# Patient Record
Sex: MALE | Race: WHITE | Employment: OTHER | URBAN - METROPOLITAN AREA
[De-identification: names, ages, dates, MRNs, and addresses within clinical notes are randomized per-mention and may not be internally consistent; named-entity substitution may affect disease eponyms.]

---

## 2017-01-04 RX ORDER — OXYCODONE HYDROCHLORIDE 5 MG/1
TABLET ORAL
Qty: 180 TABLET | Refills: 0 | Status: SHIPPED | OUTPATIENT
Start: 2017-01-04 | End: 2017-02-08

## 2017-01-04 RX ORDER — MORPHINE SULFATE 100 MG/1
100 TABLET ORAL EVERY 8 HOURS
Qty: 90 TABLET | Refills: 0 | Status: SHIPPED | OUTPATIENT
Start: 2017-01-04 | End: 2017-02-03

## 2017-01-04 RX ORDER — MORPHINE SULFATE 60 MG/1
60 TABLET, FILM COATED, EXTENDED RELEASE ORAL EVERY 8 HOURS
Qty: 90 TABLET | Refills: 0 | Status: SHIPPED | OUTPATIENT
Start: 2017-01-04 | End: 2017-02-03

## 2017-01-09 ENCOUNTER — TELEPHONE (OUTPATIENT)
Dept: PAIN CLINIC | Facility: HOSPITAL | Age: 57
End: 2017-01-09

## 2017-01-09 ENCOUNTER — TELEPHONE (OUTPATIENT)
Dept: PULMONOLOGY | Facility: CLINIC | Age: 57
End: 2017-01-09

## 2017-01-23 ENCOUNTER — PRIOR ORIGINAL RECORDS (OUTPATIENT)
Dept: OTHER | Age: 57
End: 2017-01-23

## 2017-02-06 NOTE — TELEPHONE ENCOUNTER
Patient sent blood sugars for review. On 1/23 patient's Luis Enrique Pong was increased to 10 mg daily. Patient refusing insulin.  Note from Dr. Raúl Boone from [facility] 12/6/16: \"Reviewed records which demonstrate significant transaminitis but no evidence of cirrhosis.  Would

## 2017-02-07 NOTE — TELEPHONE ENCOUNTER
Confirmed allergy to Januvia with patient. He reports having stomach issues and abdominal pain with Januvia.

## 2017-02-07 NOTE — TELEPHONE ENCOUNTER
Let's start januvia 100 mg daily. Can prescribe after discussing with him. I need to see him in [address] or April 2017.   thx

## 2017-02-07 NOTE — TELEPHONE ENCOUNTER
Called Brandon and let him know per AM based on blood sugars she would like to add Januvia 100mg PO daily to medications. He is agreeable to plan.      Dr. Evelyne Bustillos I noticed when sending medication patient has Januvia in his allergy list. Unsure what his michelle

## 2017-02-09 RX ORDER — MORPHINE SULFATE 60 MG/1
60 TABLET, FILM COATED, EXTENDED RELEASE ORAL EVERY 8 HOURS
Qty: 90 TABLET | Refills: 0 | Status: SHIPPED | OUTPATIENT
Start: 2017-02-09 | End: 2017-03-07

## 2017-02-09 RX ORDER — ZOLPIDEM TARTRATE 10 MG/1
10 TABLET ORAL NIGHTLY
Qty: 30 TABLET | Refills: 5 | Status: SHIPPED | OUTPATIENT
Start: 2017-02-09 | End: 2017-08-02

## 2017-02-09 RX ORDER — OXYCODONE HYDROCHLORIDE 5 MG/1
TABLET ORAL
Qty: 180 TABLET | Refills: 0 | Status: SHIPPED | OUTPATIENT
Start: 2017-02-09 | End: 2017-03-07

## 2017-02-09 RX ORDER — PREGABALIN 75 MG/1
75 CAPSULE ORAL 3 TIMES DAILY
Qty: 90 CAPSULE | Refills: 5 | Status: SHIPPED | OUTPATIENT
Start: 2017-02-09 | End: 2017-07-18

## 2017-02-09 RX ORDER — MORPHINE SULFATE 100 MG/1
100 TABLET ORAL EVERY 8 HOURS
Qty: 90 TABLET | Refills: 0 | Status: SHIPPED | OUTPATIENT
Start: 2017-02-09 | End: 2017-03-07

## 2017-02-10 NOTE — TELEPHONE ENCOUNTER
Dr. Lela Child sent the following routing message: \" Could we do tradjenta 5 mg daily? It is the same class of medication,  So he could have similar allergy with this too.  If patient is okay to try (after understanding that he may develop similar reaction)

## 2017-02-11 ENCOUNTER — PRIOR ORIGINAL RECORDS (OUTPATIENT)
Dept: OTHER | Age: 57
End: 2017-02-11

## 2017-02-11 ENCOUNTER — LAB ENCOUNTER (OUTPATIENT)
Dept: LAB | Facility: HOSPITAL | Age: 57
End: 2017-02-11
Attending: INTERNAL MEDICINE
Payer: MEDICARE

## 2017-02-11 DIAGNOSIS — I50.20 SYSTOLIC HEART FAILURE (HCC): Primary | ICD-10-CM

## 2017-02-11 LAB
ANION GAP SERPL CALC-SCNC: 10 MMOL/L (ref 0–18)
BUN SERPL-MCNC: 21 MG/DL (ref 8–20)
BUN/CREAT SERPL: 19.4 (ref 10–20)
CALCIUM SERPL-MCNC: 8.8 MG/DL (ref 8.5–10.5)
CHLORIDE SERPL-SCNC: 106 MMOL/L (ref 95–110)
CO2 SERPL-SCNC: 22 MMOL/L (ref 22–32)
CREAT SERPL-MCNC: 1.08 MG/DL (ref 0.5–1.5)
GLUCOSE SERPL-MCNC: 274 MG/DL (ref 70–99)
OSMOLALITY UR CALC.SUM OF ELEC: 299 MOSM/KG (ref 275–295)
POTASSIUM SERPL-SCNC: 5 MMOL/L (ref 3.3–5.1)
SODIUM SERPL-SCNC: 138 MMOL/L (ref 136–144)

## 2017-02-11 PROCEDURE — 80048 BASIC METABOLIC PNL TOTAL CA: CPT

## 2017-02-11 PROCEDURE — 36415 COLL VENOUS BLD VENIPUNCTURE: CPT

## 2017-02-18 ENCOUNTER — PRIOR ORIGINAL RECORDS (OUTPATIENT)
Dept: OTHER | Age: 57
End: 2017-02-18

## 2017-02-18 ENCOUNTER — LAB ENCOUNTER (OUTPATIENT)
Dept: LAB | Facility: HOSPITAL | Age: 57
End: 2017-02-18
Attending: INTERNAL MEDICINE
Payer: MEDICARE

## 2017-02-18 DIAGNOSIS — I50.20 SYSTOLIC HEART FAILURE (HCC): Primary | ICD-10-CM

## 2017-02-18 LAB
ALT SERPL-CCNC: 97 U/L (ref 17–63)
AST SERPL-CCNC: 68 U/L (ref 15–41)

## 2017-02-18 PROCEDURE — 87522 HEPATITIS C REVRS TRNSCRPJ: CPT

## 2017-02-18 PROCEDURE — 84460 ALANINE AMINO (ALT) (SGPT): CPT

## 2017-02-18 PROCEDURE — 84450 TRANSFERASE (AST) (SGOT): CPT

## 2017-02-18 PROCEDURE — 36415 COLL VENOUS BLD VENIPUNCTURE: CPT

## 2017-02-20 ENCOUNTER — TELEPHONE (OUTPATIENT)
Dept: PAIN CLINIC | Facility: HOSPITAL | Age: 57
End: 2017-02-20

## 2017-02-21 LAB
ALT (SGPT): 97 U/L
ALT (SGPT): 97 U/L
AST (SGOT): 68 U/L
BUN: 21 MG/DL
CREATININE, SERUM: 1.08 MG/DL
GLUCOSE: 274 MG/DL
HCV RNA QNT PCR INTERPRETATION: DETECTED
HCV RNA QNT REAL-TIME PCR LOG IU/ML: 6.1 LOG IU
POTASSIUM, SERUM: 5 MEQ/L
SODIUM: 138 MEQ/L

## 2017-03-06 ENCOUNTER — PRIOR ORIGINAL RECORDS (OUTPATIENT)
Dept: OTHER | Age: 57
End: 2017-03-06

## 2017-03-07 NOTE — TELEPHONE ENCOUNTER
Pt was informed this will be his last refill without and appt. Pt last seen in Aug 2016.   Pt states understanding

## 2017-03-09 ENCOUNTER — PRIOR ORIGINAL RECORDS (OUTPATIENT)
Dept: OTHER | Age: 57
End: 2017-03-09

## 2017-03-10 RX ORDER — MORPHINE SULFATE 60 MG/1
60 TABLET, FILM COATED, EXTENDED RELEASE ORAL EVERY 8 HOURS
Qty: 90 TABLET | Refills: 0 | Status: SHIPPED | OUTPATIENT
Start: 2017-03-10 | End: 2017-04-04

## 2017-03-10 RX ORDER — MORPHINE SULFATE 100 MG/1
100 TABLET ORAL EVERY 8 HOURS
Qty: 90 TABLET | Refills: 0 | Status: SHIPPED | OUTPATIENT
Start: 2017-03-10 | End: 2017-04-04

## 2017-03-10 RX ORDER — OXYCODONE HYDROCHLORIDE 5 MG/1
TABLET ORAL
Qty: 180 TABLET | Refills: 0 | Status: SHIPPED | OUTPATIENT
Start: 2017-03-10 | End: 2017-04-04

## 2017-03-14 ENCOUNTER — OFFICE VISIT (OUTPATIENT)
Dept: ENDOCRINOLOGY CLINIC | Facility: CLINIC | Age: 57
End: 2017-03-14

## 2017-03-14 VITALS
BODY MASS INDEX: 47.48 KG/M2 | SYSTOLIC BLOOD PRESSURE: 126 MMHG | WEIGHT: 285 LBS | DIASTOLIC BLOOD PRESSURE: 82 MMHG | HEIGHT: 65 IN | HEART RATE: 85 BPM

## 2017-03-14 DIAGNOSIS — E11.65 UNCONTROLLED TYPE 2 DIABETES MELLITUS WITH HYPERGLYCEMIA, WITHOUT LONG-TERM CURRENT USE OF INSULIN (HCC): Primary | ICD-10-CM

## 2017-03-14 LAB
CARTRIDGE LOT#: ABNORMAL NUMERIC
GLUCOSE BLOOD: 132
HEMOGLOBIN A1C: 8.5 % (ref 4.3–5.6)
TEST STRIP LOT #: NORMAL NUMERIC

## 2017-03-14 PROCEDURE — 36416 COLLJ CAPILLARY BLOOD SPEC: CPT | Performed by: INTERNAL MEDICINE

## 2017-03-14 PROCEDURE — 82962 GLUCOSE BLOOD TEST: CPT | Performed by: INTERNAL MEDICINE

## 2017-03-14 PROCEDURE — 99214 OFFICE O/P EST MOD 30 MIN: CPT | Performed by: INTERNAL MEDICINE

## 2017-03-14 PROCEDURE — 83036 HEMOGLOBIN GLYCOSYLATED A1C: CPT | Performed by: INTERNAL MEDICINE

## 2017-03-14 RX ORDER — DAPAGLIFLOZIN 10 MG/1
10 TABLET, FILM COATED ORAL DAILY
Refills: 2 | COMMUNITY
Start: 2017-02-23 | End: 2017-04-22

## 2017-03-14 NOTE — PROGRESS NOTES
Return Office Visit - Diabetes    CHIEF COMPLAINT:    Diabetes     HISTORY OF PRESENT ILLNESS:   Rosita Hillman is a 61year old male PMH as below including HTN, chronic CHF, chronic hepatitis C , diverticulitis who presents for a FU visit for DM ( discovere DAILY Disp: 90 tablet Rfl: 0   MICROLET LANCETS Does not apply Misc TEST TWICE DAILY Disp: 100 each Rfl: 0   Promethazine HCl 12.5 MG Oral Tab TK 1 T PO TID PRN N Disp:  Rfl: 0   Glucose Blood (TALIA CONTOUR NEXT TEST) In Vitro Strip Test 2-3 times a day. proptosis, blurring  ENT: Negative for:  dysphagia, neck swelling, dysphonia  Respiratory: Negative for:  dyspnea, cough  Cardiovascular: Negative for:  chest pain, palpitations, orthopnea  GI: Negative for:  abdominal pain, nausea, vomiting, diarrhea, con still high. Discussed insulin would be the best option. However, patient is resistant to insulin  Discussed that we could do the pills along with one shot of levemir. After a long discussion, patient agreed.    .     Goal a1c: < 7 %  Goal Fasting, Goal pre

## 2017-03-15 ENCOUNTER — TELEPHONE (OUTPATIENT)
Dept: ENDOCRINOLOGY CLINIC | Facility: CLINIC | Age: 57
End: 2017-03-15

## 2017-03-15 NOTE — TELEPHONE ENCOUNTER
Called pharmacy. Patient is in a coverage gap for Medicare. Unfortunately co-pay is $160 for 1 box of levemir. Bailee Benavidez is not on formulary. However, due to patient's low dose, one box of levemir should last him 6 months on his current dose.  Called patient

## 2017-03-21 ENCOUNTER — TELEPHONE (OUTPATIENT)
Dept: ENDOCRINOLOGY CLINIC | Facility: CLINIC | Age: 57
End: 2017-03-21

## 2017-03-21 NOTE — TELEPHONE ENCOUNTER
Spouse calling to report Blood sugar readings:      3/11  226  3/12  200  3/13  198  3/14  168  3/15  162  3/17   266  3/20   219   3/21 192 /310      No further information given.

## 2017-03-22 NOTE — TELEPHONE ENCOUNTER
farxiga 10 mg and Tradjenta 5 mg  Start levemir 8 units daily. Please confirm above mentioned regimen.   If correct, please increase levemir to 12

## 2017-03-22 NOTE — TELEPHONE ENCOUNTER
Spoke with Amaya Blood. Confirmed he is taking Levemir 8 units nightly, tradjenta 5 mg daily, and farxiga 10 mg daily. He will increase dose to 12 units and will fax sugars in 7-10 days.

## 2017-03-29 ENCOUNTER — TELEPHONE (OUTPATIENT)
Dept: ENDOCRINOLOGY CLINIC | Facility: CLINIC | Age: 57
End: 2017-03-29

## 2017-03-29 NOTE — TELEPHONE ENCOUNTER
Patient was last seen in internal medicine by Dr. Ronny dHz. Per request from Dr. Evelyne Bustillos routed to  and Dr. Ronny Hdz for review. Patient having abdominal pain, h/o hepatitis C. Refusing to go to ER.

## 2017-03-29 NOTE — TELEPHONE ENCOUNTER
He is regular pt of Dr Angel Houston.  i agree with sending him to ER then ffup with Dr Angel Houston tomorrow

## 2017-03-29 NOTE — TELEPHONE ENCOUNTER
Called patient. Informed him that, especially given his liver condition, she recommends that he goes to the ER for evaluation. Patient refuses to go.  Informed him that given his worsening liver condition, and that this pain is new that started this week, D

## 2017-03-29 NOTE — TELEPHONE ENCOUNTER
Called patient. He is currently taking Levemir 12 units nightly, Farxiga 10 mg daily, and tradjenta 5 mg daily. His Levemir dose was increased to 12 units 1 week ago, at which time he started having symptoms.  He takes the levemir shot at 8pm every night, a

## 2017-03-29 NOTE — TELEPHONE ENCOUNTER
Pt notified of EL message below. States he is still not willing to go to ER because his labs are not in from [place]. Stressed to pt that all providers want him to go to ER due to his history and new symptoms but pt adamantly refuses.   He states he will f

## 2017-03-29 NOTE — PROGRESS NOTES
Patient's Personal History/Story    PT OF [facility] SINCE 2000 FOR FIBROMYALGIA, ARTHRITIS, LT KNEE REPLACEMENTS     What Should We Know About You or Your Family to Care for You    ON DISABILITY   , NO CHILDREN  Patient Progress/Clinical Milestones

## 2017-03-30 NOTE — TELEPHONE ENCOUNTER
Followed up with patient. Reports feeling better today. No nausea last night or today and stomach pain has improved. Rated as 5/10 yesterday and 3/10 today. Feels better overall.  Routed to AM

## 2017-04-04 ENCOUNTER — OFFICE VISIT (OUTPATIENT)
Dept: PAIN CLINIC | Facility: HOSPITAL | Age: 57
End: 2017-04-04
Attending: NURSE PRACTITIONER
Payer: MEDICARE

## 2017-04-04 VITALS — HEIGHT: 65 IN | WEIGHT: 280 LBS | BODY MASS INDEX: 46.65 KG/M2

## 2017-04-04 DIAGNOSIS — M79.7 FIBROMYALGIA: ICD-10-CM

## 2017-04-04 DIAGNOSIS — Z79.891 ENCOUNTER FOR MONITORING OPIOID MAINTENANCE THERAPY: Primary | ICD-10-CM

## 2017-04-04 DIAGNOSIS — Z51.81 ENCOUNTER FOR MONITORING OPIOID MAINTENANCE THERAPY: Primary | ICD-10-CM

## 2017-04-04 PROCEDURE — 99211 OFF/OP EST MAY X REQ PHY/QHP: CPT

## 2017-04-04 PROCEDURE — 80307 DRUG TEST PRSMV CHEM ANLYZR: CPT | Performed by: NURSE PRACTITIONER

## 2017-04-04 PROCEDURE — 80356 HEROIN METABOLITE: CPT | Performed by: NURSE PRACTITIONER

## 2017-04-04 PROCEDURE — 80361 OPIATES 1 OR MORE: CPT | Performed by: NURSE PRACTITIONER

## 2017-04-04 PROCEDURE — 80365 DRUG SCREENING OXYCODONE: CPT | Performed by: NURSE PRACTITIONER

## 2017-04-04 RX ORDER — MORPHINE SULFATE 100 MG/1
100 TABLET ORAL EVERY 8 HOURS
Qty: 90 TABLET | Refills: 0 | Status: SHIPPED | OUTPATIENT
Start: 2017-04-09 | End: 2017-05-03

## 2017-04-04 RX ORDER — MORPHINE SULFATE 60 MG/1
60 TABLET, FILM COATED, EXTENDED RELEASE ORAL EVERY 8 HOURS
Qty: 90 TABLET | Refills: 0 | Status: SHIPPED | OUTPATIENT
Start: 2017-04-09 | End: 2017-05-03

## 2017-04-04 RX ORDER — OXYCODONE HYDROCHLORIDE 5 MG/1
TABLET ORAL
Qty: 180 TABLET | Refills: 0 | Status: SHIPPED | OUTPATIENT
Start: 2017-04-04 | End: 2017-05-03

## 2017-04-04 NOTE — PROGRESS NOTES
Patient presents to [facility] ambulatory with wife. He is here for a med eval.  We have been seeing him for chronic fibromyalgia in which he has pain in his abdomen, his lower back and his left leg.   He has numbness and tingling in his left hand, his left leg a

## 2017-04-04 NOTE — CHRONIC PAIN
MEDICATION EVALUATION    HISTORY OF PRESENT ILLNESS:  Jefferson Jeffers is a 61year old old male with history of chronic pain who presents for medication evaluation. He continues to report chronic joint pain. He denies new or worsening pain.  Pt reports that the tablet Rfl: 0   MICROLET LANCETS Does not apply Misc TEST TWICE DAILY Disp: 100 each Rfl: 0   Promethazine HCl 12.5 MG Oral Tab TK 1 T PO TID PRN N Disp:  Rfl: 0   Glucose Blood (TALIA CONTOUR NEXT TEST) In Vitro Strip Test 2-3 times a day.  Disp: 100 strip Epilepsy ([facility]     Pain and swelling of left lower leg     Fibromyalgia    Past Medical History   Diagnosis Date   • Seizure disorder ([facility]    • Lipid screening 1/17/2011     per NG   • Fibromyalgia    • Cardiomyopathy [facility])    • Rectal fissure      color

## 2017-04-11 RX ORDER — PEN NEEDLE, DIABETIC 32GX 5/32"
NEEDLE, DISPOSABLE MISCELLANEOUS
Qty: 100 EACH | Refills: 2 | Status: SHIPPED | OUTPATIENT
Start: 2017-04-11 | End: 2017-07-31

## 2017-04-11 RX ORDER — LINAGLIPTIN 5 MG/1
TABLET, FILM COATED ORAL
Qty: 30 TABLET | Refills: 2 | Status: SHIPPED | OUTPATIENT
Start: 2017-04-11 | End: 2017-06-27

## 2017-04-12 ENCOUNTER — TELEPHONE (OUTPATIENT)
Dept: ENDOCRINOLOGY CLINIC | Facility: CLINIC | Age: 57
End: 2017-04-12

## 2017-04-12 NOTE — TELEPHONE ENCOUNTER
Pt calling to report his lowest sugar reading was 163- now is back up to 207 210. Requesting to speak with RN.

## 2017-04-12 NOTE — TELEPHONE ENCOUNTER
Spoke with Juan Ramon Harrington. He is only checking fasting sugars. He is currently taking Levemir 12u nightly, Farxiga 10mg daily, and tradjenta 5 mg daily.                B  4/12: 220 (after breakfast)  4/11: 210  4/10: 207  4/9:  4/8:    163

## 2017-04-12 NOTE — TELEPHONE ENCOUNTER
Spoke with Lizeth Mayes and informed him of Dr. Teresa Brambila message below. He verbalized his understanding. He had no further questions at this time.

## 2017-04-24 RX ORDER — DAPAGLIFLOZIN 10 MG/1
TABLET, FILM COATED ORAL
Qty: 30 TABLET | Refills: 3 | Status: SHIPPED | OUTPATIENT
Start: 2017-04-24 | End: 2017-06-27

## 2017-04-26 ENCOUNTER — PRIOR ORIGINAL RECORDS (OUTPATIENT)
Dept: OTHER | Age: 57
End: 2017-04-26

## 2017-05-04 RX ORDER — OXYCODONE HYDROCHLORIDE 5 MG/1
TABLET ORAL
Qty: 180 TABLET | Refills: 0 | Status: SHIPPED | OUTPATIENT
Start: 2017-05-08 | End: 2017-06-06

## 2017-05-04 RX ORDER — MORPHINE SULFATE 100 MG/1
100 TABLET ORAL EVERY 8 HOURS
Qty: 90 TABLET | Refills: 0 | Status: SHIPPED | OUTPATIENT
Start: 2017-05-08 | End: 2017-06-06

## 2017-05-04 RX ORDER — MORPHINE SULFATE 60 MG/1
60 TABLET, FILM COATED, EXTENDED RELEASE ORAL EVERY 8 HOURS
Qty: 90 TABLET | Refills: 0 | Status: SHIPPED | OUTPATIENT
Start: 2017-05-08 | End: 2017-06-06

## 2017-05-11 ENCOUNTER — TELEPHONE (OUTPATIENT)
Dept: ENDOCRINOLOGY CLINIC | Facility: CLINIC | Age: 57
End: 2017-05-11

## 2017-05-15 NOTE — TELEPHONE ENCOUNTER
Reviewed BG. Morning BG on the higher side. Please confirm regimen. I think he is on 15 levemir. If that is the case, should increase to 18. Call in a week with Bg. Checks some more Bg during the day.      Thx.

## 2017-05-15 NOTE — TELEPHONE ENCOUNTER
Called Brandon. He is currently taking levemir 14 units. Increase to 17? Also patient will need a refill of levemir with new dosing instructions.

## 2017-05-15 NOTE — TELEPHONE ENCOUNTER
Called Brandon. Informed him to increase levemir to 17 units daily and call in 1 week with sugars. He verbalized his understanding and had no further questions at this time.

## 2017-06-06 RX ORDER — MORPHINE SULFATE 100 MG/1
100 TABLET ORAL EVERY 8 HOURS
Qty: 90 TABLET | Refills: 0 | Status: SHIPPED | OUTPATIENT
Start: 2017-06-07 | End: 2017-06-30

## 2017-06-06 RX ORDER — MORPHINE SULFATE 60 MG/1
60 TABLET, FILM COATED, EXTENDED RELEASE ORAL EVERY 8 HOURS
Qty: 90 TABLET | Refills: 0 | Status: SHIPPED | OUTPATIENT
Start: 2017-06-07 | End: 2017-06-30

## 2017-06-06 RX ORDER — OXYCODONE HYDROCHLORIDE 5 MG/1
TABLET ORAL
Qty: 180 TABLET | Refills: 0 | Status: SHIPPED | OUTPATIENT
Start: 2017-06-07 | End: 2017-06-30

## 2017-06-27 ENCOUNTER — OFFICE VISIT (OUTPATIENT)
Dept: ENDOCRINOLOGY CLINIC | Facility: CLINIC | Age: 57
End: 2017-06-27

## 2017-06-27 VITALS
HEIGHT: 65.5 IN | BODY MASS INDEX: 46.65 KG/M2 | HEART RATE: 62 BPM | WEIGHT: 283.38 LBS | DIASTOLIC BLOOD PRESSURE: 85 MMHG | SYSTOLIC BLOOD PRESSURE: 121 MMHG

## 2017-06-27 DIAGNOSIS — E11.65 UNCONTROLLED TYPE 2 DIABETES MELLITUS WITH HYPERGLYCEMIA, WITH LONG-TERM CURRENT USE OF INSULIN (HCC): Primary | ICD-10-CM

## 2017-06-27 DIAGNOSIS — Z79.4 UNCONTROLLED TYPE 2 DIABETES MELLITUS WITH HYPERGLYCEMIA, WITH LONG-TERM CURRENT USE OF INSULIN (HCC): Primary | ICD-10-CM

## 2017-06-27 PROCEDURE — 83036 HEMOGLOBIN GLYCOSYLATED A1C: CPT | Performed by: INTERNAL MEDICINE

## 2017-06-27 PROCEDURE — 99214 OFFICE O/P EST MOD 30 MIN: CPT | Performed by: INTERNAL MEDICINE

## 2017-06-27 PROCEDURE — 36416 COLLJ CAPILLARY BLOOD SPEC: CPT | Performed by: INTERNAL MEDICINE

## 2017-06-27 PROCEDURE — 82962 GLUCOSE BLOOD TEST: CPT | Performed by: INTERNAL MEDICINE

## 2017-06-27 RX ORDER — INSULIN LISPRO 100 [IU]/ML
18 INJECTION, SUSPENSION SUBCUTANEOUS 2 TIMES DAILY
Qty: 15 ML | Refills: 0 | Status: SHIPPED | OUTPATIENT
Start: 2017-06-27 | End: 2017-07-21

## 2017-06-27 NOTE — PROGRESS NOTES
Return Office Visit - Diabetes    CHIEF COMPLAINT:    Diabetes     HISTORY OF PRESENT ILLNESS:   Orquidea Pascal is a 61year old male PMH as below including HTN, chronic CHF, chronic hepatitis C , diverticulitis who presents for a FU visit for DM ( discovere Promethazine HCl 12.5 MG Oral Tab TK 1 T PO TID PRN N Disp:  Rfl: 0   Glucose Blood (TALIA CONTOUR NEXT TEST) In Vitro Strip Test 2-3 times a day.  Disp: 100 strip Rfl: 3   Elastic Bandages & Supports (MEDICAL COMPRESSION SOCKS) Does not apply Misc 1 each diarrhea, constipation, bleeding  Neurology: Negative for: headache, numbness, weakness  Genito-Urinary: Negative for: dysuria, frequency  Psychiatric: Negative for:  depression, anxiety  Hematology/Lymphatics: Negative for: bruising, lower extremity edema call me with BG in 2-3 days. b). Urine microalbumin /creatinine normal.  C). He is up to date with eye exam.   d). Foot exam: Daily feet exam explained. e). BG log maintainence explained in great detail, to get log and glucometer on next visit.   f)

## 2017-06-30 ENCOUNTER — TELEPHONE (OUTPATIENT)
Dept: ENDOCRINOLOGY CLINIC | Facility: CLINIC | Age: 57
End: 2017-06-30

## 2017-06-30 RX ORDER — MORPHINE SULFATE 60 MG/1
60 TABLET, FILM COATED, EXTENDED RELEASE ORAL EVERY 8 HOURS
Qty: 90 TABLET | Refills: 0 | Status: SHIPPED | OUTPATIENT
Start: 2017-07-06 | End: 2017-06-30

## 2017-06-30 RX ORDER — MORPHINE SULFATE 100 MG/1
100 TABLET ORAL EVERY 8 HOURS
Qty: 90 TABLET | Refills: 0 | Status: SHIPPED | OUTPATIENT
Start: 2017-07-06 | End: 2017-07-31

## 2017-06-30 RX ORDER — OXYCODONE HYDROCHLORIDE 5 MG/1
TABLET ORAL
Qty: 180 TABLET | Refills: 0 | Status: SHIPPED | OUTPATIENT
Start: 2017-07-06 | End: 2017-07-31

## 2017-06-30 NOTE — TELEPHONE ENCOUNTER
Per LOV note on 6/27/17 patient was given the following instructions: Will insulin 70/30 18 units BID. Stop levemir, stop farxiga and stop jardiance  He will call me with BG in 2-3 days.      Also called patient to see if he has been checking blood suga

## 2017-06-30 NOTE — TELEPHONE ENCOUNTER
Called Brandon. Informed him of Dr. Alyx Wills message below. He will increase evening dose to 22 units. He will call with blood sugars in 1 week or sooner if he has any blood sugars <70 or persistently >300.

## 2017-07-03 RX ORDER — MORPHINE SULFATE 60 MG/1
60 TABLET, FILM COATED, EXTENDED RELEASE ORAL EVERY 8 HOURS
Qty: 90 TABLET | Refills: 0 | Status: SHIPPED | OUTPATIENT
Start: 2017-07-06 | End: 2017-07-31

## 2017-07-07 ENCOUNTER — TELEPHONE (OUTPATIENT)
Dept: ENDOCRINOLOGY CLINIC | Facility: CLINIC | Age: 57
End: 2017-07-07

## 2017-07-07 NOTE — TELEPHONE ENCOUNTER
Pt is calling in with his sugar readings.  Before breakfast and before dinner    7/1 - 186 - 270  7/2 - 187 - no reading before dinner  7/3 - 292 - 275  7/4 - 202 - 371  7/5 - 174 - no reading before dinner  7/6 - 320 - 298   7/7 - 214

## 2017-07-07 NOTE — TELEPHONE ENCOUNTER
Let's increase to 24 twice a day. Call in one week with before Bf abd before dinner. Call before if Bg is < 70.   Thx.

## 2017-07-07 NOTE — TELEPHONE ENCOUNTER
Called Brandon. Informed him of Dr. Trenton Schilder instructions below. He verbalized his understanding and repeated instructions back correctly to the RN. He had no further questions at this time.

## 2017-07-14 ENCOUNTER — TELEPHONE (OUTPATIENT)
Dept: ENDOCRINOLOGY CLINIC | Facility: CLINIC | Age: 57
End: 2017-07-14

## 2017-07-14 NOTE — TELEPHONE ENCOUNTER
Called patient. Informed him of Dr. Lesly Connelly instructions below. He verbalized his understanding and had no further questions at this time.

## 2017-07-14 NOTE — TELEPHONE ENCOUNTER
Pt called with sugar readings:       Before Breakfast Before Dinner  7/9/17  277   177  7/10/17  223   209  7/11/17  228   223  7/12/17  159     7/13/17  231   293  7/14/17  239    Please call.

## 2017-07-18 ENCOUNTER — TELEPHONE (OUTPATIENT)
Dept: PAIN CLINIC | Facility: HOSPITAL | Age: 57
End: 2017-07-18

## 2017-07-21 ENCOUNTER — TELEPHONE (OUTPATIENT)
Dept: ENDOCRINOLOGY CLINIC | Facility: CLINIC | Age: 57
End: 2017-07-21

## 2017-07-21 RX ORDER — INSULIN LISPRO 100 [IU]/ML
INJECTION, SUSPENSION SUBCUTANEOUS
Qty: 15 ML | Refills: 0 | COMMUNITY
Start: 2017-07-21 | End: 2017-09-01

## 2017-07-21 RX ORDER — INSULIN LISPRO 100 [IU]/ML
INJECTION, SUSPENSION SUBCUTANEOUS
Qty: 30 ML | Refills: 0 | Status: SHIPPED | OUTPATIENT
Start: 2017-07-21 | End: 2017-08-23

## 2017-07-21 NOTE — TELEPHONE ENCOUNTER
Pt called with readings:       Morning  Afternoon  7/15/17  281  393  7/16/17  246  376  7/17/17  199  278  7/18/17  235  7/19/17  188  306  7/20/17  247  364  7/21/17  273    Please call.

## 2017-07-21 NOTE — TELEPHONE ENCOUNTER
Contacted pt and notified him AM reviewed his blood sugars. Blood sugars continue to be high so advised him to increase insulin to 36 units in the morning and 32 units in the evening. Pt gave verbal readback of new insulin instructions.  He will call back i

## 2017-07-24 RX ORDER — PREGABALIN 75 MG/1
75 CAPSULE ORAL 3 TIMES DAILY
Qty: 90 CAPSULE | Refills: 5 | Status: SHIPPED | OUTPATIENT
Start: 2017-07-24 | End: 2017-08-14

## 2017-07-28 ENCOUNTER — TELEPHONE (OUTPATIENT)
Dept: ENDOCRINOLOGY CLINIC | Facility: CLINIC | Age: 57
End: 2017-07-28

## 2017-07-28 NOTE — TELEPHONE ENCOUNTER
Pt called with sugar readings:      Before Breakfast  Before Dinner   7/23/17  294   154  7/24/17  192   244  7/25/17  257   194  7/26/17  227   212  7/27/17  211   350 (after lunch)  7/28/17  268    Please call.

## 2017-07-28 NOTE — TELEPHONE ENCOUNTER
Patient sent sugars for review.  He is currently taking insulin 70/30: 36 units in the morning and 32 units in the evening

## 2017-07-31 NOTE — TELEPHONE ENCOUNTER
Called Brandon. Informed him of Dr. Moore Se instructions below. He needs a refill on his insulin pen needles. Order pending. He will call with sugars in 1 week.

## 2017-08-01 RX ORDER — MORPHINE SULFATE 60 MG/1
60 TABLET, FILM COATED, EXTENDED RELEASE ORAL EVERY 8 HOURS
Qty: 90 TABLET | Refills: 0 | Status: SHIPPED | OUTPATIENT
Start: 2017-08-05 | End: 2017-09-05

## 2017-08-01 RX ORDER — OXYCODONE HYDROCHLORIDE 5 MG/1
TABLET ORAL
Qty: 180 TABLET | Refills: 0 | Status: SHIPPED | OUTPATIENT
Start: 2017-08-05 | End: 2017-08-14

## 2017-08-01 RX ORDER — MORPHINE SULFATE 100 MG/1
100 TABLET ORAL EVERY 8 HOURS
Qty: 90 TABLET | Refills: 0 | Status: SHIPPED | OUTPATIENT
Start: 2017-08-05 | End: 2017-09-04

## 2017-08-02 RX ORDER — ZOLPIDEM TARTRATE 10 MG/1
10 TABLET ORAL NIGHTLY
Qty: 30 TABLET | Refills: 2 | Status: SHIPPED | OUTPATIENT
Start: 2017-08-02 | End: 2017-09-05

## 2017-08-04 ENCOUNTER — TELEPHONE (OUTPATIENT)
Dept: ENDOCRINOLOGY CLINIC | Facility: CLINIC | Age: 57
End: 2017-08-04

## 2017-08-04 NOTE — TELEPHONE ENCOUNTER
Lissette Taylor. Informed him of Dr. Covington Spearing message below. He verbalized his undrestanding.

## 2017-08-04 NOTE — TELEPHONE ENCOUNTER
Pt called to give AM Sugar Readings      Date: Mornings Before Dinner  7/28 268  176  7/29 183  218  7/30 259  208  7/31 156  200  8/1 371  295  8/2 247  291  8/3 224  240  8/4 157      Thank You

## 2017-08-07 ENCOUNTER — TELEPHONE (OUTPATIENT)
Dept: ENDOCRINOLOGY CLINIC | Facility: CLINIC | Age: 57
End: 2017-08-07

## 2017-08-07 NOTE — TELEPHONE ENCOUNTER
Refer to previous TE Pt spoke to RN earlier but lost information on increased amount of insulin. Please Call back to clarify.

## 2017-08-07 NOTE — TELEPHONE ENCOUNTER
Called Brandon and let him know per AM to increase insulin to 50 units SQ in the morning and 44 in the evening. Understands to call back in 1 week with BG readings.

## 2017-08-07 NOTE — TELEPHONE ENCOUNTER
Pt called with sugar readings:        Before breakfast  After Lunch   8/5/17   179   319  8/6/17   267   200  8/7/17   254    Please call.

## 2017-08-11 ENCOUNTER — TELEPHONE (OUTPATIENT)
Dept: ENDOCRINOLOGY CLINIC | Facility: CLINIC | Age: 57
End: 2017-08-11

## 2017-08-11 NOTE — TELEPHONE ENCOUNTER
Spoke with Emmie Runner. Informed him of changes below. Understands to increase to 56 units in the morning and 50 units in the evening.      FYI he is checking fasting before breakfast and about 2 hours after last meal (which is eaten around 3-4 pm, doesn't eat aft

## 2017-08-11 NOTE — TELEPHONE ENCOUNTER
56 am and 50 pm   Is he checking before Bf and before dinner  Call in one week with BG.      Veronicax.

## 2017-08-11 NOTE — TELEPHONE ENCOUNTER
Pt called to give AM Sugar readings:    Date: Mornings  Evenings  8/6 267  200  8/7 254  247  8/8 265  419  8/9 220  130  8/10 222  433  8/11 294       Thank You

## 2017-08-11 NOTE — TELEPHONE ENCOUNTER
Last adjustment made last week. Patient currently taking 70/30 insulin 50 units in the morning and 44 units at night.

## 2017-08-14 ENCOUNTER — OFFICE VISIT (OUTPATIENT)
Dept: PAIN CLINIC | Facility: HOSPITAL | Age: 57
End: 2017-08-14
Attending: ANESTHESIOLOGY
Payer: MEDICARE

## 2017-08-14 VITALS
HEIGHT: 65 IN | HEART RATE: 78 BPM | BODY MASS INDEX: 47.48 KG/M2 | DIASTOLIC BLOOD PRESSURE: 95 MMHG | SYSTOLIC BLOOD PRESSURE: 128 MMHG | WEIGHT: 285 LBS

## 2017-08-14 DIAGNOSIS — M79.7 FIBROMYALGIA: Primary | ICD-10-CM

## 2017-08-14 DIAGNOSIS — M79.89 PAIN AND SWELLING OF LEFT LOWER LEG: ICD-10-CM

## 2017-08-14 DIAGNOSIS — M79.662 PAIN AND SWELLING OF LEFT LOWER LEG: ICD-10-CM

## 2017-08-14 DIAGNOSIS — F11.90 CHRONIC, CONTINUOUS USE OF OPIOIDS: ICD-10-CM

## 2017-08-14 PROCEDURE — 99211 OFF/OP EST MAY X REQ PHY/QHP: CPT

## 2017-08-14 RX ORDER — NORTRIPTYLINE HYDROCHLORIDE 25 MG/1
25 CAPSULE ORAL NIGHTLY
Qty: 30 CAPSULE | Refills: 2 | Status: SHIPPED | OUTPATIENT
Start: 2017-08-14 | End: 2017-11-01

## 2017-08-14 RX ORDER — PREGABALIN 100 MG/1
100 CAPSULE ORAL 3 TIMES DAILY
Qty: 90 CAPSULE | Refills: 2 | Status: SHIPPED | OUTPATIENT
Start: 2017-08-14 | End: 2017-11-06

## 2017-08-14 RX ORDER — PREGABALIN 75 MG/1
75 CAPSULE ORAL 3 TIMES DAILY
Qty: 90 CAPSULE | Refills: 5 | OUTPATIENT
Start: 2017-08-14 | End: 2018-02-10

## 2017-08-14 NOTE — CHRONIC PAIN
MEDICATION EVALUATION    HISTORY OF PRESENT ILLNESS:  Carmen Castellanos is a 61year old old male with history of chronic pain 2/2 fibromyalgia, joint pain who presents for medication evaluation. He continues to report chronic joint pain, right breast pain.  He d Insulin Lispro Prot & Lispro (HUMALOG MIX 75/25 KWIKPEN) (75-25) 100 UNIT/ML SC SUPN Inject 36 units SC every morning and 32 units SC every night Disp: 15 mL Rfl: 0   Insulin Lispro Prot & Lispro (HUMALOG MIX 75/25 KWIKPEN) (75-25) 100 UNIT/ML SC SUPN In Rfl: 0        REVIEW OF SYSTEMS:   Bowel/Bladder Incontinence: as above  Coughing/sneezing/straining does not exacerbate the pain.   Numbness/tingling: as above  Weakness: as above  Weight Loss: Negative   Fever: Negative   Cardiovascular:  No current chest Ayaz Henning is a 61year old  male, with history of chronic pain secondary to fibromyalgia, history of knee replacement status post foot drop in 2010 with improvement in overall functionality. Patient is now ambulating without the presence of a cane or walker.

## 2017-08-14 NOTE — PROGRESS NOTES
8/14/17-Presents ambulatory to [facility] for med eval for treatment of fibromyalgia. Presents with wife, reports more pain today than usual and feels this is due to humid weather and not sleeping well last night, otherwise no new concerns.  Seen by Miladis Wolf

## 2017-08-18 ENCOUNTER — TELEPHONE (OUTPATIENT)
Dept: ENDOCRINOLOGY CLINIC | Facility: CLINIC | Age: 57
End: 2017-08-18

## 2017-08-18 NOTE — TELEPHONE ENCOUNTER
Patient sent sugars for review. Per chart he is currently taking insulin 70/30 56 units in the morning and 50 units in the evening. Did inform patient last time to check before meals.

## 2017-08-18 NOTE — TELEPHONE ENCOUNTER
Called Brandon. Informed him of Dr. Cora Mulligan message below. He verbalized his understanding and had no further questions at this time.

## 2017-08-18 NOTE — TELEPHONE ENCOUNTER
Pt called with sugar readings:       Before Breakfast After Lunch  8/13/17  300   218  8/14/17  251   300  8/15/17  328   308  8/16/17  367 206 (before Lunch)  300 (after lunch)   8/17/17  282 300 \"      285        \"  8/18/18  231    Please call.

## 2017-08-18 NOTE — TELEPHONE ENCOUNTER
Let's increase to 64 am and 58 with dinner. Call in one week. If remain elevated, I would like to see him to discuss further options.      Thx.

## 2017-08-21 RX ORDER — LANCETS
EACH MISCELLANEOUS
Qty: 200 EACH | Refills: 0 | Status: SHIPPED | OUTPATIENT
Start: 2017-08-21 | End: 2017-09-15

## 2017-08-21 RX ORDER — LEVETIRACETAM 1000 MG/1
TABLET ORAL
Qty: 90 TABLET | Refills: 0 | OUTPATIENT
Start: 2017-08-21

## 2017-08-23 RX ORDER — INSULIN LISPRO 100 [IU]/ML
INJECTION, SUSPENSION SUBCUTANEOUS
Qty: 30 ML | Refills: 0 | Status: SHIPPED | OUTPATIENT
Start: 2017-08-23 | End: 2017-09-01

## 2017-08-23 RX ORDER — LEVETIRACETAM 1000 MG/1
1000 TABLET ORAL 2 TIMES DAILY
Qty: 60 TABLET | Refills: 0 | Status: SHIPPED | OUTPATIENT
Start: 2017-08-23 | End: 2017-09-21

## 2017-08-23 NOTE — TELEPHONE ENCOUNTER
Medication request: Levetiracetam 100 mg take 1 tab BID, qt:60 0 refills  LOV: 9/15/15  NOV: 9/21/17    Last refill: 10/3/16 qt:90

## 2017-08-25 ENCOUNTER — TELEPHONE (OUTPATIENT)
Dept: ENDOCRINOLOGY CLINIC | Facility: CLINIC | Age: 57
End: 2017-08-25

## 2017-08-25 NOTE — TELEPHONE ENCOUNTER
Blood Sugar Readings - before meals    DATE  AM  KWAKU    8/18/2017 231  238  8/19/2017 284  235  8/20/2017 242  190  8/21/2017 210  240  8/22/2017 218  225  8/23/2017 171  244  8/24/2017 280  200  8/25/2017 398  -    Please call. Thank you.

## 2017-08-25 NOTE — TELEPHONE ENCOUNTER
Instructions from last adjustment:    Let's increase to 64 am and 58 with dinner.      Call in one week. If remain elevated, I would like to see him to discuss further options.

## 2017-09-01 ENCOUNTER — TELEPHONE (OUTPATIENT)
Dept: ENDOCRINOLOGY CLINIC | Facility: CLINIC | Age: 57
End: 2017-09-01

## 2017-09-01 RX ORDER — INSULIN LISPRO 100 [IU]/ML
INJECTION, SUSPENSION SUBCUTANEOUS
Qty: 30 ML | Refills: 0 | COMMUNITY
Start: 2017-09-01 | End: 2017-09-12

## 2017-09-01 NOTE — TELEPHONE ENCOUNTER
Pt called w/readings:        Before Brkfst  Before Dinner   8/25/17   398   284  8/26/17   257   220  8/27/17   247   168  8/28/17   166   291  8/29/17   279   250  8/30/17   201   328  8/31/17   266   250  9/1/17   288    Please call.

## 2017-09-01 NOTE — TELEPHONE ENCOUNTER
Is he injecting it okay? No leaks? His sugars do not seem to go down even after increasing insulin doses. Increase by 6 units on both doses and I will see him on 9/12.

## 2017-09-01 NOTE — TELEPHONE ENCOUNTER
Pt returned call. Read AM's message below to patient. He states he used to have leaking and used to develop a bump after injecting insulin when he first started but now that he knows to inject more slowly, he doesn't experience this anymore.  He will increa

## 2017-09-01 NOTE — TELEPHONE ENCOUNTER
Per notes from last encounter. Dr. Kristen Wagner asked to see patient in clinic given persistent high blood sugars despite insulin adjustment. He was booked for 8/29 but rescheduled to 9/12.  Current doses of 70/30 are 64 units in the morning and 58 with dinner

## 2017-09-06 RX ORDER — MORPHINE SULFATE 60 MG/1
60 TABLET, FILM COATED, EXTENDED RELEASE ORAL EVERY 8 HOURS
Qty: 90 TABLET | Refills: 0 | Status: SHIPPED | OUTPATIENT
Start: 2017-10-05 | End: 2017-10-31

## 2017-09-06 RX ORDER — MORPHINE SULFATE 100 MG/1
100 TABLET ORAL EVERY 8 HOURS
Qty: 90 TABLET | Refills: 0 | Status: SHIPPED | OUTPATIENT
Start: 2017-09-06 | End: 2017-10-06

## 2017-09-06 RX ORDER — MORPHINE SULFATE 60 MG/1
60 TABLET, FILM COATED, EXTENDED RELEASE ORAL EVERY 8 HOURS
Qty: 90 TABLET | Refills: 0 | Status: SHIPPED | OUTPATIENT
Start: 2017-09-06 | End: 2018-01-25

## 2017-09-06 RX ORDER — ZOLPIDEM TARTRATE 10 MG/1
10 TABLET ORAL NIGHTLY
Qty: 30 TABLET | Refills: 2 | Status: SHIPPED | OUTPATIENT
Start: 2017-09-06 | End: 2018-02-01

## 2017-09-08 ENCOUNTER — TELEPHONE (OUTPATIENT)
Dept: ENDOCRINOLOGY CLINIC | Facility: CLINIC | Age: 57
End: 2017-09-08

## 2017-09-08 NOTE — TELEPHONE ENCOUNTER
Pt calling with BG readings     9/1- 288 in am , 241 in pm     9/2- 271 in am , 328 in pm    9/3- 190 in am , 211 in pm     9/4- 183 in am , 201 in pm    9/5- 229 in am , 289 in pm     9/6- 181 in am , 159 in pm     9/7- 294 in am , 154 in pm     9/8- 300

## 2017-09-08 NOTE — TELEPHONE ENCOUNTER
Spoke to Reese ventura and let him know per AM he should increase insulin to 74 units in the morning and 70 at night.  He has follow up scheduled 9/12

## 2017-09-08 NOTE — TELEPHONE ENCOUNTER
Per chart patient is currently takin units in the morning & 64 units with dinner of insulin 70/30. He has a follow up appt scheduled 17.

## 2017-09-12 ENCOUNTER — OFFICE VISIT (OUTPATIENT)
Dept: ENDOCRINOLOGY CLINIC | Facility: CLINIC | Age: 57
End: 2017-09-12

## 2017-09-12 ENCOUNTER — TELEPHONE (OUTPATIENT)
Dept: ENDOCRINOLOGY CLINIC | Facility: CLINIC | Age: 57
End: 2017-09-12

## 2017-09-12 VITALS
HEIGHT: 65.5 IN | WEIGHT: 297.19 LBS | SYSTOLIC BLOOD PRESSURE: 130 MMHG | DIASTOLIC BLOOD PRESSURE: 82 MMHG | HEART RATE: 64 BPM | BODY MASS INDEX: 48.92 KG/M2

## 2017-09-12 DIAGNOSIS — E11.65 UNCONTROLLED TYPE 2 DIABETES MELLITUS WITH HYPERGLYCEMIA, WITHOUT LONG-TERM CURRENT USE OF INSULIN (HCC): Primary | ICD-10-CM

## 2017-09-12 LAB
CARTRIDGE LOT#: ABNORMAL NUMERIC
GLUCOSE BLOOD: 105
HEMOGLOBIN A1C: 9.6 % (ref 4.3–5.6)
TEST STRIP LOT #: NORMAL NUMERIC

## 2017-09-12 PROCEDURE — 36416 COLLJ CAPILLARY BLOOD SPEC: CPT | Performed by: INTERNAL MEDICINE

## 2017-09-12 PROCEDURE — 82962 GLUCOSE BLOOD TEST: CPT | Performed by: INTERNAL MEDICINE

## 2017-09-12 PROCEDURE — 99214 OFFICE O/P EST MOD 30 MIN: CPT | Performed by: INTERNAL MEDICINE

## 2017-09-12 PROCEDURE — 83036 HEMOGLOBIN GLYCOSYLATED A1C: CPT | Performed by: INTERNAL MEDICINE

## 2017-09-12 NOTE — PROGRESS NOTES
Return Office Visit - Diabetes    CHIEF COMPLAINT:    Diabetes     HISTORY OF PRESENT ILLNESS:   Luly Deluca is a 61year old male PMH as below including HTN, chronic CHF, chronic hepatitis C , diverticulitis who presents for a FU visit for DM ( discovere HCl 25 MG Oral Cap Take 1 capsule (25 mg total) by mouth nightly.  Disp: 30 capsule Rfl: 2   Insulin Pen Needle (BD PEN NEEDLE ROMMEL U/F) 32G X 4 MM Does not apply Misc USE TO INJECT insulin two times per day Disp: 100 each Rfl: 2   Promethazine HCl 12.5 MG blurring  ENT: Negative for:  dysphagia, neck swelling, dysphonia  Respiratory: Negative for:  dyspnea, cough  Cardiovascular: Negative for:  chest pain, palpitations, orthopnea  GI: Negative for:  abdominal pain, nausea, vomiting, diarrhea, constipation, snack  Discussed risk of hypoglycemia. Discussed that he should call with BG as instructed ( check before meals)      b). Urine microalbumin /creatinine normal.  C). He is up to date with eye exam.   d). Foot exam: Daily feet exam explained. e).  BG log

## 2017-09-13 ENCOUNTER — TELEPHONE (OUTPATIENT)
Dept: ENDOCRINOLOGY CLINIC | Facility: CLINIC | Age: 57
End: 2017-09-13

## 2017-09-13 NOTE — TELEPHONE ENCOUNTER
Sent Rx as written in LOV note. Bola Wandy states that the pharmacy has to order his Humulin R U-500 insulin and it will not be in until Thursday. He will continue his 70/30 insulin until he gets it. He states to call with any instructions.

## 2017-09-15 ENCOUNTER — TELEPHONE (OUTPATIENT)
Dept: ENDOCRINOLOGY CLINIC | Facility: CLINIC | Age: 57
End: 2017-09-15

## 2017-09-15 RX ORDER — LANCETS
EACH MISCELLANEOUS
Qty: 200 EACH | Refills: 0 | Status: SHIPPED | OUTPATIENT
Start: 2017-09-15 | End: 2017-10-23

## 2017-09-15 NOTE — TELEPHONE ENCOUNTER
Called Lizeth Mayes and discussed message below. He understands to call Monday with BG readings and tonight will check BG over night and will call if <70 or will call if BG readings persistently >350. He is requesting refill for lancets for glucometer.  Sent per AM

## 2017-09-15 NOTE — TELEPHONE ENCOUNTER
Noted. Call on Monday with sugars. Just for tonight, check a sugar in the middel of the night. Call if under 70. Other wise call on Monday with BG. Call before if BG in > 350.

## 2017-09-18 ENCOUNTER — TELEPHONE (OUTPATIENT)
Dept: ENDOCRINOLOGY CLINIC | Facility: CLINIC | Age: 57
End: 2017-09-18

## 2017-09-18 NOTE — TELEPHONE ENCOUNTER
Called Brandon and let him know per AM he should increase doses to 55 units with breakfast, 65 units with lunch and 25 units with his evening snack. He will contact our office on Wednesday with blood sugars.

## 2017-09-18 NOTE — TELEPHONE ENCOUNTER
U 500 50 BF / 60 2nd meal /20 snack\"---> 55 BF/ 65 second meal/25 snack    Call on Wednesday with sugars  Thanks.

## 2017-09-18 NOTE — TELEPHONE ENCOUNTER
Pt called to give AM sugar readings:  BB-Before Breakfast  BL-Before Lunch  BD-Before Dinner        Date: BB BL BD  9/15 203 274 260  9/16 297 149 244  9/17 256 269 222  9/18 186        Thank You

## 2017-09-20 ENCOUNTER — TELEPHONE (OUTPATIENT)
Dept: ENDOCRINOLOGY CLINIC | Facility: CLINIC | Age: 57
End: 2017-09-20

## 2017-09-20 NOTE — TELEPHONE ENCOUNTER
Returned call to [email or web address]. Discussed below changes. Current doses now 55-70-25 and he understands to contact our office on Monday again with readings.

## 2017-09-20 NOTE — TELEPHONE ENCOUNTER
Pt called to give AM sugar readings:    BB-Before Breakfast  BL-Before Lunch  BD-Before Dinner  BB-Before Bed    Date:  BB BL BD BB  9/18 186 161 194  9/19 131 313 191 174  9/20 266    Thank You

## 2017-09-21 ENCOUNTER — OFFICE VISIT (OUTPATIENT)
Dept: NEUROLOGY | Facility: CLINIC | Age: 57
End: 2017-09-21

## 2017-09-21 VITALS
HEART RATE: 80 BPM | RESPIRATION RATE: 18 BRPM | SYSTOLIC BLOOD PRESSURE: 130 MMHG | DIASTOLIC BLOOD PRESSURE: 70 MMHG | HEIGHT: 65.5 IN | WEIGHT: 297 LBS | BODY MASS INDEX: 48.89 KG/M2

## 2017-09-21 DIAGNOSIS — G40.909 NONINTRACTABLE EPILEPSY WITHOUT STATUS EPILEPTICUS, UNSPECIFIED EPILEPSY TYPE (HCC): Primary | ICD-10-CM

## 2017-09-21 PROCEDURE — 99214 OFFICE O/P EST MOD 30 MIN: CPT | Performed by: OTHER

## 2017-09-21 RX ORDER — LEVETIRACETAM 1000 MG/1
1000 TABLET ORAL 2 TIMES DAILY
Qty: 180 TABLET | Refills: 3 | Status: SHIPPED | OUTPATIENT
Start: 2017-09-21 | End: 2018-09-26

## 2017-09-21 NOTE — PROGRESS NOTES
He has had no seizures for over 10 years. He is working on controlling his diabetes. Bilateral slight carpal tunnel symptoms. No weakness. Wears splints. Denies headache neck pain. Left foot drop has resolved. Denies lumbar spine pain.   No visual sy

## 2017-09-25 ENCOUNTER — TELEPHONE (OUTPATIENT)
Dept: ENDOCRINOLOGY CLINIC | Facility: CLINIC | Age: 57
End: 2017-09-25

## 2017-09-25 NOTE — TELEPHONE ENCOUNTER
Called Brandon. Informed him of Dr. Whitfield Conception instructions below. He verbalized his understanding and had no further questions at this time. He will call in one week with april.  Updated medication list.

## 2017-09-25 NOTE — TELEPHONE ENCOUNTER
Pt calling with blood sugar meals before meals,thanks.     Date Morning  Lunch  Dinner  9/18 186  161  194  9/19 131  313  191  9/20 266  259  231  9/21 128  230  176  9/22 197  230  303  9/23 128  111  150  9/24 103  225  171  9/25 123

## 2017-10-02 ENCOUNTER — TELEPHONE (OUTPATIENT)
Dept: ENDOCRINOLOGY CLINIC | Facility: CLINIC | Age: 57
End: 2017-10-02

## 2017-10-02 NOTE — TELEPHONE ENCOUNTER
Pt calling with blood sugar meals before meals.     Date Breakfast Lunch  Dinner    9/24 103  225  172  9/25 123  179  186  9/26 175  244  144  9/27 126  250  283  9/28 139  248  250  9/29 191  289  140  9/30 151  159  284  10/1 164  136  110  10/2 171  -

## 2017-10-03 NOTE — TELEPHONE ENCOUNTER
Called Brandon. Informed him of Dr. Daniella Gifford message below. He verbalized his understanding and had no further questions at this time.

## 2017-10-18 ENCOUNTER — TELEPHONE (OUTPATIENT)
Dept: ENDOCRINOLOGY CLINIC | Facility: CLINIC | Age: 57
End: 2017-10-18

## 2017-10-18 NOTE — TELEPHONE ENCOUNTER
Liana Rodriguez were high all day yesterday, any reason? Did he eat out  Eat different? Recommend: U500 60 --> 65-70--> 75-25  Call on Monday with BG. Before if sugars are in the 300s again  Thanks.

## 2017-10-18 NOTE — TELEPHONE ENCOUNTER
Called Brandon. Informed him of Dr. Dudley Alexander insulin instructions below. He will call as instructed. He says that he accidentally drank regular soda yesterday thinking it was diet.

## 2017-10-18 NOTE — TELEPHONE ENCOUNTER
Pt called to give AM Sugar Readings:      Date:  BB BL BD  10/1 164 136 110  10/2 171 201 98  10/3 111 186 174  10/4 132 158 123  10/5 115 160 221  10/6 149 264 225  10/7 173 206 124  10/8 119 221 118  10/9 118 289 215  10/10 120 280 130  10/11 143 161 190

## 2017-10-19 ENCOUNTER — TELEPHONE (OUTPATIENT)
Dept: ENDOCRINOLOGY CLINIC | Facility: CLINIC | Age: 57
End: 2017-10-19

## 2017-10-19 NOTE — TELEPHONE ENCOUNTER
Pt states that his sugar reading was 95 this afternoon and he has headache and he is dizzy. Please call.

## 2017-10-19 NOTE — TELEPHONE ENCOUNTER
AM - WALDO    Spoke with Brandon- BG 95 c/o HA and dizzy. Pt reports BGs have been running 120s. Nurse discussed that BG 95 is good and in target range. Pt reports he typically runs higher and U-500 dose was increased yesterday due to higher BGs.  Pt understands

## 2017-10-20 NOTE — TELEPHONE ENCOUNTER
Spoke with Katarzyna Mackey. He reports \"I'm feeling feeling better\" fasting BG this am 213.  Pt will call Monday with sugars

## 2017-10-23 ENCOUNTER — TELEPHONE (OUTPATIENT)
Dept: ENDOCRINOLOGY CLINIC | Facility: CLINIC | Age: 57
End: 2017-10-23

## 2017-10-23 RX ORDER — LANCETS
EACH MISCELLANEOUS
Qty: 300 EACH | Refills: 0 | Status: SHIPPED | OUTPATIENT
Start: 2017-10-23 | End: 2018-01-28

## 2017-10-23 NOTE — TELEPHONE ENCOUNTER
Blood Sugar Readings - Before Meals    Date   AM  PM  Kimberly    10/19/2017 170  95  195  10/20/2017 213  205  146  10/21/2017 141  191  144  10/22/2017 191  270  95 -- states pt did not do the last injection as he feel well - dizziness and blurry vision  10/2

## 2017-10-23 NOTE — TELEPHONE ENCOUNTER
These symptoms are most likely related to improvement in blood sugars. I would C[facility]. Will not increase dose further till he feels better. However, he should take his insulin with all meals. Call on Friday with Bg and give us an update on how he feels.

## 2017-10-23 NOTE — TELEPHONE ENCOUNTER
Patient sent BG for review. Please see comment regarding dizziness and blurry vision. Per chart he is currently taking U500 65-75-25.

## 2017-10-23 NOTE — TELEPHONE ENCOUNTER
Called Brandon. Informed him of Dr. Ira Alanis message below. He verbalized his understanding and had no further questions at this time.

## 2017-10-27 ENCOUNTER — TELEPHONE (OUTPATIENT)
Dept: ENDOCRINOLOGY CLINIC | Facility: CLINIC | Age: 57
End: 2017-10-27

## 2017-10-27 NOTE — TELEPHONE ENCOUNTER
pt is calling in with sugar readings.      10/17 - 214, 361, 408  10/18 - 175, 436, 666  10/19 - 170, 95, 195  10/20 - 213, 205, 146  10/21 - 141, 191, 144  10/22 - 191, 270, 95  10/23 -  225, 176, 240  10/24 - 110, 211, 200  10/25 - 151, 272, 186  10/26 -

## 2017-10-27 NOTE — TELEPHONE ENCOUNTER
Called patient and informed him to increase breakfast dose of insulin to 70 units. Will keep other doses the same. Booked for follow up on 11/28 since patient needed later appt time.

## 2017-10-31 RX ORDER — MORPHINE SULFATE 60 MG/1
60 TABLET, FILM COATED, EXTENDED RELEASE ORAL EVERY 8 HOURS
Qty: 90 TABLET | Refills: 0 | Status: SHIPPED | OUTPATIENT
Start: 2017-11-03 | End: 2017-11-15

## 2017-11-02 RX ORDER — NORTRIPTYLINE HYDROCHLORIDE 25 MG/1
25 CAPSULE ORAL NIGHTLY
Qty: 30 CAPSULE | Refills: 2 | Status: SHIPPED | OUTPATIENT
Start: 2017-11-02 | End: 2017-11-03

## 2017-11-03 RX ORDER — NORTRIPTYLINE HYDROCHLORIDE 25 MG/1
25 CAPSULE ORAL NIGHTLY
Qty: 30 CAPSULE | Refills: 2 | Status: SHIPPED | OUTPATIENT
Start: 2017-11-03 | End: 2018-07-31

## 2017-11-06 RX ORDER — PREGABALIN 100 MG/1
100 CAPSULE ORAL 3 TIMES DAILY
Qty: 90 CAPSULE | Refills: 2 | Status: SHIPPED | OUTPATIENT
Start: 2017-11-06 | End: 2018-02-02

## 2017-11-15 ENCOUNTER — OFFICE VISIT (OUTPATIENT)
Dept: PAIN CLINIC | Facility: HOSPITAL | Age: 57
End: 2017-11-15
Attending: ANESTHESIOLOGY
Payer: MEDICARE

## 2017-11-15 VITALS — DIASTOLIC BLOOD PRESSURE: 80 MMHG | HEART RATE: 75 BPM | SYSTOLIC BLOOD PRESSURE: 112 MMHG

## 2017-11-15 DIAGNOSIS — M79.7 FIBROMYALGIA: Primary | ICD-10-CM

## 2017-11-15 PROCEDURE — 99211 OFF/OP EST MAY X REQ PHY/QHP: CPT

## 2017-11-15 RX ORDER — NALOXONE HYDROCHLORIDE 4 MG/.1ML
4 SPRAY, METERED NASAL AS NEEDED
Qty: 1 EACH | Refills: 0 | Status: SHIPPED | OUTPATIENT
Start: 2017-11-15 | End: 2019-11-10

## 2017-11-15 RX ORDER — MORPHINE SULFATE 60 MG/1
60 TABLET, FILM COATED, EXTENDED RELEASE ORAL EVERY 8 HOURS
Qty: 90 TABLET | Refills: 0 | Status: SHIPPED | OUTPATIENT
Start: 2017-12-03 | End: 2017-11-15

## 2017-11-15 RX ORDER — MORPHINE SULFATE 60 MG/1
60 TABLET, FILM COATED, EXTENDED RELEASE ORAL EVERY 8 HOURS
Qty: 90 TABLET | Refills: 0 | Status: SHIPPED | OUTPATIENT
Start: 2018-01-02 | End: 2018-01-25

## 2017-11-15 NOTE — PROGRESS NOTES
Patient presents to [facility] ambulatory for med eval.  He has generalized body ache due to fibromyalgia. He takes morphine ER for pain which helps. He has generalized numbness and tingling as well.   He was juist started on lyrica and elavil at last visit which

## 2017-11-15 NOTE — CHRONIC PAIN
MEDICATION EVALUATION    HISTORY OF PRESENT ILLNESS:  Carmen Castellanos is a 61year old old male with history of chronic pain 2/2 fibromyalgia, joint pain who presents for medication evaluation. He continues to report chronic joint pain, right breast pain.  He d day. E11.65 with insulin use. Disp: 300 strip Rfl: 0   MICROLET LANCETS Does not apply Misc Use lancets to check blood sugar 3 times daily. E11.65 with insulin use.  Disp: 300 each Rfl: 0   levetiracetam 1000 MG Oral Tab Take 1 tablet (1,000 mg total) by mo meals. Disp:  Rfl:    Polyethylene Glycol 3350 (MIRALAX OR) Take by mouth daily. Disp:  Rfl:    furosemide (LASIX) 40 MG Oral Tab 20 mg daily. Disp:  Rfl: 6   spironolactone (ALDACTONE) 25 MG Oral Tab 25 mg daily.  Disp:  Rfl: 10   captopril (CAPOTEN) 1 History Narrative    The patient does not use an assistive device. .      The patient does live in a home with stairs.      PHYSICAL EXAMINATION:   11/15/17  1340   BP: 112/80   Pulse: 75      General: Alert and oriented x3  Affect:  NAD  Gait: Antalgic;  ca

## 2017-11-28 ENCOUNTER — OFFICE VISIT (OUTPATIENT)
Dept: ENDOCRINOLOGY CLINIC | Facility: CLINIC | Age: 57
End: 2017-11-28

## 2017-11-28 VITALS
HEART RATE: 78 BPM | WEIGHT: 298 LBS | DIASTOLIC BLOOD PRESSURE: 80 MMHG | HEIGHT: 65.5 IN | SYSTOLIC BLOOD PRESSURE: 150 MMHG | BODY MASS INDEX: 49.05 KG/M2

## 2017-11-28 DIAGNOSIS — Z79.4 UNCONTROLLED TYPE 2 DIABETES MELLITUS WITH HYPERGLYCEMIA, WITH LONG-TERM CURRENT USE OF INSULIN (HCC): Primary | ICD-10-CM

## 2017-11-28 DIAGNOSIS — E11.65 UNCONTROLLED TYPE 2 DIABETES MELLITUS WITH HYPERGLYCEMIA, WITH LONG-TERM CURRENT USE OF INSULIN (HCC): Primary | ICD-10-CM

## 2017-11-28 PROCEDURE — 83036 HEMOGLOBIN GLYCOSYLATED A1C: CPT | Performed by: INTERNAL MEDICINE

## 2017-11-28 PROCEDURE — 36416 COLLJ CAPILLARY BLOOD SPEC: CPT | Performed by: INTERNAL MEDICINE

## 2017-11-28 PROCEDURE — 82962 GLUCOSE BLOOD TEST: CPT | Performed by: INTERNAL MEDICINE

## 2017-11-28 PROCEDURE — 99214 OFFICE O/P EST MOD 30 MIN: CPT | Performed by: INTERNAL MEDICINE

## 2017-11-29 NOTE — PROGRESS NOTES
Return Office Visit - Diabetes    CHIEF COMPLAINT:    Diabetes     HISTORY OF PRESENT ILLNESS:   Orquidea Pascal is a 61year old male PMH as below including HTN, chronic CHF, chronic hepatitis C , diverticulitis who presents for a FU visit for DM ( discovere mouth 2 (two) times daily. Disp: 180 tablet Rfl: 3   Insulin Regular Human, Conc, (HUMULIN R U-500 KWIKPEN) 500 UNIT/ML Subcutaneous Solution Pen-injector Inject 20-60 Units into the skin 3 (three) times daily.  50 units with breakfast, 60 units with lunch, (CAPOTEN) 12.5 MG Oral Tab TAKE 1 TABLET BY MOUTH TWICE DAILY Disp: 180 tablet Rfl: 0       PAST MEDICAL, SOCIAL AND FAMILY HISTORY:  See past medical history marked as reviewed. See past surgical history marked as reviewed.   See past family history marke 7.8 % ( 11/2017)    ASSESSMENT AND PLAN:    1. Type 2 DM:    Plan:  Discussed the pathogenesis, natural course of diabetes.  Patient understands the importance of glycemic control and the implications of uncontrolled diabetes including Diabetic ketoacidosis

## 2017-11-30 ENCOUNTER — TELEPHONE (OUTPATIENT)
Dept: ENDOCRINOLOGY CLINIC | Facility: CLINIC | Age: 57
End: 2017-11-30

## 2017-11-30 NOTE — TELEPHONE ENCOUNTER
Spoke with Amaya Jaffe. Discussed note below from physician. Patient read back instructions correctly and verbalized understanding.

## 2017-11-30 NOTE — TELEPHONE ENCOUNTER
Routed to doctor on call - please advise    Patient c/o dizzy and nauseated from lower BG's due to increased U-500 insulin dose on 11/28/17 w/ AM. This AM BG reading was 118.     Patient requesting to increase lunch dose, but leave morning dose at 70 units

## 2017-11-30 NOTE — TELEPHONE ENCOUNTER
Ok to decrease morning U500 dose back to 70 units and try to increase dose next Monday. Please continue with higher dose at lunchtime. Thanks.

## 2017-12-05 ENCOUNTER — TELEPHONE (OUTPATIENT)
Dept: ENDOCRINOLOGY CLINIC | Facility: CLINIC | Age: 57
End: 2017-12-05

## 2017-12-05 NOTE — TELEPHONE ENCOUNTER
FYI - Pt called to let Dr. Griffin Begin know that the medication change is working and his sugar readings have all been lower. No specific readings given. No need to call back unless questions.

## 2017-12-18 RX ORDER — INSULIN HUMAN 500 [IU]/ML
INJECTION, SOLUTION SUBCUTANEOUS
Qty: 12 ML | Refills: 0 | Status: SHIPPED | OUTPATIENT
Start: 2017-12-18 | End: 2018-01-19

## 2017-12-20 ENCOUNTER — TELEPHONE (OUTPATIENT)
Dept: ENDOCRINOLOGY CLINIC | Facility: CLINIC | Age: 57
End: 2017-12-20

## 2017-12-20 NOTE — TELEPHONE ENCOUNTER
Jack Shelby, informed him of AM's instructions below. He verbalized his understanding.  Updated med list.

## 2017-12-20 NOTE — TELEPHONE ENCOUNTER
Pt called w/ readings:     Before   Breakfast  Dinner  Bedtime     11/29/17 169  239  165  11/30/17 118  98  219   12/1/17  169  183  155  12/2/17  121  228  219  12/3/17  215  207    12/4/17  102  156  180  12/5/17  124  146  157  12/6/17  161  225  166

## 2018-01-12 ENCOUNTER — TELEPHONE (OUTPATIENT)
Dept: ENDOCRINOLOGY CLINIC | Facility: CLINIC | Age: 58
End: 2018-01-12

## 2018-01-12 NOTE — TELEPHONE ENCOUNTER
Pt called w/ sugar readings:    Before  Breakfast 4pm  Bedtime   12/23/17 151  251  172  12/24/17 238  239  341  12/25/17 221  207  261  12/26/17 212  147  224  12/27/17 145  148  109  12/28/17 152  160  111  12/29/17 167  197  123  12/30/17 127  131  106

## 2018-01-19 ENCOUNTER — TELEPHONE (OUTPATIENT)
Dept: ENDOCRINOLOGY CLINIC | Facility: CLINIC | Age: 58
End: 2018-01-19

## 2018-01-19 RX ORDER — INSULIN HUMAN 500 [IU]/ML
INJECTION, SOLUTION SUBCUTANEOUS
Qty: 12 ML | Refills: 0 | Status: SHIPPED | OUTPATIENT
Start: 2018-01-19 | End: 2018-02-18

## 2018-01-19 RX ORDER — NORTRIPTYLINE HYDROCHLORIDE 25 MG/1
CAPSULE ORAL
Qty: 90 CAPSULE | Refills: 0 | Status: SHIPPED | OUTPATIENT
Start: 2018-01-19 | End: 2018-03-27

## 2018-01-22 ENCOUNTER — TELEPHONE (OUTPATIENT)
Dept: ENDOCRINOLOGY CLINIC | Facility: CLINIC | Age: 58
End: 2018-01-22

## 2018-01-25 ENCOUNTER — OFFICE VISIT (OUTPATIENT)
Dept: PAIN CLINIC | Facility: HOSPITAL | Age: 58
End: 2018-01-25
Attending: ANESTHESIOLOGY
Payer: MEDICARE

## 2018-01-25 VITALS — HEART RATE: 78 BPM | DIASTOLIC BLOOD PRESSURE: 82 MMHG | SYSTOLIC BLOOD PRESSURE: 134 MMHG

## 2018-01-25 DIAGNOSIS — M79.7 FIBROMYALGIA: Primary | ICD-10-CM

## 2018-01-25 PROCEDURE — 99211 OFF/OP EST MAY X REQ PHY/QHP: CPT

## 2018-01-25 RX ORDER — MORPHINE SULFATE 60 MG/1
60 TABLET, FILM COATED, EXTENDED RELEASE ORAL EVERY 8 HOURS
Qty: 90 TABLET | Refills: 0 | Status: SHIPPED | OUTPATIENT
Start: 2018-01-31 | End: 2018-03-02

## 2018-01-25 RX ORDER — MORPHINE SULFATE 100 MG/1
100 TABLET ORAL EVERY 8 HOURS
Qty: 90 TABLET | Refills: 0 | Status: SHIPPED | OUTPATIENT
Start: 2018-03-01 | End: 2018-03-29

## 2018-01-25 RX ORDER — MORPHINE SULFATE 60 MG/1
60 TABLET, FILM COATED, EXTENDED RELEASE ORAL EVERY 8 HOURS
Qty: 90 TABLET | Refills: 0 | Status: SHIPPED | OUTPATIENT
Start: 2018-03-01 | End: 2018-03-29

## 2018-01-25 RX ORDER — MORPHINE SULFATE 100 MG/1
100 TABLET ORAL EVERY 8 HOURS
Qty: 90 TABLET | Refills: 0 | Status: SHIPPED | OUTPATIENT
Start: 2018-01-31 | End: 2018-03-02

## 2018-01-25 NOTE — PROGRESS NOTES
Patient presents to [facility] ambulatory for med eval.  He has generalized joint pain especially in his hands. He takes morphine for pain which helps. He has weaned off oxy. His lyrica was increased at last visit which helped. DEBORAH Norwood in to see patient.  Se

## 2018-01-25 NOTE — CHRONIC PAIN
MEDICATION EVALUATION    HISTORY OF PRESENT ILLNESS:  Roel Alexandra is a 61year old old male with history of chronic pain 2/2 fibromyalgia, joint pain who presents for medication evaluation. He continues to report chronic joint pain.  He reports worsening ha Vitro Strip USE TEST STRIPS TO CHECK BLOOD SUGAR THREE TIMES DAILY Disp: 300 strip Rfl: 0   HUMULIN R U-500 KWIKPEN 500 UNIT/ML Subcutaneous Solution Pen-injector INJECT 20-60 UNITS INTO THE SKIN 3 TIMES DAILY.  50 UNITS WITH BREAKFAST, 60 UNITS WITH LUNCH, Vitro Solution  Disp:  Rfl: 0   Blood Glucose Monitoring Suppl (TALIA CONTOUR NEXT EZ) W/DEVICE Does not apply Kit  Disp:  Rfl: 0   Pantoprazole Sodium (PROTONIX) 40 MG Oral Tab EC  Disp:  Rfl: 0   carvedilol (COREG) 12.5 MG Oral Tab Take 12.5 mg by mouth N/A     Occupational History  None on file     Social History Main Topics   Smoking status: Never Smoker    Smokeless tobacco: Never Used    Alcohol use No    Drug use: No    Sexual activity: Not on file     Other Topics Concern    Caffeine Concern No    E

## 2018-01-28 ENCOUNTER — TELEPHONE (OUTPATIENT)
Dept: ENDOCRINOLOGY CLINIC | Facility: CLINIC | Age: 58
End: 2018-01-28

## 2018-01-29 RX ORDER — LANCETS
EACH MISCELLANEOUS
Qty: 300 EACH | Refills: 0 | Status: SHIPPED | OUTPATIENT
Start: 2018-01-29 | End: 2018-07-12

## 2018-01-29 NOTE — TELEPHONE ENCOUNTER
Spoke to pt. Relayed Dr. Rothman Her message as shown below. Pt offered appt and scheduled for 2/6/18 at 1830. Pt verbalized understanding with agreement to appt scheduled. No further action required at this time.

## 2018-02-02 RX ORDER — ZOLPIDEM TARTRATE 10 MG/1
10 TABLET ORAL NIGHTLY
Qty: 30 TABLET | Refills: 2 | Status: SHIPPED | OUTPATIENT
Start: 2018-02-02 | End: 2018-04-23

## 2018-02-02 RX ORDER — LANCETS
EACH MISCELLANEOUS
Qty: 400 EACH | Refills: 0 | Status: SHIPPED | OUTPATIENT
Start: 2018-02-02 | End: 2018-03-27

## 2018-02-02 RX ORDER — PREGABALIN 100 MG/1
100 CAPSULE ORAL 3 TIMES DAILY
Qty: 90 CAPSULE | Refills: 2 | OUTPATIENT
Start: 2018-02-02 | End: 2018-04-26

## 2018-02-12 ENCOUNTER — TELEPHONE (OUTPATIENT)
Dept: ENDOCRINOLOGY CLINIC | Facility: CLINIC | Age: 58
End: 2018-02-12

## 2018-02-12 NOTE — TELEPHONE ENCOUNTER
Spoke with Kellie Govea. He is requesting late evening Tuesday appt. Not any available until 3/27. He states he would like to wait until then. Cannot come earlier in the day. Discussed BG levels and he states they are good at home running 120-130's.

## 2018-02-12 NOTE — TELEPHONE ENCOUNTER
Pt states he has missed a couple appts due to the flu and deaths in the family and states he is still not feeling well and would like to know if there are any cancellations / openings this week.  Please call thank you

## 2018-02-19 RX ORDER — INSULIN HUMAN 500 [IU]/ML
INJECTION, SOLUTION SUBCUTANEOUS
Qty: 12 ML | Refills: 0 | Status: SHIPPED | OUTPATIENT
Start: 2018-02-19 | End: 2018-03-22

## 2018-03-08 NOTE — TELEPHONE ENCOUNTER
03/08/2018  Vaughn Retana is a 52 y.o., male.    Anesthesia Evaluation         Review of Systems  Hematology/Oncology:        Hematology Comments: Hx of DVT   Cardiovascular:   Hypertension CHF (Chronic diastolic )   Renal/:   Chronic Renal Disease, ESRD, Dialysis    Hepatic/GI:   Esophagitis   Neurological:   CVA Hemiparesis affecting left side as late effect of stroke  Closed head injury   Endocrine:   Diabetes, type 2        Physical Exam  General:  Well nourished    Airway/Jaw/Neck:  Airway Findings: Mouth Opening: Normal Tongue: Normal  General Airway Assessment: Adult  Mallampati: I  TM Distance: Normal, at least 6 cm  Jaw/Neck Findings:  Neck ROM: Normal ROM      Dental:  Dental Findings: In tact        Mental Status:  Mental Status Findings:  Cooperative, Alert and Oriented         Anesthesia Plan  Type of Anesthesia, risks & benefits discussed:  Anesthesia Type:  general  Patient's Preference:   Intra-op Monitoring Plan: standard ASA monitors  Intra-op Monitoring Plan Comments:   Post Op Pain Control Plan:   Post Op Pain Control Plan Comments:   Induction:   IV  Beta Blocker:  Patient is on a Beta-Blocker and has received one dose within the past 24 hours (No further documentation required).       Informed Consent: Patient understands risks and agrees with Anesthesia plan.  Questions answered. Anesthesia consent signed with patient.  ASA Score: 3     Day of Surgery Review of History & Physical:    H&P update referred to the provider.         Ready For Surgery From Anesthesia Perspective.        BG continue to be high. Let's do 36 am and 32 pm  Call in one week.    Thx.

## 2018-03-22 RX ORDER — INSULIN HUMAN 500 [IU]/ML
INJECTION, SOLUTION SUBCUTANEOUS
Qty: 24 ML | Refills: 0 | Status: SHIPPED | OUTPATIENT
Start: 2018-03-22 | End: 2018-04-27

## 2018-03-27 ENCOUNTER — OFFICE VISIT (OUTPATIENT)
Dept: ENDOCRINOLOGY CLINIC | Facility: CLINIC | Age: 58
End: 2018-03-27

## 2018-03-27 VITALS
HEART RATE: 66 BPM | BODY MASS INDEX: 50.27 KG/M2 | WEIGHT: 305.38 LBS | HEIGHT: 65.5 IN | DIASTOLIC BLOOD PRESSURE: 74 MMHG | SYSTOLIC BLOOD PRESSURE: 111 MMHG

## 2018-03-27 DIAGNOSIS — E11.65 UNCONTROLLED TYPE 2 DIABETES MELLITUS WITH HYPERGLYCEMIA, WITH LONG-TERM CURRENT USE OF INSULIN (HCC): Primary | ICD-10-CM

## 2018-03-27 DIAGNOSIS — Z79.4 UNCONTROLLED TYPE 2 DIABETES MELLITUS WITH HYPERGLYCEMIA, WITH LONG-TERM CURRENT USE OF INSULIN (HCC): Primary | ICD-10-CM

## 2018-03-27 DIAGNOSIS — E78.5 DYSLIPIDEMIA: ICD-10-CM

## 2018-03-27 LAB
CARTRIDGE LOT#: ABNORMAL NUMERIC
GLUCOSE BLOOD: 114
HEMOGLOBIN A1C: 7.4 % (ref 4.3–5.6)
TEST STRIP LOT #: NORMAL NUMERIC

## 2018-03-27 PROCEDURE — 36416 COLLJ CAPILLARY BLOOD SPEC: CPT | Performed by: INTERNAL MEDICINE

## 2018-03-27 PROCEDURE — 99214 OFFICE O/P EST MOD 30 MIN: CPT | Performed by: INTERNAL MEDICINE

## 2018-03-27 PROCEDURE — 82962 GLUCOSE BLOOD TEST: CPT | Performed by: INTERNAL MEDICINE

## 2018-03-27 PROCEDURE — 83036 HEMOGLOBIN GLYCOSYLATED A1C: CPT | Performed by: INTERNAL MEDICINE

## 2018-03-27 NOTE — PROGRESS NOTES
Return Office Visit - Diabetes    CHIEF COMPLAINT:    Diabetes     HISTORY OF PRESENT ILLNESS:   Jefferson Jeffers is a 61year old male PMH as below including HTN, chronic CHF, chronic hepatitis C , diverticulitis who presents for a FU visit for DM ( discovere (eight) hours.  Disp: 90 tablet Rfl: 0   TALIA CONTOUR NEXT TEST In Vitro Strip USE TEST STRIPS TO CHECK BLOOD SUGAR THREE TIMES DAILY Disp: 300 strip Rfl: 0   TALIA CONTOUR NEXT TEST In Vitro Strip USE TEST STRIPS TO CHECK BLOOD SUGAR THREE TIMES DAILY Dis MEDICAL, SOCIAL AND FAMILY HISTORY:  See past medical history marked as reviewed. See past surgical history marked as reviewed. See past family history marked as reviewed. See past social history marked as reviewed.     ASSESSMENTS:     REVIEW OF SYSTEMS pathogenesis, natural course of diabetes. Patient understands the importance of glycemic control and the implications of uncontrolled diabetes including Diabetic ketoacidosis and various micro vascular and macrovascular complications.     .     Goal a1c: <

## 2018-03-29 ENCOUNTER — OFFICE VISIT (OUTPATIENT)
Dept: PAIN CLINIC | Facility: HOSPITAL | Age: 58
End: 2018-03-29
Attending: ANESTHESIOLOGY
Payer: MEDICARE

## 2018-03-29 VITALS — HEIGHT: 65 IN | WEIGHT: 305 LBS | BODY MASS INDEX: 50.82 KG/M2

## 2018-03-29 DIAGNOSIS — M79.7 FIBROMYALGIA: Primary | ICD-10-CM

## 2018-03-29 PROCEDURE — 99211 OFF/OP EST MAY X REQ PHY/QHP: CPT

## 2018-03-29 RX ORDER — MORPHINE SULFATE 60 MG/1
60 TABLET, FILM COATED, EXTENDED RELEASE ORAL EVERY 8 HOURS
Qty: 90 TABLET | Refills: 0 | Status: SHIPPED | OUTPATIENT
Start: 2018-04-28 | End: 2018-05-22

## 2018-03-29 RX ORDER — MORPHINE SULFATE 60 MG/1
60 TABLET, FILM COATED, EXTENDED RELEASE ORAL EVERY 8 HOURS
Qty: 90 TABLET | Refills: 0 | Status: SHIPPED | OUTPATIENT
Start: 2018-03-30 | End: 2018-03-29

## 2018-03-29 RX ORDER — MORPHINE SULFATE 100 MG/1
100 TABLET ORAL EVERY 8 HOURS
Qty: 90 TABLET | Refills: 0 | Status: SHIPPED | OUTPATIENT
Start: 2018-04-28 | End: 2018-05-22

## 2018-03-29 RX ORDER — MORPHINE SULFATE 100 MG/1
100 TABLET ORAL EVERY 8 HOURS
Qty: 90 TABLET | Refills: 0 | Status: SHIPPED | OUTPATIENT
Start: 2018-03-30 | End: 2018-03-29

## 2018-03-29 NOTE — PROGRESS NOTES
Patient's Personal History/Story    PT OF [facility] SINCE 2000 FOR FIBROMYALGIA, ARTHRITIS, LT KNEE REPLACEMENTS     What Should We Know About You or Your Family to Care for You    ON DISABILITY   , NO CHILDREN  Patient Progress/Clinical Milestones Presents with wife, reports more pain today than usual and feels this is due to humid weather and not sleeping well last night, otherwise no new concerns. Seen by , script refilled for Lyrica, dose increased from 75mg to 100mg tid.   4/4/17-  Patient

## 2018-03-29 NOTE — CHRONIC PAIN
MEDICATION EVALUATION  HISTORY OF PRESENT ILLNESS:  Serenity Velasquez is a 61year old old male with history of chronic pain 2/2 fibromyalgia, joint pain who presents for medication evaluation. He continues to report chronic joint pain. He deneis worsening pain. 1 each Rfl: 0   Nortriptyline HCl 25 MG Oral Cap Take 1 capsule (25 mg total) by mouth nightly. Disp: 30 capsule Rfl: 2   levetiracetam 1000 MG Oral Tab Take 1 tablet (1,000 mg total) by mouth 2 (two) times daily.  Disp: 180 tablet Rfl: 3   Promethazine HCl OF SYSTEMS:   Bowel/Bladder Incontinence: as above  Coughing/sneezing/straining does not exacerbate the pain.   Numbness/tingling: as above  Weakness: as above  Weight Loss: Negative   Fever: Negative   Cardiovascular:  No current chest pain or palpitations [place] PHYSICIAN MONITORING PROGRAM REVIEWED  Yes      ASSESSMENT AND PLAN:    Guido Cui is a 61year old  male, with history of chronic pain secondary to fibromyalgia, history of knee replacement status post foot drop in 2010 with improvement in overall

## 2018-04-04 RX ORDER — NORTRIPTYLINE HYDROCHLORIDE 25 MG/1
CAPSULE ORAL
Qty: 90 CAPSULE | Refills: 0 | Status: SHIPPED | OUTPATIENT
Start: 2018-04-04 | End: 2018-06-04

## 2018-04-23 RX ORDER — ZOLPIDEM TARTRATE 10 MG/1
10 TABLET ORAL NIGHTLY
Qty: 30 TABLET | Refills: 2 | OUTPATIENT
Start: 2018-04-23 | End: 2018-07-31

## 2018-04-27 RX ORDER — PREGABALIN 100 MG/1
100 CAPSULE ORAL 3 TIMES DAILY
Qty: 90 CAPSULE | Refills: 2 | OUTPATIENT
Start: 2018-04-27 | End: 2018-07-19

## 2018-04-27 RX ORDER — INSULIN HUMAN 500 [IU]/ML
INJECTION, SOLUTION SUBCUTANEOUS
Qty: 36 ML | Refills: 0 | Status: SHIPPED | OUTPATIENT
Start: 2018-04-27 | End: 2018-07-12

## 2018-04-27 NOTE — TELEPHONE ENCOUNTER
Pt requesting refill for HUMULIN R U-500 KWIKPEN          Current Outpatient Prescriptions:     •  HUMULIN R U-500 KWIKPEN 500 UNIT/ML Subcutaneous Solution Pen-injector, INJECT 20-60 UNITS INTO THE SKIN 3 TIMES DAILY.  50 UNITS WITH BREAKFAST, 60 UNITS WIT

## 2018-04-27 NOTE — TELEPHONE ENCOUNTER
LOV 3/27/18. No F/U scheduled.  Per LOV:    - Insulin U 500 85--> 80/ 80 --> 85/ 30 with 1st/2nd/3rd meal

## 2018-05-09 NOTE — TELEPHONE ENCOUNTER
LOV 11/28/2017 No need for additional DVT ppx - C/w patient specific Xarelto regimen No need for additional DVT ppx - C/w patient specific Xarelto regimen No need for additional DVT ppx - C/w patient specific Xarelto regimen

## 2018-05-22 ENCOUNTER — OFFICE VISIT (OUTPATIENT)
Dept: PAIN CLINIC | Facility: HOSPITAL | Age: 58
End: 2018-05-22
Attending: NURSE PRACTITIONER
Payer: MEDICARE

## 2018-05-22 VITALS — SYSTOLIC BLOOD PRESSURE: 132 MMHG | HEART RATE: 84 BPM | DIASTOLIC BLOOD PRESSURE: 68 MMHG

## 2018-05-22 DIAGNOSIS — M79.7 FIBROMYALGIA: Primary | ICD-10-CM

## 2018-05-22 PROCEDURE — 99211 OFF/OP EST MAY X REQ PHY/QHP: CPT

## 2018-05-22 RX ORDER — MORPHINE SULFATE 100 MG/1
100 TABLET ORAL EVERY 8 HOURS
Qty: 90 TABLET | Refills: 0 | Status: SHIPPED | OUTPATIENT
Start: 2018-06-25 | End: 2018-07-20

## 2018-05-22 RX ORDER — MORPHINE SULFATE 100 MG/1
100 TABLET ORAL EVERY 8 HOURS
Qty: 90 TABLET | Refills: 0 | Status: SHIPPED | OUTPATIENT
Start: 2018-05-26 | End: 2018-06-25

## 2018-05-22 RX ORDER — MORPHINE SULFATE 60 MG/1
60 TABLET, FILM COATED, EXTENDED RELEASE ORAL EVERY 8 HOURS
Qty: 90 TABLET | Refills: 0 | Status: SHIPPED | OUTPATIENT
Start: 2018-06-25 | End: 2018-07-20

## 2018-05-22 RX ORDER — MORPHINE SULFATE 60 MG/1
60 TABLET, FILM COATED, EXTENDED RELEASE ORAL EVERY 8 HOURS
Qty: 90 TABLET | Refills: 0 | Status: SHIPPED | OUTPATIENT
Start: 2018-05-26 | End: 2018-06-25

## 2018-05-22 NOTE — PROGRESS NOTES
Patient presents to [facility] ambulatory for med eval.  He has chronic widespread pain due to fibromyalgia along with neck and low back pain that radiates to his left leg. He takes mser 160mg q 8 which is helping. DEBORAH Norwood in to see patient.  See provider note

## 2018-05-22 NOTE — CHRONIC PAIN
MEDICATION EVALUATION  HISTORY OF PRESENT ILLNESS:  Luly Deluca is a 61year old old male with history of chronic pain 2/2 fibromyalgia, joint pain who presents for medication evaluation. He continues to report chronic joint pain.  He reports some worsening STRIPS TO CHECK BLOOD SUGAR THREE TIMES DAILY Disp: 300 strip Rfl: 0   TALIA CONTOUR NEXT TEST In Vitro Strip USE TEST STRIPS TO CHECK BLOOD SUGAR THREE TIMES DAILY Disp: 300 strip Rfl: 0   Zolpidem Tartrate 10 MG Oral Tab Take 1 tablet (10 mg total) by mo with meals. Disp:  Rfl:    Polyethylene Glycol 3350 (MIRALAX OR) Take by mouth daily. Disp:  Rfl:    furosemide (LASIX) 40 MG Oral Tab 20 mg daily. Disp:  Rfl: 6   spironolactone (ALDACTONE) 25 MG Oral Tab 25 mg daily.  Disp:  Rfl: 10   captopril (CAPOT History Narrative    The patient does not use an assistive device. .      The patient does live in a home with stairs.      PHYSICAL EXAMINATION:   05/22/18  1144   BP: 132/68   Pulse: 84      General: Alert and oriented x3  Affect:  NAD  Gait: Antalgic;  ca

## 2018-06-04 RX ORDER — NORTRIPTYLINE HYDROCHLORIDE 25 MG/1
CAPSULE ORAL
Qty: 90 CAPSULE | Refills: 0 | Status: SHIPPED | OUTPATIENT
Start: 2018-06-04 | End: 2018-08-15

## 2018-07-12 ENCOUNTER — TELEPHONE (OUTPATIENT)
Dept: ENDOCRINOLOGY CLINIC | Facility: CLINIC | Age: 58
End: 2018-07-12

## 2018-07-12 RX ORDER — LANCETS
EACH MISCELLANEOUS
Qty: 300 EACH | Refills: 0 | Status: SHIPPED | OUTPATIENT
Start: 2018-07-12

## 2018-07-12 RX ORDER — INSULIN HUMAN 500 [IU]/ML
INJECTION, SOLUTION SUBCUTANEOUS
Qty: 36 ML | Refills: 0 | Status: SHIPPED | OUTPATIENT
Start: 2018-07-12 | End: 2018-07-20

## 2018-07-12 RX ORDER — BLOOD SUGAR DIAGNOSTIC
STRIP MISCELLANEOUS
Qty: 300 EACH | Refills: 0 | Status: ON HOLD | OUTPATIENT
Start: 2018-07-12 | End: 2019-07-01

## 2018-07-12 NOTE — TELEPHONE ENCOUNTER
Faxed request received from [facility] for progress notes, a1c, and testing logs if available. Sent.

## 2018-07-12 NOTE — TELEPHONE ENCOUNTER
Pt requsting refill for rx: Lancets; rx: Pens Needles, and rx:Talib Contour Next Strips, pls call at:[phone],thanks.   *Pt confirmed default [facility]    Current Outpatient Prescriptions:   •  TALIB CONTOUR NEXT TEST In [facility], U

## 2018-07-20 ENCOUNTER — OFFICE VISIT (OUTPATIENT)
Dept: PAIN CLINIC | Facility: HOSPITAL | Age: 58
End: 2018-07-20
Attending: ANESTHESIOLOGY
Payer: MEDICARE

## 2018-07-20 VITALS — HEART RATE: 80 BPM | SYSTOLIC BLOOD PRESSURE: 134 MMHG | DIASTOLIC BLOOD PRESSURE: 73 MMHG

## 2018-07-20 DIAGNOSIS — M79.7 FIBROMYALGIA: Primary | ICD-10-CM

## 2018-07-20 PROCEDURE — 99211 OFF/OP EST MAY X REQ PHY/QHP: CPT

## 2018-07-20 RX ORDER — MORPHINE SULFATE 60 MG/1
60 TABLET, FILM COATED, EXTENDED RELEASE ORAL EVERY 8 HOURS
Qty: 90 TABLET | Refills: 0 | Status: SHIPPED | OUTPATIENT
Start: 2018-08-22 | End: 2018-09-17

## 2018-07-20 RX ORDER — MORPHINE SULFATE 60 MG/1
60 TABLET, FILM COATED, EXTENDED RELEASE ORAL EVERY 8 HOURS
Qty: 90 TABLET | Refills: 0 | Status: SHIPPED | OUTPATIENT
Start: 2018-07-24 | End: 2018-08-23

## 2018-07-20 RX ORDER — INSULIN HUMAN 500 [IU]/ML
INJECTION, SOLUTION SUBCUTANEOUS
Qty: 36 ML | Refills: 0 | Status: SHIPPED | OUTPATIENT
Start: 2018-07-20 | End: 2018-10-16

## 2018-07-20 RX ORDER — MORPHINE SULFATE 100 MG/1
100 TABLET ORAL EVERY 8 HOURS
Qty: 90 TABLET | Refills: 0 | Status: SHIPPED | OUTPATIENT
Start: 2018-07-24 | End: 2018-08-23

## 2018-07-20 RX ORDER — PREGABALIN 100 MG/1
100 CAPSULE ORAL 3 TIMES DAILY
Qty: 90 CAPSULE | Refills: 2 | Status: SHIPPED | OUTPATIENT
Start: 2018-07-20 | End: 2019-02-13

## 2018-07-20 RX ORDER — MORPHINE SULFATE 100 MG/1
100 TABLET ORAL EVERY 8 HOURS
Qty: 90 TABLET | Refills: 0 | Status: SHIPPED | OUTPATIENT
Start: 2018-08-22 | End: 2018-09-17

## 2018-07-20 RX ORDER — PREGABALIN 100 MG/1
100 CAPSULE ORAL 3 TIMES DAILY
Qty: 90 CAPSULE | Refills: 2 | OUTPATIENT
Start: 2018-07-20 | End: 2018-07-20

## 2018-07-20 NOTE — TELEPHONE ENCOUNTER
Pt called for refill:     Current Outpatient Prescriptions:   •  HUMULIN R U-500 KWIKPEN 500 UNIT/ML Subcutaneous Solution Pen-injector, INJECT 80 UNITS WITH BREAKFAST, 85 UNITS WITH LUNCH AND 30 UNITS WITH DINNER, Disp: 36 mL, Rfl: 0

## 2018-07-20 NOTE — PROGRESS NOTES
Patient presents to [facility] ambulatory for med eval.  He has generalized pain joint, hands, back, neck, legs with constant numbness and tingling. He takes MSER 160mg q 8 and it helps him function.  He states the tingling is getting worse and is requesting alyssa

## 2018-07-20 NOTE — CHRONIC PAIN
MEDICATION EVALUATION  HISTORY OF PRESENT ILLNESS:  Natalia Barajas is a 61year old old male with history of chronic pain 2/2 fibromyalgia, joint pain who presents for medication evaluation. He continues to report chronic joint pain.  He reports some worsening Needle (BD PEN NEEDLE ROMMEL U/F) 32G X 4 MM Does not apply Misc USE TO INJECT insulin 3 times per day Disp: 300 each Rfl: 0   MICROLET LANCETS Does not apply Misc USE LANCET TO CHECK BLOOD SUGAR THREE TIMES DAILY Disp: 300 each Rfl: 0   CONTOUR NEXT TEST In Pantoprazole Sodium (PROTONIX) 40 MG Oral Tab EC  Disp:  Rfl: 0   carvedilol (COREG) 12.5 MG Oral Tab Take 12.5 mg by mouth 2 (two) times daily with meals. Disp:  Rfl:    Polyethylene Glycol 3350 (MIRALAX OR) Take by mouth daily.    Disp:  Rfl:    furosem Never Used    Alcohol use No    Drug use: No    Sexual activity: Not on file     Other Topics Concern    Caffeine Concern No    Exercise No     Social History Narrative    The patient does not use an assistive device. .      The patient does live in a home

## 2018-07-31 ENCOUNTER — OFFICE VISIT (OUTPATIENT)
Dept: ENDOCRINOLOGY CLINIC | Facility: CLINIC | Age: 58
End: 2018-07-31
Payer: MEDICARE

## 2018-07-31 VITALS
HEIGHT: 65.5 IN | BODY MASS INDEX: 51.03 KG/M2 | WEIGHT: 310 LBS | HEART RATE: 97 BPM | SYSTOLIC BLOOD PRESSURE: 126 MMHG | DIASTOLIC BLOOD PRESSURE: 96 MMHG

## 2018-07-31 DIAGNOSIS — E11.65 UNCONTROLLED TYPE 2 DIABETES MELLITUS WITH COMPLICATION, UNSPECIFIED WHETHER LONG TERM INSULIN USE: Primary | ICD-10-CM

## 2018-07-31 DIAGNOSIS — E11.8 UNCONTROLLED TYPE 2 DIABETES MELLITUS WITH COMPLICATION, UNSPECIFIED WHETHER LONG TERM INSULIN USE: Primary | ICD-10-CM

## 2018-07-31 PROBLEM — E11.9 DIABETES (HCC): Status: ACTIVE | Noted: 2018-07-31

## 2018-07-31 LAB
CARTRIDGE LOT#: ABNORMAL NUMERIC
GLUCOSE BLOOD: 190
HEMOGLOBIN A1C: 8.2 % (ref 4.3–5.6)
TEST STRIP LOT #: NORMAL NUMERIC

## 2018-07-31 PROCEDURE — 36416 COLLJ CAPILLARY BLOOD SPEC: CPT | Performed by: INTERNAL MEDICINE

## 2018-07-31 PROCEDURE — 99214 OFFICE O/P EST MOD 30 MIN: CPT | Performed by: INTERNAL MEDICINE

## 2018-07-31 PROCEDURE — 83036 HEMOGLOBIN GLYCOSYLATED A1C: CPT | Performed by: INTERNAL MEDICINE

## 2018-07-31 PROCEDURE — 82962 GLUCOSE BLOOD TEST: CPT | Performed by: INTERNAL MEDICINE

## 2018-07-31 RX ORDER — ZOLPIDEM TARTRATE 10 MG/1
10 TABLET ORAL NIGHTLY
Qty: 30 TABLET | Refills: 2 | OUTPATIENT
Start: 2018-07-31 | End: 2018-10-16

## 2018-07-31 NOTE — PROGRESS NOTES
Return Office Visit - Diabetes    CHIEF COMPLAINT:    Diabetes     HISTORY OF PRESENT ILLNESS:   Corrine Romero is a 61year old male PMH as below including HTN, chronic CHF, chronic hepatitis C , diverticulitis who presents for a FU visit for DM ( discovere Oral Cap Take 1 capsule (100 mg total) by mouth 3 (three) times daily.  Disp: 90 capsule Rfl: 2   Insulin Pen Needle (BD PEN NEEDLE ROMMEL U/F) 32G X 4 MM Does not apply Misc USE TO INJECT insulin 3 times per day Disp: 300 each Rfl: 0   MICROLET LANCETS Does furosemide (LASIX) 40 MG Oral Tab 20 mg daily. Disp:  Rfl: 6   spironolactone (ALDACTONE) 25 MG Oral Tab 25 mg daily.  Disp:  Rfl: 10   captopril (CAPOTEN) 12.5 MG Oral Tab TAKE 1 TABLET BY MOUTH TWICE DAILY Disp: 180 tablet Rfl: 0       PAST MEDICAL, S lesions  DIABETIC FOOT EXAM: normal monofilament sensation, + fungal infection of the left toes      DATA:     HbA1c is 5.9 %  (7/2016) --> 9.4 % ( 12/2016) --> 8.5 % ( 3/2017) -->  8.3 % ( 6/2017) --> 9.6 % ( 9/2017) --> 7.8 % ( 11/2017) --> 7.3 % ( 3/201

## 2018-08-01 ENCOUNTER — TELEPHONE (OUTPATIENT)
Dept: ENDOCRINOLOGY CLINIC | Facility: CLINIC | Age: 58
End: 2018-08-01

## 2018-08-15 ENCOUNTER — TELEPHONE (OUTPATIENT)
Dept: PAIN CLINIC | Facility: HOSPITAL | Age: 58
End: 2018-08-15

## 2018-08-15 RX ORDER — NORTRIPTYLINE HYDROCHLORIDE 25 MG/1
50 CAPSULE ORAL NIGHTLY
Qty: 60 CAPSULE | Refills: 2 | Status: SHIPPED | OUTPATIENT
Start: 2018-08-15 | End: 2018-11-10

## 2018-09-17 RX ORDER — MORPHINE SULFATE 100 MG/1
100 TABLET ORAL EVERY 8 HOURS PRN
COMMUNITY
End: 2018-09-17

## 2018-09-17 RX ORDER — MORPHINE SULFATE 60 MG/1
60 TABLET, FILM COATED, EXTENDED RELEASE ORAL EVERY 8 HOURS SCHEDULED
COMMUNITY
End: 2018-09-17

## 2018-09-21 ENCOUNTER — TELEPHONE (OUTPATIENT)
Dept: ENDOCRINOLOGY CLINIC | Facility: CLINIC | Age: 58
End: 2018-09-21

## 2018-09-21 ENCOUNTER — TELEPHONE (OUTPATIENT)
Dept: PAIN CLINIC | Facility: HOSPITAL | Age: 58
End: 2018-09-21

## 2018-09-21 ENCOUNTER — OFFICE VISIT (OUTPATIENT)
Dept: PAIN CLINIC | Facility: HOSPITAL | Age: 58
End: 2018-09-21
Attending: ANESTHESIOLOGY
Payer: MEDICARE

## 2018-09-21 VITALS
OXYGEN SATURATION: 93 % | DIASTOLIC BLOOD PRESSURE: 90 MMHG | RESPIRATION RATE: 20 BRPM | SYSTOLIC BLOOD PRESSURE: 145 MMHG | HEART RATE: 100 BPM

## 2018-09-21 DIAGNOSIS — Z51.81 ENCOUNTER FOR MONITORING OPIOID MAINTENANCE THERAPY: ICD-10-CM

## 2018-09-21 DIAGNOSIS — F11.90 CHRONIC, CONTINUOUS USE OF OPIOIDS: ICD-10-CM

## 2018-09-21 DIAGNOSIS — M79.89 PAIN AND SWELLING OF LEFT LOWER LEG: Primary | ICD-10-CM

## 2018-09-21 DIAGNOSIS — M79.7 FIBROMYALGIA: ICD-10-CM

## 2018-09-21 DIAGNOSIS — M79.662 PAIN AND SWELLING OF LEFT LOWER LEG: Primary | ICD-10-CM

## 2018-09-21 DIAGNOSIS — Z79.891 ENCOUNTER FOR MONITORING OPIOID MAINTENANCE THERAPY: ICD-10-CM

## 2018-09-21 PROCEDURE — 99211 OFF/OP EST MAY X REQ PHY/QHP: CPT

## 2018-09-21 RX ORDER — MORPHINE SULFATE 60 MG/1
60 TABLET, FILM COATED, EXTENDED RELEASE ORAL EVERY 8 HOURS
Qty: 90 TABLET | Refills: 0 | Status: SHIPPED | OUTPATIENT
Start: 2018-10-20 | End: 2018-11-14

## 2018-09-21 RX ORDER — MORPHINE SULFATE 60 MG/1
60 TABLET, FILM COATED, EXTENDED RELEASE ORAL EVERY 8 HOURS SCHEDULED
Qty: 90 TABLET | Refills: 0 | Status: SHIPPED | OUTPATIENT
Start: 2018-09-21 | End: 2018-10-21

## 2018-09-21 RX ORDER — MORPHINE SULFATE 100 MG/1
100 TABLET ORAL EVERY 8 HOURS
Qty: 90 TABLET | Refills: 0 | Status: SHIPPED | OUTPATIENT
Start: 2018-10-20 | End: 2018-11-14

## 2018-09-21 RX ORDER — MORPHINE SULFATE 100 MG/1
100 TABLET ORAL EVERY 8 HOURS PRN
Qty: 90 TABLET | Refills: 0 | Status: SHIPPED | OUTPATIENT
Start: 2018-09-21 | End: 2018-10-21

## 2018-09-21 NOTE — TELEPHONE ENCOUNTER
No answer/no voicemail. Patient needs to call [facility] for his Ray sensor refill.  [phone] ext [phone]

## 2018-09-21 NOTE — CHRONIC PAIN
MEDICATION EVALUATION  HISTORY OF PRESENT ILLNESS:  Aime Thomas is a 61year old old male with history of chronic pain 2/2 fibromyalgia, joint pain foot drops/p left total knee replacement  who presents for medication evaluation.  He continues to report chr Pen-injector INJECT 80 UNITS WITH BREAKFAST, 85 UNITS WITH LUNCH AND 30 UNITS WITH DINNER Disp: 36 mL Rfl: 0   Insulin Pen Needle (BD PEN NEEDLE ROMMEL U/F) 32G X 4 MM Does not apply Misc USE TO INJECT insulin 3 times per day Disp: 300 each Rfl: 0   MICROLET Solution  Disp:  Rfl: 0   Blood Glucose Monitoring Suppl (TALIA CONTOUR NEXT EZ) W/DEVICE Does not apply Kit  Disp:  Rfl: 0   Pantoprazole Sodium (PROTONIX) 40 MG Oral Tab EC  Disp:  Rfl: 0   carvedilol (COREG) 12.5 MG Oral Tab Take 12.5 mg by mouth 2 (two Occupational History  None on file     Social History Main Topics   Smoking status: Never Smoker    Smokeless tobacco: Never Used    Alcohol use No    Drug use: No    Sexual activity: Not on file     Other Topics Concern    Caffeine Concern No    Exerc

## 2018-09-24 NOTE — TELEPHONE ENCOUNTER
Contacted [facility]. Placed reorder request on behalf of patient. Contacted patient and let him know sensors should ship out in 3-4 days. Provided number for [facility] and instructed him how to place next reorder for supplies.

## 2018-09-26 RX ORDER — LEVETIRACETAM 1000 MG/1
TABLET ORAL
Qty: 180 TABLET | Refills: 0 | Status: ON HOLD | OUTPATIENT
Start: 2018-09-26 | End: 2019-07-01

## 2018-09-26 NOTE — TELEPHONE ENCOUNTER
Medication request: levetiracetam 1000 MG Oral Tab, #180, 0 refills  Take 1 tablet (1000 mg total) by mouth 2 (two) times daily.      [place]: 9/21/17    LOV: 9/21/17  NOV: None    Medication refill pended - routed to Dr. Iwona Duenas (Dr. Olegario Andrade is

## 2018-10-16 RX ORDER — INSULIN HUMAN 500 [IU]/ML
INJECTION, SOLUTION SUBCUTANEOUS
Qty: 36 ML | Refills: 0 | Status: SHIPPED | OUTPATIENT
Start: 2018-10-16 | End: 2019-01-09

## 2018-10-16 RX ORDER — PEN NEEDLE, DIABETIC 32GX 5/32"
NEEDLE, DISPOSABLE MISCELLANEOUS
Qty: 300 EACH | Refills: 0 | Status: SHIPPED | OUTPATIENT
Start: 2018-10-16

## 2018-10-19 RX ORDER — ZOLPIDEM TARTRATE 10 MG/1
10 TABLET ORAL NIGHTLY
Qty: 30 TABLET | Refills: 2 | OUTPATIENT
Start: 2018-10-28 | End: 2018-10-22

## 2018-10-22 ENCOUNTER — TELEPHONE (OUTPATIENT)
Dept: PAIN CLINIC | Facility: HOSPITAL | Age: 58
End: 2018-10-22

## 2018-10-22 RX ORDER — ZOLPIDEM TARTRATE 10 MG/1
10 TABLET ORAL NIGHTLY
Qty: 30 TABLET | Refills: 2 | OUTPATIENT
Start: 2018-10-28 | End: 2019-01-16

## 2018-11-12 RX ORDER — NORTRIPTYLINE HYDROCHLORIDE 25 MG/1
CAPSULE ORAL
Qty: 60 CAPSULE | Refills: 0 | Status: SHIPPED | OUTPATIENT
Start: 2018-11-12 | End: 2018-12-08

## 2018-11-12 RX ORDER — NORTRIPTYLINE HYDROCHLORIDE 25 MG/1
CAPSULE ORAL
Qty: 90 CAPSULE | Refills: 0 | Status: ON HOLD | OUTPATIENT
Start: 2018-11-12 | End: 2019-07-01

## 2018-11-14 NOTE — PROGRESS NOTES
Patient presents to [facility] ambulatory with cane for med eval.  He has chronic joint pain fibromyalgia shoulder bhack and bilateral knee pain./  He takes morphine er 160mg tid which helps him tolerate activity. The weather has been making his pain worse.   COCON

## 2018-11-14 NOTE — CHRONIC PAIN
MEDICATION EVALUATION  HISTORY OF PRESENT ILLNESS:  Saida Wen is a 61year old old male with history of chronic pain 2/2 fibromyalgia, joint pain who presents for medication evaluation. He continues to report chronic joint pain.  He denies new or worsenin MM Does not apply Misc USE TO INJECT INSULIN THREE TIMES DAILY.  Disp: 300 each Rfl: 0   LEVETIRACETAM 1000 MG Oral Tab TAKE 1 TABLET(1000 MG) BY MOUTH TWICE DAILY Disp: 180 tablet Rfl: 0   morphINE Sulfate  MG Oral Tab CR Take 1 tablet (100 mg total) Rfl:    furosemide (LASIX) 40 MG Oral Tab 20 mg daily. Disp:  Rfl: 6   spironolactone (ALDACTONE) 25 MG Oral Tab 25 mg daily.  Disp:  Rfl: 10   captopril (CAPOTEN) 12.5 MG Oral Tab TAKE 1 TABLET BY MOUTH TWICE DAILY Disp: 180 tablet Rfl: 0        REVIEW O non-medical: Not on file    Occupational History      Not on file    Tobacco Use      Smoking status: Never Smoker      Smokeless tobacco: Never Used    Substance and Sexual Activity      Alcohol use: No      Drug use: No      Sexual activity: Not on file

## 2018-12-10 RX ORDER — NORTRIPTYLINE HYDROCHLORIDE 25 MG/1
CAPSULE ORAL
Qty: 60 CAPSULE | Refills: 0 | Status: SHIPPED | OUTPATIENT
Start: 2018-12-10 | End: 2019-01-07

## 2019-01-01 ENCOUNTER — EXTERNAL RECORD (OUTPATIENT)
Dept: HEALTH INFORMATION MANAGEMENT | Facility: OTHER | Age: 59
End: 2019-01-01

## 2019-01-07 RX ORDER — NORTRIPTYLINE HYDROCHLORIDE 25 MG/1
CAPSULE ORAL
Qty: 60 CAPSULE | Refills: 2 | Status: SHIPPED | OUTPATIENT
Start: 2019-01-07 | End: 2019-02-01

## 2019-01-09 ENCOUNTER — PRIOR ORIGINAL RECORDS (OUTPATIENT)
Dept: OTHER | Age: 59
End: 2019-01-09

## 2019-01-09 ENCOUNTER — MYAURORA ACCOUNT LINK (OUTPATIENT)
Dept: OTHER | Age: 59
End: 2019-01-09

## 2019-01-09 RX ORDER — INSULIN HUMAN 500 [IU]/ML
INJECTION, SOLUTION SUBCUTANEOUS
Qty: 12 ML | Refills: 0 | Status: ON HOLD | OUTPATIENT
Start: 2019-01-09 | End: 2019-07-01

## 2019-01-09 NOTE — TELEPHONE ENCOUNTER
Pt is requesting a refill on the following medication and requesting to speak Mariza Broussard please call thank you       Current Outpatient Medications:     •  HUMULIN R U-500 KWIKPEN 500 UNIT/ML Subcutaneous Solution Pen-injector, INJECT 80 UNITS WITH BREAKFAST,

## 2019-01-16 NOTE — PROGRESS NOTES
Patient presents to [facility] ambulatory for med eval.  He has chronic neck and back pain with radiculopathy. He has n/t all over too. He takes mser 100 and mser 60 q 8. APN Green in to see patient. See provider notes.

## 2019-01-22 ENCOUNTER — OFFICE VISIT (OUTPATIENT)
Dept: ENDOCRINOLOGY CLINIC | Facility: CLINIC | Age: 59
End: 2019-01-22
Payer: MEDICARE

## 2019-01-22 VITALS
BODY MASS INDEX: 54 KG/M2 | SYSTOLIC BLOOD PRESSURE: 111 MMHG | DIASTOLIC BLOOD PRESSURE: 67 MMHG | WEIGHT: 315 LBS | HEART RATE: 55 BPM

## 2019-01-22 DIAGNOSIS — E11.65 TYPE 2 DIABETES MELLITUS WITH HYPERGLYCEMIA, WITH LONG-TERM CURRENT USE OF INSULIN (HCC): Primary | ICD-10-CM

## 2019-01-22 DIAGNOSIS — Z79.4 TYPE 2 DIABETES MELLITUS WITH HYPERGLYCEMIA, WITH LONG-TERM CURRENT USE OF INSULIN (HCC): Primary | ICD-10-CM

## 2019-01-22 LAB
CARTRIDGE LOT#: ABNORMAL NUMERIC
GLUCOSE BLOOD: 158
HEMOGLOBIN A1C: 7.6 % (ref 4.3–5.6)
TEST STRIP LOT #: NORMAL NUMERIC

## 2019-01-22 PROCEDURE — 83036 HEMOGLOBIN GLYCOSYLATED A1C: CPT | Performed by: INTERNAL MEDICINE

## 2019-01-22 PROCEDURE — 36416 COLLJ CAPILLARY BLOOD SPEC: CPT | Performed by: INTERNAL MEDICINE

## 2019-01-22 PROCEDURE — 99214 OFFICE O/P EST MOD 30 MIN: CPT | Performed by: INTERNAL MEDICINE

## 2019-01-22 PROCEDURE — G0463 HOSPITAL OUTPT CLINIC VISIT: HCPCS | Performed by: INTERNAL MEDICINE

## 2019-01-22 PROCEDURE — 82962 GLUCOSE BLOOD TEST: CPT | Performed by: INTERNAL MEDICINE

## 2019-01-23 NOTE — PROGRESS NOTES
Return Office Visit - Diabetes    CHIEF COMPLAINT:    Diabetes     HISTORY OF PRESENT ILLNESS:   Julian Shepherd is a 60year old male PMH as below including HTN, chronic CHF, chronic hepatitis C , diverticulitis who presents for a FU visit for DM.     DM HIST not apply Misc USE TO INJECT insulin 3 times per day Disp: 300 each Rfl: 0   TRUEPLUS PEN NEEDLES 32G X 4 MM Does not apply Misc USE TO INJECT INSULIN THREE TIMES DAILY Disp: 300 each Rfl: 0   Insulin Pen Needle (BD PEN NEEDLE ROMMEL U/F) 32G X 4 MM Does not daily. Disp:  Rfl: 10   captopril (CAPOTEN) 12.5 MG Oral Tab TAKE 1 TABLET BY MOUTH TWICE DAILY Disp: 180 tablet Rfl: 0   morphINE Sulfate ER 60 MG Oral Tab CR Take 1 tablet (60 mg total) by mouth every 8 (eight) hours.  Disp: 90 tablet Rfl: 0   [START ON 2 atraumatic  NECK:  Supple, symmetrical, trachea midline, no adenopathy, thyroid symmetric, not enlarged and no tenderness  LUNGS: clear to auscultation bilaterally, no crackles or wheezing  CARDIOVASCULAR:  regular rate and rhythm, normal S1 and S2  ABDOME Microalb/Creat Ratio, Random Urine

## 2019-02-01 RX ORDER — NORTRIPTYLINE HYDROCHLORIDE 25 MG/1
CAPSULE ORAL
Qty: 180 CAPSULE | Refills: 0 | Status: SHIPPED | OUTPATIENT
Start: 2019-02-01 | End: 2019-04-30

## 2019-02-06 ENCOUNTER — TELEPHONE (OUTPATIENT)
Dept: ENDOCRINOLOGY CLINIC | Facility: CLINIC | Age: 59
End: 2019-02-06

## 2019-02-06 NOTE — TELEPHONE ENCOUNTER
Pt calling with BG reading    before breakfast , before dinner, bedtime    1/22 - 109, 221, 94    1/23 - 83 , 235 , 332    1/24 - 137, 203, 128    1/25 - 129, 326, 225    1/26 - 142, 182, 81    1/27 -- 129, 95, 60    1/28 - 83, 213, 79    1/29 - 90, 143, 2

## 2019-02-08 NOTE — TELEPHONE ENCOUNTER
Most sugars are okay, but there are random high and low BG. Please stress on consistent and regular eating.   Call if Bg are under 70/ persistently over 200  Otherwise I will see him in three months  Thanks

## 2019-02-08 NOTE — TELEPHONE ENCOUNTER
Spoke with Reese ventura. He verbalizes understanding of message below from provider. RN offered to schedule follow up appointment but he declines and states he will call back to schedule at a later time.

## 2019-02-14 RX ORDER — PREGABALIN 100 MG/1
100 CAPSULE ORAL 3 TIMES DAILY
Qty: 90 CAPSULE | Refills: 2 | Status: ON HOLD | OUTPATIENT
Start: 2019-02-14 | End: 2019-07-01

## 2019-02-28 VITALS
WEIGHT: 315 LBS | DIASTOLIC BLOOD PRESSURE: 80 MMHG | BODY MASS INDEX: 52.48 KG/M2 | RESPIRATION RATE: 18 BRPM | HEIGHT: 65 IN | HEART RATE: 61 BPM | SYSTOLIC BLOOD PRESSURE: 120 MMHG

## 2019-02-28 NOTE — PROGRESS NOTES
Patient presents to [facility] ambulatory for med eval.  He has chronic back pain and generalized joint pain. He takes mser 160 q 8. He is doing generally well. DEBORAH Norwood in to see patient. See provider notes.

## 2019-02-28 NOTE — CHRONIC PAIN
MEDICATION EVALUATION  HISTORY OF PRESENT ILLNESS:  Rosita Hillman is a 60year old old male with history of chronic pain 2/2 fibromyalgia, joint pain who presents for medication evaluation. He continues to report chronic joint pain.  He denies new or worsenin HCL 25 MG Oral Cap TAKE 2 CAPSULES(50MG TOTAL) BY MOUTH NIGHTLY Disp: 180 capsule Rfl: 0   Zolpidem Tartrate 10 MG Oral Tab Take 1 tablet (10 mg total) by mouth nightly.  Disp: 30 tablet Rfl: 2   HUMULIN R U-500 KWIKPEN 500 UNIT/ML Subcutaneous Solution Pen Rfl: 0   Blood Glucose Calibration (TALIA CONTOUR NEXT CONTROL) NORMAL In Vitro Solution  Disp:  Rfl: 0   Blood Glucose Monitoring Suppl (TALIA CONTOUR NEXT EZ) W/DEVICE Does not apply Kit  Disp:  Rfl: 0   Pantoprazole Sodium (PROTONIX) 40 MG Oral Tab EC HISTORY:  Social History    Socioeconomic History      Marital status:       Spouse name: Not on file      Number of children: Not on file      Years of education: Not on file      Highest education level: Not on file    Occupational History      No EXAMINATION:  There were no vitals filed for this visit.    General: Alert and oriented x3  Affect:  NAD  Gait: Antalgic;  cane user - No  Spine: Normal  Joint Exam: Crepitus  IMAGING:  No new relevant studies   [place] PHYSICIAN MONITORING PROGRAM REVIEWED  Yes

## 2019-03-01 VITALS
HEART RATE: 67 BPM | SYSTOLIC BLOOD PRESSURE: 122 MMHG | RESPIRATION RATE: 18 BRPM | HEIGHT: 65 IN | DIASTOLIC BLOOD PRESSURE: 86 MMHG | BODY MASS INDEX: 47.15 KG/M2 | WEIGHT: 283 LBS

## 2019-04-04 ENCOUNTER — TELEPHONE (OUTPATIENT)
Dept: ENDOCRINOLOGY CLINIC | Facility: CLINIC | Age: 59
End: 2019-04-04

## 2019-04-09 RX ORDER — ZOLPIDEM TARTRATE 10 MG/1
10 TABLET ORAL NIGHTLY
Qty: 30 TABLET | Refills: 2 | Status: ON HOLD | OUTPATIENT
Start: 2019-04-10 | End: 2019-07-01

## 2019-04-17 RX ORDER — CAPTOPRIL 12.5 MG/1
TABLET ORAL
Qty: 180 TABLET | Refills: 0 | Status: SHIPPED | OUTPATIENT
Start: 2019-04-17 | End: 2020-04-10 | Stop reason: SDUPTHER

## 2019-04-17 RX ORDER — CAPTOPRIL 12.5 MG/1
12.5 TABLET ORAL 2 TIMES DAILY
COMMUNITY
Start: 2018-09-28 | End: 2020-04-10 | Stop reason: SDUPTHER

## 2019-04-30 RX ORDER — NORTRIPTYLINE HYDROCHLORIDE 25 MG/1
CAPSULE ORAL
Qty: 180 CAPSULE | Refills: 2 | Status: ON HOLD | OUTPATIENT
Start: 2019-04-30 | End: 2019-07-01

## 2019-05-02 ENCOUNTER — HOSPITAL ENCOUNTER (INPATIENT)
Facility: HOSPITAL | Age: 59
LOS: 60 days | Discharge: NURSING, REHAB OR LONG-TERM CARE | DRG: 003 | End: 2019-07-01
Attending: EMERGENCY MEDICINE | Admitting: HOSPITALIST
Payer: MEDICARE

## 2019-05-02 ENCOUNTER — APPOINTMENT (OUTPATIENT)
Dept: GENERAL RADIOLOGY | Facility: HOSPITAL | Age: 59
DRG: 003 | End: 2019-05-02
Attending: EMERGENCY MEDICINE
Payer: MEDICARE

## 2019-05-02 DIAGNOSIS — K92.2 ACUTE GI BLEEDING: Primary | ICD-10-CM

## 2019-05-02 DIAGNOSIS — I48.91 ATRIAL FIBRILLATION WITH RAPID VENTRICULAR RESPONSE (HCC): ICD-10-CM

## 2019-05-02 DIAGNOSIS — A41.9 SEPSIS DUE TO UNDETERMINED ORGANISM (HCC): ICD-10-CM

## 2019-05-02 LAB
ADENOVIRUS PCR:: NEGATIVE
ALBUMIN SERPL-MCNC: 3.2 G/DL (ref 3.4–5)
ALP LIVER SERPL-CCNC: 80 U/L (ref 45–117)
ALT SERPL-CCNC: 61 U/L (ref 16–61)
AMMONIA PLAS-MCNC: 18 UMOL/L (ref 11–32)
ANION GAP SERPL CALC-SCNC: 5 MMOL/L (ref 0–18)
AST SERPL-CCNC: 63 U/L (ref 15–37)
B PERT DNA SPEC QL NAA+PROBE: NEGATIVE
BACTERIA UR QL AUTO: NEGATIVE /HPF
BASE EXCESS BLD CALC-SCNC: 2 MMOL/L (ref ?–2)
BASOPHILS # BLD AUTO: 0.03 X10(3) UL (ref 0–0.2)
BASOPHILS NFR BLD AUTO: 0.2 %
BILIRUB DIRECT SERPL-MCNC: 0.2 MG/DL (ref 0–0.2)
BILIRUB SERPL-MCNC: 0.6 MG/DL (ref 0.1–2)
BILIRUB UR QL: NEGATIVE
BUN BLD-MCNC: 24 MG/DL (ref 7–18)
BUN/CREAT SERPL: 16.3 (ref 10–20)
C PNEUM DNA SPEC QL NAA+PROBE: NEGATIVE
CALCIUM BLD-MCNC: 9.1 MG/DL (ref 8.5–10.1)
CHLORIDE SERPL-SCNC: 107 MMOL/L (ref 98–112)
CLARITY UR: CLEAR
CO2 SERPL-SCNC: 31 MMOL/L (ref 21–32)
COLOR UR: YELLOW
CORONAVIRUS 229E PCR:: NEGATIVE
CORONAVIRUS HKU1 PCR:: NEGATIVE
CORONAVIRUS NL63 PCR:: NEGATIVE
CORONAVIRUS OC43 PCR:: NEGATIVE
CREAT BLD-MCNC: 1.47 MG/DL (ref 0.7–1.3)
DEPRECATED RDW RBC AUTO: 44.6 FL (ref 35.1–46.3)
EOSINOPHIL # BLD AUTO: 0 X10(3) UL (ref 0–0.7)
EOSINOPHIL NFR BLD AUTO: 0 %
ERYTHROCYTE [DISTWIDTH] IN BLOOD BY AUTOMATED COUNT: 13.1 % (ref 11–15)
EST. AVERAGE GLUCOSE BLD GHB EST-MCNC: 189 MG/DL (ref 68–126)
FLUAV RNA SPEC QL NAA+PROBE: NEGATIVE
FLUBV RNA SPEC QL NAA+PROBE: NEGATIVE
GLUCOSE BLD-MCNC: 280 MG/DL (ref 70–99)
GLUCOSE BLDC GLUCOMTR-MCNC: 250 MG/DL (ref 70–99)
GLUCOSE BLDC GLUCOMTR-MCNC: 300 MG/DL (ref 70–99)
GLUCOSE UR-MCNC: 50 MG/DL
HBA1C MFR BLD HPLC: 8.2 % (ref ?–5.7)
HCO3 BLDA-SCNC: 26.3 MEQ/L (ref 21–27)
HCT VFR BLD AUTO: 50.9 % (ref 39–53)
HGB BLD-MCNC: 16.7 G/DL (ref 13–17.5)
IMM GRANULOCYTES # BLD AUTO: 0.14 X10(3) UL (ref 0–1)
IMM GRANULOCYTES NFR BLD: 0.7 %
INR BLD: 1.35 (ref 0.9–1.2)
LACTATE SERPL-SCNC: 2.1 MMOL/L (ref 0.4–2)
LACTATE SERPL-SCNC: 2.1 MMOL/L (ref 0.4–2)
LACTATE SERPL-SCNC: 2.2 MMOL/L (ref 0.4–2)
LACTATE SERPL-SCNC: 2.4 MMOL/L (ref 0.4–2)
LACTATE SERPL-SCNC: 3.2 MMOL/L (ref 0.4–2)
LEUKOCYTE ESTERASE UR QL STRIP.AUTO: NEGATIVE
LYMPHOCYTES # BLD AUTO: 2.44 X10(3) UL (ref 1–4)
LYMPHOCYTES NFR BLD AUTO: 12.7 %
M PROTEIN MFR SERPL ELPH: 8.4 G/DL (ref 6.4–8.2)
MCH RBC QN AUTO: 30.4 PG (ref 26–34)
MCHC RBC AUTO-ENTMCNC: 32.8 G/DL (ref 31–37)
MCV RBC AUTO: 92.7 FL (ref 80–100)
METAPNEUMOVIRUS PCR:: NEGATIVE
MODIFIED ALLEN TEST: POSITIVE
MONOCYTES # BLD AUTO: 1.4 X10(3) UL (ref 0.1–1)
MONOCYTES NFR BLD AUTO: 7.3 %
MRSA DNA SPEC QL NAA+PROBE: NEGATIVE
MYCOPLASMA PNEUMONIA PCR:: NEGATIVE
NEUTROPHILS # BLD AUTO: 15.19 X10 (3) UL (ref 1.5–7.7)
NEUTROPHILS # BLD AUTO: 15.19 X10(3) UL (ref 1.5–7.7)
NEUTROPHILS NFR BLD AUTO: 79.1 %
NITRITE UR QL STRIP.AUTO: NEGATIVE
O2 CT BLD-SCNC: 20.9 VOL% (ref 15–23)
OSMOLALITY SERPL CALC.SUM OF ELEC: 310 MOSM/KG (ref 275–295)
PARAINFLUENZA 1 PCR:: NEGATIVE
PARAINFLUENZA 2 PCR:: NEGATIVE
PARAINFLUENZA 3 PCR:: POSITIVE
PARAINFLUENZA 4 PCR:: NEGATIVE
PCO2 BLDA: 36 MM HG (ref 35–45)
PH BLDA: 7.46 [PH] (ref 7.35–7.45)
PH UR: 6 [PH] (ref 5–8)
PLATELET # BLD AUTO: 190 10(3)UL (ref 150–450)
PO2 BLDA: 69 MM HG (ref 80–100)
POTASSIUM SERPL-SCNC: 4.3 MMOL/L (ref 3.5–5.1)
PROT UR-MCNC: 100 MG/DL
PROTHROMBIN TIME: 16.6 SECONDS (ref 11.8–14.5)
PUNCTURE CHARGE: YES
RBC # BLD AUTO: 5.49 X10(6)UL (ref 4.3–5.7)
RBC #/AREA URNS AUTO: 2 /HPF
RHINOVIRUS/ENTERO PCR:: NEGATIVE
RSV RNA SPEC QL NAA+PROBE: NEGATIVE
SAO2 % BLDA: 96.7 % (ref 94–100)
SODIUM SERPL-SCNC: 143 MMOL/L (ref 136–145)
SP GR UR STRIP: 1.02 (ref 1–1.03)
UROBILINOGEN UR STRIP-ACNC: <2
VIT C UR-MCNC: NEGATIVE MG/DL
WBC # BLD AUTO: 19.2 X10(3) UL (ref 4–11)
WBC #/AREA URNS AUTO: 2 /HPF

## 2019-05-02 PROCEDURE — 71045 X-RAY EXAM CHEST 1 VIEW: CPT | Performed by: EMERGENCY MEDICINE

## 2019-05-02 PROCEDURE — 99222 1ST HOSP IP/OBS MODERATE 55: CPT | Performed by: INTERNAL MEDICINE

## 2019-05-02 PROCEDURE — 99223 1ST HOSP IP/OBS HIGH 75: CPT | Performed by: INTERNAL MEDICINE

## 2019-05-02 PROCEDURE — 99223 1ST HOSP IP/OBS HIGH 75: CPT | Performed by: HOSPITALIST

## 2019-05-02 RX ORDER — DEXTROSE MONOHYDRATE 25 G/50ML
50 INJECTION, SOLUTION INTRAVENOUS AS NEEDED
Status: DISCONTINUED | OUTPATIENT
Start: 2019-05-02 | End: 2019-05-16

## 2019-05-02 RX ORDER — ONDANSETRON 2 MG/ML
4 INJECTION INTRAMUSCULAR; INTRAVENOUS EVERY 6 HOURS PRN
Status: DISCONTINUED | OUTPATIENT
Start: 2019-05-02 | End: 2019-05-11

## 2019-05-02 RX ORDER — MORPHINE SULFATE 30 MG/1
60 TABLET, FILM COATED, EXTENDED RELEASE ORAL EVERY 8 HOURS
Status: DISCONTINUED | OUTPATIENT
Start: 2019-05-02 | End: 2019-05-03

## 2019-05-02 RX ORDER — SODIUM CHLORIDE 0.9 % (FLUSH) 0.9 %
3 SYRINGE (ML) INJECTION AS NEEDED
Status: DISCONTINUED | OUTPATIENT
Start: 2019-05-02 | End: 2019-05-28

## 2019-05-02 RX ORDER — PREGABALIN 50 MG/1
100 CAPSULE ORAL 3 TIMES DAILY
Status: DISCONTINUED | OUTPATIENT
Start: 2019-05-02 | End: 2019-05-13

## 2019-05-02 RX ORDER — SPIRONOLACTONE 25 MG/1
25 TABLET ORAL DAILY
Status: DISCONTINUED | OUTPATIENT
Start: 2019-05-03 | End: 2019-05-03

## 2019-05-02 RX ORDER — PROMETHAZINE HYDROCHLORIDE 25 MG/1
12.5 TABLET ORAL EVERY 4 HOURS PRN
Status: DISCONTINUED | OUTPATIENT
Start: 2019-05-02 | End: 2019-07-01

## 2019-05-02 RX ORDER — MORPHINE SULFATE 100 MG/1
100 TABLET ORAL EVERY 8 HOURS
Status: DISCONTINUED | OUTPATIENT
Start: 2019-05-02 | End: 2019-05-03

## 2019-05-02 RX ORDER — PANTOPRAZOLE SODIUM 40 MG/1
40 TABLET, DELAYED RELEASE ORAL
Status: DISCONTINUED | OUTPATIENT
Start: 2019-05-03 | End: 2019-05-03

## 2019-05-02 RX ORDER — CAPTOPRIL 12.5 MG/1
12.5 TABLET ORAL 2 TIMES DAILY
Status: DISCONTINUED | OUTPATIENT
Start: 2019-05-02 | End: 2019-05-03

## 2019-05-02 RX ORDER — ZOLPIDEM TARTRATE 5 MG/1
10 TABLET ORAL NIGHTLY
Status: DISCONTINUED | OUTPATIENT
Start: 2019-05-02 | End: 2019-05-03

## 2019-05-02 RX ORDER — SODIUM CHLORIDE 9 MG/ML
INJECTION, SOLUTION INTRAVENOUS CONTINUOUS
Status: DISCONTINUED | OUTPATIENT
Start: 2019-05-02 | End: 2019-05-04

## 2019-05-02 RX ORDER — ACETAMINOPHEN 500 MG
1000 TABLET ORAL ONCE
Status: COMPLETED | OUTPATIENT
Start: 2019-05-02 | End: 2019-05-02

## 2019-05-02 RX ORDER — CARVEDILOL 12.5 MG/1
12.5 TABLET ORAL 2 TIMES DAILY WITH MEALS
Status: DISCONTINUED | OUTPATIENT
Start: 2019-05-02 | End: 2019-05-03

## 2019-05-02 RX ORDER — LEVETIRACETAM 500 MG/1
1000 TABLET ORAL 2 TIMES DAILY
Status: DISCONTINUED | OUTPATIENT
Start: 2019-05-02 | End: 2019-05-11

## 2019-05-02 RX ORDER — POLYETHYLENE GLYCOL 3350 17 G/17G
17 POWDER, FOR SOLUTION ORAL DAILY
Status: DISCONTINUED | OUTPATIENT
Start: 2019-05-03 | End: 2019-05-19

## 2019-05-02 RX ORDER — HEPARIN SODIUM 5000 [USP'U]/ML
5000 INJECTION, SOLUTION INTRAVENOUS; SUBCUTANEOUS EVERY 12 HOURS SCHEDULED
Status: DISCONTINUED | OUTPATIENT
Start: 2019-05-02 | End: 2019-05-03

## 2019-05-02 RX ORDER — NORTRIPTYLINE HYDROCHLORIDE 50 MG/1
50 CAPSULE ORAL NIGHTLY
Status: DISCONTINUED | OUTPATIENT
Start: 2019-05-02 | End: 2019-07-01

## 2019-05-02 RX ORDER — ACETAMINOPHEN 325 MG/1
650 TABLET ORAL EVERY 6 HOURS PRN
Status: DISCONTINUED | OUTPATIENT
Start: 2019-05-02 | End: 2019-05-03

## 2019-05-02 RX ORDER — SODIUM CHLORIDE 9 MG/ML
INJECTION, SOLUTION INTRAVENOUS ONCE
Status: COMPLETED | OUTPATIENT
Start: 2019-05-02 | End: 2019-05-02

## 2019-05-02 NOTE — ED NOTES
Sepsis note: pt arrived febrile and confused , hx of CHF , sepsis orders changed in order to account for heart function per Dr. Marshal Garcia and Dr. Dirk Haywood    Total volume received  500ml, infusing 500cc now, still needs last Liter to equal; 2000ml total, it is on

## 2019-05-02 NOTE — ED INITIAL ASSESSMENT (HPI)
Fever and cough x 1 week.  Patient confused per medics    Hypoxic 89% on room air, diaphoretic and AOX2 upon arrival.   Patient is a poor historian

## 2019-05-02 NOTE — ED NOTES
Respiratory at bedside for ABG, unsuccessful, Dr. Burns Never notified, have respiratory try in about 1 hr , respiratory will come back at 2pm to try for ABG

## 2019-05-02 NOTE — PROGRESS NOTES
[facility] Note: Antimicrobial Weight Dose Adjustment for: piperacillin/tazobactam (Laura Daunt)    Rosita Hillman is a 60year old male who has been prescribed piperacillin/tazobactam (ZOSYN) 3.375 gm every 8 hours.   CrCl is estimated creatinine clearance is 48

## 2019-05-02 NOTE — ED NOTES
Per Dr. Erinn Rivera order change sepsis fluid to total of 2000ml due to his hx of heart failure, Dr. Demertis Manjarrez notified

## 2019-05-02 NOTE — ED NOTES
Report to [facility], pt need repeat lactic for 3hr now, will draw and bring up to 239

## 2019-05-02 NOTE — ED NOTES
60year old male arrived via EMS for confusion and fever, per EMS able to follow command, unable to tell name/birthday, warm to touch pt diaphoretic and incontinent on arrival, temp 103 upon arrival heart rate 150, PIV 20 gauge in left AC from EMS, family

## 2019-05-02 NOTE — HISTORICAL OFFICE NOTE
Clif Plan  : 1960  ACCOUNT: [id number]  [phone]  PCP: Dr. Júnior Pool DATE: 2019  DICTATED BY: [Dr. Esa Rodriguez    CHIEF COMPLAINT: [Followup of Atrial fibrillation, Followup of Cardiomyopathy, dilated and Followup of Dyspne NEURO/PSYCH: alert and oriented to time, place and person and normal affect. CV: PALP: PMI not displaced, no lifts and thrills or rub. AUSC: regular rhythm, normal S1, S2 without S3; no pathologic murmurs.  CAROTIDS: carotid pulses normal. ABD AORTA: ab

## 2019-05-02 NOTE — CONSULTS
Pulmonary/Critical Care Consultation Note    HPI:   Fidelina Baires is a 60year old male with Patient presents with:  Fever (infectious)  Altered Mental Status (neurologic)    LATIA Garcia MD    Pt is a 61 yo with h/o CHF, morbid obesity, DM, chronic pain Rfl: 0   pregabalin 100 MG Oral Cap Take 1 capsule (100 mg total) by mouth 3 (three) times daily.  Disp: 90 capsule Rfl: 2   HUMULIN R U-500 KWIKPEN 500 UNIT/ML Subcutaneous Solution Pen-injector INJECT 80 UNITS WITH BREAKFAST, 85 UNITS WITH LUNCH AND 30 UN In Vitro Solution  Disp:  Rfl: 0   Blood Glucose Monitoring Suppl (TALIA CONTOUR NEXT EZ) W/DEVICE Does not apply Kit  Disp:  Rfl: 0   Pantoprazole Sodium (PROTONIX) 40 MG Oral Tab EC  Disp:  Rfl: 0   carvedilol (COREG) 12.5 MG Oral Tab Take 12.5 mg by caleb (39.8 °C) (Oral)   Resp 13   Wt (!) 310 lb (140.6 kg)   SpO2 95%   BMI 50.80 kg/m²     Intake/Output Summary (Last 24 hours) at 5/2/2019 1357  Last data filed at 5/2/2019 1223  Gross per 24 hour   Intake 2600 ml   Output —   Net 2600 ml     NAD but somnole

## 2019-05-02 NOTE — CONSULTS
[facility]    Report of Consultation    Tinsley Yumiko Patient Status:  Inpatient    1960 MRN [id number]   Location [facility] 2W/SW Attending Cecile Morgan MD   Hosp Day # 0 PCP LATIA Maldonado MD     Date of Admission: Continuous  •  Heparin Sodium (Porcine) 5000 UNIT/ML injection 5,000 Units, 5,000 Units, Subcutaneous, 2 times per day  •  acetaminophen (TYLENOL) tab 650 mg, 650 mg, Oral, Q6H PRN  •  ondansetron HCl (ZOFRAN) injection 4 mg, 4 mg, Intravenous, Q6H PRN  • (140.6 kg), SpO2 93 %. General: Alert, orientated x3. Cooperative. No apparent distress. HEENT: Exam is unremarkable. Lungs: Clear bilaterally. Cardiac: Regular rate and rhythm. Abdomen: Bowel sounds present, normoactive. Nontender.   Extremities

## 2019-05-02 NOTE — H&P
[facility]    PATIENT'S NAME: Abdi Sheets   ATTENDING PHYSICIAN: Kayli Kelly MD   PATIENT ACCOUNT#:   [id number]    LOCATION:  [place] [address]  MEDICAL RECORD #:   [id number]       YOB: 1960  ADMISSION DATE:       05/02/2019 Most recent ultrasound showed no evidence of cirrhosis. PAST SURGICAL HISTORY:  Left total knee arthroplasty.     MEDICATIONS:  Please see medication reconciliation list.  The patient noted to be on large doses of opiates but the last time he took his m is not able to follow full instructions for neurological exam.    ASSESSMENT:    1. Acute encephalopathy; rule out hypercapnia versus hepatic encephalopathy considering his history of hepatitis C and chronic liver disease.   2.   History of cardiomyopathy

## 2019-05-02 NOTE — CONSULTS
[facility]    Cardiology Consultation  [facility]    Rm Calle Patient Status:  Inpatient    1960 MRN [id number]   Location [facility] 2W/SW Attending Baron Collins MD   Hosp Day # 0 PCP LATIA Bruno obesity ([address]    • Obesity    • Rectal fissure     colorectal fistula - surgery   • Seizure disorder [facility])        Past Surgical History  Past Surgical History:   Procedure Laterality Date   • COLONOSCOPY     • KNEE REPLACEMENT SURGERY Left 2010       Famil packet 17 g 17 g Oral Daily   pregabalin (LYRICA) cap 100 mg 100 mg Oral TID   Promethazine HCl (PHENERGAN) tab 12.5 mg 12.5 mg Oral Q4H PRN   [START ON 5/3/2019] spironolactone (ALDACTONE) tab 25 mg 25 mg Oral Daily   Zolpidem Tartrate (AMBIEN) tab 10 mg DAILY   Naloxone HCl 4 MG/0.1ML Nasal Liquid 4 mg by Nasal route as needed. Promethazine HCl 12.5 MG Oral Tab TK 1 T PO TID PRN N   Elastic Bandages & Supports (MEDICAL COMPRESSION SOCKS) Does not apply Misc 1 each by Does not apply route continuous.  Suzie Sahni piperacillin-tazobactam  4.5 g Intravenous Q8H   • captopril  12.5 mg Oral BID   • carvedilol  12.5 mg Oral BID with meals   • levetiracetam  1,000 mg Oral BID   • morphINE Sulfate ER  100 mg Oral Q8H   • morphINE Sulfate ER  60 mg Oral Q8H   • Nortriptyli Tortuous aorta. 3. Demineralization. 4. Scoliosis. 5. Osteoarthritis.)    Dictated by (CST): Sayra Santamaria MD on 5/02/2019 at 11:03     Approved by (CST):  Sayra Santamaria MD on 5/02/2019 at 11:07              Impression:     Sepsis due to undetermine

## 2019-05-02 NOTE — ED PROVIDER NOTES
Patient Seen in: [facility] AND [facility] Emergency Department    History   Patient presents with:  Fever (infectious)  Altered Mental Status (neurologic)    Stated Complaint: fever    HPI    Patient presents with limited history from home.   According to parame DINNER  Patient taking differently: INJECT 80 UNITS WITH BREAKFAST, 95 UNITS WITH LUNCH AND 35 UNITS WITH DINNER    NORTRIPTYLINE HCL 25 MG Oral Cap,  TAKE 1 CAPSULE(25 MG) BY MOUTH EVERY NIGHT   TRUEPLUS PEN NEEDLES 32G X 4 MM Does not apply Misc,  USE TO Problem Relation Age of Onset   • Stroke Father 43        CVA   • Cancer Maternal Grandmother 79       Social History    Tobacco Use      Smoking status: Never Smoker      Smokeless tobacco: Never Used    Alcohol use: No    Drug use: No      Review of Sy here..    Patient presents with fever of 103.7 increased respiratory rate increased pulse. According to paramedics he was 91% when they picked him up he was put on 2 L.   We will do chest x-ray blood cultures lactic acid blood tests and urinalysis and IV f following components:    Lactic Acid 3.2 (*)     All other components within normal limits   URINALYSIS WITH CULTURE REFLEX - Abnormal; Notable for the following components:    Protein Urine 100  (*)     Glucose Urine 50  (*)     Ketones Urine Trace (*) specified.     Medications Prescribed:  Current Discharge Medication List        Present on Admission  Date Reviewed: 2/28/2019          ICD-10-CM Noted POA    Sepsis due to undetermined organism ([facility] [address] A41.9 5/2/2019 Unknown

## 2019-05-03 ENCOUNTER — APPOINTMENT (OUTPATIENT)
Dept: CV DIAGNOSTICS | Facility: HOSPITAL | Age: 59
DRG: 003 | End: 2019-05-03
Attending: HOSPITALIST
Payer: MEDICARE

## 2019-05-03 ENCOUNTER — APPOINTMENT (OUTPATIENT)
Dept: CT IMAGING | Facility: HOSPITAL | Age: 59
DRG: 003 | End: 2019-05-03
Attending: INTERNAL MEDICINE
Payer: MEDICARE

## 2019-05-03 ENCOUNTER — ANESTHESIA (OUTPATIENT)
Dept: MEDSURG UNIT | Facility: HOSPITAL | Age: 59
DRG: 003 | End: 2019-05-03
Payer: MEDICARE

## 2019-05-03 ENCOUNTER — ANESTHESIA EVENT (OUTPATIENT)
Dept: MEDSURG UNIT | Facility: HOSPITAL | Age: 59
DRG: 003 | End: 2019-05-03
Payer: MEDICARE

## 2019-05-03 ENCOUNTER — APPOINTMENT (OUTPATIENT)
Dept: GENERAL RADIOLOGY | Facility: HOSPITAL | Age: 59
DRG: 003 | End: 2019-05-03
Attending: INTERNAL MEDICINE
Payer: MEDICARE

## 2019-05-03 ENCOUNTER — APPOINTMENT (OUTPATIENT)
Dept: CT IMAGING | Facility: HOSPITAL | Age: 59
DRG: 003 | End: 2019-05-03
Attending: HOSPITALIST
Payer: MEDICARE

## 2019-05-03 ENCOUNTER — APPOINTMENT (OUTPATIENT)
Dept: PICC SERVICES | Facility: HOSPITAL | Age: 59
DRG: 003 | End: 2019-05-03
Attending: INTERNAL MEDICINE
Payer: MEDICARE

## 2019-05-03 LAB
ALBUMIN SERPL-MCNC: 2.9 G/DL (ref 3.4–5)
ALP LIVER SERPL-CCNC: 73 U/L (ref 45–117)
ALT SERPL-CCNC: 69 U/L (ref 16–61)
ANION GAP SERPL CALC-SCNC: 7 MMOL/L (ref 0–18)
ANION GAP SERPL CALC-SCNC: 8 MMOL/L (ref 0–18)
ANTIBODY SCREEN: POSITIVE
APTT PPP: 26.9 SECONDS (ref 23.2–35.3)
AST SERPL-CCNC: 79 U/L (ref 15–37)
BASE EXCESS BLD CALC-SCNC: 4.1 MMOL/L (ref ?–2)
BASOPHILS # BLD AUTO: 0.03 X10(3) UL (ref 0–0.2)
BASOPHILS # BLD AUTO: 0.07 X10(3) UL (ref 0–0.2)
BASOPHILS NFR BLD AUTO: 0.1 %
BASOPHILS NFR BLD AUTO: 0.3 %
BILIRUB DIRECT SERPL-MCNC: 0.3 MG/DL (ref 0–0.2)
BILIRUB SERPL-MCNC: 1 MG/DL (ref 0.1–2)
BUN BLD-MCNC: 27 MG/DL (ref 7–18)
BUN BLD-MCNC: 30 MG/DL (ref 7–18)
BUN/CREAT SERPL: 17.9 (ref 10–20)
BUN/CREAT SERPL: 22.6 (ref 10–20)
C ANTIGEN: POSITIVE
CALCIUM BLD-MCNC: 7.8 MG/DL (ref 8.5–10.1)
CALCIUM BLD-MCNC: 9 MG/DL (ref 8.5–10.1)
CHLORIDE SERPL-SCNC: 108 MMOL/L (ref 98–112)
CHLORIDE SERPL-SCNC: 114 MMOL/L (ref 98–112)
CO2 SERPL-SCNC: 28 MMOL/L (ref 21–32)
CO2 SERPL-SCNC: 29 MMOL/L (ref 21–32)
CREAT BLD-MCNC: 1.33 MG/DL (ref 0.7–1.3)
CREAT BLD-MCNC: 1.51 MG/DL (ref 0.7–1.3)
DEPRECATED RDW RBC AUTO: 45.4 FL (ref 35.1–46.3)
DEPRECATED RDW RBC AUTO: 47.4 FL (ref 35.1–46.3)
DIRECT COOMBS POLY: NEGATIVE
E ANTIGEN: NEGATIVE
EOSINOPHIL # BLD AUTO: 0 X10(3) UL (ref 0–0.7)
EOSINOPHIL # BLD AUTO: 0 X10(3) UL (ref 0–0.7)
EOSINOPHIL NFR BLD AUTO: 0 %
EOSINOPHIL NFR BLD AUTO: 0 %
ERYTHROCYTE [DISTWIDTH] IN BLOOD BY AUTOMATED COUNT: 13.5 % (ref 11–15)
ERYTHROCYTE [DISTWIDTH] IN BLOOD BY AUTOMATED COUNT: 13.8 % (ref 11–15)
GLUCOSE BLD-MCNC: 277 MG/DL (ref 70–99)
GLUCOSE BLD-MCNC: 306 MG/DL (ref 70–99)
GLUCOSE BLDC GLUCOMTR-MCNC: 196 MG/DL (ref 70–99)
GLUCOSE BLDC GLUCOMTR-MCNC: 265 MG/DL (ref 70–99)
GLUCOSE BLDC GLUCOMTR-MCNC: 292 MG/DL (ref 70–99)
GLUCOSE BLDC GLUCOMTR-MCNC: 306 MG/DL (ref 70–99)
HAV AB SER QL IA: NONREACTIVE
HAV IGM SER QL: 2.4 MG/DL (ref 1.6–2.6)
HBV CORE AB SERPL QL IA: REACTIVE
HBV SURFACE AB SER QL: NONREACTIVE
HBV SURFACE AB SERPL IA-ACNC: 4.09 MIU/ML
HBV SURFACE AG SERPL QL IA: NONREACTIVE
HCO3 BLDA-SCNC: 28 MEQ/L (ref 21–27)
HCT VFR BLD AUTO: 43.7 % (ref 39–53)
HCT VFR BLD AUTO: 53.2 % (ref 39–53)
HGB BLD-MCNC: 14.1 G/DL (ref 13–17.5)
HGB BLD-MCNC: 17.6 G/DL (ref 13–17.5)
IMM GRANULOCYTES # BLD AUTO: 0.08 X10(3) UL (ref 0–1)
IMM GRANULOCYTES # BLD AUTO: 0.18 X10(3) UL (ref 0–1)
IMM GRANULOCYTES NFR BLD: 0.4 %
IMM GRANULOCYTES NFR BLD: 0.7 %
INR BLD: 1.47 (ref 0.9–1.2)
K ANTIGEN: NEGATIVE
LACTATE SERPL-SCNC: 1.8 MMOL/L (ref 0.4–2)
LACTATE SERPL-SCNC: 2.3 MMOL/L (ref 0.4–2)
LACTATE SERPL-SCNC: 3.6 MMOL/L (ref 0.4–2)
LACTATE SERPL-SCNC: 4.6 MMOL/L (ref 0.4–2)
LIPASE SERPL-CCNC: 537 U/L (ref 73–393)
LITTLE C ANTIGEN: NEGATIVE
LITTLE E ANTIGEN: POSITIVE
LYMPHOCYTES # BLD AUTO: 2.26 X10(3) UL (ref 1–4)
LYMPHOCYTES # BLD AUTO: 3.77 X10(3) UL (ref 1–4)
LYMPHOCYTES NFR BLD AUTO: 18.8 %
LYMPHOCYTES NFR BLD AUTO: 9.3 %
M PROTEIN MFR SERPL ELPH: 7.9 G/DL (ref 6.4–8.2)
MCH RBC QN AUTO: 30.3 PG (ref 26–34)
MCH RBC QN AUTO: 30.5 PG (ref 26–34)
MCHC RBC AUTO-ENTMCNC: 32.3 G/DL (ref 31–37)
MCHC RBC AUTO-ENTMCNC: 33.1 G/DL (ref 31–37)
MCV RBC AUTO: 92.2 FL (ref 80–100)
MCV RBC AUTO: 94 FL (ref 80–100)
MODIFIED ALLEN TEST: POSITIVE
MONOCYTES # BLD AUTO: 1.39 X10(3) UL (ref 0.1–1)
MONOCYTES # BLD AUTO: 1.58 X10(3) UL (ref 0.1–1)
MONOCYTES NFR BLD AUTO: 5.7 %
MONOCYTES NFR BLD AUTO: 7.9 %
NEUTROPHILS # BLD AUTO: 14.56 X10 (3) UL (ref 1.5–7.7)
NEUTROPHILS # BLD AUTO: 14.56 X10(3) UL (ref 1.5–7.7)
NEUTROPHILS # BLD AUTO: 20.35 X10 (3) UL (ref 1.5–7.7)
NEUTROPHILS # BLD AUTO: 20.35 X10(3) UL (ref 1.5–7.7)
NEUTROPHILS NFR BLD AUTO: 72.8 %
NEUTROPHILS NFR BLD AUTO: 84 %
O2 CT BLD-SCNC: 21.9 VOL% (ref 15–23)
OSMOLALITY SERPL CALC.SUM OF ELEC: 315 MOSM/KG (ref 275–295)
OSMOLALITY SERPL CALC.SUM OF ELEC: 326 MOSM/KG (ref 275–295)
OXYGEN LITERS/MINUTE: 4 L/MIN
PCO2 BLDA: 37 MM HG (ref 35–45)
PH BLDA: 7.48 [PH] (ref 7.35–7.45)
PLATELET # BLD AUTO: 187 10(3)UL (ref 150–450)
PLATELET # BLD AUTO: 252 10(3)UL (ref 150–450)
PO2 BLDA: 76 MM HG (ref 80–100)
POTASSIUM SERPL-SCNC: 3.5 MMOL/L (ref 3.5–5.1)
POTASSIUM SERPL-SCNC: 4.2 MMOL/L (ref 3.5–5.1)
PROCALCITONIN SERPL-MCNC: 15.4 NG/ML
PROTHROMBIN TIME: 17.8 SECONDS (ref 11.8–14.5)
PUNCTURE CHARGE: YES
RBC # BLD AUTO: 4.65 X10(6)UL (ref 4.3–5.7)
RBC # BLD AUTO: 5.77 X10(6)UL (ref 4.3–5.7)
RH BLOOD TYPE: POSITIVE
SAO2 % BLDA: 97.3 % (ref 94–100)
SODIUM SERPL-SCNC: 144 MMOL/L (ref 136–145)
SODIUM SERPL-SCNC: 150 MMOL/L (ref 136–145)
TREATCELL: 10
TRIGL SERPL-MCNC: 106 MG/DL (ref 30–149)
WBC # BLD AUTO: 20 X10(3) UL (ref 4–11)
WBC # BLD AUTO: 24.3 X10(3) UL (ref 4–11)

## 2019-05-03 PROCEDURE — 93306 TTE W/DOPPLER COMPLETE: CPT | Performed by: HOSPITALIST

## 2019-05-03 PROCEDURE — 99291 CRITICAL CARE FIRST HOUR: CPT | Performed by: INTERNAL MEDICINE

## 2019-05-03 PROCEDURE — 0BH17EZ INSERTION OF ENDOTRACHEAL AIRWAY INTO TRACHEA, VIA NATURAL OR ARTIFICIAL OPENING: ICD-10-PCS | Performed by: INTERNAL MEDICINE

## 2019-05-03 PROCEDURE — 5A1955Z RESPIRATORY VENTILATION, GREATER THAN 96 CONSECUTIVE HOURS: ICD-10-PCS | Performed by: INTERNAL MEDICINE

## 2019-05-03 PROCEDURE — 71045 X-RAY EXAM CHEST 1 VIEW: CPT | Performed by: INTERNAL MEDICINE

## 2019-05-03 PROCEDURE — 99232 SBSQ HOSP IP/OBS MODERATE 35: CPT | Performed by: INTERNAL MEDICINE

## 2019-05-03 PROCEDURE — 74150 CT ABDOMEN W/O CONTRAST: CPT | Performed by: HOSPITALIST

## 2019-05-03 PROCEDURE — 02HV33Z INSERTION OF INFUSION DEVICE INTO SUPERIOR VENA CAVA, PERCUTANEOUS APPROACH: ICD-10-PCS | Performed by: INTERNAL MEDICINE

## 2019-05-03 PROCEDURE — 99222 1ST HOSP IP/OBS MODERATE 55: CPT | Performed by: INTERNAL MEDICINE

## 2019-05-03 PROCEDURE — 70450 CT HEAD/BRAIN W/O DYE: CPT | Performed by: INTERNAL MEDICINE

## 2019-05-03 RX ORDER — HYDRALAZINE HYDROCHLORIDE 20 MG/ML
5 INJECTION INTRAMUSCULAR; INTRAVENOUS EVERY 4 HOURS PRN
Status: DISCONTINUED | OUTPATIENT
Start: 2019-05-03 | End: 2019-05-05

## 2019-05-03 RX ORDER — MORPHINE SULFATE 4 MG/ML
4 INJECTION, SOLUTION INTRAMUSCULAR; INTRAVENOUS ONCE
Status: DISCONTINUED | OUTPATIENT
Start: 2019-05-03 | End: 2019-05-12 | Stop reason: ALTCHOICE

## 2019-05-03 RX ORDER — LIDOCAINE HYDROCHLORIDE 10 MG/ML
0.5 INJECTION, SOLUTION INFILTRATION; PERINEURAL ONCE AS NEEDED
Status: ACTIVE | OUTPATIENT
Start: 2019-05-03 | End: 2019-05-03

## 2019-05-03 RX ORDER — MORPHINE SULFATE IN 0.9 % NACL 1 MG/ML
0.5 PLASTIC BAG, INJECTION (ML) INTRAVENOUS CONTINUOUS
Status: DISCONTINUED | OUTPATIENT
Start: 2019-05-03 | End: 2019-05-19

## 2019-05-03 RX ORDER — CAPTOPRIL 25 MG/1
25 TABLET ORAL 2 TIMES DAILY
Status: DISCONTINUED | OUTPATIENT
Start: 2019-05-03 | End: 2019-05-03

## 2019-05-03 RX ORDER — CHLORHEXIDINE GLUCONATE 0.12 MG/ML
15 RINSE ORAL
Status: DISCONTINUED | OUTPATIENT
Start: 2019-05-03 | End: 2019-05-09

## 2019-05-03 RX ORDER — METOPROLOL TARTRATE 5 MG/5ML
5 INJECTION INTRAVENOUS ONCE
Status: COMPLETED | OUTPATIENT
Start: 2019-05-03 | End: 2019-05-03

## 2019-05-03 RX ORDER — METOPROLOL TARTRATE 5 MG/5ML
5 INJECTION INTRAVENOUS EVERY 6 HOURS
Status: DISCONTINUED | OUTPATIENT
Start: 2019-05-03 | End: 2019-05-04

## 2019-05-03 RX ORDER — ACETAMINOPHEN 500 MG
1000 TABLET ORAL EVERY 6 HOURS PRN
Status: DISCONTINUED | OUTPATIENT
Start: 2019-05-03 | End: 2019-07-01

## 2019-05-03 RX ORDER — ACETAMINOPHEN 650 MG/1
650 SUPPOSITORY RECTAL EVERY 6 HOURS PRN
Status: DISCONTINUED | OUTPATIENT
Start: 2019-05-03 | End: 2019-05-09

## 2019-05-03 RX ORDER — ACETAMINOPHEN 325 MG/1
650 TABLET ORAL EVERY 6 HOURS PRN
Status: DISCONTINUED | OUTPATIENT
Start: 2019-05-03 | End: 2019-05-09

## 2019-05-03 RX ORDER — METOPROLOL TARTRATE 5 MG/5ML
INJECTION INTRAVENOUS
Status: COMPLETED
Start: 2019-05-03 | End: 2019-05-03

## 2019-05-03 RX ORDER — ENALAPRILAT 2.5 MG/2ML
1.25 INJECTION INTRAVENOUS EVERY 6 HOURS
Status: DISCONTINUED | OUTPATIENT
Start: 2019-05-03 | End: 2019-05-03

## 2019-05-03 RX ORDER — POTASSIUM CHLORIDE 29.8 MG/ML
40 INJECTION INTRAVENOUS ONCE
Status: COMPLETED | OUTPATIENT
Start: 2019-05-03 | End: 2019-05-04

## 2019-05-03 RX ORDER — SODIUM CHLORIDE 0.9 % (FLUSH) 0.9 %
10 SYRINGE (ML) INJECTION AS NEEDED
Status: DISCONTINUED | OUTPATIENT
Start: 2019-05-03 | End: 2019-07-01

## 2019-05-03 RX ORDER — DILTIAZEM HYDROCHLORIDE 5 MG/ML
INJECTION INTRAVENOUS
Status: COMPLETED
Start: 2019-05-03 | End: 2019-05-03

## 2019-05-03 RX ORDER — ETOMIDATE 2 MG/ML
INJECTION INTRAVENOUS
Status: DISCONTINUED
Start: 2019-05-03 | End: 2019-05-03 | Stop reason: WASHOUT

## 2019-05-03 RX ORDER — CARVEDILOL 12.5 MG/1
12.5 TABLET ORAL 2 TIMES DAILY WITH MEALS
Status: DISCONTINUED | OUTPATIENT
Start: 2019-05-03 | End: 2019-05-03

## 2019-05-03 RX ORDER — ETOMIDATE 2 MG/ML
INJECTION INTRAVENOUS AS NEEDED
Status: DISCONTINUED | OUTPATIENT
Start: 2019-05-03 | End: 2019-05-03

## 2019-05-03 RX ORDER — SODIUM CHLORIDE 9 MG/ML
INJECTION, SOLUTION INTRAVENOUS CONTINUOUS
Status: DISCONTINUED | OUTPATIENT
Start: 2019-05-03 | End: 2019-05-04

## 2019-05-03 RX ORDER — CARVEDILOL 6.25 MG/1
6.25 TABLET ORAL 2 TIMES DAILY WITH MEALS
Status: DISCONTINUED | OUTPATIENT
Start: 2019-05-03 | End: 2019-05-03

## 2019-05-03 RX ORDER — ACETAMINOPHEN 160 MG/5ML
650 SOLUTION ORAL EVERY 6 HOURS PRN
Status: DISCONTINUED | OUTPATIENT
Start: 2019-05-03 | End: 2019-05-09

## 2019-05-03 RX ORDER — ETOMIDATE 2 MG/ML
INJECTION INTRAVENOUS
Status: DISPENSED
Start: 2019-05-03 | End: 2019-05-03

## 2019-05-03 RX ADMIN — ETOMIDATE 20 MG: 2 INJECTION INTRAVENOUS at 10:25:00

## 2019-05-03 NOTE — PROGRESS NOTES
[facility]    Progress Note    Dia Parisi Patient Status:  Inpatient    1960 MRN [id number]   Location [facility] 2W/SW Attending Hetal Villanueva MD   Hosp Day # 1 PCP LATIA Haider MD     Subjective:  Feels better  Ea Diabetic diet    We will follow BG and adjust insulin doses as needed.       Gilda Kaye MD

## 2019-05-03 NOTE — PROGRESS NOTES
[facility] Dosing Service    Initial Pharmacokinetic Consult for Vancomycin Dosing     Magda Ko is a 60year old male who is being treated for fever of unknown origin.   Pharmacy has been asked to dose Vancomycin by Dr. Rosa Campos    He is allergic to d

## 2019-05-03 NOTE — PROGRESS NOTES
This writer tried to call the wife for consent for a PICC line at [phone]; called home and mobile number. Could not get a hold of her.

## 2019-05-03 NOTE — PROGRESS NOTES
[facility]    Cardiology Progress Note  [facility]    Mary Alice Jha Patient Status:  Inpatient    1960 MRN [id number]   Location [facility] 2W/SW Attending Juancarlos Peguero MD   Hosp Day # 1 PCP LATIA Odom Abdomen protuberant hard and tender. Assessment and Plan:     Sepsis due to undetermined organism (HCC)  Lactic acid elevated. Fortunately hypotensive is resolved but he appears to be more volume overloaded.       Atrial fibrillation with rapid ve (CST): Kelly Lynch MD on 5/03/2019 at 7:03          Xr Chest Ap Portable  (cpt=71045)    Result Date: 5/2/2019  CONCLUSION:  1. Mild cardiomegaly, heart appears significantly smaller than prior study of May 8, 2012. (pericardial effusion 2.  Tortuous

## 2019-05-03 NOTE — PROGRESS NOTES
[facility]    Progress Note    Dayanara Jordan Patient Status:  Inpatient    1960 MRN [id number]   Location [facility] 2W/SW Attending Marge Melendez MD   Hosp Day # 1 PCP LATIA Guan MD     Subjective:  Alert but not or Hypoglycemia protocol      We will follow BG and adjust insulin doses as needed. Barbara Zavala MD    ADDENDUM  Patient was intubated.    Novolin R q 6 hrs  We will follow

## 2019-05-03 NOTE — PLAN OF CARE
Patient was handed-off at 0700 shift change with increased wet breathing; increased (but not sustained) heart rate and blood pressure; increasing restlessness. Patient handed off to RADAH Olsen at [phone].

## 2019-05-03 NOTE — PROGRESS NOTES
Notified Dr. Emma Jaramillo that lab called with hepatitis B core antibody that was reactive. MD aware, no concerns at this time.

## 2019-05-03 NOTE — SEPSIS REASSESSMENT
[facility]    Sepsis Reassessment Note    BP (!) 161/94 (BP Location: Right arm)   Pulse 97   Temp 98.2 °F (36.8 °C) (Rectal)   Resp 22   Wt (!) 316 lb 9.6 oz (143.6 kg)   SpO2 96%   BMI 51.88 kg/m²      6:59 AM    Cardiac:  Regularity: I

## 2019-05-03 NOTE — PLAN OF CARE
Problem: Patient Centered Care  Goal: Patient preferences are identified and integrated in the patient's plan of care  Description  Interventions:  - What would you like us to know as we care for you?   - Provide timely, complete, and accurate informatio routines  Outcome: Not Progressing     Problem: Safety Risk - Non-Violent Restraints  Goal: Patient will remain free from self-harm  Description  INTERVENTIONS:  - Apply the least restrictive restraint to prevent harm  - Notify patient and family of reason

## 2019-05-03 NOTE — CONSULTS
[facility]  Report of GI Consultation    Jefferson Jeffers Patient Status:  Inpatient    1960 MRN [id number]   Location [facility] 2W/SW Attending Lainey Hope, [facility] Day # 1 PCP LATIA Joel MD     Date of Admission:   coffee-ground to clear green bilious. On assessment this afternoon, Mr. Keren Carrillo is sedated, tolerating mechanical ventilation well.     Wt Readings from Last 20 Encounters:  05/03/19 : (!) 316 lb 9.6 oz (143.6 kg)  01/22/19 : (!) 326 lb 12.8 oz (148.2 kg)  07 acute blood loss anemia  · Coffee-ground, bloody output from orogastric tube has cleared. · Unclear cause for upper GI bleed at this point. Possibilities include ulcer disease, Rose-Johnson tear, less likely gut ischemia.   · Not currently a candidate fo History   Problem Relation Age of Onset   • Stroke Father 43        CVA   • Cancer Maternal Grandmother 79       Social History  Patient Guardian Status:  Not on file    Other Topics            Concern  Caffeine Concern        No  Exercise                N injection 3 mL 3 mL Intravenous PRN   0.9%  NaCl infusion  Intravenous Continuous   Heparin Sodium (Porcine) 5000 UNIT/ML injection 5,000 Units 5,000 Units Subcutaneous 2 times per day   ondansetron HCl (ZOFRAN) injection 4 mg 4 mg Intravenous Q6H PRN   de EVERY NIGHT   TRUEPLUS PEN NEEDLES 32G X 4 MM Does not apply Misc USE TO INJECT INSULIN THREE TIMES DAILY   Insulin Pen Needle (BD PEN NEEDLE ROMMEL U/F) 32G X 4 MM Does not apply Misc USE TO INJECT INSULIN THREE TIMES DAILY.    LEVETIRACETAM 1000 MG Oral Tab at home. Some abdominal pain past few days. No vomiting prior to admission. 12 point review of systems is as above, otherwise negative.     Physical Exam:   Blood pressure 138/71, pulse 77, temperature 97.2 °F (36.2 °C), temperature source Temporal, resp extraluminal gas evident at this time, although this does not rule out an associated ischemic process. Bibasilar atelectasis.   Dictated by (CST): Kiah Wilhelm MD on 5/03/2019 at 6:56     Approved by (CST): Kiah Wilhelm MD on 5/03/2019 at 7:03

## 2019-05-03 NOTE — PLAN OF CARE
Problem: Safety Risk - Non-Violent Restraints  Goal: Patient will remain free from self-harm  Description  INTERVENTIONS:  - Apply the least restrictive restraint to prevent harm  - Notify patient and family of reasons restraints applied  - Assess for an

## 2019-05-03 NOTE — PROGRESS NOTES
Pulmonary/Critical Care Follow Up Note    HPI:   Luly Deluca is a 60year old male with Patient presents with:  Fever (infectious)  Altered Mental Status (neurologic)      PCP LATIA Damon MD  Admission Attending No admitting provider for patient encoun 5,000 Units, 5,000 Units, Subcutaneous, 2 times per day  •  ondansetron HCl (ZOFRAN) injection 4 mg, 4 mg, Intravenous, Q6H PRN  •  dextrose 50 % injection 50 mL, 50 mL, Intravenous, PRN  •  Glucose-Vitamin C (DEX-4) 4-6 GM-MG chewable tab 4 tablet, 4 tabl 05/03/2019    ALB 2.9 05/03/2019    ALKPHO 73 05/03/2019    BILT 1.0 05/03/2019    TP 7.9 05/03/2019    AST 79 05/03/2019    ALT 69 05/03/2019    PTT 26.9 05/03/2019    INR 1.47 05/03/2019           ASSESSMENT/PLAN:     Severe sepsis  Worsening MOSF but no

## 2019-05-03 NOTE — PROGRESS NOTES
Vascular Access Note  Inserted by Vance Soulier RN  Vascular Access Screening:   Allergies to Lidocaine: no  Allergies to Latex: no  Presence of Pacemaker/Defibrillator: No  Mastectomy with Lymph Node Dissection: No  AV Fistula / AV Graft: No  Dialysis Cath

## 2019-05-03 NOTE — ANESTHESIA PROCEDURE NOTES
Airway  Date/Time: 5/3/2019 10:30 AM  Urgency: emergent    Airway not difficult    General Information and Staff    Anesthesiologist: Laurita Fernando MD  Performed: anesthesiologist     Consent for Airway (if performed for an anesthetic, see related do

## 2019-05-03 NOTE — PROGRESS NOTES
Assumed care of patient at 1100. Patient intubated at approximately 1030. Large amount of coffee ground emesis present at time of intubation. OG tube place. 1750 dark/coffee ground output noted. Dr. Won Santamaria at bedside and aware. GI consulted.  VSS at this stefani

## 2019-05-04 ENCOUNTER — APPOINTMENT (OUTPATIENT)
Dept: GENERAL RADIOLOGY | Facility: HOSPITAL | Age: 59
DRG: 003 | End: 2019-05-04
Attending: INTERNAL MEDICINE
Payer: MEDICARE

## 2019-05-04 LAB
ALBUMIN SERPL-MCNC: 2.4 G/DL (ref 3.4–5)
ALBUMIN/GLOB SERPL: 0.5 {RATIO} (ref 1–2)
ALP LIVER SERPL-CCNC: 55 U/L (ref 45–117)
ALT SERPL-CCNC: 95 U/L (ref 16–61)
ANION GAP SERPL CALC-SCNC: 6 MMOL/L (ref 0–18)
AST SERPL-CCNC: 97 U/L (ref 15–37)
BASOPHILS # BLD AUTO: 0.03 X10(3) UL (ref 0–0.2)
BASOPHILS NFR BLD AUTO: 0.1 %
BILIRUB SERPL-MCNC: 0.8 MG/DL (ref 0.1–2)
BUN BLD-MCNC: 22 MG/DL (ref 7–18)
BUN/CREAT SERPL: 19.3 (ref 10–20)
CALCIUM BLD-MCNC: 7.6 MG/DL (ref 8.5–10.1)
CHLORIDE SERPL-SCNC: 119 MMOL/L (ref 98–112)
CO2 SERPL-SCNC: 28 MMOL/L (ref 21–32)
CREAT BLD-MCNC: 1.14 MG/DL (ref 0.7–1.3)
DEPRECATED RDW RBC AUTO: 47.1 FL (ref 35.1–46.3)
EOSINOPHIL # BLD AUTO: 0 X10(3) UL (ref 0–0.7)
EOSINOPHIL NFR BLD AUTO: 0 %
ERYTHROCYTE [DISTWIDTH] IN BLOOD BY AUTOMATED COUNT: 13.7 % (ref 11–15)
GLOBULIN PLAS-MCNC: 4.4 G/DL (ref 2.8–4.4)
GLUCOSE BLD-MCNC: 158 MG/DL (ref 70–99)
GLUCOSE BLDC GLUCOMTR-MCNC: 129 MG/DL (ref 70–99)
GLUCOSE BLDC GLUCOMTR-MCNC: 137 MG/DL (ref 70–99)
GLUCOSE BLDC GLUCOMTR-MCNC: 166 MG/DL (ref 70–99)
GLUCOSE BLDC GLUCOMTR-MCNC: 217 MG/DL (ref 70–99)
GLUCOSE BLDC GLUCOMTR-MCNC: 243 MG/DL (ref 70–99)
HCT VFR BLD AUTO: 44 % (ref 39–53)
HGB BLD-MCNC: 14.1 G/DL (ref 13–17.5)
IMM GRANULOCYTES # BLD AUTO: 0.1 X10(3) UL (ref 0–1)
IMM GRANULOCYTES NFR BLD: 0.5 %
LYMPHOCYTES # BLD AUTO: 4.63 X10(3) UL (ref 1–4)
LYMPHOCYTES NFR BLD AUTO: 22.9 %
M PROTEIN MFR SERPL ELPH: 6.8 G/DL (ref 6.4–8.2)
MCH RBC QN AUTO: 30.2 PG (ref 26–34)
MCHC RBC AUTO-ENTMCNC: 32 G/DL (ref 31–37)
MCV RBC AUTO: 94.2 FL (ref 80–100)
MONOCYTES # BLD AUTO: 1.53 X10(3) UL (ref 0.1–1)
MONOCYTES NFR BLD AUTO: 7.6 %
NEUTROPHILS # BLD AUTO: 13.97 X10 (3) UL (ref 1.5–7.7)
NEUTROPHILS # BLD AUTO: 13.97 X10(3) UL (ref 1.5–7.7)
NEUTROPHILS NFR BLD AUTO: 68.9 %
OSMOLALITY SERPL CALC.SUM OF ELEC: 323 MOSM/KG (ref 275–295)
PLATELET # BLD AUTO: 181 10(3)UL (ref 150–450)
POTASSIUM SERPL-SCNC: 3.5 MMOL/L (ref 3.5–5.1)
RBC # BLD AUTO: 4.67 X10(6)UL (ref 4.3–5.7)
SODIUM SERPL-SCNC: 153 MMOL/L (ref 136–145)
STAPHYLOCOCCUS NOT AUREUS BY PCR: DETECTED
WBC # BLD AUTO: 20.3 X10(3) UL (ref 4–11)

## 2019-05-04 PROCEDURE — 99232 SBSQ HOSP IP/OBS MODERATE 35: CPT | Performed by: INTERNAL MEDICINE

## 2019-05-04 PROCEDURE — 99291 CRITICAL CARE FIRST HOUR: CPT | Performed by: INTERNAL MEDICINE

## 2019-05-04 PROCEDURE — 71045 X-RAY EXAM CHEST 1 VIEW: CPT | Performed by: INTERNAL MEDICINE

## 2019-05-04 RX ORDER — DIGOXIN 0.25 MG/ML
500 INJECTION INTRAMUSCULAR; INTRAVENOUS ONCE
Status: COMPLETED | OUTPATIENT
Start: 2019-05-04 | End: 2019-05-04

## 2019-05-04 RX ORDER — DEXTROSE AND SODIUM CHLORIDE 5; .45 G/100ML; G/100ML
INJECTION, SOLUTION INTRAVENOUS CONTINUOUS
Status: DISCONTINUED | OUTPATIENT
Start: 2019-05-04 | End: 2019-05-05

## 2019-05-04 RX ORDER — POTASSIUM CHLORIDE 29.8 MG/ML
40 INJECTION INTRAVENOUS ONCE
Status: COMPLETED | OUTPATIENT
Start: 2019-05-04 | End: 2019-05-04

## 2019-05-04 RX ORDER — IPRATROPIUM BROMIDE AND ALBUTEROL SULFATE 2.5; .5 MG/3ML; MG/3ML
3 SOLUTION RESPIRATORY (INHALATION) EVERY 6 HOURS PRN
Status: DISCONTINUED | OUTPATIENT
Start: 2019-05-04 | End: 2019-07-01

## 2019-05-04 RX ORDER — DIGOXIN 0.25 MG/ML
250 INJECTION INTRAMUSCULAR; INTRAVENOUS EVERY 6 HOURS
Status: COMPLETED | OUTPATIENT
Start: 2019-05-04 | End: 2019-05-04

## 2019-05-04 RX ORDER — HYDRALAZINE HYDROCHLORIDE 20 MG/ML
10 INJECTION INTRAMUSCULAR; INTRAVENOUS ONCE
Status: COMPLETED | OUTPATIENT
Start: 2019-05-04 | End: 2019-05-04

## 2019-05-04 RX ORDER — METOPROLOL TARTRATE 5 MG/5ML
5 INJECTION INTRAVENOUS EVERY 4 HOURS
Status: DISCONTINUED | OUTPATIENT
Start: 2019-05-04 | End: 2019-05-13

## 2019-05-04 NOTE — H&P (VIEW-ONLY)
[facility]     Gastroenterology Progress Note    Rm Calle Patient Status:  Inpatient    1960 MRN [id number]   Location [facility] 2W/SW Attending Leo Simms, [address] [facility] Day # 2 PCP MD Guicho Peterson [facility] monitor, possibly from sepsis. Of note ammonia is not elevated. #Hep C hx: Per report--> has a history of hepatitis C, failed Harvoni treatment secondary to drug resistance. He follows up at [facility].        Darrin Rivera, [address] [place] [address] Inocente Brooks MD on 5/03/2019 at 7:03          Xr Chest Ap Portable  (cpt=71045)    Result Date: 5/4/2019  CONCLUSION:  1. Stable cardiomegaly. 2. Bibasilar lung consolidation/atelectasis left much greater than right with left effusion.   Similar to May 3, 2019

## 2019-05-04 NOTE — PLAN OF CARE
Problem: Diabetes/Glucose Control  Goal: Glucose maintained within prescribed range  Description  INTERVENTIONS:  - Monitor Blood Glucose as ordered  - Assess for signs and symptoms of hyperglycemia and hypoglycemia  - Administer ordered medications to m breathe, Incentive Spirometry  - Assess the need for suctioning and perform as needed  - Assess and instruct to report SOB or any respiratory difficulty  - Respiratory Therapy support as indicated  - Manage/alleviate anxiety  - Monitor for signs/symptoms o Sedation vacation performed this morning. Patient opening eyes/moving all extremities however not following commands. Dr. Faustin Ran aware. Restraints in place for safety. Pt tolerating vent settings, Fio2 weaned to 40%. Suctioning PRN. Oral care q4hr.  NO SBT

## 2019-05-04 NOTE — PROGRESS NOTES
[facility]    Cardiology Progress Note    Natalia Barajas Patient Status:  Inpatient    1960 MRN [id number]   Location [facility] 2W/SW Attending Chu Vides, [address] [facility] Day # 2 PCP LATIA Abernathy MD     2019  Subjective: MG  --   --   --   --  2.4  --    * 306*  --   --  277* 158*       Recent Labs   Lab 05/02/19  1026 05/03/19  0545 05/04/19  0445   ALT 61 69* 95*   AST 63* 79* 97*   ALB 3.2* 2.9* 2.4*       No results for input(s): TROP in the last 168 hours. injection 4 mg 4 mg Intravenous Q6H PRN   dextrose 50 % injection 50 mL 50 mL Intravenous PRN   Glucose-Vitamin C (DEX-4) 4-6 GM-MG chewable tab 4 tablet 4 tablet Oral Q15 Min PRN   glucose (DEX4) oral liquid 15 g 15 g Oral Q15 Min PRN   Piperacillin Sod-T

## 2019-05-04 NOTE — PROGRESS NOTES
[facility]    Progress Note    Roberto Sickle Patient Status:  Inpatient    1960 MRN [id number]   Location [facility] 2W/SW Attending Rhiannon Bone, [address] [facility] Day # 2 PCP LATIA Galeas MD        Subjective:     Unable to p suppose to use bipap 12/8      9-Sz  Plan Keppra     10- FM/chronic pain  On chronic narcotics  Plan     Will use narcotic pain meds for comfort while intubated     11-HL  Plan statin     12-DM  Endo following  Plan insulin  Endo following      13-DVT px this time, although this does not rule out an associated ischemic process. Bibasilar atelectasis.   Dictated by (CST): Fredi Walsh MD on 5/03/2019 at 6:56     Approved by (CST): Fredi Walsh MD on 5/03/2019 at 7:03          Xr Chest Ap Portable documentation in H+P. Based on patients current state of illness, I anticipate that, after discharge, patient will require TBD. Kevin Rendon.  Isac Ware MD  5/4/2019

## 2019-05-04 NOTE — CONSULTS
[facility]     Gastroenterology Progress Note    Donelda Dakins Patient Status:  Inpatient    1960 MRN [id number]   Location [facility] 2W/SW Attending Shadi Todd, [address] [facility] Day # 2 PCP MD Candi Meek monitor, possibly from sepsis. Of note ammonia is not elevated. #Hep C hx: Per report--> has a history of hepatitis C, failed Harvoni treatment secondary to drug resistance. He follows up at [facility].        Broderick Bahena, [address] [place] [address] Kayla Chen MD on 5/03/2019 at 7:03          Xr Chest Ap Portable  (cpt=71045)    Result Date: 5/4/2019  CONCLUSION:  1. Stable cardiomegaly. 2. Bibasilar lung consolidation/atelectasis left much greater than right with left effusion.   Similar to May 3, 2019

## 2019-05-04 NOTE — PLAN OF CARE
Problem: Patient Centered Care  Goal: Patient preferences are identified and integrated in the patient's plan of care  Description  Interventions:  - What would you like us to know as we care for you?   - Provide timely, complete, and accurate informatio Restraints  Goal: Patient will remain free from self-harm  Description  INTERVENTIONS:  - Apply the least restrictive restraint to prevent harm  - Notify patient and family of reasons restraints applied  - Assess for any contributing factors to confusion (

## 2019-05-04 NOTE — PROGRESS NOTES
[place] [facility]    Progress Note    Esther Toro Patient Status:  Inpatient    1960 MRN [id number]   Location [facility] 2W/SW Attending Lennox Muñoz MD   [facility] Day # 2 PCP LATIA Rocha MD     Subjective:   Intubated and se

## 2019-05-05 ENCOUNTER — APPOINTMENT (OUTPATIENT)
Dept: GENERAL RADIOLOGY | Facility: HOSPITAL | Age: 59
DRG: 003 | End: 2019-05-05
Attending: INTERNAL MEDICINE
Payer: MEDICARE

## 2019-05-05 LAB
ALBUMIN SERPL-MCNC: 2.1 G/DL (ref 3.4–5)
ALBUMIN/GLOB SERPL: 0.5 {RATIO} (ref 1–2)
ALP LIVER SERPL-CCNC: 43 U/L (ref 45–117)
ALT SERPL-CCNC: 73 U/L (ref 16–61)
ANION GAP SERPL CALC-SCNC: 5 MMOL/L (ref 0–18)
APTT PPP: 25.7 SECONDS (ref 23.2–35.3)
APTT PPP: 31.3 SECONDS (ref 23.2–35.3)
AST SERPL-CCNC: 57 U/L (ref 15–37)
BASOPHILS # BLD AUTO: 0.02 X10(3) UL (ref 0–0.2)
BASOPHILS NFR BLD AUTO: 0.2 %
BILIRUB SERPL-MCNC: 0.8 MG/DL (ref 0.1–2)
BUN BLD-MCNC: 19 MG/DL (ref 7–18)
BUN/CREAT SERPL: 18.1 (ref 10–20)
CALCIUM BLD-MCNC: 7.6 MG/DL (ref 8.5–10.1)
CHLORIDE SERPL-SCNC: 118 MMOL/L (ref 98–112)
CO2 SERPL-SCNC: 26 MMOL/L (ref 21–32)
CREAT BLD-MCNC: 1.05 MG/DL (ref 0.7–1.3)
DEPRECATED RDW RBC AUTO: 47.5 FL (ref 35.1–46.3)
EOSINOPHIL # BLD AUTO: 0.09 X10(3) UL (ref 0–0.7)
EOSINOPHIL NFR BLD AUTO: 0.7 %
ERYTHROCYTE [DISTWIDTH] IN BLOOD BY AUTOMATED COUNT: 13.4 % (ref 11–15)
GLOBULIN PLAS-MCNC: 3.9 G/DL (ref 2.8–4.4)
GLUCOSE BLD-MCNC: 220 MG/DL (ref 70–99)
GLUCOSE BLDC GLUCOMTR-MCNC: 140 MG/DL (ref 70–99)
GLUCOSE BLDC GLUCOMTR-MCNC: 149 MG/DL (ref 70–99)
GLUCOSE BLDC GLUCOMTR-MCNC: 190 MG/DL (ref 70–99)
GLUCOSE BLDC GLUCOMTR-MCNC: 197 MG/DL (ref 70–99)
GLUCOSE BLDC GLUCOMTR-MCNC: 220 MG/DL (ref 70–99)
HCT VFR BLD AUTO: 38.6 % (ref 39–53)
HGB BLD-MCNC: 12.2 G/DL (ref 13–17.5)
IMM GRANULOCYTES # BLD AUTO: 0.13 X10(3) UL (ref 0–1)
IMM GRANULOCYTES NFR BLD: 1 %
LYMPHOCYTES # BLD AUTO: 3 X10(3) UL (ref 1–4)
LYMPHOCYTES NFR BLD AUTO: 22.6 %
M PROTEIN MFR SERPL ELPH: 6 G/DL (ref 6.4–8.2)
MCH RBC QN AUTO: 30.2 PG (ref 26–34)
MCHC RBC AUTO-ENTMCNC: 31.6 G/DL (ref 31–37)
MCV RBC AUTO: 95.5 FL (ref 80–100)
MONOCYTES # BLD AUTO: 0.89 X10(3) UL (ref 0.1–1)
MONOCYTES NFR BLD AUTO: 6.7 %
NEUTROPHILS # BLD AUTO: 9.17 X10 (3) UL (ref 1.5–7.7)
NEUTROPHILS # BLD AUTO: 9.17 X10(3) UL (ref 1.5–7.7)
NEUTROPHILS NFR BLD AUTO: 68.8 %
OSMOLALITY SERPL CALC.SUM OF ELEC: 317 MOSM/KG (ref 275–295)
PLATELET # BLD AUTO: 153 10(3)UL (ref 150–450)
POTASSIUM SERPL-SCNC: 3.4 MMOL/L (ref 3.5–5.1)
RBC # BLD AUTO: 4.04 X10(6)UL (ref 4.3–5.7)
SODIUM SERPL-SCNC: 149 MMOL/L (ref 136–145)
WBC # BLD AUTO: 13.3 X10(3) UL (ref 4–11)

## 2019-05-05 PROCEDURE — 99232 SBSQ HOSP IP/OBS MODERATE 35: CPT | Performed by: INTERNAL MEDICINE

## 2019-05-05 PROCEDURE — 99291 CRITICAL CARE FIRST HOUR: CPT | Performed by: INTERNAL MEDICINE

## 2019-05-05 PROCEDURE — 71045 X-RAY EXAM CHEST 1 VIEW: CPT | Performed by: INTERNAL MEDICINE

## 2019-05-05 RX ORDER — FUROSEMIDE 10 MG/ML
40 INJECTION INTRAMUSCULAR; INTRAVENOUS EVERY 12 HOURS
Status: COMPLETED | OUTPATIENT
Start: 2019-05-05 | End: 2019-05-06

## 2019-05-05 RX ORDER — POTASSIUM CHLORIDE 29.8 MG/ML
40 INJECTION INTRAVENOUS ONCE
Status: COMPLETED | OUTPATIENT
Start: 2019-05-05 | End: 2019-05-05

## 2019-05-05 RX ORDER — HEPARIN SODIUM AND DEXTROSE 10000; 5 [USP'U]/100ML; G/100ML
1000 INJECTION INTRAVENOUS ONCE
Status: COMPLETED | OUTPATIENT
Start: 2019-05-05 | End: 2019-05-05

## 2019-05-05 RX ORDER — DIGOXIN 0.25 MG/ML
250 INJECTION INTRAMUSCULAR; INTRAVENOUS DAILY
Status: DISCONTINUED | OUTPATIENT
Start: 2019-05-06 | End: 2019-05-12

## 2019-05-05 RX ORDER — HEPARIN SODIUM AND DEXTROSE 10000; 5 [USP'U]/100ML; G/100ML
INJECTION INTRAVENOUS CONTINUOUS
Status: DISCONTINUED | OUTPATIENT
Start: 2019-05-05 | End: 2019-05-05

## 2019-05-05 RX ORDER — HEPARIN SODIUM AND DEXTROSE 10000; 5 [USP'U]/100ML; G/100ML
1000 INJECTION INTRAVENOUS ONCE
Status: DISCONTINUED | OUTPATIENT
Start: 2019-05-05 | End: 2019-05-05

## 2019-05-05 RX ORDER — DEXTROSE AND SODIUM CHLORIDE 5; .45 G/100ML; G/100ML
INJECTION, SOLUTION INTRAVENOUS CONTINUOUS
Status: DISCONTINUED | OUTPATIENT
Start: 2019-05-05 | End: 2019-05-12

## 2019-05-05 RX ORDER — HEPARIN SODIUM AND DEXTROSE 10000; 5 [USP'U]/100ML; G/100ML
INJECTION INTRAVENOUS CONTINUOUS
Status: DISCONTINUED | OUTPATIENT
Start: 2019-05-05 | End: 2019-05-14

## 2019-05-05 RX ORDER — HYDRALAZINE HYDROCHLORIDE 20 MG/ML
10 INJECTION INTRAMUSCULAR; INTRAVENOUS EVERY 4 HOURS PRN
Status: DISCONTINUED | OUTPATIENT
Start: 2019-05-05 | End: 2019-05-07

## 2019-05-05 RX ORDER — HEPARIN SODIUM 1000 [USP'U]/ML
3000 INJECTION, SOLUTION INTRAVENOUS; SUBCUTANEOUS ONCE
Status: DISCONTINUED | OUTPATIENT
Start: 2019-05-05 | End: 2019-05-05

## 2019-05-05 RX ORDER — HEPARIN SODIUM 1000 [USP'U]/ML
3000 INJECTION, SOLUTION INTRAVENOUS; SUBCUTANEOUS ONCE
Status: COMPLETED | OUTPATIENT
Start: 2019-05-05 | End: 2019-05-05

## 2019-05-05 NOTE — CONSULTS
[facility] HOSP - [facility]    Report of Consultation    Aime Thomas Patient Status:  Inpatient    1960 MRN [id number]   Location [facility] 2W/SW Attending Azucena Vasques MD   Hosp Day # 2 PCP LATIA Gonzales MD     Date of Admission:  5 at that time requiring LifeVest.  Repeat echocardiogram showed improvement in his ejection fraction, according to the wife. He follows up with Dr. Lester Salcedo. He has no history of coronary artery disease.   History of diabetes mellitus type 2, musculoskeletal d clinic for chronic pain due to fibromyalgia (on narcotics), who presented with worsening cough, fatigue, weakness. Febrile on admission. Found to have sepsis, respiratory failure and a SBO. GI consulted for coffee ground emesis.      History:    Past Medica (APRESOLINE) injection 5 mg, 5 mg, Intravenous, Q4H PRN  •  morphINE sulfate (PF) 4 MG/ML injection 4 mg, 4 mg, Intravenous, Once  •  acetaminophen (TYLENOL) tab 650 mg, 650 mg, Oral, Q6H PRN **OR** acetaminophen (TYLENOL) 160 MG/5ML oral liquid 650 mg, 65 upper or lower extremities  NEURO: no sensory or motor complaint  ENDOCRINE: denies excessive thirst or urination; denies unexpected wt gain or wt loss      Physical Exam:  Blood pressure (!) 161/90, pulse 98, temperature 99.8 °F (37.7 °C), temperature tejinder findings as above. Dictated by (CST): Clay Diaz MD on 5/03/2019 at 16:13     Approved by (CST): Clay Diaz MD on 5/03/2019 at 16:18          Ct Abdomen (cpt=74150)    Result Date: 5/3/2019  CONCLUSION:   High grade small-bowel obstruction.   Ca Portable  (cpt=71045)    Result Date: 5/3/2019  CONCLUSION:   Endotracheal tube above the christian. Nasogastric tube within the stomach and below the diaphragm.    Mild interstitial edema, unchanged  Dictated by (CST): Dayne Aschoff, MD on 5/03/2019 at 11:19

## 2019-05-05 NOTE — PROGRESS NOTES
[facility]    Progress Note    Fidelina Baires Patient Status:  Inpatient    1960 MRN [id number]   Location [facility] 2W/SW Attending Philomena Duff MD   Hosp Day # 4 PCP LATIA Garcia MD     Subjective:   Intubated    Majo

## 2019-05-05 NOTE — PROGRESS NOTES
[facility]    Progress Note    [facility] Patient Status:  Inpatient    1960 MRN [id number]   Location [facility] 2W/SW Attending Kenny Valdez MD   Hosp Day # 3 PCP LATIA Velazquez MD     Subjective:   Intubated    Majo

## 2019-05-05 NOTE — PLAN OF CARE
Problem: Diabetes/Glucose Control  Goal: Glucose maintained within prescribed range  Description  INTERVENTIONS:  - Monitor Blood Glucose as ordered  - Assess for signs and symptoms of hyperglycemia and hypoglycemia  - Administer ordered medications to m mobilization and activity  - Obtain nutritional consult as needed  - Establish a toileting routine/schedule  - Consider collaborating with pharmacy to review patient's medication profile  Outcome: Progressing  NG to LIS.  No surgical or gastroenterology int and behaviors that affect risk of falls.   - [facility] fall precautions as indicated by assessment.  - Educate pt/family on patient safety including physical limitations  - Instruct pt to call for assistance with activity based on assessment  - Modify envir Oxygen supplementation based on oxygen saturation or ABGs  - Provide Smoking Cessation handout, if applicable  - Encourage broncho-pulmonary hygiene including cough, deep breathe, Incentive Spirometry  - Assess the need for suctioning and perform as needed

## 2019-05-05 NOTE — PROGRESS NOTES
05/05/19 0715   Vent Information   Interface Invasive   Vent Type    Vent ID [id number]   Vent Mode VC/AC   Settings   FiO2 (%) 40 %   Resp Rate (Set) 16   Vt (Set, mL) 450 mL   Waveform Decelerating ramp   PEEP/CPAP (cm H2O) 5 cm H20   Peak Flow LPM

## 2019-05-05 NOTE — PROGRESS NOTES
[facility] [address]     Gastroenterology Progress Note    Esther Toro Patient Status:  Inpatient    1960 MRN [id number]   Location [facility] 2W/SW Attending Laurance Goodpasture, [address] [facility]  Day # 3 PCP MD Tyrone Lainez okay for anticoagulation if needed, please also clear with surgery. Continue IV PPI.      #Coffee-ground emesis- likely 2/2 MWT or gastritis in the setting of SBO, treat with IV protonix.  NO plans for endoscopy given no signs of active bleeding.      #LFT compressive atelectasis and/or superimposed pneumonia. The lungs are hypoinflated. 3. Additional support tubes and lines as above.      Dictated by (CST): Guido Lee MD on 5/05/2019 at 7:47     Approved by (CST): Guido Lee MD on 5/05/2019 at 7:5

## 2019-05-05 NOTE — PROGRESS NOTES
[facility] - [facility]    Progress Note    Aime Thomas Patient Status:  Inpatient    1960 MRN [id number]   Location [facility] 2W/SW Attending Azucena Vasques, [address] [facility] Se Day # 3 PCP LATIA Gonzales MD        Subjective:     Unable to p Cardiology following /and recommended start IV heparin  Per GI note cleared to start IV heparin  We will start IV heparin for A. fib    5-toxic and metabolic Encepahopathy  Moving all extremities  Head ct negative   Still lethargic   Supportive care     incidental findings as above. Dictated by (CST): Alice Gaytan MD on 5/03/2019 at 16:13     Approved by (CST): Alice Gaytan MD on 5/03/2019 at 16:18          Xr Chest Ap Portable  (cpt=71045)    Result Date: 5/5/2019  CONCLUSION:  1.  Cardiomegaly wi

## 2019-05-05 NOTE — PROGRESS NOTES
[facility]    Cardiology Progress Note  [facility]    Dayanara Jordan Patient Status:  Inpatient    1960 MRN [id number]   Location [facility] 2W/SW Attending Betty Drake, [address] [facility]  Day # 3 PCP LATIA Rutherford anteriorly on ventilator   CV: Heart with atrial fibrillation rates in the 80s now. Ext: No edema  Abd: Abdomen obese, nontender today.   Was tender to my initial exam.        Assessment and Plan:     Sepsis due to undetermined organism ([address]  No longer acute sinusitis or alternatively be related to prolonged intubation. 4. Lesser incidental findings as above.    Dictated by (CST): Venkat Fatima MD on 5/03/2019 at 16:13     Approved by (CST): Venkat Fatima MD on 5/03/2019 at 16:18          Xr Chest Ap (cpt=71045)    Result Date: 5/3/2019  CONCLUSION:   Endotracheal tube above the christian. Nasogastric tube within the stomach and below the diaphragm.    Mild interstitial edema, unchanged  Dictated by (CST): Birgit Downs MD on 5/03/2019 at 11:19     Approve

## 2019-05-06 ENCOUNTER — APPOINTMENT (OUTPATIENT)
Dept: GENERAL RADIOLOGY | Facility: HOSPITAL | Age: 59
DRG: 003 | End: 2019-05-06
Attending: SURGERY
Payer: MEDICARE

## 2019-05-06 ENCOUNTER — APPOINTMENT (OUTPATIENT)
Dept: GENERAL RADIOLOGY | Facility: HOSPITAL | Age: 59
DRG: 003 | End: 2019-05-06
Attending: INTERNAL MEDICINE
Payer: MEDICARE

## 2019-05-06 LAB
ANION GAP SERPL CALC-SCNC: 7 MMOL/L (ref 0–18)
APTT PPP: 36.2 SECONDS (ref 23.2–35.3)
APTT PPP: 38 SECONDS (ref 23.2–35.3)
APTT PPP: >240 SECONDS (ref 23.2–35.3)
BASOPHILS # BLD AUTO: 0.03 X10(3) UL (ref 0–0.2)
BASOPHILS NFR BLD AUTO: 0.2 %
BUN BLD-MCNC: 19 MG/DL (ref 7–18)
BUN/CREAT SERPL: 18.6 (ref 10–20)
CALCIUM BLD-MCNC: 7.8 MG/DL (ref 8.5–10.1)
CHLORIDE SERPL-SCNC: 110 MMOL/L (ref 98–112)
CO2 SERPL-SCNC: 28 MMOL/L (ref 21–32)
CREAT BLD-MCNC: 1.02 MG/DL (ref 0.7–1.3)
DEPRECATED RDW RBC AUTO: 44 FL (ref 35.1–46.3)
EOSINOPHIL # BLD AUTO: 0.29 X10(3) UL (ref 0–0.7)
EOSINOPHIL NFR BLD AUTO: 1.8 %
ERYTHROCYTE [DISTWIDTH] IN BLOOD BY AUTOMATED COUNT: 13.1 % (ref 11–15)
GLUCOSE BLD-MCNC: 185 MG/DL (ref 70–99)
GLUCOSE BLDC GLUCOMTR-MCNC: 152 MG/DL (ref 70–99)
GLUCOSE BLDC GLUCOMTR-MCNC: 163 MG/DL (ref 70–99)
GLUCOSE BLDC GLUCOMTR-MCNC: 189 MG/DL (ref 70–99)
GLUCOSE BLDC GLUCOMTR-MCNC: 194 MG/DL (ref 70–99)
GLUCOSE BLDC GLUCOMTR-MCNC: 201 MG/DL (ref 70–99)
GLUCOSE BLDC GLUCOMTR-MCNC: 208 MG/DL (ref 70–99)
HAV IGM SER QL: 2.5 MG/DL (ref 1.6–2.6)
HCT VFR BLD AUTO: 42.7 % (ref 39–53)
HGB BLD-MCNC: 14.1 G/DL (ref 13–17.5)
IMM GRANULOCYTES # BLD AUTO: 0.12 X10(3) UL (ref 0–1)
IMM GRANULOCYTES NFR BLD: 0.8 %
LYMPHOCYTES # BLD AUTO: 3.52 X10(3) UL (ref 1–4)
LYMPHOCYTES NFR BLD AUTO: 22.2 %
MCH RBC QN AUTO: 30.6 PG (ref 26–34)
MCHC RBC AUTO-ENTMCNC: 33 G/DL (ref 31–37)
MCV RBC AUTO: 92.6 FL (ref 80–100)
MONOCYTES # BLD AUTO: 1.04 X10(3) UL (ref 0.1–1)
MONOCYTES NFR BLD AUTO: 6.5 %
NEUTROPHILS # BLD AUTO: 10.88 X10 (3) UL (ref 1.5–7.7)
NEUTROPHILS # BLD AUTO: 10.88 X10(3) UL (ref 1.5–7.7)
NEUTROPHILS NFR BLD AUTO: 68.5 %
OSMOLALITY SERPL CALC.SUM OF ELEC: 307 MOSM/KG (ref 275–295)
PLATELET # BLD AUTO: 188 10(3)UL (ref 150–450)
POTASSIUM SERPL-SCNC: 3.2 MMOL/L (ref 3.5–5.1)
POTASSIUM SERPL-SCNC: 4.1 MMOL/L (ref 3.5–5.1)
RBC # BLD AUTO: 4.61 X10(6)UL (ref 4.3–5.7)
SODIUM SERPL-SCNC: 145 MMOL/L (ref 136–145)
VANCOMYCIN TROUGH SERPL-MCNC: 5.6 UG/ML (ref 10–20)
WBC # BLD AUTO: 15.9 X10(3) UL (ref 4–11)

## 2019-05-06 PROCEDURE — 74018 RADEX ABDOMEN 1 VIEW: CPT | Performed by: SURGERY

## 2019-05-06 PROCEDURE — 71045 X-RAY EXAM CHEST 1 VIEW: CPT | Performed by: INTERNAL MEDICINE

## 2019-05-06 PROCEDURE — 99232 SBSQ HOSP IP/OBS MODERATE 35: CPT | Performed by: INTERNAL MEDICINE

## 2019-05-06 PROCEDURE — 99233 SBSQ HOSP IP/OBS HIGH 50: CPT | Performed by: INTERNAL MEDICINE

## 2019-05-06 PROCEDURE — 99291 CRITICAL CARE FIRST HOUR: CPT | Performed by: INTERNAL MEDICINE

## 2019-05-06 RX ORDER — FUROSEMIDE 10 MG/ML
40 INJECTION INTRAMUSCULAR; INTRAVENOUS ONCE
Status: COMPLETED | OUTPATIENT
Start: 2019-05-06 | End: 2019-05-06

## 2019-05-06 RX ORDER — HEPARIN SODIUM 1000 [USP'U]/ML
3000 INJECTION, SOLUTION INTRAVENOUS; SUBCUTANEOUS ONCE
Status: COMPLETED | OUTPATIENT
Start: 2019-05-06 | End: 2019-05-06

## 2019-05-06 RX ORDER — POTASSIUM CHLORIDE 1.5 G/1.77G
40 POWDER, FOR SOLUTION ORAL EVERY 4 HOURS
Status: COMPLETED | OUTPATIENT
Start: 2019-05-06 | End: 2019-05-06

## 2019-05-06 RX ORDER — SPIRONOLACTONE 25 MG/1
25 TABLET ORAL DAILY
Status: DISCONTINUED | OUTPATIENT
Start: 2019-05-06 | End: 2019-05-13 | Stop reason: SINTOL

## 2019-05-06 NOTE — PROGRESS NOTES
Pulmonary/Critical Care Follow Up Note    HPI:   Dia Parisi is a 60year old male with Patient presents with:  Fever (infectious)  Altered Mental Status (neurologic)      PCP LATIA Haider MD  Admission Attending No admitting provider for patient encoun detemir (LEVEMIR) 100 UNIT/ML flextouch 68 Units, 68 Units, Subcutaneous, Nightly  •  morphINE sulfate (PF) 4 MG/ML injection 4 mg, 4 mg, Intravenous, Once  •  acetaminophen (TYLENOL) tab 650 mg, 650 mg, Oral, Q6H PRN **OR** acetaminophen (TYLENOL) 160 MG/ %.    Intake/Output Summary (Last 24 hours) at 5/6/2019 1241  Last data filed at 5/6/2019 1100  Gross per 24 hour   Intake 3791.35 ml   Output 5780 ml   Net -1988.65 ml     No resp distress  Follows commands off sedation  Lung course bs  cv reg   Abd soft insulin     DVT px  SCDs  SQ hep    35 min CCT       Sondra Morin MD

## 2019-05-06 NOTE — PROGRESS NOTES
[facility]    Progress Note    Gustavo Dixon Patient Status:  Inpatient    1960 MRN [id number]   Location [facility] 2W/SW Attending Bonny Murillo, [address] [facility]  Day # 4 PCP LATIA Gonzalez MD         Assessment and Plan:     Sep arousable, exam limited  Skin: no rashes or lesions    Scheduled Meds:    • vancomycin  1,750 mg Intravenous Q12H   • potassium chloride  40 mEq Per NG Tube Q4H   • digoxin  250 mcg Intravenous Daily   • Insulin Regular Human  4 Units Subcutaneous Q6H   •

## 2019-05-06 NOTE — PLAN OF CARE
Problem: Diabetes/Glucose Control  Goal: Glucose maintained within prescribed range  Description  INTERVENTIONS:  - Monitor Blood Glucose as ordered  - Assess for signs and symptoms of hyperglycemia and hypoglycemia  - Administer ordered medications to m vasoactive medications to optimize hemodynamic stability  - Monitor arterial and/or venous puncture sites for bleeding and/or hematoma  - Assess quality of pulses, skin color and temperature  - Assess for signs of decreased coronary artery perfusion - ex. NEUROLOGICAL - ADULT  Goal: Achieves stable or improved neurological status  Description  INTERVENTIONS  - Assess for and report changes in neurological status  - Initiate measures to prevent increased intracranial pressure  - Maintain blood pressure and f

## 2019-05-06 NOTE — PROGRESS NOTES
[facility]     Gastroenterology Progress Note    Magda Ko Patient Status:  Inpatient    1960 MRN [id number]   Location [facility] 2W/SW Attending Gustavo Bee, [address] [facility] Se Day # 4 PCP MD Star Funes    #Coffee-ground emesis- likely 2/2 MWT or gastritis in the setting of SBO, treat with IV protonix.  NO plans for endoscopy given no signs of active bleeding.      #LFT elevation -- mildly elevated, will continue to monitor, possibly from sepsis, SHANKS/HC Angie Tomlin MD on 5/05/2019 at 7:51

## 2019-05-06 NOTE — PROGRESS NOTES
[address] [facility] dosing service    Follow-up Pharmacokinetic Consult for Vancomycin Dosing     Denise Villanueva is a 60year old male who is being treated for Sepsis/Pneumonia. Patient is on day 4 of Vancomycin and add is currently receiving 2 gm IV Q 24 hours.

## 2019-05-07 ENCOUNTER — APPOINTMENT (OUTPATIENT)
Dept: CT IMAGING | Facility: HOSPITAL | Age: 59
DRG: 003 | End: 2019-05-07
Attending: INTERNAL MEDICINE
Payer: MEDICARE

## 2019-05-07 LAB
ANION GAP SERPL CALC-SCNC: 6 MMOL/L (ref 0–18)
APTT PPP: 41.1 SECONDS (ref 23.2–35.3)
APTT PPP: 42.8 SECONDS (ref 23.2–35.3)
APTT PPP: 60.1 SECONDS (ref 23.2–35.3)
BUN BLD-MCNC: 22 MG/DL (ref 7–18)
BUN/CREAT SERPL: 21.6 (ref 10–20)
CALCIUM BLD-MCNC: 8.1 MG/DL (ref 8.5–10.1)
CHLORIDE SERPL-SCNC: 116 MMOL/L (ref 98–112)
CO2 SERPL-SCNC: 24 MMOL/L (ref 21–32)
CREAT BLD-MCNC: 1.02 MG/DL (ref 0.7–1.3)
DIGOXIN SERPL-MCNC: 0.92 NG/ML (ref 0.8–2)
GLUCOSE BLD-MCNC: 150 MG/DL (ref 70–99)
GLUCOSE BLDC GLUCOMTR-MCNC: 133 MG/DL (ref 70–99)
GLUCOSE BLDC GLUCOMTR-MCNC: 150 MG/DL (ref 70–99)
GLUCOSE BLDC GLUCOMTR-MCNC: 168 MG/DL (ref 70–99)
GLUCOSE BLDC GLUCOMTR-MCNC: 184 MG/DL (ref 70–99)
OSMOLALITY SERPL CALC.SUM OF ELEC: 308 MOSM/KG (ref 275–295)
POTASSIUM SERPL-SCNC: 3.6 MMOL/L (ref 3.5–5.1)
POTASSIUM SERPL-SCNC: 4.8 MMOL/L (ref 3.5–5.1)
SODIUM SERPL-SCNC: 146 MMOL/L (ref 136–145)
VANCOMYCIN TROUGH SERPL-MCNC: 11.3 UG/ML (ref 10–20)

## 2019-05-07 PROCEDURE — 99233 SBSQ HOSP IP/OBS HIGH 50: CPT | Performed by: INTERNAL MEDICINE

## 2019-05-07 PROCEDURE — 74177 CT ABD & PELVIS W/CONTRAST: CPT | Performed by: INTERNAL MEDICINE

## 2019-05-07 PROCEDURE — 99291 CRITICAL CARE FIRST HOUR: CPT | Performed by: INTERNAL MEDICINE

## 2019-05-07 PROCEDURE — 99232 SBSQ HOSP IP/OBS MODERATE 35: CPT | Performed by: INTERNAL MEDICINE

## 2019-05-07 RX ORDER — POTASSIUM CHLORIDE 1.5 G/1.77G
40 POWDER, FOR SOLUTION ORAL EVERY 4 HOURS
Status: COMPLETED | OUTPATIENT
Start: 2019-05-07 | End: 2019-05-07

## 2019-05-07 RX ORDER — HYDRALAZINE HYDROCHLORIDE 20 MG/ML
10 INJECTION INTRAMUSCULAR; INTRAVENOUS EVERY 4 HOURS
Status: DISCONTINUED | OUTPATIENT
Start: 2019-05-07 | End: 2019-05-12

## 2019-05-07 NOTE — PROGRESS NOTES
[facility] - [facility]    Progress Note    Malinda Flores Patient Status:  Inpatient    1960 MRN [id number]   Location [facility] 2W/SW Attending Niko Ojeda,  [facility] Se Day # 5 PCP LATIA Bess MD            Subjective:     Pt comfor --  93   GFRNAA  --    < > 70 77 80  --  80   CA  --    < > 7.6* 7.6* 7.8*  --  8.1*   ALB 2.9*  --  2.4* 2.1*  --   --   --    NA  --    < > 153* 149* 145  --  146*   K 4.2   < > 3.5 3.4* 3.2* 4.1 3.6   CL  --    < > 119* 118* 110  --  116*   CO2  --    <

## 2019-05-07 NOTE — PROGRESS NOTES
[facility]    Progress Note    Roberto Sickle Patient Status:  Inpatient    1960 MRN [id number]   Location [facility] 2W/SW Attending Stu Umaña MD   Hosp Day # 5 PCP LATIA Galeas MD     Subjective:   Intubated    Majo

## 2019-05-07 NOTE — CONSULTS
[facility]    PATIENT'S NAME: Tyler Lyman   ATTENDING PHYSICIAN: Kat Reagan MD   CONSULTING PHYSICIAN: Dave Elizondo.  Rafael Medina MD   PATIENT ACCOUNT#:   [id number]    LOCATION:  [facility] [address] RECORD #:   [id number]       DATE OF BIRTH:  [birth year]

## 2019-05-07 NOTE — PROGRESS NOTES
[facility]     Gastroenterology Progress Note    Denisedev Villanueva Patient Status:  Inpatient    1960 MRN [id number]   Location [facility] 2W/SW Attending Kelle Kennedy, [address] [facility]  Day # 5 PCP MD Fredy Lea a/p today for better assessment of dilation. Hold off on tube-feeds for now.      #CHF/Afib - per cardiology, okay for anticoagulation if needed.  Continue IV PPI.      #Coffee-ground emesis- likely 2/2 MWT or gastritis in the setting of SBO, treat with IV Moderate hiatal hernia. 2. Hyperinflation. Bibasilar atelectatic and/or parenchymal opacification.  3. Support tubes and catheters are satisfactory    Dictated by (CST): Chema Richardson MD on 5/06/2019 at 9:54     Approved by (CST): Chema Richardson,

## 2019-05-07 NOTE — PLAN OF CARE
Problem: Diabetes/Glucose Control  Goal: Glucose maintained within prescribed range  Description  INTERVENTIONS:  - Monitor Blood Glucose as ordered  - Assess for signs and symptoms of hyperglycemia and hypoglycemia  - Administer ordered medications to m redirection as needed  - Assess for the need to continue restraints  Outcome: Progressing  Note:   See Epic Flowsheet     Problem: CARDIOVASCULAR - ADULT  Goal: Maintains optimal cardiac output and hemodynamic stability  Description  INTERVENTIONS:  - Stephy Progressing  Note:   40%fio2 via vent with o2 sats >90     Problem: GASTROINTESTINAL - ADULT  Goal: Maintains or returns to baseline bowel function  Description  INTERVENTIONS:  - Assess bowel function  - Maintain adequate hydration with IV or PO as ordere Progressing     Problem: NEUROLOGICAL - ADULT  Goal: Achieves stable or improved neurological status  Description  INTERVENTIONS  - Assess for and report changes in neurological status  - Initiate measures to prevent increased intracranial pressure  - Main order or complex needs related to functional status, cognitive ability or social support system  Outcome: Progressing

## 2019-05-07 NOTE — RESPIRATORY THERAPY NOTE
1615: Patient placed on CPAP/PSV of 5/5. Seems to be tolerating, but patient is on phone with wife with RN holding phone, and helping communicate. Unable to get accurate RR. HR is 87.     Will continue to monitor pt.    1805: patient placed back on full sup

## 2019-05-07 NOTE — PROGRESS NOTES
[facility]    Cardiology Progress Note  [facility]    Magda Ko Patient Status:  Inpatient    1960 MRN [id number]   Location [facility] 2W/SW Attending Gustavo Bee, [address] [facility] Se Day # 5 PCP LATIA Harrell morphINE sulfate 0.5 mg/hr (05/06/19 2015)       Exam    Pulm: Lungs clear, anteriorly on ventilator  CV: Heart with dual fibrillation controlled rate  Ext: Legs large   abd: Abdomen soft, nontender, nondistended, no organomegaly, bowel sounds present atelectatic and/or parenchymal opacification.  3. Support tubes and catheters are satisfactory    Dictated by (CST): Ta Conway MD on 5/06/2019 at 9:54     Approved by (CST): Ta Conway MD on 5/06/2019 at 9:57          Xr Chest Ap Portable

## 2019-05-07 NOTE — PROGRESS NOTES
Pulmonary/Critical Care Follow Up Note    HPI:   Rm Calle is a 60year old male with Patient presents with:  Fever (infectious)  Altered Mental Status (neurologic)      PCP LATIA Mcmahon MD  Admission Attending No admitting provider for patient encoun Nightly  •  morphINE sulfate (PF) 4 MG/ML injection 4 mg, 4 mg, Intravenous, Once  •  acetaminophen (TYLENOL) tab 650 mg, 650 mg, Oral, Q6H PRN **OR** acetaminophen (TYLENOL) 160 MG/5ML oral liquid 650 mg, 650 mg, Oral, Q6H PRN **OR** acetaminophen (TYLENO [address] [facility] filed at 5/7/2019 0617  Gross per 24 hour   Intake 3376.85 ml   Output 4700 ml   Net -1323.15 ml     No resp distress  Follows commands off sedation  Lung course BS  CV  reg   Abd soft   Ext warm, + edema    LABS:    Lab Results   Component insulin     DVT px  SCDs  SQ hep    31 min CCT    D/w Dr Holly Goodman MD

## 2019-05-07 NOTE — PROGRESS NOTES
RESPIRATORY THERAPY MECHANICAL VENTILATION PROGRESS NOTE    Ventilator Weaning:  Patient meets criteria for weaning? no Weaning was attempted no.  Plan to do weaning trial after CT.         05/07/19 1105   Readings   Total RR 31   Minute Ventilation (L/min)

## 2019-05-07 NOTE — RESPIRATORY THERAPY NOTE
RESPIRATORY THERAPY PATIENT TRANSPORT NOTE    Transported from: 65  Transported to: CT      Patient is intubated?:yes  Transport ventilator used? :yes  Resuscitation bag used during transport?:no   ETT placement and ventilator function were verified post-t

## 2019-05-07 NOTE — RESPIRATORY THERAPY NOTE
RESPIRATORY THERAPY MECHANICAL VENTILATION PROGRESS NOTE    Ventilator Weaning:  Patient meets criteria for weaning? yes Weaning was attempted no   Weaning not done at this time on this shift.

## 2019-05-08 ENCOUNTER — APPOINTMENT (OUTPATIENT)
Dept: GENERAL RADIOLOGY | Facility: HOSPITAL | Age: 59
DRG: 003 | End: 2019-05-08
Attending: INTERNAL MEDICINE
Payer: MEDICARE

## 2019-05-08 LAB
ANION GAP SERPL CALC-SCNC: 6 MMOL/L (ref 0–18)
APTT PPP: 65 SECONDS (ref 23.2–35.3)
BASOPHILS # BLD AUTO: 0.02 X10(3) UL (ref 0–0.2)
BASOPHILS NFR BLD AUTO: 0.2 %
BUN BLD-MCNC: 21 MG/DL (ref 7–18)
BUN/CREAT SERPL: 21.6 (ref 10–20)
CALCIUM BLD-MCNC: 8.3 MG/DL (ref 8.5–10.1)
CHLORIDE SERPL-SCNC: 113 MMOL/L (ref 98–112)
CO2 SERPL-SCNC: 25 MMOL/L (ref 21–32)
CREAT BLD-MCNC: 0.97 MG/DL (ref 0.7–1.3)
DEPRECATED RDW RBC AUTO: 46.5 FL (ref 35.1–46.3)
EOSINOPHIL # BLD AUTO: 0.67 X10(3) UL (ref 0–0.7)
EOSINOPHIL NFR BLD AUTO: 5.2 %
ERYTHROCYTE [DISTWIDTH] IN BLOOD BY AUTOMATED COUNT: 13.8 % (ref 11–15)
GLUCOSE BLD-MCNC: 131 MG/DL (ref 70–99)
GLUCOSE BLDC GLUCOMTR-MCNC: 139 MG/DL (ref 70–99)
GLUCOSE BLDC GLUCOMTR-MCNC: 147 MG/DL (ref 70–99)
GLUCOSE BLDC GLUCOMTR-MCNC: 155 MG/DL (ref 70–99)
GLUCOSE BLDC GLUCOMTR-MCNC: 159 MG/DL (ref 70–99)
GLUCOSE BLDC GLUCOMTR-MCNC: 172 MG/DL (ref 70–99)
GLUCOSE BLDC GLUCOMTR-MCNC: 175 MG/DL (ref 70–99)
GLUCOSE BLDC GLUCOMTR-MCNC: 189 MG/DL (ref 70–99)
HCT VFR BLD AUTO: 39.1 % (ref 39–53)
HCV RNA SERPL NAA+PROBE-ACNC: ABNORMAL IU/ML
HCV RNA SERPL NAA+PROBE-LOG IU: 6.59 LOG (IU/ML)
HCV RNA SERPL QL NAA+PROBE: DETECTED
HGB BLD-MCNC: 12.9 G/DL (ref 13–17.5)
IMM GRANULOCYTES # BLD AUTO: 0.12 X10(3) UL (ref 0–1)
IMM GRANULOCYTES NFR BLD: 0.9 %
LYMPHOCYTES # BLD AUTO: 2.89 X10(3) UL (ref 1–4)
LYMPHOCYTES NFR BLD AUTO: 22.4 %
MCH RBC QN AUTO: 31 PG (ref 26–34)
MCHC RBC AUTO-ENTMCNC: 33 G/DL (ref 31–37)
MCV RBC AUTO: 94 FL (ref 80–100)
MONOCYTES # BLD AUTO: 1.08 X10(3) UL (ref 0.1–1)
MONOCYTES NFR BLD AUTO: 8.4 %
NEUTROPHILS # BLD AUTO: 8.1 X10 (3) UL (ref 1.5–7.7)
NEUTROPHILS # BLD AUTO: 8.1 X10(3) UL (ref 1.5–7.7)
NEUTROPHILS NFR BLD AUTO: 62.9 %
OSMOLALITY SERPL CALC.SUM OF ELEC: 303 MOSM/KG (ref 275–295)
PLATELET # BLD AUTO: 208 10(3)UL (ref 150–450)
POTASSIUM SERPL-SCNC: 3.5 MMOL/L (ref 3.5–5.1)
POTASSIUM SERPL-SCNC: 4.2 MMOL/L (ref 3.5–5.1)
RBC # BLD AUTO: 4.16 X10(6)UL (ref 4.3–5.7)
SODIUM SERPL-SCNC: 144 MMOL/L (ref 136–145)
TRIGL SERPL-MCNC: 144 MG/DL (ref 30–149)
WBC # BLD AUTO: 12.9 X10(3) UL (ref 4–11)

## 2019-05-08 PROCEDURE — 99232 SBSQ HOSP IP/OBS MODERATE 35: CPT | Performed by: INTERNAL MEDICINE

## 2019-05-08 PROCEDURE — 99291 CRITICAL CARE FIRST HOUR: CPT | Performed by: INTERNAL MEDICINE

## 2019-05-08 PROCEDURE — 99233 SBSQ HOSP IP/OBS HIGH 50: CPT | Performed by: INTERNAL MEDICINE

## 2019-05-08 PROCEDURE — 71045 X-RAY EXAM CHEST 1 VIEW: CPT | Performed by: INTERNAL MEDICINE

## 2019-05-08 RX ORDER — MORPHINE SULFATE 4 MG/ML
4 INJECTION, SOLUTION INTRAMUSCULAR; INTRAVENOUS EVERY 2 HOUR PRN
Status: DISCONTINUED | OUTPATIENT
Start: 2019-05-08 | End: 2019-07-01

## 2019-05-08 RX ORDER — FUROSEMIDE 10 MG/ML
40 INJECTION INTRAMUSCULAR; INTRAVENOUS ONCE
Status: COMPLETED | OUTPATIENT
Start: 2019-05-08 | End: 2019-05-08

## 2019-05-08 RX ORDER — POTASSIUM CHLORIDE 1.5 G/1.77G
40 POWDER, FOR SOLUTION ORAL EVERY 4 HOURS
Status: COMPLETED | OUTPATIENT
Start: 2019-05-08 | End: 2019-05-08

## 2019-05-08 RX ORDER — METOCLOPRAMIDE HYDROCHLORIDE 5 MG/ML
10 INJECTION INTRAMUSCULAR; INTRAVENOUS EVERY 8 HOURS PRN
Status: DISCONTINUED | OUTPATIENT
Start: 2019-05-08 | End: 2019-05-11

## 2019-05-08 NOTE — PLAN OF CARE
Problem: Patient Centered Care  Goal: Patient preferences are identified and integrated in the patient's plan of care  Description  Interventions:  - What would you like us to know as we care for you?   - Provide timely, complete, and accurate informatio routines  Outcome: Progressing     Problem: Safety Risk - Non-Violent Restraints  Goal: Patient will remain free from self-harm  Description  INTERVENTIONS:  - Apply the least restrictive restraint to prevent harm  - Notify patient and family of reasons re respiratory status  - Assess for changes in mentation and behavior  - Position to facilitate oxygenation and minimize respiratory effort  - Oxygen supplementation based on oxygen saturation or ABGs  - Provide Smoking Cessation handout, if applicable  - Enc fluid and nutrition restrictions as appropriate  Outcome: Progressing     Problem: SKIN/TISSUE INTEGRITY - ADULT  Goal: Skin integrity remains intact  Description  INTERVENTIONS  - Assess and document risk factors for pressure ulcer development  - Assess a interpreters to assist at discharge as needed  - Consider post-discharge preferences of patient/family/discharge partner  - Complete POLST form as appropriate  - Assess patient's ability to be responsible for managing their own health  - Refer to [facility]

## 2019-05-08 NOTE — PROGRESS NOTES
[address] [facility] dosing service    Follow-up Pharmacokinetic Consult for Vancomycin Dosing     Rosita Hillman is a 60year old male who is being treated for sepsis. Patient is on day 4 of Vancomycin and add is currently receiving 1.75 gm IV Q 12 hours.   Goal tr [phone]

## 2019-05-08 NOTE — PROGRESS NOTES
Pulmonary/Critical Care Follow Up Note    HPI:   Jesus Villa is a 60year old male with Patient presents with:  Fever (infectious)  Altered Mental Status (neurologic)      PCP LATIA Ceron MD  Admission Attending No admitting provider for patient encoun Subcutaneous, Nightly  •  morphINE sulfate (PF) 4 MG/ML injection 4 mg, 4 mg, Intravenous, Once  •  acetaminophen (TYLENOL) tab 650 mg, 650 mg, Oral, Q6H PRN **OR** acetaminophen (TYLENOL) 160 MG/5ML oral liquid 650 mg, 650 mg, Oral, Q6H PRN **OR** acetami 7280  Last data filed at 5/8/2019 0900  Gross per 24 hour   Intake 4078.03 ml   Output 2801 ml   Net 1277.03 ml     No resp distress  Follows commands off sedation  Lung course BS  CV  reg   Abd soft + BS but quiet and slighlty high pitched  Ext warm, + ed snoring  Suspect IRMA  ABG w/o hypercapnia now  Plan        PSG at outpt if pt allows                Empiric inpt BiPAP at night     Sz  Plan Keppra     FM/chronic pain  On chronic narcotics  Off other meds as NPO  Plan     Low dose morphine gtt    Prn for

## 2019-05-08 NOTE — PROGRESS NOTES
[facility]    Progress Note    Migdaliakeith Delarosaing Patient Status:  Inpatient    1960 MRN [id number]   Location [facility] 2W/SW Attending Chioma Marks,  [facility] Se Day # 6 PCP LATIA Felipe MD            Subjective:     Pt comfor 0. 97   GFRAA  --    < > 81 89 93  --  93  --  98   GFRNAA  --    < > 70 77 80  --  80  --  85   CA  --    < > 7.6* 7.6* 7.8*  --  8.1*  --  8.3*   ALB 2.9*  --  2.4* 2.1*  --   --   --   --   --    NA  --    < > 153* 149* 145  --  146*  --  144   K 4.2   < Right renal cyst.  Dictated by (CST): Cyndy Mixon MD on 5/07/2019 at 13:29     Approved by (CST): Cyndy Mixon MD on 5/07/2019 at 13:42          Xr Chest Ap Portable  (cpt=71045)    Result Date: 5/8/2019  CONCLUSION:  1.  Suboptimal inspiration with elevat

## 2019-05-08 NOTE — PROGRESS NOTES
[place] [facility] - [facility]    Progress Note    Jai Carbajal Patient Status:  Inpatient    1960 MRN [id number]   Location [facility] 2W/SW Attending Gideon Lazo MD   Hosp Day # 6 PCP C. Stephani Boas, MD     Subjective:   Intubated    Majo

## 2019-05-08 NOTE — PLAN OF CARE
Problem: Diabetes/Glucose Control  Goal: Glucose maintained within prescribed range  Description  INTERVENTIONS:  - Monitor Blood Glucose as ordered  - Assess for signs and symptoms of hyperglycemia and hypoglycemia  - Administer ordered medications to m See epic flowsheet for assessments     Problem: CARDIOVASCULAR - ADULT  Goal: Maintains optimal cardiac output and hemodynamic stability  Description  INTERVENTIONS:  - Monitor vital signs, rhythm, and trends  - Monitor for bleeding, hypotension and sign Problem: METABOLIC/FLUID AND ELECTROLYTES - ADULT  Goal: Glucose maintained within prescribed range  Description  INTERVENTIONS:  - Monitor Blood Glucose as ordered  - Assess for signs and symptoms of hyperglycemia and hypoglycemia  - Administer ordered fall injury  Description  INTERVENTIONS:  - Assess pt frequently for physical needs  - Identify cognitive and physical deficits and behaviors that affect risk of falls.   - [place] fall precautions as indicated by assessment.  - Educate pt/family on patie

## 2019-05-08 NOTE — PLAN OF CARE
Problem: Patient Centered Care  Goal: Patient preferences are identified and integrated in the patient's plan of care  Description  Interventions:  - What would you like us to know as we care for you?   - Provide timely, complete, and accurate informatio routines  Outcome: Progressing     Problem: Safety Risk - Non-Violent Restraints  Goal: Patient will remain free from self-harm  Description  INTERVENTIONS:  - Apply the least restrictive restraint to prevent harm  - Notify patient and family of reasons re minimize respiratory effort  - Oxygen supplementation based on oxygen saturation or ABGs  - Provide Smoking Cessation handout, if applicable  - Encourage broncho-pulmonary hygiene including cough, deep breathe, Incentive Spirometry  - Assess the need for s ADULT  Goal: Skin integrity remains intact  Description  INTERVENTIONS  - Assess and document risk factors for pressure ulcer development  - Assess and document skin integrity  - Monitor for areas of redness and/or skin breakdown  - Initiate interventions, patient/family/discharge partner  - Complete POLST form as appropriate  - Assess patient's ability to be responsible for managing their own health  - Refer to Case Management Department for coordinating discharge planning if the patient needs post-hospital

## 2019-05-08 NOTE — PROGRESS NOTES
[facility]     Gastroenterology Progress Note    Brayden Garcia Patient Status:  Inpatient    1960 MRN [id number]   Location [facility] 2W/SW Attending Yang Zimmerman, [address] [facility] Se Day # 6 PCP MD Catherine Alcocer [facility] see if this helps his ileus. No obstruction is seen. #Nutrition -- plan to start low rate of tube-feeds to see if he can tolerate.      #CHF/Afib - per cardiology, okay for anticoagulation if needed.  Continue IV PPI.      #Coffee-ground emesis- likely atherosclerosis. 3.  Wedge-shaped pulmonary opacities the lung bases, which could represent atelectasis or pneumonia. The bibasilar distribution also raises the possibility of aspiration.  4.  Feeding tube is present with tip in the 2nd portion of the duod

## 2019-05-08 NOTE — PROGRESS NOTES
[place] [facility] - [facility]    Progress Note    Donelda Dakins Patient Status:  Inpatient    1960 MRN [id number]   Location [facility] 2W/SW Attending Shadi Todd, [facility] Se Day # 6 PCP LATIA Ness MD         Assessment and Plan:     Sep Intravenous Q12H   • spironolactone  25 mg Oral Daily   • digoxin  250 mcg Intravenous Daily   • Insulin Regular Human  4 Units Subcutaneous Q6H   • Insulin Regular Human  1-5 Units Subcutaneous 4 times per day   • metoprolol Tartrate  5 mg Intravenous Q4H obvious bowel caliber transition site, and no evidence of bowel obstruction. The appearance suggests ileus.   There is suggestion of prior surgery in the region of the mid-distal sigmoid colon, with clips and sutures in this region, without evidence of bow

## 2019-05-09 ENCOUNTER — APPOINTMENT (OUTPATIENT)
Dept: GENERAL RADIOLOGY | Facility: HOSPITAL | Age: 59
DRG: 003 | End: 2019-05-09
Attending: INTERNAL MEDICINE
Payer: MEDICARE

## 2019-05-09 ENCOUNTER — APPOINTMENT (OUTPATIENT)
Dept: CT IMAGING | Facility: HOSPITAL | Age: 59
DRG: 003 | End: 2019-05-09
Attending: INTERNAL MEDICINE
Payer: MEDICARE

## 2019-05-09 LAB
ANION GAP SERPL CALC-SCNC: 8 MMOL/L (ref 0–18)
APTT PPP: 63.4 SECONDS (ref 23.2–35.3)
BASOPHILS # BLD AUTO: 0.02 X10(3) UL (ref 0–0.2)
BASOPHILS NFR BLD AUTO: 0.2 %
BUN BLD-MCNC: 21 MG/DL (ref 7–18)
BUN/CREAT SERPL: 19.6 (ref 10–20)
CALCIUM BLD-MCNC: 8.4 MG/DL (ref 8.5–10.1)
CHLORIDE SERPL-SCNC: 111 MMOL/L (ref 98–112)
CO2 SERPL-SCNC: 24 MMOL/L (ref 21–32)
CREAT BLD-MCNC: 1.07 MG/DL (ref 0.7–1.3)
DEPRECATED RDW RBC AUTO: 47.8 FL (ref 35.1–46.3)
EOSINOPHIL # BLD AUTO: 0.31 X10(3) UL (ref 0–0.7)
EOSINOPHIL NFR BLD AUTO: 2.6 %
ERYTHROCYTE [DISTWIDTH] IN BLOOD BY AUTOMATED COUNT: 14.4 % (ref 11–15)
GLUCOSE BLD-MCNC: 190 MG/DL (ref 70–99)
GLUCOSE BLDC GLUCOMTR-MCNC: 184 MG/DL (ref 70–99)
GLUCOSE BLDC GLUCOMTR-MCNC: 214 MG/DL (ref 70–99)
GLUCOSE BLDC GLUCOMTR-MCNC: 220 MG/DL (ref 70–99)
GLUCOSE BLDC GLUCOMTR-MCNC: 282 MG/DL (ref 70–99)
HAV IGM SER QL: 2.5 MG/DL (ref 1.6–2.6)
HCT VFR BLD AUTO: 39.5 % (ref 39–53)
HCV GENTYP SERPL NAA+PROBE: 1
HGB BLD-MCNC: 12.9 G/DL (ref 13–17.5)
IMM GRANULOCYTES # BLD AUTO: 0.11 X10(3) UL (ref 0–1)
IMM GRANULOCYTES NFR BLD: 0.9 %
LYMPHOCYTES # BLD AUTO: 2.58 X10(3) UL (ref 1–4)
LYMPHOCYTES NFR BLD AUTO: 21.4 %
MCH RBC QN AUTO: 31 PG (ref 26–34)
MCHC RBC AUTO-ENTMCNC: 32.7 G/DL (ref 31–37)
MCV RBC AUTO: 95 FL (ref 80–100)
MONOCYTES # BLD AUTO: 1.33 X10(3) UL (ref 0.1–1)
MONOCYTES NFR BLD AUTO: 11 %
NEUTROPHILS # BLD AUTO: 7.69 X10 (3) UL (ref 1.5–7.7)
NEUTROPHILS # BLD AUTO: 7.69 X10(3) UL (ref 1.5–7.7)
NEUTROPHILS NFR BLD AUTO: 63.9 %
OSMOLALITY SERPL CALC.SUM OF ELEC: 304 MOSM/KG (ref 275–295)
PHOSPHATE SERPL-MCNC: 3 MG/DL (ref 2.5–4.9)
PLATELET # BLD AUTO: 301 10(3)UL (ref 150–450)
POTASSIUM SERPL-SCNC: 3.7 MMOL/L (ref 3.5–5.1)
RBC # BLD AUTO: 4.16 X10(6)UL (ref 4.3–5.7)
SODIUM SERPL-SCNC: 143 MMOL/L (ref 136–145)
WBC # BLD AUTO: 12 X10(3) UL (ref 4–11)

## 2019-05-09 PROCEDURE — 99232 SBSQ HOSP IP/OBS MODERATE 35: CPT | Performed by: PHYSICIAN ASSISTANT

## 2019-05-09 PROCEDURE — 71045 X-RAY EXAM CHEST 1 VIEW: CPT | Performed by: INTERNAL MEDICINE

## 2019-05-09 PROCEDURE — 70496 CT ANGIOGRAPHY HEAD: CPT | Performed by: INTERNAL MEDICINE

## 2019-05-09 PROCEDURE — 99233 SBSQ HOSP IP/OBS HIGH 50: CPT | Performed by: INTERNAL MEDICINE

## 2019-05-09 PROCEDURE — 99232 SBSQ HOSP IP/OBS MODERATE 35: CPT | Performed by: INTERNAL MEDICINE

## 2019-05-09 RX ORDER — POTASSIUM CHLORIDE 1.5 G/1.77G
40 POWDER, FOR SOLUTION ORAL ONCE
Status: COMPLETED | OUTPATIENT
Start: 2019-05-09 | End: 2019-05-09

## 2019-05-09 RX ORDER — FUROSEMIDE 10 MG/ML
40 INJECTION INTRAMUSCULAR; INTRAVENOUS ONCE
Status: COMPLETED | OUTPATIENT
Start: 2019-05-09 | End: 2019-05-09

## 2019-05-09 NOTE — PROGRESS NOTES
[place] [facility] - [facility]    Progress Note    Magda Ko Patient Status:  Inpatient    1960 MRN [id number]   Location [facility] [place] 2W/SW Attending Gustavo Bee, [address] [facility] Se Day # 7 PCP LATIA Jesus MD            Subjective:     Pt comfor --  0.97  --  1.07   GFRAA  --    < > 81 89   < > 93  --  98  --  87   GFRNAA  --    < > 70 77   < > 80  --  85  --  76   CA  --    < > 7.6* 7.6*   < > 8.1*  --  8.3*  --  8.4*   ALB 2.9*  --  2.4* 2.1*  --   --   --   --   --   --    NA  --    < > 153* 1 CT scan 5/7 with majority of the large bowel with gas and fluid  NG tube has been removed. No nausea vomiting. Patient is extubated  We'll start liquids slowly today.         Shayla Wynne MD [facility]  General Surgery  [facility]  5/9/2019  4:31 PM

## 2019-05-09 NOTE — PROGRESS NOTES
[place] [facility] - [facility]    Progress Note    Saida Wen Patient Status:  Inpatient    1960 MRN [id number]   Location [facility] 2W/SW Attending Kenisha Chávez, [address] [facility] Se Day # 7 PCP LATIA Long MD         Assessment and Plan:     Sep Human  4 Units Subcutaneous Q6H   • Insulin Regular Human  1-5 Units Subcutaneous 4 times per day   • metoprolol Tartrate  5 mg Intravenous Q4H   • insulin detemir  68 Units Subcutaneous Nightly   • morphINE sulfate  4 mg Intravenous Once   • Chlorhexidine duodenum. 5.  Right renal cyst.  Dictated by (CST): Vandana Melo MD on 5/07/2019 at 13:29     Approved by (CST): Vandana Melo MD on 5/07/2019 at 13:42          Xr Chest Ap Portable  (cpt=71045)    Result Date: 5/9/2019  CONCLUSION:  1.   No significant hawley

## 2019-05-09 NOTE — PLAN OF CARE
Problem: ALTERED NUTRIENT INTAKE - ADULT  Goal: Nutrient intake appropriate for improving, restoring or maintaining nutritional needs  Description  INTERVENTIONS:  - Assess nutritional status and recommend course of action  - Monitor oral intake when res

## 2019-05-09 NOTE — PLAN OF CARE
Patient remains on the ventilator at [facility] 62 40%. Patient alert to himself and able to follow commands. Patient has NG-tube to [facility]. Patient still NPO for abnormal CT ABD. Patient had a smear/small bowel movement this morning.  Bowel sounds are hypoactive and a management of diabetes  Outcome: Progressing     Problem: Patient/Family Goals  Goal: Patient/Family Long Term Goal  Description  Patient's Long Term Goal: Extubation    Interventions:  - Weaning trials  - See additional Care Plan goals for specific interv arrhythmias  - Monitor electrolytes and administer replacement therapy as ordered  Outcome: Progressing     Problem: RESPIRATORY - ADULT  Goal: Achieves optimal ventilation and oxygenation  Description  INTERVENTIONS:  - Assess for changes in respiratory s development  - Assess and document skin integrity  - Monitor for areas of redness and/or skin breakdown  - Initiate interventions, skin care algorithm/standards of care as needed  Outcome: Progressing     Problem: NEUROLOGICAL - ADULT  Goal: Achieves stabl health  - Refer to Case Management Department for coordinating discharge planning if the patient needs post-hospital services based on physician/LIP order or complex needs related to functional status, cognitive ability or social support system  Outcome: P

## 2019-05-09 NOTE — PLAN OF CARE
Problem: Patient Centered Care  Goal: Patient preferences are identified and integrated in the patient's plan of care  Description  Interventions:  - What would you like us to know as we care for you?   - Provide timely, complete, and accurate informatio routines  Outcome: Progressing     Problem: CARDIOVASCULAR - ADULT  Goal: Maintains optimal cardiac output and hemodynamic stability  Description  INTERVENTIONS:  - Monitor vital signs, rhythm, and trends  - Monitor for bleeding, hypotension and signs of d function  - Maintain adequate hydration with IV or PO as ordered and tolerated  - Evaluate effectiveness of GI medications  - Encourage mobilization and activity  - Obtain nutritional consult as needed  - Establish a toileting routine/schedule  - Consider optimize cerebral perfusion and minimize risk of hemorrhage  - Monitor temperature, glucose, and sodium.  Initiate appropriate interventions as ordered  Outcome: Progressing     Problem: SAFETY ADULT - FALL  Goal: Free from fall injury  Description  INTERVE information to patient/family  - Incorporate patient and family knowledge, values, beliefs, and cultural backgrounds into the planning and delivery of care  - Encourage patient/family to participate in care and decision-making at the level they choose  - H of decreased cardiac output  - Evaluate effectiveness of vasoactive medications to optimize hemodynamic stability  - Monitor arterial and/or venous puncture sites for bleeding and/or hematoma  - Assess quality of pulses, skin color and temperature  - Asses Consider collaborating with pharmacy to review patient's medication profile  Outcome: Progressing     Problem: METABOLIC/FLUID AND ELECTROLYTES - ADULT  Goal: Glucose maintained within prescribed range  Description  INTERVENTIONS:  - Monitor Blood Glucose injury  Description  INTERVENTIONS:  - Assess pt frequently for physical needs  - Identify cognitive and physical deficits and behaviors that affect risk of falls.   - [place] fall precautions as indicated by assessment.  - Educate pt/family on patient sa

## 2019-05-09 NOTE — SLP NOTE
ADULT SWALLOWING EVALUATION    ASSESSMENT    ASSESSMENT/OVERALL IMPRESSION:  Pt extubated today at approximately 1200. Pt with dysphonic vocal output during any and all audible phonation. Pt with notable decrease in vocal intensity.  Audible phonation appea thick    Compensatory Strategies Recommended: No straws; Slow rate; Alternate consistencies;Small bites and sips;Multiple swallows  Aspiration Precautions: Upright position; Slow rate;Small bites and sips; No straw  Medication Administration Recommendations: C Quality: (dysphonic)  Respiratory Status: Nasal cannula  Consistencies Trialed: Thin liquids; Nectar thick liquids;Puree; Soft solid  Method of Presentation: Staff/Clinician assistance;Spoon;Cup;Single sips  Patient Positioning: Upright    Oral Phase of Swal CCC-SLP  Speech-Language Pathologist  [address] [place] [address]    If you have any questions, please contact Néstor Christianson

## 2019-05-09 NOTE — DIETARY NOTE
ADULT NUTRITION INITIAL ASSESSMENT    Pt is at high nutrition risk. Pt does not meet malnutrition criteria. RECOMMENDATIONS TO MD:  See Nutrition Intervention.  When able to advance tube feeds recommended rx Promote goal rate 80 ml/hr + ProSource BI nutrition care to new setting or provider: monitor plans        PERTINENT PAST MEDICAL HISTORY:   Past Medical History:   Diagnosis Date   • Cardiomyopathy [facility])    • Chronic pain     chronic narcotics   • [facility]    • Diabetes ([facility] HISTORY:  Appetite: PTA good. Intake: 0%    Intake Meeting Needs: TF meeting needs when to goal rate.  See above-trickle feeds start-low nutrition intake to start     Food Allergies: No  Cultural/Ethnic/Jewish Preferences: Not Obtained     MEDICATIONS: Supplements: nPO  ESTIMATED NUTRITION NEEDS:  Calories: 2324 calories/day ([place] state using est dry wt 136 kg or 17 calories per kg Estimated dry wt of 136 kg0  Protein: 127  grams protein/day (2 grams protein per kg Ideal body wt (IBW))  Fluid needs: ~200

## 2019-05-09 NOTE — PROGRESS NOTES
[place] [facility] - [facility]     Progress Note        Natalia Barajas Patient Status:  Inpatient    1960 MRN [id number]   Location [facility] 2W/SW Attending Chu Vides,  [facility] Se Day # 7 PCP LATIA Abernathy MD       Subjective:   Patient see acetaminophen (TYLENOL EXTRA STRENGTH) tab 1,000 mg 1,000 mg Oral Q6H PRN   insulin detemir (LEVEMIR) 100 UNIT/ML flextouch 68 Units 68 Units Subcutaneous Nightly   morphINE sulfate (PF) 4 MG/ML injection 4 mg 4 mg Intravenous Once   acetaminophen (TYLEN abdomen firm, slightly distended  Extremities: no clubbing, cyanosis, + bilateral lower extremity  Neurologic: Moves extremities  Skin: warm, dry      Results:     Lab Results   Component Value Date    WBC 12.0 05/09/2019    HGB 12.9 05/09/2019    HCT 39. 5 (cpt=71045)    Result Date: 5/8/2019  CONCLUSION:  1. Suboptimal inspiration with elevation/eventration left hemidiaphragm. Moderate left base consolidation/atelectasis perhaps slightly worse. Minimal right lung base atelectasis.   Follow-up studies are a

## 2019-05-09 NOTE — PROGRESS NOTES
[place] [facility] - [facility]    Progress Note    Tinsley Begin Patient Status:  Inpatient    1960 MRN [id number]   Location [facility] 2W/SW Attending Cecile Mogran MD   Hosp Day # 7 PCP LATIA Maldonado MD     Subjective:   Intubated    Majo

## 2019-05-09 NOTE — PROGRESS NOTES
[facility]     Gastroenterology Progress Note    Curt Buckleyalyse Patient Status:  Inpatient    1960 MRN [id number]   Location [facility] 2W/SW Attending Donald Hurd, [address] [facility] Se Day # 7 PCP LATIA Hayes MD       [facility] initiation of Relistor - CPM. No obstruction seen.     #Nutrition: patient was extubated today with plans for swallow evaluation and diet per surgery. Tube feedings were not initiated.     #CHF/Afib: per cardiology, OK for anticoagulation if needed.  Contin Dictated by (CST): Dominga Edouard MD on 5/09/2019 at 6:54     Approved by (CST): Dominga Edouard MD on 5/09/2019 at 6:57          Xr Chest Ap Portable  (cpt=71045)    Result Date: 5/8/2019  CONCLUSION:  1.  Suboptimal inspiration with elevation/eventration left

## 2019-05-09 NOTE — PROGRESS NOTES
Notified Dr. Britt Marti that patient is having difficulty \"finding word\"/expressive aphagia. No concerns for a head CT at this time. Will monitor progress.

## 2019-05-09 NOTE — RESPIRATORY THERAPY NOTE
RESPIRATORY THERAPY MECHANICAL VENTILATION PROGRESS NOTE    Ventilator Weaning:  Patient meets criteria for weaning? yes Weaning was attempted yes using pressure support 5 cmH2O + PEEP 5 cmH2O. The patient tolerated well for 45 minutes. extubated to [facility]

## 2019-05-10 ENCOUNTER — APPOINTMENT (OUTPATIENT)
Dept: GENERAL RADIOLOGY | Facility: HOSPITAL | Age: 59
DRG: 003 | End: 2019-05-10
Attending: SURGERY
Payer: MEDICARE

## 2019-05-10 ENCOUNTER — APPOINTMENT (OUTPATIENT)
Dept: GENERAL RADIOLOGY | Facility: HOSPITAL | Age: 59
DRG: 003 | End: 2019-05-10
Attending: INTERNAL MEDICINE
Payer: MEDICARE

## 2019-05-10 LAB
ANION GAP SERPL CALC-SCNC: 9 MMOL/L (ref 0–18)
APTT PPP: 64.2 SECONDS (ref 23.2–35.3)
BASE EXCESS BLD CALC-SCNC: 0.5 MMOL/L (ref ?–2)
BASOPHILS # BLD AUTO: 0.02 X10(3) UL (ref 0–0.2)
BASOPHILS NFR BLD AUTO: 0.2 %
BLOOD GAS EPAP: 5 CM H2O
BLOOD GAS IPAP: 12 CM H2O
BUN BLD-MCNC: 26 MG/DL (ref 7–18)
BUN/CREAT SERPL: 20 (ref 10–20)
CA-I BLD-SCNC: 1.17 MMOL/L (ref 0.95–1.32)
CALCIUM BLD-MCNC: 8.4 MG/DL (ref 8.5–10.1)
CHLORIDE SERPL-SCNC: 108 MMOL/L (ref 98–112)
CO2 SERPL-SCNC: 23 MMOL/L (ref 21–32)
COHGB MFR BLD: 2.1 % (ref 0–1.5)
CREAT BLD-MCNC: 1.3 MG/DL (ref 0.7–1.3)
DEPRECATED RDW RBC AUTO: 48.2 FL (ref 35.1–46.3)
EOSINOPHIL # BLD AUTO: 0 X10(3) UL (ref 0–0.7)
EOSINOPHIL NFR BLD AUTO: 0 %
ERYTHROCYTE [DISTWIDTH] IN BLOOD BY AUTOMATED COUNT: 14.6 % (ref 11–15)
GLUCOSE BLD-MCNC: 288 MG/DL (ref 70–99)
GLUCOSE BLDC GLUCOMTR-MCNC: 127 MG/DL (ref 70–99)
GLUCOSE BLDC GLUCOMTR-MCNC: 236 MG/DL (ref 70–99)
GLUCOSE BLDC GLUCOMTR-MCNC: 283 MG/DL (ref 70–99)
GLUCOSE BLDC GLUCOMTR-MCNC: 285 MG/DL (ref 70–99)
GLUCOSE BLDC GLUCOMTR-MCNC: 323 MG/DL (ref 70–99)
HCO3 BLDA-SCNC: 25.2 MEQ/L (ref 21–27)
HCT VFR BLD AUTO: 40.3 % (ref 39–53)
HGB BLD-MCNC: 13.4 G/DL (ref 13–17.5)
HGB BLD-MCNC: 14.4 G/DL (ref 13.5–17.5)
IMM GRANULOCYTES # BLD AUTO: 0.12 X10(3) UL (ref 0–1)
IMM GRANULOCYTES NFR BLD: 1 %
LACTIC ACID (BLOOD GAS): 2.1 MMOL/L (ref 0.5–2.2)
LYMPHOCYTES # BLD AUTO: 1.71 X10(3) UL (ref 1–4)
LYMPHOCYTES NFR BLD AUTO: 13.6 %
MCH RBC QN AUTO: 31.5 PG (ref 26–34)
MCHC RBC AUTO-ENTMCNC: 33.3 G/DL (ref 31–37)
MCV RBC AUTO: 94.8 FL (ref 80–100)
METHGB MFR BLD: 1.4 % SAT (ref 0.4–1.5)
MODIFIED ALLEN TEST: POSITIVE
MONOCYTES # BLD AUTO: 1.37 X10(3) UL (ref 0.1–1)
MONOCYTES NFR BLD AUTO: 10.9 %
NEUTROPHILS # BLD AUTO: 9.31 X10 (3) UL (ref 1.5–7.7)
NEUTROPHILS # BLD AUTO: 9.31 X10(3) UL (ref 1.5–7.7)
NEUTROPHILS NFR BLD AUTO: 74.3 %
O2 CT BLD-SCNC: 18.9 VOL% (ref 15–23)
O2/TOTAL GAS SETTING VFR VENT: 50 %
OSMOLALITY SERPL CALC.SUM OF ELEC: 305 MOSM/KG (ref 275–295)
PCO2 BLDA: 27 MM HG (ref 35–45)
PH BLDA: 7.52 [PH] (ref 7.35–7.45)
PLATELET # BLD AUTO: 354 10(3)UL (ref 150–450)
PO2 BLDA: 69 MM HG (ref 80–100)
POTASSIUM (BLOOD GAS): 3.6 MMOL/L (ref 3.3–5.1)
POTASSIUM SERPL-SCNC: 4 MMOL/L (ref 3.5–5.1)
PUNCTURE CHARGE: YES
RBC # BLD AUTO: 4.25 X10(6)UL (ref 4.3–5.7)
SAO2 % BLDA: 96.7 % (ref 94–100)
SODIUM (BLOOD GAS): 140 MMOL/L (ref 135–145)
SODIUM SERPL-SCNC: 140 MMOL/L (ref 136–145)
WBC # BLD AUTO: 12.5 X10(3) UL (ref 4–11)

## 2019-05-10 PROCEDURE — 99233 SBSQ HOSP IP/OBS HIGH 50: CPT | Performed by: INTERNAL MEDICINE

## 2019-05-10 PROCEDURE — 74018 RADEX ABDOMEN 1 VIEW: CPT | Performed by: SURGERY

## 2019-05-10 PROCEDURE — 99223 1ST HOSP IP/OBS HIGH 75: CPT | Performed by: OTHER

## 2019-05-10 PROCEDURE — 99232 SBSQ HOSP IP/OBS MODERATE 35: CPT | Performed by: INTERNAL MEDICINE

## 2019-05-10 PROCEDURE — 71045 X-RAY EXAM CHEST 1 VIEW: CPT | Performed by: INTERNAL MEDICINE

## 2019-05-10 RX ORDER — FUROSEMIDE 10 MG/ML
20 INJECTION INTRAMUSCULAR; INTRAVENOUS ONCE
Status: COMPLETED | OUTPATIENT
Start: 2019-05-10 | End: 2019-05-10

## 2019-05-10 NOTE — CM/SW NOTE
Care Coordination Note    Progression of Care: Hospital Day # 8  Saw pt at bedside to assess his progress and discharge planning needs. Pt resting in bed, awake, alert but slow to respond, nods occasionally, on bipap currently. Pt was extubated yesterday.

## 2019-05-10 NOTE — PROGRESS NOTES
[facility]     Gastroenterology Progress Note    Denise Villanueva Patient Status:  Inpatient    1960 MRN [id number]   Location [facility] 2W/SW Attending Kelle Kennedy, [address] [facility] Se Day # 8 PCP MD Fredy Lea in the setting of SBO, treat with IV protonix. NO plans for endoscopy given no signs of active bleeding. Hgb remains stable.      #LFT elevation -- mildly elevated, will continue to monitor, possibly from sepsis, SHANKS/HCV. Of note ammonia is not elevated. Sola Anderson MD on 5/10/2019 at 15:18          Xr Chest Ap Portable  (cpt=71045)    Result Date: 5/10/2019  CONCLUSION:  1. When compared to May 9, 2019 the endotracheal tube and nasogastric tube have been removed.  2. Bibasilar lung consolidation/atelectasis p 05/09/2019 at 17:31 by Alfredito Perez DO

## 2019-05-10 NOTE — PLAN OF CARE
Patient alert and oriented to person and place. Patient has expressive aphasia s/p extubation. CT head negative for stroke. Patient on high flow NC during the day, tolerated Bipap last night. Patient still on morphine drip for chronic pain.  Patient on hepa extubation    Interventions:   - ABX as ordered  -weaning trials when appropriate  - See additional Care Plan goals for specific interventions  Outcome: Progressing     Problem: Delirium  Goal: Minimize duration of delirium  Description  Interventions:  - effort  - Oxygen supplementation based on oxygen saturation or ABGs  - Provide Smoking Cessation handout, if applicable  - Encourage broncho-pulmonary hygiene including cough, deep breathe, Incentive Spirometry  - Assess the need for suctioning and perform integrity remains intact  Description  INTERVENTIONS  - Assess and document risk factors for pressure ulcer development  - Assess and document skin integrity  - Monitor for areas of redness and/or skin breakdown  - Initiate interventions, skin care algorit Complete POLST form as appropriate  - Assess patient's ability to be responsible for managing their own health  - Refer to Case Management Department for coordinating discharge planning if the patient needs post-hospital services based on physician/LIP ord

## 2019-05-10 NOTE — PROGRESS NOTES
[place] [facility] - [facility]    Progress Note    Magda Ko Patient Status:  Inpatient    1960 MRN [id number]   Location [facility] 2W/SW Attending Gustavo Bee, [address] [facility] Se Day # 8 PCP LATIA Jesus MD            Subjective:     Pt comfor GFRAA 81 89   < > 98  --  87 69   GFRNAA 70 77   < > 85  --  76 60   CA 7.6* 7.6*   < > 8.3*  --  8.4* 8.4*   ALB 2.4* 2.1*  --   --   --   --   --    * 149*   < > 144  --  143 140   K 3.5 3.4*   < > 3.5 4.2 3.7 4.0   * 118*   < > 113*  --  1 above.   Dictated by (CST): Mohinder Magdaleno MD on 5/09/2019 at 18:30     Approved by (CST): Mohinder Magdaleno MD on 5/09/2019 at 18:36          Ekg 12-lead    Result Date: 5/9/2019  ECG Report  Interpretation  -------------------------- Undetermined Rhythm -

## 2019-05-10 NOTE — PROGRESS NOTES
[place] [facility] - [facility]     Progress Note        Corrine Romero Patient Status:  Inpatient    1960 MRN [id number]   Location [facility] 2W/SW Attending Robi Reynolds, [address] [facility] Se Day # 8 PCP LATIA Heart MD       Subjective:   Patient see Q6H PRN   insulin detemir (LEVEMIR) 100 UNIT/ML flextouch 68 Units 68 Units Subcutaneous Nightly   morphINE sulfate (PF) 4 MG/ML injection 4 mg 4 mg Intravenous Once   Pantoprazole Sodium (PROTONIX) 40 mg in Sodium Chloride 0.9 % 10 mL IV push 40 mg [place] (cpt=71045)    Result Date: 5/10/2019  CONCLUSION:  1. When compared to May 9, 2019 the endotracheal tube and nasogastric tube have been removed. 2. Bibasilar lung consolidation/atelectasis perhaps slightly worse. 3. Increasing bowel distension.     Dictate shock  2. Acute respiratory failure  3. Hypertensive urgency  4. Encephalopathy, improved  5. Systolic CHF  6. Atrial fibrillation  7. Ileus  8. Coffee-ground emesis, resolved  9. Morbid obesity  10. Seizure disorder  11. Chronic pain  12.   Diabe

## 2019-05-10 NOTE — PROGRESS NOTES
[place] [facility] - [facility]    Progress Note    Isaiah Sesay Patient Status:  Inpatient    1960 MRN [id number]   Location [facility] 2W/SW Attending [email or web address], [address] [facility] Se Day # 8 PCP LATIA Chand MD         Assessment and Plan:   Russ Keita Insulin Aspart Pen  4 Units Subcutaneous TID CC   • hydrALAzine HCl  10 mg Intravenous Q4H   • spironolactone  25 mg Oral Daily   • digoxin  250 mcg Intravenous Daily   • metoprolol Tartrate  5 mg Intravenous Q4H   • insulin detemir  68 Units Subcutaneous (CST): Earle Carolina MD on 5/09/2019 at 6:54     Approved by (CST): Earle Carolina MD on 5/09/2019 at 6:57          Cta Brain (cpt=70496)    Result Date: 5/9/2019  CONCLUSION:  1. CT angiography of the head demonstrates no evidence of intracranial flow limiti

## 2019-05-10 NOTE — OCCUPATIONAL THERAPY NOTE
OCCUPATIONAL THERAPY EVALUATION - INPATIENT     Room Number: 151/585-Z  Evaluation Date: 5/10/2019  Type of Evaluation: Initial       Physician Order: IP Consult to Occupational Therapy  Reason for Therapy: ADL/IADL Dysfunction and Discharge Planning    OC following, therapist utilized OH lift to transition patient from bed to chair- once patient positioned in chair, he was unable to follow commands for repositioning and utilizing UE for groom/self feed; patient is able to initiate movement in his BUE; Cyndi Zuniga 1/17/2011    per NG   • Morbid obesity ([facility]    • Obesity    • Rectal fissure     colorectal fistula - surgery   • Seizure disorder [facility])        Past Surgical History  Past Surgical History:   Procedure Laterality Date   • COLONOSCOPY     • KNEE REPLACEMEN ‘6-Clicks’ Inpatient Daily Activity Short Form  How much help from another person does the patient currently need…  -   Putting on and taking off regular lower body clothing?: Total  -   Bathing (including washing, rinsing, drying)?: A Lot  -   Toileting,

## 2019-05-10 NOTE — CONSULTS
Neurology Inpatient Consult Note    Rosita Hillman : 1960   Referring Physician: Dr. Fay Frederick  HPI:     Rosita Hillman is a 60year old male who is being seen in neurologic evaluation. Pt being seen in eval for speech change. Notes reviewed. °F (36.6 °C) (Temporal)   Resp (!) 43   Ht 65.65\"   Wt (!) 309 lb 1.6 oz   SpO2 96%   BMI 50.43 kg/m²    General: no apparent distress   Lungs: on supplemental O2  Neuro:  Mental Status: alert, difficult to assess speech as severely hypophonic bu mouthing day, minimize interruptions at night, re-orienting cues (e.g. clocks, calendars), familiar staff when possible    - will continue to follow clinically; if symptoms worsen / fail to improve, will order MRI brain      Neurology service will continue to follo

## 2019-05-10 NOTE — PHYSICAL THERAPY NOTE
PHYSICAL THERAPY EVALUATION - INPATIENT     Room Number: 224/224-A  Evaluation Date: 5/10/2019  Type of Evaluation: Initial   Physician Order: PT Eval and Treat    Presenting Problem: sesis due to undetermined organism  Reason for Therapy: Mobility Dysfun may benefit from [facility] but is slowly improving mental status and will hopefully be able to progress and go to [facility] but will continue to monitor. Patient will benefit from continued IP PT services to address these deficits in preparation for discharge. home with his supportive wife. He was able to manage mobility at home with sc or without AD. SUBJECTIVE  Patient mouths words or shakes head yes/no    PHYSICAL THERAPY EXAMINATION     OBJECTIVE  Precautions: Bed/chair alarm; Other (Comment)(limited verba lying on back to sitting on the side of the bed?: A Lot   How much help from another person does the patient currently need. ..   -   Moving to and from a bed to a chair (including a wheelchair)?: Total   -   Need to walk in hospital room?: Total   -   Clim

## 2019-05-11 ENCOUNTER — APPOINTMENT (OUTPATIENT)
Dept: GENERAL RADIOLOGY | Facility: HOSPITAL | Age: 59
DRG: 003 | End: 2019-05-11
Attending: SURGERY
Payer: MEDICARE

## 2019-05-11 ENCOUNTER — APPOINTMENT (OUTPATIENT)
Dept: GENERAL RADIOLOGY | Facility: HOSPITAL | Age: 59
DRG: 003 | End: 2019-05-11
Attending: EMERGENCY MEDICINE
Payer: MEDICARE

## 2019-05-11 LAB
AMMONIA PLAS-MCNC: 54 UMOL/L (ref 11–32)
ANION GAP SERPL CALC-SCNC: 13 MMOL/L (ref 0–18)
BACTERIA UR QL AUTO: NEGATIVE /HPF
BASE EXCESS BLD CALC-SCNC: -6.1 MMOL/L (ref ?–2)
BASOPHILS # BLD AUTO: 0.04 X10(3) UL (ref 0–0.2)
BASOPHILS NFR BLD AUTO: 0.2 %
BILIRUB UR QL: NEGATIVE
BUN BLD-MCNC: 50 MG/DL (ref 7–18)
BUN/CREAT SERPL: 15.6 (ref 10–20)
CA-I BLD-SCNC: 1.11 MMOL/L (ref 0.95–1.32)
CALCIUM BLD-MCNC: 7.3 MG/DL (ref 8.5–10.1)
CHLORIDE SERPL-SCNC: 105 MMOL/L (ref 98–112)
CO2 SERPL-SCNC: 20 MMOL/L (ref 21–32)
COHGB MFR BLD: 1.9 % (ref 0–1.5)
COLOR UR: YELLOW
CREAT BLD-MCNC: 3.2 MG/DL (ref 0.7–1.3)
DEPRECATED RDW RBC AUTO: 50.7 FL (ref 35.1–46.3)
EOSINOPHIL # BLD AUTO: 0.01 X10(3) UL (ref 0–0.7)
EOSINOPHIL NFR BLD AUTO: 0.1 %
ERYTHROCYTE [DISTWIDTH] IN BLOOD BY AUTOMATED COUNT: 15.2 % (ref 11–15)
GLUCOSE BLD-MCNC: 342 MG/DL (ref 70–99)
GLUCOSE BLDC GLUCOMTR-MCNC: 146 MG/DL (ref 70–99)
GLUCOSE BLDC GLUCOMTR-MCNC: 190 MG/DL (ref 70–99)
GLUCOSE BLDC GLUCOMTR-MCNC: 222 MG/DL (ref 70–99)
GLUCOSE BLDC GLUCOMTR-MCNC: 232 MG/DL (ref 70–99)
GLUCOSE BLDC GLUCOMTR-MCNC: 236 MG/DL (ref 70–99)
GLUCOSE BLDC GLUCOMTR-MCNC: 272 MG/DL (ref 70–99)
GLUCOSE BLDC GLUCOMTR-MCNC: 284 MG/DL (ref 70–99)
GLUCOSE BLDC GLUCOMTR-MCNC: 318 MG/DL (ref 70–99)
GLUCOSE BLDC GLUCOMTR-MCNC: 326 MG/DL (ref 70–99)
GLUCOSE BLDC GLUCOMTR-MCNC: 339 MG/DL (ref 70–99)
GLUCOSE BLDC GLUCOMTR-MCNC: 396 MG/DL (ref 70–99)
GLUCOSE UR-MCNC: 50 MG/DL
HCO3 BLDA-SCNC: 20 MEQ/L (ref 21–27)
HCT VFR BLD AUTO: 40.3 % (ref 39–53)
HGB BLD-MCNC: 12.2 G/DL (ref 13–17.5)
HGB BLD-MCNC: 13.3 G/DL (ref 13–17.5)
HGB BLD-MCNC: 14.4 G/DL (ref 13.5–17.5)
HGB UR QL STRIP.AUTO: NEGATIVE
IMM GRANULOCYTES # BLD AUTO: 0.28 X10(3) UL (ref 0–1)
IMM GRANULOCYTES NFR BLD: 1.5 %
KETONES UR-MCNC: NEGATIVE MG/DL
LACTIC ACID (BLOOD GAS): 2.1 MMOL/L (ref 0.5–2.2)
LEUKOCYTE ESTERASE UR QL STRIP.AUTO: NEGATIVE
LYMPHOCYTES # BLD AUTO: 2.31 X10(3) UL (ref 1–4)
LYMPHOCYTES NFR BLD AUTO: 12 %
MCH RBC QN AUTO: 31.7 PG (ref 26–34)
MCHC RBC AUTO-ENTMCNC: 33 G/DL (ref 31–37)
MCV RBC AUTO: 96.2 FL (ref 80–100)
METHGB MFR BLD: 1.2 % SAT (ref 0.4–1.5)
MODIFIED ALLEN TEST: POSITIVE
MONOCYTES # BLD AUTO: 1.83 X10(3) UL (ref 0.1–1)
MONOCYTES NFR BLD AUTO: 9.5 %
NEUTROPHILS # BLD AUTO: 14.8 X10 (3) UL (ref 1.5–7.7)
NEUTROPHILS # BLD AUTO: 14.8 X10(3) UL (ref 1.5–7.7)
NEUTROPHILS NFR BLD AUTO: 76.7 %
NITRITE UR QL STRIP.AUTO: NEGATIVE
O2 CT BLD-SCNC: 18.6 VOL% (ref 15–23)
O2/TOTAL GAS SETTING VFR VENT: 100 %
OSMOLALITY SERPL CALC.SUM OF ELEC: 313 MOSM/KG (ref 275–295)
PCO2 BLDA: 30 MM HG (ref 35–45)
PEEP SETTING VENT: 5 CM H2O
PH BLDA: 7.38 [PH] (ref 7.35–7.45)
PH UR: 5 [PH] (ref 5–8)
PLATELET # BLD AUTO: 413 10(3)UL (ref 150–450)
PO2 BLDA: 66 MM HG (ref 80–100)
POTASSIUM (BLOOD GAS): 3.9 MMOL/L (ref 3.3–5.1)
POTASSIUM SERPL-SCNC: 4.5 MMOL/L (ref 3.5–5.1)
PROT UR-MCNC: 30 MG/DL
PUNCTURE CHARGE: YES
RBC # BLD AUTO: 4.19 X10(6)UL (ref 4.3–5.7)
RBC #/AREA URNS AUTO: 1 /HPF
RESP RATE: 16 BPM
SAO2 % BLDA: 94.8 % (ref 94–100)
SODIUM (BLOOD GAS): 136 MMOL/L (ref 135–145)
SODIUM SERPL-SCNC: 138 MMOL/L (ref 136–145)
SP GR UR STRIP: 1.03 (ref 1–1.03)
SPECIMEN VOL 24H UR: 450 ML
UROBILINOGEN UR STRIP-ACNC: <2
VIT B12 SERPL-MCNC: >2000 PG/ML (ref 193–986)
VIT C UR-MCNC: NEGATIVE MG/DL
WBC # BLD AUTO: 19.3 X10(3) UL (ref 4–11)
WBC #/AREA URNS AUTO: 2 /HPF

## 2019-05-11 PROCEDURE — 99233 SBSQ HOSP IP/OBS HIGH 50: CPT | Performed by: OTHER

## 2019-05-11 PROCEDURE — 74018 RADEX ABDOMEN 1 VIEW: CPT | Performed by: SURGERY

## 2019-05-11 PROCEDURE — 99291 CRITICAL CARE FIRST HOUR: CPT | Performed by: INTERNAL MEDICINE

## 2019-05-11 PROCEDURE — 5A1955Z RESPIRATORY VENTILATION, GREATER THAN 96 CONSECUTIVE HOURS: ICD-10-PCS | Performed by: EMERGENCY MEDICINE

## 2019-05-11 PROCEDURE — 0BH17EZ INSERTION OF ENDOTRACHEAL AIRWAY INTO TRACHEA, VIA NATURAL OR ARTIFICIAL OPENING: ICD-10-PCS | Performed by: EMERGENCY MEDICINE

## 2019-05-11 PROCEDURE — 71045 X-RAY EXAM CHEST 1 VIEW: CPT | Performed by: EMERGENCY MEDICINE

## 2019-05-11 RX ORDER — ETOMIDATE 2 MG/ML
20 INJECTION INTRAVENOUS ONCE
Status: COMPLETED | OUTPATIENT
Start: 2019-05-11 | End: 2019-05-11

## 2019-05-11 RX ORDER — BISACODYL 10 MG
10 SUPPOSITORY, RECTAL RECTAL
Status: DISCONTINUED | OUTPATIENT
Start: 2019-05-11 | End: 2019-07-01

## 2019-05-11 RX ORDER — ETOMIDATE 2 MG/ML
INJECTION INTRAVENOUS
Status: COMPLETED
Start: 2019-05-11 | End: 2019-05-11

## 2019-05-11 RX ORDER — METOCLOPRAMIDE HYDROCHLORIDE 5 MG/ML
5 INJECTION INTRAMUSCULAR; INTRAVENOUS EVERY 8 HOURS PRN
Status: DISCONTINUED | OUTPATIENT
Start: 2019-05-11 | End: 2019-05-11

## 2019-05-11 RX ORDER — METOCLOPRAMIDE HYDROCHLORIDE 5 MG/ML
5 INJECTION INTRAMUSCULAR; INTRAVENOUS EVERY 8 HOURS
Status: DISCONTINUED | OUTPATIENT
Start: 2019-05-11 | End: 2019-06-15

## 2019-05-11 NOTE — PROGRESS NOTES
[place] [facility] - [facility]    Progress Note    Isaiah Sesay Patient Status:  Inpatient    1960 MRN [id number]   Location [facility] 2W/SW Attending [employer], [address] [facility]  Day # 9 PCP LATIA Chand MD         Assessment and Plan:   1) Se pantoprazole (PROTONIX) IV push  40 mg Intravenous Q12H   • piperacillin-tazobactam  4.5 g Intravenous Q8H   • Nortriptyline HCl  50 mg Oral Nightly   • PEG 3350  17 g Oral Daily   • pregabalin  100 mg Oral TID       Results:     Lab Results   Component Va

## 2019-05-11 NOTE — SLP NOTE
Attempted to see patient to monitor tolerance of PO diet and for speech and language evaluation, however, patient was orally intubated this morning. Will check status tomorrow.   Emilia Mayorga MA/[facility]-SLP  Speech Language Pathologist  Darlyn Atkinson

## 2019-05-11 NOTE — PROGRESS NOTES
[place] [facility] - [facility]    Progress Note    Vanessa Tavarez Patient Status:  Inpatient    1960 MRN [id number]   Location [facility] 2W/SW Attending Sunny Ibrahim, [address] [facility] Se Day # 9 PCP LATIA Campbell MD            Subjective:     Pt was re BILT 0.8  --   --   --   --    TP 6.0*  --   --   --   --     < > = values in this interval not displayed.              Xr Abdomen (1 View) (cpt=74018)    Result Date: 5/10/2019  CONCLUSION:   Marked gaseous distention of multiple large and small bowel lo 5/10/2019 at 8:45          Cta Brain (cpt=70496)    Result Date: 5/9/2019  CONCLUSION:  1. CT angiography of the head demonstrates no evidence of intracranial flow limiting stenosis or aneurysm.  If symptoms or suspicion for acute ischemia persist, consider

## 2019-05-11 NOTE — PLAN OF CARE
Pt placed on bilateral soft wrist restraints to protect integrity of lines and airway. Bed is low/locked with siderails upx3. Frequent rounding and close monitoring in progress.        Problem: SAFETY ADULT - FALL  Goal: Free from fall injury  Description

## 2019-05-11 NOTE — PROGRESS NOTES
Called for dec bp, patient on heparin drip and bipap, tachypneic which is not new per patient. Patient is awake has expressive aphasia which is not new. The tachypnea is not new per primary nurse, coarse bs bilateral, hr in 90's.    Abd is distended which

## 2019-05-11 NOTE — PROGRESS NOTES
[facility] Note:  Renal Dose Adjustment for Metoclopramide (REGLAN)    Mary Alice Jha has been prescribed Metoclopramide (REGLAN) 10 mg every 8 hours as needed for N/V. Estimated Creatinine Clearance: 22.1 mL/min (A) (based on SCr of 3.2 mg/dL (H)).     H

## 2019-05-11 NOTE — PROGRESS NOTES
[facility]     Progress Note        Kelsi Quiles Patient Status:  Inpatient    1960 MRN [id number]   Location [facility] 2W/SW Attending Ever Girard, [address] [facility] Se Day # 9 PCP LATIA Mcclure MD       Subjective:   Patient see Intravenous Daily   dextrose 5 %-0.45 % NaCl infusion  Intravenous Continuous   heparin (PORCINE) drip 34090vbwgt/250mL infusion CONTINUOUS 200-3,000 Units/hr Intravenous Continuous   metoprolol Tartrate (LOPRESSOR) 5 MG/5ML injection 5 mg 5 mg Intravenous lower extremity  Neurologic: Moves extremities  Skin: warm, dry      Results:     Lab Results   Component Value Date    WBC 19.3 05/11/2019    HGB 13.3 05/11/2019    HCT 40.3 05/11/2019    .0 05/11/2019    CREATSERUM 3.20 05/11/2019    BUN 50 05/11/ compared to May 9, 2019 the endotracheal tube and nasogastric tube have been removed. 2. Bibasilar lung consolidation/atelectasis perhaps slightly worse. 3. Increasing bowel distension. Dictated by (CST):  Babs Milian MD on 5/10/2019 at 8:41     Ap heparin.  -Patient with underlying chronic pain syndrome. On chronic narcotics at home. Low-dose morphine GTT at this time. -NG tube to low intermittent suction  -Insulin for underlying diabetes management. Further recommendations per endocrine.   -If

## 2019-05-12 ENCOUNTER — APPOINTMENT (OUTPATIENT)
Dept: GENERAL RADIOLOGY | Facility: HOSPITAL | Age: 59
DRG: 003 | End: 2019-05-12
Attending: INTERNAL MEDICINE
Payer: MEDICARE

## 2019-05-12 ENCOUNTER — APPOINTMENT (OUTPATIENT)
Dept: CT IMAGING | Facility: HOSPITAL | Age: 59
DRG: 003 | End: 2019-05-12
Attending: INTERNAL MEDICINE
Payer: MEDICARE

## 2019-05-12 LAB
ALBUMIN SERPL-MCNC: 2 G/DL (ref 3.4–5)
ALBUMIN/GLOB SERPL: 0.5 {RATIO} (ref 1–2)
ALP LIVER SERPL-CCNC: 43 U/L (ref 45–117)
ALT SERPL-CCNC: 1110 U/L (ref 16–61)
ANION GAP SERPL CALC-SCNC: 14 MMOL/L (ref 0–18)
ANTIBODY SCREEN: POSITIVE
APTT PPP: 185.6 SECONDS (ref 23.2–35.3)
AST SERPL-CCNC: 1869 U/L (ref 15–37)
BASE EXCESS BLD CALC-SCNC: -11.5 MMOL/L (ref ?–2)
BASOPHILS # BLD AUTO: 0.05 X10(3) UL (ref 0–0.2)
BASOPHILS NFR BLD AUTO: 0.2 %
BILIRUB SERPL-MCNC: 1.2 MG/DL (ref 0.1–2)
BUN BLD-MCNC: 74 MG/DL (ref 7–18)
BUN/CREAT SERPL: 11.7 (ref 10–20)
C DIFF TOX B STL QL: NEGATIVE
CA-I BLD-SCNC: 0.91 MMOL/L (ref 0.95–1.32)
CALCIUM BLD-MCNC: 6.7 MG/DL (ref 8.5–10.1)
CHLORIDE SERPL-SCNC: 104 MMOL/L (ref 98–112)
CO2 SERPL-SCNC: 19 MMOL/L (ref 21–32)
COHGB MFR BLD: 2 % (ref 0–1.5)
CREAT BLD-MCNC: 6.32 MG/DL (ref 0.7–1.3)
DEPRECATED RDW RBC AUTO: 45.9 FL (ref 35.1–46.3)
DIRECT COOMBS POLY: NEGATIVE
EOSINOPHIL # BLD AUTO: 0 X10(3) UL (ref 0–0.7)
EOSINOPHIL NFR BLD AUTO: 0 %
ERYTHROCYTE [DISTWIDTH] IN BLOOD BY AUTOMATED COUNT: 13.9 % (ref 11–15)
GLOBULIN PLAS-MCNC: 4.2 G/DL (ref 2.8–4.4)
GLUCOSE BLD-MCNC: 133 MG/DL (ref 70–99)
GLUCOSE BLDC GLUCOMTR-MCNC: 108 MG/DL (ref 70–99)
GLUCOSE BLDC GLUCOMTR-MCNC: 117 MG/DL (ref 70–99)
GLUCOSE BLDC GLUCOMTR-MCNC: 120 MG/DL (ref 70–99)
GLUCOSE BLDC GLUCOMTR-MCNC: 124 MG/DL (ref 70–99)
GLUCOSE BLDC GLUCOMTR-MCNC: 124 MG/DL (ref 70–99)
GLUCOSE BLDC GLUCOMTR-MCNC: 126 MG/DL (ref 70–99)
GLUCOSE BLDC GLUCOMTR-MCNC: 127 MG/DL (ref 70–99)
GLUCOSE BLDC GLUCOMTR-MCNC: 128 MG/DL (ref 70–99)
GLUCOSE BLDC GLUCOMTR-MCNC: 131 MG/DL (ref 70–99)
GLUCOSE BLDC GLUCOMTR-MCNC: 133 MG/DL (ref 70–99)
GLUCOSE BLDC GLUCOMTR-MCNC: 135 MG/DL (ref 70–99)
GLUCOSE BLDC GLUCOMTR-MCNC: 139 MG/DL (ref 70–99)
GLUCOSE BLDC GLUCOMTR-MCNC: 144 MG/DL (ref 70–99)
GLUCOSE BLDC GLUCOMTR-MCNC: 155 MG/DL (ref 70–99)
GLUCOSE BLDC GLUCOMTR-MCNC: 155 MG/DL (ref 70–99)
GLUCOSE BLDC GLUCOMTR-MCNC: 161 MG/DL (ref 70–99)
GLUCOSE BLDC GLUCOMTR-MCNC: 162 MG/DL (ref 70–99)
GLUCOSE BLDC GLUCOMTR-MCNC: 163 MG/DL (ref 70–99)
GLUCOSE BLDC GLUCOMTR-MCNC: 164 MG/DL (ref 70–99)
HAV IGM SER QL: 2.3 MG/DL (ref 1.6–2.6)
HCO3 BLDA-SCNC: 15.9 MEQ/L (ref 21–27)
HCT VFR BLD AUTO: 38.3 % (ref 39–53)
HGB BLD-MCNC: 12.9 G/DL (ref 13–17.5)
HGB BLD-MCNC: 13.6 G/DL (ref 13.5–17.5)
IMM GRANULOCYTES # BLD AUTO: 0.4 X10(3) UL (ref 0–1)
IMM GRANULOCYTES NFR BLD: 1.3 %
INR BLD: 2.22 (ref 0.9–1.2)
LACTIC ACID (BLOOD GAS): 1.7 MMOL/L (ref 0.5–2.2)
LDH SERPL L TO P-CCNC: 1803 U/L (ref 87–241)
LYMPHOCYTES # BLD AUTO: 3.5 X10(3) UL (ref 1–4)
LYMPHOCYTES NFR BLD AUTO: 10.9 %
M PROTEIN MFR SERPL ELPH: 6.2 G/DL (ref 6.4–8.2)
MCH RBC QN AUTO: 31.4 PG (ref 26–34)
MCHC RBC AUTO-ENTMCNC: 33.7 G/DL (ref 31–37)
MCV RBC AUTO: 93.2 FL (ref 80–100)
METHGB MFR BLD: 1.4 % SAT (ref 0.4–1.5)
MODIFIED ALLEN TEST: POSITIVE
MONOCYTES # BLD AUTO: 1.21 X10(3) UL (ref 0.1–1)
MONOCYTES NFR BLD AUTO: 3.8 %
NEUTROPHILS # BLD AUTO: 26.84 X10 (3) UL (ref 1.5–7.7)
NEUTROPHILS # BLD AUTO: 26.84 X10(3) UL (ref 1.5–7.7)
NEUTROPHILS NFR BLD AUTO: 83.8 %
O2 CT BLD-SCNC: 18 VOL% (ref 15–23)
O2/TOTAL GAS SETTING VFR VENT: 100 %
OSMOLALITY SERPL CALC.SUM OF ELEC: 308 MOSM/KG (ref 275–295)
PCO2 BLDA: 32 MM HG (ref 35–45)
PEEP SETTING VENT: 7 CM H2O
PH BLDA: 7.26 [PH] (ref 7.35–7.45)
PHOSPHATE SERPL-MCNC: 8.7 MG/DL (ref 2.5–4.9)
PLATELET # BLD AUTO: 299 10(3)UL (ref 150–450)
PO2 BLDA: 75 MM HG (ref 80–100)
POTASSIUM (BLOOD GAS): 4.6 MMOL/L (ref 3.3–5.1)
POTASSIUM SERPL-SCNC: 4.8 MMOL/L (ref 3.5–5.1)
PROTHROMBIN TIME: 24.9 SECONDS (ref 11.8–14.5)
PUNCTURE CHARGE: YES
RBC # BLD AUTO: 4.11 X10(6)UL (ref 4.3–5.7)
RESP RATE: 16 BPM
RH BLOOD TYPE: POSITIVE
SAO2 % BLDA: 97.3 % (ref 94–100)
SODIUM (BLOOD GAS): 130 MMOL/L (ref 135–145)
SODIUM SERPL-SCNC: 137 MMOL/L (ref 136–145)
SPECIMEN VOL 24H UR: 450 ML
TREATCELL: 3
WBC # BLD AUTO: 32 X10(3) UL (ref 4–11)

## 2019-05-12 PROCEDURE — 74176 CT ABD & PELVIS W/O CONTRAST: CPT | Performed by: INTERNAL MEDICINE

## 2019-05-12 PROCEDURE — 99291 CRITICAL CARE FIRST HOUR: CPT | Performed by: INTERNAL MEDICINE

## 2019-05-12 PROCEDURE — 71250 CT THORAX DX C-: CPT | Performed by: INTERNAL MEDICINE

## 2019-05-12 PROCEDURE — 99223 1ST HOSP IP/OBS HIGH 75: CPT | Performed by: INTERNAL MEDICINE

## 2019-05-12 PROCEDURE — 99232 SBSQ HOSP IP/OBS MODERATE 35: CPT | Performed by: INTERNAL MEDICINE

## 2019-05-12 PROCEDURE — 99233 SBSQ HOSP IP/OBS HIGH 50: CPT | Performed by: OTHER

## 2019-05-12 PROCEDURE — 30233L1 TRANSFUSION OF NONAUTOLOGOUS FRESH PLASMA INTO PERIPHERAL VEIN, PERCUTANEOUS APPROACH: ICD-10-PCS | Performed by: INTERNAL MEDICINE

## 2019-05-12 PROCEDURE — 71045 X-RAY EXAM CHEST 1 VIEW: CPT | Performed by: INTERNAL MEDICINE

## 2019-05-12 RX ORDER — MINERAL OIL AND PETROLATUM 150; 830 MG/G; MG/G
OINTMENT OPHTHALMIC 3 TIMES DAILY
Status: DISCONTINUED | OUTPATIENT
Start: 2019-05-12 | End: 2019-06-25

## 2019-05-12 RX ORDER — SODIUM CHLORIDE 9 MG/ML
INJECTION, SOLUTION INTRAVENOUS ONCE
Status: COMPLETED | OUTPATIENT
Start: 2019-05-12 | End: 2019-05-12

## 2019-05-12 RX ORDER — CASTOR OIL AND BALSAM, PERU 788; 87 MG/G; MG/G
OINTMENT TOPICAL 2 TIMES DAILY PRN
Status: DISCONTINUED | OUTPATIENT
Start: 2019-05-12 | End: 2019-07-01

## 2019-05-12 RX ORDER — SODIUM CHLORIDE 0.9 % (FLUSH) 0.9 %
10 SYRINGE (ML) INJECTION AS NEEDED
Status: DISCONTINUED | OUTPATIENT
Start: 2019-05-12 | End: 2019-05-12

## 2019-05-12 RX ORDER — METRONIDAZOLE 500 MG/100ML
500 INJECTION, SOLUTION INTRAVENOUS EVERY 8 HOURS
Status: DISCONTINUED | OUTPATIENT
Start: 2019-05-12 | End: 2019-05-26 | Stop reason: ALTCHOICE

## 2019-05-12 NOTE — PLAN OF CARE
Problem: Diabetes/Glucose Control  Goal: Glucose maintained within prescribed range  Description  INTERVENTIONS:  - Monitor Blood Glucose as ordered  - Assess for signs and symptoms of hyperglycemia and hypoglycemia  - Administer ordered medications to m ADULT  Goal: Skin integrity remains intact  Description  INTERVENTIONS  - Assess and document risk factors for pressure ulcer development  - Assess and document skin integrity  - Monitor for areas of redness and/or skin breakdown  - Initiate interventions,

## 2019-05-12 NOTE — PROGRESS NOTES
[facility] Dosing Service    Initial Pharmacokinetic Consult for Vancomycin Dosing     Kirill Hitchcock is a 60year old male who is being treated for sepsis.   Pharmacy has been asked to dose Vancomycin by Dr. Lilton Simmonds    He is allergic to demerol [meperidine]

## 2019-05-12 NOTE — PROGRESS NOTES
[facility] Pharmacy Note:  Renal Adjustment for meropenem (MERREM)    Serenity Velasquez is a 60year old male who has been prescribed meropenem (MERREM) 500 mg every 8 hrs.   CrCl is estimated at 11.2 mL/min (based on SCr of 6.32 mg/dL) with ARF so the dose has been adj

## 2019-05-12 NOTE — PROGRESS NOTES
[facility]    Progress Note    Jefferson Jeffers Patient Status:  Inpatient    1960 MRN [id number]   Location [facility] 2W/SW Attending Lainey Hope, [address] [facility] Se Day # 10 PCP LATIA Joel MD       Subjective:   Jefferson Jeffers is 100 mL infusion 1-28 Units/hr Intravenous Continuous   phenylephrine HCl (VALERIE-SYNEPHRINE) 100 mg in sodium chloride 0.9% 250 mL infusion 200 mcg/min Intravenous Continuous   Metoclopramide HCl (REGLAN) injection 5 mg 5 mg Intravenous Q8H   morphINE sulfate 05/12/2019    TP 6.2 (L) 05/12/2019    AST 1,869 (H) 05/12/2019    ALT 1,110 (H) 05/12/2019    .6 (H) 05/12/2019    INR 2.22 (H) 05/12/2019     (H) 05/03/2019    MG 2.3 05/12/2019    PHOS 8.7 (H) 05/12/2019    B12 >2,000 (H) 05/11/2019 5/12/2019  CONCLUSION:  1. Mild cardiomegaly. Atherosclerotic aorta. 2. Support tubes and catheters are satisfactory 3. Bibasilar atelectatic changes and/or superimposed worsening left basilar lung consolidation.  4. Small left-sided effusion suspected

## 2019-05-12 NOTE — PLAN OF CARE
Discontinued Droplet Isolation at this time. Isolation was maintained for 7 days as per CDC guidelines for + Parainfluenza via Respiratory Panel performed on 5/2. No isolation required at this time. Will monitor closely.

## 2019-05-12 NOTE — PROGRESS NOTES
[facility]    Progress Note    Ezekiel Fraction Patient Status:  Inpatient    1960 MRN [id number]   Location [facility] 2W/SW Attending Esthela Smith, [address] [facility] Se Day # 9 PCP LATIA Moya MD       Subjective:   Ezekiel Fraction is a injection 250 mcg 250 mcg Intravenous Daily   dextrose 5 %-0.45 % NaCl infusion  Intravenous Continuous   heparin (PORCINE) drip [id number]/250mL infusion CONTINUOUS 200-3,000 Units/hr Intravenous Continuous   metoprolol Tartrate (LOPRESSOR) 5 MG/5ML injec 2.5 05/09/2019    PHOS 3.0 05/09/2019    B12 >2,000 (H) 05/11/2019       Xr Abdomen (1 View) (cpt=74018)    Result Date: 5/11/2019  CONCLUSION: Dilated small and large bowel loops suggesting bowel obstruction or ileus.   No significant change since prior ex endotracheal tube and nasogastric tube have been removed. 2. Bibasilar lung consolidation/atelectasis perhaps slightly worse. 3. Increasing bowel distension. Dictated by (CST): Abi Xoing MD on 5/10/2019 at 8:41     Approved by (CST):  Graciela Minor,

## 2019-05-12 NOTE — PROGRESS NOTES
[place] [facility] - [facility]    Progress Note    Malinda Flores Patient Status:  Inpatient    1960 MRN [id number]   Location [facility] 2W/SW Attending Niko Ojeda,  [facility] Se Day # 10 PCP LATIA Bess MD         Assessment and Plan:   1) S mcg Intravenous Daily   • metoprolol Tartrate  5 mg Intravenous Q4H   • pantoprazole (PROTONIX) IV push  40 mg Intravenous Q12H   • Nortriptyline HCl  50 mg Oral Nightly   • PEG 3350  17 g Oral Daily   • pregabalin  100 mg Oral TID       Results:     Lab R

## 2019-05-12 NOTE — PROGRESS NOTES
[facility]    Progress Note    [facility] Patient Status:  Inpatient    1960 MRN [id number]   Location [facility] 2W/SW Attending Kenny Valdez MD   Hosp Day # 11 PCP LATIA Velazquez MD     Subjective:   Intubated and o

## 2019-05-12 NOTE — SLP NOTE
Attempted to see patient to monitor tolerance of PO diet and for speech and language evaluation, however, patient remains orally intubated this morning.   Speech therapy will place pt on hold and new orders will be required when the pt is medically appropri

## 2019-05-12 NOTE — CONSULTS
[facility]    Report of Consultation    Date of Admission:  5/2/2019  Date of Consult:  5/12/2019     Reason for Consultation:     KOMAL     History of Present Illness:     Patient is a 60 yrs old male with pmh of DM II, hepatitis C failed does live in a home with stairs.             Current Medications:    Current Facility-Administered Medications:  meropenem (MERREM) 1 g in sodium chloride 0.9% 100 mL MBP 1 g Intravenous Q12H   [START ON 5/14/2019] Vancomycin HCl (VANCOCIN) 1,500 mg in sodi acetaminophen (TYLENOL EXTRA STRENGTH) tab 1,000 mg 1,000 mg Oral Q6H PRN   Pantoprazole Sodium (PROTONIX) 40 mg in Sodium Chloride 0.9 % 10 mL IV push 40 mg Intravenous Q12H   Normal Saline Flush 0.9 % injection 10 mL 10 mL Intravenous PRN   morphINE 1m atraumatic  Eyes: conjunctivae/corneas clear  Neck:  no JVD  Pulmonary: limited   Cardiovascular: S1, S2 normal  Abdominal: soft, no grimace on palpation.  +ve NGT   Extremities: no edema  Skin: No rashes or lesions  Lymph nodes: Cervical, supraclavicular n 05/11/2019    GLUUR 50  05/11/2019    BILUR Negative 05/11/2019    KETUR Negative 05/11/2019    BLOODURINE Negative 05/11/2019    PHURINE 5.0 05/11/2019    PROUR 30  05/11/2019    UROBILINOGEN <2.0 05/11/2019    NITRITE Negative 05/11/2019    LEUUR Negativ

## 2019-05-12 NOTE — PLAN OF CARE
Problem: Patient Centered Care  Goal: Patient preferences are identified and integrated in the patient's plan of care  Description  Interventions:  - Provide timely, complete, and accurate information to patient/family  - Incorporate patient and family k Problem: CARDIOVASCULAR - ADULT  Goal: Maintains optimal cardiac output and hemodynamic stability  Description  INTERVENTIONS:  - Monitor vital signs, rhythm, and trends  - Monitor for bleeding, hypotension and signs of decreased cardiac output  - Evalua hydration with IV or PO as ordered and tolerated  - Evaluate effectiveness of GI medications  - Encourage mobilization and activity  - Obtain nutritional consult as needed  - Establish a toileting routine/schedule  - Consider collaborating with pharmacy to minimize risk of hemorrhage  - Monitor temperature, glucose, and sodium.  Initiate appropriate interventions as ordered  Outcome: Progressing     Problem: SAFETY ADULT - FALL  Goal: Free from fall injury  Description  INTERVENTIONS:  - Assess pt frequently started today. Sedation vacation completed by Dr Katarzyna Bean. Propofol restarted due to RSB, stacking his breaths on ventilator, and diaphoresis. Complete bed bath completed.   Nephrology placed on consult, possible HD cath and possible CRRT if patient is a c

## 2019-05-12 NOTE — PROGRESS NOTES
[facility]     Progress Note        Gustavoasaf Parada Patient Status:  Inpatient    1960 MRN [id number]   Location [facility] 2W/SW Attending Jonathan Madrigal, [address]  Se Day # 10 PCP LATIA Aguilera MD       Subjective:   Patient se 250 mL infusion 0.5-8 mcg/min Intravenous Continuous   Insulin Regular Human (NOVOLIN R) 100 Units in sodium chloride 0.9% 100 mL infusion 1-28 Units/hr Intravenous Continuous   phenylephrine HCl (VALERIE-SYNEPHRINE) 100 mg in sodium chloride 0.9% 250 mL infus insulin regular 4 Units/hr (05/12/19 0900)   • phenylephrine 75 mcg/min (05/12/19 1100)   • Dextrose-NaCl 50 mL/hr at 05/11/19 1600   • Continuous dose Heparin infusion 1,400 Units/hr (05/12/19 0617)   • morphINE sulfate Stopped (05/11/19 1215)       Physi Atherosclerotic aorta. 2. Support tubes and catheters are satisfactory 3. Bibasilar atelectatic changes and/or superimposed worsening left basilar lung consolidation.  4. Small left-sided effusion suspected    Dictated by (CST): Cresencio Rivas MD on 5 FiO2 and 7 PEEP. -Antibiotics switched to meropenem and vancomycin. Concern for possible C. difficile. We will add oral vancomycin and IV Flagyl.  -Most recent blood cultures negative.   Repeat blood cultures today.  -Nebulizer treatments  -Wearing mul

## 2019-05-12 NOTE — PLAN OF CARE
Problem: Patient Centered Care  Goal: Patient preferences are identified and integrated in the patient's plan of care  Description  Interventions:    - Provide timely, complete, and accurate information to patient/family  - Incorporate patient and family limits  Description  INTERVENTIONS:  - Monitor labs and rhythm and assess patient for signs and symptoms of electrolyte imbalances  - Administer electrolyte replacement as ordered  - Monitor response to electrolyte replacements, including rhythm and repeat

## 2019-05-12 NOTE — PROGRESS NOTES
[place] [facility] - [facility]    Progress Note    Rm Calle Patient Status:  Inpatient    1960 MRN [id number]   Location [facility] 2W/SW Attending Baron Collins MD   Hosp Day # 10 PCP LATIA Mcmahon MD     Subjective:   Intubated, wors follow    Julio Retort  5/12/2019

## 2019-05-13 ENCOUNTER — APPOINTMENT (OUTPATIENT)
Dept: ULTRASOUND IMAGING | Facility: HOSPITAL | Age: 59
DRG: 003 | End: 2019-05-13
Attending: SURGERY
Payer: MEDICARE

## 2019-05-13 ENCOUNTER — APPOINTMENT (OUTPATIENT)
Dept: GENERAL RADIOLOGY | Facility: HOSPITAL | Age: 59
DRG: 003 | End: 2019-05-13
Attending: SURGERY
Payer: MEDICARE

## 2019-05-13 ENCOUNTER — APPOINTMENT (OUTPATIENT)
Dept: GENERAL RADIOLOGY | Facility: HOSPITAL | Age: 59
DRG: 003 | End: 2019-05-13
Attending: INTERNAL MEDICINE
Payer: MEDICARE

## 2019-05-13 LAB
ALBUMIN SERPL-MCNC: 1.9 G/DL (ref 3.4–5)
ALBUMIN/GLOB SERPL: 0.5 {RATIO} (ref 1–2)
ALP LIVER SERPL-CCNC: 76 U/L (ref 45–117)
ALT SERPL-CCNC: 951 U/L (ref 16–61)
ANION GAP SERPL CALC-SCNC: 16 MMOL/L (ref 0–18)
APTT PPP: 39.4 SECONDS (ref 23.2–35.3)
AST SERPL-CCNC: 1112 U/L (ref 15–37)
BASOPHILS # BLD AUTO: 0.03 X10(3) UL (ref 0–0.2)
BASOPHILS NFR BLD AUTO: 0.1 %
BILIRUB SERPL-MCNC: 1.3 MG/DL (ref 0.1–2)
BLOOD TYPE BARCODE: 2800
BUN BLD-MCNC: 93 MG/DL (ref 7–18)
BUN/CREAT SERPL: 12 (ref 10–20)
CALCIUM BLD-MCNC: 5.7 MG/DL (ref 8.5–10.1)
CHLORIDE SERPL-SCNC: 102 MMOL/L (ref 98–112)
CO2 SERPL-SCNC: 17 MMOL/L (ref 21–32)
CREAT BLD-MCNC: 7.78 MG/DL (ref 0.7–1.3)
DEPRECATED RDW RBC AUTO: 46.5 FL (ref 35.1–46.3)
EOSINOPHIL # BLD AUTO: 0 X10(3) UL (ref 0–0.7)
EOSINOPHIL NFR BLD AUTO: 0 %
ERYTHROCYTE [DISTWIDTH] IN BLOOD BY AUTOMATED COUNT: 14.3 % (ref 11–15)
GLOBULIN PLAS-MCNC: 3.8 G/DL (ref 2.8–4.4)
GLUCOSE BLD-MCNC: 154 MG/DL (ref 70–99)
GLUCOSE BLDC GLUCOMTR-MCNC: 112 MG/DL (ref 70–99)
GLUCOSE BLDC GLUCOMTR-MCNC: 123 MG/DL (ref 70–99)
GLUCOSE BLDC GLUCOMTR-MCNC: 128 MG/DL (ref 70–99)
GLUCOSE BLDC GLUCOMTR-MCNC: 135 MG/DL (ref 70–99)
GLUCOSE BLDC GLUCOMTR-MCNC: 136 MG/DL (ref 70–99)
GLUCOSE BLDC GLUCOMTR-MCNC: 146 MG/DL (ref 70–99)
GLUCOSE BLDC GLUCOMTR-MCNC: 147 MG/DL (ref 70–99)
GLUCOSE BLDC GLUCOMTR-MCNC: 149 MG/DL (ref 70–99)
GLUCOSE BLDC GLUCOMTR-MCNC: 150 MG/DL (ref 70–99)
GLUCOSE BLDC GLUCOMTR-MCNC: 156 MG/DL (ref 70–99)
GLUCOSE BLDC GLUCOMTR-MCNC: 163 MG/DL (ref 70–99)
GLUCOSE BLDC GLUCOMTR-MCNC: 168 MG/DL (ref 70–99)
HAV IGM SER QL: 2.6 MG/DL (ref 1.6–2.6)
HCT VFR BLD AUTO: 31.2 % (ref 39–53)
HGB BLD-MCNC: 10.4 G/DL (ref 13–17.5)
IMM GRANULOCYTES # BLD AUTO: 0.24 X10(3) UL (ref 0–1)
IMM GRANULOCYTES NFR BLD: 1.2 %
INR BLD: 1.9 (ref 0.9–1.2)
LACTATE SERPL-SCNC: 1.3 MMOL/L (ref 0.4–2)
LYMPHOCYTES # BLD AUTO: 1.14 X10(3) UL (ref 1–4)
LYMPHOCYTES NFR BLD AUTO: 5.6 %
M PROTEIN MFR SERPL ELPH: 5.7 G/DL (ref 6.4–8.2)
MCH RBC QN AUTO: 30.7 PG (ref 26–34)
MCHC RBC AUTO-ENTMCNC: 33.3 G/DL (ref 31–37)
MCV RBC AUTO: 92 FL (ref 80–100)
MONOCYTES # BLD AUTO: 0.75 X10(3) UL (ref 0.1–1)
MONOCYTES NFR BLD AUTO: 3.7 %
NEUTROPHILS # BLD AUTO: 18.23 X10 (3) UL (ref 1.5–7.7)
NEUTROPHILS # BLD AUTO: 18.23 X10(3) UL (ref 1.5–7.7)
NEUTROPHILS NFR BLD AUTO: 89.4 %
OSMOLALITY SERPL CALC.SUM OF ELEC: 312 MOSM/KG (ref 275–295)
PHOSPHATE SERPL-MCNC: 10.7 MG/DL (ref 2.5–4.9)
PLATELET # BLD AUTO: 184 10(3)UL (ref 150–450)
POTASSIUM SERPL-SCNC: 4.8 MMOL/L (ref 3.5–5.1)
PROTHROMBIN TIME: 21.9 SECONDS (ref 11.8–14.5)
RBC # BLD AUTO: 3.39 X10(6)UL (ref 4.3–5.7)
SODIUM SERPL-SCNC: 135 MMOL/L (ref 136–145)
WBC # BLD AUTO: 20.4 X10(3) UL (ref 4–11)

## 2019-05-13 PROCEDURE — 95816 EEG AWAKE AND DROWSY: CPT | Performed by: OTHER

## 2019-05-13 PROCEDURE — 76705 ECHO EXAM OF ABDOMEN: CPT | Performed by: SURGERY

## 2019-05-13 PROCEDURE — 99232 SBSQ HOSP IP/OBS MODERATE 35: CPT | Performed by: INTERNAL MEDICINE

## 2019-05-13 PROCEDURE — 71045 X-RAY EXAM CHEST 1 VIEW: CPT | Performed by: INTERNAL MEDICINE

## 2019-05-13 PROCEDURE — 74018 RADEX ABDOMEN 1 VIEW: CPT | Performed by: SURGERY

## 2019-05-13 PROCEDURE — 99233 SBSQ HOSP IP/OBS HIGH 50: CPT | Performed by: OTHER

## 2019-05-13 PROCEDURE — 99233 SBSQ HOSP IP/OBS HIGH 50: CPT | Performed by: INTERNAL MEDICINE

## 2019-05-13 PROCEDURE — 99291 CRITICAL CARE FIRST HOUR: CPT | Performed by: INTERNAL MEDICINE

## 2019-05-13 RX ORDER — PREGABALIN 50 MG/1
100 CAPSULE ORAL NIGHTLY
Status: DISCONTINUED | OUTPATIENT
Start: 2019-05-13 | End: 2019-06-12

## 2019-05-13 RX ORDER — METOPROLOL TARTRATE 5 MG/5ML
5 INJECTION INTRAVENOUS EVERY 4 HOURS PRN
Status: DISCONTINUED | OUTPATIENT
Start: 2019-05-13 | End: 2019-06-12

## 2019-05-13 NOTE — PLAN OF CARE
Problem: Diabetes/Glucose Control  Goal: Glucose maintained within prescribed range  Description  INTERVENTIONS:  - Monitor Blood Glucose as ordered  - Assess for signs and symptoms of hyperglycemia and hypoglycemia  - Administer ordered medications to m retention  Outcome: Progressing    PEEP 7, FiO2 decreased from 100 to 90% today - maintaining O2 sats above 95%. Tachypneic, shallow breaths.     Problem: GASTROINTESTINAL - ADULT  Goal: Maintains or returns to baseline bowel function  Description  INTERVEN prevent increased intracranial pressure  - Maintain blood pressure and fluid volume within ordered parameters to optimize cerebral perfusion and minimize risk of hemorrhage  - Monitor temperature, glucose, and sodium.  Initiate appropriate interventions as

## 2019-05-13 NOTE — PROGRESS NOTES
[facility] Pharmacy Note:  Renal Dose Adjustment for Pregabalin (Lyrica)    Isaiah Sesay has been prescribed pregabalin 100mg every 8 hours. Estimated Creatinine Clearance: 9.1 mL/min (A) (based on SCr of 7.78 mg/dL (H)).     His calculated creatinine clearance

## 2019-05-13 NOTE — PROGRESS NOTES
[facility] Pharmacy Note:  Renal Adjustment for Meropenem    Serenity Velasquez is a 60year old male who has a CrCl is estimated creatinine clearance is 9.1 mL/min (A) (based on SCr of 7.78 mg/dL (H)).  so the Meropenem dose has been further reduced to Meropenem 1g ever

## 2019-05-13 NOTE — PROGRESS NOTES
Pulmonary/Critical Care Follow Up Note    HPI:   Saida Wen is a 60year old male with Patient presents with:  Fever (infectious)  Altered Mental Status (neurologic)      PCP LATIA Long MD  Admission Attending No admitting provider for patient encoun shock, 0.01-0.04 Units/min, Intravenous, Continuous  •  Insulin Regular Human (NOVOLIN R) 100 Units in sodium chloride 0.9% 100 mL infusion, 1-28 Units/hr, Intravenous, Continuous  •  phenylephrine HCl (VALERIE-SYNEPHRINE) 100 mg in sodium chloride 0.9% 250 mL kg), SpO2 98 %.     Intake/Output Summary (Last 24 hours) at 5/13/2019 1305  Last data filed at 5/13/2019 0800  Gross per 24 hour   Intake 3491.55 ml   Output 960 ml   Net 2531.55 ml     Mild inc WOB  Sedated and intubated  Lung course BS  CV  reg   Abd sof shock  No indications for RRT yet  Plan d/w Dr Tayla Francois   Follow   Avoid neprothoxins       Encephalopathy  Much better  CT head neg  Now follows commands  Moving all extremities  Plan      Follow    Daily sedation hold     H/o CHF  Systolic EF 60% in 9692

## 2019-05-13 NOTE — PROGRESS NOTES
[facility]    Progress Note      Subjective:     Remain intubated and sedated       Review of Systems:     Unable to obtain - intubated and sedated       Objective:   Temp:  [97 °F (36.1 °C)-98.8 °F (37.1 °C)] 98.5 °F (36.9 °C)  Pulse:  [ vasopressin (PITRESSIN) 20 Units in sodium chloride 0.9% 50 mL infusion for shock 0.01-0.04 Units/min Intravenous Continuous   Insulin Regular Human (NOVOLIN R) 100 Units in sodium chloride 0.9% 100 mL infusion 1-28 Units/hr Intravenous Continuous   phen 342* 133* 154*   BUN 50* 74* 93*   CREATSERUM 3.20* 6.32* 7.78*   GFRAA 23* 10* 8*   GFRNAA 20* 9* 7*   CA 7.3* 6.7* 5.7*   ALB  --  2.0* 1.9*    137 135*   K 4.5 4.8 4.8    104 102   CO2 20.0* 19.0* 17.0*   ALKPHO  --  43* 76   AST  --  1,869* perifissural region of the right middle lobe. 3. A subcapsular hepatic fluid collection contiguous with the gallbladder is unchanged. Further interrogation with ultrasound is suggested.   Differential considerations include organizing hematoma, and less li oliguric   - KOMAL secondary to sepsis with multiorgan dysfunction   - second episode of KOMAL during this hospitalization   - creatinine 1.0 mg/dl at baseline   - KOMAL secondary to septic shock and ATN  - strict I/Os and daily weights  - net +ve 16 liters   -

## 2019-05-13 NOTE — PROGRESS NOTES
[facility]    Progress Note    Kelsi Quiles Patient Status:  Inpatient    1960 MRN [id number]   Location [facility] 2W/SW Attending Ever Girard, [address] [facility] Se Day # 11 PCP LATIA Mcclure MD            Subjective:     Pt was r 1,869* 1,112*   ALT  --  1,110* 951*   BILT  --  1.2 1.3   TP  --  6.2* 5.7*             Xr Abdomen (1 View) (cpt=74018)    Result Date: 5/11/2019  CONCLUSION: Dilated small and large bowel loops suggesting bowel obstruction or ileus.   No significant goyal worsening left basilar lung consolidation.  4. Small left-sided effusion suspected    Dictated by (CST): Katelyn Diallo MD on 5/12/2019 at 7:21     Approved by (CST): Katelyn Diallo MD on 5/12/2019 at 7:23                  Assessment and Plan:

## 2019-05-13 NOTE — PROGRESS NOTES
[facility]  Progress Note    Jefferson Handing Patient Status:  Inpatient    1960 MRN [id number]   Location [facility] 2W/SW Attending Lainey Hope, [facility] Se Day # 11 PCP LATIA Joel MD     Assessment:    1. Septic shock.   Still on 05/13/2019     05/13/2019    CO2 17.0 05/13/2019    BUN 93 05/13/2019    CREATSERUM 7.78 05/13/2019     05/13/2019    MG 2.6 05/13/2019    CA 5.7 05/13/2019     05/13/2019    AST 1,112 05/13/2019    ALB 1.9 05/13/2019        No results

## 2019-05-13 NOTE — CM/SW NOTE
MD order received regarding PHQ4. The pt. Is currently vented. SW will follow up with PHQ4 when the pt. Is more medically appropriate.       [facility] JohansenWellstar Douglas Hospital ext [phone]

## 2019-05-13 NOTE — PLAN OF CARE
Problem: Diabetes/Glucose Control  Goal: Glucose maintained within prescribed range  Description  INTERVENTIONS:  - Monitor Blood Glucose as ordered  - Assess for signs and symptoms of hyperglycemia and hypoglycemia  - Administer ordered medications to m Insulin gtt   Goal: Electrolytes maintained within normal limits  Description  INTERVENTIONS:  - Monitor labs and rhythm and assess patient for signs and symptoms of electrolyte imbalances  - Administer electrolyte replacement as ordered  - Monitor respo

## 2019-05-13 NOTE — PROGRESS NOTES
[facility]     Gastroenterology Progress Note    Saida Wen Patient Status:  Inpatient    1960 MRN [id number]   Location [facility] 2W/SW Attending Kenisha Chávez, [address] [facility] Se Day # 11 PCP LATIA Long MD       Sub holding tube-feeds due to ileus.      #CHF/Afib - per cardiology     #Coffee-ground emesis- likely 2/2 MWT or gastritis in the setting of SBO, treat with IV protonix.  NO plans for endoscopy given no signs of active bleeding.     #LFT elevation -- significa pleural effusion with compressive atelectasis in the right lower lobe and perifissural region of the right middle lobe. 3. A subcapsular hepatic fluid collection contiguous with the gallbladder is unchanged.   Further interrogation with ultrasound is sugges 5/12/2019 at 7:23

## 2019-05-13 NOTE — PHYSICAL THERAPY NOTE
Attempted PT treatment- pt currently intubated and will be placed on hold. Will require new orders when extubated for re-assessment.   Thank you,  Kevin Henning, PT, DPT

## 2019-05-13 NOTE — PLAN OF CARE
Pt's wife arrived and states that Dr. Donna García wanted to be paged when she arrived. Spoke with Dr. Donna García - she will page Dr. Zainab Milan to read EEG, as wife would like EEG results prior to discussing how/if to process with dialysis/CRRT.     Discussed resta

## 2019-05-14 ENCOUNTER — APPOINTMENT (OUTPATIENT)
Dept: GENERAL RADIOLOGY | Facility: HOSPITAL | Age: 59
DRG: 003 | End: 2019-05-14
Attending: INTERNAL MEDICINE
Payer: MEDICARE

## 2019-05-14 ENCOUNTER — APPOINTMENT (OUTPATIENT)
Dept: GENERAL RADIOLOGY | Facility: HOSPITAL | Age: 59
DRG: 003 | End: 2019-05-14
Attending: SURGERY
Payer: MEDICARE

## 2019-05-14 ENCOUNTER — APPOINTMENT (OUTPATIENT)
Dept: INTERVENTIONAL RADIOLOGY/VASCULAR | Facility: HOSPITAL | Age: 59
DRG: 003 | End: 2019-05-14
Attending: INTERNAL MEDICINE
Payer: MEDICARE

## 2019-05-14 LAB
ALBUMIN SERPL-MCNC: 1.7 G/DL (ref 3.4–5)
ALBUMIN/GLOB SERPL: 0.4 {RATIO} (ref 1–2)
ALP LIVER SERPL-CCNC: 71 U/L (ref 45–117)
ALT SERPL-CCNC: 682 U/L (ref 16–61)
ANION GAP SERPL CALC-SCNC: 21 MMOL/L (ref 0–18)
AST SERPL-CCNC: 373 U/L (ref 15–37)
BASOPHILS # BLD AUTO: 0.01 X10(3) UL (ref 0–0.2)
BASOPHILS NFR BLD AUTO: 0.1 %
BILIRUB SERPL-MCNC: 1.7 MG/DL (ref 0.1–2)
BUN BLD-MCNC: 119 MG/DL (ref 7–18)
BUN/CREAT SERPL: 12 (ref 10–20)
CALCIUM BLD-MCNC: 5.7 MG/DL (ref 8.5–10.1)
CHLORIDE SERPL-SCNC: 98 MMOL/L (ref 98–112)
CO2 SERPL-SCNC: 14 MMOL/L (ref 21–32)
CREAT BLD-MCNC: 9.92 MG/DL (ref 0.7–1.3)
DEPRECATED RDW RBC AUTO: 48 FL (ref 35.1–46.3)
EOSINOPHIL # BLD AUTO: 0.01 X10(3) UL (ref 0–0.7)
EOSINOPHIL NFR BLD AUTO: 0.1 %
ERYTHROCYTE [DISTWIDTH] IN BLOOD BY AUTOMATED COUNT: 14.7 % (ref 11–15)
GLOBULIN PLAS-MCNC: 4.1 G/DL (ref 2.8–4.4)
GLUCOSE BLD-MCNC: 134 MG/DL (ref 70–99)
GLUCOSE BLDC GLUCOMTR-MCNC: 101 MG/DL (ref 70–99)
GLUCOSE BLDC GLUCOMTR-MCNC: 103 MG/DL (ref 70–99)
GLUCOSE BLDC GLUCOMTR-MCNC: 105 MG/DL (ref 70–99)
GLUCOSE BLDC GLUCOMTR-MCNC: 113 MG/DL (ref 70–99)
GLUCOSE BLDC GLUCOMTR-MCNC: 114 MG/DL (ref 70–99)
GLUCOSE BLDC GLUCOMTR-MCNC: 132 MG/DL (ref 70–99)
GLUCOSE BLDC GLUCOMTR-MCNC: 132 MG/DL (ref 70–99)
GLUCOSE BLDC GLUCOMTR-MCNC: 133 MG/DL (ref 70–99)
GLUCOSE BLDC GLUCOMTR-MCNC: 133 MG/DL (ref 70–99)
GLUCOSE BLDC GLUCOMTR-MCNC: 142 MG/DL (ref 70–99)
GLUCOSE BLDC GLUCOMTR-MCNC: 145 MG/DL (ref 70–99)
GLUCOSE BLDC GLUCOMTR-MCNC: 146 MG/DL (ref 70–99)
GLUCOSE BLDC GLUCOMTR-MCNC: 147 MG/DL (ref 70–99)
GLUCOSE BLDC GLUCOMTR-MCNC: 149 MG/DL (ref 70–99)
GLUCOSE BLDC GLUCOMTR-MCNC: 169 MG/DL (ref 70–99)
GLUCOSE BLDC GLUCOMTR-MCNC: 96 MG/DL (ref 70–99)
GLUCOSE BLDC GLUCOMTR-MCNC: 98 MG/DL (ref 70–99)
HAV IGM SER QL: 2.9 MG/DL (ref 1.6–2.6)
HCT VFR BLD AUTO: 29.3 % (ref 39–53)
HGB BLD-MCNC: 10.1 G/DL (ref 13–17.5)
IMM GRANULOCYTES # BLD AUTO: 0.1 X10(3) UL (ref 0–1)
IMM GRANULOCYTES NFR BLD: 0.7 %
INR BLD: 1.55 (ref 0.9–1.2)
LYMPHOCYTES # BLD AUTO: 0.53 X10(3) UL (ref 1–4)
LYMPHOCYTES NFR BLD AUTO: 3.7 %
M PROTEIN MFR SERPL ELPH: 5.8 G/DL (ref 6.4–8.2)
MCH RBC QN AUTO: 31.6 PG (ref 26–34)
MCHC RBC AUTO-ENTMCNC: 34.5 G/DL (ref 31–37)
MCV RBC AUTO: 91.6 FL (ref 80–100)
MONOCYTES # BLD AUTO: 0.71 X10(3) UL (ref 0.1–1)
MONOCYTES NFR BLD AUTO: 5 %
NEUTROPHILS # BLD AUTO: 12.91 X10 (3) UL (ref 1.5–7.7)
NEUTROPHILS # BLD AUTO: 12.91 X10(3) UL (ref 1.5–7.7)
NEUTROPHILS NFR BLD AUTO: 90.4 %
OSMOLALITY SERPL CALC.SUM OF ELEC: 316 MOSM/KG (ref 275–295)
PHOSPHATE SERPL-MCNC: 14.6 MG/DL (ref 2.5–4.9)
PLATELET # BLD AUTO: 155 10(3)UL (ref 150–450)
POTASSIUM SERPL-SCNC: 5.4 MMOL/L (ref 3.5–5.1)
PROTHROMBIN TIME: 18.6 SECONDS (ref 11.8–14.5)
RBC # BLD AUTO: 3.2 X10(6)UL (ref 4.3–5.7)
SODIUM SERPL-SCNC: 133 MMOL/L (ref 136–145)
VANCOMYCIN TROUGH SERPL-MCNC: 20.3 UG/ML (ref 10–20)
WBC # BLD AUTO: 14.3 X10(3) UL (ref 4–11)

## 2019-05-14 PROCEDURE — 02HV33Z INSERTION OF INFUSION DEVICE INTO SUPERIOR VENA CAVA, PERCUTANEOUS APPROACH: ICD-10-PCS | Performed by: RADIOLOGY

## 2019-05-14 PROCEDURE — 99233 SBSQ HOSP IP/OBS HIGH 50: CPT | Performed by: INTERNAL MEDICINE

## 2019-05-14 PROCEDURE — 74019 RADEX ABDOMEN 2 VIEWS: CPT | Performed by: SURGERY

## 2019-05-14 PROCEDURE — 5A1D70Z PERFORMANCE OF URINARY FILTRATION, INTERMITTENT, LESS THAN 6 HOURS PER DAY: ICD-10-PCS | Performed by: INTERNAL MEDICINE

## 2019-05-14 PROCEDURE — 99232 SBSQ HOSP IP/OBS MODERATE 35: CPT | Performed by: INTERNAL MEDICINE

## 2019-05-14 PROCEDURE — 71045 X-RAY EXAM CHEST 1 VIEW: CPT | Performed by: INTERNAL MEDICINE

## 2019-05-14 PROCEDURE — B5181ZA FLUOROSCOPY OF SUPERIOR VENA CAVA USING LOW OSMOLAR CONTRAST, GUIDANCE: ICD-10-PCS | Performed by: RADIOLOGY

## 2019-05-14 PROCEDURE — 99233 SBSQ HOSP IP/OBS HIGH 50: CPT | Performed by: OTHER

## 2019-05-14 RX ORDER — MIDAZOLAM HYDROCHLORIDE 1 MG/ML
INJECTION INTRAMUSCULAR; INTRAVENOUS
Status: DISCONTINUED
Start: 2019-05-14 | End: 2019-05-14 | Stop reason: WASHOUT

## 2019-05-14 RX ORDER — LIDOCAINE HYDROCHLORIDE 20 MG/ML
INJECTION, SOLUTION EPIDURAL; INFILTRATION; INTRACAUDAL; PERINEURAL
Status: COMPLETED
Start: 2019-05-14 | End: 2019-05-14

## 2019-05-14 RX ORDER — HEPARIN SODIUM (PORCINE) LOCK FLUSH IV SOLN 100 UNIT/ML 100 UNIT/ML
SOLUTION INTRAVENOUS
Status: COMPLETED
Start: 2019-05-14 | End: 2019-05-14

## 2019-05-14 NOTE — PLAN OF CARE
Problem: Diabetes/Glucose Control  Goal: Glucose maintained within prescribed range  Description  INTERVENTIONS:  - Monitor Blood Glucose as ordered  - Assess for signs and symptoms of hyperglycemia and hypoglycemia  - Administer ordered medications to m breathe, Incentive Spirometry  - Assess the need for suctioning and perform as needed  - Assess and instruct to report SOB or any respiratory difficulty  - Respiratory Therapy support as indicated  - Manage/alleviate anxiety  - Monitor for signs/symptoms o and/or skin breakdown  - Initiate interventions, skin care algorithm/standards of care as needed  Outcome: Progressing     Problem: NEUROLOGICAL - ADULT  Goal: Achieves stable or improved neurological status  Description  INTERVENTIONS  - Assess for and re

## 2019-05-14 NOTE — DIETARY NOTE
ADULT NUTRITION REASSESSMENT     Pt is at high nutrition risk. Pt does not meet malnutrition criteria. RECOMMENDATIONS TO MD:  See Nutrition Intervention.  For [employer] TPN rx  #1 start this pm.      NUTRITION DIAGNOSIS/PROBLEM:  Inadequate prot education: assess education needs in future if appropriate. - Coordination of nutrition care: collaboration with other providers and Pt care rounds.    - Discharge and transfer of nutrition care to new setting or provider: monitor plans        PERTINENT P lb)  06/27/17 : 128.5 kg (283 lb 6.4 oz)      GASTROINTESTINAL: OG tube in place. CT suggests ileus- Gi svc following. Relistor started for narcotic bowel. Brown loose BM via rectal alvarado. Abdomen distended.     FOOD/NUTRITION RELATED HISTORY:  Appetite: PT obestiy per visual exam.    - Fluid Accumulation: upper extremity edema, lower extremity edema and generalized 1+ per visual exam    - Skin Integrity: intact.     NUTRITION PRESCRIPTION:  Diet: Parenteral Nutrition (PN)  Oral Supplements: NPO  ESTIMATED NUT

## 2019-05-14 NOTE — PROGRESS NOTES
[facility]    Progress Note    Magda Ko Patient Status:  Inpatient    1960 MRN [id number]   Location [facility] 2W/SW Attending Reji West MD   Hosp Day # 12 PCP LATIA Jesus MD     Subjective:   Intubated and o hours   - Hypoglycemia protocol    Will follow    Lisa Alicea MD

## 2019-05-14 NOTE — PROGRESS NOTES
[place] [facility] - [facility]     Progress Note        Tinsley Begin Patient Status:  Inpatient    1960 MRN [id number]   Location [facility] 2W/SW Attending Lanette Bean, [address] [facility] Se Day # 12 PCP LATIA Maldonado MD       Subjective:   Patient se infusion 0.5-8 mcg/min Intravenous Continuous   artificial tears 83-15 % ophthalmic ointment  Both Eyes TID   propofol (DIPRIVAN) infusion 5-100 mcg/kg/min (Dosing Weight) Intravenous Continuous   levETIRAcetam (KEPPRA) 1,000 mg in sodium chloride 0.9% 100 intubated, sedated  HEENT: PERRL  Cardio: RRR, S1 S2  Respiratory: Scattered crackles  GI: abdomen soft  Extremities: no clubbing, cyanosis, + bilateral lower extremity  Neurologic: Does not follow commands  Skin: warm, dry      Results:     Lab Results ileus about the same as was seen previously. No large free air collection. Dictated by (CST): Johnson Garza MD on 5/14/2019 at 12:01     Approved by (CST): Johnson Garza MD on 5/14/2019 at 12:07                  Assessment   1. Septic shock  2.   Acu

## 2019-05-14 NOTE — PROCEDURES
[address] [place] [facility] [address]      PATIENT'S NAME: Gayle Pedro   ATTENDING PHYSICIAN: Dash Robert MD   PATIENT ACCOUNT #: [de-identified] LOCATION: [address] [place] [address] RECORD #: [id number] YOB: 1960

## 2019-05-14 NOTE — PROGRESS NOTES
[place] [facility]    Cardiology Progress Note  [facility]    Jesus Villa Patient Status:  Inpatient    1960 MRN [id number]   Location [facility] 2W/SW Attending Freeman Aceves, [address] [facility] Se Day # 12 PCP LATIA Lucas fibrillation controlled rate  Ext: Plus edema extremities warm  Abd: Abdomen soft,         Assessment and Plan:     Sepsis due to undetermined organism ([address]  Remains on pressors      Atrial fibrillation with rapid ventricular response (HCC)  Rate stable. Approved by (CST): Dana Damon MD on 5/13/2019 at 7:55          Xr Abdomen 2 Views(cpt=74019)    Result Date: 5/14/2019  CONCLUSION:  1.  Continued moderate air-filled distention of the ascending and transverse colon, and to a lesser degree the proximal

## 2019-05-14 NOTE — PROGRESS NOTES
RESPIRATORY THERAPY MECHANICAL VENTILATION PROGRESS NOTE    Ventilator Weaning:  Patient meets criteria for weaning? no Weaning was attempted no     Ventilator parameter:     05/14/19 0850   Readings   Total RR 23   Minute Ventilation (L/min) 8.4 L/min   E

## 2019-05-14 NOTE — PROGRESS NOTES
[facility] [employer] dosing service    Follow-up Pharmacokinetic Consult for Vancomycin Dosing     Roel Alexandra is a 60year old male who is being treated for sepsis. Patient has received a single 2g dose on 5/12. Goal trough is 15-20 ug/mL.     He is allergic to

## 2019-05-14 NOTE — PLAN OF CARE
Problem: Patient Centered Care  Goal: Patient preferences are identified and integrated in the patient's plan of care  Description  Interventions:  - What would you like us to know as we care for you?   - Provide timely, complete, and accurate informatio bleeding, hypotension and signs of decreased cardiac output  - Evaluate effectiveness of vasoactive medications to optimize hemodynamic stability  - Monitor arterial and/or venous puncture sites for bleeding and/or hematoma  - Assess quality of pulses, ski toileting routine/schedule  - Consider collaborating with pharmacy to review patient's medication profile  Outcome: Progressing     Problem: METABOLIC/FLUID AND ELECTROLYTES - ADULT  Goal: Glucose maintained within prescribed range  Description  INTERVENTI from fall injury  Description  INTERVENTIONS:  - Assess pt frequently for physical needs  - Identify cognitive and physical deficits and behaviors that affect risk of falls.   - [place] fall precautions as indicated by assessment.  - Educate pt/family on

## 2019-05-14 NOTE — CONSULTS
JAROCHO THURMAND [facility]    Progress Note      Subjective:     Remain intubated and on morphine    Open eyes intermittently       Review of Systems:     Unable to obtain - intubated and morphine     Objective:   Temp:  [96.9 °F (36.1 °C)-97.7 °F (36.5 ophthalmic ointment  Both Eyes TID   propofol (DIPRIVAN) infusion 5-100 mcg/kg/min (Dosing Weight) Intravenous Continuous   levETIRAcetam (KEPPRA) 1,000 mg in sodium chloride 0.9% 100 mL IVPB 1,000 mg Intravenous Q12H   bisacodyl (DULCOLAX) rectal supposit BUN 74* 93* 119*   CREATSERUM 6.32* 7.78* 9.92*   GFRAA 10* 8* 6*   GFRNAA 9* 7* 5*   CA 6.7* 5.7* 5.7*   ALB 2.0* 1.9* 1.7*    135* 133*   K 4.8 4.8 5.4*    102 98   CO2 19.0* 17.0* 14.0*   ALKPHO 43* 76 71   AST 1,869* 1,112* 373*   ALT 1,1 worsening of aeration. Small left basilar pleural effusion. 2. Small right basilar pleural effusion with compressive atelectasis in the right lower lobe and perifissural region of the right middle lobe.  3. A subcapsular hepatic fluid collection contiguous nephrotoxins  - given poor urine output and continuous decline in kidney function will need RRT - discussed with wife and IR - temp catheter today and will do short treatment today   - on levophed pressors - BP acceptable - lowish  - improve Fio2 to 70%

## 2019-05-14 NOTE — PLAN OF CARE
Problem: Patient Centered Care  Goal: Patient preferences are identified and integrated in the patient's plan of care  Description  Interventions:  - What would you like us to know as we care for you?   - Provide timely, complete, and accurate informatio routines  Outcome: Progressing  7p-9p 5/13 A.S. On insulin, levo, remains vented.

## 2019-05-14 NOTE — PROGRESS NOTES
[place] [facility] - [facility]    Progress Note    Luly Deluca Patient Status:  Inpatient    1960 MRN [id number]   Location [facility] 2W/SW Attending Kiarra Patrick,  [facility] Se Day # 12 PCP LATIA Damon MD            Subjective:     Pt was r 102 98   CO2 19.0* 17.0* 14.0*   ALKPHO 43* 76 71   AST 1,869* 1,112* 373*   ALT 1,110* 951* 682*   BILT 1.2 1.3 1.7   TP 6.2* 5.7* 5.8*             Xr Abdomen (1 View) (cpt=74018)    Result Date: 5/13/2019  CONCLUSION:  1.  Interval decrease in poorly visu Small left basilar pleural effusion. 2. Small right basilar pleural effusion with compressive atelectasis in the right lower lobe and perifissural region of the right middle lobe.   3. A subcapsular hepatic fluid collection contiguous with the gallbladder

## 2019-05-14 NOTE — PROGRESS NOTES
[place] [facility] - [facility]    Progress Note    Luly Deluca Patient Status:  Inpatient    1960 MRN [id number]   Location [facility] 2W/SW Attending Kiarra Patrick, [address] [facility] Se Day # 11 PCP LATIA Damon MD       Subjective:   Luly Deluca is 0. 01-0.04 Units/min Intravenous Continuous   Insulin Regular Human (NOVOLIN R) 100 Units in sodium chloride 0.9% 100 mL infusion 1-28 Units/hr Intravenous Continuous   Metoclopramide HCl (REGLAN) injection 5 mg 5 mg Intravenous Q8H   morphINE sulfate (PF) (1 View) (cpt=74018)    Result Date: 5/13/2019  CONCLUSION:  1. Interval decrease in poorly visualized moderately dilated large and small bowel with minimal air seen in the descending colon. Findings suggest some resolution of a diffuse abdominal ileus. consolidation/atelectasis/effusion. Mild right lung base atelectasis. Continued follow-up is advised. NG tube tip not definitely visualized.     Dictated by (CST): Keyana Grant MD on 5/13/2019 at 7:52     Approved by (CST): Keyana Grant MD on 5/13/20

## 2019-05-14 NOTE — RESPIRATORY THERAPY NOTE
RESPIRATORY THERAPY MECHANICAL VENTILATION PROGRESS NOTE    Ventilator Weaning:  Patient meets criteria for weaning? no Weaning was attempted no   Peep 7. fio2 70%

## 2019-05-14 NOTE — PROGRESS NOTES
[facility] [address]     Gastroenterology Progress Note    Isaiah Sesay Patient Status:  Inpatient    1960 MRN [id number]   Location [facility] 2W/SW Attending [email or web address], [address] [facility]  Day # 12 PCP LATIA Chand MD       Sub elevated. #Nutrition -- holding tube-feeds due to ileus. Will start TPN given prolonged ileus issues.      #CHF/Afib - per cardiology     #Coffee-ground emesis- likely 2/2 MWT or gastritis in the setting of SBO, treat with IV protonix.  NO plans for end aeration. Small left basilar pleural effusion. 2. Small right basilar pleural effusion with compressive atelectasis in the right lower lobe and perifissural region of the right middle lobe.  3. A subcapsular hepatic fluid collection contiguous with the gal

## 2019-05-14 NOTE — PROGRESS NOTES
Pt is at bedside. Consent was signed by the family through phone for Insertion of HD cath on Right chest. Patient on drips (SEE MAR). MD aware of pt's condition and medication regimen. No sedation was given to patient.  Local anesthetic given by Dr. Alex Lares, 7

## 2019-05-15 ENCOUNTER — APPOINTMENT (OUTPATIENT)
Dept: CT IMAGING | Facility: HOSPITAL | Age: 59
DRG: 003 | End: 2019-05-15
Attending: INTERNAL MEDICINE
Payer: MEDICARE

## 2019-05-15 ENCOUNTER — APPOINTMENT (OUTPATIENT)
Dept: CT IMAGING | Facility: HOSPITAL | Age: 59
DRG: 003 | End: 2019-05-15
Attending: SURGERY
Payer: MEDICARE

## 2019-05-15 ENCOUNTER — APPOINTMENT (OUTPATIENT)
Dept: GENERAL RADIOLOGY | Facility: HOSPITAL | Age: 59
DRG: 003 | End: 2019-05-15
Attending: INTERNAL MEDICINE
Payer: MEDICARE

## 2019-05-15 LAB
ALBUMIN SERPL-MCNC: 1.7 G/DL (ref 3.4–5)
ALBUMIN/GLOB SERPL: 0.4 {RATIO} (ref 1–2)
ALP LIVER SERPL-CCNC: 67 U/L (ref 45–117)
ALT SERPL-CCNC: 480 U/L (ref 16–61)
ANION GAP SERPL CALC-SCNC: 17 MMOL/L (ref 0–18)
AST SERPL-CCNC: 181 U/L (ref 15–37)
BASOPHILS # BLD AUTO: 0.01 X10(3) UL (ref 0–0.2)
BASOPHILS NFR BLD AUTO: 0.1 %
BILIRUB SERPL-MCNC: 1.8 MG/DL (ref 0.1–2)
BUN BLD-MCNC: 100 MG/DL (ref 7–18)
BUN/CREAT SERPL: 13.2 (ref 10–20)
CALCIUM BLD-MCNC: 6.2 MG/DL (ref 8.5–10.1)
CHLORIDE SERPL-SCNC: 102 MMOL/L (ref 98–112)
CO2 SERPL-SCNC: 17 MMOL/L (ref 21–32)
CREAT BLD-MCNC: 7.59 MG/DL (ref 0.7–1.3)
DEPRECATED RDW RBC AUTO: 46.7 FL (ref 35.1–46.3)
EOSINOPHIL # BLD AUTO: 0.02 X10(3) UL (ref 0–0.7)
EOSINOPHIL NFR BLD AUTO: 0.2 %
ERYTHROCYTE [DISTWIDTH] IN BLOOD BY AUTOMATED COUNT: 15 % (ref 11–15)
GLOBULIN PLAS-MCNC: 3.9 G/DL (ref 2.8–4.4)
GLUCOSE BLD-MCNC: 189 MG/DL (ref 70–99)
GLUCOSE BLDC GLUCOMTR-MCNC: 107 MG/DL (ref 70–99)
GLUCOSE BLDC GLUCOMTR-MCNC: 108 MG/DL (ref 70–99)
GLUCOSE BLDC GLUCOMTR-MCNC: 127 MG/DL (ref 70–99)
GLUCOSE BLDC GLUCOMTR-MCNC: 132 MG/DL (ref 70–99)
GLUCOSE BLDC GLUCOMTR-MCNC: 133 MG/DL (ref 70–99)
GLUCOSE BLDC GLUCOMTR-MCNC: 134 MG/DL (ref 70–99)
GLUCOSE BLDC GLUCOMTR-MCNC: 134 MG/DL (ref 70–99)
GLUCOSE BLDC GLUCOMTR-MCNC: 139 MG/DL (ref 70–99)
GLUCOSE BLDC GLUCOMTR-MCNC: 141 MG/DL (ref 70–99)
GLUCOSE BLDC GLUCOMTR-MCNC: 141 MG/DL (ref 70–99)
GLUCOSE BLDC GLUCOMTR-MCNC: 150 MG/DL (ref 70–99)
GLUCOSE BLDC GLUCOMTR-MCNC: 154 MG/DL (ref 70–99)
GLUCOSE BLDC GLUCOMTR-MCNC: 159 MG/DL (ref 70–99)
GLUCOSE BLDC GLUCOMTR-MCNC: 159 MG/DL (ref 70–99)
GLUCOSE BLDC GLUCOMTR-MCNC: 165 MG/DL (ref 70–99)
GLUCOSE BLDC GLUCOMTR-MCNC: 178 MG/DL (ref 70–99)
GLUCOSE BLDC GLUCOMTR-MCNC: 188 MG/DL (ref 70–99)
GLUCOSE BLDC GLUCOMTR-MCNC: 192 MG/DL (ref 70–99)
GLUCOSE BLDC GLUCOMTR-MCNC: 193 MG/DL (ref 70–99)
GLUCOSE BLDC GLUCOMTR-MCNC: 202 MG/DL (ref 70–99)
GLUCOSE BLDC GLUCOMTR-MCNC: 202 MG/DL (ref 70–99)
GLUCOSE BLDC GLUCOMTR-MCNC: 90 MG/DL (ref 70–99)
GLUCOSE BLDC GLUCOMTR-MCNC: 92 MG/DL (ref 70–99)
GLUCOSE BLDC GLUCOMTR-MCNC: 95 MG/DL (ref 70–99)
HAV IGM SER QL: 2.5 MG/DL (ref 1.6–2.6)
HCT VFR BLD AUTO: 27.3 % (ref 39–53)
HGB BLD-MCNC: 9.4 G/DL (ref 13–17.5)
IMM GRANULOCYTES # BLD AUTO: 0.08 X10(3) UL (ref 0–1)
IMM GRANULOCYTES NFR BLD: 0.8 %
LYMPHOCYTES # BLD AUTO: 0.44 X10(3) UL (ref 1–4)
LYMPHOCYTES NFR BLD AUTO: 4.3 %
M PROTEIN MFR SERPL ELPH: 5.6 G/DL (ref 6.4–8.2)
MCH RBC QN AUTO: 30.8 PG (ref 26–34)
MCHC RBC AUTO-ENTMCNC: 34.4 G/DL (ref 31–37)
MCV RBC AUTO: 89.5 FL (ref 80–100)
MONOCYTES # BLD AUTO: 0.61 X10(3) UL (ref 0.1–1)
MONOCYTES NFR BLD AUTO: 5.9 %
NEUTROPHILS # BLD AUTO: 9.15 X10 (3) UL (ref 1.5–7.7)
NEUTROPHILS # BLD AUTO: 9.15 X10(3) UL (ref 1.5–7.7)
NEUTROPHILS NFR BLD AUTO: 88.7 %
OSMOLALITY SERPL CALC.SUM OF ELEC: 318 MOSM/KG (ref 275–295)
PHOSPHATE SERPL-MCNC: 8.7 MG/DL (ref 2.5–4.9)
PLATELET # BLD AUTO: 141 10(3)UL (ref 150–450)
POTASSIUM SERPL-SCNC: 4.2 MMOL/L (ref 3.5–5.1)
RBC # BLD AUTO: 3.05 X10(6)UL (ref 4.3–5.7)
SODIUM SERPL-SCNC: 136 MMOL/L (ref 136–145)
WBC # BLD AUTO: 10.3 X10(3) UL (ref 4–11)

## 2019-05-15 PROCEDURE — 99233 SBSQ HOSP IP/OBS HIGH 50: CPT | Performed by: INTERNAL MEDICINE

## 2019-05-15 PROCEDURE — 99233 SBSQ HOSP IP/OBS HIGH 50: CPT | Performed by: OTHER

## 2019-05-15 PROCEDURE — 99232 SBSQ HOSP IP/OBS MODERATE 35: CPT | Performed by: INTERNAL MEDICINE

## 2019-05-15 PROCEDURE — 71250 CT THORAX DX C-: CPT | Performed by: SURGERY

## 2019-05-15 PROCEDURE — 74176 CT ABD & PELVIS W/O CONTRAST: CPT | Performed by: SURGERY

## 2019-05-15 PROCEDURE — 99291 CRITICAL CARE FIRST HOUR: CPT | Performed by: INTERNAL MEDICINE

## 2019-05-15 PROCEDURE — 71045 X-RAY EXAM CHEST 1 VIEW: CPT | Performed by: INTERNAL MEDICINE

## 2019-05-15 RX ORDER — ALBUMIN (HUMAN) 12.5 G/50ML
100 SOLUTION INTRAVENOUS AS NEEDED
Status: DISCONTINUED | OUTPATIENT
Start: 2019-05-15 | End: 2019-05-18

## 2019-05-15 NOTE — PROGRESS NOTES
[facility]    Progress Note    Magda Ko Patient Status:  Inpatient    1960 MRN [id number]   Location [facility] 2W/SW Attending Reji West MD   Hosp Day # 13 PCP LATIA Jesus MD     Subjective:   Intubated, impr - Accuchecks Q2 hours   - Hypoglycemia protocol    Will follow    Demarco Draft  5/15/2019

## 2019-05-15 NOTE — PROGRESS NOTES
[place] [facility] - [facility]  Cardiology Progress Note    Donelda Dakins Patient Status:  Inpatient    1960 MRN [id number]   Location [facility] 2W/SW Attending Shadi Todd, [facility] Se Day # 15 PCP LATIA Ness MD       Assessment and Plan: HEENT: Normocephalic,   Neck: difficult to evaluate  JVD,   Cardiac: Irregular, irregular rhythm. rate stable S1, S2   Lungs: Clear anterior   Abdomen:central adiposity   Extremities:  edema. Upper and lower extremities Peripheral pulses are 1+.   Neurolog HCl (VANCOCIN) 1 mg in sodium chloride 0.9% 250 mL IVPB 1 mg Intravenous See Admin Instructions (RX holding)   methylnaltrexone bromide (RELISTOR) injection 10.2 mg 0.07 mg/kg Subcutaneous Q48H   dextrose 10 % infusion  Intravenous Continuous PRN   adult 3 Saline Flush 0.9 % injection 3 mL 3 mL Intravenous PRN   dextrose 50 % injection 50 mL 50 mL Intravenous PRN   Glucose-Vitamin C (DEX-4) 4-6 GM-MG chewable tab 4 tablet 4 tablet Oral Q15 Min PRN   glucose (DEX4) oral liquid 15 g 15 g Oral Q15 Min PRN   Nor

## 2019-05-15 NOTE — PLAN OF CARE
Problem: Patient Centered Care  Goal: Patient preferences are identified and integrated in the patient's plan of care  Description  Interventions:  - What would you like us to know as we care for you?   - Provide timely, complete, and accurate informatio routines  Outcome: Progressing     Problem: CARDIOVASCULAR - ADULT  Goal: Maintains optimal cardiac output and hemodynamic stability  Description  INTERVENTIONS:  - Monitor vital signs, rhythm, and trends  - Monitor for bleeding, hypotension and signs of d function  - Maintain adequate hydration with IV or PO as ordered and tolerated  - Evaluate effectiveness of GI medications  - Encourage mobilization and activity  - Obtain nutritional consult as needed  - Establish a toileting routine/schedule  - Consider optimize cerebral perfusion and minimize risk of hemorrhage  - Monitor temperature, glucose, and sodium.  Initiate appropriate interventions as ordered  Outcome: Progressing     Problem: SAFETY ADULT - FALL  Goal: Free from fall injury  Description  INTERVE suction, minimal green output noted. Insulin gtt infusing, titrating hourly per order. Rectal tube in place, minimal brown, liquid output noted. Burks in place, minimal urine output noted. On levo drip titrated down to 2mcg. TPN infusing.  Morphine

## 2019-05-15 NOTE — PROGRESS NOTES
[place] [facility] - [facility]    Progress Note    Minerva Score Patient Status:  Inpatient    1960 MRN [id number]   Location [facility] 2W/SW Attending Michael Harrisr,  [facility] Se Day # 15 PCP LATIA Linares MD            Subjective:     Pt was r 71 67   AST 1,112* 373* 181*   * 682* 480*   BILT 1.3 1.7 1.8   TP 5.7* 5.8* 5.6*             Xr Abdomen (1 View) (cpt=74018)    Result Date: 5/13/2019  CONCLUSION:  1.  Interval decrease in poorly visualized moderately dilated large and small bowel with the gallbladder is unchanged. Further interrogation with ultrasound is suggested. Differential considerations include organizing hematoma, and less likely a stable abscess. 4. Nonspecific hazy inflammatory changes in the right lower quadrant fat.

## 2019-05-15 NOTE — PROGRESS NOTES
[facility] [address]     Gastroenterology Progress Note    Mary Alice Jha Patient Status:  Inpatient    1960 MRN [id number]   Location [facility] 2W/SW Attending Candida Parker, [address] [facility] Se Day # 15 PCP LATIA Slaughter MD       Sub there is a large gallstone in Gb. No clear evidence of acute cholecystitis.      #Acute respiratory failure - on vent, per primary team.  +Parainfluenza noted. #Acute renal failure    #Nutrition -- holding tube-feeds due to ileus. Continue TPN.       #CHF performed, following resolution of patient's symptoms a colonoscopy should be performed to exclude an underlying mass. Diffuse colonic ileus, minimally improved since 5/12/2019. No obstruction. Cirrhotic liver morphology.       Dictated by (CST): Valencia Cordero

## 2019-05-15 NOTE — PROGRESS NOTES
[place] [facility] - [facility]    Progress Note    Laureano Calderon Patient Status:  Inpatient    1960 MRN [id number]   Location [facility] 2W/SW Attending Lanette Bean, [address] [facility] Se Day # 12 PCP LATIA Maldonado MD       Subjective:   Laureano Calderon is (PITRESSIN) 20 Units in sodium chloride 0.9% 50 mL infusion for shock 0.01-0.04 Units/min Intravenous Continuous   Insulin Regular Human (NOVOLIN R) 100 Units in sodium chloride 0.9% 100 mL infusion 1-28 Units/hr Intravenous Continuous   Metoclopramide HCl (cpt=74018)    Result Date: 5/13/2019  CONCLUSION:  1. Interval decrease in poorly visualized moderately dilated large and small bowel with minimal air seen in the descending colon. Findings suggest some resolution of a diffuse abdominal ileus.   Correlate MD MARCO  5/14/2019

## 2019-05-15 NOTE — PROGRESS NOTES
Pulmonary/Critical Care Follow Up Note    HPI:   Esther Toro is a 60year old male with Patient presents with:  Fever (infectious)  Altered Mental Status (neurologic)      PCP LATIA Rocha MD  Admission Attending No admitting provider for patient encoun ophthalmic ointment, , Both Eyes, TID  •  propofol (DIPRIVAN) infusion, 5-100 mcg/kg/min (Dosing Weight), Intravenous, Continuous  •  levETIRAcetam (KEPPRA) 1,000 mg in sodium chloride 0.9% 100 mL IVPB, 1,000 mg, Intravenous, Q12H  •  bisacodyl (DULCOLAX) m), weight (!) 326 lb 11.6 oz (148.2 kg), SpO2 97 %.     Intake/Output Summary (Last 24 hours) at 5/15/2019 0943  Last data filed at 5/15/2019 0622  Gross per 24 hour   Intake 1436.75 ml   Output 250 ml   Net 1186.75 ml     NAD  Sedated and intubated but ca follows commands  Moving all extremities  Plan      Follow    Daily sedation hold     H/o CHF  Systolic EF 81% in 7012  Followed by cards  Now central congestion edema vs PNA  ECHO now with LV > 60%  Plan      Follow              aldactone stopped    Digox

## 2019-05-16 ENCOUNTER — APPOINTMENT (OUTPATIENT)
Dept: GENERAL RADIOLOGY | Facility: HOSPITAL | Age: 59
DRG: 003 | End: 2019-05-16
Attending: SURGERY
Payer: MEDICARE

## 2019-05-16 ENCOUNTER — APPOINTMENT (OUTPATIENT)
Dept: GENERAL RADIOLOGY | Facility: HOSPITAL | Age: 59
DRG: 003 | End: 2019-05-16
Attending: INTERNAL MEDICINE
Payer: MEDICARE

## 2019-05-16 LAB
ANION GAP SERPL CALC-SCNC: 14 MMOL/L (ref 0–18)
BASOPHILS # BLD AUTO: 0.02 X10(3) UL (ref 0–0.2)
BASOPHILS NFR BLD AUTO: 0.2 %
BUN BLD-MCNC: 85 MG/DL (ref 7–18)
BUN/CREAT SERPL: 14.6 (ref 10–20)
CALCIUM BLD-MCNC: 6.5 MG/DL (ref 8.5–10.1)
CHLORIDE SERPL-SCNC: 101 MMOL/L (ref 98–112)
CO2 SERPL-SCNC: 23 MMOL/L (ref 21–32)
CREAT BLD-MCNC: 5.84 MG/DL (ref 0.7–1.3)
DEPRECATED RDW RBC AUTO: 43.2 FL (ref 35.1–46.3)
EOSINOPHIL # BLD AUTO: 0.08 X10(3) UL (ref 0–0.7)
EOSINOPHIL NFR BLD AUTO: 0.9 %
ERYTHROCYTE [DISTWIDTH] IN BLOOD BY AUTOMATED COUNT: 14.6 % (ref 11–15)
GLUCOSE BLD-MCNC: 111 MG/DL (ref 70–99)
GLUCOSE BLDC GLUCOMTR-MCNC: 104 MG/DL (ref 70–99)
GLUCOSE BLDC GLUCOMTR-MCNC: 108 MG/DL (ref 70–99)
GLUCOSE BLDC GLUCOMTR-MCNC: 110 MG/DL (ref 70–99)
GLUCOSE BLDC GLUCOMTR-MCNC: 112 MG/DL (ref 70–99)
GLUCOSE BLDC GLUCOMTR-MCNC: 112 MG/DL (ref 70–99)
GLUCOSE BLDC GLUCOMTR-MCNC: 118 MG/DL (ref 70–99)
GLUCOSE BLDC GLUCOMTR-MCNC: 118 MG/DL (ref 70–99)
GLUCOSE BLDC GLUCOMTR-MCNC: 121 MG/DL (ref 70–99)
GLUCOSE BLDC GLUCOMTR-MCNC: 121 MG/DL (ref 70–99)
GLUCOSE BLDC GLUCOMTR-MCNC: 123 MG/DL (ref 70–99)
GLUCOSE BLDC GLUCOMTR-MCNC: 127 MG/DL (ref 70–99)
GLUCOSE BLDC GLUCOMTR-MCNC: 131 MG/DL (ref 70–99)
GLUCOSE BLDC GLUCOMTR-MCNC: 139 MG/DL (ref 70–99)
GLUCOSE BLDC GLUCOMTR-MCNC: 140 MG/DL (ref 70–99)
GLUCOSE BLDC GLUCOMTR-MCNC: 145 MG/DL (ref 70–99)
HAV IGM SER QL: 2.3 MG/DL (ref 1.6–2.6)
HCT VFR BLD AUTO: 25.7 % (ref 39–53)
HGB BLD-MCNC: 9 G/DL (ref 13–17.5)
IMM GRANULOCYTES # BLD AUTO: 0.38 X10(3) UL (ref 0–1)
IMM GRANULOCYTES NFR BLD: 4.2 %
LYMPHOCYTES # BLD AUTO: 0.82 X10(3) UL (ref 1–4)
LYMPHOCYTES NFR BLD AUTO: 9 %
MCH RBC QN AUTO: 30.7 PG (ref 26–34)
MCHC RBC AUTO-ENTMCNC: 35 G/DL (ref 31–37)
MCV RBC AUTO: 87.7 FL (ref 80–100)
MONOCYTES # BLD AUTO: 0.64 X10(3) UL (ref 0.1–1)
MONOCYTES NFR BLD AUTO: 7 %
NEUTROPHILS # BLD AUTO: 7.21 X10 (3) UL (ref 1.5–7.7)
NEUTROPHILS # BLD AUTO: 7.21 X10(3) UL (ref 1.5–7.7)
NEUTROPHILS NFR BLD AUTO: 78.7 %
OSMOLALITY SERPL CALC.SUM OF ELEC: 313 MOSM/KG (ref 275–295)
PHOSPHATE SERPL-MCNC: 5.8 MG/DL (ref 2.5–4.9)
PLATELET # BLD AUTO: 127 10(3)UL (ref 150–450)
POTASSIUM SERPL-SCNC: 3.2 MMOL/L (ref 3.5–5.1)
RBC # BLD AUTO: 2.93 X10(6)UL (ref 4.3–5.7)
SODIUM SERPL-SCNC: 138 MMOL/L (ref 136–145)
TRIGL SERPL-MCNC: 147 MG/DL (ref 30–149)
WBC # BLD AUTO: 9.2 X10(3) UL (ref 4–11)

## 2019-05-16 PROCEDURE — 0DJD8ZZ INSPECTION OF LOWER INTESTINAL TRACT, VIA NATURAL OR ARTIFICIAL OPENING ENDOSCOPIC: ICD-10-PCS | Performed by: INTERNAL MEDICINE

## 2019-05-16 PROCEDURE — 45380 COLONOSCOPY AND BIOPSY: CPT | Performed by: INTERNAL MEDICINE

## 2019-05-16 PROCEDURE — 0D7L8ZZ DILATION OF TRANSVERSE COLON, VIA NATURAL OR ARTIFICIAL OPENING ENDOSCOPIC: ICD-10-PCS | Performed by: INTERNAL MEDICINE

## 2019-05-16 PROCEDURE — 99232 SBSQ HOSP IP/OBS MODERATE 35: CPT | Performed by: INTERNAL MEDICINE

## 2019-05-16 PROCEDURE — 99291 CRITICAL CARE FIRST HOUR: CPT | Performed by: INTERNAL MEDICINE

## 2019-05-16 PROCEDURE — 74018 RADEX ABDOMEN 1 VIEW: CPT | Performed by: SURGERY

## 2019-05-16 PROCEDURE — 71045 X-RAY EXAM CHEST 1 VIEW: CPT | Performed by: INTERNAL MEDICINE

## 2019-05-16 PROCEDURE — 45393 COLONOSCOPY W/DECOMPRESSION: CPT | Performed by: INTERNAL MEDICINE

## 2019-05-16 PROCEDURE — 0DBL8ZX EXCISION OF TRANSVERSE COLON, VIA NATURAL OR ARTIFICIAL OPENING ENDOSCOPIC, DIAGNOSTIC: ICD-10-PCS | Performed by: INTERNAL MEDICINE

## 2019-05-16 PROCEDURE — 99233 SBSQ HOSP IP/OBS HIGH 50: CPT | Performed by: INTERNAL MEDICINE

## 2019-05-16 RX ORDER — POTASSIUM CHLORIDE 14.9 MG/ML
20 INJECTION INTRAVENOUS ONCE
Status: COMPLETED | OUTPATIENT
Start: 2019-05-16 | End: 2019-05-16

## 2019-05-16 RX ORDER — HEPARIN SODIUM 1000 [USP'U]/ML
INJECTION, SOLUTION INTRAVENOUS; SUBCUTANEOUS
Status: COMPLETED
Start: 2019-05-16 | End: 2019-05-16

## 2019-05-16 RX ORDER — DEXTROSE MONOHYDRATE 25 G/50ML
50 INJECTION, SOLUTION INTRAVENOUS AS NEEDED
Status: DISCONTINUED | OUTPATIENT
Start: 2019-05-16 | End: 2019-07-01

## 2019-05-16 NOTE — PROGRESS NOTES
Pulmonary/Critical Care Follow Up Note    HPI:   Kelsi Quiles is a 60year old male with Patient presents with:  Fever (infectious)  Altered Mental Status (neurologic)      PCP LATIA Mcclure MD  Admission Attending No admitting provider for patient encoun Tartrate (LOPRESSOR) 5 MG/5ML injection 5 mg, 5 mg, Intravenous, Q4H PRN  •  pregabalin (LYRICA) cap 100 mg, 100 mg, Oral, Nightly  •  Vancomycin HCl (FIRVANQ) 50 MG/ML oral solution 125 mg, 125 mg, Per NG Tube, Daily  •  balsam peru-castor oil (VENELEX) o Januvia [Sitaglipti*        Comment:GI upset, abdominal pain  Rocephin [Ceftriaxo*    UNKNOWN    Comment:Tolerated Zosyn  Thimerosal              RASH        PHYSICAL EXAM:   Blood pressure 124/69, pulse 78, temperature 98.1 °F (36.7 °C), temperature s urgency  Better  Now low with presumed septic shock  Plan      Held meds for now    Will need to add back    KOMAL  2nd episode  Now progressive and severe  Presumed from septic shock  Started on RRT  Low/noUOP  Plan  RRT per renal       Encephalopathy  Now

## 2019-05-16 NOTE — OPERATIVE REPORT
COLONOSCOPY REPORT    Kirill Hitchcock     1960 Age 60year old   PCP LATIA Bardales MD Endoscopist Mesha Moore MD     Date of procedure: 19    Procedure: Colonoscopy w/cold biopsy and decompression tube placement    Pre-operative diag was cavernous and I suctioned down the air inside as much possible. Minimal air insufflation was used. 4. A 14 Fr decompression tube with a suture at the tip was grasped with a endoclip and advanced along side the colonoscope to the splenic flexure.  The

## 2019-05-16 NOTE — PROGRESS NOTES
[place] [facility]    Progress Note      Subjective:     Remain intubated and awake     Off pressors       Review of Systems:     Unable to obtain - intubated and lethargic       Objective:   Temp:  [97.2 °F (36.2 °C)-98.3 °F (36.8 °C)] 98.2 °F ( (RELISTOR) injection 10.2 mg 0.07 mg/kg Subcutaneous Q48H   dextrose 10 % infusion  Intravenous Continuous PRN   meropenem (MERREM) 1 g in sodium chloride 0.9% 100 mL MBP 1 g Intravenous Q24H   metoprolol Tartrate (LOPRESSOR) 5 MG/5ML injection 5 mg 5 mg I Promethazine HCl (PHENERGAN) tab 12.5 mg 12.5 mg Oral Q4H PRN        Results:     Recent Labs   Lab 05/14/19  0825 05/15/19  0423 05/16/19  0528   RBC 3.20* 3.05* 2.93*   HGB 10.1* 9.4* 9.0*   HCT 29.3* 27.3* 25.7*   MCV 91.6 89.5 87.7   NEPRELIM 12.91* distention as above noted slightly improved from prior studies. 2. Contrast from recent CT scan in the sigmoid colon and rectum which is collapse. 3. Postop changes in the pelvis. 4. Nasogastric tube. 5. Endotracheal tube. 6. Cardiomegaly.  7. Bibasilar elicia catheter. Dictated by (CST): Maritza Blum MD on 5/14/2019 at 13:25     Approved by (CST): Maritza Blum MD on 5/14/2019 at 13:27                Assessment and Plan:      1.  KOMAL: oligoanuric  - KOMAL secondary to sepsis with multiorgan

## 2019-05-16 NOTE — PROGRESS NOTES
[facility]    Progress Note    Jai Carbajal Patient Status:  Inpatient    1960 MRN [id number]   Location [facility] 2W/SW Attending Gideon Lazo MD   Hosp Day # 14 PCP C. Stephani Boas, MD     Subjective:   Intubated, impr dextrose   - May also consider scheduled regular insulin every 6 hours    - Accuchecks Q2 hours   - Hypoglycemia protocol    Will follow    Carmina Ma  5/16/2019

## 2019-05-16 NOTE — PROGRESS NOTES
[place] [facility] - [facility]    Cardiology Progress Note  [facility]    Donelda Dakins Patient Status:  Inpatient    1960 MRN [id number]   Location [facility] 2W/SW Attending Shadi Todd, [address] [facility] Se Day # 14 PCP LATIA Florez (05/13/19 0048)   • insulin regular Stopped (05/16/19 0600)   • morphINE sulfate 0.5 mg/hr (05/16/19 0600)       Exam    Pulm: Lungs clear, normal respiratory effort  CV: Heart with regular rate and rhythm, no murmur, rub, extrasounds   Ext: No edema  Abd: space opacities suggestive of atelectasis is unchanged since the prior exam performed 5/12/2019.  Underlying pneumonia is felt to be less likely but is also in the differential.  Interval decrease without resolution of subcapsular hepatic fluid along the ri to a lesser degree the proximal descending colon with air in nondilated distal colon and rectum. Findings suggest probable colonic ileus about the same as was seen previously. No large free air collection.     Dictated by (CST): Ajay Macdonald MD on 5/14/

## 2019-05-16 NOTE — PLAN OF CARE
Patient still on ventilator at [facility] 62 50%. Patient alert to person and able to follow commands. Patient stable after HD yesterday; 2.5L removed. Patient vitals signs/blood pressure stable; off levo, vaso. Patient on morphine gtt for pain control.  Patient OG interventions  Outcome: Progressing     Problem: Delirium  Goal: Minimize duration of delirium  Description  Interventions:  - Encourage use of hearing aids, eye glasses  - Promote highest level of mobility daily  - Provide frequent reorientation  - Promot broncho-pulmonary hygiene including cough, deep breathe, Incentive Spirometry  - Assess the need for suctioning and perform as needed  - Assess and instruct to report SOB or any respiratory difficulty  - Respiratory Therapy support as indicated  - Manage/a document skin integrity  - Monitor for areas of redness and/or skin breakdown  - Initiate interventions, skin care algorithm/standards of care as needed  5/16/2019 0603 by Gila Luke, RN  Outcome: Progressing  5/16/2019 0600 by Gila Luke, RN  Outcome: Assess patient's ability to be responsible for managing their own health  - Refer to Case Management Department for coordinating discharge planning if the patient needs post-hospital services based on physician/LIP order or complex needs related to functio

## 2019-05-16 NOTE — PROGRESS NOTES
[place] [facility] - [facility]    Progress Note    Esther Toro Patient Status:  Inpatient    1960 MRN [id number]   Location [facility] 2W/SW Attending Laurance Goodpasture, [address] [facility] Se Day # 15 PCP LATIA Rocha MD       Subjective:   Esther Toro is (VENELEX) ointment  Topical BID PRN   metRONIDAZOLE in NaCl (FLAGYL) 5 mg/ml IVPB premix 500 mg 500 mg Intravenous Q8H   Norepinephrine Bitartrate (LEVOPHED) 16 mg in dextrose 5 % 250 mL infusion 0.5-8 mcg/min Intravenous Continuous   artificial tears 83-1 05/15/2019    CREATSERUM 7.59 (H) 05/15/2019     (H) 05/15/2019     05/15/2019    K 4.2 05/15/2019     05/15/2019    CO2 17.0 (L) 05/15/2019     (H) 05/15/2019    CA 6.2 (L) 05/15/2019    ALB 1.7 (L) 05/15/2019    ALKPHO 67 05/15/ Portable  (cpt=71045)    Result Date: 5/14/2019  CONCLUSION:  1. Successful placement of right internal jugular catheter.     Dictated by (CST): Faisal Roe MD on 5/14/2019 at 13:25     Approved by (CST): Faisal Roe MD on 5/14/2019

## 2019-05-16 NOTE — PROGRESS NOTES
[facility] [address]     Gastroenterology Progress Note    [facility] Patient Status:  Inpatient    1960 MRN [id number]   Location [facility] 2W/SW Attending Ruthann Amaya,  [facility] Se Day # 14 PCP LATIA Velazquez MD       Sub    #Acute respiratory failure - on vent, per primary team.  +Parainfluenza noted. #Acute renal failure    #Nutrition -- holding tube-feeds due to ileus. Continue TPN.       #CHF/Afib - per cardiology     #Coffee-ground emesis- likely 2/2 MWT or gastriti unchanged. Bilateral lower lobe air space opacities suggestive of atelectasis is unchanged since the prior exam performed 5/12/2019.  Underlying pneumonia is felt to be less likely but is also in the differential.  Interval decrease without resolution of lopes

## 2019-05-16 NOTE — DIETARY NOTE
Follow up Nutrition Note       Followed up. Blair TPN well. BG controlling. Off pressors. Weaning from Insulin GTT stopped. Levemir increased to 55 units. Read Dr. Mert Benavides notes.  Followed orders to increase Dextrose from 176g to 225 g and Insulin from 40

## 2019-05-16 NOTE — INTERVAL H&P NOTE
Pre-op Diagnosis: illeus    The above referenced H&P was reviewed by Zafar Shelby MD on 5/16/2019, the patient was examined and no significant changes have occurred in the patient's condition since the H&P was performed.   I discussed with the patient an

## 2019-05-16 NOTE — PLAN OF CARE
Problem: Patient Centered Care  Goal: Patient preferences are identified and integrated in the patient's plan of care  Description  Interventions:  - What would you like us to know as we care for you?   - Provide timely, complete, and accurate informatio bleeding, hypotension and signs of decreased cardiac output  - Evaluate effectiveness of vasoactive medications to optimize hemodynamic stability  - Monitor arterial and/or venous puncture sites for bleeding and/or hematoma  - Assess quality of pulses, ski toileting routine/schedule  - Consider collaborating with pharmacy to review patient's medication profile  Outcome: Progressing     Problem: METABOLIC/FLUID AND ELECTROLYTES - ADULT  Goal: Glucose maintained within prescribed range  Description  INTERVENTI from fall injury  Description  INTERVENTIONS:  - Assess pt frequently for physical needs  - Identify cognitive and physical deficits and behaviors that affect risk of falls.   - [place] fall precautions as indicated by assessment.  - Educate pt/family on

## 2019-05-16 NOTE — PROGRESS NOTES
[facility]    Progress Note    Dayanara Jordan Patient Status:  Inpatient    1960 MRN [id number]   Location [facility] 2W/SW Attending Betty Drake, [address] [facility] Se Day # 15 PCP LATIA Guan MD       Subjective:   Dayanara Grigsbyquoc is 05/11/2019       Imaging:  [unfilled]   Ct Chest+abdomen+pelvis(cpt=71250/03569)    Result Date: 5/15/2019  CONCLUSION:   Small bilateral pleural effusions are unchanged.  Bilateral lower lobe air space opacities suggestive of atelectasis is unchanged s Francoise Matute MD on 5/14/2019 at 16:30          Xr Abdomen 2 Views(cpt=74019)    Result Date: 5/14/2019  CONCLUSION:  1.  Continued moderate air-filled distention of the ascending and transverse colon, and to a lesser degree the proximal descending colon with a

## 2019-05-17 ENCOUNTER — APPOINTMENT (OUTPATIENT)
Dept: GENERAL RADIOLOGY | Facility: HOSPITAL | Age: 59
DRG: 003 | End: 2019-05-17
Attending: INTERNAL MEDICINE
Payer: MEDICARE

## 2019-05-17 LAB
ANION GAP SERPL CALC-SCNC: 15 MMOL/L (ref 0–18)
BASOPHILS # BLD: 0 X10(3) UL (ref 0–0.2)
BASOPHILS NFR BLD: 0 %
BUN BLD-MCNC: 108 MG/DL (ref 7–18)
BUN/CREAT SERPL: 15 (ref 10–20)
CALCIUM BLD-MCNC: 6.7 MG/DL (ref 8.5–10.1)
CHLORIDE SERPL-SCNC: 99 MMOL/L (ref 98–112)
CO2 SERPL-SCNC: 22 MMOL/L (ref 21–32)
CREAT BLD-MCNC: 7.22 MG/DL (ref 0.7–1.3)
DEPRECATED RDW RBC AUTO: 46.6 FL (ref 35.1–46.3)
EOSINOPHIL # BLD: 0 X10(3) UL (ref 0–0.7)
EOSINOPHIL NFR BLD: 0 %
ERYTHROCYTE [DISTWIDTH] IN BLOOD BY AUTOMATED COUNT: 15 % (ref 11–15)
GLUCOSE BLD-MCNC: 127 MG/DL (ref 70–99)
GLUCOSE BLDC GLUCOMTR-MCNC: 119 MG/DL (ref 70–99)
GLUCOSE BLDC GLUCOMTR-MCNC: 135 MG/DL (ref 70–99)
GLUCOSE BLDC GLUCOMTR-MCNC: 160 MG/DL (ref 70–99)
GLUCOSE BLDC GLUCOMTR-MCNC: 161 MG/DL (ref 70–99)
HAV IGM SER QL: 2.4 MG/DL (ref 1.6–2.6)
HCT VFR BLD AUTO: 25.6 % (ref 39–53)
HGB BLD-MCNC: 8.8 G/DL (ref 13–17.5)
LYMPHOCYTES NFR BLD: 1.57 X10(3) UL (ref 1–4)
LYMPHOCYTES NFR BLD: 13 %
MCH RBC QN AUTO: 31 PG (ref 26–34)
MCHC RBC AUTO-ENTMCNC: 34.4 G/DL (ref 31–37)
MCV RBC AUTO: 90.1 FL (ref 80–100)
METAMYELOCYTES # BLD: 0.12 X10(3) UL
METAMYELOCYTES NFR BLD: 1 %
MONOCYTES # BLD: 0.73 X10(3) UL (ref 0.1–1)
MONOCYTES NFR BLD: 6 %
MORPHOLOGY: NORMAL
NEUTROPHILS # BLD AUTO: 8.55 X10 (3) UL (ref 1.5–7.7)
NEUTROPHILS NFR BLD: 73 %
NEUTS BAND NFR BLD: 7 %
NEUTS HYPERSEG # BLD: 9.68 X10(3) UL (ref 1.5–7.7)
OSMOLALITY SERPL CALC.SUM OF ELEC: 318 MOSM/KG (ref 275–295)
PHOSPHATE SERPL-MCNC: 6.9 MG/DL (ref 2.5–4.9)
PLATELET # BLD AUTO: 145 10(3)UL (ref 150–450)
PLATELET MORPHOLOGY: NORMAL
POTASSIUM SERPL-SCNC: 3.5 MMOL/L (ref 3.5–5.1)
RBC # BLD AUTO: 2.84 X10(6)UL (ref 4.3–5.7)
SODIUM SERPL-SCNC: 136 MMOL/L (ref 136–145)
TOTAL CELLS COUNTED: 100
VANCOMYCIN SERPL-MCNC: 21.8 UG/ML
WBC # BLD AUTO: 12.1 X10(3) UL (ref 4–11)

## 2019-05-17 PROCEDURE — 99232 SBSQ HOSP IP/OBS MODERATE 35: CPT | Performed by: INTERNAL MEDICINE

## 2019-05-17 PROCEDURE — 99233 SBSQ HOSP IP/OBS HIGH 50: CPT | Performed by: INTERNAL MEDICINE

## 2019-05-17 PROCEDURE — 99291 CRITICAL CARE FIRST HOUR: CPT | Performed by: INTERNAL MEDICINE

## 2019-05-17 PROCEDURE — 74019 RADEX ABDOMEN 2 VIEWS: CPT | Performed by: INTERNAL MEDICINE

## 2019-05-17 PROCEDURE — 74018 RADEX ABDOMEN 1 VIEW: CPT | Performed by: INTERNAL MEDICINE

## 2019-05-17 NOTE — PROGRESS NOTES
[facility] [address]     Gastroenterology Progress Note    Migdalia Evans Patient Status:  Inpatient    1960 MRN [id number]   Location [facility] 2W/SW Attending Chioma Marks,  [facility] Se Day # 15 PCP LATIA Felipe MD       Sub abx.    #Acute respiratory failure - on vent, per primary team.  +Parainfluenza noted. #Acute renal failure    #Nutrition -- holding tube-feeds due to ileus. Continue TPN.       #CHF/Afib - per cardiology     #Coffee-ground emesis- likely 2/2 MWT or gas distention as above noted slightly improved from prior studies. 2. Contrast from recent CT scan in the sigmoid colon and rectum which is collapse. 3. Postop changes in the pelvis. 4. Nasogastric tube. 5. Endotracheal tube. 6. Cardiomegaly.  7. Bibasilar elicia

## 2019-05-17 NOTE — PROGRESS NOTES
RESPIRATORY THERAPY MECHANICAL VENTILATION PROGRESS NOTE    Ventilator Weaning:  Patient meets criteria for weaning? yes Weaning was attempted yes using pressure support 5 cmH2O + PEEP 5 cmH2O.    Dialysis done this evening, start CPAP trial from 1445, cpap

## 2019-05-17 NOTE — PROGRESS NOTES
Pulmonary/Critical Care Follow Up Note    HPI:   Guido Cui is a 60year old male with Patient presents with:  Fever (infectious)  Altered Mental Status (neurologic)      PCP LATIA Stevens MD  Admission Attending No admitting provider for patient encoun sodium chloride 0.9% 100 mL MBP, 1 g, Intravenous, Q24H  •  metoprolol Tartrate (LOPRESSOR) 5 MG/5ML injection 5 mg, 5 mg, Intravenous, Q4H PRN  •  pregabalin (LYRICA) cap 100 mg, 100 mg, Oral, Nightly  •  Vancomycin HCl (FIRVANQ) 50 MG/ML oral solution 12 RASH        PHYSICAL EXAM:   Blood pressure 132/56, pulse 65, temperature 97.2 °F (36.2 °C), temperature source Tympanic, resp. rate 26, height 5' 5.67\" (1.668 m), weight (!) 327 lb 6.1 oz (148.5 kg), SpO2 94 %.     Intake/Output Summary (Last 24 hours) a tube  abd exam about the same  AXR about the same  Plan decompression    Abx   NPO   relistor vs iv erythromycin- on erythromycin          HTN urgency  Better  Now low with presumed septic shock  Plan      Held meds for now       KOMAL  2nd episode  Now prog

## 2019-05-17 NOTE — PROGRESS NOTES
[facility]    Progress Note      Subjective:     Remain intubated and on morphine gtt     Off pressors       Review of Systems:     Unable to obtain - intubated and lethargic       Objective:   Temp:  [97.1 °F (36.2 °C)-98.7 °F (37.1 °C)] Subcutaneous Q48H   dextrose 10 % infusion  Intravenous Continuous PRN   meropenem (MERREM) 1 g in sodium chloride 0.9% 100 mL MBP 1 g Intravenous Q24H   metoprolol Tartrate (LOPRESSOR) 5 MG/5ML injection 5 mg 5 mg Intravenous Q4H PRN   pregabalin (LYRICA) 8.55*   WBC 10.3 9.2 12.1*   .0* 127.0* 145.0*     Recent Labs   Lab 05/13/19  0329 05/14/19  0825 05/15/19  0423 05/16/19  0528 05/17/19  0533   * 134* 189* 111* 127*   BUN 93* 119* 100* 85* 108*   CREATSERUM 7.78* 9.92* 7.59* 5.84* 7.22* Postop changes in the pelvis. 4. Nasogastric tube. 5. Endotracheal tube. 6. Cardiomegaly. 7. Bibasilar lung consolidation. Dictated by (CST): Gabriela Ko MD on 5/16/2019 at 8:43     Approved by (CST):  Gabriela Ko MD on 5/16/2019 at 8:51 Septic shock  - parainfluenza +ve and pneumonia on admission with SBO/ileus   - likely recent shock from abdominal process / aspiration pneumonia  - s/p decompressive colonoscopy yesterday - acute colonic pseudo-obstruction with moderate colitis.  S/p sucti

## 2019-05-17 NOTE — PLAN OF CARE
Patient remains intubated, Anuric overnight, dialysis likely today, No output from rectal tube past midnight, antibiotics as ordered, Morphine gtt continued.

## 2019-05-17 NOTE — PROGRESS NOTES
Patient currently normal sinus rhythm, no further cardiac recommendations. Will sign off, please call with questions.

## 2019-05-17 NOTE — PROGRESS NOTES
[facility]    Progress Note    Rm Calle Patient Status:  Inpatient    1960 MRN [id number]   Location [facility] 2W/SW Attending Baron Collins MD   Hosp Day # 15 PCP LATIA Mcmahon MD     Subjective:   Intubated, impr

## 2019-05-18 LAB
ANION GAP SERPL CALC-SCNC: 13 MMOL/L (ref 0–18)
BASOPHILS # BLD: 0 X10(3) UL (ref 0–0.2)
BASOPHILS NFR BLD: 0 %
BUN BLD-MCNC: 87 MG/DL (ref 7–18)
BUN/CREAT SERPL: 14.8 (ref 10–20)
CALCIUM BLD-MCNC: 7.4 MG/DL (ref 8.5–10.1)
CHLORIDE SERPL-SCNC: 104 MMOL/L (ref 98–112)
CO2 SERPL-SCNC: 22 MMOL/L (ref 21–32)
CREAT BLD-MCNC: 5.86 MG/DL (ref 0.7–1.3)
DEPRECATED RDW RBC AUTO: 50.4 FL (ref 35.1–46.3)
EOSINOPHIL # BLD: 0.31 X10(3) UL (ref 0–0.7)
EOSINOPHIL NFR BLD: 3 %
ERYTHROCYTE [DISTWIDTH] IN BLOOD BY AUTOMATED COUNT: 15.9 % (ref 11–15)
GLUCOSE BLD-MCNC: 157 MG/DL (ref 70–99)
GLUCOSE BLDC GLUCOMTR-MCNC: 161 MG/DL (ref 70–99)
GLUCOSE BLDC GLUCOMTR-MCNC: 163 MG/DL (ref 70–99)
GLUCOSE BLDC GLUCOMTR-MCNC: 164 MG/DL (ref 70–99)
GLUCOSE BLDC GLUCOMTR-MCNC: 173 MG/DL (ref 70–99)
HAV IGM SER QL: 2.3 MG/DL (ref 1.6–2.6)
HCT VFR BLD AUTO: 26.5 % (ref 39–53)
HGB BLD-MCNC: 8.9 G/DL (ref 13–17.5)
LYMPHOCYTES NFR BLD: 0.73 X10(3) UL (ref 1–4)
LYMPHOCYTES NFR BLD: 7 %
MCH RBC QN AUTO: 31.2 PG (ref 26–34)
MCHC RBC AUTO-ENTMCNC: 33.6 G/DL (ref 31–37)
MCV RBC AUTO: 93 FL (ref 80–100)
METAMYELOCYTES # BLD: 0.1 X10(3) UL
METAMYELOCYTES NFR BLD: 1 %
MONOCYTES # BLD: 0.62 X10(3) UL (ref 0.1–1)
MONOCYTES NFR BLD: 6 %
MORPHOLOGY: NORMAL
NEUTROPHILS # BLD AUTO: 7.7 X10 (3) UL (ref 1.5–7.7)
NEUTROPHILS NFR BLD: 72 %
NEUTS BAND NFR BLD: 11 %
NEUTS HYPERSEG # BLD: 8.63 X10(3) UL (ref 1.5–7.7)
OSMOLALITY SERPL CALC.SUM OF ELEC: 318 MOSM/KG (ref 275–295)
PHOSPHATE SERPL-MCNC: 5.7 MG/DL (ref 2.5–4.9)
PLATELET # BLD AUTO: 132 10(3)UL (ref 150–450)
PLATELET MORPHOLOGY: NORMAL
POTASSIUM SERPL-SCNC: 3.7 MMOL/L (ref 3.5–5.1)
RBC # BLD AUTO: 2.85 X10(6)UL (ref 4.3–5.7)
SODIUM SERPL-SCNC: 139 MMOL/L (ref 136–145)
TOTAL CELLS COUNTED: 100
WBC # BLD AUTO: 10.4 X10(3) UL (ref 4–11)

## 2019-05-18 PROCEDURE — 99233 SBSQ HOSP IP/OBS HIGH 50: CPT | Performed by: INTERNAL MEDICINE

## 2019-05-18 PROCEDURE — 99232 SBSQ HOSP IP/OBS MODERATE 35: CPT | Performed by: INTERNAL MEDICINE

## 2019-05-18 RX ORDER — ALBUMIN (HUMAN) 12.5 G/50ML
100 SOLUTION INTRAVENOUS AS NEEDED
Status: DISCONTINUED | OUTPATIENT
Start: 2019-05-18 | End: 2019-05-18

## 2019-05-18 RX ORDER — HEPARIN SODIUM 1000 [USP'U]/ML
INJECTION, SOLUTION INTRAVENOUS; SUBCUTANEOUS
Status: COMPLETED
Start: 2019-05-18 | End: 2019-05-18

## 2019-05-18 RX ORDER — HEPARIN SODIUM 5000 [USP'U]/ML
5000 INJECTION, SOLUTION INTRAVENOUS; SUBCUTANEOUS EVERY 8 HOURS SCHEDULED
Status: DISCONTINUED | OUTPATIENT
Start: 2019-05-18 | End: 2019-05-28

## 2019-05-18 RX ORDER — ALBUMIN (HUMAN) 12.5 G/50ML
100 SOLUTION INTRAVENOUS AS NEEDED
Status: DISCONTINUED | OUTPATIENT
Start: 2019-05-18 | End: 2019-05-25

## 2019-05-18 NOTE — PROGRESS NOTES
[address]     Gastroenterology Progress Note    Gustavo Dixon Patient Status:  Inpatient    1960 MRN [id number]   Location [facility] 2W/SW Attending Bonny Murillo,  [facility] Se Day # 16 PCP LATIA Gonzalez MD       Ass 05/18/19 0500 104/59 — — 63 24 96 % — —   05/18/19 0400 103/57 97.3 °F (36.3 °C) Temporal 62 24 96 % — —   05/18/19 0318 — — — 62 24 96 % — —   05/18/19 0300 110/55 — — 62 26 96 % — —   05/18/19 0200 119/61 — — 69 (!) 28 98 % — —   05/18/19 0100 123/61 — GFRAA 8* 6* 8* 11* 9* 11*   GFRNAA 7* 5* 7* 10* 8* 10*   CA 5.7* 5.7* 6.2* 6.5* 6.7* 7.4*   ALB 1.9* 1.7* 1.7*  --   --   --    * 133* 136 138 136 139   K 4.8 5.4* 4.2 3.2* 3.5 3.7    98 102 101 99 104   CO2 17.0* 14.0* 17.0* 23.0 22.0 22.0 resolution of subcapsular hepatic fluid along the right anterior hepatic lobe. Cecal wall thickening with inflammatory fat stranding within the right lower quadrant suspicious for colitis.  If not recently performed, following resolution of patient's sympt

## 2019-05-18 NOTE — PROGRESS NOTES
[facility]    Progress Note    Julian Shepherd Patient Status:  Inpatient    1960 MRN [id number]   Location [facility] 2W/SW Attending Anuj Coker, [address] [facility] Se Day # 16 PCP C. Auther Boast, MD            Subjective:     Patient 134* 189* 111* 127* 157*   BUN 93* 119* 100* 85* 108* 87*   CREATSERUM 7.78* 9.92* 7.59* 5.84* 7.22* 5.86*   GFRAA 8* 6* 8* 11* 9* 11*   GFRNAA 7* 5* 7* 10* 8* 10*   CA 5.7* 5.7* 6.2* 6.5* 6.7* 7.4*   ALB 1.9* 1.7* 1.7*  --   --   --    * 133* 136 13 stomach. 10. Atherosclerotic vascular calcification including coronary artery calcification. 11. Endotracheal tube tip just above the christian. 12. Burks catheter. 13. Right venous catheter with tip at RA SVC junction.   14. Prior granulomatous disease in as tight. He does not have any tenderness morning of my exam.  The plan is to continue observation. Continue hyperalimentation for the present time.     Patient seen and examined yesterday April 16, had spoke with Dr. Lynsey Austin, I have encouraged him that he

## 2019-05-18 NOTE — PROGRESS NOTES
Pulmonary/Critical Care Follow Up Note    HPI:   Aime Thomas is a 60year old male with Patient presents with:  Fever (infectious)  Altered Mental Status (neurologic)      PCP LATIA Gonzales MD  Admission Attending No admitting provider for patient encoun (LOPRESSOR) 5 MG/5ML injection 5 mg, 5 mg, Intravenous, Q4H PRN  •  pregabalin (LYRICA) cap 100 mg, 100 mg, Oral, Nightly  •  Vancomycin HCl (FIRVANQ) 50 MG/ML oral solution 125 mg, 125 mg, Per NG Tube, Daily  •  balsam peru-castor oil (VENELEX) ointment, °F (36.8 °C), temperature source Temporal, resp. rate (!) 28, height 5' 5.67\" (1.668 m), weight (!) 324 lb 11.8 oz (147.3 kg), SpO2 96 %.     Intake/Output Summary (Last 24 hours) at 5/18/2019 1815  Last data filed at 5/18/2019 1700  Gross per 24 hour   In episode  Now progressive and severe  Presumed from septic shock  Started on RRT  Low/no UOP  - 4 L today  Plan    RRT per renal          Encephalopathy  Now seems baseline but sedated  Plan      Follow    Daily sedation hold     H/o CHF  Systolic EF 71% in

## 2019-05-18 NOTE — PROGRESS NOTES
[facility]    Progress Note    Corrine Romero Patient Status:  Inpatient    1960 MRN [id number]   Location [facility] 2W/SW Attending Deborah Maya MD   Hosp Day # 16 PCP LATIA Heart MD     Subjective:   Intubated    Sumi Diez

## 2019-05-18 NOTE — PROGRESS NOTES
[facility]    Progress Note    Julian Shepherd Patient Status:  Inpatient    1960 MRN [id number]   Location [facility] 2W/SW Attending Anuj Coker, [address] [facility] Se Day # 15 PCP C. Auther Boast, MD            Subjective:     Patient GFRAA 8* 6* 8* 11* 9*   GFRNAA 7* 5* 7* 10* 8*   CA 5.7* 5.7* 6.2* 6.5* 6.7*   ALB 1.9* 1.7* 1.7*  --   --    * 133* 136 138 136   K 4.8 5.4* 4.2 3.2* 3.5    98 102 101 99   CO2 17.0* 14.0* 17.0* 23.0 22.0   ALKPHO 76 71 67  --   --    AST 1, of the right middle lobe. 3. A subcapsular hepatic fluid collection contiguous with the gallbladder is unchanged. Further interrogation with ultrasound is suggested.   Differential considerations include organizing hematoma, and less likely a stable absce anterior hepatic lobe. Cecal wall thickening with inflammatory fat stranding within the right lower quadrant suspicious for colitis.  If not recently performed, following resolution of patient's symptoms a colonoscopy should be performed to exclude an unde

## 2019-05-18 NOTE — PLAN OF CARE
Problem: Patient Centered Care  Goal: Patient preferences are identified and integrated in the patient's plan of care  Description  Interventions:  - What would you like us to know as we care for you? Per brother pt is  and lives with his wife.   - or close your eyes.       Problem: CARDIOVASCULAR - ADULT  Goal: Maintains optimal cardiac output and hemodynamic stability  Description  INTERVENTIONS:  - Monitor vital signs, rhythm, and trends  - Monitor for bleeding, hypotension and signs of decreased c Establish a toileting routine/schedule  - Consider collaborating with pharmacy to review patient's medication profile  Outcome: Progressing  Note:   Rectal tube in place for decompression, draining sm amt liquid brown stool.       Problem: METABOLIC/FLUID A hemorrhage  - Monitor temperature, glucose, and sodium. Initiate appropriate interventions as ordered  Outcome: Progressing  Note:   Awake, opens eyes and tracks, follows simple commands.  Appears too weak to move extremities; does not move hands or feet to

## 2019-05-18 NOTE — PLAN OF CARE
Problem: Patient Centered Care  Goal: Patient preferences are identified and integrated in the patient's plan of care  Description  Interventions:  - What would you like us to know as we care for you? Patient and family want to be involved in his care. cycle i.e. lights off, TV off, minimize noise and interruptions  - Encourage family to assist in orientation and promotion of home routines  Outcome: Progressing     Problem: CARDIOVASCULAR - ADULT  Goal: Maintains optimal cardiac output and hemodynamic st to adequate nutritional intake and initiate nutrition consult as needed  - Instruct patient on self management of diabetes  Outcome: Progressing     Problem: SKIN/TISSUE INTEGRITY - ADULT  Goal: Skin integrity remains intact  Description  INTERVENTIONS  - Identify discharge learning needs (meds, wound care, etc)  - Arrange for interpreters to assist at discharge as needed  - Consider post-discharge preferences of patient/family/discharge partner  - Complete POLST form as appropriate  - Assess patient's abil

## 2019-05-19 ENCOUNTER — APPOINTMENT (OUTPATIENT)
Dept: GENERAL RADIOLOGY | Facility: HOSPITAL | Age: 59
DRG: 003 | End: 2019-05-19
Attending: SURGERY
Payer: MEDICARE

## 2019-05-19 LAB
ANION GAP SERPL CALC-SCNC: 16 MMOL/L (ref 0–18)
BASOPHILS # BLD: 0 X10(3) UL (ref 0–0.2)
BASOPHILS NFR BLD: 0 %
BUN BLD-MCNC: 118 MG/DL (ref 7–18)
BUN/CREAT SERPL: 16.6 (ref 10–20)
CALCIUM BLD-MCNC: 7.8 MG/DL (ref 8.5–10.1)
CHLORIDE SERPL-SCNC: 102 MMOL/L (ref 98–112)
CO2 SERPL-SCNC: 21 MMOL/L (ref 21–32)
CREAT BLD-MCNC: 7.09 MG/DL (ref 0.7–1.3)
DEPRECATED RDW RBC AUTO: 51.5 FL (ref 35.1–46.3)
EOSINOPHIL # BLD: 0.25 X10(3) UL (ref 0–0.7)
EOSINOPHIL NFR BLD: 3 %
ERYTHROCYTE [DISTWIDTH] IN BLOOD BY AUTOMATED COUNT: 16.9 % (ref 11–15)
GLUCOSE BLD-MCNC: 141 MG/DL (ref 70–99)
GLUCOSE BLDC GLUCOMTR-MCNC: 111 MG/DL (ref 70–99)
GLUCOSE BLDC GLUCOMTR-MCNC: 144 MG/DL (ref 70–99)
GLUCOSE BLDC GLUCOMTR-MCNC: 155 MG/DL (ref 70–99)
GLUCOSE BLDC GLUCOMTR-MCNC: 63 MG/DL (ref 70–99)
HAV IGM SER QL: 2.2 MG/DL (ref 1.6–2.6)
HCT VFR BLD AUTO: 24.4 % (ref 39–53)
HGB BLD-MCNC: 8.3 G/DL (ref 13–17.5)
LYMPHOCYTES NFR BLD: 0.98 X10(3) UL (ref 1–4)
LYMPHOCYTES NFR BLD: 12 %
MCH RBC QN AUTO: 31.9 PG (ref 26–34)
MCHC RBC AUTO-ENTMCNC: 34 G/DL (ref 31–37)
MCV RBC AUTO: 93.8 FL (ref 80–100)
METAMYELOCYTES # BLD: 0.08 X10(3) UL
METAMYELOCYTES NFR BLD: 1 %
MONOCYTES # BLD: 0.33 X10(3) UL (ref 0.1–1)
MONOCYTES NFR BLD: 4 %
MORPHOLOGY: NORMAL
NEUTROPHILS # BLD AUTO: 6.07 X10 (3) UL (ref 1.5–7.7)
NEUTROPHILS NFR BLD: 75 %
NEUTS BAND NFR BLD: 5 %
NEUTS HYPERSEG # BLD: 6.56 X10(3) UL (ref 1.5–7.7)
OSMOLALITY SERPL CALC.SUM OF ELEC: 328 MOSM/KG (ref 275–295)
PHOSPHATE SERPL-MCNC: 7.3 MG/DL (ref 2.5–4.9)
PLATELET # BLD AUTO: 118 10(3)UL (ref 150–450)
PLATELET MORPHOLOGY: NORMAL
POTASSIUM SERPL-SCNC: 4.1 MMOL/L (ref 3.5–5.1)
RBC # BLD AUTO: 2.6 X10(6)UL (ref 4.3–5.7)
SODIUM SERPL-SCNC: 139 MMOL/L (ref 136–145)
TOTAL CELLS COUNTED: 100
WBC # BLD AUTO: 8.2 X10(3) UL (ref 4–11)

## 2019-05-19 PROCEDURE — 74018 RADEX ABDOMEN 1 VIEW: CPT | Performed by: SURGERY

## 2019-05-19 PROCEDURE — 99291 CRITICAL CARE FIRST HOUR: CPT | Performed by: INTERNAL MEDICINE

## 2019-05-19 PROCEDURE — 99232 SBSQ HOSP IP/OBS MODERATE 35: CPT | Performed by: INTERNAL MEDICINE

## 2019-05-19 PROCEDURE — 99233 SBSQ HOSP IP/OBS HIGH 50: CPT | Performed by: INTERNAL MEDICINE

## 2019-05-19 RX ORDER — HEPARIN SODIUM 1000 [USP'U]/ML
INJECTION, SOLUTION INTRAVENOUS; SUBCUTANEOUS
Status: COMPLETED
Start: 2019-05-19 | End: 2019-05-19

## 2019-05-19 NOTE — PROGRESS NOTES
[facility]    Progress Note    Vanessa Tavarez Patient Status:  Inpatient    1960 MRN [id number]   Location [facility] 2W/SW Attending Jodie Bell MD   Hosp Day # 16 PCP LATIA Campbell MD     Subjective:   Intubated    Rich Delatorre

## 2019-05-19 NOTE — PROGRESS NOTES
[facility]    Progress Note    Carmen Castellanos Patient Status:  Inpatient    1960 MRN [id number]   Location [facility] 2W/SW Attending Marilee Chan MD   Hosp Day # 18 PCP LATIA Alcantara MD     Subjective:   Intubated    Sukhi Henao

## 2019-05-19 NOTE — PROGRESS NOTES
[facility]    Progress Note    Roel Alexandra Patient Status:  Inpatient    1960 MRN [id number]   Location [facility] 2W/SW Attending Quan Arreguin, [address] [facility] Se Day # 16 PCP LATIA Britt MD            Subjective:     Patient 119* 100*   < > 108* 87* 118*   CREATSERUM 7.78* 9.92* 7.59*   < > 7.22* 5.86* 7.09*   GFRAA 8* 6* 8*   < > 9* 11* 9*   GFRNAA 7* 5* 7*   < > 8* 10* 8*   CA 5.7* 5.7* 6.2*   < > 6.7* 7.4* 7.8*   ALB 1.9* 1.7* 1.7*  --   --   --   --    * 133* 136   < obstruction) (HCC) vs ileus  Septic shock, shock liver, ARF, Coagulopathy  Diarrhea - r/o C.  Diff  Leucocytosis  Appeared to be more likely ileus initially  - CT scan with majority of the large bowel with gas and fluid initially      Ct showed 5/12 no evid

## 2019-05-19 NOTE — PROGRESS NOTES
[facility]    Progress Note    Gustavo Dixon Patient Status:  Inpatient    1960 MRN [id number]   Location [facility] 2W/SW Attending Bonny Murillo, [address] [facility] Se Day # 16 PCP LATIA Gonzalez MD       Subjective:   Gustavo Dixon is Decompressive rectal tube extends up the left: Into the mid transverse colon with significant decompression of the previously noted markedly distended colon.   Diffuse colonic distention has diminished since 5/17/2019. 2. Residual contrast in the left colo

## 2019-05-19 NOTE — PLAN OF CARE
Problem: Delirium  Goal: Minimize duration of delirium  Description  Interventions:  - Encourage use of hearing aids, eye glasses  - Promote highest level of mobility daily  - Provide frequent reorientation  - Promote wakefulness i.e. lights on, blinds o supplementation based on oxygen saturation or ABGs  - Provide Smoking Cessation handout, if applicable  - Encourage broncho-pulmonary hygiene including cough, deep breathe, Incentive Spirometry  - Assess the need for suctioning and perform as needed  - Ass algorithm/standards of care as needed  Outcome: Progressing     Problem: NEUROLOGICAL - ADULT  Goal: Achieves stable or improved neurological status  Description  INTERVENTIONS  - Assess for and report changes in neurological status  - Initiate measures to physician/LIP order or complex needs related to functional status, cognitive ability or social support system  Outcome: Progressing

## 2019-05-19 NOTE — PLAN OF CARE
VSS. Open eyes by voice. Minimal following commands. Not being able to move his extremities  No pain noted. In Junctional rhythm w/ occ PVC's. ETT remains, well tolerating CPAP, PS 10 wo any s/s of resp distress.    OG inplace @ 53cm, on LIS;  bile/yellow delirium  Description  Interventions:  - Encourage use of hearing aids, eye glasses  - Promote highest level of mobility daily  - Provide frequent reorientation  - Promote wakefulness i.e. lights on, blinds open  - Promote sleep, encourage patient's normal Assess the need for suctioning and perform as needed  - Assess and instruct to report SOB or any respiratory difficulty  - Respiratory Therapy support as indicated  - Manage/alleviate anxiety  - Monitor for signs/symptoms of CO2 retention  Outcome: Rachelle Initiate interventions, skin care algorithm/standards of care as needed  Outcome: Progressing     Problem: NEUROLOGICAL - ADULT  Goal: Achieves stable or improved neurological status  Description  INTERVENTIONS  - Assess for and report changes in neurologi post-hospital services based on physician/LIP order or complex needs related to functional status, cognitive ability or social support system  Outcome: Progressing

## 2019-05-19 NOTE — PROGRESS NOTES
[facility] [address]     Gastroenterology Progress Note    Saida Minekasandra Patient Status:  Inpatient    1960 MRN [id number]   Location [facility] 2W/SW Attending Kenisha Chávez, [address] [facility] Day # 16 PCP LATIA Long MD       Ass 120/54 — — 59 26 98 % —   05/18/19 1800 122/58 — — 62 (!) 30 97 % —   05/18/19 1700 104/52 — — 61 (!) 28 96 % —   05/18/19 1600 112/52 98.3 °F (36.8 °C) Temporal 65 (!) 32 98 % —   05/18/19 1500 113/53 — — 60 25 98 % —   05/18/19 1400 105/62 — — 62 22 98 % (H) 05/13/2019    INR 2.22 (H) 05/12/2019       Xr Abdomen (1 View) (cpt=74018)    Result Date: 5/17/2019  CONCLUSION:  * A rectal tube has been inserted with the distal tip in the proximal transverse colon.   * There has been considerable reduction in the

## 2019-05-20 ENCOUNTER — APPOINTMENT (OUTPATIENT)
Dept: GENERAL RADIOLOGY | Facility: HOSPITAL | Age: 59
DRG: 003 | End: 2019-05-20
Attending: HOSPITALIST
Payer: MEDICARE

## 2019-05-20 LAB
ALBUMIN SERPL-MCNC: 2.1 G/DL (ref 3.4–5)
ALBUMIN/GLOB SERPL: 0.5 {RATIO} (ref 1–2)
ALP LIVER SERPL-CCNC: 69 U/L (ref 45–117)
ALT SERPL-CCNC: 62 U/L (ref 16–61)
ANION GAP SERPL CALC-SCNC: 11 MMOL/L (ref 0–18)
AST SERPL-CCNC: 38 U/L (ref 15–37)
BASOPHILS # BLD AUTO: 0.02 X10(3) UL (ref 0–0.2)
BASOPHILS NFR BLD AUTO: 0.2 %
BILIRUB SERPL-MCNC: 0.9 MG/DL (ref 0.1–2)
BUN BLD-MCNC: 89 MG/DL (ref 7–18)
BUN/CREAT SERPL: 16.3 (ref 10–20)
CALCIUM BLD-MCNC: 8 MG/DL (ref 8.5–10.1)
CHLORIDE SERPL-SCNC: 98 MMOL/L (ref 98–112)
CO2 SERPL-SCNC: 26 MMOL/L (ref 21–32)
CREAT BLD-MCNC: 5.46 MG/DL (ref 0.7–1.3)
DEPRECATED RDW RBC AUTO: 51 FL (ref 35.1–46.3)
EOSINOPHIL # BLD AUTO: 0.2 X10(3) UL (ref 0–0.7)
EOSINOPHIL NFR BLD AUTO: 2 %
ERYTHROCYTE [DISTWIDTH] IN BLOOD BY AUTOMATED COUNT: 17.7 % (ref 11–15)
GLOBULIN PLAS-MCNC: 4.4 G/DL (ref 2.8–4.4)
GLUCOSE BLD-MCNC: 184 MG/DL (ref 70–99)
GLUCOSE BLDC GLUCOMTR-MCNC: 159 MG/DL (ref 70–99)
GLUCOSE BLDC GLUCOMTR-MCNC: 161 MG/DL (ref 70–99)
GLUCOSE BLDC GLUCOMTR-MCNC: 186 MG/DL (ref 70–99)
GLUCOSE BLDC GLUCOMTR-MCNC: 222 MG/DL (ref 70–99)
GLUCOSE BLDC GLUCOMTR-MCNC: 232 MG/DL (ref 70–99)
HCT VFR BLD AUTO: 26.9 % (ref 39–53)
HGB BLD-MCNC: 9.1 G/DL (ref 13–17.5)
IMM GRANULOCYTES # BLD AUTO: 0.4 X10(3) UL (ref 0–1)
IMM GRANULOCYTES NFR BLD: 4 %
LYMPHOCYTES # BLD AUTO: 0.87 X10(3) UL (ref 1–4)
LYMPHOCYTES NFR BLD AUTO: 8.7 %
M PROTEIN MFR SERPL ELPH: 6.5 G/DL (ref 6.4–8.2)
MCH RBC QN AUTO: 31.6 PG (ref 26–34)
MCHC RBC AUTO-ENTMCNC: 33.8 G/DL (ref 31–37)
MCV RBC AUTO: 93.4 FL (ref 80–100)
MONOCYTES # BLD AUTO: 0.88 X10(3) UL (ref 0.1–1)
MONOCYTES NFR BLD AUTO: 8.8 %
NEUTROPHILS # BLD AUTO: 7.62 X10 (3) UL (ref 1.5–7.7)
NEUTROPHILS # BLD AUTO: 7.62 X10(3) UL (ref 1.5–7.7)
NEUTROPHILS NFR BLD AUTO: 76.3 %
OSMOLALITY SERPL CALC.SUM OF ELEC: 312 MOSM/KG (ref 275–295)
PLATELET # BLD AUTO: 128 10(3)UL (ref 150–450)
POTASSIUM SERPL-SCNC: 3.9 MMOL/L (ref 3.5–5.1)
RBC # BLD AUTO: 2.88 X10(6)UL (ref 4.3–5.7)
SODIUM SERPL-SCNC: 135 MMOL/L (ref 136–145)
WBC # BLD AUTO: 10 X10(3) UL (ref 4–11)

## 2019-05-20 PROCEDURE — 99291 CRITICAL CARE FIRST HOUR: CPT | Performed by: INTERNAL MEDICINE

## 2019-05-20 PROCEDURE — 99232 SBSQ HOSP IP/OBS MODERATE 35: CPT | Performed by: INTERNAL MEDICINE

## 2019-05-20 PROCEDURE — 71045 X-RAY EXAM CHEST 1 VIEW: CPT | Performed by: HOSPITALIST

## 2019-05-20 PROCEDURE — 99233 SBSQ HOSP IP/OBS HIGH 50: CPT | Performed by: INTERNAL MEDICINE

## 2019-05-20 NOTE — PLAN OF CARE
Problem: Patient Centered Care  Goal: Patient preferences are identified and integrated in the patient's plan of care  Description  Interventions:  - What would you like us to know as we care for you?   - Provide timely, complete, and accurate informatio decreased cardiac output  - Evaluate effectiveness of vasoactive medications to optimize hemodynamic stability  - Monitor arterial and/or venous puncture sites for bleeding and/or hematoma  - Assess quality of pulses, skin color and temperature  - Assess f Maintains or returns to baseline bowel function  Description  INTERVENTIONS:  - Assess bowel function  - Maintain adequate hydration with IV or PO as ordered and tolerated  - Evaluate effectiveness of GI medications  - Encourage mobilization and activity environment to reduce risk of injury  - Provide assistive devices as appropriate  - Consider OT/PT consult to assist with strengthening/mobility  - Encourage toileting schedule   Outcome: Progressing  Note:   Needs attended. Call light at reach.  Bed on low ordered  - Assess for signs and symptoms of hyperglycemia and hypoglycemia  - Administer ordered medications to maintain glucose within target range  - Assess barriers to adequate nutritional intake and initiate nutrition consult as needed  - Instruct marlys

## 2019-05-20 NOTE — PROGRESS NOTES
Pulmonary/Critical Care Follow Up Note    HPI:   Laureano Calderon is a 60year old male with Patient presents with:  Fever (infectious)  Altered Mental Status (neurologic)      PCP LATIA Maldonado MD  Admission Attending No admitting provider for patient encoun metoprolol Tartrate (LOPRESSOR) 5 MG/5ML injection 5 mg, 5 mg, Intravenous, Q4H PRN  •  pregabalin (LYRICA) cap 100 mg, 100 mg, Oral, Nightly  •  Vancomycin HCl (FIRVANQ) 50 MG/ML oral solution 125 mg, 125 mg, Per NG Tube, Daily  •  balsam peru-castor oil lb 10.4 oz (145.9 kg), SpO2 98 %.     Intake/Output Summary (Last 24 hours) at 5/20/2019 1517  Last data filed at 5/20/2019 0600  Gross per 24 hour   Intake 1768.83 ml   Output 130 ml   Net 1638.83 ml     NAD  Profound weakness  Arousable and following comm low HR  Plan prn morphine    HTN urgency  Better  Now low with presumed septic shock  Plan      Prn anti HTN meds    KOMAL  2nd episode  Now progressive and severe  Presumed from septic shock  Started on RRT  Low/no UOP  Plan    RRT per renal          Cindi Roll

## 2019-05-20 NOTE — PROGRESS NOTES
Pulmonary/Critical Care Follow Up Note    HPI:   Rosita Hillman is a 60year old male with Patient presents with:  Fever (infectious)  Altered Mental Status (neurologic)      PCP LATIA Augustine MD  Admission Attending No admitting provider for patient encoun mg, 5 mg, Intravenous, Q4H PRN  •  pregabalin (LYRICA) cap 100 mg, 100 mg, Oral, Nightly  •  Vancomycin HCl (FIRVANQ) 50 MG/ML oral solution 125 mg, 125 mg, Per NG Tube, Daily  •  balsam peru-castor oil (VENELEX) ointment, , Topical, BID PRN  •  metRONIDAZ weight (!) 325 lb 2.9 oz (147.5 kg), SpO2 91 %.     Intake/Output Summary (Last 24 hours) at 5/19/2019 2034  Last data filed at 5/19/2019 1832  Gross per 24 hour   Intake 3053.21 ml   Output 3755 ml   Net -701.79 ml     NAD  Profound weakness  arousable and and severe  Presumed from septic shock  Started on RRT  Low/no UOP  - 4 L yesterday  Plan    RRT per renal          Encephalopathy  Now seems baseline but sedated  Plan      Follow        H/o CHF  Systolic EF 67% in 5705  Followed by cards  Now central con

## 2019-05-20 NOTE — CM/SW NOTE
Care Coordination Note    Progression of Care: Hospital Day # 18  Saw pt at bedside to his progress. Pt currently undergoing breathing trial, appears comfortable, lethargic. Brother, Jayden [place] present, states wife at work.     Pt with history of diabetes, IRMA, f Pt not stable to transition yet, will  Follow, LTACs liaisons updated, clinicals sent via [facility] per ISR today.   Addendum 6/3/2019 3:57 PM :  Pt remains critically ill, on vent support with acute GI bleeding  S/p colonoscopy last week which showed ischemic c

## 2019-05-20 NOTE — PROGRESS NOTES
[address]     Gastroenterology Progress Note    Roel Alexandra Patient Status:  Inpatient    1960 MRN [id number]   Location [facility] 2W/SW Attending Quan Arreguin, [address] [facility] Se Day # 18 PCP LATIA Britt MD       Ass 05/19/19 2000 143/74 98.2 °F (36.8 °C) Temporal 76 (!) 32 91 % —   05/19/19 1900 117/59 — — 72 24 97 % —     Body mass index is 52.44 kg/m².     Gen: Critically ill.  Orally intubated with OG tube in place  HEENT:Negative for scleral icterus.    CV- regul significant decompression of the previously noted markedly distended colon.   Diffuse colonic distention has diminished since 5/17/2019. 2. Residual contrast in the left colon from prior CT exam     Dictated by (CST): Christine Edmonds MD on 5/19/2019 at

## 2019-05-20 NOTE — PROGRESS NOTES
[facility]    Progress Note    Gustavo Dixon Patient Status:  Inpatient    1960 MRN [id number]   Location [facility] 2W/SW Attending Milagro Metzger MD   Hosp Day # 19 PCP LATIA Gonzalez MD     Subjective:   Intubated    Hanane Bolls dose Novolin CF   - Accuchecks Q6 hours   - Hypoglycemia protocol  - Patient might need D10 if TPN is placed on hold . Please notify the endocrine service in that case.      Will follow    King Antwan MD

## 2019-05-20 NOTE — PROGRESS NOTES
[facility] [place]    [facility] Note    Gustavo Karma Patient Status:  Inpatient    1960 MRN [id number]   Location [facility] 2W/SW Attending [place], [address] [facility] Se Day # 18 PCP LATIA Aguilera MD            Subjective:     Patient CREATSERUM 9.92* 7.59*   < > 5.86* 7.09* 5.46*   GFRAA 6* 8*   < > 11* 9* 12*   GFRNAA 5* 7*   < > 10* 8* 11*   CA 5.7* 6.2*   < > 7.4* 7.8* 8.0*   ALB 1.7* 1.7*  --   --   --  2.1*   * 136   < > 139 139 135*   K 5.4* 4.2   < > 3.7 4.1 3.9   CL 98 right lower quadrant fat. 5. Colonic ileus with cecum distended to approximately 14 cm compared with 12.5 cm on previous. 6. Previous sigmoid colon resection. Rectal tube in place. 7. Hepatic cirrhosis. 8. Small hiatal hernia.   9. No evidence for subha improved since 5/12/2019. No obstruction. Cirrhotic liver morphology  Dictated by (CST): Birgit Downs MD on 5/15/2019 at 15:10    Patient generally improved. Review of the flatplate done  3/43 appears to be less distention.   There are still a lot of gas

## 2019-05-20 NOTE — DIETARY NOTE
ADULT NUTRITION REASSESSMENT     Pt is at high nutrition risk. Pt does not meet malnutrition criteria. RECOMMENDATIONS TO MD:  See Nutrition Intervention. For [employer] TPN rx. Addendum: EN rx to start as documented below.       NUTRITION DIAGNOS to continue same insulin regime/endocrine svc. Plan for hemodialysis tomorrow. 5/20 ADDENDUM @1500:  Received call/Dr. Jaskaran Abraham to discuss starting low rate tube feedings  this pm and holding till tomorrow before advance further to assess tolerance.  Will be CLASSIFICATION: greater than 40 kg/m2 - morbid obesity class III  IBW: 142 lbs       310-316 lbs UBW. 100 % UBW on admit.      WEIGHT HISTORY:  Patient Weight(s) for the past 336 hrs:   Weight   05/20/19 0500 (!) 145.9 kg (321 lb 10.4 oz)   05/19/19 Q12H   • Nortriptyline HCl  50 mg Oral Nightly   Meds infusing:   • adult 3 in 1 TPN     • adult 3 in 1 TPN 75 mL/hr at 05/20/19 0600   • dextrose     • norepinephrine Stopped (05/15/19 1000)   • vasopressin (PITRESSIN) infusion for shock 0.04 Units/min (0 surgeon plans.      - Food and Nutrient Administration:      Monitor: tolerance to parenteral and enteral nutrition, adequacy of enteral and parenteral nutrition, for parenteral + enteral  nutrition adjustment and ability to transition  to 100% enteral nut

## 2019-05-20 NOTE — PROGRESS NOTES
[facility]    Progress Note      Subjective:     Remain intubated     Off pressors     Brother at bedside     Review of Systems:     Unable to obtain - intubated and lethargic       Objective:   Temp:  [97.2 °F (36.2 °C)-98.2 °F (36.8 °C) (MERREM) 1 g in sodium chloride 0.9% 100 mL MBP 1 g Intravenous Q24H   metoprolol Tartrate (LOPRESSOR) 5 MG/5ML injection 5 mg 5 mg Intravenous Q4H PRN   pregabalin (LYRICA) cap 100 mg 100 mg Oral Nightly   Vancomycin HCl (FIRVANQ) 50 MG/ML oral solution 1 < > 87* 118* 89*   CREATSERUM 9.92* 7.59*   < > 5.86* 7.09* 5.46*   GFRAA 6* 8*   < > 11* 9* 12*   GFRNAA 5* 7*   < > 10* 8* 11*   CA 5.7* 6.2*   < > 7.4* 7.8* 8.0*   ALB 1.7* 1.7*  --   --   --  2.1*   * 136   < > 139 139 135*   K 5.4* 4.2   < > 3. Diffuse colonic distention has diminished since 5/17/2019. 2. Residual contrast in the left colon from prior CT exam     Dictated by (CST): Daniela Fajardo MD on 5/19/2019 at 17:07     Approved by (CST): Daniela Fajardo MD on 5/19/2019 at 17:10

## 2019-05-21 ENCOUNTER — APPOINTMENT (OUTPATIENT)
Dept: GENERAL RADIOLOGY | Facility: HOSPITAL | Age: 59
DRG: 003 | End: 2019-05-21
Attending: PHYSICIAN ASSISTANT
Payer: MEDICARE

## 2019-05-21 ENCOUNTER — APPOINTMENT (OUTPATIENT)
Dept: GENERAL RADIOLOGY | Facility: HOSPITAL | Age: 59
DRG: 003 | End: 2019-05-21
Attending: INTERNAL MEDICINE
Payer: MEDICARE

## 2019-05-21 LAB
ANION GAP SERPL CALC-SCNC: 17 MMOL/L (ref 0–18)
BASOPHILS # BLD AUTO: 0.02 X10(3) UL (ref 0–0.2)
BASOPHILS NFR BLD AUTO: 0.2 %
BUN BLD-MCNC: 120 MG/DL (ref 7–18)
BUN/CREAT SERPL: 17.1 (ref 10–20)
CALCIUM BLD-MCNC: 7.3 MG/DL (ref 8.5–10.1)
CHLORIDE SERPL-SCNC: 98 MMOL/L (ref 98–112)
CO2 SERPL-SCNC: 23 MMOL/L (ref 21–32)
CREAT BLD-MCNC: 7.02 MG/DL (ref 0.7–1.3)
DEPRECATED RDW RBC AUTO: 56.6 FL (ref 35.1–46.3)
EOSINOPHIL # BLD AUTO: 0.21 X10(3) UL (ref 0–0.7)
EOSINOPHIL NFR BLD AUTO: 2.5 %
ERYTHROCYTE [DISTWIDTH] IN BLOOD BY AUTOMATED COUNT: 17.7 % (ref 11–15)
GLUCOSE BLD-MCNC: 189 MG/DL (ref 70–99)
GLUCOSE BLDC GLUCOMTR-MCNC: 142 MG/DL (ref 70–99)
GLUCOSE BLDC GLUCOMTR-MCNC: 194 MG/DL (ref 70–99)
GLUCOSE BLDC GLUCOMTR-MCNC: 236 MG/DL (ref 70–99)
HAV IGM SER QL: 2.1 MG/DL (ref 1.6–2.6)
HCT VFR BLD AUTO: 28.5 % (ref 39–53)
HGB BLD-MCNC: 9.5 G/DL (ref 13–17.5)
IMM GRANULOCYTES # BLD AUTO: 0.16 X10(3) UL (ref 0–1)
IMM GRANULOCYTES NFR BLD: 1.9 %
LYMPHOCYTES # BLD AUTO: 0.97 X10(3) UL (ref 1–4)
LYMPHOCYTES NFR BLD AUTO: 11.5 %
MCH RBC QN AUTO: 31.3 PG (ref 26–34)
MCHC RBC AUTO-ENTMCNC: 33.3 G/DL (ref 31–37)
MCV RBC AUTO: 93.8 FL (ref 80–100)
MONOCYTES # BLD AUTO: 0.65 X10(3) UL (ref 0.1–1)
MONOCYTES NFR BLD AUTO: 7.7 %
NEUTROPHILS # BLD AUTO: 6.45 X10 (3) UL (ref 1.5–7.7)
NEUTROPHILS # BLD AUTO: 6.45 X10(3) UL (ref 1.5–7.7)
NEUTROPHILS NFR BLD AUTO: 76.2 %
OSMOLALITY SERPL CALC.SUM OF ELEC: 329 MOSM/KG (ref 275–295)
PHOSPHATE SERPL-MCNC: 6.8 MG/DL (ref 2.5–4.9)
PLATELET # BLD AUTO: 113 10(3)UL (ref 150–450)
POTASSIUM SERPL-SCNC: 4.4 MMOL/L (ref 3.5–5.1)
RBC # BLD AUTO: 3.04 X10(6)UL (ref 4.3–5.7)
SODIUM SERPL-SCNC: 138 MMOL/L (ref 136–145)
TRIGL SERPL-MCNC: 103 MG/DL (ref 30–149)
WBC # BLD AUTO: 8.5 X10(3) UL (ref 4–11)

## 2019-05-21 PROCEDURE — 99291 CRITICAL CARE FIRST HOUR: CPT | Performed by: INTERNAL MEDICINE

## 2019-05-21 PROCEDURE — 99232 SBSQ HOSP IP/OBS MODERATE 35: CPT | Performed by: PHYSICIAN ASSISTANT

## 2019-05-21 PROCEDURE — 99233 SBSQ HOSP IP/OBS HIGH 50: CPT | Performed by: INTERNAL MEDICINE

## 2019-05-21 PROCEDURE — 99232 SBSQ HOSP IP/OBS MODERATE 35: CPT | Performed by: INTERNAL MEDICINE

## 2019-05-21 PROCEDURE — 71045 X-RAY EXAM CHEST 1 VIEW: CPT | Performed by: INTERNAL MEDICINE

## 2019-05-21 PROCEDURE — 74018 RADEX ABDOMEN 1 VIEW: CPT | Performed by: PHYSICIAN ASSISTANT

## 2019-05-21 PROCEDURE — 99221 1ST HOSP IP/OBS SF/LOW 40: CPT | Performed by: OTOLARYNGOLOGY

## 2019-05-21 RX ORDER — HEPARIN SODIUM 1000 [USP'U]/ML
INJECTION, SOLUTION INTRAVENOUS; SUBCUTANEOUS
Status: COMPLETED
Start: 2019-05-21 | End: 2019-05-21

## 2019-05-21 NOTE — PROGRESS NOTES
[facility]    Progress Note    Malinda Flores Patient Status:  Inpatient    1960 MRN [id number]   Location [facility] 2W/SW Attending Niko Ojeda,  [facility] Se Day # 23 PCP LATIA Bess MD            Subjective:     Patient 5.46* 7.02*   GFRAA 8*   < > 9* 12* 9*   GFRNAA 7*   < > 8* 11* 8*   CA 6.2*   < > 7.8* 8.0* 7.3*   ALB 1.7*  --   --  2.1*  --       < > 139 135* 138   K 4.2   < > 4.1 3.9 4.4      < > 102 98 98   CO2 17.0*   < > 21.0 26.0 23.0   ALKPHO 67  -- ultrasound is suggested. Differential considerations include organizing hematoma, and less likely a stable abscess. 4. Nonspecific hazy inflammatory changes in the right lower quadrant fat.   5. Colonic ileus with cecum distended to approximately 14 cm co underlying mass. Diffuse colonic ileus, minimally improved since 5/12/2019. No obstruction. Cirrhotic liver morphology  Dictated by (CST): Ron Lopes MD on 5/15/2019 at 15:10    Patient  improved.   Review of the flatplate done  6/12 appears to be less

## 2019-05-21 NOTE — PROGRESS NOTES
Pulmonary/Critical Care Follow Up Note    HPI:   Luly Deluca is a 60year old male with Patient presents with:  Fever (infectious)  Altered Mental Status (neurologic)      PCP LATIA Damon MD  Admission Attending No admitting provider for patient encoun g, Intravenous, Q24H  •  metoprolol Tartrate (LOPRESSOR) 5 MG/5ML injection 5 mg, 5 mg, Intravenous, Q4H PRN  •  pregabalin (LYRICA) cap 100 mg, 100 mg, Oral, Nightly  •  Vancomycin HCl (FIRVANQ) 50 MG/ML oral solution 125 mg, 125 mg, Per NG Tube, Daily  • (1.668 m), weight (!) 324 lb 1.2 oz (147 kg), SpO2 98 %.     Intake/Output Summary (Last 24 hours) at 5/21/2019 1125  Last data filed at 5/21/2019 0600  Gross per 24 hour   Intake 2412.87 ml   Output 150 ml   Net 2262.87 ml     NAD  Profound weakness  Arous off  Possible w/d but low HR  Plan prn morphine    HTN urgency  Better  Now low with presumed septic shock  Plan      Prn anti HTN meds    KOMAL  2nd episode  Now progressive and severe  Presumed from septic shock  Started on RRT  Low/no UOP  Plan    RRT per

## 2019-05-21 NOTE — RESPIRATORY THERAPY NOTE
RN called asking to put on patient on CPAP/PSV, to alleviate gagging/coughing. Per RN patient was on CPAP/PSV of 22/5 yesterday. Went to do overall vent check, and saw the tube was 18 at the lip.  Patient had a lot of gurgling, gagging, and bucking the

## 2019-05-21 NOTE — PROGRESS NOTES
[facility]    Progress Note      Subjective:     Remain intubated     Off pressors     Brother at bedside     Review of Systems:     Unable to obtain - intubated and lethargic       Objective:   Temp:  [97.4 °F (36.3 °C)-99.2 °F (37.3 °C) times per day   Vancomycin HCl (VANCOCIN) 1 mg in sodium chloride 0.9% 250 mL IVPB 1 mg Intravenous See Admin Instructions (RX holding)   dextrose 10 % infusion  Intravenous Continuous PRN   meropenem (MERREM) 1 g in sodium chloride 0.9% 100 mL MBP 1 g Int 6. 45   WBC 8.2 10.0 8.5   .0* 128.0* 113.0*     Recent Labs   Lab 05/15/19  0423  05/19/19  0452 05/20/19  0529 05/21/19  0517   *   < > 141* 184* 189*   *   < > 118* 89* 120*   CREATSERUM 7.59*   < > 7.09* 5.46* 7.02*   GFRAA 8*   < > colon with significant decompression of the previously noted markedly distended colon.   Diffuse colonic distention has diminished since 5/17/2019. 2. Residual contrast in the left colon from prior CT exam     Dictated by (CST): David Haynes MD on 5/ clearance  - improve leukocytosis     3.  Respiratory failure  - plan for SBT     discussed with Nursing and Dr. Sanford Course    Discussed with brother Daria Pitt at bedside     Mikayla Stout MD  5/21/2019

## 2019-05-21 NOTE — PLAN OF CARE
Problem: Patient Centered Care  Goal: Patient preferences are identified and integrated in the patient's plan of care  Description  Interventions:  - What would you like us to know as we care for you?   - Provide timely, complete, and accurate informatio i.e. lights off, TV off, minimize noise and interruptions  - Encourage family to assist in orientation and promotion of home routines  Outcome: Progressing     Problem: CARDIOVASCULAR - ADULT  Goal: Maintains optimal cardiac output and hemodynamic stabilit signs/symptoms of CO2 retention  Outcome: Progressing  Note:   On CPAP/PSV overnight. Tolerating well. Oral care and suctioning done per protocol.       Problem: GASTROINTESTINAL - ADULT  Goal: Maintains or returns to baseline bowel function  Description  I intact  Description  INTERVENTIONS  - Assess and document risk factors for pressure ulcer development  - Assess and document skin integrity  - Monitor for areas of redness and/or skin breakdown  - Initiate interventions, skin care algorithm/standards of ca patient/family/discharge partner in discharge planning  - Arrange for needed discharge resources and transportation as appropriate  - Identify discharge learning needs (meds, wound care, etc)  - Arrange for interpreters to assist at discharge as needed  -

## 2019-05-21 NOTE — DIETARY NOTE
NUTRITION NOTE UPDATE:    TPN and Tube feeding orders adjusted today after discussion with MD's. Pt tolerated trickle rate tube feeds (Nepro 10 ml/hr) and ordered to increase to 20 ml/hr and hold.      TPN adjusted accordingly: (

## 2019-05-21 NOTE — PLAN OF CARE
Problem: Patient Centered Care  Goal: Patient preferences are identified and integrated in the patient's plan of care  Description  Interventions:  - What would you like us to know as we care for you?  Patient has 9 lives per wife  - Provide timely, compl ordered  - Initiate emergency measures for life threatening arrhythmias  - Monitor electrolytes and administer replacement therapy as ordered  Outcome: Progressing     Problem: RESPIRATORY - ADULT  Goal: Achieves optimal ventilation and oxygenation  Descri strengthening/mobility  - Encourage toileting schedule   Outcome: Progressing

## 2019-05-21 NOTE — PROGRESS NOTES
[facility]     Gastroenterology Progress Note    Corrine Romero Patient Status:  Inpatient    1960 MRN [id number]   Location [facility] 2W/SW Attending Robi Reynolds, [address] [facility] Se Day # 23 PCP LATIA Heart MD       Sub fistula.      #Sepsis - ongoing ICU care, continue abx which cover for ischemic colitis (CLN PATH: moderate ischemic colitis).      #Acute respiratory failure - hospital course c/b re-intubation.  +Parainfluenza noted.     #Nutrition: (+) continue tube feed diminished since 5/17/2019. 2. Residual contrast in the left colon from prior CT exam     Dictated by (CST): Desmond Sicard, MD on 5/19/2019 at 17:07     Approved by (CST): Desmond Sicard, MD on 5/19/2019 at 17:10          Xr Chest Ap/pa (1 View) (

## 2019-05-22 ENCOUNTER — ANESTHESIA (OUTPATIENT)
Dept: SURGERY | Facility: HOSPITAL | Age: 59
DRG: 003 | End: 2019-05-22
Payer: MEDICARE

## 2019-05-22 ENCOUNTER — ANESTHESIA EVENT (OUTPATIENT)
Dept: SURGERY | Facility: HOSPITAL | Age: 59
DRG: 003 | End: 2019-05-22
Payer: MEDICARE

## 2019-05-22 ENCOUNTER — APPOINTMENT (OUTPATIENT)
Dept: GENERAL RADIOLOGY | Facility: HOSPITAL | Age: 59
DRG: 003 | End: 2019-05-22
Attending: INTERNAL MEDICINE
Payer: MEDICARE

## 2019-05-22 LAB
ANION GAP SERPL CALC-SCNC: 15 MMOL/L (ref 0–18)
BUN BLD-MCNC: 111 MG/DL (ref 7–18)
BUN/CREAT SERPL: 17.9 (ref 10–20)
CALCIUM BLD-MCNC: 8.5 MG/DL (ref 8.5–10.1)
CHLORIDE SERPL-SCNC: 102 MMOL/L (ref 98–112)
CO2 SERPL-SCNC: 21 MMOL/L (ref 21–32)
CREAT BLD-MCNC: 6.21 MG/DL (ref 0.7–1.3)
GLUCOSE BLD-MCNC: 198 MG/DL (ref 70–99)
GLUCOSE BLDC GLUCOMTR-MCNC: 104 MG/DL (ref 70–99)
GLUCOSE BLDC GLUCOMTR-MCNC: 177 MG/DL (ref 70–99)
GLUCOSE BLDC GLUCOMTR-MCNC: 182 MG/DL (ref 70–99)
GLUCOSE BLDC GLUCOMTR-MCNC: 198 MG/DL (ref 70–99)
HAV IGM SER QL: 2.3 MG/DL (ref 1.6–2.6)
OSMOLALITY SERPL CALC.SUM OF ELEC: 327 MOSM/KG (ref 275–295)
PHOSPHATE SERPL-MCNC: 6.6 MG/DL (ref 2.5–4.9)
POTASSIUM SERPL-SCNC: 4.8 MMOL/L (ref 3.5–5.1)
SODIUM SERPL-SCNC: 138 MMOL/L (ref 136–145)

## 2019-05-22 PROCEDURE — 31600 PLANNED TRACHEOSTOMY: CPT | Performed by: OTOLARYNGOLOGY

## 2019-05-22 PROCEDURE — 99232 SBSQ HOSP IP/OBS MODERATE 35: CPT | Performed by: INTERNAL MEDICINE

## 2019-05-22 PROCEDURE — 5A1955Z RESPIRATORY VENTILATION, GREATER THAN 96 CONSECUTIVE HOURS: ICD-10-PCS | Performed by: INTERNAL MEDICINE

## 2019-05-22 PROCEDURE — 0JB40ZZ EXCISION OF RIGHT NECK SUBCUTANEOUS TISSUE AND FASCIA, OPEN APPROACH: ICD-10-PCS | Performed by: OTOLARYNGOLOGY

## 2019-05-22 PROCEDURE — 99232 SBSQ HOSP IP/OBS MODERATE 35: CPT | Performed by: PHYSICIAN ASSISTANT

## 2019-05-22 PROCEDURE — 99233 SBSQ HOSP IP/OBS HIGH 50: CPT | Performed by: INTERNAL MEDICINE

## 2019-05-22 PROCEDURE — 71045 X-RAY EXAM CHEST 1 VIEW: CPT | Performed by: INTERNAL MEDICINE

## 2019-05-22 PROCEDURE — 0B110F4 BYPASS TRACHEA TO CUTANEOUS WITH TRACHEOSTOMY DEVICE, OPEN APPROACH: ICD-10-PCS | Performed by: OTOLARYNGOLOGY

## 2019-05-22 PROCEDURE — 0JB50ZZ EXCISION OF LEFT NECK SUBCUTANEOUS TISSUE AND FASCIA, OPEN APPROACH: ICD-10-PCS | Performed by: OTOLARYNGOLOGY

## 2019-05-22 PROCEDURE — 99291 CRITICAL CARE FIRST HOUR: CPT | Performed by: INTERNAL MEDICINE

## 2019-05-22 RX ORDER — METOPROLOL TARTRATE 5 MG/5ML
2.5 INJECTION INTRAVENOUS ONCE
Status: DISCONTINUED | OUTPATIENT
Start: 2019-05-22 | End: 2019-05-26 | Stop reason: ALTCHOICE

## 2019-05-22 RX ORDER — HYDROMORPHONE HYDROCHLORIDE 1 MG/ML
0.6 INJECTION, SOLUTION INTRAMUSCULAR; INTRAVENOUS; SUBCUTANEOUS EVERY 5 MIN PRN
Status: DISCONTINUED | OUTPATIENT
Start: 2019-05-22 | End: 2019-05-26 | Stop reason: ALTCHOICE

## 2019-05-22 RX ORDER — SODIUM CHLORIDE 9 MG/ML
INJECTION, SOLUTION INTRAVENOUS CONTINUOUS PRN
Status: DISCONTINUED | OUTPATIENT
Start: 2019-05-22 | End: 2019-05-22 | Stop reason: SURG

## 2019-05-22 RX ORDER — ONDANSETRON 2 MG/ML
4 INJECTION INTRAMUSCULAR; INTRAVENOUS ONCE AS NEEDED
Status: ACTIVE | OUTPATIENT
Start: 2019-05-22 | End: 2019-05-22

## 2019-05-22 RX ORDER — HYDROMORPHONE HYDROCHLORIDE 1 MG/ML
0.2 INJECTION, SOLUTION INTRAMUSCULAR; INTRAVENOUS; SUBCUTANEOUS EVERY 5 MIN PRN
Status: DISCONTINUED | OUTPATIENT
Start: 2019-05-22 | End: 2019-05-26 | Stop reason: ALTCHOICE

## 2019-05-22 RX ORDER — DEXTROSE MONOHYDRATE 25 G/50ML
50 INJECTION, SOLUTION INTRAVENOUS
Status: DISCONTINUED | OUTPATIENT
Start: 2019-05-22 | End: 2019-05-26 | Stop reason: ALTCHOICE

## 2019-05-22 RX ORDER — HEPARIN SODIUM 1000 [USP'U]/ML
INJECTION, SOLUTION INTRAVENOUS; SUBCUTANEOUS
Status: DISPENSED
Start: 2019-05-22 | End: 2019-05-22

## 2019-05-22 RX ORDER — LIDOCAINE HYDROCHLORIDE AND EPINEPHRINE 10; 10 MG/ML; UG/ML
INJECTION, SOLUTION INFILTRATION; PERINEURAL AS NEEDED
Status: DISCONTINUED | OUTPATIENT
Start: 2019-05-22 | End: 2019-05-22 | Stop reason: HOSPADM

## 2019-05-22 RX ORDER — ROCURONIUM BROMIDE 10 MG/ML
INJECTION, SOLUTION INTRAVENOUS AS NEEDED
Status: DISCONTINUED | OUTPATIENT
Start: 2019-05-22 | End: 2019-05-22 | Stop reason: SURG

## 2019-05-22 RX ORDER — CARVEDILOL 12.5 MG/1
12.5 TABLET ORAL 2 TIMES DAILY WITH MEALS
Status: DISCONTINUED | OUTPATIENT
Start: 2019-05-22 | End: 2019-05-27

## 2019-05-22 RX ORDER — MORPHINE SULFATE 2 MG/ML
2 INJECTION, SOLUTION INTRAMUSCULAR; INTRAVENOUS EVERY 10 MIN PRN
Status: DISCONTINUED | OUTPATIENT
Start: 2019-05-22 | End: 2019-05-26 | Stop reason: ALTCHOICE

## 2019-05-22 RX ORDER — ALBUMIN (HUMAN) 12.5 G/50ML
100 SOLUTION INTRAVENOUS AS NEEDED
Status: DISCONTINUED | OUTPATIENT
Start: 2019-05-22 | End: 2019-05-25

## 2019-05-22 RX ORDER — HYDROMORPHONE HYDROCHLORIDE 1 MG/ML
0.4 INJECTION, SOLUTION INTRAMUSCULAR; INTRAVENOUS; SUBCUTANEOUS EVERY 5 MIN PRN
Status: DISCONTINUED | OUTPATIENT
Start: 2019-05-22 | End: 2019-05-26 | Stop reason: ALTCHOICE

## 2019-05-22 RX ORDER — MORPHINE SULFATE 4 MG/ML
4 INJECTION, SOLUTION INTRAMUSCULAR; INTRAVENOUS EVERY 10 MIN PRN
Status: DISCONTINUED | OUTPATIENT
Start: 2019-05-22 | End: 2019-05-26 | Stop reason: ALTCHOICE

## 2019-05-22 RX ORDER — NALOXONE HYDROCHLORIDE 0.4 MG/ML
80 INJECTION, SOLUTION INTRAMUSCULAR; INTRAVENOUS; SUBCUTANEOUS AS NEEDED
Status: ACTIVE | OUTPATIENT
Start: 2019-05-22 | End: 2019-05-23

## 2019-05-22 RX ORDER — MORPHINE SULFATE 10 MG/ML
6 INJECTION, SOLUTION INTRAMUSCULAR; INTRAVENOUS EVERY 10 MIN PRN
Status: DISCONTINUED | OUTPATIENT
Start: 2019-05-22 | End: 2019-05-26 | Stop reason: ALTCHOICE

## 2019-05-22 RX ORDER — HYDROCODONE BITARTRATE AND ACETAMINOPHEN 5; 325 MG/1; MG/1
2 TABLET ORAL AS NEEDED
Status: DISCONTINUED | OUTPATIENT
Start: 2019-05-22 | End: 2019-05-26 | Stop reason: ALTCHOICE

## 2019-05-22 RX ORDER — SODIUM CHLORIDE, SODIUM LACTATE, POTASSIUM CHLORIDE, CALCIUM CHLORIDE 600; 310; 30; 20 MG/100ML; MG/100ML; MG/100ML; MG/100ML
INJECTION, SOLUTION INTRAVENOUS CONTINUOUS
Status: DISCONTINUED | OUTPATIENT
Start: 2019-05-22 | End: 2019-05-25

## 2019-05-22 RX ORDER — HALOPERIDOL 5 MG/ML
0.25 INJECTION INTRAMUSCULAR ONCE AS NEEDED
Status: ACTIVE | OUTPATIENT
Start: 2019-05-22 | End: 2019-05-22

## 2019-05-22 RX ORDER — PROCHLORPERAZINE EDISYLATE 5 MG/ML
5 INJECTION INTRAMUSCULAR; INTRAVENOUS ONCE AS NEEDED
Status: ACTIVE | OUTPATIENT
Start: 2019-05-22 | End: 2019-05-22

## 2019-05-22 RX ORDER — HYDROCODONE BITARTRATE AND ACETAMINOPHEN 5; 325 MG/1; MG/1
1 TABLET ORAL AS NEEDED
Status: DISCONTINUED | OUTPATIENT
Start: 2019-05-22 | End: 2019-05-26 | Stop reason: ALTCHOICE

## 2019-05-22 RX ADMIN — SODIUM CHLORIDE: 9 INJECTION, SOLUTION INTRAVENOUS at 17:59:00

## 2019-05-22 RX ADMIN — SODIUM CHLORIDE: 9 INJECTION, SOLUTION INTRAVENOUS at 17:24:00

## 2019-05-22 RX ADMIN — ROCURONIUM BROMIDE 30 MG: 10 INJECTION, SOLUTION INTRAVENOUS at 16:56:00

## 2019-05-22 NOTE — ANESTHESIA PREPROCEDURE EVALUATION
Anesthesia PreOp Note    HPI:     Orquidea Pascal is a 60year old male who presents for preoperative consultation requested by: Santiago Thorpe MD    Date of Surgery: 5/2/2019 - 5/22/2019    Procedure(s):  TRACHEOSTOMY  Indication: Respiratory difficulty [R06. 10 MG Oral Tab Take 1 tablet (10 mg total) by mouth nightly. Disp: 30 tablet Rfl: 2    [] morphINE Sulfate  MG Oral Tab CR Take 1 tablet (100 mg total) by mouth every 8 (eight) hours.  Disp: 90 tablet Rfl: 0    [] morphINE Sulfate ER 60 (MEDICAL COMPRESSION SOCKS) Does not apply Misc 1 each by Does not apply route continuous.  Order for Wayne compression sock, medium Disp: 1 each Rfl: 0 Taking   Potassium Chloride ER (K-DUR M20) 20 MEQ Oral Tab CR Take two tabs twice daily Disp: 120 tablet R PRN Esa Johnson MD     insulin detemir (LEVEMIR) 100 UNIT/ML flextouch 55 Units 55 Units Subcutaneous Daily Landy Cruz MD 55 Units at 05/22/19 0836    dextrose 50 % injection 50 mL 50 mL Intravenous PRN Landy Cruz MD 50 mL at 05/19/19 7922    G 0834 1,000 mg at 05/22/19 0834   bisacodyl (DULCOLAX) rectal suppository 10 mg 10 mg Rectal Daily PRN Darell LOW DO 10 mg at 05/11/19 1735    vasopressin (PITRESSIN) 20 Units in sodium chloride 0.9% 50 mL infusion for shock 0.01-0.04 Units/min Int file      Highest education level: Not on file    Occupational History      Not on file    Social Needs      Financial resource strain: Not on file      Food insecurity:        Worry: Not on file        Inability: Not on file      Transportation needs: 05/21/2019    MCH 31.3 05/21/2019    MCHC 33.3 05/21/2019    RDW 17.7 (H) 05/21/2019    .0 (L) 05/21/2019     Lab Results   Component Value Date     05/22/2019    K 4.8 05/22/2019     05/22/2019    CO2 21.0 05/22/2019     (H) 05/2 ability. The patient desires the anesthetic management as planned.   Charles Rivas  5/22/2019 4:35 PM

## 2019-05-22 NOTE — PROGRESS NOTES
Obtained telephone consent from patient's wife, Magalys Mcnulty (reached at home phone #[phone]), verified with Kade Melchor RN. Spoke with Dr. Martinez Miller via telephone. Tracheostomy scheduled for 17:00 today. Tube feedings held at this time.  D10 start

## 2019-05-22 NOTE — PROGRESS NOTES
[facility]    Progress Note    Yosef De Leon Patient Status:  Inpatient    1960 MRN [id number]   Location [facility] 2W/SW Attending Trinh Yang,  [facility] Se Day # 20 PCP LATIA Campos MD            Subjective:     Patient ALB  --  2.1*  --     135* 138   K 4.1 3.9 4.4    98 98   CO2 21.0 26.0 23.0   ALKPHO  --  69  --    AST  --  38*  --    ALT  --  62*  --    BILT  --  0.9  --    TP  --  6.5  --              Xr Abdomen (1 View) (cpt=74018)    Result Date: 5/2 venous catheter with tip at RA SVC junction.   14. Prior granulomatous disease in the chest.  Dictated by (CST): Monserrat Mendoza MD on 5/12/2019 at 12:53           Assessment and Plan:     Sepsis due to undetermined organism [facility])  SBO (small bowel obstructi Group  5/22/2019

## 2019-05-22 NOTE — PLAN OF CARE
-Patient on CPAP 5, PSV 22, FiO2 40% during day shift.     -Plan for tracheostomy with Dr. Ericka Thorne at 16:15. Wife and brother at bedside, supportive. -TFs held (Nepro @ 20cc/hr, 30cc water flushes Q4H) per protocol. TPN at ordered rate continued.  No hawley range  Description  INTERVENTIONS:  - Monitor Blood Glucose as ordered  - Assess for signs and symptoms of hyperglycemia and hypoglycemia  - Administer ordered medications to maintain glucose within target range  - Assess barriers to adequate nutritional i ventilation and oxygenation  Description  INTERVENTIONS:  - Assess for changes in respiratory status  - Assess for changes in mentation and behavior  - Position to facilitate oxygenation and minimize respiratory effort  - Oxygen supplementation based on ox results as appropriate  - Fluid restriction as ordered  - Instruct patient on fluid and nutrition restrictions as appropriate  Outcome: Progressing     Problem: SKIN/TISSUE INTEGRITY - ADULT  Goal: Skin integrity remains intact  Description  INTERVENTIONS appropriate  - Identify discharge learning needs (meds, wound care, etc)  - Arrange for interpreters to assist at discharge as needed  - Consider post-discharge preferences of patient/family/discharge partner  - Complete POLST form as appropriate  - Assess

## 2019-05-22 NOTE — PROGRESS NOTES
[facility]     Gastroenterology Progress Note    Rm Calle Patient Status:  Inpatient    1960 MRN [id number]   Location [facility] 2W/SW Attending Leo Simms, [address] [facility] Se Day # 20 PCP LATIA Mcmahon MD       Sub BS appreciated. Serial abdominal exams are recommended. C. Diff (-).      #Prior Sigmoid Surgery - with Dr. Manuel Thibodeaux in 2015 - ?colon resection surgery for colo-vesicular fistula.      #Sepsis - ongoing ICU care, continue abx which cover for ischemic colitis ( (cpt=74018)    Result Date: 5/21/2019  CONCLUSION:  1. Nonobstructive bowel gas pattern. 2. There is a decompressive rectal tube with decrease in the caliber of the distal colon, but residual dilatation of the more proximal colon.   3. Lesser incidental fi

## 2019-05-22 NOTE — PROGRESS NOTES
Pulmonary/Critical Care Follow Up Note    HPI:   Aime Thomas is a 60year old male with Patient presents with:  Fever (infectious)  Altered Mental Status (neurologic)      PCP LATIA Gonzales MD  Admission Attending No admitting provider for patient encoun 1 g, Intravenous, Q24H  •  metoprolol Tartrate (LOPRESSOR) 5 MG/5ML injection 5 mg, 5 mg, Intravenous, Q4H PRN  •  pregabalin (LYRICA) cap 100 mg, 100 mg, Oral, Nightly  •  Vancomycin HCl (FIRVANQ) 50 MG/ML oral solution 125 mg, 125 mg, Per NG Tube, Daily m), weight (!) 324 lb 4.8 oz (147.1 kg), SpO2 99 %.     Intake/Output Summary (Last 24 hours) at 5/22/2019 1532  Last data filed at 5/22/2019 1512  Gross per 24 hour   Intake 4023 ml   Output 3740 ml   Net 283 ml     NAD  Profound weakness   Arousable but n meds   Restart coreg    KOMAL  2nd episode  Now progressive and severe  Presumed from septic shock  Started on RRT  Low/no UOP  Plan     RRT per renal        Encephalopathy  Not following commands  Head CT neg  Plan      Follow           H/o CHF  Systolic EF

## 2019-05-22 NOTE — ANESTHESIA POSTPROCEDURE EVALUATION
Patient: Migdalia Evans    Procedure Summary     Date:  05/22/19 Room / Location:  [address] [place] [address] OR 02 / [address] [facility] OR    Anesthesia Start:  5030 Anesthesia Stop:      Procedure:  TRACHEOSTOMY (N/A Neck) Diagnosis:       Respiratory difficulty      (Respiratory diff

## 2019-05-22 NOTE — OPERATIVE REPORT
Laureano Begin  DATE OF SURGERY: 5/22/2019  PREOPERATIVE DIAGNOSIS:   Respiratory failure    POSTOPERATIVE DIAGNOSIS: Same.   OPERATIVE PROCEDURE:    Tracheostomy with cervical defatting- complicated due to patients morbid obesity & 23short neck    SURGEON: gauze pack was placed in the stoma for added hemostasis   A sterile trach dressing was placed. The patient was uneventfully transferred to the ICU in stable condition Estimated blood loss was 10cc.

## 2019-05-22 NOTE — PROGRESS NOTES
[facility]    [facility] Note    Fidelina Baires Patient Status:  Inpatient    1960 MRN [id number]   Location [facility] 2W/SW Attending Philomena Duff MD   Hosp Day # 20 PCP LATIA Garcia MD     Subjective:   Intubated    Xin Britton dose Novolin CF   - Accuchecks Q6 hours   - Hypoglycemia protocol  - Patient might need D10 if TPN is placed on hold . Please notify the endocrine service in that case.      Will follow    Sola Due  5/22/2019

## 2019-05-22 NOTE — CONSULTS
[facility]    PATIENT'S NAME: Codie Diaz   ATTENDING PHYSICIAN: Aliza March MD   CONSULTING PHYSICIAN: Faviola Reinoso.  Chico Woods MD   PATIENT ACCOUNT#:   [id number]    [facility]  [facility] RECORD #:   [id number]       DATE OF BIRTH:  [birth year] A total of 40 minutes was spent with the patient, greater than 50% of which was in direct patient care and coordination of care. Dictated By Susan Yan MD  d: 05/21/2019 19:49:40  t: 05/21/2019 20:20:56  Job [id number]  [facility]/

## 2019-05-22 NOTE — PROGRESS NOTES
[facility]    Progress Note    Vanessa Tavarez Patient Status:  Inpatient    1960 MRN [id number]   Location [place] [address] Attending Sunny Ibrahim,  [facility] Se Day # 20 PCP LATIA Campbell MD       Subjective:   Jessy Guzman (cpt=74018)    Result Date: 5/21/2019  CONCLUSION:  1. Nonobstructive bowel gas pattern. 2. There is a decompressive rectal tube with decrease in the caliber of the distal colon, but residual dilatation of the more proximal colon.   3. Lesser incidental fi

## 2019-05-23 LAB
ALBUMIN SERPL-MCNC: 1.9 G/DL (ref 3.4–5)
ANION GAP SERPL CALC-SCNC: 18 MMOL/L (ref 0–18)
BASOPHILS # BLD AUTO: 0.03 X10(3) UL (ref 0–0.2)
BASOPHILS NFR BLD AUTO: 0.3 %
BUN BLD-MCNC: 128 MG/DL (ref 7–18)
BUN/CREAT SERPL: 17.9 (ref 10–20)
CALCIUM BLD-MCNC: 8 MG/DL (ref 8.5–10.1)
CHLORIDE SERPL-SCNC: 103 MMOL/L (ref 98–112)
CO2 SERPL-SCNC: 19 MMOL/L (ref 21–32)
CREAT BLD-MCNC: 7.16 MG/DL (ref 0.7–1.3)
DEPRECATED RDW RBC AUTO: 61.8 FL (ref 35.1–46.3)
EOSINOPHIL # BLD AUTO: 0.24 X10(3) UL (ref 0–0.7)
EOSINOPHIL NFR BLD AUTO: 2.5 %
ERYTHROCYTE [DISTWIDTH] IN BLOOD BY AUTOMATED COUNT: 17.9 % (ref 11–15)
GLUCOSE BLD-MCNC: 149 MG/DL (ref 70–99)
GLUCOSE BLDC GLUCOMTR-MCNC: 123 MG/DL (ref 70–99)
GLUCOSE BLDC GLUCOMTR-MCNC: 129 MG/DL (ref 70–99)
GLUCOSE BLDC GLUCOMTR-MCNC: 161 MG/DL (ref 70–99)
GLUCOSE BLDC GLUCOMTR-MCNC: 180 MG/DL (ref 70–99)
HAV IGM SER QL: 2.2 MG/DL (ref 1.6–2.6)
HCT VFR BLD AUTO: 26.2 % (ref 39–53)
HGB BLD-MCNC: 8.5 G/DL (ref 13–17.5)
IMM GRANULOCYTES # BLD AUTO: 0.09 X10(3) UL (ref 0–1)
IMM GRANULOCYTES NFR BLD: 0.9 %
LYMPHOCYTES # BLD AUTO: 1.04 X10(3) UL (ref 1–4)
LYMPHOCYTES NFR BLD AUTO: 10.9 %
MCH RBC QN AUTO: 31.6 PG (ref 26–34)
MCHC RBC AUTO-ENTMCNC: 32.4 G/DL (ref 31–37)
MCV RBC AUTO: 97.4 FL (ref 80–100)
MONOCYTES # BLD AUTO: 0.74 X10(3) UL (ref 0.1–1)
MONOCYTES NFR BLD AUTO: 7.8 %
NEUTROPHILS # BLD AUTO: 7.38 X10 (3) UL (ref 1.5–7.7)
NEUTROPHILS # BLD AUTO: 7.38 X10(3) UL (ref 1.5–7.7)
NEUTROPHILS NFR BLD AUTO: 77.6 %
OSMOLALITY SERPL CALC.SUM OF ELEC: 334 MOSM/KG (ref 275–295)
PHOSPHATE SERPL-MCNC: 7.8 MG/DL (ref 2.5–4.9)
PLATELET # BLD AUTO: 103 10(3)UL (ref 150–450)
POTASSIUM SERPL-SCNC: 5.2 MMOL/L (ref 3.5–5.1)
RBC # BLD AUTO: 2.69 X10(6)UL (ref 4.3–5.7)
SODIUM SERPL-SCNC: 140 MMOL/L (ref 136–145)
VANCOMYCIN SERPL-MCNC: 21.6 UG/ML
WBC # BLD AUTO: 9.5 X10(3) UL (ref 4–11)

## 2019-05-23 PROCEDURE — 99232 SBSQ HOSP IP/OBS MODERATE 35: CPT | Performed by: INTERNAL MEDICINE

## 2019-05-23 PROCEDURE — 99233 SBSQ HOSP IP/OBS HIGH 50: CPT | Performed by: INTERNAL MEDICINE

## 2019-05-23 PROCEDURE — 99291 CRITICAL CARE FIRST HOUR: CPT | Performed by: INTERNAL MEDICINE

## 2019-05-23 RX ORDER — ALBUMIN (HUMAN) 12.5 G/50ML
25 SOLUTION INTRAVENOUS ONCE
Status: COMPLETED | OUTPATIENT
Start: 2019-05-23 | End: 2019-05-23

## 2019-05-23 RX ORDER — HEPARIN SODIUM 1000 [USP'U]/ML
INJECTION, SOLUTION INTRAVENOUS; SUBCUTANEOUS
Status: DISPENSED
Start: 2019-05-23 | End: 2019-05-24

## 2019-05-23 NOTE — PROGRESS NOTES
[facility] [facility]    Progress Note      Subjective:     Remain intubated     Off pressors     S/p tracheostomy tube placement    Review of Systems:     Unable to obtain - intubated and lethargic       Objective:   Temp:  [96.7 °F (35.9 °C)-98.7 Intravenous Q5 Min PRN   fentaNYL citrate (SUBLIMAZE) 0.05 MG/ML injection 50 mcg 50 mcg Intravenous Q5 Min PRN   HYDROmorphone HCl (DILAUDID) 1 MG/ML injection 0.2 mg 0.2 mg Intravenous Q5 Min PRN   HYDROmorphone HCl (DILAUDID) 1 MG/ML injection 0.4 mg 0. Bitartrate (LEVOPHED) 16 mg in dextrose 5 % 250 mL infusion 0.5-8 mcg/min Intravenous Continuous   artificial tears 83-15 % ophthalmic ointment  Both Eyes TID   levETIRAcetam (KEPPRA) 1,000 mg in sodium chloride 0.9% 100 mL IVPB 1,000 mg Intravenous Q12H the aPTT in patients not receiving  anticoagulant therapy (Heparin, etc.), may be  seen in Factor deficiency, vitamin K deficiency,  factor inhibitors, liver disease, etc.  Clinical correlation is recommended.        INR   Date Value Ref Range Status   05/1 resistance to flow recommend slight adjustment in tube position to remove the fold. 2.  Interval placement of tracheostomy tube. Satisfactory position of the tube.  3.  Right basal pulmonary opacity which is more prominent since prior exam and may represen

## 2019-05-23 NOTE — DIETARY NOTE
NUTRITION NOTE UPDATE:       EN rx past 24 hrs:  Nepro 20 ml/hr provided ~ 800 kcals, 34 gm protein, 70 gms carbohydrate, 12 meq k+, 320 ml H20, 44% RDI's, 5 gms soluble+insoluble fiber. (requires for renal failure on dialysis

## 2019-05-23 NOTE — PROGRESS NOTES
Pulmonary/Critical Care Follow Up Note    HPI:   Rosita Hillman is a 60year old male with Patient presents with:  Fever (infectious)  Altered Mental Status (neurologic)      PCP LATIA Augustine MD  Admission Attending No admitting provider for patient encoun injection 0.4 mg, 0.4 mg, Intravenous, Q5 Min PRN  •  HYDROmorphone HCl (DILAUDID) 1 MG/ML injection 0.6 mg, 0.6 mg, Intravenous, Q5 Min PRN  •  morphINE sulfate (PF) 2 MG/ML injection 2 mg, 2 mg, Intravenous, Q10 Min PRN  •  morphINE sulfate (PF) 4 MG/ML ophthalmic ointment, , Both Eyes, TID  •  levETIRAcetam (KEPPRA) 1,000 mg in sodium chloride 0.9% 100 mL IVPB, 1,000 mg, Intravenous, Q12H  •  bisacodyl (DULCOLAX) rectal suppository 10 mg, 10 mg, Rectal, Daily PRN  •  vasopressin (PITRESSIN) 20 Units in s  05/23/2019    K 5.2 05/23/2019     05/23/2019    CO2 19.0 05/23/2019     05/23/2019    CA 8.0 05/23/2019    ALB 1.9 05/23/2019    MG 2.2 05/23/2019    PHOS 7.8 05/23/2019     Lab Results   Component Value Date    HGB 8.5 (L) 05/23/20 compliant with a/c from past cards notes  Plan       Follow rate               Restart hep or warfarin when safe                 GIB  Coffee ground emesis but resolved  Hb 17-->16--> 14 --> 12.9--> 9-->8.8--> 8.3  GI and surgery following  H/H stabilizing

## 2019-05-23 NOTE — PROGRESS NOTES
[facility]     Gastroenterology Progress Note    Denise Villanueva Patient Status:  Inpatient    1960 MRN [id number]   Location [facility] 2W/SW Attending Kelle Kennedy, [address] [facility] Se Day # 21 PCP LATIA Sawant MD       Ass 1509 — — — — — — (!) 316 lb 5.8 oz (143.5 kg)   05/23/19 0500 — — — 75 21 96 % —   05/23/19 0449 — — — 76 24 93 % —   05/23/19 0400 130/67 98.3 °F (36.8 °C) Temporal 79 24 97 % —   05/23/19 0300 137/69 — — 80 22 97 % —   05/23/19 0208 — — — 80 20 97 % — CO2 26.0 23.0 21.0 19.0*   ALKPHO 69  --   --   --    AST 38*  --   --   --    ALT 62*  --   --   --    BILT 0.9  --   --   --    TP 6.5  --   --   --        Lab Results   Component Value Date    INR 1.55 (H) 05/14/2019    INR 1.90 (H) 05/13/2019    INR Santy Sy MD on 5/22/2019 at 19:04                  Christian Plascencia  5/23/2019

## 2019-05-23 NOTE — PHYSICAL THERAPY NOTE
New PT orders received; pt now s/p trach placement. Attempted PT re-eval however pt currently receiving dialysis. Will re-attempt tomorrow as able/appropriate.

## 2019-05-23 NOTE — PROGRESS NOTES
[place] [facility] - [facility]    Progress Note    Jai Carbajal Patient Status:  Inpatient    1960 MRN [id number]   Location [facility] 2W/SW Attending Gideon Lazo MD   Hosp Day # 21 PCP C. Stephani Boas, MD     Subjective:  Tracheostomy ye - Hypoglycemia protocol  - Patient might need D10 if TF is placed on hold . Please notify the endocrine service in that case.      Will follow     Room  5/23/2019

## 2019-05-23 NOTE — PROGRESS NOTES
Patient had medium stool and pushed out rectal tube. Attempted to reach GI to provide update. . Bowel sounds are now active. Tolerating tube feedings well at 40ml/hr, residuals 0-10ml during routine assessments.

## 2019-05-23 NOTE — PROGRESS NOTES
[facility]    Progress Note    Minerva Score Patient Status:  Inpatient    1960 MRN [id number]   Location [facility] 2W/SW Attending Maliha Palmer MD   Hosp Day # 22 PCP LATIA Linares MD     Subjective:  S/p Tracheostom

## 2019-05-23 NOTE — PROGRESS NOTES
[facility]    Progress Note    Jai Carbajal Patient Status:  Inpatient    1960 MRN [id number]   Location [facility] 2W/SW Attending Luis M Alonzo, [address] [facility] Se Day # 21 PCP C. Stephani Boas, MD            Subjective:     Patient 9*   CA 8.0* 7.3* 8.5   ALB 2.1*  --   --    * 138 138   K 3.9 4.4 4.8   CL 98 98 102   CO2 26.0 23.0 21.0   ALKPHO 69  --   --    AST 38*  --   --    ALT 62*  --   --    BILT 0.9  --   --    TP 6.5  --   --              Xr Abdomen (1 View) (cpt=7407 5/12  CONCLUSION:   1. Left lower lobe pneumonia and atelectasis with slight interval worsening of aeration. Small left basilar pleural effusion.   2. Small right basilar pleural effusion with compressive atelectasis in the right lower lobe and perifissura differential.  Interval decrease without resolution of subcapsular hepatic fluid along the right anterior hepatic lobe. Cecal wall thickening with inflammatory fat stranding within the right lower quadrant suspicious for colitis.  If not recently performed

## 2019-05-23 NOTE — PLAN OF CARE
Problem: Patient Centered Care  Goal: Patient preferences are identified and integrated in the patient's plan of care  Description  Interventions:  - What would you like us to know as we care for you?  - Provide timely, complete, and accurate information routines  Outcome: Progressing  Note:   Reorientation provided with every interaction with the patient. Family supportive and present throughout the day.      Problem: CARDIOVASCULAR - ADULT  Goal: Maintains optimal cardiac output and hemodynamic stability METABOLIC/FLUID AND ELECTROLYTES - ADULT  Goal: Glucose maintained within prescribed range  Description  INTERVENTIONS:  - Monitor Blood Glucose as ordered  - Assess for signs and symptoms of hyperglycemia and hypoglycemia  - Administer ordered medications Achieves stable or improved neurological status  Description  INTERVENTIONS  - Assess for and report changes in neurological status  - Initiate measures to prevent increased intracranial pressure  - Maintain blood pressure and fluid volume within ordered p

## 2019-05-23 NOTE — PROGRESS NOTES
[facility] dosing service    Follow-up Pharmacokinetic Consult for Vancomycin Dosing     Vaenssa Tavarez is a 60year old male who is being treated for sepsis. Patient is on day 12 of Vancomycin and add is currently receiving 750 mg IV post HD .   Goal trou

## 2019-05-24 ENCOUNTER — APPOINTMENT (OUTPATIENT)
Dept: INTERVENTIONAL RADIOLOGY/VASCULAR | Facility: HOSPITAL | Age: 59
DRG: 003 | End: 2019-05-24
Attending: INTERNAL MEDICINE
Payer: MEDICARE

## 2019-05-24 ENCOUNTER — APPOINTMENT (OUTPATIENT)
Dept: CT IMAGING | Facility: HOSPITAL | Age: 59
DRG: 003 | End: 2019-05-24
Attending: INTERNAL MEDICINE
Payer: MEDICARE

## 2019-05-24 ENCOUNTER — APPOINTMENT (OUTPATIENT)
Dept: GENERAL RADIOLOGY | Facility: HOSPITAL | Age: 59
DRG: 003 | End: 2019-05-24
Attending: INTERNAL MEDICINE
Payer: MEDICARE

## 2019-05-24 LAB
GLUCOSE BLDC GLUCOMTR-MCNC: 111 MG/DL (ref 70–99)
GLUCOSE BLDC GLUCOMTR-MCNC: 122 MG/DL (ref 70–99)
GLUCOSE BLDC GLUCOMTR-MCNC: 146 MG/DL (ref 70–99)
GLUCOSE BLDC GLUCOMTR-MCNC: 163 MG/DL (ref 70–99)

## 2019-05-24 PROCEDURE — 74018 RADEX ABDOMEN 1 VIEW: CPT | Performed by: INTERNAL MEDICINE

## 2019-05-24 PROCEDURE — 99231 SBSQ HOSP IP/OBS SF/LOW 25: CPT | Performed by: OTOLARYNGOLOGY

## 2019-05-24 PROCEDURE — 99233 SBSQ HOSP IP/OBS HIGH 50: CPT | Performed by: INTERNAL MEDICINE

## 2019-05-24 PROCEDURE — 70450 CT HEAD/BRAIN W/O DYE: CPT | Performed by: INTERNAL MEDICINE

## 2019-05-24 PROCEDURE — 99232 SBSQ HOSP IP/OBS MODERATE 35: CPT | Performed by: INTERNAL MEDICINE

## 2019-05-24 PROCEDURE — B5181ZA FLUOROSCOPY OF SUPERIOR VENA CAVA USING LOW OSMOLAR CONTRAST, GUIDANCE: ICD-10-PCS | Performed by: RADIOLOGY

## 2019-05-24 PROCEDURE — 0JH63XZ INSERTION OF TUNNELED VASCULAR ACCESS DEVICE INTO CHEST SUBCUTANEOUS TISSUE AND FASCIA, PERCUTANEOUS APPROACH: ICD-10-PCS | Performed by: RADIOLOGY

## 2019-05-24 PROCEDURE — 02HV33Z INSERTION OF INFUSION DEVICE INTO SUPERIOR VENA CAVA, PERCUTANEOUS APPROACH: ICD-10-PCS | Performed by: RADIOLOGY

## 2019-05-24 RX ORDER — ALBUMIN (HUMAN) 12.5 G/50ML
100 SOLUTION INTRAVENOUS AS NEEDED
Status: DISCONTINUED | OUTPATIENT
Start: 2019-05-24 | End: 2019-07-01

## 2019-05-24 RX ORDER — SODIUM CHLORIDE 9 MG/ML
INJECTION, SOLUTION INTRAVENOUS
Status: COMPLETED
Start: 2019-05-24 | End: 2019-05-24

## 2019-05-24 RX ORDER — LIDOCAINE HYDROCHLORIDE 20 MG/ML
INJECTION, SOLUTION EPIDURAL; INFILTRATION; INTRACAUDAL; PERINEURAL
Status: COMPLETED
Start: 2019-05-24 | End: 2019-05-24

## 2019-05-24 RX ORDER — HEPARIN SODIUM 1000 [USP'U]/ML
INJECTION, SOLUTION INTRAVENOUS; SUBCUTANEOUS
Status: COMPLETED
Start: 2019-05-24 | End: 2019-05-24

## 2019-05-24 NOTE — OCCUPATIONAL THERAPY NOTE
OCCUPATIONAL THERAPY EVALUATION - INPATIENT     Room Number: 224/224-A  Evaluation Date: 5/24/2019  Type of Evaluation: Re-evaluation       Physician Order: IP Consult to Occupational Therapy  Reason for Therapy: ADL/IADL Dysfunction and Discharge Planning TBD    PLAN  OT Treatment Plan: Balance activities; Energy conservation/work simplification techniques;ADL training;Functional transfer training;Patient/Family education;Patient/Family training; Compensatory technique education;Cognitive reorientation;UE str level; uses cane for mobilityPrior Level of [place]: Patient unable to provide history; per chart review, lives with spouse who assist with IADL, however, patient managing BADL at Mod I to I level; uses cane for mobility    SUBJECTIVE  Per brother- \" bed      Patient was able to achieve the following . ..    Patient able to toilet transfer  Not able to perform    Patient able to dress lower extremities  Not able to perform     Patient/Caregiver able to demonstrate safety with ADLS  Not able to perform

## 2019-05-24 NOTE — PLAN OF CARE
Patient recent tracheostomy placement. Patient on trach/vent fiO2 40%. Patient no signs of respiratory distress. Patient tolerating vent, not on sedation. Patient stuporous after HD treatment, 3.7L removed, not able to follow commands.  CT head done over ni ordered  -weaning trials when appropriate  - See additional Care Plan goals for specific interventions  Outcome: Progressing     Problem: Delirium  Goal: Minimize duration of delirium  Description  Interventions:  - Encourage use of hearing aids, eye glass oxygen saturation or ABGs  - Provide Smoking Cessation handout, if applicable  - Encourage broncho-pulmonary hygiene including cough, deep breathe, Incentive Spirometry  - Assess the need for suctioning and perform as needed  - Assess and instruct to repor intact  Description  INTERVENTIONS  - Assess and document risk factors for pressure ulcer development  - Assess and document skin integrity  - Monitor for areas of redness and/or skin breakdown  - Initiate interventions, skin care algorithm/standards of ca as appropriate  - Assess patient's ability to be responsible for managing their own health  - Refer to Case Management Department for coordinating discharge planning if the patient needs post-hospital services based on physician/LIP order or complex needs

## 2019-05-24 NOTE — DIETARY NOTE
ADULT NUTRITION REASSESSMENT     Pt is at high nutrition risk. Pt does not meet malnutrition criteria. RECOMMENDATIONS TO MD:  See Nutrition Intervention. For EN rx. Pt receiving low water intake ~ 1000 ml/day via EN.  Additional water may be needed feed enteral route. TPN to continue per surgeon providing 100% nutrition needs. X1 low BG @MN rx with 25 gm dextrose, otherwise BG levels acceptable and to continue same insulin regime/endocrine svc. Plan for hemodialysis tomorrow.    5/20 ADDENDUM @1500: • Seizure disorder ([facility] [address]        ANTHROPOMETRICS:  HT: 166.8 cm (5' 5.65\")  WT: (!) 142.8 kg (314 lb 13.1 oz) (estimate true wt less noting edema UE and LE and hands and feet, oliguranuric.) wt improving with fluid removal/dialysis.    BMI: Body mass ind Sodium (Porcine)  5,000 Units Subcutaneous Q8H [facility]   • insulin detemir  55 Units Subcutaneous Daily   • Insulin Regular Human  1-11 Units Subcutaneous 4 times per day   • vancomycin  1 mg Intravenous See Admin Instructions (RX holding)   • meropenem  1 g I obestiy per visual exam.  - Fluid Accumulation: upper extremity edema, lower extremity edema and hands and feet per visual exam  - Skin Integrity: intact.     NUTRITION PRESCRIPTION:  Diet: Enteral Nutrition (EN)  Oral Supplements: NPO  ESTIMATED NUTRITION

## 2019-05-24 NOTE — PROGRESS NOTES
[facility]     Gastroenterology Progress Note    Jesus Villa Patient Status:  Inpatient    1960 MRN [id number]   Location [facility] 2W/SW Attending Freeman Aceves, [address] [facility] Se Day # 25 PCP LATIA Ceron MD       Ass 23 95 % —   05/24/19 0200 123/73 — — 92 18 97 % —   05/24/19 0100 137/86 — — 89 17 98 % —   05/24/19 0008 — — — 88 18 97 % —   05/24/19 0000 138/77 98.1 °F (36.7 °C) Temporal 80 17 97 % —   05/23/19 2300 119/60 — — 87 23 98 % —   05/23/19 2200 105/64 — — 8 19.0*   ALKPHO 69  --   --   --    AST 38*  --   --   --    ALT 62*  --   --   --    BILT 0.9  --   --   --    TP 6.5  --   --   --        Lab Results   Component Value Date    INR 1.55 (H) 05/14/2019    INR 1.90 (H) 05/13/2019    INR 2.22 (H) 05/12/2019 Chest Ap Portable  (cpt=71045)    Result Date: 5/22/2019  CONCLUSION:  1. Feeding tube has been placed. The tip is in the gastric fundus. The distal end of the catheter a few cm proximal to the tip is folded upon itself.   If there is resistance to flow

## 2019-05-24 NOTE — PROGRESS NOTES
[place] [facility]    Progress Note    Tinsley Begin Patient Status:  Inpatient    1960 MRN [id number]   Location [facility] 2W/SW Attending Lanette Bean,  [facility] Se Day # 25 PCP LTAIA Maldonado MD        Subjective:     Unable to    D/w with brother at bedside                Results:     Lab Results   Component Value Date    WBC 9.5 05/23/2019    HGB 8.5 (L) 05/23/2019    HCT 26.2 (L) 05/23/2019    .0 (L) 05/23/2019    CREATSERUM 7.16 (H) 05/23/2019     (H) 05/23/20 20:31          Xr Chest Ap Portable  (cpt=71045)    Result Date: 5/22/2019  CONCLUSION:  1. Feeding tube has been placed. The tip is in the gastric fundus. The distal end of the catheter a few cm proximal to the tip is folded upon itself.   If there is r

## 2019-05-24 NOTE — PHYSICAL THERAPY NOTE
PHYSICAL THERAPY RE-evaluation NOTE - INPATIENT     Room Number: 973/282-H       Presenting Problem: sepsis: trach on 5/22    Problem List  Principal Problem:    Sepsis due to undetermined organism [facility])  Active Problems:    Atrial fibrillation with rapid needed)    WEIGHT BEARING RESTRICTION  Weight Bearing Restriction: None                PAIN ASSESSMENT   Rating: Unable to rate  Location: grimacing with LLE movement  Management Techniques: Activity promotion; Body mechanics;Repositioning    BALANCE

## 2019-05-24 NOTE — PLAN OF CARE
Problem: Patient Centered Care  Goal: Patient preferences are identified and integrated in the patient's plan of care  Description  Interventions:  - What would you like us to know as we care for you?   - Provide timely, complete, and accurate informatio skin color and temperature  - Assess for signs of decreased coronary artery perfusion - ex.  Angina  - Evaluate fluid balance, assess for edema, trend weights  Outcome: Progressing  Goal: Absence of cardiac arrhythmias or at baseline  Description  INTERVENT range  Description  INTERVENTIONS:  - Monitor Blood Glucose as ordered  - Assess for signs and symptoms of hyperglycemia and hypoglycemia  - Administer ordered medications to maintain glucose within target range  - Assess barriers to adequate nutritional i assessment.  - Educate pt/family on patient safety including physical limitations  - Instruct pt to call for assistance with activity based on assessment  - Modify environment to reduce risk of injury  - Provide assistive devices as appropriate  - Consider

## 2019-05-24 NOTE — PROGRESS NOTES
Patient went for permacath today, back to room without complications, feedings at goal, plan for HD tomorrow.  No other shift events

## 2019-05-24 NOTE — PROGRESS NOTES
[place] [facility]    Progress Note    Guido Cui Patient Status:  Inpatient    1960 MRN [id number]   Location [facility] 2W/SW Attending Amy Alanis, [address] [facility] Day [facility] 25 PCP LATIA Stevens MD            Subjective:     Patient 6.21* 7.16*   GFRAA 12* 9* 10* 9*   GFRNAA 11* 8* 9* 8*   CA 8.0* 7.3* 8.5 8.0*   ALB 2.1*  --   --  1.9*   * 138 138 140   K 3.9 4.4 4.8 5.2*   CL 98 98 102 103   CO2 26.0 23.0 21.0 19.0*   ALKPHO 69  --   --   --    AST 38*  --   --   --    ALT 62* tube.  Satisfactory position of the tube. 3.  Right basal pulmonary opacity which is more prominent since prior exam and may represent a region of atelectasis or pneumonia.   Dictated by (CST): Osiel Sexton MD on 5/22/2019 at 19:00     Approved by (CST): Joselito Diff  Leucocytosis        Patient  improved. Last KUB  Much improved, colon more normal in size    Minimal   tenderness to  my exam RUQ, no guarding. Swiching to oral feedings at present  Cont to increase tube feedings.  I spoke with GI and nutrition

## 2019-05-24 NOTE — PROGRESS NOTES
[facility]    Follow up inpatient visit    [place] Numbers Patient Status:  Inpatient    1960 MRN [id number]   Location [facility] 2W/SW Attending Gurpreet Cedeno, [address] [facility] Se Day # 25 PCP LATIA Jack MD     Date of Admissio Albumin Human (ALBUMINAR) 25 % solution 100 mL 100 mL Intravenous PRN   epoetin shakeel-epbx (RETACRIT) 68227 UNIT/ML injection SOLN 10,000 Units 10,000 Units Subcutaneous Once per day on Mon Wed Fri   Insulin Regular Human (NOVOLIN R) 100 UNIT/ML injection % solution 100 mL 100 mL Intravenous PRN   insulin detemir (LEVEMIR) 100 UNIT/ML flextouch 55 Units 55 Units Subcutaneous Daily   dextrose 50 % injection 50 mL 50 mL Intravenous PRN   Glucose-Vitamin C (DEX-4) 4-6 GM-MG chewable tab 4 tablet 4 tablet Oral Flush 0.9 % injection 3 mL 3 mL Intravenous PRN   Nortriptyline HCl (PAMELOR) cap 50 mg 50 mg Oral Nightly   Promethazine HCl (PHENERGAN) tab 12.5 mg 12.5 mg Oral Q4H PRN       Medications Prior to Admission:  NORTRIPTYLINE HCL 25 MG Oral Cap TAKE 2 CAPSUL [place] compression sock, medium   Potassium Chloride ER (K-DUR M20) 20 MEQ Oral Tab CR Take two tabs twice daily   Alcohol Swabs 70 % Does not apply Pads    Blood Glucose Calibration (TALIA CONTOUR NEXT CONTROL) NORMAL In Vitro Solution    Blood Glucose Monit (H) 05/20/2019    ALT 62 (H) 05/20/2019    PTT 39.4 (H) 05/13/2019    INR 1.55 (H) 05/14/2019    PTP 18.6 (H) 05/14/2019     (H) 05/03/2019    MG 2.2 05/23/2019    PHOS 7.8 (H) 05/23/2019    B12 >2,000 (H) 05/11/2019         Imaging:  Xr Abdomen (1 the transverse colon. A nasogastric tube is suggested with tip projecting in the region of the proximal stomach. BOWEL GAS PATTERN: No dilated loops of small bowel are identified.  There appears to be persistent gaseous distention and diffuse dilatation of CONCLUSION:  1. Decompressive rectal tube extends up the left: Into the mid transverse colon with significant decompression of the previously noted markedly distended colon.   Diffuse colonic distention has diminished since 5/17/2019. 2. Residual contr CALCIFICATIONS: None significant. BONES: Normal.  No significant arthritic changes. OTHER: Surgical clips are seen in the pelvis. Nasogastric tube re-identified. Endotracheal tube is seen.   Heart is enlarged with bibasilar lung consolidation/atelectasis Raphael France MD on 5/13/2019 at 13:58          Xr Abdomen (1 View) (cpt=74018)    Result Date: 5/11/2019  PROCEDURE: XR ABDOMEN (1 VIEW) (CPT=74000)  COMPARISON: [facility], XR ABDOMEN (1 VIEW) (CPT=74018), 5/10/2019, 14:10.   INDICATIONS: Abdomin changes. OTHER: Negative. No abnormal gaseous collections. CONCLUSION:   Marked gaseous distention of multiple large and small bowel loops.   The small bowel distention has increased since 5/7/2019 where as the large bowel distention has decrease (CPT=71045), 5/20/2019, 1:31. INDICATIONS: ET tube placement  TECHNIQUE:   Single PA view. FINDINGS: CARDIAC/VASC: Stable mild cardiomegaly with normal caliber pulmonary vessels. MEDIAST/ZIYAD:   No visible mass or adenopathy.  LUNGS/PLEURA: Decreased rig ganglionic lacunar infarct. 3. Intracranial atherosclerosis cavernous carotid arteries. 4. Newly developed small bilateral mastoid effusions. 5. Pansinusitis.  6. 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    Dictated by (CST): Sim with superimposed aerated secretions ORBITS: Limited views are grossly unremarkable. OTHER: Atherosclerotic vascular calcifications are perceived in the carotid siphons.   Partially imaged endotracheal and oral enteric tubes; nasopharynx is coapted with th hydronephrosis. No calculi are present. Probable low-density cyst right kidney lateral cortex, seen on comparison from 2015. GI/MESENTERY: Lack of enteric and IV contrast limits evaluation. There is a moderate to large hiatal hernia.   The visualized dist 5/12/2019, 12:17. INDICATIONS:   Subcapsular fluid collection liver  TECHNIQUE:   Limited evaluation of the areas indicated in the order with gray scale and colorflow of the main vessels where appropriate.   The exam is compromised because of portable tech 5/12/2019, 12:17. INDICATIONS: Evaluation for ongoing sepsis and abdominal distension. TECHNIQUE: CT images of the chest, abdomen and pelvis were obtained without intravenous contrast material.  Automated exposure control for dose reduction was used.  Adj ductal dilation. SPLEEN: There is a splenule within the left upper quadrant. PANCREAS: No gross ductal dilation. ADRENALS: Unremarkable KIDNEYS: There is no hydronephrosis. AORTA/VASCULAR:   No aneurysmal dilation.  RETROPERITONEUM: There are scattered sub performed to exclude an underlying mass. Diffuse colonic ileus, minimally improved since 5/12/2019. No obstruction. Cirrhotic liver morphology.       Dictated by (CST): Payal Jerome MD on 5/15/2019 at 15:10     Approved by (CST): Payal Jerome MD on 5/15/ BILIARY: Gallbladder is no longer distended. No evidence for cholecystitis or biliary dilatation. SPLEEN: Normal unenhanced spleen. PANCREAS: Normal unenhanced pancreas. ADRENALS: Normal unenhanced adrenals. KIDNEYS: No renal obstruction.   A 2.7 cm sta Colonic ileus with cecum distended to approximately 14 cm compared with 12.5 cm on previous. 6. Previous sigmoid colon resection. Rectal tube in place. 7. Hepatic cirrhosis. 8. Small hiatal hernia. 9. No evidence for bowel obstruction.   Nasogastric tube i AORTA/VASCULAR:   Scattered atherosclerosis within common iliac arteries. No abdominal aortic aneurysm or dissection. RETROPERITONEUM: No mass or enlarged adenopathy.   BOWEL/MESENTERY:  Prominent distention of the large bowel distended main with gas and f of the duodenum.  5.  Right renal cyst.  Dictated by (CST): Orlando Chaparro MD on 5/07/2019 at 13:29     Approved by (CST): Orlando Chaparro MD on 5/07/2019 at 13:42          Xr Chest Ap Portable  (cpt=71045)    Result Date: 5/22/2019  PROCEDURE: XR CHEST AP TAO tracheostomy tube. Satisfactory position of the tube. 3.  Right basal pulmonary opacity which is more prominent since prior exam and may represent a region of atelectasis or pneumonia.   Dictated by (CST): Osiel Sexton MD on 5/22/2019 at 19:00     Approved pulmonary vessels. MEDIAST/ZIYAD:   No visible mass or adenopathy. LUNGS/PLEURA: Increased opacification of the right lower lung as compared to previous chest x-ray. Milder opacification at the left lung base is unchanged.  BONES: No fracture or visible bon on 5/15/2019 at 8:53     Approved by (CST): Glen Guthrie MD on 5/15/2019 at 8:55          Xr Chest Ap Portable  (cpt=71045)    Result Date: 5/14/2019  PROCEDURE: XR CHEST AP PORTABLE (CPT=71010) TIME: 1245 hr.   COMPARISON: [facility] clavicles. NG tube tip not definitely visualized. BONES: No fracture or visible bony lesion. OTHER: Negative. CONCLUSION:  1. Persistent mild cardiomegaly and normal pulmonary vascularity.   Unchanged moderate left base consolidation/atelectasis/e [facility], XR CHEST AP PORTABLE (CPT=71045), 5/02/2019, 10:48. [facility], XR CHEST AP PORTABLE (CPT=71045), 5/03/2019, 10:32. [facility], XR CHEST AP PORTABLE (CPT=71045), 5/03/2019, 11:14.   [facility] and loops of bowel is noted. Leads overlie the chest and obscure underlying detail. CONCLUSION:  1. Endotracheal tube in satisfactory position. 2. Bibasilar reticular opacities are seen and may reflect atelectasis.  Cardiomegaly with The heart is mildly enlarged. MEDIAST/ZIYAD:    The aorta is elongated and tortuous with atherosclerotic plaques. No visible mass or                                                                                             adenopathy.  LUNGS/PLEURA:   Biba right lung base and atelectasis versus pneumonia on the left lung base. 2.  Stable position of lines and tubes.    Dictated by (CST): Leobardo Carrillo MD on 5/09/2019 at 6:54     Approved by (CST): Leobardo Carrillo MD on 5/09/2019 at 6:57          Xr Chest Ap Almetta Hammans MEDIAST/ZIYAD:   Atherosclerotic aorta with no visible aneurysm. ET tube in satisfactory position 3.8 cm above the christian. Nasogastric tube extends into the stomach. LUNGS/PLEURA: Hypoinflation.   Bibasilar atelectatic changes and/or residual parenchymal o stably enlarged. There is mild tortuosity of the thoracic aorta with peripheral atherosclerotic vascular calcification. The pulmonary vascularity diffusely accentuated, likely due to crowding. MEDIAST/ZIYAD: No visible mass or adenopathy.  LUNGS/PLEURA: The tube re-identified. A catheter is seen in the superior vena cava. Mild bowel distension. CONCLUSION:  1. Stable cardiomegaly. 2. Bibasilar lung consolidation/atelectasis left much greater than right with left effusion. Similar to May 3, 2019.  3. Dictated by (CST): Loi Lawson MD on 5/03/2019 at 12:12     Approved by (CST): Loi Lawson MD on 5/03/2019 at 12:15          Xr Chest Ap Portable  (cpt=71045)    Result Date: 5/3/2019  PROCEDURE: XR CHEST AP PORTABLE (CPT=71010) TIME: 10 heart is mildly enlarged. Unremarkable pulmonary vasculature. MEDIAST/ZIYAD:   The aorta is slightly elongated and tortuous. No visible mass or adenopathy. LUNGS/PLEURA: Normal.  No significant pulmonary parenchymal abnormalities.   No effusion or pleural th its cuff subcutaneously. Tips of the catheter were passed through the peel-away sheath, positioned at the superior vena cava/right atrial junction. Peel-away sheath was removed, incision closed with Dermabond.   Catheter was secured to the skin, flushed Abdomen 2 Views(cpt=74019)    Result Date: 5/14/2019  PROCEDURE: XR ABDOMEN MINIMUM 2 VIEWS (CPT=74020)  COMPARISON: [facility], XR ABDOMEN (1 VIEW) (CPT=74018), 5/11/2019, 13:13.   [facility], XR ABDOMEN (1 VIEW) (CPT=74018) on NASCET Criteria. FINDINGS: NONCONTRAST HEAD CT: CSF SPACES: No hydrocephalus, subarachnoid hemorrhage, or effacement of the basal cisterns is appreciated. There is no extra-axial fluid collection.  CEREBRUM: No acute intraparenchymal hemorrhage, edema thickening with superimposed aerated secretions. Please correlate for symptoms of acute sinusitis. 4. Lesser incidental findings as above.    Dictated by (CST): Kj Hdez MD on 5/09/2019 at 18:30     Approved by (CST): Kj Hdez MD on 5/09/2019

## 2019-05-24 NOTE — PLAN OF CARE
Problem: Patient Centered Care  Goal: Patient preferences are identified and integrated in the patient's plan of care  Description  Interventions:  - Provide timely, complete, and accurate information to patient/family  - Incorporate patient and family k indicated  - Manage/alleviate anxiety  - Monitor for signs/symptoms of CO2 retention  Outcome: Progressing  Note:   Patient on full support on ventilator (tracheostomy) during my shift.        Problem: METABOLIC/FLUID AND ELECTROLYTES - ADULT  Goal: Glucose Complete POLST form as appropriate  - Assess patient's ability to be responsible for managing their own health  - Refer to Case Management Department for coordinating discharge planning if the patient needs post-hospital services based on physician/LIP ord

## 2019-05-24 NOTE — PROGRESS NOTES
[facility]    Progress Note    Brayden Garcia Patient Status:  Inpatient    1960 MRN [id number]   Location [facility] 2W/SW Attending Yang Zimmerman, [address] [facility]  Day # 25 PCP LATIA Cornell MD       Subjective:   Brayden Garcia is Imaging:  [unfilled]   Xr Abdomen (1 View) (cpt=74018)    Result Date: 5/24/2019  CONCLUSION:  1. Generalized colonic ileus versus Beth syndrome with partial decompression. 2. Rectal colonic tube has been removed.   3. Residual contrast remaini ASSESSMENT/PLAN:   Assessment       #1 respiratory failure still on ventilator at 40% oxygen  #2 KOMAL renal function not yet improving urine output still minimal plan dialysis on Saturday  #3 colonic pseudoobstruction doing better can probably stop anti

## 2019-05-24 NOTE — PROGRESS NOTES
Patient's wife stated she does not want to make decisions in terms of permanent dialysis catheter placement.  Patient's wife stated to this nurse that she wants \"a day or two\" to see if his condition improves/changes before making more decisions in terms

## 2019-05-24 NOTE — PROGRESS NOTES
Consent for permanent hemodialysis catheter has been signed. Education provided to wife and patient as appropriate. Sonali Ortiz from IR updated.

## 2019-05-25 ENCOUNTER — APPOINTMENT (OUTPATIENT)
Dept: GENERAL RADIOLOGY | Facility: HOSPITAL | Age: 59
DRG: 003 | End: 2019-05-25
Attending: INTERNAL MEDICINE
Payer: MEDICARE

## 2019-05-25 LAB
ANION GAP SERPL CALC-SCNC: 16 MMOL/L (ref 0–18)
BASOPHILS # BLD AUTO: 0.03 X10(3) UL (ref 0–0.2)
BASOPHILS NFR BLD AUTO: 0.3 %
BUN BLD-MCNC: 123 MG/DL (ref 7–18)
BUN/CREAT SERPL: 18.2 (ref 10–20)
CALCIUM BLD-MCNC: 7.7 MG/DL (ref 8.5–10.1)
CHLORIDE SERPL-SCNC: 101 MMOL/L (ref 98–112)
CO2 SERPL-SCNC: 22 MMOL/L (ref 21–32)
CREAT BLD-MCNC: 6.77 MG/DL (ref 0.7–1.3)
DEPRECATED RDW RBC AUTO: 62 FL (ref 35.1–46.3)
EOSINOPHIL # BLD AUTO: 0.28 X10(3) UL (ref 0–0.7)
EOSINOPHIL NFR BLD AUTO: 2.5 %
ERYTHROCYTE [DISTWIDTH] IN BLOOD BY AUTOMATED COUNT: 17.9 % (ref 11–15)
GLUCOSE BLD-MCNC: 152 MG/DL (ref 70–99)
GLUCOSE BLDC GLUCOMTR-MCNC: 124 MG/DL (ref 70–99)
GLUCOSE BLDC GLUCOMTR-MCNC: 135 MG/DL (ref 70–99)
GLUCOSE BLDC GLUCOMTR-MCNC: 158 MG/DL (ref 70–99)
GLUCOSE BLDC GLUCOMTR-MCNC: 166 MG/DL (ref 70–99)
HAV IGM SER QL: 2.4 MG/DL (ref 1.6–2.6)
HCT VFR BLD AUTO: 28.2 % (ref 39–53)
HGB BLD-MCNC: 9.2 G/DL (ref 13–17.5)
IMM GRANULOCYTES # BLD AUTO: 0.04 X10(3) UL (ref 0–1)
IMM GRANULOCYTES NFR BLD: 0.4 %
LYMPHOCYTES # BLD AUTO: 1.19 X10(3) UL (ref 1–4)
LYMPHOCYTES NFR BLD AUTO: 10.8 %
MCH RBC QN AUTO: 31.6 PG (ref 26–34)
MCHC RBC AUTO-ENTMCNC: 32.6 G/DL (ref 31–37)
MCV RBC AUTO: 96.9 FL (ref 80–100)
MONOCYTES # BLD AUTO: 0.91 X10(3) UL (ref 0.1–1)
MONOCYTES NFR BLD AUTO: 8.3 %
NEUTROPHILS # BLD AUTO: 8.54 X10 (3) UL (ref 1.5–7.7)
NEUTROPHILS # BLD AUTO: 8.54 X10(3) UL (ref 1.5–7.7)
NEUTROPHILS NFR BLD AUTO: 77.7 %
OSMOLALITY SERPL CALC.SUM OF ELEC: 330 MOSM/KG (ref 275–295)
PLATELET # BLD AUTO: 126 10(3)UL (ref 150–450)
POTASSIUM SERPL-SCNC: 4.6 MMOL/L (ref 3.5–5.1)
RBC # BLD AUTO: 2.91 X10(6)UL (ref 4.3–5.7)
SODIUM SERPL-SCNC: 139 MMOL/L (ref 136–145)
WBC # BLD AUTO: 11 X10(3) UL (ref 4–11)

## 2019-05-25 PROCEDURE — 99233 SBSQ HOSP IP/OBS HIGH 50: CPT | Performed by: INTERNAL MEDICINE

## 2019-05-25 PROCEDURE — 71045 X-RAY EXAM CHEST 1 VIEW: CPT | Performed by: INTERNAL MEDICINE

## 2019-05-25 PROCEDURE — 99232 SBSQ HOSP IP/OBS MODERATE 35: CPT | Performed by: INTERNAL MEDICINE

## 2019-05-25 RX ORDER — HEPARIN SODIUM 1000 [USP'U]/ML
INJECTION, SOLUTION INTRAVENOUS; SUBCUTANEOUS
Status: COMPLETED
Start: 2019-05-25 | End: 2019-05-25

## 2019-05-25 RX ORDER — HEPARIN SODIUM 1000 [USP'U]/ML
INJECTION, SOLUTION INTRAVENOUS; SUBCUTANEOUS
Status: DISCONTINUED
Start: 2019-05-25 | End: 2019-05-25 | Stop reason: WASHOUT

## 2019-05-25 NOTE — PLAN OF CARE
Problem: Patient Centered Care  Goal: Patient preferences are identified and integrated in the patient's plan of care  Description  Interventions:  - What would you like us to know as we care for you?  Keep wife involved in his care  - Provide timely, com and interruptions  - Encourage family to assist in orientation and promotion of home routines  Outcome: Progressing     Problem: CARDIOVASCULAR - ADULT  Goal: Maintains optimal cardiac output and hemodynamic stability  Description  INTERVENTIONS:  - Monito Maintains or returns to baseline bowel function  Description  INTERVENTIONS:  - Assess bowel function  - Maintain adequate hydration with IV or PO as ordered and tolerated  - Evaluate effectiveness of GI medications  - Encourage mobilization and activity intracranial pressure  - Maintain blood pressure and fluid volume within ordered parameters to optimize cerebral perfusion and minimize risk of hemorrhage  - Monitor temperature, glucose, and sodium.  Initiate appropriate interventions as ordered  Outcome: Remains on vent on CPAP 10/5. Patient's tube feedings remain on. Rectal tube and alvarado in place. Patient is being turned q2 and PRN. Patient's bed is locked; in the lowest position; call light is within reach. Wife was called and updated.

## 2019-05-25 NOTE — PROGRESS NOTES
[facility]  Nephrology Daily Progress Note    Isaiah Sesay  [id number]  60year old      HPI:   Isaiah Sesay is a 60year old male. Awake and looking around but not really communicating well. HD just completed. Tolerated well.   On tube fee for age reflexes and motor skills appropriate for age    Labs:  Lab Results   Component Value Date    WBC 11.0 05/25/2019    HGB 9.2 05/25/2019    HCT 28.2 05/25/2019    .0 05/25/2019    CREATSERUM 6.77 05/25/2019     05/25/2019     05/ Dictated by (CST): Lidia Souza MD on 5/24/2019 at 7:49     Approved by (CST): Lidia Souza MD on 5/24/2019 at 7:56          Xr Chest Ap Portable  (cpt=71045)    Result Date: 5/25/2019  CONCLUSION:  1. Borderline cardiomegaly. Tortuous thoracic aorta. heparin sodium 1000 UNIT/ML injection, , ,   •  Albumin Human (ALBUMINAR) 25 % solution 100 mL, 100 mL, Intravenous, PRN  •  epoetin shakeel-epbx (RETACRIT) 29792 UNIT/ML injection SOLN 10,000 Units, 10,000 Units, Subcutaneous, Once per day on Mon Wed Fri  • Continuous PRN  •  Heparin Sodium (Porcine) 5000 UNIT/ML injection 5,000 Units, 5,000 Units, Subcutaneous, Q8H TIAGO  •  Albumin Human (ALBUMINAR) 25 % solution 100 mL, 100 mL, Intravenous, PRN  •  insulin detemir (LEVEMIR) 100 UNIT/ML flextouch 55 Units, 55 acetaminophen (TYLENOL EXTRA STRENGTH) tab 1,000 mg, 1,000 mg, Oral, Q6H PRN  •  Pantoprazole Sodium (PROTONIX) 40 mg in Sodium Chloride 0.9 % 10 mL IV push, 40 mg, Intravenous, Q12H  •  Normal Saline Flush 0.9 % injection 10 mL, 10 mL, Intravenous, PRN  •

## 2019-05-25 NOTE — PROGRESS NOTES
[facility]    Progress Note      Assessment and Plan:   1. Respiratory failure–presented with parainfluenza virus, required tracheostomy and repeat intubation. The patient remains on ventilator at present.   He has an ejection fraction of rate and rhythm no murmur. Abdomen nontender, without hepatosplenomegaly and no mass appreciable. Extremities without clubbing cyanosis nor edema. Neurologic patient can be aroused to open eyes but not follow command. Diminished tone diffusely. Francine Chavez

## 2019-05-25 NOTE — PROGRESS NOTES
[facility]     Gastroenterology Progress Note    Ezekiel Fraction Patient Status:  Inpatient    1960 MRN [id number]   Location [facility] 2W/SW Attending Esthela Smith, [address] [facility] Se Day # 23 PCP LATIA Moya MD       Ass 1400 136/71 — — 67 22 97 % —   05/23/19 1300 110/82 — — 65 23 98 % —     Body mass index is 51.33 kg/m². Gen: Critically ill, however, looks a bit better today.  Nasoenteral feeding tube in place. HEENT:Negative for scleral icterus.   Neck: Tracheostomy Residual contrast remaining in the distal rectosigmoid colon has diminished.     Dictated by (CST): Katie Guadarrama MD on 5/24/2019 at 9:43     Approved by (CST): Katie Guadarrama MD on 5/24/2019 at 9:50          Ct Brain Or Head ([id number])    Result Da tube. 3.  Right basal pulmonary opacity which is more prominent since prior exam and may represent a region of atelectasis or pneumonia.   Dictated by (CST): Shayy Reyes MD on 5/22/2019 at 19:00     Approved by (CST): Shayy Reyes MD on 5/22/2019 at 19:04

## 2019-05-25 NOTE — PROGRESS NOTES
[facility]    Progress Note    Delsie Factor Patient Status:  Inpatient    1960 MRN [id number]   Location [facility] 2W/SW Attending Merry Oh MD   Hosp Day # 23 PCP LATIA Bardales MD     Subjective:  S/p Tracheostom

## 2019-05-25 NOTE — PLAN OF CARE
Problem: Patient Centered Care  Goal: Patient preferences are identified and integrated in the patient's plan of care  Description  Interventions:  - What would you like us to know as we care for you?  - Provide timely, complete, and accurate information routines  Outcome: Progressing     Problem: CARDIOVASCULAR - ADULT  Goal: Maintains optimal cardiac output and hemodynamic stability  Description  INTERVENTIONS:  - Monitor vital signs, rhythm, and trends  - Monitor for bleeding, hypotension and signs of d Problem: GASTROINTESTINAL - ADULT  Goal: Maintains or returns to baseline bowel function  Description  INTERVENTIONS:  - Assess bowel function  - Maintain adequate hydration with IV or PO as ordered and tolerated  - Evaluate effectiveness of GI medicatio status  Description  INTERVENTIONS  - Assess for and report changes in neurological status  - Initiate measures to prevent increased intracranial pressure  - Maintain blood pressure and fluid volume within ordered parameters to optimize cerebral perfusion

## 2019-05-26 LAB
ALBUMIN SERPL-MCNC: 2.8 G/DL (ref 3.4–5)
ALBUMIN/GLOB SERPL: 0.8 {RATIO} (ref 1–2)
ALP LIVER SERPL-CCNC: 65 U/L (ref 45–117)
ALT SERPL-CCNC: 17 U/L (ref 16–61)
ANION GAP SERPL CALC-SCNC: 10 MMOL/L (ref 0–18)
AST SERPL-CCNC: 29 U/L (ref 15–37)
BASOPHILS # BLD AUTO: 0.01 X10(3) UL (ref 0–0.2)
BASOPHILS NFR BLD AUTO: 0.1 %
BILIRUB SERPL-MCNC: 0.8 MG/DL (ref 0.1–2)
BUN BLD-MCNC: 81 MG/DL (ref 7–18)
BUN/CREAT SERPL: 16.8 (ref 10–20)
CALCIUM BLD-MCNC: 7.8 MG/DL (ref 8.5–10.1)
CHLORIDE SERPL-SCNC: 102 MMOL/L (ref 98–112)
CO2 SERPL-SCNC: 29 MMOL/L (ref 21–32)
CREAT BLD-MCNC: 4.82 MG/DL (ref 0.7–1.3)
DEPRECATED HBV CORE AB SER IA-ACNC: 349.6 NG/ML (ref 30–530)
DEPRECATED RDW RBC AUTO: 61.1 FL (ref 35.1–46.3)
EOSINOPHIL # BLD AUTO: 0.28 X10(3) UL (ref 0–0.7)
EOSINOPHIL NFR BLD AUTO: 3.6 %
ERYTHROCYTE [DISTWIDTH] IN BLOOD BY AUTOMATED COUNT: 17.7 % (ref 11–15)
GLOBULIN PLAS-MCNC: 3.7 G/DL (ref 2.8–4.4)
GLUCOSE BLD-MCNC: 121 MG/DL (ref 70–99)
GLUCOSE BLDC GLUCOMTR-MCNC: 123 MG/DL (ref 70–99)
GLUCOSE BLDC GLUCOMTR-MCNC: 123 MG/DL (ref 70–99)
GLUCOSE BLDC GLUCOMTR-MCNC: 127 MG/DL (ref 70–99)
GLUCOSE BLDC GLUCOMTR-MCNC: 133 MG/DL (ref 70–99)
HAV IGM SER QL: 2.4 MG/DL (ref 1.6–2.6)
HCT VFR BLD AUTO: 24.1 % (ref 39–53)
HGB BLD-MCNC: 7.7 G/DL (ref 13–17.5)
IMM GRANULOCYTES # BLD AUTO: 0.02 X10(3) UL (ref 0–1)
IMM GRANULOCYTES NFR BLD: 0.3 %
IRON SATURATION: 34 % (ref 20–50)
IRON SERPL-MCNC: 35 UG/DL (ref 65–175)
LYMPHOCYTES # BLD AUTO: 0.87 X10(3) UL (ref 1–4)
LYMPHOCYTES NFR BLD AUTO: 11.2 %
M PROTEIN MFR SERPL ELPH: 6.5 G/DL (ref 6.4–8.2)
MCH RBC QN AUTO: 31.4 PG (ref 26–34)
MCHC RBC AUTO-ENTMCNC: 32 G/DL (ref 31–37)
MCV RBC AUTO: 98.4 FL (ref 80–100)
MONOCYTES # BLD AUTO: 0.64 X10(3) UL (ref 0.1–1)
MONOCYTES NFR BLD AUTO: 8.2 %
NEUTROPHILS # BLD AUTO: 5.95 X10 (3) UL (ref 1.5–7.7)
NEUTROPHILS # BLD AUTO: 5.95 X10(3) UL (ref 1.5–7.7)
NEUTROPHILS NFR BLD AUTO: 76.6 %
OSMOLALITY SERPL CALC.SUM OF ELEC: 318 MOSM/KG (ref 275–295)
PHOSPHATE SERPL-MCNC: 6.9 MG/DL (ref 2.5–4.9)
PLATELET # BLD AUTO: 94 10(3)UL (ref 150–450)
POTASSIUM SERPL-SCNC: 3.4 MMOL/L (ref 3.5–5.1)
RBC # BLD AUTO: 2.45 X10(6)UL (ref 4.3–5.7)
SODIUM SERPL-SCNC: 141 MMOL/L (ref 136–145)
TOTAL IRON BINDING CAPACITY: 103 UG/DL (ref 240–450)
TRANSFERRIN SERPL-MCNC: 69 MG/DL (ref 200–360)
WBC # BLD AUTO: 7.8 X10(3) UL (ref 4–11)

## 2019-05-26 PROCEDURE — 99232 SBSQ HOSP IP/OBS MODERATE 35: CPT | Performed by: INTERNAL MEDICINE

## 2019-05-26 PROCEDURE — 99233 SBSQ HOSP IP/OBS HIGH 50: CPT | Performed by: INTERNAL MEDICINE

## 2019-05-26 RX ORDER — POTASSIUM CHLORIDE 1.5 G/1.77G
20 POWDER, FOR SOLUTION ORAL ONCE
Status: COMPLETED | OUTPATIENT
Start: 2019-05-26 | End: 2019-05-26

## 2019-05-26 RX ORDER — LEVETIRACETAM 100 MG/ML
1000 SOLUTION ORAL 2 TIMES DAILY
Status: DISCONTINUED | OUTPATIENT
Start: 2019-05-26 | End: 2019-06-04

## 2019-05-26 NOTE — PROGRESS NOTES
[facility]    Progress Note      Assessment and Plan:   1. Respiratory failure–presented with parainfluenza virus, required tracheostomy and repeat intubation. The patient remains on ventilator at present.   He has an ejection fraction of patient can be aroused to open eyes but not follow command. Diminished tone diffusely. .  Skin without gross abnormality     Results:     Lab Results   Component Value Date    WBC 7.8 05/26/2019    HGB 7.7 05/26/2019    HCT 24.1 05/26/2019    PLT 94.0 05/2

## 2019-05-26 NOTE — PROGRESS NOTES
[facility]    Progress Note    Migdalia Evans Patient Status:  Inpatient    1960 MRN [id number]   Location [facility] 2W/SW Attending Miguelito Olivier MD   Hosp Day # 25 PCP LATIA Felipe MD     Subjective:  S/p Tracheostom

## 2019-05-26 NOTE — PLAN OF CARE
On CPAP 10/5 and tolerating well up to this note. BP WNL and no Albumin has been needed for my shift to this point. It is noted that his BP will be decreased (Syst 80's) after repositioning for 10-15min.      He has been mostly awake and following commands minimize noise and interruptions  - Encourage family to assist in orientation and promotion of home routines  Outcome: Progressing     Problem: CARDIOVASCULAR - ADULT  Goal: Maintains optimal cardiac output and hemodynamic stability  Description  INTERVENT ELECTROLYTES - ADULT  Goal: Glucose maintained within prescribed range  Description  INTERVENTIONS:  - Monitor Blood Glucose as ordered  - Assess for signs and symptoms of hyperglycemia and hypoglycemia  - Administer ordered medications to maintain glucose risk of falls.   - [place] fall precautions as indicated by assessment.  - Educate pt/family on patient safety including physical limitations  - Instruct pt to call for assistance with activity based on assessment  - Modify environment to reduce risk of i

## 2019-05-26 NOTE — PROGRESS NOTES
[facility] Pharmacy Note: Route Optimization for Levetiracetam (KEPPRA)    Patient is currently on Levetiracetam (KEPPRA) 1000 mg IV every 12 hours.    The patient meets the criteria to convert to the oral equivalent as established by the IV to Oral conversion pro

## 2019-05-26 NOTE — PROGRESS NOTES
[facility]  Nephrology Daily Progress Note    Migdalia Evans  [id number]  60year old      HPI:   Migdalia Evans is a 60year old male. Eyes open and looks around. Seems comfortable. Tolerating tube feedings.        ROS:     Constitutional:  Neg motor skills appropriate for age    Labs:  Lab Results   Component Value Date    WBC 7.8 05/26/2019    HGB 7.7 05/26/2019    HCT 24.1 05/26/2019    PLT 94.0 05/26/2019    CREATSERUM 4.82 05/26/2019    BUN 81 05/26/2019     05/26/2019    K 3.4 05/26/2 finding. 2. Small chronic left basal ganglionic lacunar infarct. 3. Intracranial atherosclerosis cavernous carotid arteries. 4. Newly developed small bilateral mastoid effusions. 5. Pansinusitis.  6. Incidental developmental incisive canal cyst/nasopalatine 4 tablet, Oral, Q15 Min PRN  •  Insulin Regular Human (NOVOLIN R) 100 UNIT/ML injection 1-11 Units, 1-11 Units, Subcutaneous, 4 times per day  •  Vancomycin HCl (VANCOCIN) 1 mg in sodium chloride 0.9% 250 mL IVPB, 1 mg, Intravenous, See Admin Instructions PRN    Allergies:    Demerol [Meperidine]      Januvia [Sitaglipti*        Comment:GI upset, abdominal pain  Rocephin [Ceftriaxo*    UNKNOWN    Comment:Tolerated Zosyn  Thimerosal              RASH    Input/Output:    Intake/Output Summary (Last 24 hours)

## 2019-05-26 NOTE — PROGRESS NOTES
[facility]     Gastroenterology Progress Note    Aime Thomas Patient Status:  Inpatient    1960 MRN [id number]   Location [facility] 2W/SW Attending Azucena Vasques, [address] [facility] Se Day # 24 PCP LATIA Gonzales MD       Ass 05/23/19 1400 136/71 — — 67 22 97 % —   05/23/19 1300 110/82 — — 65 23 98 % —     Body mass index is 51.65 kg/m². Gen: Critically ill, however, looks a bit better today.  Nasoenteral feeding tube in place. HEENT:Negative for scleral icterus.   Neck: T Residual contrast remaining in the distal rectosigmoid colon has diminished.     Dictated by (CST): Daniela Fajardo MD on 5/24/2019 at 9:43     Approved by (CST): Daniela Fajardo MD on 5/24/2019 at 9:50          Ct Brain Or Head ([id number])    Result Da

## 2019-05-26 NOTE — PLAN OF CARE
Problem: Patient Centered Care  Goal: Patient preferences are identified and integrated in the patient's plan of care  Description  Interventions:  - What would you like us to know as we care for you?  Keep wife involved in his care  - Provide timely, com and interruptions  - Encourage family to assist in orientation and promotion of home routines  Outcome: Progressing     Problem: CARDIOVASCULAR - ADULT  Goal: Maintains optimal cardiac output and hemodynamic stability  Description  INTERVENTIONS:  - Monito Maintains or returns to baseline bowel function  Description  INTERVENTIONS:  - Assess bowel function  - Maintain adequate hydration with IV or PO as ordered and tolerated  - Evaluate effectiveness of GI medications  - Encourage mobilization and activity intracranial pressure  - Maintain blood pressure and fluid volume within ordered parameters to optimize cerebral perfusion and minimize risk of hemorrhage  - Monitor temperature, glucose, and sodium.  Initiate appropriate interventions as ordered  Outcome: Patient is being turned q2 and PRN. Patient is tolerating tube feeds. Patient's bed is locked; in the lowest position; call light is within reach. Family is staying involved in his care.

## 2019-05-27 LAB
ALBUMIN SERPL-MCNC: 2.6 G/DL (ref 3.4–5)
ANION GAP SERPL CALC-SCNC: 14 MMOL/L (ref 0–18)
ANTIBODY SCREEN: POSITIVE
BASOPHILS # BLD AUTO: 0.02 X10(3) UL (ref 0–0.2)
BASOPHILS NFR BLD AUTO: 0.2 %
BUN BLD-MCNC: 107 MG/DL (ref 7–18)
BUN/CREAT SERPL: 18 (ref 10–20)
CALCIUM BLD-MCNC: 8.3 MG/DL (ref 8.5–10.1)
CHLORIDE SERPL-SCNC: 103 MMOL/L (ref 98–112)
CO2 SERPL-SCNC: 24 MMOL/L (ref 21–32)
CREAT BLD-MCNC: 5.93 MG/DL (ref 0.7–1.3)
DEPRECATED RDW RBC AUTO: 62.1 FL (ref 35.1–46.3)
EOSINOPHIL # BLD AUTO: 0.34 X10(3) UL (ref 0–0.7)
EOSINOPHIL NFR BLD AUTO: 4.2 %
ERYTHROCYTE [DISTWIDTH] IN BLOOD BY AUTOMATED COUNT: 17.8 % (ref 11–15)
GLUCOSE BLD-MCNC: 126 MG/DL (ref 70–99)
GLUCOSE BLDC GLUCOMTR-MCNC: 127 MG/DL (ref 70–99)
GLUCOSE BLDC GLUCOMTR-MCNC: 141 MG/DL (ref 70–99)
GLUCOSE BLDC GLUCOMTR-MCNC: 146 MG/DL (ref 70–99)
GLUCOSE BLDC GLUCOMTR-MCNC: 189 MG/DL (ref 70–99)
GLUCOSE BLDC GLUCOMTR-MCNC: 259 MG/DL (ref 70–99)
HAV IGM SER QL: 2.6 MG/DL (ref 1.6–2.6)
HCT VFR BLD AUTO: 22.6 % (ref 39–53)
HCT VFR BLD AUTO: 25.1 % (ref 39–53)
HGB BLD-MCNC: 7.2 G/DL (ref 13–17.5)
HGB BLD-MCNC: 8.2 G/DL (ref 13–17.5)
IMM GRANULOCYTES # BLD AUTO: 0.03 X10(3) UL (ref 0–1)
IMM GRANULOCYTES NFR BLD: 0.4 %
LYMPHOCYTES # BLD AUTO: 0.89 X10(3) UL (ref 1–4)
LYMPHOCYTES NFR BLD AUTO: 10.9 %
MCH RBC QN AUTO: 32 PG (ref 26–34)
MCHC RBC AUTO-ENTMCNC: 32.7 G/DL (ref 31–37)
MCV RBC AUTO: 98 FL (ref 80–100)
MONOCYTES # BLD AUTO: 0.83 X10(3) UL (ref 0.1–1)
MONOCYTES NFR BLD AUTO: 10.2 %
NEUTROPHILS # BLD AUTO: 6.02 X10 (3) UL (ref 1.5–7.7)
NEUTROPHILS # BLD AUTO: 6.02 X10(3) UL (ref 1.5–7.7)
NEUTROPHILS NFR BLD AUTO: 74.1 %
OSMOLALITY SERPL CALC.SUM OF ELEC: 327 MOSM/KG (ref 275–295)
PHOSPHATE SERPL-MCNC: 7.6 MG/DL (ref 2.5–4.9)
PLATELET # BLD AUTO: 105 10(3)UL (ref 150–450)
POTASSIUM SERPL-SCNC: 3.4 MMOL/L (ref 3.5–5.1)
RBC # BLD AUTO: 2.56 X10(6)UL (ref 4.3–5.7)
RH BLOOD TYPE: POSITIVE
SODIUM SERPL-SCNC: 141 MMOL/L (ref 136–145)
WBC # BLD AUTO: 8.1 X10(3) UL (ref 4–11)

## 2019-05-27 PROCEDURE — 99233 SBSQ HOSP IP/OBS HIGH 50: CPT | Performed by: INTERNAL MEDICINE

## 2019-05-27 RX ORDER — SODIUM CHLORIDE 0.9 % (FLUSH) 0.9 %
10 SYRINGE (ML) INJECTION AS NEEDED
Status: DISCONTINUED | OUTPATIENT
Start: 2019-05-27 | End: 2019-05-28

## 2019-05-27 RX ORDER — POTASSIUM CHLORIDE 1.5 G/1.77G
40 POWDER, FOR SOLUTION ORAL ONCE
Status: COMPLETED | OUTPATIENT
Start: 2019-05-27 | End: 2019-05-27

## 2019-05-27 RX ORDER — SODIUM CHLORIDE 9 MG/ML
INJECTION, SOLUTION INTRAVENOUS ONCE
Status: COMPLETED | OUTPATIENT
Start: 2019-05-27 | End: 2019-05-28

## 2019-05-27 RX ORDER — CARVEDILOL 12.5 MG/1
12.5 TABLET ORAL 2 TIMES DAILY WITH MEALS
Status: DISCONTINUED | OUTPATIENT
Start: 2019-05-27 | End: 2019-06-08

## 2019-05-27 RX ORDER — CALCIUM ACETATE 667 MG/1
1 CAPSULE ORAL
Status: DISCONTINUED | OUTPATIENT
Start: 2019-05-27 | End: 2019-06-16

## 2019-05-27 NOTE — PLAN OF CARE
Problem: Diabetes/Glucose Control  Goal: Glucose maintained within prescribed range  Description  INTERVENTIONS:  - Monitor Blood Glucose as ordered  - Assess for signs and symptoms of hyperglycemia and hypoglycemia  - Administer ordered medications to m ordered  Outcome: Progressing     Problem: RESPIRATORY - ADULT  Goal: Achieves optimal ventilation and oxygenation  Description  INTERVENTIONS:  - Assess for changes in respiratory status  - Assess for changes in mentation and behavior  - Position to facil ordered  - Monitor response to electrolyte replacements, including rhythm and repeat lab results as appropriate  - Fluid restriction as ordered  - Instruct patient on fluid and nutrition restrictions as appropriate  Outcome: Progressing     Problem: SKIN/T

## 2019-05-27 NOTE — RESPIRATORY THERAPY NOTE
Patient placed on 40% 12L trach collar. Patient is tolerating and stable, patient is breathing in mid 30's to low 40's. Patient stated he is breathing okay and not having any difficulty.  Patient left on trach collar, will continue to monitor

## 2019-05-27 NOTE — PLAN OF CARE
Problem: Patient Centered Care  Goal: Patient preferences are identified and integrated in the patient's plan of care  Description  Interventions:  - What would you like us to know as we care for you?  Keep wife involved in his care  - Provide timely, com and interruptions  - Encourage family to assist in orientation and promotion of home routines  Outcome: Progressing     Problem: CARDIOVASCULAR - ADULT  Goal: Maintains optimal cardiac output and hemodynamic stability  Description  INTERVENTIONS:  - Monito Maintains or returns to baseline bowel function  Description  INTERVENTIONS:  - Assess bowel function  - Maintain adequate hydration with IV or PO as ordered and tolerated  - Evaluate effectiveness of GI medications  - Encourage mobilization and activity intracranial pressure  - Maintain blood pressure and fluid volume within ordered parameters to optimize cerebral perfusion and minimize risk of hemorrhage  - Monitor temperature, glucose, and sodium.  Initiate appropriate interventions as ordered  Outcome:

## 2019-05-27 NOTE — PROGRESS NOTES
[facility]    Progress Note      Assessment and Plan:   1. Respiratory failure–presented with parainfluenza virus, required tracheostomy and repeat intubation. The patient remains on ventilator at present.   He has an ejection fraction of percussion. Cardiac regular rate and rhythm no murmur. Abdomen nontender, without hepatosplenomegaly and no mass appreciable. Extremities without clubbing cyanosis nor edema. Neurologic patient can be aroused to open eyes but not follow command.   [facility]

## 2019-05-27 NOTE — PROGRESS NOTES
Spoke to discharge planner about orders for LTAC from dr. Lam Chadwick. Dr. David Clifton also ok for LTAC. Order in place.

## 2019-05-27 NOTE — PROGRESS NOTES
[facility]  Nephrology Daily Progress Note    Aime Thomas  [id number]  60year old      HPI:   Aime Thomas is a 60year old male. Alert and looking around. Follows simple commands.        ROS:     Constitutional:  Negative for decreased ac Value Date    WBC 8.1 05/27/2019    HGB 8.2 05/27/2019    HCT 25.1 05/27/2019    .0 05/27/2019    CREATSERUM 5.93 05/27/2019     05/27/2019     05/27/2019    K 3.4 05/27/2019     05/27/2019    CO2 24.0 05/27/2019     05/27/ 40 mEq, 40 mEq, Oral, Once  •  iron sucrose (VENOFER) 300 mg in sodium chloride 0.9% 250 mL IVPB, 300 mg, Intravenous, Daily  •  levETIRAcetam (KEPPRA) 100 MG/ML solution 1,000 mg, 1,000 mg, Per NG Tube, BID  •  Albumin Human (ALBUMINAR) 25 % solution 100 ipratropium-albuterol (DUONEB) nebulizer solution 3 mL, 3 mL, Nebulization, Q6H PRN  •  acetaminophen (TYLENOL EXTRA STRENGTH) tab 1,000 mg, 1,000 mg, Oral, Q6H PRN  •  Pantoprazole Sodium (PROTONIX) 40 mg in Sodium Chloride 0.9 % 10 mL IV push, 40 mg, Int

## 2019-05-28 ENCOUNTER — APPOINTMENT (OUTPATIENT)
Dept: CT IMAGING | Facility: HOSPITAL | Age: 59
DRG: 003 | End: 2019-05-28
Attending: SURGERY
Payer: MEDICARE

## 2019-05-28 ENCOUNTER — ANESTHESIA (OUTPATIENT)
Dept: ENDOSCOPY | Facility: HOSPITAL | Age: 59
DRG: 003 | End: 2019-05-28
Payer: MEDICARE

## 2019-05-28 ENCOUNTER — ANESTHESIA EVENT (OUTPATIENT)
Dept: ENDOSCOPY | Facility: HOSPITAL | Age: 59
DRG: 003 | End: 2019-05-28
Payer: MEDICARE

## 2019-05-28 LAB
ALBUMIN SERPL-MCNC: 2.9 G/DL (ref 3.4–5)
ANION GAP SERPL CALC-SCNC: 15 MMOL/L (ref 0–18)
ANION GAP SERPL CALC-SCNC: 15 MMOL/L (ref 0–18)
APTT PPP: 39.2 SECONDS (ref 23.2–35.3)
BASOPHILS # BLD AUTO: 0.01 X10(3) UL (ref 0–0.2)
BASOPHILS # BLD AUTO: 0.01 X10(3) UL (ref 0–0.2)
BASOPHILS NFR BLD AUTO: 0.1 %
BASOPHILS NFR BLD AUTO: 0.2 %
BUN BLD-MCNC: 124 MG/DL (ref 7–18)
BUN BLD-MCNC: 126 MG/DL (ref 7–18)
BUN/CREAT SERPL: 18.8 (ref 10–20)
BUN/CREAT SERPL: 19.2 (ref 10–20)
CALCIUM BLD-MCNC: 7.7 MG/DL (ref 8.5–10.1)
CALCIUM BLD-MCNC: 8 MG/DL (ref 8.5–10.1)
CHLORIDE SERPL-SCNC: 106 MMOL/L (ref 98–112)
CHLORIDE SERPL-SCNC: 109 MMOL/L (ref 98–112)
CO2 SERPL-SCNC: 21 MMOL/L (ref 21–32)
CO2 SERPL-SCNC: 22 MMOL/L (ref 21–32)
CREAT BLD-MCNC: 6.57 MG/DL (ref 0.7–1.3)
CREAT BLD-MCNC: 6.58 MG/DL (ref 0.7–1.3)
DEPRECATED RDW RBC AUTO: 58.4 FL (ref 35.1–46.3)
DEPRECATED RDW RBC AUTO: 61 FL (ref 35.1–46.3)
DIRECT COOMBS POLY: NEGATIVE
EOSINOPHIL # BLD AUTO: 0.01 X10(3) UL (ref 0–0.7)
EOSINOPHIL # BLD AUTO: 0.02 X10(3) UL (ref 0–0.7)
EOSINOPHIL NFR BLD AUTO: 0.1 %
EOSINOPHIL NFR BLD AUTO: 0.4 %
ERYTHROCYTE [DISTWIDTH] IN BLOOD BY AUTOMATED COUNT: 17.5 % (ref 11–15)
ERYTHROCYTE [DISTWIDTH] IN BLOOD BY AUTOMATED COUNT: 18 % (ref 11–15)
GLUCOSE BLD-MCNC: 251 MG/DL (ref 70–99)
GLUCOSE BLD-MCNC: 323 MG/DL (ref 70–99)
GLUCOSE BLDC GLUCOMTR-MCNC: 124 MG/DL (ref 70–99)
GLUCOSE BLDC GLUCOMTR-MCNC: 134 MG/DL (ref 70–99)
GLUCOSE BLDC GLUCOMTR-MCNC: 261 MG/DL (ref 70–99)
GLUCOSE BLDC GLUCOMTR-MCNC: 325 MG/DL (ref 70–99)
GLUCOSE BLDC GLUCOMTR-MCNC: 83 MG/DL (ref 70–99)
HAV IGM SER QL: 2.6 MG/DL (ref 1.6–2.6)
HCT VFR BLD AUTO: 19.3 % (ref 39–53)
HCT VFR BLD AUTO: 21 % (ref 39–53)
HCT VFR BLD AUTO: 23.2 % (ref 39–53)
HGB BLD-MCNC: 6.5 G/DL (ref 13–17.5)
HGB BLD-MCNC: 6.9 G/DL (ref 13–17.5)
HGB BLD-MCNC: 7.6 G/DL (ref 13–17.5)
IMM GRANULOCYTES # BLD AUTO: 0.02 X10(3) UL (ref 0–1)
IMM GRANULOCYTES # BLD AUTO: 0.03 X10(3) UL (ref 0–1)
IMM GRANULOCYTES NFR BLD: 0.3 %
IMM GRANULOCYTES NFR BLD: 0.6 %
INR BLD: 1.76 (ref 0.9–1.2)
INR BLD: 2.2 (ref 0.9–1.2)
INR BLD: 2.21 (ref 0.9–1.2)
LYMPHOCYTES # BLD AUTO: 0.8 X10(3) UL (ref 1–4)
LYMPHOCYTES # BLD AUTO: 0.89 X10(3) UL (ref 1–4)
LYMPHOCYTES NFR BLD AUTO: 13 %
LYMPHOCYTES NFR BLD AUTO: 16.4 %
MCH RBC QN AUTO: 31.3 PG (ref 26–34)
MCH RBC QN AUTO: 32.1 PG (ref 26–34)
MCHC RBC AUTO-ENTMCNC: 32.9 G/DL (ref 31–37)
MCHC RBC AUTO-ENTMCNC: 33.7 G/DL (ref 31–37)
MCV RBC AUTO: 92.8 FL (ref 80–100)
MCV RBC AUTO: 97.7 FL (ref 80–100)
MONOCYTES # BLD AUTO: 0.53 X10(3) UL (ref 0.1–1)
MONOCYTES # BLD AUTO: 0.53 X10(3) UL (ref 0.1–1)
MONOCYTES NFR BLD AUTO: 10.9 %
MONOCYTES NFR BLD AUTO: 7.7 %
NEUTROPHILS # BLD AUTO: 3.49 X10 (3) UL (ref 1.5–7.7)
NEUTROPHILS # BLD AUTO: 3.49 X10(3) UL (ref 1.5–7.7)
NEUTROPHILS # BLD AUTO: 5.38 X10 (3) UL (ref 1.5–7.7)
NEUTROPHILS # BLD AUTO: 5.38 X10(3) UL (ref 1.5–7.7)
NEUTROPHILS NFR BLD AUTO: 71.5 %
NEUTROPHILS NFR BLD AUTO: 78.8 %
OSMOLALITY SERPL CALC.SUM OF ELEC: 346 MOSM/KG (ref 275–295)
OSMOLALITY SERPL CALC.SUM OF ELEC: 351 MOSM/KG (ref 275–295)
PATAGSCRN: 6
PHOSPHATE SERPL-MCNC: 7.1 MG/DL (ref 2.5–4.9)
PLATELET # BLD AUTO: 103 10(3)UL (ref 150–450)
PLATELET # BLD AUTO: 70 10(3)UL (ref 150–450)
PLATELET # BLD AUTO: 72 10(3)UL (ref 150–450)
POTASSIUM SERPL-SCNC: 3.7 MMOL/L (ref 3.5–5.1)
POTASSIUM SERPL-SCNC: 4.4 MMOL/L (ref 3.5–5.1)
PROTHROMBIN TIME: 20.6 SECONDS (ref 11.8–14.5)
PROTHROMBIN TIME: 24.7 SECONDS (ref 11.8–14.5)
PROTHROMBIN TIME: 24.8 SECONDS (ref 11.8–14.5)
RBC # BLD AUTO: 2.08 X10(6)UL (ref 4.3–5.7)
RBC # BLD AUTO: 2.15 X10(6)UL (ref 4.3–5.7)
RGTSCRN: 12
SODIUM SERPL-SCNC: 142 MMOL/L (ref 136–145)
SODIUM SERPL-SCNC: 146 MMOL/L (ref 136–145)
TREATCELL: 4
TRIGL SERPL-MCNC: 114 MG/DL (ref 30–149)
WBC # BLD AUTO: 4.9 X10(3) UL (ref 4–11)
WBC # BLD AUTO: 6.8 X10(3) UL (ref 4–11)

## 2019-05-28 PROCEDURE — 99233 SBSQ HOSP IP/OBS HIGH 50: CPT | Performed by: INTERNAL MEDICINE

## 2019-05-28 PROCEDURE — 30233N1 TRANSFUSION OF NONAUTOLOGOUS RED BLOOD CELLS INTO PERIPHERAL VEIN, PERCUTANEOUS APPROACH: ICD-10-PCS | Performed by: INTERNAL MEDICINE

## 2019-05-28 PROCEDURE — 74177 CT ABD & PELVIS W/CONTRAST: CPT | Performed by: SURGERY

## 2019-05-28 PROCEDURE — 99232 SBSQ HOSP IP/OBS MODERATE 35: CPT | Performed by: INTERNAL MEDICINE

## 2019-05-28 PROCEDURE — 99291 CRITICAL CARE FIRST HOUR: CPT | Performed by: INTERNAL MEDICINE

## 2019-05-28 RX ORDER — SODIUM CHLORIDE 0.9 % (FLUSH) 0.9 %
10 SYRINGE (ML) INJECTION AS NEEDED
Status: DISCONTINUED | OUTPATIENT
Start: 2019-05-28 | End: 2019-06-05

## 2019-05-28 RX ORDER — SODIUM CHLORIDE 9 MG/ML
INJECTION, SOLUTION INTRAVENOUS ONCE
Status: COMPLETED | OUTPATIENT
Start: 2019-05-28 | End: 2019-05-28

## 2019-05-28 RX ORDER — SODIUM CHLORIDE 9 MG/ML
INJECTION, SOLUTION INTRAVENOUS ONCE
Status: DISCONTINUED | OUTPATIENT
Start: 2019-05-28 | End: 2019-05-28

## 2019-05-28 RX ORDER — SODIUM CHLORIDE 0.9 % (FLUSH) 0.9 %
10 SYRINGE (ML) INJECTION AS NEEDED
Status: DISCONTINUED | OUTPATIENT
Start: 2019-05-28 | End: 2019-05-28

## 2019-05-28 RX ADMIN — SODIUM CHLORIDE: 9 INJECTION, SOLUTION INTRAVENOUS at 14:46:00

## 2019-05-28 RX ADMIN — SODIUM CHLORIDE: 9 INJECTION, SOLUTION INTRAVENOUS at 15:24:00

## 2019-05-28 NOTE — ANESTHESIA PREPROCEDURE EVALUATION
Anesthesia PreOp Note    HPI:     Magda Ko is a 60year old male who presents for preoperative consultation requested by: Radha Marrero MD    Date of Surgery: 5/2/2019 - 5/28/2019    Procedure(s):  COLONOSCOPY  Indication: lower GI bleed    Relevant OR         Medications Prior to Admission:  NORTRIPTYLINE HCL 25 MG Oral Cap TAKE 2 CAPSULES(50MG TOTAL) BY MOUTH NIGHTLY Disp: 180 capsule Rfl: 2    Zolpidem Tartrate 10 MG Oral Tab Take 1 tablet (10 mg total) by mouth nightly.  Disp: 30 tablet Rfl: 2    [ 4 mg by Nasal route as needed. Disp: 1 each Rfl: 0 Taking   Promethazine HCl 12.5 MG Oral Tab TK 1 T PO TID PRN N Disp:  Rfl: 0 Taking   Elastic Bandages & Supports (MEDICAL COMPRESSION SOCKS) Does not apply Misc 1 each by Does not apply route continuous. Natalie Mccloud  mg at 05/28/19 0911    iron sucrose (VENOFER) 300 mg in sodium chloride 0.9% 250 mL IVPB 300 mg Intravenous Daily Nava Mathis MD Last Rate: 176.7 mL/hr at 05/28/19 0843 300 mg at 05/28/19 0843   carvedilol (COREG) tab 12.5 mg 12.5 mg Or ointment  Topical BID PRN Tree Oro MD     artificial tears 83-15 % ophthalmic ointment  Both Eyes TID Tree Oro MD 5 g at 05/28/19 0911    bisacodyl (DULCOLAX) rectal suppository 10 mg 10 mg Rectal Daily PRN Tree Oro MD 10 mg at 05/11/19 1 strain: Not on file      Food insecurity:        Worry: Not on file        Inability: Not on file      Transportation needs:        Medical: Not on file        Non-medical: Not on file    Tobacco Use      Smoking status: Never Smoker      Smokeless tobacco Component Value Date     (H) 05/28/2019    K 3.7 05/28/2019     05/28/2019    CO2 22.0 05/28/2019     (H) 05/28/2019    CREATSERUM 6.57 (H) 05/28/2019     (H) 05/28/2019    PGLU 134 (H) 05/28/2019    CA 7.7 (L) 05/28/2019     [place]

## 2019-05-28 NOTE — PROGRESS NOTES
[facility]    Progress Note    Gustavo Dixon Patient Status:  Inpatient    1960 MRN [id number]   Location [facility] 2W/SW Attending Bonny Murillo, [address] [facility] Se Day # 26 PCP LATIA Gonzalez MD            Subjective:       Yenny David 2.6*  --  2.9*    141 142 146*   K 3.4* 3.4* 4.4 3.7    103 106 109   CO2 29.0 24.0 21.0 22.0   ALKPHO 65  --   --   --    AST 29  --   --   --    ALT 17  --   --   --    BILT 0.8  --   --   --    TP 6.5  --   --   --                  CT 5/12 rectal area     No brisk bleeding     Will hold anticoagulants and follow for present time        CDiff negative  No surgical intervention planned at present      [id number]  5/28/2019  10:26 AM

## 2019-05-28 NOTE — PLAN OF CARE
Problem: Patient Centered Care  Goal: Patient preferences are identified and integrated in the patient's plan of care  Description  Interventions:  - What would you like us to know as we care for you?  Keep wife involved in his care  - Provide timely, com minimize noise and interruptions  - Encourage family to assist in orientation and promotion of home routines  Outcome: Not Progressing     Problem: CARDIOVASCULAR - ADULT  Goal: Maintains optimal cardiac output and hemodynamic stability  Description  INTER GASTROINTESTINAL - ADULT  Goal: Maintains or returns to baseline bowel function  Description  INTERVENTIONS:  - Assess bowel function  - Maintain adequate hydration with IV or PO as ordered and tolerated  - Evaluate effectiveness of GI medications  - Encou status  - Initiate measures to prevent increased intracranial pressure  - Maintain blood pressure and fluid volume within ordered parameters to optimize cerebral perfusion and minimize risk of hemorrhage  - Monitor temperature, glucose, and sodium.  Initiat usage: patient with low platelets)  INTERVENTIONS:  - Avoid intramuscular injections, enemas and rectal medication administration  - Ensure safe mobilization of patient  - Hold pressure on venipuncture sites to achieve adequate hemostasis  - Assess for sig

## 2019-05-28 NOTE — SIGNIFICANT EVENT
Called to evaluate for rectal bleeding and decreased blood pressure. Mamadou stool collecting in rectal tube. SBP in 70's.     /71   Pulse 82   Temp 98.2 °F (36.8 °C) (Temporal)   Resp 20   Ht 5' 5.67\"   Wt (!) 316 lb 12.8 oz   SpO2 96%   BMI 51.65

## 2019-05-28 NOTE — OPERATIVE REPORT
[place] [facility] Endoscopy Report      Preoperative Diagnosis:  - GI bleeding, red blood per rectum      Postoperative Diagnosis:  - old blood throughout colon  - ulceration and suspected perforation proximal transverse colon ( 90 cm)  - divertic MD  5/28/2019  3:33 PM

## 2019-05-28 NOTE — PROGRESS NOTES
[facility]    Progress Note      Subjective:     Minimal to not responsive     Bright red blood in rectal tube since last night     Review of Systems:     Unable to obtain - on trach      Objective:   Temp:  [98.2 °F (36.8 °C)-100.2 °F (3 10,000 Units 10,000 Units Subcutaneous Once per day on Mon Wed Fri   Insulin Regular Human (NOVOLIN R) 100 UNIT/ML injection 6 Units 6 Units Subcutaneous Q6H   dextrose 10 % infusion  Intravenous Continuous PRN   insulin detemir (LEVEMIR) 100 UNIT/ML flext 05/27/19  0501 05/27/19  2355 05/28/19  0510   * 126* 323* 251*   BUN 81* 107* 124* 126*   CREATSERUM 4.82* 5.93* 6.58* 6.57*   GFRAA 14* 11* 10* 10*   GFRNAA 12* 10* 8* 8*   CA 7.8* 8.3* 8.0* 7.7*   ALB 2.8* 2.6*  --  2.9*    141 142 146*   K and ATN  - strict I/Os and daily weights  - improve volume status   - avoid nephrotoxins     2.  Septic shock  - parainfluenza +ve and pneumonia on admission with SBO/ileus   - likely recent shock from abdominal process / aspiration pneumonia  - s/p decompr

## 2019-05-28 NOTE — PLAN OF CARE
Problem: Patient Centered Care  Goal: Patient preferences are identified and integrated in the patient's plan of care  Description  Interventions:  - What would you like us to know as we care for you?  Keep wife involved in his care  - Provide timely, com Monitor arterial and/or venous puncture sites for bleeding and/or hematoma  - Assess quality of pulses, skin color and temperature  - Assess for signs of decreased coronary artery perfusion - ex.  Angina  - Evaluate fluid balance, assess for edema, trend we Identify cognitive and physical deficits and behaviors that affect risk of falls.   - [place] fall precautions as indicated by assessment.  - Educate pt/family on patient safety including physical limitations  - Instruct pt to call for assistance with act

## 2019-05-28 NOTE — PROGRESS NOTES
[facility]     Gastroenterology Progress Note    Aloha Numbers Patient Status:  Inpatient    1960 MRN [id number]   Location [facility] 2W/SW Attending Gurpreet Cdeeno, [address] [facility] Se Day # 26 PCP LATIA Jack MD       Ass —   05/23/19 1300 110/82 — — 65 23 98 % —     Body mass index is 49.92 kg/m². Gen: Critically ill, however, looks a bit better today.  Nasoenteral feeding tube in place. HEENT:Negative for scleral icterus.   Neck: Tracheostomy  CV- regular rate and rhyt

## 2019-05-28 NOTE — PROGRESS NOTES
[facility]    Progress Note    Rm Calle Patient Status:  Inpatient    1960 MRN [id number]   Location [facility] 2W/SW Attending Baron Collins MD   Hosp Day # 26 PCP LATIA Mcmahon MD     Subjective:  S/p Tracheostom

## 2019-05-28 NOTE — PROGRESS NOTES
[address] [facility] Dosing Service  Antibiotic Dosing    Jefferson Jeffers is a 60year old male for whom pharmacy is dosing Zosyn for treatment of  sepsis.      Allergies: is allergic to demerol [meperidine]; januvia [sitagliptin]; rocephin [ceftriaxone]; and thimerosa

## 2019-05-28 NOTE — PROGRESS NOTES
Spoke with patients wife  Will reverse code status to full code for colonoscopy procedure and return to DNR after completion of exam  Dr Earle Lunsford present during conversation

## 2019-05-28 NOTE — PROGRESS NOTES
Pulmonary/Critical Care Follow Up Note    HPI:   Ezekiel Crouch is a 60year old male with Patient presents with:  Fever (infectious)  Altered Mental Status (neurologic)      PCP LATIA Moya MD  Admission Attending No admitting provider for patient encoun (NOVOLIN R) 100 UNIT/ML injection 1-11 Units, 1-11 Units, Subcutaneous, 4 times per day  •  metoprolol Tartrate (LOPRESSOR) 5 MG/5ML injection 5 mg, 5 mg, Intravenous, Q4H PRN  •  pregabalin (LYRICA) cap 100 mg, 100 mg, Oral, Nightly  •  Vancomycin HCl (FI filed at 5/28/2019 1000  Gross per 24 hour   Intake 2886 ml   Output 2630 ml   Net 256 ml     NAD  Profound weakness   Arousable and intermittently  following commands  Lung course BS  CV  reg   Abd full + tender quiet BS   Ext warm, + edema    LABS:    La coreg    KOMAL  2nd episode  Now progressive and severe  Presumed from septic shock  Started on RRT  Low/no UOP  Plan     RRT per renal        Encephalopathy  intermittelntly following commands  Head CT neg  Plan      Follow           H/o CHF  Syst[facility] EF [facility]          Medications:    Current Facility-Administered Medications:   •  calcium acetate (PHOSLO) cap 667 mg, 1 capsule, Per G Tube, TID CC  •  iron sucrose (VENOFER) 300 mg in sodium chloride 0.9% 250 mL IVPB, 300 mg, Intravenous, Daily  • Intravenous, Q8H  •  morphINE sulfate (PF) 4 MG/ML injection 4 mg, 4 mg, Intravenous, Q2H PRN  •  ipratropium-albuterol (DUONEB) nebulizer solution 3 mL, 3 mL, Nebulization, Q6H PRN  •  acetaminophen (TYLENOL EXTRA STRENGTH) tab 1,000 mg, 1,000 mg, Oral, Q 05/25/2019           ASSESSMENT/PLAN:     Severe sepsis  C/b recurrent septic shock  Presented colitis with ileus and + parainfluenza and PNA  S/p 9 Days Zosyn and now on vanco/flagyl/Erna  CT A/P with ileus and \"colitis\"  HD stable off pressors x many d Empiric inpt BiPAP at night after extubation     Sz  Plan Keppra     FM/chronic pain  On chronic narcotics  Off other meds as NPO  Weaned off morphine  Plan follow off morphine    HL  Plan statin    DM  Endo following  Plan insulin    FEN  No

## 2019-05-28 NOTE — ANESTHESIA POSTPROCEDURE EVALUATION
Patient: Mary Alice Jha    Procedure Summary     Date:  05/28/19 Room / Location:  [facility] ENDO AT BEDSIDE 1 / [facility] ENDOSCOPY    Anesthesia Start:  3965 Anesthesia Stop:  9464    Procedure:  COLONOSCOPY (N/A ) Diagnosis:  (lower GI bleed)    Surgeon:  Anuj Rebollar

## 2019-05-28 NOTE — RESTORATIVE THERAPY
RESTORATIVE CARE TREATMENT NOTE    Presenting Problem  Presenting Problem: sepsis: trach on 5/22     Presenting Problem: Swallow    Precautions  Precautions: Bed/chair alarm(bariatric equipment; trach placed 5/22)  Precautions: Aspiration    Weight Bearing

## 2019-05-28 NOTE — PROGRESS NOTES
Awaiting colonoscopy  bright red blood increasing in amount from flexiseal throughout the morning  Dr Adolfo Brooks notified, dr Yulia Lozoya arrived at bediside. Removed flexiseal; had to start on levophed low dose to maintain blood pressure.  3 units blood given thus fa

## 2019-05-29 ENCOUNTER — APPOINTMENT (OUTPATIENT)
Dept: GENERAL RADIOLOGY | Facility: HOSPITAL | Age: 59
DRG: 003 | End: 2019-05-29
Attending: INTERNAL MEDICINE
Payer: MEDICARE

## 2019-05-29 LAB
AFP-TM SERPL-MCNC: 2.8 NG/ML (ref ?–8)
ANION GAP SERPL CALC-SCNC: 12 MMOL/L (ref 0–18)
APTT PPP: 27.3 SECONDS (ref 23.2–35.3)
APTT PPP: 35.2 SECONDS (ref 23.2–35.3)
BASOPHILS # BLD AUTO: 0.02 X10(3) UL (ref 0–0.2)
BASOPHILS # BLD AUTO: 0.03 X10(3) UL (ref 0–0.2)
BASOPHILS NFR BLD AUTO: 0.3 %
BASOPHILS NFR BLD AUTO: 0.4 %
BLOOD TYPE BARCODE: 6200
BLOOD TYPE BARCODE: 6200
BLOOD TYPE BARCODE: 8400
BLOOD TYPE BARCODE: 8400
BUN BLD-MCNC: 82 MG/DL (ref 7–18)
BUN/CREAT SERPL: 18.9 (ref 10–20)
CALCIUM BLD-MCNC: 7.7 MG/DL (ref 8.5–10.1)
CHLORIDE SERPL-SCNC: 107 MMOL/L (ref 98–112)
CO2 SERPL-SCNC: 25 MMOL/L (ref 21–32)
CREAT BLD-MCNC: 4.33 MG/DL (ref 0.7–1.3)
D DIMER PPP FEU-MCNC: >4 UG/ML FEU (ref ?–0.59)
DEPRECATED RDW RBC AUTO: 55.1 FL (ref 35.1–46.3)
DEPRECATED RDW RBC AUTO: 55.2 FL (ref 35.1–46.3)
EOSINOPHIL # BLD AUTO: 0.55 X10(3) UL (ref 0–0.7)
EOSINOPHIL # BLD AUTO: 0.67 X10(3) UL (ref 0–0.7)
EOSINOPHIL NFR BLD AUTO: 6.8 %
EOSINOPHIL NFR BLD AUTO: 9.6 %
ERYTHROCYTE [DISTWIDTH] IN BLOOD BY AUTOMATED COUNT: 17.8 % (ref 11–15)
ERYTHROCYTE [DISTWIDTH] IN BLOOD BY AUTOMATED COUNT: 18.2 % (ref 11–15)
FIBRINOGEN PPP-MCNC: 389 MG/DL (ref 176–491)
GLUCOSE BLD-MCNC: 85 MG/DL (ref 70–99)
GLUCOSE BLDC GLUCOMTR-MCNC: 103 MG/DL (ref 70–99)
GLUCOSE BLDC GLUCOMTR-MCNC: 78 MG/DL (ref 70–99)
GLUCOSE BLDC GLUCOMTR-MCNC: 81 MG/DL (ref 70–99)
HCT VFR BLD AUTO: 25.4 % (ref 39–53)
HCT VFR BLD AUTO: 28.9 % (ref 39–53)
HCT VFR BLD AUTO: 29.9 % (ref 39–53)
HGB BLD-MCNC: 10 G/DL (ref 13–17.5)
HGB BLD-MCNC: 10.2 G/DL (ref 13–17.5)
HGB BLD-MCNC: 8.7 G/DL (ref 13–17.5)
IMM GRANULOCYTES # BLD AUTO: 0.02 X10(3) UL (ref 0–1)
IMM GRANULOCYTES # BLD AUTO: 0.03 X10(3) UL (ref 0–1)
IMM GRANULOCYTES NFR BLD: 0.2 %
IMM GRANULOCYTES NFR BLD: 0.4 %
INR BLD: 1.49 (ref 0.9–1.2)
INR BLD: 1.73 (ref 0.9–1.2)
LYMPHOCYTES # BLD AUTO: 1.02 X10(3) UL (ref 1–4)
LYMPHOCYTES # BLD AUTO: 1.28 X10(3) UL (ref 1–4)
LYMPHOCYTES NFR BLD AUTO: 14.6 %
LYMPHOCYTES NFR BLD AUTO: 15.9 %
MCH RBC QN AUTO: 30.5 PG (ref 26–34)
MCH RBC QN AUTO: 31.3 PG (ref 26–34)
MCHC RBC AUTO-ENTMCNC: 34.1 G/DL (ref 31–37)
MCHC RBC AUTO-ENTMCNC: 34.6 G/DL (ref 31–37)
MCV RBC AUTO: 89.5 FL (ref 80–100)
MCV RBC AUTO: 90.3 FL (ref 80–100)
MONOCYTES # BLD AUTO: 0.77 X10(3) UL (ref 0.1–1)
MONOCYTES # BLD AUTO: 0.84 X10(3) UL (ref 0.1–1)
MONOCYTES NFR BLD AUTO: 10.5 %
MONOCYTES NFR BLD AUTO: 11 %
NEUTROPHILS # BLD AUTO: 4.48 X10 (3) UL (ref 1.5–7.7)
NEUTROPHILS # BLD AUTO: 4.48 X10(3) UL (ref 1.5–7.7)
NEUTROPHILS # BLD AUTO: 5.31 X10 (3) UL (ref 1.5–7.7)
NEUTROPHILS # BLD AUTO: 5.31 X10(3) UL (ref 1.5–7.7)
NEUTROPHILS NFR BLD AUTO: 64.1 %
NEUTROPHILS NFR BLD AUTO: 66.2 %
OSMOLALITY SERPL CALC.SUM OF ELEC: 322 MOSM/KG (ref 275–295)
PLATELET # BLD AUTO: 82 10(3)UL (ref 150–450)
PLATELET # BLD AUTO: 84 10(3)UL (ref 150–450)
POTASSIUM SERPL-SCNC: 3 MMOL/L (ref 3.5–5.1)
PROTHROMBIN TIME: 18 SECONDS (ref 11.8–14.5)
PROTHROMBIN TIME: 20.3 SECONDS (ref 11.8–14.5)
RBC # BLD AUTO: 3.2 X10(6)UL (ref 4.3–5.7)
RBC # BLD AUTO: 3.34 X10(6)UL (ref 4.3–5.7)
SODIUM SERPL-SCNC: 144 MMOL/L (ref 136–145)
WBC # BLD AUTO: 7 X10(3) UL (ref 4–11)
WBC # BLD AUTO: 8 X10(3) UL (ref 4–11)

## 2019-05-29 PROCEDURE — 71045 X-RAY EXAM CHEST 1 VIEW: CPT | Performed by: INTERNAL MEDICINE

## 2019-05-29 PROCEDURE — 99232 SBSQ HOSP IP/OBS MODERATE 35: CPT | Performed by: INTERNAL MEDICINE

## 2019-05-29 PROCEDURE — 99291 CRITICAL CARE FIRST HOUR: CPT | Performed by: INTERNAL MEDICINE

## 2019-05-29 PROCEDURE — 99233 SBSQ HOSP IP/OBS HIGH 50: CPT | Performed by: INTERNAL MEDICINE

## 2019-05-29 RX ORDER — SODIUM CHLORIDE 0.9 % (FLUSH) 0.9 %
10 SYRINGE (ML) INJECTION AS NEEDED
Status: DISCONTINUED | OUTPATIENT
Start: 2019-05-29 | End: 2019-06-05

## 2019-05-29 RX ORDER — BUMETANIDE 0.25 MG/ML
2 INJECTION, SOLUTION INTRAMUSCULAR; INTRAVENOUS ONCE
Status: COMPLETED | OUTPATIENT
Start: 2019-05-29 | End: 2019-05-29

## 2019-05-29 RX ORDER — POTASSIUM CHLORIDE 29.8 MG/ML
40 INJECTION INTRAVENOUS ONCE
Status: COMPLETED | OUTPATIENT
Start: 2019-05-29 | End: 2019-05-29

## 2019-05-29 RX ORDER — SODIUM CHLORIDE 9 MG/ML
INJECTION, SOLUTION INTRAVENOUS ONCE
Status: COMPLETED | OUTPATIENT
Start: 2019-05-29 | End: 2019-05-29

## 2019-05-29 NOTE — PROGRESS NOTES
[facility]    Progress Note      Subjective:     Awake and non verbal     Review of Systems:     Unable to obtain - on trach    Objective:   Temp:  [97 °F (36.1 °C)-100.8 °F (38.2 °C)] 98.2 °F (36.8 °C)  Pulse:  [52-81] 59  Resp:  [15-26] Glucose-Vitamin C (DEX-4) 4-6 GM-MG chewable tab 4 tablet 4 tablet Oral Q15 Min PRN   Insulin Regular Human (NOVOLIN R) 100 UNIT/ML injection 1-11 Units 1-11 Units Subcutaneous 4 times per day   metoprolol Tartrate (LOPRESSOR) 5 MG/5ML injection 5 mg 5 m 29  --   --   --   --    ALT 17  --   --   --   --    BILT 0.8  --   --   --   --    TP 6.5  --   --   --   --      PTT   Date Value Ref Range Status   05/29/2019 35.2 23.2 - 35.3 seconds Final     INR   Date Value Ref Range Status   05/29/2019 1.73 (H) 0. 5/28/2019 at 17:58     Approved by (CST): Osiel Sexton MD on 5/28/2019 at 18:06          Xr Chest Ap Portable  (cpt=71045)    Result Date: 5/29/2019  CONCLUSION:  1. Increasing opacification of both lower lungs.     Dictated by (CST): Rosie Blum

## 2019-05-29 NOTE — PROGRESS NOTES
Pulmonary/Critical Care Follow Up Note    HPI:   Fidelina Baires is a 60year old male with Patient presents with:  Fever (infectious)  Altered Mental Status (neurologic)      PCP LATIA Garcia MD  Admission Attending No admitting provider for patient encoun PRN  •  Insulin Regular Human (NOVOLIN R) 100 UNIT/ML injection 1-11 Units, 1-11 Units, Subcutaneous, 4 times per day  •  metoprolol Tartrate (LOPRESSOR) 5 MG/5ML injection 5 mg, 5 mg, Intravenous, Q4H PRN  •  pregabalin (LYRICA) cap 100 mg, 100 mg, Oral, 05/29/2019    HGB 10.2 05/29/2019    HCT 29.9 05/29/2019    PLT 82.0 05/29/2019    CREATSERUM 4.33 05/29/2019    BUN 82 05/29/2019     05/29/2019    K 3.0 05/29/2019     05/29/2019    CO2 25.0 05/29/2019    GLU 85 05/29/2019    CA 7.7 05/29/201 RRT  More UOP  May be recovering  Plan     RRT per renal   Bumex   follow    Encephalopathy  intermittelntly following commands  Head CT neg  Plan      Follow           H/o CHF  Systolic EF 76% in 9291  Followed by cards  Now central congestion edema vs PN mL, Intravenous, PRN  •  adult 3 in 1 TPN, , Intravenous, Continuous TPN  •  meropenem (MERREM) 1 g in sodium chloride 0.9% 100 mL MBP, 1 g, Intravenous, Q24H  •  Normal Saline Flush 0.9 % injection 10 mL, 10 mL, Intravenous, PRN  •  calcium acetate (PHOSL cap 50 mg, 50 mg, Oral, Nightly  •  Promethazine HCl (PHENERGAN) tab 12.5 mg, 12.5 mg, Oral, Q4H PRN      Allergies:    Demerol [Meperidine]      Januvia [Sitaglipti*        Comment:GI upset, abdominal pain  Rocephin [Ceftriaxo*    UNKNOWN    Comment:Sheldon Weaning tomorrow as tolerated   Nebs       GI  High grade pseudo obstruction  ?  Narcotic related and areas of Bx suggestive of ischemic colitis  S/p decomp lower endo with Bx of segment of colitis and placement of decompression tube  Better  Tolerated TF 2

## 2019-05-29 NOTE — PROGRESS NOTES
[facility] [address]     Gastroenterology Progress Note    Magda Ko Patient Status:  Inpatient    1960 MRN [id number]   Location [facility] 2W/SW Attending Gustavo Bee, [address] [facility] [address] Day # 32 PCP LATIA Jesus MD       Ass — — 82 16 96 % —   05/23/19 1700 101/64 — — 75 24 97 % —   05/23/19 1600 104/60 97.7 °F (36.5 °C) Temporal 73 24 95 % —   05/23/19 1500 92/60 — — 70 21 95 % —   05/23/19 1400 136/71 — — 67 22 97 % —   05/23/19 1300 110/82 — — 65 23 98 % —     Body mass ind 1.73 (H) 05/29/2019    INR 1.76 (H) 05/28/2019       Ct Abdomen+pelvis(contrast Only)(cpt=74177)    Result Date: 5/28/2019  CONCLUSION:  1.   Liver has a cirrhotic morphology and there is a hypoenhancing focus within segment 4, which may represent a hepatom

## 2019-05-29 NOTE — PLAN OF CARE
Problem: Patient Centered Care  Goal: Patient preferences are identified and integrated in the patient's plan of care  Description  Interventions:  - What would you like us to know as we care for you?  Keep wife involved in his care  - Provide timely, com and interruptions  - Encourage family to assist in orientation and promotion of home routines  Outcome: Progressing     Problem: CARDIOVASCULAR - ADULT  Goal: Maintains optimal cardiac output and hemodynamic stability  Description  INTERVENTIONS:  - Monito Maintains or returns to baseline bowel function  Description  INTERVENTIONS:  - Assess bowel function  - Maintain adequate hydration with IV or PO as ordered and tolerated  - Evaluate effectiveness of GI medications  - Encourage mobilization and activity intracranial pressure  - Maintain blood pressure and fluid volume within ordered parameters to optimize cerebral perfusion and minimize risk of hemorrhage  - Monitor temperature, glucose, and sodium.  Initiate appropriate interventions as ordered  Outcome: intramuscular injections, enemas and rectal medication administration  - Ensure safe mobilization of patient  - Hold pressure on venipuncture sites to achieve adequate hemostasis  - Assess for signs and symptoms of internal bleeding  - Monitor lab trends

## 2019-05-29 NOTE — PROGRESS NOTES
[facility]    Progress Note    Carmen Castellanos Patient Status:  Inpatient    1960 MRN [id number]   Location [facility] 2W/SW Attending Marilee Chan MD   Hosp Day # 27 PCP LATIA Alcantara MD     Subjective:  S/p Tracheostom

## 2019-05-29 NOTE — PROGRESS NOTES
[facility]    Progress Note    Gustavoasaf Parada Patient Status:  Inpatient    1960 MRN [id number]   Location [facility] 2W/SW Attending Jonathan Madrigal MD   [address] Day # 32 PCP LATIA Aguilera MD            Subjective:       Raquel Esposito CREATSERUM 4.82* 5.93* 6.58* 6.57* 4.33*   GFRAA 14* 11* 10* 10* 16*   GFRNAA 12* 10* 8* 8* 14*   CA 7.8* 8.3* 8.0* 7.7* 7.7*   ALB 2.8* 2.6*  --  2.9*  --     141 142 146* 144   K 3.4* 3.4* 4.4 3.7 3.0*    103 106 109 107   CO2 29.0 24.0 21. obstruction) (HCC) vs ileus  Septic shock, shock liver, ARF, Coagulopathy  Diarrhea - r/o C. Diff  Leucocytosis        Patient had bleeding yesterday and today  Still coagulopathic, .     Colon tube is out  Tenderness minimal  Tube feedings held again for p

## 2019-05-29 NOTE — PLAN OF CARE
Problem: Delirium  Goal: Minimize duration of delirium  Description  Interventions:  - Encourage use of hearing aids, eye glasses  - Promote highest level of mobility daily  - Provide frequent reorientation  - Promote wakefulness i.e. lights on, blinds o cough, deep breathe, Incentive Spirometry  - Assess the need for suctioning and perform as needed  - Assess and instruct to report SOB or any respiratory difficulty  - Respiratory Therapy support as indicated  - Manage/alleviate anxiety  - Monitor for sign Goal  Description  Patient's Long Term Goal: To go home    Interventions:  - Follow MD orders  - Follow medication regimen  - See additional Care Plan goals for specific interventions    Outcome: Not Progressing  Goal: Patient/Family Short Term Goal  Descr integrity remains intact  Description  INTERVENTIONS  - Assess and document risk factors for pressure ulcer development  - Assess and document skin integrity  - Monitor for areas of redness and/or skin breakdown  - Initiate interventions, skin care algorit patient with low platelets)  INTERVENTIONS:  - Avoid intramuscular injections, enemas and rectal medication administration  - Ensure safe mobilization of patient  - Hold pressure on venipuncture sites to achieve adequate hemostasis  - Assess for signs and

## 2019-05-29 NOTE — DIETARY NOTE
ADULT NUTRITION REASSESSMENT     Pt is at high nutrition risk. Pt does not meet malnutrition criteria. RECOMMENDATIONS TO MD:  See Nutrition Intervention. For CPN rx. MD to adjust insulin accordingly.       NUTRITION DIAGNOSIS/PROBLEM:  Inadequate p regime/endocrine svc. Plan for hemodialysis tomorrow. 5/20 ADDENDUM @1500:  Received call/Dr. Angie Baxter to discuss starting low rate tube feedings  this pm and holding till tomorrow before advance further to assess tolerance.  Will begin at low rate 10 ml/hr Morbid obesity ([facility]    • Obesity    • Rectal fissure     colorectal fistula - surgery   • Renal disorder    • Seizure disorder (HCC)        ANTHROPOMETRICS:  HT: 166.8 cm (5' 5.65\")  WT: (!) 145.1 kg (319 lb 14.4 oz) (estimate true wt less noting edema. ) getting 120 gms via D10W @ 50 ml/hr) , D10W will stop once TPN is started. unless ordered otherwise. Can hold phos-lo as EN on hold.    • insulin detemir  48 Units Subcutaneous Daily   • potassium chloride  40 mEq Intravenous Once   • piperacillin-tazobacta generalized non-pitting per visual exam  - Skin Integrity: intact.     NUTRITION PRESCRIPTION:  Diet: Parenteral Nutrition (PN)  Oral Supplements: NPO  ESTIMATED NUTRITION NEEDS:    Calories:2000- 2324 calories/day ([place] state using est dry wt 136 kg or 15-

## 2019-05-29 NOTE — CONSULTS
INFECTIOUS DISEASE CONSULT NOTE    Gustavo Dixon Patient Status:  Inpatient    1960 MRN [id number]   Location [facility] 2W/SW Attending Bonny Murillo MD   [facility] Day # 32 PCP LATIA Gonzalez colonoscopy with biopsy and decompression tube placement by GI on 5/16 with findings of acute colonic pseudoobstruction, pseudomembranes. Dusky mucosa. No mechanical obstruction. Pathology with concern for ischemic colitis. Underwent tracheostomy 5/22. Onset   • Stroke Father 43        CVA   • Cancer Maternal Grandmother 79      reports that he has never smoked. He has never used smokeless tobacco. He reports that he does not drink alcohol or use drugs.     Allergies:    Demerol [Meperidine]      Januvia ointment, , Both Eyes, TID  •  bisacodyl (DULCOLAX) rectal suppository 10 mg, 10 mg, Rectal, Daily PRN  •  Metoclopramide HCl (REGLAN) injection 5 mg, 5 mg, Intravenous, Q8H  •  morphINE sulfate (PF) 4 MG/ML injection 4 mg, 4 mg, Intravenous, Q2H PRN  •  i 10.0* 8.7*   HCT 28.9* 25.4*   MCV 90.3  --    MCH 31.3  --    MCHC 34.6  --    RDW 17.8*  --    NEPRELIM 5.31  --    WBC 8.0  --    PLT 84.0*  --      Recent Labs   Lab 05/26/19  0429 05/27/19  0501 05/27/19  2355 05/28/19  0510 05/29/19  0442   * in the care of this patient. Please do not hesitate to call if you have any questions. I will continue to follow with you and will make further recommendations based on his progress.     Santy Toro  5/29/2019

## 2019-05-30 LAB
ALBUMIN SERPL-MCNC: 2.7 G/DL (ref 3.4–5)
ALBUMIN SERPL-MCNC: 2.8 G/DL (ref 3.4–5)
ALBUMIN/GLOB SERPL: 0.7 {RATIO} (ref 1–2)
ALBUMIN/GLOB SERPL: 0.7 {RATIO} (ref 1–2)
ALP LIVER SERPL-CCNC: 47 U/L (ref 45–117)
ALP LIVER SERPL-CCNC: 61 U/L (ref 45–117)
ALT SERPL-CCNC: 28 U/L (ref 16–61)
ALT SERPL-CCNC: 39 U/L (ref 16–61)
ANION GAP SERPL CALC-SCNC: 12 MMOL/L (ref 0–18)
ANION GAP SERPL CALC-SCNC: 18 MMOL/L (ref 0–18)
APTT PPP: 30.3 SECONDS (ref 23.2–35.3)
APTT PPP: 31.9 SECONDS (ref 23.2–35.3)
AST SERPL-CCNC: 115 U/L (ref 15–37)
AST SERPL-CCNC: 53 U/L (ref 15–37)
BASOPHILS # BLD AUTO: 0.02 X10(3) UL (ref 0–0.2)
BASOPHILS NFR BLD AUTO: 0.3 %
BILIRUB SERPL-MCNC: 0.8 MG/DL (ref 0.1–2)
BILIRUB SERPL-MCNC: 1.3 MG/DL (ref 0.1–2)
BUN BLD-MCNC: 86 MG/DL (ref 7–18)
BUN BLD-MCNC: 95 MG/DL (ref 7–18)
BUN/CREAT SERPL: 19.4 (ref 10–20)
BUN/CREAT SERPL: 21.1 (ref 10–20)
CALCIUM BLD-MCNC: 8.1 MG/DL (ref 8.5–10.1)
CALCIUM BLD-MCNC: 8.2 MG/DL (ref 8.5–10.1)
CHLORIDE SERPL-SCNC: 106 MMOL/L (ref 98–112)
CHLORIDE SERPL-SCNC: 108 MMOL/L (ref 98–112)
CO2 SERPL-SCNC: 16 MMOL/L (ref 21–32)
CO2 SERPL-SCNC: 24 MMOL/L (ref 21–32)
CREAT BLD-MCNC: 4.43 MG/DL (ref 0.7–1.3)
CREAT BLD-MCNC: 4.5 MG/DL (ref 0.7–1.3)
D DIMER PPP FEU-MCNC: >4 UG/ML FEU (ref ?–0.59)
DEPRECATED RDW RBC AUTO: 58.9 FL (ref 35.1–46.3)
EOSINOPHIL # BLD AUTO: 0.7 X10(3) UL (ref 0–0.7)
EOSINOPHIL NFR BLD AUTO: 11.5 %
ERYTHROCYTE [DISTWIDTH] IN BLOOD BY AUTOMATED COUNT: 18.3 % (ref 11–15)
FIBRINOGEN PPP-MCNC: 440 MG/DL (ref 176–491)
GLOBULIN PLAS-MCNC: 3.8 G/DL (ref 2.8–4.4)
GLOBULIN PLAS-MCNC: 3.8 G/DL (ref 2.8–4.4)
GLUCOSE BLD-MCNC: 116 MG/DL (ref 70–99)
GLUCOSE BLD-MCNC: 376 MG/DL (ref 70–99)
GLUCOSE BLDC GLUCOMTR-MCNC: 113 MG/DL (ref 70–99)
GLUCOSE BLDC GLUCOMTR-MCNC: 115 MG/DL (ref 70–99)
GLUCOSE BLDC GLUCOMTR-MCNC: 126 MG/DL (ref 70–99)
GLUCOSE BLDC GLUCOMTR-MCNC: 242 MG/DL (ref 70–99)
GLUCOSE BLDC GLUCOMTR-MCNC: 299 MG/DL (ref 70–99)
HAV IGM SER QL: 2.1 MG/DL (ref 1.6–2.6)
HAV IGM SER QL: 2.1 MG/DL (ref 1.6–2.6)
HCT VFR BLD AUTO: 26.1 % (ref 39–53)
HCT VFR BLD AUTO: 28.8 % (ref 39–53)
HGB BLD-MCNC: 8.6 G/DL (ref 13–17.5)
HGB BLD-MCNC: 9.7 G/DL (ref 13–17.5)
IMM GRANULOCYTES # BLD AUTO: 0.03 X10(3) UL (ref 0–1)
IMM GRANULOCYTES NFR BLD: 0.5 %
INR BLD: 1.47 (ref 0.9–1.2)
INR BLD: 1.93 (ref 0.9–1.2)
LYMPHOCYTES # BLD AUTO: 0.93 X10(3) UL (ref 1–4)
LYMPHOCYTES NFR BLD AUTO: 15.2 %
M PROTEIN MFR SERPL ELPH: 6.5 G/DL (ref 6.4–8.2)
M PROTEIN MFR SERPL ELPH: 6.6 G/DL (ref 6.4–8.2)
MCH RBC QN AUTO: 31.3 PG (ref 26–34)
MCHC RBC AUTO-ENTMCNC: 33.7 G/DL (ref 31–37)
MCV RBC AUTO: 92.9 FL (ref 80–100)
MONOCYTES # BLD AUTO: 0.77 X10(3) UL (ref 0.1–1)
MONOCYTES NFR BLD AUTO: 12.6 %
NEUTROPHILS # BLD AUTO: 3.66 X10 (3) UL (ref 1.5–7.7)
NEUTROPHILS # BLD AUTO: 3.66 X10(3) UL (ref 1.5–7.7)
NEUTROPHILS NFR BLD AUTO: 59.9 %
OSMOLALITY SERPL CALC.SUM OF ELEC: 321 MOSM/KG (ref 275–295)
OSMOLALITY SERPL CALC.SUM OF ELEC: 339 MOSM/KG (ref 275–295)
PHOSPHATE SERPL-MCNC: 6.2 MG/DL (ref 2.5–4.9)
PLATELET # BLD AUTO: 100 10(3)UL (ref 150–450)
PLATELET # BLD AUTO: 100 10(3)UL (ref 150–450)
POTASSIUM SERPL-SCNC: 3.2 MMOL/L (ref 3.5–5.1)
POTASSIUM SERPL-SCNC: 4.8 MMOL/L (ref 3.5–5.1)
PROTHROMBIN TIME: 17.8 SECONDS (ref 11.8–14.5)
PROTHROMBIN TIME: 22.2 SECONDS (ref 11.8–14.5)
RBC # BLD AUTO: 3.1 X10(6)UL (ref 4.3–5.7)
SODIUM SERPL-SCNC: 142 MMOL/L (ref 136–145)
SODIUM SERPL-SCNC: 142 MMOL/L (ref 136–145)
WBC # BLD AUTO: 6.1 X10(3) UL (ref 4–11)

## 2019-05-30 PROCEDURE — 99232 SBSQ HOSP IP/OBS MODERATE 35: CPT | Performed by: INTERNAL MEDICINE

## 2019-05-30 PROCEDURE — 99233 SBSQ HOSP IP/OBS HIGH 50: CPT | Performed by: INTERNAL MEDICINE

## 2019-05-30 RX ORDER — BUMETANIDE 0.25 MG/ML
2 INJECTION, SOLUTION INTRAMUSCULAR; INTRAVENOUS ONCE
Status: COMPLETED | OUTPATIENT
Start: 2019-05-30 | End: 2019-05-30

## 2019-05-30 NOTE — PROGRESS NOTES
[facility]    Progress Note      Subjective:     Awake and non verbal     Review of Systems:     Unable to obtain - tracheostomy     Objective:   Temp:  [96.9 °F (36.1 °C)-98.4 °F (36.9 °C)] 97.6 °F (36.4 °C)  Pulse:  [53-73] 53  Resp:  [ Intravenous Continuous PRN   dextrose 50 % injection 50 mL 50 mL Intravenous PRN   Glucose-Vitamin C (DEX-4) 4-6 GM-MG chewable tab 4 tablet 4 tablet Oral Q15 Min PRN   Insulin Regular Human (NOVOLIN R) 100 UNIT/ML injection 1-11 Units 1-11 Units Subcutane < > 146* 144 142   K 3.4* 3.4*   < > 3.7 3.0* 3.2*    103   < > 109 107 106   CO2 29.0 24.0   < > 22.0 25.0 24.0   ALKPHO 65  --   --   --   --  47   AST 29  --   --   --   --  53*   ALT 17  --   --   --   --  28   BILT 0.8  --   --   --   --  0.8 the large bowel is distended with fluid without well-formed fecal material.  This could also represent normal variation however correlate for symptoms of colitis.  6.  Right renal cyst.   Dictated by (CST): Vandana Melo MD on 5/28/2019 at 17:58     Approved Ameena Vogt MD  5/30/2019

## 2019-05-30 NOTE — PROGRESS NOTES
[facility]    Progress Note    Delsie Factor Patient Status:  Inpatient    1960 MRN [id number]   Location [facility] 2W/SW Attending Merry Oh MD   Hosp Day # 28 PCP LATIA Bardales MD     Subjective:  He has now been MD  5/30/2019

## 2019-05-30 NOTE — PROGRESS NOTES
[facility] [address]     Gastroenterology Progress Note    Minerva Score Patient Status:  Inpatient    1960 MRN [id number]   Location [facility] 2W/SW Attending Michael Harrisr,  [facility] [address] Day # 28 PCP LATIA Linares MD       Ass 51.97 kg/m². Gen: Critically ill, however, looks a bit better today.  Nasoenteral feeding tube in place. HEENT:Negative for scleral icterus. Neck: Tracheostomy  CV- regular rate and rhythm.   Not tachycardic  Lung- Clear to auscultation bilaterally  Ab hypoenhancing focus within segment 4, which may represent a hepatoma. Correlate with AFP level.   Because arterial phase imaging was not performed recommend correlation with dedicated MRI abdomen with without contrast.  Splenomegaly is present suggesting p

## 2019-05-30 NOTE — PROGRESS NOTES
[facility]    Progress Note    Yosef De Leon Patient Status:  Inpatient    1960 MRN [id number]   Location [facility] 2W/SW Attending Gavin Han MD   Hosp Day # 29 PCP LATIA Campos MD     Subjective:  He has now been stop insulin drip once BG go down  - Accuchecks Q6 hours   - Hypoglycemia protocol    Will follow    Lesley Michel MD

## 2019-05-30 NOTE — PLAN OF CARE
Problem: Patient Centered Care  Goal: Patient preferences are identified and integrated in the patient's plan of care  Description  Interventions:  - What would you like us to know as we care for you?  Keep wife involved in his care  - Provide timely, com delirium  Description  Interventions:  - Encourage use of hearing aids, eye glasses  - Promote highest level of mobility daily  - Provide frequent reorientation  - Promote wakefulness i.e. lights on, blinds open  - Promote sleep, encourage patient's normal most of shift.      Problem: RESPIRATORY - ADULT  Goal: Achieves optimal ventilation and oxygenation  Description  INTERVENTIONS:  - Assess for changes in respiratory status  - Assess for changes in mentation and behavior  - Position to facilitate oxygenati initiate nutrition consult as needed  - Instruct patient on self management of diabetes  Outcome: Progressing  Note:   Accuchecks performed Q6H. No coverage required at this point in shift.  Will continue to monitor following patient's transition from D10% including physical limitations  - Instruct pt to call for assistance with activity based on assessment  - Modify environment to reduce risk of injury  - Provide assistive devices as appropriate  - Consider OT/PT consult to assist with strengthening/mobilit

## 2019-05-30 NOTE — RESTORATIVE THERAPY
Not RESTORATIVE CARE TREATMENT NOTE    Presenting Problem  Presenting Problem: sepsis: trach on 5/22     Presenting Problem: Swallow    Precautions  Precautions: Bed/chair alarm(bariatric equipment; trach placed 5/22)  Precautions: Aspiration    Weight Lashon

## 2019-05-30 NOTE — PROGRESS NOTES
Pulmonary/Critical Care Follow Up Note    HPI:   Aime Thomas is a 60year old male with Patient presents with:  Fever (infectious)  Altered Mental Status (neurologic)      PCP LATIA Gonzales MD  Admission Attending No admitting provider for patient encoun Intravenous, Continuous PRN  •  dextrose 50 % injection 50 mL, 50 mL, Intravenous, PRN  •  Glucose-Vitamin C (DEX-4) 4-6 GM-MG chewable tab 4 tablet, 4 tablet, Oral, Q15 Min PRN  •  Insulin Regular Human (NOVOLIN R) 100 UNIT/ML injection 1-11 Units, 1-11 U weakness   Awake and  following commands this AM  Lung course BS  CV  reg   Abd full + tender quiet BS   Ext warm, + edema    LABS:    Lab Results   Component Value Date    WBC 6.1 05/30/2019    HGB 9.7 05/30/2019    HCT 28.8 05/30/2019    .0 05/30/ surgery following   Surgery if recurrent massive bleeding        Opoid dependence  Weaned off over 20+ days in CCU  Plan prn morphine    HTN urgency  Better  Low side now  Plan      Prn anti HTN meds    coreg    KOMAL  2nd episode  Now progressive and severe 1/17/2011    per NG   • Morbid obesity ([facility] [address]    • Obesity    • Rectal fissure     colorectal fistula - surgery   • Renal disorder    • Seizure disorder (HCC)          Medications:    Current Facility-Administered Medications:   •  potassium chloride 40 mEq Daily PRN  •  Metoclopramide HCl (REGLAN) injection 5 mg, 5 mg, Intravenous, Q8H  •  morphINE sulfate (PF) 4 MG/ML injection 4 mg, 4 mg, Intravenous, Q2H PRN  •  ipratropium-albuterol (DUONEB) nebulizer solution 3 mL, 3 mL, Nebulization, Q6H PRN  •  acetam Lab Results   Component Value Date    HGB 9.7 (L) 05/30/2019    HGB 10.2 (L) 05/29/2019    HGB 8.7 (L) 05/29/2019    HGB 10.0 (L) 05/29/2019    HGB 7.6 (L) 05/28/2019           ASSESSMENT/PLAN:     Severe sepsis  C/b recurrent septic shock  Presented c 9-->8.8--> 8.3  GI and surgery following  H/H stabilizing  Plan      PPI    NG to LIS       Morbid obesity  Suspect IRMA  Plan        PSG at outpt if pt allows                 Empiric inpt BiPAP at night after extubation     Sz  Plan Keppra     FM/chronic p

## 2019-05-30 NOTE — PROGRESS NOTES
[facility] ID PROGRESS NOTE    Roel Alexandra Patient Status:  Inpatient    1960 MRN [id number]   Location [facility] 2W/SW Attending Quan Arreguin MD   [address] Day # 28 PCP LATIA Britt MD     Subjective:  Awake, afebrile.  Remains 40% on ventilat 5/2-5/7, IV meropenem 5/12-5/26, metronidazole 5/12-5/25, IV vancomycin 5/12-5/25, PO vancomycin 1112    60year old male.  Patient is a 59-year-old male with a history of seizure disorder, moderate obesity, hypertension, hyperlipidemia, cardiomyopathy with Now with downtrending hemoglobin with rectal bleeding with hemoglobin as low as 6.5. With the bleeding noted to have low-grade fevers up to 100.8 with no increased leukocytosis and hypotension on 5/29 early morning without requiring vasopressors.   Remains

## 2019-05-31 LAB
AMMONIA PLAS-MCNC: 43 UMOL/L (ref 11–32)
ANION GAP SERPL CALC-SCNC: 14 MMOL/L (ref 0–18)
ANTIBODY SCREEN: NEGATIVE
APTT PPP: 29.7 SECONDS (ref 23.2–35.3)
BASOPHILS # BLD AUTO: 0 X10(3) UL (ref 0–0.2)
BASOPHILS # BLD: 0 X10(3) UL (ref 0–0.2)
BASOPHILS NFR BLD AUTO: 0 %
BASOPHILS NFR BLD: 0 %
BLOOD TYPE BARCODE: 600
BLOOD TYPE BARCODE: 600
BUN BLD-MCNC: 101 MG/DL (ref 7–18)
BUN/CREAT SERPL: 23 (ref 10–20)
CALCIUM BLD-MCNC: 7.9 MG/DL (ref 8.5–10.1)
CHLORIDE SERPL-SCNC: 112 MMOL/L (ref 98–112)
CO2 SERPL-SCNC: 21 MMOL/L (ref 21–32)
CREAT BLD-MCNC: 4.4 MG/DL (ref 0.7–1.3)
DEPRECATED RDW RBC AUTO: 55.2 FL (ref 35.1–46.3)
DEPRECATED RDW RBC AUTO: 58.6 FL (ref 35.1–46.3)
DEPRECATED RDW RBC AUTO: 58.8 FL (ref 35.1–46.3)
EOSINOPHIL # BLD AUTO: 0.01 X10(3) UL (ref 0–0.7)
EOSINOPHIL # BLD: 0 X10(3) UL (ref 0–0.7)
EOSINOPHIL NFR BLD AUTO: 0.2 %
EOSINOPHIL NFR BLD: 0 %
ERYTHROCYTE [DISTWIDTH] IN BLOOD BY AUTOMATED COUNT: 17.7 % (ref 11–15)
ERYTHROCYTE [DISTWIDTH] IN BLOOD BY AUTOMATED COUNT: 18.1 % (ref 11–15)
ERYTHROCYTE [DISTWIDTH] IN BLOOD BY AUTOMATED COUNT: 18.6 % (ref 11–15)
EST. AVERAGE GLUCOSE BLD GHB EST-MCNC: 111 MG/DL (ref 68–126)
GLUCOSE BLD-MCNC: 396 MG/DL (ref 70–99)
GLUCOSE BLDC GLUCOMTR-MCNC: 157 MG/DL (ref 70–99)
GLUCOSE BLDC GLUCOMTR-MCNC: 164 MG/DL (ref 70–99)
GLUCOSE BLDC GLUCOMTR-MCNC: 185 MG/DL (ref 70–99)
GLUCOSE BLDC GLUCOMTR-MCNC: 188 MG/DL (ref 70–99)
GLUCOSE BLDC GLUCOMTR-MCNC: 191 MG/DL (ref 70–99)
GLUCOSE BLDC GLUCOMTR-MCNC: 192 MG/DL (ref 70–99)
GLUCOSE BLDC GLUCOMTR-MCNC: 194 MG/DL (ref 70–99)
GLUCOSE BLDC GLUCOMTR-MCNC: 198 MG/DL (ref 70–99)
GLUCOSE BLDC GLUCOMTR-MCNC: 202 MG/DL (ref 70–99)
GLUCOSE BLDC GLUCOMTR-MCNC: 203 MG/DL (ref 70–99)
GLUCOSE BLDC GLUCOMTR-MCNC: 215 MG/DL (ref 70–99)
GLUCOSE BLDC GLUCOMTR-MCNC: 248 MG/DL (ref 70–99)
GLUCOSE BLDC GLUCOMTR-MCNC: 254 MG/DL (ref 70–99)
GLUCOSE BLDC GLUCOMTR-MCNC: 257 MG/DL (ref 70–99)
GLUCOSE BLDC GLUCOMTR-MCNC: 281 MG/DL (ref 70–99)
GLUCOSE BLDC GLUCOMTR-MCNC: 294 MG/DL (ref 70–99)
GLUCOSE BLDC GLUCOMTR-MCNC: 348 MG/DL (ref 70–99)
GLUCOSE BLDC GLUCOMTR-MCNC: 400 MG/DL (ref 70–99)
GLUCOSE BLDC GLUCOMTR-MCNC: 445 MG/DL (ref 70–99)
GLUCOSE BLDC GLUCOMTR-MCNC: 446 MG/DL (ref 70–99)
GLUCOSE BLDC GLUCOMTR-MCNC: 448 MG/DL (ref 70–99)
GLUCOSE BLDC GLUCOMTR-MCNC: 450 MG/DL (ref 70–99)
HAV IGM SER QL: 1.9 MG/DL (ref 1.6–2.6)
HBA1C MFR BLD HPLC: 5.5 % (ref ?–5.7)
HCT VFR BLD AUTO: 15.6 % (ref 39–53)
HCT VFR BLD AUTO: 20.4 % (ref 39–53)
HCT VFR BLD AUTO: 23.4 % (ref 39–53)
HCT VFR BLD AUTO: 23.5 % (ref 39–53)
HGB BLD-MCNC: 5.2 G/DL (ref 13–17.5)
HGB BLD-MCNC: 6.7 G/DL (ref 13–17.5)
HGB BLD-MCNC: 7.7 G/DL (ref 13–17.5)
HGB BLD-MCNC: 7.7 G/DL (ref 13–17.5)
IMM GRANULOCYTES # BLD AUTO: 0.03 X10(3) UL (ref 0–1)
IMM GRANULOCYTES NFR BLD: 0.7 %
INR BLD: 1.82 (ref 0.9–1.2)
LYMPHOCYTES # BLD AUTO: 0.7 X10(3) UL (ref 1–4)
LYMPHOCYTES NFR BLD AUTO: 15.5 %
LYMPHOCYTES NFR BLD: 0.78 X10(3) UL (ref 1–4)
LYMPHOCYTES NFR BLD: 13 %
MCH RBC QN AUTO: 31.4 PG (ref 26–34)
MCH RBC QN AUTO: 31.6 PG (ref 26–34)
MCH RBC QN AUTO: 31.9 PG (ref 26–34)
MCHC RBC AUTO-ENTMCNC: 32.8 G/DL (ref 31–37)
MCHC RBC AUTO-ENTMCNC: 32.9 G/DL (ref 31–37)
MCHC RBC AUTO-ENTMCNC: 33.3 G/DL (ref 31–37)
MCV RBC AUTO: 95.5 FL (ref 80–100)
MCV RBC AUTO: 95.7 FL (ref 80–100)
MCV RBC AUTO: 96.2 FL (ref 80–100)
MONOCYTES # BLD AUTO: 0.61 X10(3) UL (ref 0.1–1)
MONOCYTES # BLD: 0.24 X10(3) UL (ref 0.1–1)
MONOCYTES NFR BLD AUTO: 13.5 %
MONOCYTES NFR BLD: 4 %
NEUTROPHILS # BLD AUTO: 3.18 X10 (3) UL (ref 1.5–7.7)
NEUTROPHILS # BLD AUTO: 3.18 X10(3) UL (ref 1.5–7.7)
NEUTROPHILS # BLD AUTO: 4.97 X10 (3) UL (ref 1.5–7.7)
NEUTROPHILS NFR BLD AUTO: 70.1 %
NEUTROPHILS NFR BLD: 77 %
NEUTS BAND NFR BLD: 6 %
NEUTS HYPERSEG # BLD: 4.98 X10(3) UL (ref 1.5–7.7)
NRBC BLD MANUAL-RTO: 1 %
OSMOLALITY SERPL CALC.SUM OF ELEC: 352 MOSM/KG (ref 275–295)
PHOSPHATE SERPL-MCNC: 4.6 MG/DL (ref 2.5–4.9)
PLATELET # BLD AUTO: 154 10(3)UL (ref 150–450)
PLATELET # BLD AUTO: 204 10(3)UL (ref 150–450)
PLATELET # BLD AUTO: 88 10(3)UL (ref 150–450)
PLATELET MORPHOLOGY: NORMAL
POTASSIUM SERPL-SCNC: 3.6 MMOL/L (ref 3.5–5.1)
PROTHROMBIN TIME: 21.2 SECONDS (ref 11.8–14.5)
RBC # BLD AUTO: 1.63 X10(6)UL (ref 4.3–5.7)
RBC # BLD AUTO: 2.12 X10(6)UL (ref 4.3–5.7)
RBC # BLD AUTO: 2.45 X10(6)UL (ref 4.3–5.7)
RH BLOOD TYPE: POSITIVE
SODIUM SERPL-SCNC: 147 MMOL/L (ref 136–145)
TOTAL CELLS COUNTED: 100
WBC # BLD AUTO: 4.5 X10(3) UL (ref 4–11)
WBC # BLD AUTO: 4.5 X10(3) UL (ref 4–11)
WBC # BLD AUTO: 6 X10(3) UL (ref 4–11)

## 2019-05-31 PROCEDURE — 99232 SBSQ HOSP IP/OBS MODERATE 35: CPT | Performed by: INTERNAL MEDICINE

## 2019-05-31 PROCEDURE — 99233 SBSQ HOSP IP/OBS HIGH 50: CPT | Performed by: INTERNAL MEDICINE

## 2019-05-31 PROCEDURE — 3E030XZ INTRODUCTION OF VASOPRESSOR INTO PERIPHERAL VEIN, OPEN APPROACH: ICD-10-PCS | Performed by: INTERNAL MEDICINE

## 2019-05-31 PROCEDURE — 99291 CRITICAL CARE FIRST HOUR: CPT | Performed by: INTERNAL MEDICINE

## 2019-05-31 RX ORDER — SODIUM CHLORIDE 9 MG/ML
INJECTION, SOLUTION INTRAVENOUS ONCE
Status: COMPLETED | OUTPATIENT
Start: 2019-05-31 | End: 2019-05-31

## 2019-05-31 RX ORDER — SODIUM CHLORIDE 0.9 % (FLUSH) 0.9 %
10 SYRINGE (ML) INJECTION AS NEEDED
Status: DISCONTINUED | OUTPATIENT
Start: 2019-05-31 | End: 2019-06-05

## 2019-05-31 RX ORDER — SODIUM CHLORIDE 9 MG/ML
INJECTION, SOLUTION INTRAVENOUS ONCE
Status: DISCONTINUED | OUTPATIENT
Start: 2019-05-31 | End: 2019-06-12

## 2019-05-31 RX ORDER — PHYTONADIONE 1 MG/.5ML
5 INJECTION, EMULSION INTRAMUSCULAR; INTRAVENOUS; SUBCUTANEOUS ONCE
Status: DISCONTINUED | OUTPATIENT
Start: 2019-05-31 | End: 2019-05-31

## 2019-05-31 NOTE — PROGRESS NOTES
Pulmonary/Critical Care Follow Up Note    HPI:   Ezekiel Crouch is a 60year old male with Patient presents with:  Fever (infectious)  Altered Mental Status (neurologic)      PCP LATIA Moya MD  Admission Attending No admitting provider for patient encoun MG/ML solution 1,000 mg, 1,000 mg, Per NG Tube, BID  •  Albumin Human (ALBUMINAR) 25 % solution 100 mL, 100 mL, Intravenous, PRN  •  epoetin shakeel-epbx (RETACRIT) 07433 UNIT/ML injection SOLN 10,000 Units, 10,000 Units, Subcutaneous, Once per day on Mon Wed %.    Intake/Output Summary (Last 24 hours) at 5/31/2019 1647  Last data filed at 5/31/2019 1000  Gross per 24 hour   Intake 1768.83 ml   Output 1350 ml   Net 418.83 ml     NAD  Profound weakness   Awake and  following commands this AM  Lung course BS  CV tube  Better and was tolerating TFs but now GIB  Plan      TPN       GIB  BRB in rectal tube  Persistent  Bedside flex sig with ulcerations but mainly pooling blood in colon  Persistent  approx 10 units now  S/p FFP and cryo  Plan      Serial H/H   GI and [facility])    • Diverticulitis    • Esophageal reflux    • Essential hypertension    • Fibromyalgia    • Hepatic Encephalopathy (HCC)     lactulose   • High blood pressure    • Hyperlipidemia    • Lipid screening 1/17/2011    per NG   • Morbid obesity ([facility] [address] MG/5ML injection 5 mg, 5 mg, Intravenous, Q4H PRN  •  pregabalin (LYRICA) cap 100 mg, 100 mg, Oral, Nightly  •  Vancomycin HCl (FIRVANQ) 50 MG/ML oral solution 125 mg, 125 mg, Per NG Tube, Daily  •  balsam peru-castor oil (VENELEX) ointment, , Topical, BID  05/31/2019    K 3.6 05/31/2019     05/31/2019    CO2 21.0 05/31/2019     05/31/2019    CA 7.9 05/31/2019    ALB 2.7 05/30/2019    ALKPHO 61 05/30/2019    BILT 1.3 05/30/2019    TP 6.5 05/30/2019     05/30/2019    ALT 39 05/30/201 2012  Followed by cards  Now central congestion edema vs PNA  ECHO now with LV > 60%  Plan      Follow              restarted coreg    Afib  Rate controlled  Non compliant with a/c from past cards notes  Plan       Follow rate               Restart hep or

## 2019-05-31 NOTE — PROGRESS NOTES
[place] [facility]    Progress Note    Rm Calle Patient Status:  Inpatient    1960 MRN [id number]   Location [facility] 2W/SW Attending Leo Simms, [address] [facility] [address] Day # 28 PCP LATIA Mcmahon MD            Subjective:       Adalid Flores 05/30/19 2014   *   < > 251* 85 116* 376*   BUN 81*   < > 126* 82* 86* 95*   CREATSERUM 4.82*   < > 6.57* 4.33* 4.43* 4.50*   GFRAA 14*   < > 10* 16* 16* 15*   GFRNAA 12*   < > 8* 14* 14* 13*   CA 7.8*   < > 7.7* 7.7* 8.1* 8.2*   ALB 2.8*   < > 2.9 have IV contrast to help search for bleeding source) though no source confirmed. Appears clinically to be from right colon.   We discussed options including extended right hemicolectomy to remove the persistently dialated colon and prob source of bleeding;

## 2019-05-31 NOTE — PROGRESS NOTES
Luisana Majano 89     Gastroenterology Progress Note    Fisherpablo Duartemegan Patient Status:  Inpatient    1960 MRN [id number]   Location [facility]SW [facility] Se Day # 34 PCP LATIA Abernathy MD       Ass — — 88 16 96 % —   05/23/19 1800 102/70 — — 82 16 96 % —   05/23/19 1700 101/64 — — 75 24 97 % —   05/23/19 1600 104/60 97.7 °F (36.5 °C) Temporal 73 24 95 % —   05/23/19 1500 92/60 — — 70 21 95 % —   05/23/19 1400 136/71 — — 67 22 97 % —   05/23/19 1300 1 --  28 39  --    BILT 0.8  --   --   --  0.8 1.3  --    TP 6.5  --   --   --  6.6 6.5  --     < > = values in this interval not displayed.        Lab Results   Component Value Date    INR 1.82 (H) 05/31/2019    INR 1.93 (H) 05/30/2019    INR 1.47 (H) 05/30/

## 2019-05-31 NOTE — PROGRESS NOTES
[facility] ID PROGRESS NOTE    Corrine Romero Patient Status:  Inpatient    1960 MRN [id number]   Location [facility] 2W/SW Attending Robi Reynolds MD   [facility] Day # 34 PCP LATIA Heart MD     Subjective:  Awake, 40% on ventilator.  Was less respo loss anemia      ASSESSMENT:    Antibiotics: IV Zosyn, OVP  IV zosyn 5/2-5/11, IV vancomycin 5/2-5/7, IV meropenem 5/12-5/26, metronidazole 5/12-5/25, IV vancomycin 5/12-5/25, PO vancomycin 1112    60year old male.  Patient is a 42-year-old male with a his mechanical obstruction. Pathology with concern for ischemic colitis. Underwent tracheostomy 5/22. Now with downtrending hemoglobin with rectal bleeding with hemoglobin as low as 6.5.   With the bleeding noted to have low-grade fevers up to 100.8 with no

## 2019-05-31 NOTE — PROGRESS NOTES
[facility]    Progress Note      Subjective:     Awake and non verbal     Last night events noted - recurrent GI bleed     Review of Systems:     Unable to obtain - tracheostomy     Objective:   Temp:  [97 °F (36.1 °C)-99.3 °F (37.4 °C)] Intravenous PRN   epoetin shakeel-epbx (RETACRIT) 15584 UNIT/ML injection SOLN 10,000 Units 10,000 Units Subcutaneous Once per day on Mon Wed Fri   dextrose 10 % infusion  Intravenous Continuous PRN   dextrose 50 % injection 50 mL 50 mL Intravenous PRN   Gluc 8*   < > 14* 13* 14*   CA 7.8*   < > 7.7*   < > 8.1* 8.2* 7.9*   ALB 2.8*   < > 2.9*  --  2.8* 2.7*  --       < > 146*   < > 142 142 147*   K 3.4*   < > 3.7   < > 3.2* 4.8 3.6      < > 109   < > 106 108 112   CO2 29.0   < > 22.0   < > 24.0 16. 0 shock  - parainfluenza +ve and pneumonia on admission with SBO/ileus   - likely recent shock from abdominal process / aspiration pneumonia  - s/p decompressive colonoscopy yesterday - acute colonic pseudo-obstruction with moderate colitis.  S/p suctioning o

## 2019-05-31 NOTE — PROGRESS NOTES
[place] [facility]    Progress Note    Mary Alice Jha Patient Status:  Inpatient    1960 MRN [id number]   Location [facility] 2W/SW Attending Candida Parker, [address] [facility] Se Day # 34 PCP LATIA Slaughter MD            Subjective:       Clarissa Yee 126*   < > 86* 95* 101*   CREATSERUM 4.82*   < > 6.57*   < > 4.43* 4.50* 4.40*   GFRAA 14*   < > 10*   < > 16* 15* 16*   GFRNAA 12*   < > 8*   < > 14* 13* 14*   CA 7.8*   < > 7.7*   < > 8.1* 8.2* 7.9*   ALB 2.8*   < > 2.9*  --  2.8* 2.7*  --       < colon. We discussed options including extended right hemicolectomy to remove the persistently dialated colon and prob source of bleeding; However, pt will be high risk due to general condition and cirrhosis.   Will tx with attempted correction of coag iss

## 2019-05-31 NOTE — SIGNIFICANT EVENT
Night MD - CCU    Summary/Impressions:  --CTSP who had become suddenly pale and sweaty, with HR low 100's, significantly increased from baseline 50's-60's (AF at least since admission). Concerning for recurrent LGI bleeding. --On ventilator, S/P trach. has shown cirrhotic appearance, and patient has hx of hep C, per nurse, though I was unable to easily find EMR documentation of this  D/W'd Dr. Og Deer -> to give 2 units of FFP.   He asked me to let Dr. Pola Thomas know about episode, but did not feel patient is a

## 2019-06-01 LAB
ANION GAP SERPL CALC-SCNC: 9 MMOL/L (ref 0–18)
BASOPHILS # BLD AUTO: 0.01 X10(3) UL (ref 0–0.2)
BASOPHILS NFR BLD AUTO: 0.2 %
BLOOD TYPE BARCODE: 600
BLOOD TYPE BARCODE: 8400
BUN BLD-MCNC: 62 MG/DL (ref 7–18)
BUN/CREAT SERPL: 26.8 (ref 10–20)
CALCIUM BLD-MCNC: 8.2 MG/DL (ref 8.5–10.1)
CHLORIDE SERPL-SCNC: 111 MMOL/L (ref 98–112)
CO2 SERPL-SCNC: 24 MMOL/L (ref 21–32)
CREAT BLD-MCNC: 2.31 MG/DL (ref 0.7–1.3)
DEPRECATED RDW RBC AUTO: 51.6 FL (ref 35.1–46.3)
EOSINOPHIL # BLD AUTO: 0.47 X10(3) UL (ref 0–0.7)
EOSINOPHIL NFR BLD AUTO: 8.2 %
ERYTHROCYTE [DISTWIDTH] IN BLOOD BY AUTOMATED COUNT: 17.8 % (ref 11–15)
GLUCOSE BLD-MCNC: 154 MG/DL (ref 70–99)
GLUCOSE BLDC GLUCOMTR-MCNC: 107 MG/DL (ref 70–99)
GLUCOSE BLDC GLUCOMTR-MCNC: 112 MG/DL (ref 70–99)
GLUCOSE BLDC GLUCOMTR-MCNC: 113 MG/DL (ref 70–99)
GLUCOSE BLDC GLUCOMTR-MCNC: 120 MG/DL (ref 70–99)
GLUCOSE BLDC GLUCOMTR-MCNC: 120 MG/DL (ref 70–99)
GLUCOSE BLDC GLUCOMTR-MCNC: 138 MG/DL (ref 70–99)
GLUCOSE BLDC GLUCOMTR-MCNC: 154 MG/DL (ref 70–99)
GLUCOSE BLDC GLUCOMTR-MCNC: 155 MG/DL (ref 70–99)
GLUCOSE BLDC GLUCOMTR-MCNC: 158 MG/DL (ref 70–99)
GLUCOSE BLDC GLUCOMTR-MCNC: 161 MG/DL (ref 70–99)
GLUCOSE BLDC GLUCOMTR-MCNC: 164 MG/DL (ref 70–99)
GLUCOSE BLDC GLUCOMTR-MCNC: 164 MG/DL (ref 70–99)
GLUCOSE BLDC GLUCOMTR-MCNC: 165 MG/DL (ref 70–99)
GLUCOSE BLDC GLUCOMTR-MCNC: 169 MG/DL (ref 70–99)
HAV IGM SER QL: 2 MG/DL (ref 1.6–2.6)
HCT VFR BLD AUTO: 22.9 % (ref 39–53)
HCT VFR BLD AUTO: 23.2 % (ref 39–53)
HGB BLD-MCNC: 7.4 G/DL (ref 13–17.5)
HGB BLD-MCNC: 7.6 G/DL (ref 13–17.5)
HGB BLD-MCNC: 8.1 G/DL (ref 13–17.5)
IMM GRANULOCYTES # BLD AUTO: 0.06 X10(3) UL (ref 0–1)
IMM GRANULOCYTES NFR BLD: 1 %
INR BLD: 1.65 (ref 0.9–1.2)
LYMPHOCYTES # BLD AUTO: 1.08 X10(3) UL (ref 1–4)
LYMPHOCYTES NFR BLD AUTO: 18.8 %
MCH RBC QN AUTO: 31 PG (ref 26–34)
MCHC RBC AUTO-ENTMCNC: 33.2 G/DL (ref 31–37)
MCV RBC AUTO: 93.5 FL (ref 80–100)
MONOCYTES # BLD AUTO: 0.86 X10(3) UL (ref 0.1–1)
MONOCYTES NFR BLD AUTO: 14.9 %
NEUTROPHILS # BLD AUTO: 3.28 X10 (3) UL (ref 1.5–7.7)
NEUTROPHILS # BLD AUTO: 3.28 X10(3) UL (ref 1.5–7.7)
NEUTROPHILS NFR BLD AUTO: 56.9 %
OSMOLALITY SERPL CALC.SUM OF ELEC: 319 MOSM/KG (ref 275–295)
PHOSPHATE SERPL-MCNC: 3 MG/DL (ref 2.5–4.9)
PLATELET # BLD AUTO: 108 10(3)UL (ref 150–450)
POTASSIUM SERPL-SCNC: 3.4 MMOL/L (ref 3.5–5.1)
PROTHROMBIN TIME: 19.5 SECONDS (ref 11.8–14.5)
RBC # BLD AUTO: 2.45 X10(6)UL (ref 4.3–5.7)
SODIUM SERPL-SCNC: 144 MMOL/L (ref 136–145)
WBC # BLD AUTO: 5.8 X10(3) UL (ref 4–11)

## 2019-06-01 PROCEDURE — 99232 SBSQ HOSP IP/OBS MODERATE 35: CPT | Performed by: INTERNAL MEDICINE

## 2019-06-01 PROCEDURE — 99233 SBSQ HOSP IP/OBS HIGH 50: CPT | Performed by: INTERNAL MEDICINE

## 2019-06-01 RX ORDER — BUMETANIDE 0.25 MG/ML
2 INJECTION, SOLUTION INTRAMUSCULAR; INTRAVENOUS ONCE
Status: COMPLETED | OUTPATIENT
Start: 2019-06-01 | End: 2019-06-01

## 2019-06-01 NOTE — PROGRESS NOTES
[facility]    Progress Note      Subjective:     Awake and non verbal     No further GI bleed     Review of Systems:     Unable to obtain - tracheostomy     Objective:   Temp:  [96.9 °F (36.1 °C)-98.9 °F (37.2 °C)] 98.3 °F (36.8 °C)  Puls Q24H   Normal Saline Flush 0.9 % injection 10 mL 10 mL Intravenous PRN   calcium acetate (PHOSLO) cap 667 mg 1 capsule Per G Tube TID CC   carvedilol (COREG) tab 12.5 mg 12.5 mg Oral BID with meals   levETIRAcetam (KEPPRA) 100 MG/ML solution 1,000 mg 1,000 --   --  3.28   WBC 6.0 4.5 4.5  --  5.8   .0 154.0 88.0*  --  108.0*     Recent Labs   Lab 05/26/19  0429 05/28/19  0510  05/30/19  0530 05/30/19 2014 05/31/19 0428 06/01/19  0540   *   < > 251*   < > 116* 376* 396* 154*   BUN 81*   < > 1 secondary to sepsis with multiorgan dysfunction   - second episode of KOMAL during this hospitalization   - started on HD on 5/14 - last HD 5/31  - wife refused tunelled catheter placement - will readress  - creatinine 1.0 mg/dl at baseline  - KOMAL secondary

## 2019-06-01 NOTE — PLAN OF CARE
Monitoring hgb q 12 hours; had 1 small and 1 large episode of rectal bleeding today. Dr Yenny Talamantes aware and witnessed himself. Most recent hgb improved from this morning. Hemodynamically stable, off levophed this morning. Pain control using morphine.  No blood baseline  Description  INTERVENTIONS:  - Continuous cardiac monitoring, monitor vital signs, obtain 12 lead EKG if indicated  - Evaluate effectiveness of antiarrhythmic and heart rate control medications as ordered  - Initiate emergency measures for life t ordered  - Instruct patient on fluid and nutrition restrictions as appropriate  Outcome: Progressing     Problem: NEUROLOGICAL - ADULT  Goal: Achieves stable or improved neurological status  Description  INTERVENTIONS  - Assess for and report changes in ne Evaluate effectiveness of GI medications  - Encourage mobilization and activity  - Obtain nutritional consult as needed  - Establish a toileting routine/schedule  - Consider collaborating with pharmacy to review patient's medication profile  Outcome: Not P coordinating discharge planning if the patient needs post-hospital services based on physician/LIP order or complex needs related to functional status, cognitive ability or social support system  Outcome: Not Progressing     Problem: HEMATOLOGIC - ADULT  G

## 2019-06-01 NOTE — PROGRESS NOTES
Luisana Majano 89     Gastroenterology Progress Note    Amoraron Toro Patient Status:  Inpatient    1960 MRN [id number]   Location [facility] [place] 2W/SW Attending Laurance Goodpasture, [address] [facility] Se Day # 30 PCP LATIA Rocha MD       Ass jaundice.    Ext: No cyanosis, clubbing or edema is evident.    Neuro:  Responsive to voice   Affect: Unable to assess      Results:     Recent Labs   Lab 05/30/19  2350 05/31/19  0428 05/31/19  1819 06/01/19  0220 06/01/19  0540   RBC 2.45* 2.12* 1.63*  --

## 2019-06-01 NOTE — PROGRESS NOTES
[facility]    Progress Note    Carmen Castellanos Patient Status:  Inpatient    1960 MRN [id number]   Location [facility] 2W/SW Attending Marilee Chan MD   Hosp Day # 30 PCP LATIA Alcantara MD     Subjective:  Continues to be BG go down  - Accuchecks Q6 hours   - Hypoglycemia protocol    Will follow    Verna Guadarrama MD

## 2019-06-01 NOTE — PLAN OF CARE
Problem: CARDIOVASCULAR - ADULT  Goal: Maintains optimal cardiac output and hemodynamic stability  Description  INTERVENTIONS:  - Monitor vital signs, rhythm, and trends  - Monitor for bleeding, hypotension and signs of decreased cardiac output  - Evalua assistive devices as appropriate  - Consider OT/PT consult to assist with strengthening/mobility  - Encourage toileting schedule   Outcome: Progressing     Problem: Diabetes/Glucose Control  Goal: Glucose maintained within prescribed range  Description  IN bowel function  - Maintain adequate hydration with IV or PO as ordered and tolerated  - Evaluate effectiveness of GI medications  - Encourage mobilization and activity  - Obtain nutritional consult as needed  - Establish a toileting routine/schedule  - Con

## 2019-06-01 NOTE — PLAN OF CARE
Problem: Patient Centered Care  Goal: Patient preferences are identified and integrated in the patient's plan of care  Description  Interventions:  - What would you like us to know as we care for you?  Keep wife involved in his care  - Provide timely, com medications to optimize hemodynamic stability  - Monitor arterial and/or venous puncture sites for bleeding and/or hematoma  - Assess quality of pulses, skin color and temperature  - Assess for signs of decreased coronary artery perfusion - ex.  Angina  - E tolerated  - Evaluate effectiveness of GI medications  - Encourage mobilization and activity  - Obtain nutritional consult as needed  - Establish a toileting routine/schedule  - Consider collaborating with pharmacy to review patient's medication profile  O for and report changes in neurological status  - Initiate measures to prevent increased intracranial pressure  - Maintain blood pressure and fluid volume within ordered parameters to optimize cerebral perfusion and minimize risk of hemorrhage  - Monitor te post-hospital services based on physician/LIP order or complex needs related to functional status, cognitive ability or social support system  Outcome: Progressing     Problem: HEMATOLOGIC - ADULT  Goal: Free from bleeding injury  Description  (Example Apolinaria

## 2019-06-01 NOTE — PROGRESS NOTES
[facility]     Progress Note        Roel Alexandra Patient Status:  Inpatient    1960 MRN [id number]   Location [facility] 2W/SW Attending Quan Arreguin, [address] [facility] Se Day # 30 PCP LATIA Britt MD       Subjective:   Patient se 12.5 mg 12.5 mg Oral BID with meals   levETIRAcetam (KEPPRA) 100 MG/ML solution 1,000 mg 1,000 mg Per NG Tube BID   Albumin Human (ALBUMINAR) 25 % solution 100 mL 100 mL Intravenous PRN   epoetin shakeel-epbx (RETACRIT) 55599 UNIT/ML injection SOLN 10,000 Uni bilateral lower extremity edema  Neurologic: Does not follow commands  Skin: warm, dry      Results:     Lab Results   Component Value Date    WBC 5.8 06/01/2019    HGB 7.6 06/01/2019    HCT 22.9 06/01/2019    .0 06/01/2019    CREATSERUM 2.31 06/01/

## 2019-06-02 ENCOUNTER — APPOINTMENT (OUTPATIENT)
Dept: GENERAL RADIOLOGY | Facility: HOSPITAL | Age: 59
DRG: 003 | End: 2019-06-02
Attending: INTERNAL MEDICINE
Payer: MEDICARE

## 2019-06-02 LAB
ANION GAP SERPL CALC-SCNC: 10 MMOL/L (ref 0–18)
BASOPHILS # BLD AUTO: 0.01 X10(3) UL (ref 0–0.2)
BASOPHILS NFR BLD AUTO: 0.2 %
BLOOD TYPE BARCODE: 600
BLOOD TYPE BARCODE: 8400
BUN BLD-MCNC: 74 MG/DL (ref 7–18)
BUN/CREAT SERPL: 32 (ref 10–20)
CALCIUM BLD-MCNC: 8.8 MG/DL (ref 8.5–10.1)
CHLORIDE SERPL-SCNC: 114 MMOL/L (ref 98–112)
CO2 SERPL-SCNC: 24 MMOL/L (ref 21–32)
CREAT BLD-MCNC: 2.31 MG/DL (ref 0.7–1.3)
DEPRECATED RDW RBC AUTO: 54.8 FL (ref 35.1–46.3)
EOSINOPHIL # BLD AUTO: 0.55 X10(3) UL (ref 0–0.7)
EOSINOPHIL NFR BLD AUTO: 10.7 %
ERYTHROCYTE [DISTWIDTH] IN BLOOD BY AUTOMATED COUNT: 18.7 % (ref 11–15)
GLUCOSE BLD-MCNC: 110 MG/DL (ref 70–99)
GLUCOSE BLDC GLUCOMTR-MCNC: 104 MG/DL (ref 70–99)
GLUCOSE BLDC GLUCOMTR-MCNC: 112 MG/DL (ref 70–99)
GLUCOSE BLDC GLUCOMTR-MCNC: 113 MG/DL (ref 70–99)
GLUCOSE BLDC GLUCOMTR-MCNC: 113 MG/DL (ref 70–99)
GLUCOSE BLDC GLUCOMTR-MCNC: 115 MG/DL (ref 70–99)
GLUCOSE BLDC GLUCOMTR-MCNC: 116 MG/DL (ref 70–99)
GLUCOSE BLDC GLUCOMTR-MCNC: 124 MG/DL (ref 70–99)
GLUCOSE BLDC GLUCOMTR-MCNC: 125 MG/DL (ref 70–99)
GLUCOSE BLDC GLUCOMTR-MCNC: 126 MG/DL (ref 70–99)
GLUCOSE BLDC GLUCOMTR-MCNC: 128 MG/DL (ref 70–99)
GLUCOSE BLDC GLUCOMTR-MCNC: 129 MG/DL (ref 70–99)
GLUCOSE BLDC GLUCOMTR-MCNC: 134 MG/DL (ref 70–99)
GLUCOSE BLDC GLUCOMTR-MCNC: 134 MG/DL (ref 70–99)
GLUCOSE BLDC GLUCOMTR-MCNC: 135 MG/DL (ref 70–99)
GLUCOSE BLDC GLUCOMTR-MCNC: 136 MG/DL (ref 70–99)
GLUCOSE BLDC GLUCOMTR-MCNC: 149 MG/DL (ref 70–99)
GLUCOSE BLDC GLUCOMTR-MCNC: 150 MG/DL (ref 70–99)
HAV IGM SER QL: 1.9 MG/DL (ref 1.6–2.6)
HCT VFR BLD AUTO: 22.6 % (ref 39–53)
HCT VFR BLD AUTO: 23.8 % (ref 39–53)
HCT VFR BLD AUTO: 25.8 % (ref 39–53)
HGB BLD-MCNC: 7.4 G/DL (ref 13–17.5)
HGB BLD-MCNC: 8.3 G/DL (ref 13–17.5)
HGB BLD-MCNC: 8.5 G/DL (ref 13–17.5)
IMM GRANULOCYTES # BLD AUTO: 0.1 X10(3) UL (ref 0–1)
IMM GRANULOCYTES NFR BLD: 1.9 %
INR BLD: 1.8 (ref 0.9–1.2)
LYMPHOCYTES # BLD AUTO: 1.2 X10(3) UL (ref 1–4)
LYMPHOCYTES NFR BLD AUTO: 23.3 %
MCH RBC QN AUTO: 31.7 PG (ref 26–34)
MCHC RBC AUTO-ENTMCNC: 32.9 G/DL (ref 31–37)
MCV RBC AUTO: 96.3 FL (ref 80–100)
MONOCYTES # BLD AUTO: 1.05 X10(3) UL (ref 0.1–1)
MONOCYTES NFR BLD AUTO: 20.4 %
NEUTROPHILS # BLD AUTO: 2.23 X10 (3) UL (ref 1.5–7.7)
NEUTROPHILS # BLD AUTO: 2.23 X10(3) UL (ref 1.5–7.7)
NEUTROPHILS NFR BLD AUTO: 43.5 %
OSMOLALITY SERPL CALC.SUM OF ELEC: 329 MOSM/KG (ref 275–295)
PHOSPHATE SERPL-MCNC: 2.8 MG/DL (ref 2.5–4.9)
PLATELET # BLD AUTO: 118 10(3)UL (ref 150–450)
POTASSIUM SERPL-SCNC: 3.4 MMOL/L (ref 3.5–5.1)
PROTHROMBIN TIME: 21 SECONDS (ref 11.8–14.5)
RBC # BLD AUTO: 2.68 X10(6)UL (ref 4.3–5.7)
SODIUM SERPL-SCNC: 148 MMOL/L (ref 136–145)
WBC # BLD AUTO: 5.1 X10(3) UL (ref 4–11)

## 2019-06-02 PROCEDURE — 99233 SBSQ HOSP IP/OBS HIGH 50: CPT | Performed by: INTERNAL MEDICINE

## 2019-06-02 PROCEDURE — 74018 RADEX ABDOMEN 1 VIEW: CPT | Performed by: INTERNAL MEDICINE

## 2019-06-02 PROCEDURE — 99232 SBSQ HOSP IP/OBS MODERATE 35: CPT | Performed by: INTERNAL MEDICINE

## 2019-06-02 RX ORDER — SODIUM CHLORIDE 0.9 % (FLUSH) 0.9 %
10 SYRINGE (ML) INJECTION AS NEEDED
Status: DISCONTINUED | OUTPATIENT
Start: 2019-06-02 | End: 2019-06-05

## 2019-06-02 RX ORDER — DEXTROSE MONOHYDRATE 50 MG/ML
INJECTION, SOLUTION INTRAVENOUS CONTINUOUS
Status: DISCONTINUED | OUTPATIENT
Start: 2019-06-02 | End: 2019-06-09 | Stop reason: ALTCHOICE

## 2019-06-02 RX ORDER — SODIUM CHLORIDE 9 MG/ML
INJECTION, SOLUTION INTRAVENOUS ONCE
Status: COMPLETED | OUTPATIENT
Start: 2019-06-02 | End: 2019-06-02

## 2019-06-02 RX ORDER — POTASSIUM CHLORIDE 14.9 MG/ML
20 INJECTION INTRAVENOUS ONCE
Status: COMPLETED | OUTPATIENT
Start: 2019-06-02 | End: 2019-06-02

## 2019-06-02 NOTE — PROGRESS NOTES
[facility]    Progress Note      Subjective:     Awake and non verbal     Continue to have GIB    Review of Systems:     Unable to obtain - tracheostomy     Objective:   Temp:  [96.5 °F (35.8 °C)-100 °F (37.8 °C)] 96.5 °F (35.8 °C)  Pulse Normal Saline Flush 0.9 % injection 10 mL 10 mL Intravenous PRN   calcium acetate (PHOSLO) cap 667 mg 1 capsule Per G Tube TID CC   carvedilol (COREG) tab 12.5 mg 12.5 mg Oral BID with meals   levETIRAcetam (KEPPRA) 100 MG/ML solution 1,000 mg 1,000 mg P 93. 5  --  96.3  --    NEPRELIM 3.18  --   --  3.28  --  2.23  --    WBC 4.5 4.5  --  5.8  --  5.1  --    .0 88.0*  --  108.0*  --  118.0*  --     < > = values in this interval not displayed.      Recent Labs   Lab 05/28/19  0510  05/30/19  0530 05/30 creatinine keep rising   - KOMAL secondary to sepsis with multiorgan dysfunction   - second episode of KOMAL during this hospitalization   - started on HD on 5/14 - last HD 5/31  - wife refused tunelled catheter placement - will readress  - creatinine 1.0 mg/d

## 2019-06-02 NOTE — PROGRESS NOTES
[place] [facility] - [facility]    Progress Note    Donelda Dakins Patient Status:  Inpatient    1960 MRN [id number]   Location [facility] 2W/SW Attending Shadi Todd,  [facility] Se Day # 32 PCP LATIA Ness MD            Subjective:       Manpreet Bro 06/02/19  0526   *   < > 116* 376* 396* 154* 110*   *   < > 86* 95* 101* 62* 74*   CREATSERUM 6.57*   < > 4.43* 4.50* 4.40* 2.31* 2.31*   GFRAA 10*   < > 16* 15* 16* 34* 34*   GFRNAA 8*   < > 14* 13* 14* 30* 30*   CA 7.7*   < > 8.1* 8.2* 7.9* remains possibility that situation of colon may worsen, rebleeding or perforation, however, due to high risk of surgery and pt generally condition, cont support for present time      Amy Jones MD [address] [facility]  06/02/19  9:32 AM

## 2019-06-02 NOTE — PROGRESS NOTES
[facility]    Progress Note    Jai Carbajal Patient Status:  Inpatient    1960 MRN [id number]   Location [facility] 2W/SW Attending Luis M Alonzo, [address] [facility] Se Day # 32 PCP C. Stephani Boas, MD        Subjective:     Unable to dependence / chronic pain syndrome      9- nutrition   TPN since  active GI bleed     10 dvt prophylaxis   scd   No anticoagulation with acute GI bleed     11- DNR     Overall poor prognosis  Continue supportive care               Results:     Lab Results

## 2019-06-02 NOTE — PROGRESS NOTES
Luisana Majano 98     Gastroenterology Progress Note    Brayden Garcia Patient Status:  Inpatient    1960 MRN [id number]   Location [facility] 2W/SW Attending Yang Zimmerman, [address] [facility] Se Day # 32 PCP LATIA Cornell MD       Ass tachycardic  Lung- Clear to auscultation bilaterally  Abdomen: Obese.  Slightly distended and tympanitic. More distended than yesterday and hypoactive bowel sounds. More tinkling and pain.    Skin- No jaundice.    Ext: No cyanosis, clubbing or edema is evid

## 2019-06-02 NOTE — PLAN OF CARE
Problem: CARDIOVASCULAR - ADULT  Goal: Maintains optimal cardiac output and hemodynamic stability  Description  INTERVENTIONS:  - Monitor vital signs, rhythm, and trends  - Monitor for bleeding, hypotension and signs of decreased cardiac output  - Evalua of care as needed  Outcome: Progressing  Note:   Pt being turned every 2 hrs and as needed. Sacral mepilex dressing in place for added skin protection.      Problem: NEUROLOGICAL - ADULT  Goal: Achieves stable or improved neurological status  Description  I bleed. H&H being monitored closely. Latest Hgb is 8.1.      Problem: Diabetes/Glucose Control  Goal: Glucose maintained within prescribed range  Description  INTERVENTIONS:  - Monitor Blood Glucose as ordered  - Assess for signs and symptoms of hyperglycemi

## 2019-06-03 LAB
ALBUMIN SERPL-MCNC: 2.8 G/DL (ref 3.4–5)
ALBUMIN/GLOB SERPL: 0.8 {RATIO} (ref 1–2)
ALP LIVER SERPL-CCNC: 50 U/L (ref 45–117)
ALT SERPL-CCNC: 50 U/L (ref 16–61)
ANION GAP SERPL CALC-SCNC: 9 MMOL/L (ref 0–18)
ANTIBODY SCREEN: NEGATIVE
AST SERPL-CCNC: 53 U/L (ref 15–37)
BASOPHILS # BLD AUTO: 0.02 X10(3) UL (ref 0–0.2)
BASOPHILS NFR BLD AUTO: 0.4 %
BILIRUB SERPL-MCNC: 1.1 MG/DL (ref 0.1–2)
BLOOD TYPE BARCODE: 8400
BUN BLD-MCNC: 76 MG/DL (ref 7–18)
BUN/CREAT SERPL: 38.8 (ref 10–20)
CALCIUM BLD-MCNC: 8.4 MG/DL (ref 8.5–10.1)
CHLORIDE SERPL-SCNC: 115 MMOL/L (ref 98–112)
CO2 SERPL-SCNC: 24 MMOL/L (ref 21–32)
CREAT BLD-MCNC: 1.96 MG/DL (ref 0.7–1.3)
DEPRECATED RDW RBC AUTO: 59.3 FL (ref 35.1–46.3)
EOSINOPHIL # BLD AUTO: 0.48 X10(3) UL (ref 0–0.7)
EOSINOPHIL NFR BLD AUTO: 8.6 %
ERYTHROCYTE [DISTWIDTH] IN BLOOD BY AUTOMATED COUNT: 19.3 % (ref 11–15)
GLOBULIN PLAS-MCNC: 3.4 G/DL (ref 2.8–4.4)
GLUCOSE BLD-MCNC: 142 MG/DL (ref 70–99)
GLUCOSE BLDC GLUCOMTR-MCNC: 131 MG/DL (ref 70–99)
GLUCOSE BLDC GLUCOMTR-MCNC: 152 MG/DL (ref 70–99)
GLUCOSE BLDC GLUCOMTR-MCNC: 152 MG/DL (ref 70–99)
GLUCOSE BLDC GLUCOMTR-MCNC: 159 MG/DL (ref 70–99)
GLUCOSE BLDC GLUCOMTR-MCNC: 169 MG/DL (ref 70–99)
GLUCOSE BLDC GLUCOMTR-MCNC: 171 MG/DL (ref 70–99)
GLUCOSE BLDC GLUCOMTR-MCNC: 173 MG/DL (ref 70–99)
HCT VFR BLD AUTO: 25.1 % (ref 39–53)
HCT VFR BLD AUTO: 25.1 % (ref 39–53)
HGB BLD-MCNC: 8.1 G/DL (ref 13–17.5)
HGB BLD-MCNC: 8.2 G/DL (ref 13–17.5)
IMM GRANULOCYTES # BLD AUTO: 0.07 X10(3) UL (ref 0–1)
IMM GRANULOCYTES NFR BLD: 1.3 %
INR BLD: 1.45 (ref 0.9–1.2)
LYMPHOCYTES # BLD AUTO: 1.04 X10(3) UL (ref 1–4)
LYMPHOCYTES NFR BLD AUTO: 18.6 %
M PROTEIN MFR SERPL ELPH: 6.2 G/DL (ref 6.4–8.2)
MCH RBC QN AUTO: 31.9 PG (ref 26–34)
MCHC RBC AUTO-ENTMCNC: 32.3 G/DL (ref 31–37)
MCV RBC AUTO: 98.8 FL (ref 80–100)
MONOCYTES # BLD AUTO: 0.78 X10(3) UL (ref 0.1–1)
MONOCYTES NFR BLD AUTO: 14 %
NEUTROPHILS # BLD AUTO: 3.19 X10 (3) UL (ref 1.5–7.7)
NEUTROPHILS # BLD AUTO: 3.19 X10(3) UL (ref 1.5–7.7)
NEUTROPHILS NFR BLD AUTO: 57.1 %
OSMOLALITY SERPL CALC.SUM OF ELEC: 331 MOSM/KG (ref 275–295)
PLATELET # BLD AUTO: 131 10(3)UL (ref 150–450)
POTASSIUM SERPL-SCNC: 3.7 MMOL/L (ref 3.5–5.1)
PROTHROMBIN TIME: 17.6 SECONDS (ref 11.8–14.5)
RBC # BLD AUTO: 2.54 X10(6)UL (ref 4.3–5.7)
RH BLOOD TYPE: POSITIVE
SODIUM SERPL-SCNC: 148 MMOL/L (ref 136–145)
WBC # BLD AUTO: 5.6 X10(3) UL (ref 4–11)

## 2019-06-03 PROCEDURE — 99291 CRITICAL CARE FIRST HOUR: CPT | Performed by: INTERNAL MEDICINE

## 2019-06-03 PROCEDURE — 99233 SBSQ HOSP IP/OBS HIGH 50: CPT | Performed by: INTERNAL MEDICINE

## 2019-06-03 PROCEDURE — 99232 SBSQ HOSP IP/OBS MODERATE 35: CPT | Performed by: INTERNAL MEDICINE

## 2019-06-03 NOTE — DIETARY NOTE
ADULT NUTRITION REASSESSMENT     Pt is at high nutrition risk. Pt does not meet malnutrition criteria. RECOMMENDATIONS TO MD:  See Nutrition Intervention. For CPN rx.       NUTRITION DIAGNOSIS/PROBLEM:  Inadequate protein energy intake related to re hemodialysis tomorrow. 5/20 ADDENDUM @1500:  Received call/Dr. Jaskaran Abraham to discuss starting low rate tube feedings  this pm and holding till tomorrow before advance further to assess tolerance.  Will begin at low rate 10 ml/hr (per Dr Uvaldo Garcia)  X ave 22 hr inf • Congestive heart disease ([facility]    • [facility]    • Diverticulitis    • Esophageal reflux    • Essential hypertension    • Fibromyalgia    • Hepatic Encephalopathy (HCC)     lactulose   • High blood pressure    • Hyperlipidemia    • Lipid screening good.   Intake: Via  CPN tonight. Intake Meeting Needs: 100% via CPN . Food Allergies: No  Cultural/Ethnic/Hinduism Preferences: Not Obtained     MEDICATIONS: reviewed.     • sodium chloride   Intravenous Once   • insulin detemir  55 Units Subcutan edema and hands and feet, +3 generalized non-pitting per visual exam  - Skin Integrity: intact.     NUTRITION PRESCRIPTION:  Diet: Parenteral Nutrition (PN)  Oral Supplements: NPO  ESTIMATED NUTRITION NEEDS:    Calories:2000- 2324 calories/day ([facility] u

## 2019-06-03 NOTE — PROGRESS NOTES
[facility]    Progress Note    Saida Wen Patient Status:  Inpatient    1960 MRN [id number]   Location [facility] 2W/SW Attending Amauri Dominguez MD   Hosp Day # 31 PCP LATIA Long MD     Subjective:  Continues to be Levemir 55 units SQ QAM   - Accuchecks Q6 hours   - Hypoglycemia protocol    Will follow    Jae Norman MD

## 2019-06-03 NOTE — PROGRESS NOTES
Luisana Majano [address]     Gastroenterology Progress Note    Gustavo Parada Patient Status:  Inpatient    1960 MRN [id number]   Location [facility] 2W/SW Attending Jonathan Madrigal, [address] [facility] Se Day # 28 PCP LATIA Aguilera MD       Ass auscultation bilaterally  Abdomen: Obese. Improved exam, softer, less tender, hypoactive bowel sounds  Skin- No jaundice.    Ext: No cyanosis, clubbing or edema is evident.    Neuro:  Responsive to voice   Affect: Unable to assess      Results:     Recent on 6/02/2019 at 13:21     Approved by (CST):  Dominic Patino MD on 6/02/2019 at 13:24                  Pawel Keene MD

## 2019-06-03 NOTE — PROGRESS NOTES
[facility] ID PROGRESS NOTE    Kelsi Quiles Patient Status:  Inpatient    1960 MRN [id number]   Location [facility] 2W/SW Attending Ever Girard MD   [facility] Day # 28 PCP LATIA Mcclure MD     Subjective:  Awake, 40% on ventilator.  Got FFP yester kidney injury) ([facility]     [place]'s syndrome     Acute GI bleeding     Acute blood loss anemia      ASSESSMENT:    Antibiotics: Meropenem, day 6, OVP  IV zosyn 5/2-5/11, IV vancomycin 5/2-5/7, IV meropenem 5/12-5/26, metronidazole 5/12-5/25, IV vancomycin 5 with findings of acute colonic pseudoobstruction, pseudomembranes. Dusky mucosa. No mechanical obstruction. Pathology with concern for ischemic colitis. Underwent tracheostomy 5/22.   Now with downtrending hemoglobin with rectal bleeding with hemoglobin Consultants  [phone]  5/30/2019

## 2019-06-03 NOTE — PROGRESS NOTES
[facility] - [facility]    Progress Note    Yosef De Leon Patient Status:  Inpatient    1960 MRN [id number]   Location [facility] 2W/SW Attending Gavin Han MD   Hosp Day # 35 PCP LATIA Campos MD     Subjective:  Continues to be dextrose in the bag  - Continue Levemir 55 units SQ QAM   - Accuchecks Q6 hours   - Hypoglycemia protocol    Will follow    Heraclio Hay MD

## 2019-06-03 NOTE — PROGRESS NOTES
[facility]    Progress Note    Dayanara Jordan Patient Status:  Inpatient    1960 MRN [id number]   Location [facility] 2W/SW Attending Marge Melendez MD   Hosp Day # 28 PCP LATIA Guan MD     Subjective:  Continues to be Continue Levemir 55 units SQ QAM   - He has been started on D5 was on 40 cc/hr yesterday, rate has been increased slightly today. Will follow BG.  If they go up, we can add novolin R low dose correction scale  - Accuchecks Q6 hours   - Hypoglycemia protocol

## 2019-06-03 NOTE — PROGRESS NOTES
Pulmonary/Critical Care Follow Up Note    HPI:   Orquidea Pascal is a 60year old male with Patient presents with:  Fever (infectious)  Altered Mental Status (neurologic)      PCP LATIA Kendrick MD  Admission Attending No admitting provider for patient encoun 10 mL, Intravenous, PRN  •  calcium acetate (PHOSLO) cap 667 mg, 1 capsule, Per G Tube, TID CC  •  carvedilol (COREG) tab 12.5 mg, 12.5 mg, Oral, BID with meals  •  levETIRAcetam (KEPPRA) 100 MG/ML solution 1,000 mg, 1,000 mg, Per NG Tube, BID  •  Albumin (36.2 °C), temperature source Temporal, resp. rate 15, height 5' 5.67\" (1.668 m), weight (!) 323 lb 3.2 oz (146.6 kg), SpO2 100 %.     Intake/Output Summary (Last 24 hours) at 6/3/2019 1816  Last data filed at 6/3/2019 1400  Gross per 24 hour   Intake 9786 segment of colitis and placement of decompression tube  Better and was tolerating TFs but now GIB  Plan      TPN       GIB  BRB in rectal tube  Persistent  Bedside flex sig with ulcerations but mainly pooling blood in colon  Persistent  approx 10 units now • Congestive heart disease ([facility]    • [facility]    • Diverticulitis    • Esophageal reflux    • Essential hypertension    • Fibromyalgia    • Hepatic Encephalopathy (HCC)     lactulose   • High blood pressure    • Hyperlipidemia    • Lipid screening dextrose 50 % injection 50 mL, 50 mL, Intravenous, PRN  •  Glucose-Vitamin C (DEX-4) 4-6 GM-MG chewable tab 4 tablet, 4 tablet, Oral, Q15 Min PRN  •  metoprolol Tartrate (LOPRESSOR) 5 MG/5ML injection 5 mg, 5 mg, Intravenous, Q4H PRN  •  pregabalin (LYRICA Results   Component Value Date    WBC 5.6 06/03/2019    HGB 8.2 06/03/2019    HCT 25.1 06/03/2019    .0 06/03/2019    CREATSERUM 1.96 06/03/2019    BUN 76 06/03/2019     06/03/2019    K 3.7 06/03/2019     06/03/2019    CO2 24.0 06/03/201 restarted coreg    Afib  Rate controlled  Non compliant with a/c from past cards notes  Plan       Follow rate               Restart hep or warfarin when safe                 GIB  Coffee ground emesis but resolved  Hb 17-->16--> 14 --> 12.9--> 9

## 2019-06-03 NOTE — PROGRESS NOTES
[facility]    Progress Note      Subjective:     Awake and non verbal     Little to no GIB    Review of Systems:     Unable to obtain - tracheostomy     Objective:   Temp:  [96.5 °F (35.8 °C)-98.6 °F (37 °C)] 97.7 °F (36.5 °C)  Pulse:  [5 chloride 0.9% 100 mL MBP 1 g Intravenous Q24H   Normal Saline Flush 0.9 % injection 10 mL 10 mL Intravenous PRN   calcium acetate (PHOSLO) cap 667 mg 1 capsule Per G Tube TID CC   carvedilol (COREG) tab 12.5 mg 12.5 mg Oral BID with meals   levETIRAcetam ( --   --  3.19   WBC 5.8  --  5.1  --   --  5.6   .0*  --  118.0*  --   --  131.0*    < > = values in this interval not displayed.      Recent Labs   Lab 05/30/19  0530 05/30/19 2014 06/01/19  0540 06/02/19  0526 06/03/19  0530   * 376*   < > last HD 5/31  - continue to monitor - creatinine trending down  - wife refused tunelled catheter placement - monitor the need  - creatinine 1.0 mg/dl at baseline  - KOMAL secondary to septic shock and ATN  - strict I/Os and daily weights  - avoid nephrotoxin

## 2019-06-03 NOTE — PROGRESS NOTES
[facility]    Progress Note    Kelsi Quiles Patient Status:  Inpatient    1960 MRN [id number]   Location [facility] 2W/SW Attending Ever Girard, [address] [facility] Se Day # 28 PCP LATIA Mcclure MD            Subjective:       Kelly Trevino BUN 86* 95*   < > 62* 74* 76*   CREATSERUM 4.43* 4.50*   < > 2.31* 2.31* 1.96*   GFRAA 16* 15*   < > 34* 34* 42*   GFRNAA 14* 13*   < > 30* 30* 36*   CA 8.1* 8.2*   < > 8.2* 8.8 8.4*   ALB 2.8* 2.7*  --   --   --  2.8*    142   < > 144 148* 148* However, pt will be high risk due to general condition and cirrhosis. Will tx with attempted correction of coag issues and transfusions as needed for present time.    pt may need surgery soon if unable to improve situation further, though pt would be high

## 2019-06-04 ENCOUNTER — APPOINTMENT (OUTPATIENT)
Dept: GENERAL RADIOLOGY | Facility: HOSPITAL | Age: 59
DRG: 003 | End: 2019-06-04
Attending: SURGERY
Payer: MEDICARE

## 2019-06-04 ENCOUNTER — APPOINTMENT (OUTPATIENT)
Dept: GENERAL RADIOLOGY | Facility: HOSPITAL | Age: 59
DRG: 003 | End: 2019-06-04
Attending: INTERNAL MEDICINE
Payer: MEDICARE

## 2019-06-04 ENCOUNTER — APPOINTMENT (OUTPATIENT)
Dept: PICC SERVICES | Facility: HOSPITAL | Age: 59
DRG: 003 | End: 2019-06-04
Attending: INTERNAL MEDICINE
Payer: MEDICARE

## 2019-06-04 LAB
ANION GAP SERPL CALC-SCNC: 8 MMOL/L (ref 0–18)
BASOPHILS # BLD AUTO: 0.01 X10(3) UL (ref 0–0.2)
BASOPHILS NFR BLD AUTO: 0.2 %
BUN BLD-MCNC: 70 MG/DL (ref 7–18)
BUN/CREAT SERPL: 44.6 (ref 10–20)
CALCIUM BLD-MCNC: 8.4 MG/DL (ref 8.5–10.1)
CHLORIDE SERPL-SCNC: 114 MMOL/L (ref 98–112)
CO2 SERPL-SCNC: 24 MMOL/L (ref 21–32)
CREAT BLD-MCNC: 1.57 MG/DL (ref 0.7–1.3)
DEPRECATED RDW RBC AUTO: 66.5 FL (ref 35.1–46.3)
EOSINOPHIL # BLD AUTO: 0.56 X10(3) UL (ref 0–0.7)
EOSINOPHIL NFR BLD AUTO: 9.3 %
ERYTHROCYTE [DISTWIDTH] IN BLOOD BY AUTOMATED COUNT: 19.8 % (ref 11–15)
GLUCOSE BLD-MCNC: 153 MG/DL (ref 70–99)
GLUCOSE BLDC GLUCOMTR-MCNC: 142 MG/DL (ref 70–99)
GLUCOSE BLDC GLUCOMTR-MCNC: 144 MG/DL (ref 70–99)
GLUCOSE BLDC GLUCOMTR-MCNC: 145 MG/DL (ref 70–99)
GLUCOSE BLDC GLUCOMTR-MCNC: 194 MG/DL (ref 70–99)
HCT VFR BLD AUTO: 26.1 % (ref 39–53)
HCT VFR BLD AUTO: 27.7 % (ref 39–53)
HGB BLD-MCNC: 8.1 G/DL (ref 13–17.5)
HGB BLD-MCNC: 8.9 G/DL (ref 13–17.5)
IMM GRANULOCYTES # BLD AUTO: 0.06 X10(3) UL (ref 0–1)
IMM GRANULOCYTES NFR BLD: 1 %
LYMPHOCYTES # BLD AUTO: 0.87 X10(3) UL (ref 1–4)
LYMPHOCYTES NFR BLD AUTO: 14.4 %
MCH RBC QN AUTO: 30.8 PG (ref 26–34)
MCHC RBC AUTO-ENTMCNC: 31 G/DL (ref 31–37)
MCV RBC AUTO: 99.2 FL (ref 80–100)
MONOCYTES # BLD AUTO: 0.62 X10(3) UL (ref 0.1–1)
MONOCYTES NFR BLD AUTO: 10.3 %
NEUTROPHILS # BLD AUTO: 3.92 X10 (3) UL (ref 1.5–7.7)
NEUTROPHILS # BLD AUTO: 3.92 X10(3) UL (ref 1.5–7.7)
NEUTROPHILS NFR BLD AUTO: 64.8 %
OSMOLALITY SERPL CALC.SUM OF ELEC: 326 MOSM/KG (ref 275–295)
PLATELET # BLD AUTO: 141 10(3)UL (ref 150–450)
POTASSIUM SERPL-SCNC: 4.2 MMOL/L (ref 3.5–5.1)
RBC # BLD AUTO: 2.63 X10(6)UL (ref 4.3–5.7)
SODIUM SERPL-SCNC: 146 MMOL/L (ref 136–145)
TRIGL SERPL-MCNC: 112 MG/DL (ref 30–149)
WBC # BLD AUTO: 6 X10(3) UL (ref 4–11)

## 2019-06-04 PROCEDURE — B5181ZA FLUOROSCOPY OF SUPERIOR VENA CAVA USING LOW OSMOLAR CONTRAST, GUIDANCE: ICD-10-PCS | Performed by: INTERNAL MEDICINE

## 2019-06-04 PROCEDURE — 99232 SBSQ HOSP IP/OBS MODERATE 35: CPT | Performed by: INTERNAL MEDICINE

## 2019-06-04 PROCEDURE — 74018 RADEX ABDOMEN 1 VIEW: CPT | Performed by: SURGERY

## 2019-06-04 PROCEDURE — 99291 CRITICAL CARE FIRST HOUR: CPT | Performed by: INTERNAL MEDICINE

## 2019-06-04 PROCEDURE — 71045 X-RAY EXAM CHEST 1 VIEW: CPT | Performed by: INTERNAL MEDICINE

## 2019-06-04 PROCEDURE — 99233 SBSQ HOSP IP/OBS HIGH 50: CPT | Performed by: INTERNAL MEDICINE

## 2019-06-04 PROCEDURE — 02HV33Z INSERTION OF INFUSION DEVICE INTO SUPERIOR VENA CAVA, PERCUTANEOUS APPROACH: ICD-10-PCS | Performed by: INTERNAL MEDICINE

## 2019-06-04 RX ORDER — LEVETIRACETAM 100 MG/ML
1000 SOLUTION ORAL 2 TIMES DAILY
Status: DISCONTINUED | OUTPATIENT
Start: 2019-06-04 | End: 2019-06-17 | Stop reason: SDUPTHER

## 2019-06-04 RX ORDER — LIDOCAINE HYDROCHLORIDE 10 MG/ML
0.5 INJECTION, SOLUTION INFILTRATION; PERINEURAL ONCE AS NEEDED
Status: ACTIVE | OUTPATIENT
Start: 2019-06-04 | End: 2019-06-04

## 2019-06-04 NOTE — PLAN OF CARE
Problem: Diabetes/Glucose Control  Goal: Glucose maintained within prescribed range  Description  INTERVENTIONS:  - Monitor Blood Glucose as ordered  - Assess for signs and symptoms of hyperglycemia and hypoglycemia  - Administer ordered medications to m as indicated  - Manage/alleviate anxiety  - Monitor for signs/symptoms of CO2 retention  Outcome: Progressing  Note:   Pt with trach connected to mechanical vent.  No acute respiratory distress observed, pt able to tolerate current vent settings well, rocio injury  Description  INTERVENTIONS:  - Assess pt frequently for physical needs  - Identify cognitive and physical deficits and behaviors that affect risk of falls.   - [place] fall precautions as indicated by assessment.  - Educate pt/family on patient sa and tolerated  - Evaluate effectiveness of GI medications  - Encourage mobilization and activity  - Obtain nutritional consult as needed  - Establish a toileting routine/schedule  - Consider collaborating with pharmacy to review patient's medication profil

## 2019-06-04 NOTE — PROGRESS NOTES
Vascular Access Note  Inserted by Moriah Szymanski RN    Vascular Access Screening:   Allergies to Lidocaine: no  Allergies to Latex: no  Presence of Pacemaker/Defibrillator: No  Mastectomy with Lymph Node Dissection: No  AV Fistula / AV Graft: No  Dialysis Cathete

## 2019-06-04 NOTE — PROGRESS NOTES
[facility] ID PROGRESS NOTE    Mary Alice Jha Patient Status:  Inpatient    1960 MRN [id number]   Location [facility] 2W/SW Attending Candida Parker MD   [facility] Day # 35 PCP LATIA Slaughter MD     Subjective:  Awake, 40% on ventilator.  Some rectal bl Beth's syndrome     Acute GI bleeding     Acute blood loss anemia      ASSESSMENT:    Antibiotics: Meropenem, day 7, OVP  IV zosyn 5/2-5/11, IV vancomycin 5/2-5/7, IV meropenem 5/12-5/26, metronidazole 5/12-5/25, IV vancomycin 5/12-5/25, PO vancomycin 5 colonic pseudoobstruction, pseudomembranes. Dusky mucosa. No mechanical obstruction. Pathology with concern for ischemic colitis. Underwent tracheostomy 5/22. Now with downtrending hemoglobin with rectal bleeding with hemoglobin as low as 6.5.   With t [facility]  [phone]  5/30/2019

## 2019-06-04 NOTE — PROGRESS NOTES
PICC line out of place. Dressing is CDI, but exposes PICC line past the port. PICC line RN at bedside with writer/RN to assess. Dressing changed, PICC line measured (at 32), and chest XR ordered. Results of CXR: PICC line tip at subclavian vein.

## 2019-06-04 NOTE — PROGRESS NOTES
[facility]    Progress Note      Subjective:     Awake and non verbal     Large bloody BM today     Review of Systems:     Unable to obtain - tracheostomy     Objective:   Temp:  [96.9 °F (36.1 °C)-98.1 °F (36.7 °C)] 98.1 °F (36.7 °C)  Pu injection 10 mL 10 mL Intravenous PRN   calcium acetate (PHOSLO) cap 667 mg 1 capsule Per G Tube TID CC   carvedilol (COREG) tab 12.5 mg 12.5 mg Oral BID with meals   levETIRAcetam (KEPPRA) 100 MG/ML solution 1,000 mg 1,000 mg Per NG Tube BID   Albumin Hum not displayed.      Recent Labs   Lab 05/30/19  0530 05/30/19 2014 06/02/19  0526 06/03/19  0530 06/04/19  0514   * 376*   < > 110* 142* 153*   BUN 86* 95*   < > 74* 76* 70*   CREATSERUM 4.43* 4.50*   < > 2.31* 1.96* 1.57*   GFRAA 16* 15*   < > 34* kidney function continue to improve   - creatinine 1.0 mg/dl at baseline  - KOMAL secondary to septic shock and ATN  - strict I/Os and daily weights  - avoid nephrotoxins     2.  Septic shock  - parainfluenza +ve and pneumonia on admission with SBO/ileus   -

## 2019-06-04 NOTE — PROGRESS NOTES
Pulmonary/Critical Care Follow Up Note    HPI:   Denise Villanueva is a 60year old male with Patient presents with:  Fever (infectious)  Altered Mental Status (neurologic)      PCP LATIA Sawant MD  Admission Attending No admitting provider for patient encoun meals  •  levETIRAcetam (KEPPRA) 100 MG/ML solution 1,000 mg, 1,000 mg, Per NG Tube, BID  •  Albumin Human (ALBUMINAR) 25 % solution 100 mL, 100 mL, Intravenous, PRN  •  epoetin shakeel-epbx (RETACRIT) 40614 UNIT/ML injection SOLN 10,000 Units, 10,000 Units, (Last 24 hours) at 6/4/2019 1053  Last data filed at 6/4/2019 0800  Gross per 24 hour   Intake 3247 ml   Output 2675 ml   Net 572 ml     NAD  Profound weakness   Awake and  following commands this AM  Lung course BS  CV  reg   Abd full + less tender quiet Prn anti HTN meds    coreg    KOMAL  2nd episode  Now progressive and severe  Presumed from septic shock  Started on RRT  More UOP  May be recovering  Plan     Bumex prn   follow    Encephalopathy  intermittelntly following commands  Head CT neg  Plan      F TPN, , Intravenous, Continuous TPN  •  Normal Saline Flush 0.9 % injection 10 mL, 10 mL, Intravenous, PRN  •  dextrose 5% infusion, , Intravenous, Continuous  •  Insulin Regular Human (NOVOLIN R) 100 Units in sodium chloride 0.9% 100 mL infusion, 1-28 Unit mg, Intravenous, Q8H  •  morphINE sulfate (PF) 4 MG/ML injection 4 mg, 4 mg, Intravenous, Q2H PRN  •  ipratropium-albuterol (DUONEB) nebulizer solution 3 mL, 3 mL, Nebulization, Q6H PRN  •  acetaminophen (TYLENOL EXTRA STRENGTH) tab 1,000 mg, 1,000 mg, Ora vanco/flagyl/Erna  CT A/P with ileus and \"colitis\"  HD stable off pressors x many days  Plan       Day # 10+ erna and will d/c                    Acute resp failure  Multifactorial including PNA, parainfluenza, abdominal embarrassment  CXR stable/improve with wife  Updated re all events  She understands that he may or may not recover form his acute critical illness  DNR   Cont aggressive critical care support for now    40 min CCT        Drea Mtz MD

## 2019-06-04 NOTE — PROGRESS NOTES
[facility]     Gastroenterology Progress Note    Dayanara Jordan Patient Status:  Inpatient    1960 MRN [id number]   Location [facility] 2W/SW Attending Betty Drake,  [facility] Se Day # 35 PCP LATIA Guan MD       Sub Dictated by (CST): Rosendo Celeste MD on 6/02/2019 at 13:21     Approved by (CST): Rosendo Celeste MD on 6/02/2019 at 13:24          Xr Chest Ap Portable  (cpt=71045)    Result Date: 6/4/2019  CONCLUSION:  1.  Right-sided PICC line tip at least to the l colonoscopic interventions or additional medical therapy (I.e. Antibiotics, anti-inflammatories) are likely to be of aid.     Recommend:  -continued supportive care - tpn, blood transfusion prn    Khadar Nance MD  [facility] - Gastroenterology  6

## 2019-06-04 NOTE — PLAN OF CARE
Pulmonary [facility]): continue current POC, change out PICC, wean AT   Nephrology [facility]): possibly remove HD catheter 6/6-6/7 if stable   Surgery [facility]): abdominal XR, continue TPN   Endo (Caleb Meehan): continue current POC, call if BS > 200  GI Junie Brennan Patient/Family Short Term Goal  Description  Patient's Short Term Goal: Per wife extubation    Interventions:   - ABX as ordered  -weaning trials when appropriate  - See additional Care Plan goals for specific interventions  Outcome: Progressing     Proble symptoms of hyperglycemia and hypoglycemia  - Administer ordered medications to maintain glucose within target range  - Assess barriers to adequate nutritional intake and initiate nutrition consult as needed  - Instruct patient on self management of diabet call for assistance with activity based on assessment  - Modify environment to reduce risk of injury  - Provide assistive devices as appropriate  - Consider OT/PT consult to assist with strengthening/mobility  - Encourage toileting schedule   Outcome: Prog Cessation handout, if applicable  - Encourage broncho-pulmonary hygiene including cough, deep breathe, Incentive Spirometry  - Assess the need for suctioning and perform as needed  - Assess and instruct to report SOB or any respiratory difficulty  - Respir

## 2019-06-05 LAB
ANION GAP SERPL CALC-SCNC: 7 MMOL/L (ref 0–18)
BASOPHILS # BLD AUTO: 0.03 X10(3) UL (ref 0–0.2)
BASOPHILS NFR BLD AUTO: 0.4 %
BUN BLD-MCNC: 62 MG/DL (ref 7–18)
BUN/CREAT SERPL: 44.9 (ref 10–20)
CALCIUM BLD-MCNC: 8.8 MG/DL (ref 8.5–10.1)
CHLORIDE SERPL-SCNC: 111 MMOL/L (ref 98–112)
CO2 SERPL-SCNC: 25 MMOL/L (ref 21–32)
CREAT BLD-MCNC: 1.38 MG/DL (ref 0.7–1.3)
DEPRECATED RDW RBC AUTO: 67.7 FL (ref 35.1–46.3)
EOSINOPHIL # BLD AUTO: 0.37 X10(3) UL (ref 0–0.7)
EOSINOPHIL NFR BLD AUTO: 4.8 %
ERYTHROCYTE [DISTWIDTH] IN BLOOD BY AUTOMATED COUNT: 19.2 % (ref 11–15)
GLUCOSE BLD-MCNC: 175 MG/DL (ref 70–99)
GLUCOSE BLDC GLUCOMTR-MCNC: 191 MG/DL (ref 70–99)
GLUCOSE BLDC GLUCOMTR-MCNC: 191 MG/DL (ref 70–99)
GLUCOSE BLDC GLUCOMTR-MCNC: 222 MG/DL (ref 70–99)
HAV IGM SER QL: 1.7 MG/DL (ref 1.6–2.6)
HCT VFR BLD AUTO: 27.7 % (ref 39–53)
HCT VFR BLD AUTO: 30.6 % (ref 39–53)
HGB BLD-MCNC: 10.1 G/DL (ref 13–17.5)
HGB BLD-MCNC: 8.8 G/DL (ref 13–17.5)
IMM GRANULOCYTES # BLD AUTO: 0.09 X10(3) UL (ref 0–1)
IMM GRANULOCYTES NFR BLD: 1.2 %
LYMPHOCYTES # BLD AUTO: 1.07 X10(3) UL (ref 1–4)
LYMPHOCYTES NFR BLD AUTO: 13.9 %
MCH RBC QN AUTO: 31.2 PG (ref 26–34)
MCHC RBC AUTO-ENTMCNC: 31.8 G/DL (ref 31–37)
MCV RBC AUTO: 98.2 FL (ref 80–100)
MONOCYTES # BLD AUTO: 0.89 X10(3) UL (ref 0.1–1)
MONOCYTES NFR BLD AUTO: 11.6 %
NEUTROPHILS # BLD AUTO: 5.24 X10 (3) UL (ref 1.5–7.7)
NEUTROPHILS # BLD AUTO: 5.24 X10(3) UL (ref 1.5–7.7)
NEUTROPHILS NFR BLD AUTO: 68.1 %
OSMOLALITY SERPL CALC.SUM OF ELEC: 318 MOSM/KG (ref 275–295)
PHOSPHATE SERPL-MCNC: 3.1 MG/DL (ref 2.5–4.9)
PLATELET # BLD AUTO: 162 10(3)UL (ref 150–450)
POTASSIUM SERPL-SCNC: 4.1 MMOL/L (ref 3.5–5.1)
RBC # BLD AUTO: 2.82 X10(6)UL (ref 4.3–5.7)
SODIUM SERPL-SCNC: 143 MMOL/L (ref 136–145)
WBC # BLD AUTO: 7.7 X10(3) UL (ref 4–11)

## 2019-06-05 PROCEDURE — 99232 SBSQ HOSP IP/OBS MODERATE 35: CPT | Performed by: INTERNAL MEDICINE

## 2019-06-05 PROCEDURE — 99291 CRITICAL CARE FIRST HOUR: CPT | Performed by: INTERNAL MEDICINE

## 2019-06-05 PROCEDURE — 99233 SBSQ HOSP IP/OBS HIGH 50: CPT | Performed by: INTERNAL MEDICINE

## 2019-06-05 RX ORDER — MORPHINE SULFATE 2 MG/ML
2 INJECTION, SOLUTION INTRAMUSCULAR; INTRAVENOUS EVERY 2 HOUR PRN
Status: DISCONTINUED | OUTPATIENT
Start: 2019-06-05 | End: 2019-07-01

## 2019-06-05 RX ORDER — HYDRALAZINE HYDROCHLORIDE 20 MG/ML
20 INJECTION INTRAMUSCULAR; INTRAVENOUS EVERY 4 HOURS PRN
Status: DISCONTINUED | OUTPATIENT
Start: 2019-06-05 | End: 2019-06-12

## 2019-06-05 NOTE — PROGRESS NOTES
[facility] ID PROGRESS NOTE    Jai Carbajal Patient Status:  Inpatient    1960 MRN [id number]   Location [facility] 2W/SW Attending Luis M Alonzo MD   [facility] Day # 29 PCP C. Stephani Boas, MD     Subjective:  Awake, 40% on ventilator.  Two bloody BMs bleeding     Acute blood loss anemia      ASSESSMENT:    Antibiotics: Meropenem, day 7, OVP  IV zosyn 5/2-5/11, IV vancomycin 5/2-5/7, IV meropenem 5/12-5/26, metronidazole 5/12-5/25, IV vancomycin 5/12-5/25, PO vancomycin 1112    60year old male.  Patient pseudomembranes. Dusky mucosa. No mechanical obstruction. Pathology with concern for ischemic colitis. Underwent tracheostomy 5/22. Now with downtrending hemoglobin with rectal bleeding with hemoglobin as low as 6.5.   With the bleeding noted to have l

## 2019-06-05 NOTE — PLAN OF CARE
Problem: Patient Centered Care  Goal: Patient preferences are identified and integrated in the patient's plan of care  Description  Interventions:  - What would you like us to know as we care for you?  Keep wife involved in his care  - Provide timely, com hematoma  - Assess quality of pulses, skin color and temperature  - Assess for signs of decreased coronary artery perfusion - ex.  Angina  - Evaluate fluid balance, assess for edema, trend weights  Outcome: Not Progressing  Goal: Absence of cardiac arrhythm maintained within prescribed range  Description  INTERVENTIONS:  - Monitor Blood Glucose as ordered  - Assess for signs and symptoms of hyperglycemia and hypoglycemia  - Administer ordered medications to maintain glucose within target range  - Assess barri precautions as indicated by assessment.  - Educate pt/family on patient safety including physical limitations  - Instruct pt to call for assistance with activity based on assessment  - Modify environment to reduce risk of injury  - Provide assistive device of bloody bowel movement x2, kept clean and dry, will continue to ,onitor.

## 2019-06-05 NOTE — PROGRESS NOTES
Pulmonary/Critical Care Follow Up Note    HPI:   Rm Calle is a 60year old male with Patient presents with:  Fever (infectious)  Altered Mental Status (neurologic)      PCP LATIA Mcmahon MD  Admission Attending No admitting provider for patient encoun 0.9 % injection 10 mL, 10 mL, Intravenous, PRN  •  Normal Saline Flush 0.9 % injection 10 mL, 10 mL, Intravenous, PRN  •  calcium acetate (PHOSLO) cap 667 mg, 1 capsule, Per G Tube, TID CC  •  carvedilol (COREG) tab 12.5 mg, 12.5 mg, Oral, BID with meals temperature 97.7 °F (36.5 °C), temperature source Temporal, resp. rate 24, height 5' 5.67\" (1.668 m), weight (!) 326 lb 9.6 oz (148.1 kg), SpO2 100 %.     Intake/Output Summary (Last 24 hours) at 6/5/2019 0920  Last data filed at 6/5/2019 0500  Gross per 2 mainly pooling blood in colon  S/p FFP and cryo  D/w with GI and no new ideas  Plan      Serial H/H    GI and surgery following    Surgery if recurrent massive bleeding and surgery is life saving        Opoid dependence  Weaned off over 20+ days in CCU  Pl • Esophageal reflux    • Essential hypertension    • Fibromyalgia    • Hepatic Encephalopathy (HCC)     lactulose   • High blood pressure    • Hyperlipidemia    • Lipid screening 1/17/2011    per NG   • Morbid obesity (HCC)    • Obesity    • Rectal fissu Glucose-Vitamin C (DEX-4) 4-6 GM-MG chewable tab 4 tablet, 4 tablet, Oral, Q15 Min PRN  •  metoprolol Tartrate (LOPRESSOR) 5 MG/5ML injection 5 mg, 5 mg, Intravenous, Q4H PRN  •  pregabalin (LYRICA) cap 100 mg, 100 mg, Oral, Nightly  •  Vancomycin HCl (FIR 06/05/2019    HGB 8.8 06/05/2019    HCT 27.7 06/05/2019    .0 06/05/2019    CREATSERUM 1.38 06/05/2019    BUN 62 06/05/2019     06/05/2019    K 4.1 06/05/2019     06/05/2019    CO2 25.0 06/05/2019     06/05/2019    CA 8.8 06/05/20 warfarin when safe                 GIB  Coffee ground emesis but resolved  Hb 17-->16--> 14 --> 12.9--> 9-->8.8--> 8.3--> 8.2  GI and surgery following  Still bleeding intermittently  Plan      PPI    NG to LIS   PRBC prn       Morbid obesity  Suspect IRMA

## 2019-06-05 NOTE — PROGRESS NOTES
[facility]    Progress Note    Gustavo Dixon Patient Status:  Inpatient    1960 MRN [id number]   Location [facility] 2W/SW Attending Bonny Murillo, [address] [facility] Se Day # 29 PCP LATIA Gonzalez MD            Subjective:       Yenny David 4.43* 4.50*   < > 1.96* 1.57* 1.38*   GFRAA 16* 15*   < > 42* 55* 64   GFRNAA 14* 13*   < > 36* 48* 56*   CA 8.1* 8.2*   < > 8.4* 8.4* 8.8   ALB 2.8* 2.7*  --  2.8*  --   --     142   < > 148* 146* 143   K 3.2* 4.8   < > 3.7 4.2 4.1    108   < the lower lungs    Dictated by (CST): Dulce Maria Patton MD on 6/04/2019 at 10:34     Approved by (CST): Dulce Maria Patton MD on 6/04/2019 at 10:40                    Assessment and Plan:     Sepsis due to undetermined organism [facility])  SBO (small bowel obstruction

## 2019-06-05 NOTE — PLAN OF CARE
Patient alert and trying to mouth words. Able to communicate with noding head. Trach to vent with CPAP trial. Patient on 12/5 for over an hour then tachypnic. TPN at ordered rate. D5 rate decreased. 1 Large bloody stool this Am. Serial H/H.  No blood produc arrhythmias or at baseline  Description  INTERVENTIONS:  - Continuous cardiac monitoring, monitor vital signs, obtain 12 lead EKG if indicated  - Evaluate effectiveness of antiarrhythmic and heart rate control medications as ordered  - Initiate emergency m needed  - Establish a toileting routine/schedule  - Consider collaborating with pharmacy to review patient's medication profile  Outcome: Not Progressing

## 2019-06-05 NOTE — PROGRESS NOTES
[facility]    Progress Note    Mary Alice Jha Patient Status:  Inpatient    1960 MRN [id number]   Location [facility] 2W/SW Attending Juancarlos Peguero MD   Hosp Day # 29 PCP LATIA Slaughter MD     Subjective:  Continues to be Hypoglycemia protocol  - Spoke with RN will call if change in TPN or nutrition plan     Will follow    Janice Miller MD  6/5/2019

## 2019-06-05 NOTE — PROGRESS NOTES
[facility]     Gastroenterology Progress Note    Rm Calle Patient Status:  Inpatient    1960 MRN [id number]   Location [facility] 2W/SW Attending Leo Simms, [address] [facility] Se Day # 29 PCP LATIA Mcmahon MD       Sub Chest Ap Portable  (cpt=71045)    Result Date: 6/4/2019  CONCLUSION:  *  A PICC line has been inserted from the left into the superior vena cava without complication. * Heart remains enlarged.  * Bibasilar lung consolidation/atelectasis noted left greater t pseudo-obstruction and eventual flexible sigmoidoscopy with Dr. Evelyn Friedman 5/2/2019 for blood per rectum with multiple abnormalities/changes in the transverse colon.     He's been evaluated by surgery with recommendations for conservative management given multip

## 2019-06-05 NOTE — PROGRESS NOTES
[facility]    Progress Note      Subjective:     Awake and non verbal     Large bloody BM x 2     Review of Systems:     Unable to obtain - tracheostomy     Objective:   Temp:  [97.4 °F (36.3 °C)-98.4 °F (36.9 °C)] 97.9 °F (36.6 °C)  Puls Intravenous Continuous   Normal Saline Flush 0.9 % injection 10 mL 10 mL Intravenous PRN   Normal Saline Flush 0.9 % injection 10 mL 10 mL Intravenous PRN   Normal Saline Flush 0.9 % injection 10 mL 10 mL Intravenous PRN   calcium acetate (PHOSLO) cap 661 5. 24   WBC 5.6  --  6.0  --  7.7   .0*  --  141.0*  --  162.0    < > = values in this interval not displayed.      Recent Labs   Lab 05/30/19 0530 05/30/19 2014 06/03/19 0530 06/04/19  0514 06/05/19  0434   * 376*   < > 142* 153* 175*   BU hospitalization   - started on HD on 5/14 - last HD 5/31  - continue to monitor - creatinine trending down  - will consider removing temp catheter tomm if renal function stable/improve  - creatinine 1.0 mg/dl at baseline  - KOMAL secondary to septic shock an

## 2019-06-06 LAB
ANION GAP SERPL CALC-SCNC: 9 MMOL/L (ref 0–18)
BASOPHILS # BLD AUTO: 0.02 X10(3) UL (ref 0–0.2)
BASOPHILS NFR BLD AUTO: 0.2 %
BUN BLD-MCNC: 69 MG/DL (ref 7–18)
BUN/CREAT SERPL: 35.9 (ref 10–20)
CALCIUM BLD-MCNC: 8.4 MG/DL (ref 8.5–10.1)
CHLORIDE SERPL-SCNC: 112 MMOL/L (ref 98–112)
CO2 SERPL-SCNC: 23 MMOL/L (ref 21–32)
CREAT BLD-MCNC: 1.92 MG/DL (ref 0.7–1.3)
DEPRECATED RDW RBC AUTO: 68.4 FL (ref 35.1–46.3)
EOSINOPHIL # BLD AUTO: 0.25 X10(3) UL (ref 0–0.7)
EOSINOPHIL NFR BLD AUTO: 3 %
ERYTHROCYTE [DISTWIDTH] IN BLOOD BY AUTOMATED COUNT: 19.1 % (ref 11–15)
GLUCOSE BLD-MCNC: 173 MG/DL (ref 70–99)
GLUCOSE BLDC GLUCOMTR-MCNC: 164 MG/DL (ref 70–99)
GLUCOSE BLDC GLUCOMTR-MCNC: 165 MG/DL (ref 70–99)
GLUCOSE BLDC GLUCOMTR-MCNC: 170 MG/DL (ref 70–99)
GLUCOSE BLDC GLUCOMTR-MCNC: 212 MG/DL (ref 70–99)
HAV IGM SER QL: 1.9 MG/DL (ref 1.6–2.6)
HCT VFR BLD AUTO: 24.6 % (ref 39–53)
HCT VFR BLD AUTO: 28 % (ref 39–53)
HGB BLD-MCNC: 8.2 G/DL (ref 13–17.5)
HGB BLD-MCNC: 8.8 G/DL (ref 13–17.5)
IMM GRANULOCYTES # BLD AUTO: 0.11 X10(3) UL (ref 0–1)
IMM GRANULOCYTES NFR BLD: 1.3 %
LYMPHOCYTES # BLD AUTO: 1.08 X10(3) UL (ref 1–4)
LYMPHOCYTES NFR BLD AUTO: 13.1 %
MCH RBC QN AUTO: 31.4 PG (ref 26–34)
MCHC RBC AUTO-ENTMCNC: 31.4 G/DL (ref 31–37)
MCV RBC AUTO: 100 FL (ref 80–100)
MONOCYTES # BLD AUTO: 0.89 X10(3) UL (ref 0.1–1)
MONOCYTES NFR BLD AUTO: 10.8 %
NEUTROPHILS # BLD AUTO: 5.89 X10 (3) UL (ref 1.5–7.7)
NEUTROPHILS # BLD AUTO: 5.89 X10(3) UL (ref 1.5–7.7)
NEUTROPHILS NFR BLD AUTO: 71.6 %
OSMOLALITY SERPL CALC.SUM OF ELEC: 322 MOSM/KG (ref 275–295)
PHOSPHATE SERPL-MCNC: 3.9 MG/DL (ref 2.5–4.9)
PLATELET # BLD AUTO: 173 10(3)UL (ref 150–450)
POTASSIUM SERPL-SCNC: 4.3 MMOL/L (ref 3.5–5.1)
RBC # BLD AUTO: 2.8 X10(6)UL (ref 4.3–5.7)
SODIUM SERPL-SCNC: 144 MMOL/L (ref 136–145)
WBC # BLD AUTO: 8.2 X10(3) UL (ref 4–11)

## 2019-06-06 PROCEDURE — 99232 SBSQ HOSP IP/OBS MODERATE 35: CPT | Performed by: INTERNAL MEDICINE

## 2019-06-06 PROCEDURE — 99233 SBSQ HOSP IP/OBS HIGH 50: CPT | Performed by: INTERNAL MEDICINE

## 2019-06-06 PROCEDURE — 99291 CRITICAL CARE FIRST HOUR: CPT | Performed by: INTERNAL MEDICINE

## 2019-06-06 NOTE — PROGRESS NOTES
[facility]     Gastroenterology Progress Note    [facility] Patient Status:  Inpatient    1960 MRN [id number]   Location [facility] 2W/SW Attending Ruthann Amaya, [address] [facility] Se Day # 28 PCP LATIA Velazquez MD       Sub compared to exam earlier the same day. * Tracheostomy tube noted. * Feeding tube at the cardioesophageal junction. * Additional right-sided catheters in the superior vena cava. Dictated by (CST):  Sosa Gilmore MD on 6/04/2019 at 16:01     Approved b

## 2019-06-06 NOTE — PROGRESS NOTES
[facility]    Progress Note    Aime Thomas Patient Status:  Inpatient    1960 MRN [id number]   Location [facility] 2W/SW Attending Boris Ordonez MD   Hosp Day # 28 PCP LATIA Gonzales MD     Subjective:  Continues to be Hypoglycemia protocol  - Spoke with RN will call if change in TPN or nutrition plan     Will follow    Sola Rubio MD  6/6/2019

## 2019-06-06 NOTE — PROGRESS NOTES
Pulmonary/Critical Care Follow Up Note    HPI:   Jesus Villa is a 60year old male with Patient presents with:  Fever (infectious)  Altered Mental Status (neurologic)      PCP LATIA Ceron MD  Admission Attending No admitting provider for patient encoun mL, Intravenous, PRN  •  epoetin shakeel-epbx (RETACRIT) 97226 UNIT/ML injection SOLN 10,000 Units, 10,000 Units, Subcutaneous, Once per day on Mon Wed Fri  •  dextrose 50 % injection 50 mL, 50 mL, Intravenous, PRN  •  Glucose-Vitamin C (DEX-4) 4-6 GM-MG chew NAD  Profound weakness   Awake and can follow commands  Lung dec bs bases  CV  reg   Abd full + less tender quiet BS   Ext warm, + edema  No change     LABS:    Lab Results   Component Value Date    WBC 8.2 06/06/2019    HGB 8.8 06/06/2019    HCT 28.0 urgency  Better  Low side now  Plan      Prn anti HTN meds    coreg    KOMAL  3nd episode  Now progressive and severe  Presumed from septic shock  Plan     Bumex prn   Follow SCR and UOP   Renal following and daily eval for RRT    Encephalopathy  intermittel Topical, [email or web address]  •  adult 3 in 1 TPN, , Intravenous, Continuous TPN  •  hydrALAzine HCl (APRESOLINE) injection 20 mg, 20 mg, Intravenous, Q4H PRN  •  morphINE sulfate (PF) 2 MG/ML injection 2 mg, 2 mg, Intravenous, Q2H PRN  •  adult 3 in 1 TPN, , In PRN  •  acetaminophen (TYLENOL EXTRA STRENGTH) tab 1,000 mg, 1,000 mg, Oral, Q6H PRN  •  Pantoprazole Sodium (PROTONIX) 40 mg in Sodium Chloride 0.9 % 10 mL IV push, 40 mg, Intravenous, Q12H  •  Normal Saline Flush 0.9 % injection 10 mL, 10 mL, Intravenous Acute resp failure  Multifactorial including PNA, parainfluenza, abdominal embarrassment  CXR stable/improve  Failed extubation  S/p trach  Plan     Vent weaning as tolerated   Nebs       GI  High grade pseudo obstruction  ?  Narcotic related and areas o critical care support for now    40 min CCT        Rosa Hillman MD

## 2019-06-06 NOTE — PROGRESS NOTES
[place] [facility]    Progress Note      Subjective:     Awake and non verbal     Large bloody BM last night and this am     Review of Systems:     Unable to obtain - tracheostomy     Objective:   Temp:  [98 °F (36.7 °C)-100.3 °F (37.9 °C)] 100 ° with meals   Albumin Human (ALBUMINAR) 25 % solution 100 mL 100 mL Intravenous PRN   epoetin shakeel-epbx (RETACRIT) 98860 UNIT/ML injection SOLN 10,000 Units 10,000 Units Subcutaneous Once per day on Mon Wed Fri   dextrose 50 % injection 50 mL 50 mL Intraven GFRAA 15*   < > 42* 55* 64 43*   GFRNAA 13*   < > 36* 48* 56* 37*   CA 8.2*   < > 8.4* 8.4* 8.8 8.4*   ALB 2.7*  --  2.8*  --   --   --       < > 148* 146* 143 144   K 4.8   < > 3.7 4.2 4.1 4.3      < > 115* 114* 111 112   CO2 16.0*   < > 24. - likely recent shock from abdominal process / aspiration pneumonia  - s/p decompressive colonoscopy - acute colonic pseudo-obstruction with moderate colitis. S/p suctioning of air as much as possible and decompression tube left in colon.  Repeat KUB impr

## 2019-06-06 NOTE — PROGRESS NOTES
[facility] ID PROGRESS NOTE    [facility] Patient Status:  Inpatient    1960 MRN [id number]   Location [facility] 2W/SW Attending Ruthann Amaya MD   [facility] Day # 28 PCP LATIA Velazquez MD     Subjective:  Awake, 40% on ventilator.  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 Dusky mucosa. No mechanical obstruction. Pathology with concern for ischemic colitis. Underwent tracheostomy 5/22. Now with downtrending hemoglobin with rectal bleeding with hemoglobin as low as 6.5.   With the bleed RN.    Marcie Cowden PA-C  [facility]  [phone]  5/30/2019

## 2019-06-07 ENCOUNTER — APPOINTMENT (OUTPATIENT)
Dept: GENERAL RADIOLOGY | Facility: HOSPITAL | Age: 59
DRG: 003 | End: 2019-06-07
Attending: INTERNAL MEDICINE
Payer: MEDICARE

## 2019-06-07 LAB
ANION GAP SERPL CALC-SCNC: 9 MMOL/L (ref 0–18)
ANTIBODY SCREEN: POSITIVE
BASE EXCESS BLD CALC-SCNC: -2.6 MMOL/L (ref ?–2)
BASOPHILS # BLD AUTO: 0.02 X10(3) UL (ref 0–0.2)
BASOPHILS NFR BLD AUTO: 0.3 %
BUN BLD-MCNC: 82 MG/DL (ref 7–18)
BUN/CREAT SERPL: 34.7 (ref 10–20)
CA-I BLD-SCNC: 1.19 MMOL/L (ref 0.95–1.32)
CALCIUM BLD-MCNC: 8.2 MG/DL (ref 8.5–10.1)
CHLORIDE SERPL-SCNC: 110 MMOL/L (ref 98–112)
CO2 SERPL-SCNC: 22 MMOL/L (ref 21–32)
COHGB MFR BLD: 1.9 % (ref 0–1.5)
CREAT BLD-MCNC: 2.36 MG/DL (ref 0.7–1.3)
DEPRECATED RDW RBC AUTO: 66.8 FL (ref 35.1–46.3)
DIRECT COOMBS POLY: NEGATIVE
EOSINOPHIL # BLD AUTO: 0.4 X10(3) UL (ref 0–0.7)
EOSINOPHIL NFR BLD AUTO: 5.1 %
ERYTHROCYTE [DISTWIDTH] IN BLOOD BY AUTOMATED COUNT: 18.4 % (ref 11–15)
GLUCOSE BLD-MCNC: 131 MG/DL (ref 70–99)
GLUCOSE BLDC GLUCOMTR-MCNC: 122 MG/DL (ref 70–99)
GLUCOSE BLDC GLUCOMTR-MCNC: 133 MG/DL (ref 70–99)
GLUCOSE BLDC GLUCOMTR-MCNC: 133 MG/DL (ref 70–99)
GLUCOSE BLDC GLUCOMTR-MCNC: 143 MG/DL (ref 70–99)
HAV IGM SER QL: 2.2 MG/DL (ref 1.6–2.6)
HCO3 BLDA-SCNC: 22.9 MEQ/L (ref 21–27)
HCT VFR BLD AUTO: 23.2 % (ref 39–53)
HCT VFR BLD AUTO: 23.8 % (ref 39–53)
HGB BLD-MCNC: 7.5 G/DL (ref 13–17.5)
HGB BLD-MCNC: 7.8 G/DL (ref 13–17.5)
HGB BLD-MCNC: 9.6 G/DL (ref 13.5–17.5)
IMM GRANULOCYTES # BLD AUTO: 0.06 X10(3) UL (ref 0–1)
IMM GRANULOCYTES NFR BLD: 0.8 %
INR BLD: 1.44 (ref 0.9–1.2)
LACTIC ACID (BLOOD GAS): 1 MMOL/L (ref 0.5–2.2)
LYMPHOCYTES # BLD AUTO: 1.02 X10(3) UL (ref 1–4)
LYMPHOCYTES NFR BLD AUTO: 13 %
MCH RBC QN AUTO: 31.4 PG (ref 26–34)
MCHC RBC AUTO-ENTMCNC: 31.5 G/DL (ref 31–37)
MCV RBC AUTO: 99.6 FL (ref 80–100)
METHGB MFR BLD: 1.2 % SAT (ref 0.4–1.5)
MODIFIED ALLEN TEST: POSITIVE
MONOCYTES # BLD AUTO: 0.79 X10(3) UL (ref 0.1–1)
MONOCYTES NFR BLD AUTO: 10 %
MRSA DNA SPEC QL NAA+PROBE: NEGATIVE
NEUTROPHILS # BLD AUTO: 5.58 X10 (3) UL (ref 1.5–7.7)
NEUTROPHILS # BLD AUTO: 5.58 X10(3) UL (ref 1.5–7.7)
NEUTROPHILS NFR BLD AUTO: 70.8 %
O2 CT BLD-SCNC: 13.3 VOL% (ref 15–23)
O2/TOTAL GAS SETTING VFR VENT: 40 %
OSMOLALITY SERPL CALC.SUM OF ELEC: 319 MOSM/KG (ref 275–295)
PATAGSCRN: 2
PCO2 BLDA: 32 MM HG (ref 35–45)
PEEP SETTING VENT: 5 CM H2O
PH BLDA: 7.43 [PH] (ref 7.35–7.45)
PHOSPHATE SERPL-MCNC: 4.7 MG/DL (ref 2.5–4.9)
PLATELET # BLD AUTO: 146 10(3)UL (ref 150–450)
PLATELET MORPHOLOGY: NORMAL
PO2 BLDA: 124 MM HG (ref 80–100)
POTASSIUM (BLOOD GAS): 4.4 MMOL/L (ref 3.3–5.1)
POTASSIUM SERPL-SCNC: 4.9 MMOL/L (ref 3.5–5.1)
PROTHROMBIN TIME: 17.5 SECONDS (ref 11.8–14.5)
PUNCTURE CHARGE: YES
RBC # BLD AUTO: 2.39 X10(6)UL (ref 4.3–5.7)
RESP RATE: 16 BPM
RH BLOOD TYPE: POSITIVE
SAO2 % BLDA: >99 % (ref 94–100)
SODIUM (BLOOD GAS): 135 MMOL/L (ref 135–145)
SODIUM SERPL-SCNC: 141 MMOL/L (ref 136–145)
SPECIMEN VOL 24H UR: 600 ML
TREATCELL: 10
WBC # BLD AUTO: 7.9 X10(3) UL (ref 4–11)

## 2019-06-07 PROCEDURE — 99233 SBSQ HOSP IP/OBS HIGH 50: CPT | Performed by: INTERNAL MEDICINE

## 2019-06-07 PROCEDURE — 99232 SBSQ HOSP IP/OBS MODERATE 35: CPT | Performed by: INTERNAL MEDICINE

## 2019-06-07 PROCEDURE — 31502 CHANGE OF WINDPIPE AIRWAY: CPT | Performed by: OTOLARYNGOLOGY

## 2019-06-07 PROCEDURE — 0B21XFZ CHANGE TRACHEOSTOMY DEVICE IN TRACHEA, EXTERNAL APPROACH: ICD-10-PCS | Performed by: OTOLARYNGOLOGY

## 2019-06-07 PROCEDURE — 99291 CRITICAL CARE FIRST HOUR: CPT | Performed by: INTERNAL MEDICINE

## 2019-06-07 PROCEDURE — 71045 X-RAY EXAM CHEST 1 VIEW: CPT | Performed by: INTERNAL MEDICINE

## 2019-06-07 RX ORDER — SODIUM CHLORIDE 9 MG/ML
INJECTION, SOLUTION INTRAVENOUS ONCE
Status: COMPLETED | OUTPATIENT
Start: 2019-06-07 | End: 2019-06-07

## 2019-06-07 RX ORDER — SODIUM CHLORIDE 0.9 % (FLUSH) 0.9 %
10 SYRINGE (ML) INJECTION AS NEEDED
Status: DISCONTINUED | OUTPATIENT
Start: 2019-06-07 | End: 2019-07-01

## 2019-06-07 RX ORDER — DOPAMINE HYDROCHLORIDE 320 MG/100ML
INJECTION, SOLUTION INTRAVENOUS CONTINUOUS
Status: DISCONTINUED | OUTPATIENT
Start: 2019-06-07 | End: 2019-06-12

## 2019-06-07 NOTE — PLAN OF CARE
Problem: Diabetes/Glucose Control  Goal: Glucose maintained within prescribed range  Description  INTERVENTIONS:  - Monitor Blood Glucose as ordered  - Assess for signs and symptoms of hyperglycemia and hypoglycemia  - Administer ordered medications to m Cessation handout, if applicable  - Encourage broncho-pulmonary hygiene including cough, deep breathe, Incentive Spirometry  - Assess the need for suctioning and perform as needed  - Assess and instruct to report SOB or any respiratory difficulty  - Respir minimize risk of hemorrhage  - Monitor temperature, glucose, and sodium. Initiate appropriate interventions as ordered  Outcome: Progressing  Note:   Pt alert and responsive, able to follow commands when asked.  Moves all extremities to commands but observe injury  Description  (Example usage: patient with low platelets)  INTERVENTIONS:  - Avoid intramuscular injections, enemas and rectal medication administration  - Ensure safe mobilization of patient  - Hold pressure on venipuncture sites to achieve adequat

## 2019-06-07 NOTE — PROGRESS NOTES
[facility] ID PROGRESS NOTE    Luly Deluca Patient Status:  Inpatient    1960 MRN [id number]   Location [facility] 2W/SW Attending Kiarra Patrick MD   [facility] Day # 39 PCP LATIA Damon MD     Subjective:  Awake, 40% on ventilator.  Remains on TPN Acute blood loss anemia     Rectal bleeding      ASSESSMENT:    Antibiotics:  OVP  IV zosyn 5/2-5/11, IV vancomycin 5/2-5/7, IV meropenem 5/12-5/26, metronidazole 5/12-5/25, IV vancomycin 5/12-5/25, PO vancomycin 1112    60year old male.  Patient is a 60-y Dusky mucosa. No mechanical obstruction. Pathology with concern for ischemic colitis. Underwent tracheostomy 5/22. Now with downtrending hemoglobin with rectal bleeding with hemoglobin as low as 6.5.   With the bleeding noted to have low-grade fevers up [facility]  [phone]  5/30/2019

## 2019-06-07 NOTE — PROGRESS NOTES
[facility] Dosing Service    Initial Pharmacokinetic Consult for Vancomycin Dosing     Corrine Romero is a 60year old male who is being treated for bacteremia.   Pharmacy has been asked to dose Vancomycin by Dr. Denis Moss    He is allergic to demerol [meperi PharmD  6/7/2019  4:24 PM  [address]: [phone]

## 2019-06-07 NOTE — PROGRESS NOTES
Endocrine Note    Reviewed BG levels from the past 24 hours which remain stable. Continue Levemir 55 units SQ daily and regular insulin 70 units in TPN bag. Please contact Endocrine service if changing dextrose in TPN or restarting TF.   Will follow perip

## 2019-06-07 NOTE — RESPIRATORY THERAPY NOTE
RESPIRATORY THERAPY MECHANICAL VENTILATION PROGRESS NOTE      Current Ventilator Data:  AC16,600,40%,+5      RCP recommends   Placed back to full support due to dropped HR.

## 2019-06-07 NOTE — PROCEDURES
Kelsi Quiles is a 60year old male.  Patient presents with:  Fever (infectious)  Altered Mental Status (neurologic)         Social History    Socioeconomic History      Marital status:       Spouse name: Not on file      Number of children: Not on marcia The patient does not use an assistive device. .        The patient does live in a home with stairs.       Family History   Problem Relation Age of Onset   • Stroke Father 43        CVA   • Cancer Maternal Grandmother 79     Past Medical History:   Diagnosis sterile trach dressing was placed. The patient tolerated the procedure well.

## 2019-06-07 NOTE — PROGRESS NOTES
[facility]    Progress Note      Subjective:     Awake and non verbal     Continue to have bloody BMs     Review of Systems:     Unable to obtain - tracheostomy     Objective:   Temp:  [97.8 °F (36.6 °C)-99.1 °F (37.3 °C)] 97.9 °F (36.6 ° Albumin Human (ALBUMINAR) 25 % solution 100 mL 100 mL Intravenous PRN   epoetin shakeel-epbx (RETACRIT) 88378 UNIT/ML injection SOLN 10,000 Units 10,000 Units Subcutaneous Once per day on Mon Wed Fri   dextrose 50 % injection 50 mL 50 mL Intravenous PRN   G > 56* 37* 29*   CA 8.4*   < > 8.8 8.4* 8.2*   ALB 2.8*  --   --   --   --    *   < > 143 144 141   K 3.7   < > 4.1 4.3 4.9   *   < > 111 112 110   CO2 24.0   < > 25.0 23.0 22.0   ALKPHO 50  --   --   --   --    AST 53*  --   --   --   --    ALT pseudo-obstruction with moderate colitis. S/p suctioning of air as much as possible and decompression tube left in colon. Repeat KUB improve   - off antibiotics   - tmax 100.3 - afebrile 24 hrs. blood CS per ID     3. GIB: recurrent   - s/p multiple units P

## 2019-06-07 NOTE — PROGRESS NOTES
Pulmonary/Critical Care Follow Up Note    HPI:   Jesusita De La Torre is a 60year old male with Patient presents with:  Fever (infectious)  Altered Mental Status (neurologic)      PCP LATIA Jack MD  Admission Attending No admitting provider for patient encoun G Tube, TID CC  •  carvedilol (COREG) tab 12.5 mg, 12.5 mg, Oral, BID with meals  •  Albumin Human (ALBUMINAR) 25 % solution 100 mL, 100 mL, Intravenous, PRN  •  epoetin shakeel-epbx (RETACRIT) 48198 UNIT/ML injection SOLN 10,000 Units, 10,000 Units, Subcutan hours) at 6/7/2019 1422  Last data filed at 6/7/2019 0600  Gross per 24 hour   Intake 2680 ml   Output 975 ml   Net 1705 ml     NAD  Profound weakness   Awake and can follow commands and more alert   Lung dec bs bases  CV  reg   Abd full + less tender quie surgery following    Surgery if recurrent massive bleeding and surgery is life saving        Opoid dependence  Weaned off over 20+ days in CCU  Plan prn morphine    HTN urgency  Better  Low side now  Plan      Prn anti HTN meds    coreg and hold for low HR • Obesity    • Rectal fissure     colorectal fistula - surgery   • Renal disorder    • Seizure disorder (HCC)          Medications:    Current Facility-Administered Medications:   •  adult 3 in 1 TPN, , Intravenous, Continuous TPN  •  Normal Saline Flush ointment, , Both Eyes, TID  •  bisacodyl (DULCOLAX) rectal suppository 10 mg, 10 mg, Rectal, Daily PRN  •  Metoclopramide HCl (REGLAN) injection 5 mg, 5 mg, Intravenous, Q8H  •  morphINE sulfate (PF) 4 MG/ML injection 4 mg, 4 mg, Intravenous, Q2H PRN  •  i 06/05/2019    HGB 8.8 (L) 06/05/2019           ASSESSMENT/PLAN:     Severe sepsis  C/b recurrent septic shock  Presented colitis with ileus and + parainfluenza and PNA  S/p 9 Days Zosyn and now on vanco/flagyl/Erna  CT A/P with ileus and \"colitis\"  HD st NPO  Weaned off morphine  Plan follow off morphine    HL  Plan statin    DM  Endo following  Plan insulin    FEN  Now on TPN     DVT px  SCDs  SQ hep    EOL  DNR    31 min CCT      Long discussion with wife  Updated re all events  She understands that he m

## 2019-06-07 NOTE — DIETARY NOTE
ADULT NUTRITION REASSESSMENT     Pt is at high nutrition risk. Pt does not meet malnutrition criteria. RECOMMENDATIONS TO MD: See Nutrition Intervention for CPN rx.       NUTRITION DIAGNOSIS/PROBLEM: Inadequate protein energy intake related to respira hemodialysis tomorrow. 5/20 ADDENDUM @1500:  Received call/Dr. Ozzie Arboleda to discuss starting low rate tube feedings  this pm and holding till tomorrow before advance further to assess tolerance.  Will begin at low rate 10 ml/hr (per Dr Roro Melton)  X ave 22 hr inf assistance: NPO  - Nutrition education: assess education needs in future if appropriate. - Coordination of nutrition care: collaboration with other providers and Pt care rounds.    - Discharge and transfer of nutrition care to new setting or provider: mon oz)      GASTROINTESTINAL:s/p colonoscpy  5/28- no perf but  ulceration + inflammation and surgery notes potential surgery though high risk. Potential Xfr to tertiary care transplant center if continue GIB.       6/7: Still with GI Bleed.  2 bloody BM yest 23.0 22.0   PHOS 3.1 3.9 4.7   OSMOCALC 318* 322* 319*       NUTRITION RELATED PHYSICAL FINDINGS:  - Body Fat/Muscle Mass: well nourished and morbid obestiy per visual exam.  - Fluid Accumulation: +2 upper extremity edema,+2 lower extremity edema and hands

## 2019-06-07 NOTE — PROGRESS NOTES
[facility]     Gastroenterology Progress Note    Rm Calle Patient Status:  Inpatient    1960 MRN [id number]   Location [facility] 2W/SW Attending Leo Simms, [address] [facility] [address] Day # 39 PCP LATIA Mcmahon MD       Sub on 6/07/2019 at 7:17                Assessment and Plan:   Patient is a 60year old man with BMI 52, sleep apnea, diabetes, pulmonary hypertension, high blood pressure, high cholesterol, heart failure, chronic pain on chronic narcotics, prior colon surgery

## 2019-06-08 LAB
ANION GAP SERPL CALC-SCNC: 11 MMOL/L (ref 0–18)
BASOPHILS # BLD AUTO: 0.03 X10(3) UL (ref 0–0.2)
BASOPHILS NFR BLD AUTO: 0.4 %
BUN BLD-MCNC: 75 MG/DL (ref 7–18)
BUN/CREAT SERPL: 41.7 (ref 10–20)
CALCIUM BLD-MCNC: 8 MG/DL (ref 8.5–10.1)
CHLORIDE SERPL-SCNC: 109 MMOL/L (ref 98–112)
CO2 SERPL-SCNC: 22 MMOL/L (ref 21–32)
CREAT BLD-MCNC: 1.8 MG/DL (ref 0.7–1.3)
DEPRECATED RDW RBC AUTO: 62.3 FL (ref 35.1–46.3)
EOSINOPHIL # BLD AUTO: 0.34 X10(3) UL (ref 0–0.7)
EOSINOPHIL NFR BLD AUTO: 4 %
ERYTHROCYTE [DISTWIDTH] IN BLOOD BY AUTOMATED COUNT: 17.9 % (ref 11–15)
GLUCOSE BLD-MCNC: 168 MG/DL (ref 70–99)
GLUCOSE BLDC GLUCOMTR-MCNC: 118 MG/DL (ref 70–99)
GLUCOSE BLDC GLUCOMTR-MCNC: 118 MG/DL (ref 70–99)
GLUCOSE BLDC GLUCOMTR-MCNC: 123 MG/DL (ref 70–99)
GLUCOSE BLDC GLUCOMTR-MCNC: 165 MG/DL (ref 70–99)
HAV IGM SER QL: 2.2 MG/DL (ref 1.6–2.6)
HCT VFR BLD AUTO: 27.8 % (ref 39–53)
HCT VFR BLD AUTO: 28.3 % (ref 39–53)
HCT VFR BLD AUTO: 29 % (ref 39–53)
HGB BLD-MCNC: 9.1 G/DL (ref 13–17.5)
HGB BLD-MCNC: 9.2 G/DL (ref 13–17.5)
HGB BLD-MCNC: 9.4 G/DL (ref 13–17.5)
IMM GRANULOCYTES # BLD AUTO: 0.05 X10(3) UL (ref 0–1)
IMM GRANULOCYTES NFR BLD: 0.6 %
INR BLD: 1.42 (ref 0.9–1.2)
LYMPHOCYTES # BLD AUTO: 0.76 X10(3) UL (ref 1–4)
LYMPHOCYTES NFR BLD AUTO: 8.9 %
MCH RBC QN AUTO: 31.2 PG (ref 26–34)
MCHC RBC AUTO-ENTMCNC: 32.4 G/DL (ref 31–37)
MCV RBC AUTO: 96.3 FL (ref 80–100)
MONOCYTES # BLD AUTO: 0.7 X10(3) UL (ref 0.1–1)
MONOCYTES NFR BLD AUTO: 8.2 %
NEUTROPHILS # BLD AUTO: 6.64 X10 (3) UL (ref 1.5–7.7)
NEUTROPHILS # BLD AUTO: 6.64 X10(3) UL (ref 1.5–7.7)
NEUTROPHILS NFR BLD AUTO: 77.9 %
OSMOLALITY SERPL CALC.SUM OF ELEC: 320 MOSM/KG (ref 275–295)
PHOSPHATE SERPL-MCNC: 4.5 MG/DL (ref 2.5–4.9)
PLATELET # BLD AUTO: 170 10(3)UL (ref 150–450)
POTASSIUM SERPL-SCNC: 4.1 MMOL/L (ref 3.5–5.1)
PROTHROMBIN TIME: 17.3 SECONDS (ref 11.8–14.5)
RBC # BLD AUTO: 3.01 X10(6)UL (ref 4.3–5.7)
SODIUM SERPL-SCNC: 142 MMOL/L (ref 136–145)
WBC # BLD AUTO: 8.5 X10(3) UL (ref 4–11)

## 2019-06-08 PROCEDURE — 99233 SBSQ HOSP IP/OBS HIGH 50: CPT | Performed by: INTERNAL MEDICINE

## 2019-06-08 PROCEDURE — 99232 SBSQ HOSP IP/OBS MODERATE 35: CPT | Performed by: INTERNAL MEDICINE

## 2019-06-08 RX ORDER — CARVEDILOL 6.25 MG/1
6.25 TABLET ORAL 2 TIMES DAILY WITH MEALS
Status: DISCONTINUED | OUTPATIENT
Start: 2019-06-08 | End: 2019-06-09

## 2019-06-08 NOTE — PROGRESS NOTES
[facility] ID PROGRESS NOTE    Gustavo Dixon Patient Status:  Inpatient    1960 MRN [id number]   Location [facility] 2W/SW Attending Bonny Murillo MD   [address] Day # 40 PCP LATIA Gonzalez MD     Subjective:  Awake, 40% on ventilator.  No BMs reporte anemia     Rectal bleeding      ASSESSMENT:    Antibiotics:  OVP  IV zosyn 5/2-5/11, IV vancomycin 5/2-5/7, IV meropenem 5/12-5/26, metronidazole 5/12-5/25, IV vancomycin 5/12-5/25, PO vancomycin 1112    60year old male.  Patient is a 69-year-old male with mechanical obstruction. Pathology with concern for ischemic colitis. Underwent tracheostomy 5/22. Now with downtrending hemoglobin with rectal bleeding with hemoglobin as low as 6.5.   With the bleeding noted to have low-grade fevers up to 100.8 with no patient, RN.     Navarro Adan PA-C  [facility]  [phone]  5/30/2019

## 2019-06-08 NOTE — PLAN OF CARE
Pt awake and alert throughout shift. Pt appears to understand what is going on around him but is very flat and does not actively interact with staff. FSBS stable on levemier. Dopamine gtt stopped per cards this shift; remains junctional with HR in 50s. on, blinds open  - Promote sleep, encourage patient's normal rest cycle i.e. lights off, TV off, minimize noise and interruptions  - Encourage family to assist in orientation and promotion of home routines  6/8/2019 1705 by Courtney Aguila RN  Outcome: Progr broncho-pulmonary hygiene including cough, deep breathe, Incentive Spirometry  - Assess the need for suctioning and perform as needed  - Assess and instruct to report SOB or any respiratory difficulty  - Respiratory Therapy support as indicated  - Manage/a patient on fluid and nutrition restrictions as appropriate  6/8/2019 1705 by Shanna Perez, RN  Outcome: Progressing  6/8/2019 1705 by Shanna Perez, RN  Outcome: Progressing     Problem: SKIN/TISSUE INTEGRITY - ADULT  Goal: Skin integrity remains intact  Danny Levy PLANNING  Goal: Discharge to home or other facility with appropriate resources  Description  INTERVENTIONS:  - Identify barriers to discharge w/pt and caregiver  - Include patient/family/discharge partner in discharge planning  - Arrange for needed dischar

## 2019-06-08 NOTE — PROGRESS NOTES
Night MD - CCU     --Patient seen for bradycardia, with HR consistently in 40's and occasionally dipping to 30's, especially when patient falls asleep.   --Monitor -> junctional rhythm  --Rhythm review -> junctional rhythm ~ all day, with rates mainly 40's-

## 2019-06-08 NOTE — CONSULTS
[facility]    Report of EP Cardiology Consultation    Danielesobeida aGrcia Patient Status:  Inpatient    1960 MRN [id number]   Location [facility] 2W/SW Attending Yang Zimmerman MD   [facility] Day # 40 PCP LATIA Cornell MD     Date of leukocytosis.     CT abdomen done 5/3 to determine source of sepsis with findings of high-grade small bowel obstruction with pneumatosis. NG tube placed. respiratory distress requiring intubation on 5/3.   Repeat CT A/P 5/7 without evidence of bowel obst not SOB, denies pain anywhere  --SpO2 100%  --Lungs clear B, with RR 16-18  --'A-450'X systolic, lower than usual for patient  --Had echo 5/3, the day after admission -> LVH with normal LVEF, C/W HTN      Past Medical History  Past Medical History: mg/250 ml premix infusion 1.5-20 mcg/kg/min (Dosing Weight) Intravenous Continuous   Miconazole Nitrate 2 % powder  Topical [email or web address]   hydrALAzine HCl (APRESOLINE) injection 20 mg 20 mg Intravenous Q4H PRN   morphINE sulfate (PF) 2 MG/ML injection 2 m Intravenous Q12H   Normal Saline Flush 0.9 % injection 10 mL 10 mL Intravenous PRN   Nortriptyline HCl (PAMELOR) cap 50 mg 50 mg Oral Nightly   Promethazine HCl (PHENERGAN) tab 12.5 mg 12.5 mg Oral Q4H PRN       Medications Prior to Admission:  Gumaro Rosas not apply route continuous.  Order for Wayne compression sock, medium   Potassium Chloride ER (K-DUR M20) 20 MEQ Oral Tab CR Take two tabs twice daily   Alcohol Swabs 70 % Does not apply Pads    Blood Glucose Calibration (TALIA CONTOUR NEXT CONTROL) NORMAL In Metoclopramide HCl  5 mg Intravenous Q8H   • pantoprazole (PROTONIX) IV push  40 mg Intravenous Q12H   • Nortriptyline HCl  50 mg Oral Nightly         Physical Exam:    General: Alert and oriented. No apparent distress.  No respiratory or constitutional dis Electrophysiology  6/8/2019

## 2019-06-08 NOTE — PROGRESS NOTES
[facility]     Gastroenterology Progress Note    Denisedev Villanueva Patient Status:  Inpatient    1960 MRN [id number]   Location [facility] 2W/SW Attending Kelle Kennedy, [address] [facility] [address] Day # 40 PCP LATIA Sawant MD       Sub 6/07/2019 at 7:17                Assessment and Plan:   Patient is a 60year old man with BMI 52, sleep apnea, diabetes, pulmonary hypertension, high blood pressure, high cholesterol, heart failure, chronic pain on chronic narcotics, prior colon surgery fo

## 2019-06-08 NOTE — PLAN OF CARE
Afebrile; normotensive  Junctional rhythm, pvcs; HR dropping into 40's, multiple episodes of HR 39  Dopamine gtt @ 1.5-2mcg; immediate HR improval  Trach intact; moderate secretions; SPO2 92-98%  GOC discussed with wife, DNR with IV pressor/HR support    P hearing aids, eye glasses  - Promote highest level of mobility daily  - Provide frequent reorientation  - Promote wakefulness i.e. lights on, blinds open  - Promote sleep, encourage patient's normal rest cycle i.e. lights off, TV off, minimize noise and in tolerated  - Evaluate effectiveness of GI medications  - Encourage mobilization and activity  - Obtain nutritional consult as needed  - Establish a toileting routine/schedule  - Consider collaborating with pharmacy to review patient's medication profile  O temperature, glucose, and sodium.  Initiate appropriate interventions as ordered  Outcome: Progressing     Problem: SAFETY ADULT - FALL  Goal: Free from fall injury  Description  INTERVENTIONS:  - Assess pt frequently for physical needs  - Identify cognitiv hemostasis  - Assess for signs and symptoms of internal bleeding  - Monitor lab trends  Outcome: Progressing     Problem: CARDIOVASCULAR - ADULT  Goal: Absence of cardiac arrhythmias or at baseline  Description  INTERVENTIONS:  - Continuous cardiac monitor

## 2019-06-08 NOTE — PROGRESS NOTES
[facility] [facility]    Progress Note    Malinda Flores Patient Status:  Inpatient    1960 MRN [id number]   Location [facility] 2W/SW Attending Niko Ojeda MD   [facility] Day # 40 PCP LATIA Bess MD            Subjective:         Appe 168*   BUN 76*   < > 69* 82* 75*   CREATSERUM 1.96*   < > 1.92* 2.36* 1.80*   GFRAA 42*   < > 43* 34* 47*   GFRNAA 36*   < > 37* 29* 40*   CA 8.4*   < > 8.4* 8.2* 8.0*   ALB 2.8*  --   --   --   --    *   < > 144 141 142   K 3.7   < > 4.3 4.9 4.1   C of colon may worsen, rebleeding or perforation, however, due to high risk of surgery and pt generally condition, cont support for present time  XRAY abd ordered 6/4 shows continued prominence of colon but not sever  Plan to cont hyperal for present time  I

## 2019-06-08 NOTE — PROGRESS NOTES
[facility]    Progress Note    Dia Parisi Patient Status:  Inpatient    1960 MRN [id number]   Location [facility] 2W/SW Attending Lillian Tristan MD   [facility] Day # 40 PCP LATIA Haider MD        Subjective:     Unable to scd   No anticoagulation with acute GI bleed     11- DNR      Overall poor prognosis  Continue supportive care                                Results:     Lab Results   Component Value Date    WBC 8.5 06/08/2019    HGB 9.4 (L) 06/08/2019    HCT 29.0 (L)

## 2019-06-08 NOTE — PROGRESS NOTES
[facility]    Progress Note    Isaiah Sesay Patient Status:  Inpatient    1960 MRN [id number]   Location [facility] 2W/SW Attending Ronit Link MD   [facility] Day # 39 PCP LATIA Chand MD            Subjective:       Mago Gonzalez CREATSERUM 1.96*   < > 1.38* 1.92* 2.36*   GFRAA 42*   < > 64 43* 34*   GFRNAA 36*   < > 56* 37* 29*   CA 8.4*   < > 8.8 8.4* 8.2*   ALB 2.8*  --   --   --   --    *   < > 143 144 141   K 3.7   < > 4.1 4.3 4.9   *   < > 111 112 110   CO2 24. 0 perforation, however, due to high risk of surgery and pt generally condition, cont support for present time  XRAY abd ordered 6/4 shows continued prominence of colon but not sever  Plan to cont hyperal for present time  I spoke with Dr Zaida Blunt 6/5, planning

## 2019-06-09 LAB
ANION GAP SERPL CALC-SCNC: 8 MMOL/L (ref 0–18)
BASOPHILS # BLD AUTO: 0.03 X10(3) UL (ref 0–0.2)
BASOPHILS NFR BLD AUTO: 0.4 %
BLOOD TYPE BARCODE: 8400
BUN BLD-MCNC: 67 MG/DL (ref 7–18)
BUN/CREAT SERPL: 48.6 (ref 10–20)
CALCIUM BLD-MCNC: 8.3 MG/DL (ref 8.5–10.1)
CHLORIDE SERPL-SCNC: 111 MMOL/L (ref 98–112)
CO2 SERPL-SCNC: 23 MMOL/L (ref 21–32)
CREAT BLD-MCNC: 1.38 MG/DL (ref 0.7–1.3)
DEPRECATED RDW RBC AUTO: 61.3 FL (ref 35.1–46.3)
EOSINOPHIL # BLD AUTO: 0.24 X10(3) UL (ref 0–0.7)
EOSINOPHIL NFR BLD AUTO: 3.5 %
ERYTHROCYTE [DISTWIDTH] IN BLOOD BY AUTOMATED COUNT: 17.7 % (ref 11–15)
GLUCOSE BLD-MCNC: 143 MG/DL (ref 70–99)
GLUCOSE BLDC GLUCOMTR-MCNC: 141 MG/DL (ref 70–99)
GLUCOSE BLDC GLUCOMTR-MCNC: 143 MG/DL (ref 70–99)
GLUCOSE BLDC GLUCOMTR-MCNC: 150 MG/DL (ref 70–99)
GLUCOSE BLDC GLUCOMTR-MCNC: 159 MG/DL (ref 70–99)
HCT VFR BLD AUTO: 27.4 % (ref 39–53)
HCT VFR BLD AUTO: 27.5 % (ref 39–53)
HCT VFR BLD AUTO: 31.6 % (ref 39–53)
HGB BLD-MCNC: 10.5 G/DL (ref 13–17.5)
HGB BLD-MCNC: 8.8 G/DL (ref 13–17.5)
HGB BLD-MCNC: 9 G/DL (ref 13–17.5)
IMM GRANULOCYTES # BLD AUTO: 0.06 X10(3) UL (ref 0–1)
IMM GRANULOCYTES NFR BLD: 0.9 %
LYMPHOCYTES # BLD AUTO: 0.75 X10(3) UL (ref 1–4)
LYMPHOCYTES NFR BLD AUTO: 10.9 %
MCH RBC QN AUTO: 30.9 PG (ref 26–34)
MCHC RBC AUTO-ENTMCNC: 32.1 G/DL (ref 31–37)
MCV RBC AUTO: 96.1 FL (ref 80–100)
MONOCYTES # BLD AUTO: 0.73 X10(3) UL (ref 0.1–1)
MONOCYTES NFR BLD AUTO: 10.6 %
NEUTROPHILS # BLD AUTO: 5.07 X10 (3) UL (ref 1.5–7.7)
NEUTROPHILS # BLD AUTO: 5.07 X10(3) UL (ref 1.5–7.7)
NEUTROPHILS NFR BLD AUTO: 73.7 %
OSMOLALITY SERPL CALC.SUM OF ELEC: 316 MOSM/KG (ref 275–295)
PLATELET # BLD AUTO: 166 10(3)UL (ref 150–450)
POTASSIUM SERPL-SCNC: 3.5 MMOL/L (ref 3.5–5.1)
RBC # BLD AUTO: 2.85 X10(6)UL (ref 4.3–5.7)
SODIUM SERPL-SCNC: 142 MMOL/L (ref 136–145)
VANCOMYCIN TROUGH SERPL-MCNC: 17.9 UG/ML (ref 10–20)
WBC # BLD AUTO: 6.9 X10(3) UL (ref 4–11)

## 2019-06-09 PROCEDURE — 99233 SBSQ HOSP IP/OBS HIGH 50: CPT | Performed by: INTERNAL MEDICINE

## 2019-06-09 PROCEDURE — 99232 SBSQ HOSP IP/OBS MODERATE 35: CPT | Performed by: INTERNAL MEDICINE

## 2019-06-09 RX ORDER — DEXTROSE MONOHYDRATE 50 MG/ML
INJECTION, SOLUTION INTRAVENOUS CONTINUOUS
Status: DISCONTINUED | OUTPATIENT
Start: 2019-06-09 | End: 2019-06-10

## 2019-06-09 RX ORDER — POTASSIUM CHLORIDE 29.8 MG/ML
40 INJECTION INTRAVENOUS ONCE
Status: COMPLETED | OUTPATIENT
Start: 2019-06-09 | End: 2019-06-09

## 2019-06-09 NOTE — PROGRESS NOTES
[address] [facility] dosing service    Follow-up Pharmacokinetic Consult for Vancomycin Dosing     Jesusita De La Torre is a 60year old male who is being treated for bacteremia. Patient is on day 3 of Vancomycin and add is currently receiving 1.5 gm IV Q 24 hours.   Goal necessary. We appreciate the opportunity to assist in his care.     Branden Conway, Juana[place]  6/9/2019  4:48 PM  [address]: [phone]

## 2019-06-09 NOTE — PROGRESS NOTES
[facility]     Gastroenterology Progress Note    Leonardaaron De Leon Patient Status:  Inpatient    1960 MRN [id number]   Location [facility] 2W/SW Attending Trinh Yang,  [facility] Se Day # 45 PCP LATIA Campos MD       Sub who was admitted 5/2/2019 after presenting from a facility for cough, fever, confusion found to have altered mental status, sepsis, respiratory failure, acute renal failure initially seen by the GI service 5/3 by Dr. Hilda Lan for coffee-ground emesis and a s

## 2019-06-09 NOTE — PROGRESS NOTES
[facility] [facility]    Progress Note    Magda Ko Patient Status:  Inpatient    1960 MRN [id number]   Location [facility] 2W/SW Attending Gustavo Bee MD   [facility] Day # 45 PCP LATIA Jesus MD        Subjective:   Subjective: syndrome      9- nutrition   TPN      10 dvt prophylaxis   scd   No anticoagulation with acute GI bleed     11- DNR                        Results:     Lab Results   Component Value Date    WBC 6.9 06/09/2019    HGB 9.0 (L) 06/09/2019    HGB 8.8 (L) 06/09/

## 2019-06-09 NOTE — PROGRESS NOTES
[place] [facility] - [facility]    Progress Note    Malinda Flores Patient Status:  Inpatient    1960 MRN [id number]   Location [facility] 2W/SW Attending Niko Ojeda MD   [address] Day # 45 PCP LATIA Bess MD       Subjective:   Malinda Flores is bruising noted  Back/Spine: no abnormalities noted  Musculoskeletal: full ROM all extremities good strength  no deformities  Extremities: 2+ edema  Neurological:  Grossly normal    Results:     Laboratory Data:  Lab Results   Component Value Date    WBC 6.

## 2019-06-09 NOTE — PROGRESS NOTES
[place] [facility] - [facility]    Progress Note    Laureano Calderon Patient Status:  Inpatient    1960 MRN [id number]   Location [facility] 2W/SW Attending Lanette Bean MD   [facility] Day # 40 PCP LATIA Maldonado MD       Subjective:   Laureano Calderon is Imaging:  [unfilled]   Xr Chest Ap Portable  (cpt=71045)    Result Date: 6/7/2019  CONCLUSION:  1. No significant change in the appearance of right basilar pleural effusion and left retrocardiac consolidation.     Dictated by (CST): KWAN Blum

## 2019-06-09 NOTE — PLAN OF CARE
Afebrile; normotensive  Junctional most of night; trigeminy/bigeminy; few brief episodes of HR 38; converted to NSR a few times  Could use another [address] conversation; asked patient if he wanted to continue all of this treatment, he shook his head no; when as delirium  Description  Interventions:  - Encourage use of hearing aids, eye glasses  - Promote highest level of mobility daily  - Provide frequent reorientation  - Promote wakefulness i.e. lights on, blinds open  - Promote sleep, encourage patient's normal Assess the need for suctioning and perform as needed  - Assess and instruct to report SOB or any respiratory difficulty  - Respiratory Therapy support as indicated  - Manage/alleviate anxiety  - Monitor for signs/symptoms of CO2 retention  Outcome: Rachelle Initiate interventions, skin care algorithm/standards of care as needed  Outcome: Progressing     Problem: NEUROLOGICAL - ADULT  Goal: Achieves stable or improved neurological status  Description  INTERVENTIONS  - Assess for and report changes in neurologi post-hospital services based on physician/LIP order or complex needs related to functional status, cognitive ability or social support system  Outcome: Progressing     Problem: HEMATOLOGIC - ADULT  Goal: Free from bleeding injury  Description  (Example Apolinaria

## 2019-06-09 NOTE — PLAN OF CARE
Pt awake and alert this shift. Answers simple questions with shake of head; follows commands. Pt up to chair for about 4 hrs this shift and tolerated well. Remains on vent. FSBS monitored q 6 hrs and levemeir as per mar. HR 50-60s this shift.   Pt was goals for specific interventions    Outcome: Progressing  Goal: Patient/Family Short Term Goal  Description  Patient's Short Term Goal: Per wife extubation    Interventions:   - ABX as ordered  -weaning trials when appropriate  - See additional Care Plan g changes in respiratory status  - Assess for changes in mentation and behavior  - Position to facilitate oxygenation and minimize respiratory effort  - Oxygen supplementation based on oxygen saturation or ABGs  - Provide Smoking Cessation handout, if applic patient on fluid and nutrition restrictions as appropriate  Outcome: Progressing     Problem: SKIN/TISSUE INTEGRITY - ADULT  Goal: Skin integrity remains intact  Description  INTERVENTIONS  - Assess and document risk factors for pressure ulcer development Arrange for interpreters to assist at discharge as needed  - Consider post-discharge preferences of patient/family/discharge partner  - Complete POLST form as appropriate  - Assess patient's ability to be responsible for managing their own health  - Refer

## 2019-06-09 NOTE — PROGRESS NOTES
[place] [facility] - [facility]    Progress Note    Minerva Score Patient Status:  Inpatient    1960 MRN [id number]   Location [facility] 2W/SW Attending Michael Peck MD   [address] [facility] [address] Day [address] 45 PCP LATIA Linares MD            Subjective:         Appe 1.80* 1.38*   GFRAA 42*   < > 34* 47* 64   GFRNAA 36*   < > 29* 40* 56*   CA 8.4*   < > 8.2* 8.0* 8.3*   ALB 2.8*  --   --   --   --    *   < > 141 142 142   K 3.7   < > 4.9 4.1 3.5   *   < > 110 109 111   CO2 24.0   < > 22.0 22.0 23.0   ALKPHO consider upper scope and possible colonoscopy early next week  Family aware of situation, high risk for surgery, possible sudden worsening of situation such as perforation, bleeding    Ebony Pizarro MD [address] [facility]  6/9/2019

## 2019-06-10 ENCOUNTER — APPOINTMENT (OUTPATIENT)
Dept: GENERAL RADIOLOGY | Facility: HOSPITAL | Age: 59
DRG: 003 | End: 2019-06-10
Attending: EMERGENCY MEDICINE
Payer: MEDICARE

## 2019-06-10 ENCOUNTER — APPOINTMENT (OUTPATIENT)
Dept: GENERAL RADIOLOGY | Facility: HOSPITAL | Age: 59
DRG: 003 | End: 2019-06-10
Attending: SURGERY
Payer: MEDICARE

## 2019-06-10 LAB
ALBUMIN SERPL-MCNC: 2.5 G/DL (ref 3.4–5)
ANION GAP SERPL CALC-SCNC: 9 MMOL/L (ref 0–18)
BASOPHILS # BLD AUTO: 0.03 X10(3) UL (ref 0–0.2)
BASOPHILS NFR BLD AUTO: 0.4 %
BUN BLD-MCNC: 56 MG/DL (ref 7–18)
BUN/CREAT SERPL: 46.3 (ref 10–20)
CALCIUM BLD-MCNC: 8.1 MG/DL (ref 8.5–10.1)
CHLORIDE SERPL-SCNC: 112 MMOL/L (ref 98–112)
CO2 SERPL-SCNC: 22 MMOL/L (ref 21–32)
CREAT BLD-MCNC: 1.21 MG/DL (ref 0.7–1.3)
DEPRECATED RDW RBC AUTO: 61.8 FL (ref 35.1–46.3)
EOSINOPHIL # BLD AUTO: 0.24 X10(3) UL (ref 0–0.7)
EOSINOPHIL NFR BLD AUTO: 2.9 %
ERYTHROCYTE [DISTWIDTH] IN BLOOD BY AUTOMATED COUNT: 17.7 % (ref 11–15)
GLUCOSE BLD-MCNC: 140 MG/DL (ref 70–99)
GLUCOSE BLDC GLUCOMTR-MCNC: 126 MG/DL (ref 70–99)
GLUCOSE BLDC GLUCOMTR-MCNC: 149 MG/DL (ref 70–99)
GLUCOSE BLDC GLUCOMTR-MCNC: 162 MG/DL (ref 70–99)
HAV IGM SER QL: 1.8 MG/DL (ref 1.6–2.6)
HCT VFR BLD AUTO: 30.5 % (ref 39–53)
HCT VFR BLD AUTO: 32.6 % (ref 39–53)
HGB BLD-MCNC: 10.3 G/DL (ref 13–17.5)
HGB BLD-MCNC: 9.8 G/DL (ref 13–17.5)
IMM GRANULOCYTES # BLD AUTO: 0.05 X10(3) UL (ref 0–1)
IMM GRANULOCYTES NFR BLD: 0.6 %
LYMPHOCYTES # BLD AUTO: 0.93 X10(3) UL (ref 1–4)
LYMPHOCYTES NFR BLD AUTO: 11.2 %
MCH RBC QN AUTO: 31.1 PG (ref 26–34)
MCHC RBC AUTO-ENTMCNC: 32.1 G/DL (ref 31–37)
MCV RBC AUTO: 96.8 FL (ref 80–100)
MONOCYTES # BLD AUTO: 0.76 X10(3) UL (ref 0.1–1)
MONOCYTES NFR BLD AUTO: 9.2 %
NEUTROPHILS # BLD AUTO: 6.29 X10 (3) UL (ref 1.5–7.7)
NEUTROPHILS # BLD AUTO: 6.29 X10(3) UL (ref 1.5–7.7)
NEUTROPHILS NFR BLD AUTO: 75.7 %
OSMOLALITY SERPL CALC.SUM OF ELEC: 314 MOSM/KG (ref 275–295)
PHOSPHATE SERPL-MCNC: 3.8 MG/DL (ref 2.5–4.9)
PLATELET # BLD AUTO: 195 10(3)UL (ref 150–450)
POTASSIUM SERPL-SCNC: 3.5 MMOL/L (ref 3.5–5.1)
RBC # BLD AUTO: 3.15 X10(6)UL (ref 4.3–5.7)
SODIUM SERPL-SCNC: 143 MMOL/L (ref 136–145)
WBC # BLD AUTO: 8.3 X10(3) UL (ref 4–11)

## 2019-06-10 PROCEDURE — 99232 SBSQ HOSP IP/OBS MODERATE 35: CPT | Performed by: INTERNAL MEDICINE

## 2019-06-10 PROCEDURE — 74018 RADEX ABDOMEN 1 VIEW: CPT | Performed by: SURGERY

## 2019-06-10 PROCEDURE — 99233 SBSQ HOSP IP/OBS HIGH 50: CPT | Performed by: INTERNAL MEDICINE

## 2019-06-10 PROCEDURE — 99291 CRITICAL CARE FIRST HOUR: CPT | Performed by: INTERNAL MEDICINE

## 2019-06-10 PROCEDURE — 71045 X-RAY EXAM CHEST 1 VIEW: CPT | Performed by: EMERGENCY MEDICINE

## 2019-06-10 RX ORDER — DEXTROSE MONOHYDRATE 100 MG/ML
INJECTION, SOLUTION INTRAVENOUS CONTINUOUS
Status: DISCONTINUED | OUTPATIENT
Start: 2019-06-10 | End: 2019-06-25

## 2019-06-10 NOTE — PROGRESS NOTES
[facility]     Gastroenterology Progress Note    Guido Cui Patient Status:  Inpatient    1960 MRN [id number]   Location [facility] 2W/SW Attending Amy Alanis, [address] [facility] [address] Day # 44 PCP LATIA Stevens MD       Sub cough, fever, confusion found to have altered mental status, sepsis, respiratory failure, acute renal failure initially seen by the GI service 5/3 by Dr. Lorenzo Rosen for coffee-ground emesis and a small bowel obstruction though repeat imaging suggestive of ileu

## 2019-06-10 NOTE — PROGRESS NOTES
[facility] ID PROGRESS NOTE    Minerav Score Patient Status:  Inpatient    1960 MRN [id number]   Location [facility] 2W/SW Attending Michael Peck MD   [facility] Day # 44 PCP LATIA Linares MD     Subjective:  Awake, 40% on ventilator.  No bleeding re anemia     Rectal bleeding      ASSESSMENT:    Antibiotics:  OVP, Vancomycin  IV zosyn 5/2-5/11, IV vancomycin 5/2-5/7, IV meropenem 5/12-5/26, metronidazole 5/12-5/25, IV vancomycin 5/12-5/25, PO vancomycin 1112    60year old male.  Patient is a 55-year-o mucosa. No mechanical obstruction. Pathology with concern for ischemic colitis. Underwent tracheostomy 5/22. Now with downtrending hemoglobin with rectal bleeding with hemoglobin as low as 6.5.   With the bleeding noted to have low-grade fevers up to 10 fever curve, wbc. -  Reviewed labs, micro, imaging reports.  -  Case d/w patient, RN, Dr. Griselda So.     Neil Guo PA-C  [facility]  [phone]  5/30/2019

## 2019-06-10 NOTE — PLAN OF CARE
Afebrile; normotensive  Junctional rhythm; trigeminy/bigeminy  Stable resp status  Abdominal bloating; new NG inserted    Problem: Patient Centered Care  Goal: Patient preferences are identified and integrated in the patient's plan of care  Description  In lights on, blinds open  - Promote sleep, encourage patient's normal rest cycle i.e. lights off, TV off, minimize noise and interruptions  - Encourage family to assist in orientation and promotion of home routines  Outcome: Progressing     Problem: Morene Socks Monitor for signs/symptoms of CO2 retention  Outcome: Progressing     Problem: METABOLIC/FLUID AND ELECTROLYTES - ADULT  Goal: Glucose maintained within prescribed range  Description  INTERVENTIONS:  - Monitor Blood Glucose as ordered  - Assess for signs a frequently for physical needs  - Identify cognitive and physical deficits and behaviors that affect risk of falls.   - [place] fall precautions as indicated by assessment.  - Educate pt/family on patient safety including physical limitations  - Instruct p

## 2019-06-10 NOTE — PLAN OF CARE
Prepared to remove PICC line, notice wound at edge of dressing with some serous drainage, which had oozed into dressing. When dressing removed edge of PICC line wing had broken skin. PICC removed and culture of to lab.

## 2019-06-10 NOTE — PROGRESS NOTES
[facility]    Progress Note    Saida Wen Patient Status:  Inpatient    1960 MRN [id number]   Location [facility] 2W/SW Attending Kenisha Chávez MD   [facility] Day # 44 PCP LATIA Long MD            Subjective:         Appe * 143* 140*   BUN 75* 67* 56*   CREATSERUM 1.80* 1.38* 1.21   GFRAA 47* 64 75   GFRNAA 40* 56* 65   CA 8.0* 8.3* 8.1*   ALB  --   --  2.5*    142 143   K 4.1 3.5 3.5    111 112   CO2 22.0 23.0 22.0                           Assessment colon.   Family aware of situation, high risk for surgery, possible sudden worsening of situation such as perforation, bleeding    Nico Crawley MD Merged with [facility]  General Surgery  [facility]  6/10/2019  9:01 AM

## 2019-06-10 NOTE — PROGRESS NOTES
Pulmonary/Critical Care Follow Up Note    HPI:   Mary Alice Jha is a 60year old male with Patient presents with:  Fever (infectious)  Altered Mental Status (neurologic)      PCP LATIA Slaughter MD  Admission Attending No admitting provider for patient encoun Daily  •  norepinephrine (LEVOPHED) 4 mg/250 ml premix infusion, 0.5-8 mcg/min, Intravenous, Continuous  •  calcium acetate (PHOSLO) cap 667 mg, 1 capsule, Per G Tube, TID CC  •  Albumin Human (ALBUMINAR) 25 % solution 100 mL, 100 mL, Intravenous, PRN  • (1.668 m), weight (!) 330 lb 8 oz (149.9 kg), SpO2 99 %.     Intake/Output Summary (Last 24 hours) at 6/10/2019 1336  Last data filed at 6/10/2019 0800  Gross per 24 hour   Intake 3440 ml   Output 2650 ml   Net 790 ml     NAD  Profound weakness   Awake and cryo  D/w with GI and no new ideas  Seems to have slowed/stopped  Plan      Serial H/H    GI and surgery following    Surgery if recurrent massive bleeding and surgery is life saving        Opoid dependence  Weaned off over 20+ days in CCU  Plan prn morphi pressure    • Hyperlipidemia    • Lipid screening 1/17/2011    per NG   • Morbid obesity (HCC)    • Obesity    • Rectal fissure     colorectal fistula - surgery   • Renal disorder    • Seizure disorder (HCC)          Medications:    Current Facility-Admini 100 mg, 100 mg, Oral, Nightly  •  Vancomycin HCl (FIRVANQ) 50 MG/ML oral solution 125 mg, 125 mg, Per NG Tube, Daily  •  balsam peru-castor oil (VENELEX) ointment, , Topical, BID PRN  •  artificial tears 83-15 % ophthalmic ointment, , Both Eyes, TID  •  bi 140 06/10/2019    CA 8.1 06/10/2019    ALB 2.5 06/10/2019    MG 1.8 06/10/2019    PHOS 3.8 06/10/2019     Lab Results   Component Value Date    HGB 9.8 (L) 06/10/2019    HGB 10.5 (L) 06/09/2019    HGB 9.0 (L) 06/09/2019    HGB 8.8 (L) 06/09/2019    HGB 9.1 NG to LIS   PRBC prn       Morbid obesity  Suspect IRMA  Plan        PSG at outpt if pt allows                 Empiric inpt BiPAP at night after extubation     Sz  Plan Keppra     FM/chronic pain  On chronic narcotics  Off other meds as NPO  Weaned off morp

## 2019-06-10 NOTE — PLAN OF CARE
Problem: Patient Centered Care  Goal: Patient preferences are identified and integrated in the patient's plan of care  Description  Interventions:  - What would you like us to know as we care for you?  Keep wife involved in his care  - Provide timely, com and/or venous puncture sites for bleeding and/or hematoma  - Assess quality of pulses, skin color and temperature  - Assess for signs of decreased coronary artery perfusion - ex.  Angina  - Evaluate fluid balance, assess for edema, trend weights  Outcome: P ELECTROLYTES - ADULT  Goal: Glucose maintained within prescribed range  Description  INTERVENTIONS:  - Monitor Blood Glucose as ordered  - Assess for signs and symptoms of hyperglycemia and hypoglycemia  - Administer ordered medications to maintain glucose risk of falls.   - [place] fall precautions as indicated by assessment.  - Educate pt/family on patient safety including physical limitations  - Instruct pt to call for assistance with activity based on assessment  - Modify environment to reduce risk of i reorientation  - Promote wakefulness i.e. lights on, blinds open  - Promote sleep, encourage patient's normal rest cycle i.e. lights off, TV off, minimize noise and interruptions  - Encourage family to assist in orientation and promotion of home routines

## 2019-06-10 NOTE — PROGRESS NOTES
[facility]    Cardiology Progress Note    Ezekiel Fraction Patient Status:  Inpatient    1960 MRN [id number]   Location [facility] 2W/SW Attending Esthela Smith MD   [facility] Day # 44 PCP LATIA Moya MD         Assessment and P Tube TID CC   • epoetin shakeel-epbx  10,000 Units Subcutaneous Once per day on Mon Wed Fri   • pregabalin  100 mg Oral Nightly   • Vancomycin HCl  125 mg Per NG Tube Daily   • artificial tears   Both Eyes TID   • Metoclopramide HCl  5 mg Intravenous Q8H   • Krishna Holloway MD on 6/10/2019 at 11:29                  Harmony Guerrero, [address] [facility]  Cardiac Electrophysiology  6/10/2019

## 2019-06-11 ENCOUNTER — APPOINTMENT (OUTPATIENT)
Dept: PICC SERVICES | Facility: HOSPITAL | Age: 59
DRG: 003 | End: 2019-06-11
Attending: PHYSICIAN ASSISTANT
Payer: MEDICARE

## 2019-06-11 ENCOUNTER — APPOINTMENT (OUTPATIENT)
Dept: GENERAL RADIOLOGY | Facility: HOSPITAL | Age: 59
DRG: 003 | End: 2019-06-11
Attending: INTERNAL MEDICINE
Payer: MEDICARE

## 2019-06-11 LAB
ALBUMIN SERPL-MCNC: 2.9 G/DL (ref 3.4–5)
ALBUMIN/GLOB SERPL: 0.6 {RATIO} (ref 1–2)
ALP LIVER SERPL-CCNC: 92 U/L (ref 45–117)
ALT SERPL-CCNC: 39 U/L (ref 16–61)
ANION GAP SERPL CALC-SCNC: 8 MMOL/L (ref 0–18)
AST SERPL-CCNC: 36 U/L (ref 15–37)
BILIRUB SERPL-MCNC: 0.9 MG/DL (ref 0.1–2)
BUN BLD-MCNC: 44 MG/DL (ref 7–18)
BUN/CREAT SERPL: 37 (ref 10–20)
CALCIUM BLD-MCNC: 7.9 MG/DL (ref 8.5–10.1)
CHLORIDE SERPL-SCNC: 110 MMOL/L (ref 98–112)
CO2 SERPL-SCNC: 22 MMOL/L (ref 21–32)
CREAT BLD-MCNC: 1.19 MG/DL (ref 0.7–1.3)
GLOBULIN PLAS-MCNC: 4.6 G/DL (ref 2.8–4.4)
GLUCOSE BLD-MCNC: 172 MG/DL (ref 70–99)
GLUCOSE BLDC GLUCOMTR-MCNC: 163 MG/DL (ref 70–99)
GLUCOSE BLDC GLUCOMTR-MCNC: 167 MG/DL (ref 70–99)
GLUCOSE BLDC GLUCOMTR-MCNC: 172 MG/DL (ref 70–99)
GLUCOSE BLDC GLUCOMTR-MCNC: 182 MG/DL (ref 70–99)
GLUCOSE BLDC GLUCOMTR-MCNC: 186 MG/DL (ref 70–99)
GLUCOSE BLDC GLUCOMTR-MCNC: 224 MG/DL (ref 70–99)
HCT VFR BLD AUTO: 34.2 % (ref 39–53)
HCT VFR BLD AUTO: 35.2 % (ref 39–53)
HGB BLD-MCNC: 10.9 G/DL (ref 13–17.5)
HGB BLD-MCNC: 11.1 G/DL (ref 13–17.5)
M PROTEIN MFR SERPL ELPH: 7.5 G/DL (ref 6.4–8.2)
OSMOLALITY SERPL CALC.SUM OF ELEC: 305 MOSM/KG (ref 275–295)
POTASSIUM SERPL-SCNC: 3.7 MMOL/L (ref 3.5–5.1)
SODIUM SERPL-SCNC: 140 MMOL/L (ref 136–145)
TRIGL SERPL-MCNC: 84 MG/DL (ref 30–149)

## 2019-06-11 PROCEDURE — B5181ZA FLUOROSCOPY OF SUPERIOR VENA CAVA USING LOW OSMOLAR CONTRAST, GUIDANCE: ICD-10-PCS | Performed by: INTERNAL MEDICINE

## 2019-06-11 PROCEDURE — 99233 SBSQ HOSP IP/OBS HIGH 50: CPT | Performed by: INTERNAL MEDICINE

## 2019-06-11 PROCEDURE — 02HV33Z INSERTION OF INFUSION DEVICE INTO SUPERIOR VENA CAVA, PERCUTANEOUS APPROACH: ICD-10-PCS | Performed by: INTERNAL MEDICINE

## 2019-06-11 PROCEDURE — 71045 X-RAY EXAM CHEST 1 VIEW: CPT | Performed by: INTERNAL MEDICINE

## 2019-06-11 PROCEDURE — 99232 SBSQ HOSP IP/OBS MODERATE 35: CPT | Performed by: INTERNAL MEDICINE

## 2019-06-11 RX ORDER — LIDOCAINE HYDROCHLORIDE 10 MG/ML
0.5 INJECTION, SOLUTION INFILTRATION; PERINEURAL ONCE AS NEEDED
Status: ACTIVE | OUTPATIENT
Start: 2019-06-11 | End: 2019-06-11

## 2019-06-11 RX ORDER — BUMETANIDE 0.25 MG/ML
1 INJECTION, SOLUTION INTRAMUSCULAR; INTRAVENOUS ONCE
Status: COMPLETED | OUTPATIENT
Start: 2019-06-11 | End: 2019-06-11

## 2019-06-11 RX ORDER — CARVEDILOL 6.25 MG/1
6.25 TABLET ORAL ONCE
Status: COMPLETED | OUTPATIENT
Start: 2019-06-11 | End: 2019-06-11

## 2019-06-11 RX ORDER — ACETAMINOPHEN 10 MG/ML
1000 INJECTION, SOLUTION INTRAVENOUS EVERY 6 HOURS
Status: DISCONTINUED | OUTPATIENT
Start: 2019-06-11 | End: 2019-06-15

## 2019-06-11 NOTE — PROGRESS NOTES
[facility]    Progress Note      Subjective:     Awake and non verbal     Off TPN     Review of Systems:     Unable to obtain - tracheostomy     Objective:   Temp:  [97.5 °F (36.4 °C)-97.9 °F (36.6 °C)] 97.9 °F (36.6 °C)  Pulse:  Elinor Ceballos UNIT/ML flextouch 55 Units 55 Units Subcutaneous Daily   norepinephrine (LEVOPHED) 4 mg/250 ml premix infusion 0.5-8 mcg/min Intravenous Continuous   calcium acetate (PHOSLO) cap 667 mg 1 capsule Per G Tube TID CC   Albumin Human (ALBUMINAR) 25 % solution 170.0  --  166.0  --  195.0  --   --     < > = values in this interval not displayed.      Recent Labs   Lab 06/09/19  0527 06/10/19  0542 06/11/19  0406   * 140* 172*   BUN 67* 56* 44*   CREATSERUM 1.38* 1.21 1.19   GFRAA 64 75 77   GFRNAA 56* 65 66 recent shock from abdominal process / aspiration pneumonia  - s/p decompressive colonoscopy - acute colonic pseudo-obstruction with moderate colitis. S/p suctioning of air as much as possible and decompression tube left in colon.  Repeat KUB improve   - off

## 2019-06-11 NOTE — PROGRESS NOTES
[facility]     Gastroenterology Progress Note    Denise Villanueva Patient Status:  Inpatient    1960 MRN [id number]   Location [facility] 2W/SW Attending Kelle Kennedy, [address] [facility] [address] Day # 36 PCP LATIA Sawant MD       Sub 2. No significant change     Dictated by (CST): Robert Carmona MD on 6/10/2019 at 12:37     Approved by (CST): Robert Carmona MD on 6/10/2019 at 12:40          Xr Chest Ap Portable  (cpt=71045)    Result Date: 6/11/2019  CONCLUSION:  1.  Jenni slaughter with Dr. Margie Wiggins 5/16 for acute colonic pseudo-obstruction and eventual flexible sigmoidoscopy with Dr. Aaron Cooley 5/2/2019 for blood per rectum with multiple abnormalities/changes in the transverse colon.     He's had intermittent blood per rectum which is though as well as goals of care at this time she states that other than his DNR status she would like to continue all cares.     Recommend:  -continued supportive care - nutrition, blood transfusion prn   -consider EGD - timing to be determined  -discussed colonos

## 2019-06-11 NOTE — PLAN OF CARE
Problem: Patient Centered Care  Goal: Patient preferences are identified and integrated in the patient's plan of care  Description  Interventions:  - What would you like us to know as we care for you?  Keep wife involved in his care  - Provide timely, com arterial and/or venous puncture sites for bleeding and/or hematoma  - Assess quality of pulses, skin color and temperature  - Assess for signs of decreased coronary artery perfusion - ex.  Angina  - Evaluate fluid balance, assess for edema, trend weights  O METABOLIC/FLUID AND ELECTROLYTES - ADULT  Goal: Glucose maintained within prescribed range  Description  INTERVENTIONS:  - Monitor Blood Glucose as ordered  - Assess for signs and symptoms of hyperglycemia and hypoglycemia  - Administer ordered medications behaviors that affect risk of falls.   - [place] fall precautions as indicated by assessment.  - Educate pt/family on patient safety including physical limitations  - Instruct pt to call for assistance with activity based on assessment  - Modify environme instances, pt closely monitored. Full vent support, secretions are moderate,  vs stable, pt had one bm overnight liquid, dark green , large in amount, no bleeding noted, pt also passing lots of gas.  Skin kept clean and dry, repositioned q2 hrs, will contin

## 2019-06-11 NOTE — PROGRESS NOTES
[facility]    Progress Note      Assessment and Plan:   1. Respiratory failure–presented with parainfluenza virus, required tracheostomy and repeat intubation. The patient remains on ventilator at present.   He has an ejection fraction of lethargic white male  HEENT examination is unremarkable with pupils equal round and reactive to light and accommodation. Neck without adenopathy, thyromegaly, JVD nor bruit. Lungs diminished breath sounds at bases to auscultation and percussion.   Dolores Thibodeaux

## 2019-06-11 NOTE — PLAN OF CARE
Problem: Patient Centered Care  Goal: Patient preferences are identified and integrated in the patient's plan of care  Description  Interventions:  - What would you like us to know as we care for you?  Keep wife involved in his care  - Provide timely, com and interruptions  - Encourage family to assist in orientation and promotion of home routines  Outcome: Progressing     Problem: CARDIOVASCULAR - ADULT  Goal: Maintains optimal cardiac output and hemodynamic stability  Description  INTERVENTIONS:  - [place] interventions, skin care algorithm/standards of care as needed  Outcome: Progressing     Problem: NEUROLOGICAL - ADULT  Goal: Achieves stable or improved neurological status  Description  INTERVENTIONS  - Assess for and report changes in neurological statu services based on physician/LIP order or complex needs related to functional status, cognitive ability or social support system  Outcome: Progressing     Problem: HEMATOLOGIC - ADULT  Goal: Free from bleeding injury  Description  (Example usage: patient wi

## 2019-06-11 NOTE — PROGRESS NOTES
[facility] ID PROGRESS NOTE    [facility] Patient Status:  Inpatient    1960 MRN [id number]   Location [facility] 2W/SW Attending Ruthann Amaya MD   [facility] Day # 36 PCP LATIA Velazquez MD     Subjective:  Awake, 40% on ventilator.  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 Dusky mucosa. No mechanical obstruction. Pathology with concern for ischemic colitis. Underwent tracheostomy 5/22. Now with downtrending hemoglobin with rectal bleeding with hemoglobin as low as 6.5.   With the bleed today.  -  Follow fever curve, wbc.  -  KUB 6/10 reviewed. -  Reviewed labs, micro, imaging reports.  -  Case d/w patient, RN.     Sidra Dukes PAJoannC  [facility]  [phone]  5/30/2019

## 2019-06-11 NOTE — PROGRESS NOTES
[facility] - [facility]    Progress Note    Esther Toro Patient Status:  Inpatient    1960 MRN [id number]   Location [facility] 2W/SW Attending Laurance Goodpasture, MD   [facility] Day # 44 PCP LATIA Rocha MD       Subjective:   Esther Toro is deformities  Extremities: 2+ edema  Neurological:  Grossly normal    Results:     Laboratory Data:  Lab Results   Component Value Date    WBC 8.3 06/10/2019    HGB 10.3 (L) 06/10/2019    HCT 32.6 (L) 06/10/2019    .0 06/10/2019    CREATSERUM 1.21 06 stable is still on D5W at 40 cc/h  #5 severe gastric distention has ileus and megacolon probably will not do well in the long run not a candidate for surgery  #6 overall prognosis very poor  Continue IV vancomycin for previous sepsis             6/10/2019

## 2019-06-11 NOTE — PROGRESS NOTES
[facility]  [facility] Cardiology Progress Note    Brayden Garcia Patient Status:  Inpatient    1960 MRN [id number]   Location [facility] 2W/SW Attending Yang Zimmerman, [address] [facility] [address] Day # 36 PCP LATIA Cornell MD     59 yo male with multipl • Congestive heart disease ([facility] [address]    • Diabetes ([email or web address]    • Diverticulitis    • Esophageal reflux    • Essential hypertension    • Fibromyalgia    • Hepatic Encephalopathy (HCC)     lactulose   • High blood pressure    • Hyperlipidemia    • Lipid screening

## 2019-06-11 NOTE — PROGRESS NOTES
Vascular Access Note    Vascular Access Screening:   Allergies to Lidocaine: no  Allergies to Latex: no  Presence of Pacemaker/Defibrillator: No  Mastectomy with Lymph Node Dissection: No  AV Fistula / AV Graft: No  Dialysis Catheter: Rt side  Central Line:

## 2019-06-11 NOTE — PROGRESS NOTES
[facility]    Progress Note    Kelsi Quiles Patient Status:  Inpatient    1960 MRN [id number]   Location [facility] 2W/SW Attending Ever Girard, [address] [facility] [address] Day # 36 PCP LATIA Mcclure MD            Subjective:         Appe Lab Results   Component Value Date    INR 1.42 (H) 06/08/2019       Recent Labs   Lab 06/09/19  0527 06/10/19  0542 06/11/19  0406   * 140* 172*   BUN 67* 56* 44*   CREATSERUM 1.38* 1.21 1.19   GFRAA 64 75 77   GFRNAA 56* 65 66   CA 8.3* 8.1* 7. of bleeding for certain. CT with IV contrast - no source confirmed. Appears clinically to be from right colon.    I previously discussed options including extended right hemicolectomy to remove the persistently dialated colon and prob source of bleeding;  H

## 2019-06-12 ENCOUNTER — APPOINTMENT (OUTPATIENT)
Dept: CT IMAGING | Facility: HOSPITAL | Age: 59
DRG: 003 | End: 2019-06-12
Attending: SURGERY
Payer: MEDICARE

## 2019-06-12 ENCOUNTER — APPOINTMENT (OUTPATIENT)
Dept: INTERVENTIONAL RADIOLOGY/VASCULAR | Facility: HOSPITAL | Age: 59
DRG: 003 | End: 2019-06-12
Attending: INTERNAL MEDICINE
Payer: MEDICARE

## 2019-06-12 ENCOUNTER — APPOINTMENT (OUTPATIENT)
Dept: GENERAL RADIOLOGY | Facility: HOSPITAL | Age: 59
DRG: 003 | End: 2019-06-12
Attending: CLINICAL NURSE SPECIALIST
Payer: MEDICARE

## 2019-06-12 LAB
ANION GAP SERPL CALC-SCNC: 8 MMOL/L (ref 0–18)
ANTIBODY SCREEN: POSITIVE
APTT PPP: 32.6 SECONDS (ref 23.2–35.3)
BASOPHILS # BLD AUTO: 0.04 X10(3) UL (ref 0–0.2)
BASOPHILS NFR BLD AUTO: 0.2 %
BUN BLD-MCNC: 32 MG/DL (ref 7–18)
BUN/CREAT SERPL: 27.8 (ref 10–20)
CALCIUM BLD-MCNC: 7.7 MG/DL (ref 8.5–10.1)
CHLORIDE SERPL-SCNC: 111 MMOL/L (ref 98–112)
CO2 SERPL-SCNC: 21 MMOL/L (ref 21–32)
CREAT BLD-MCNC: 1.15 MG/DL (ref 0.7–1.3)
DEPRECATED RDW RBC AUTO: 60.6 FL (ref 35.1–46.3)
DIRECT COOMBS POLY: NEGATIVE
EOSINOPHIL # BLD AUTO: 0.22 X10(3) UL (ref 0–0.7)
EOSINOPHIL NFR BLD AUTO: 1.2 %
ERYTHROCYTE [DISTWIDTH] IN BLOOD BY AUTOMATED COUNT: 17.2 % (ref 11–15)
GLUCOSE BLD-MCNC: 159 MG/DL (ref 70–99)
GLUCOSE BLDC GLUCOMTR-MCNC: 169 MG/DL (ref 70–99)
GLUCOSE BLDC GLUCOMTR-MCNC: 208 MG/DL (ref 70–99)
GLUCOSE BLDC GLUCOMTR-MCNC: 258 MG/DL (ref 70–99)
HCT VFR BLD AUTO: 32.1 % (ref 39–53)
HCT VFR BLD AUTO: 33.8 % (ref 39–53)
HGB BLD-MCNC: 10.7 G/DL (ref 13–17.5)
HGB BLD-MCNC: 10.9 G/DL (ref 13–17.5)
IMM GRANULOCYTES # BLD AUTO: 0.11 X10(3) UL (ref 0–1)
IMM GRANULOCYTES NFR BLD: 0.6 %
INR BLD: 1.65 (ref 0.9–1.2)
LYMPHOCYTES # BLD AUTO: 1.05 X10(3) UL (ref 1–4)
LYMPHOCYTES NFR BLD AUTO: 5.6 %
MCH RBC QN AUTO: 31.1 PG (ref 26–34)
MCHC RBC AUTO-ENTMCNC: 32.2 G/DL (ref 31–37)
MCV RBC AUTO: 96.3 FL (ref 80–100)
MONOCYTES # BLD AUTO: 1.35 X10(3) UL (ref 0.1–1)
MONOCYTES NFR BLD AUTO: 7.2 %
NEUTROPHILS # BLD AUTO: 16.08 X10 (3) UL (ref 1.5–7.7)
NEUTROPHILS # BLD AUTO: 16.08 X10(3) UL (ref 1.5–7.7)
NEUTROPHILS NFR BLD AUTO: 85.2 %
OSMOLALITY SERPL CALC.SUM OF ELEC: 300 MOSM/KG (ref 275–295)
PLATELET # BLD AUTO: 237 10(3)UL (ref 150–450)
POTASSIUM SERPL-SCNC: 2.9 MMOL/L (ref 3.5–5.1)
POTASSIUM SERPL-SCNC: 3 MMOL/L (ref 3.5–5.1)
PROTHROMBIN TIME: 19.5 SECONDS (ref 11.8–14.5)
RBC # BLD AUTO: 3.51 X10(6)UL (ref 4.3–5.7)
RH BLOOD TYPE: POSITIVE
SODIUM SERPL-SCNC: 140 MMOL/L (ref 136–145)
WBC # BLD AUTO: 18.9 X10(3) UL (ref 4–11)

## 2019-06-12 PROCEDURE — 99232 SBSQ HOSP IP/OBS MODERATE 35: CPT | Performed by: INTERNAL MEDICINE

## 2019-06-12 PROCEDURE — 02PY33Z REMOVAL OF INFUSION DEVICE FROM GREAT VESSEL, PERCUTANEOUS APPROACH: ICD-10-PCS | Performed by: RADIOLOGY

## 2019-06-12 PROCEDURE — 74176 CT ABD & PELVIS W/O CONTRAST: CPT | Performed by: SURGERY

## 2019-06-12 PROCEDURE — 99233 SBSQ HOSP IP/OBS HIGH 50: CPT | Performed by: INTERNAL MEDICINE

## 2019-06-12 PROCEDURE — 71045 X-RAY EXAM CHEST 1 VIEW: CPT | Performed by: CLINICAL NURSE SPECIALIST

## 2019-06-12 RX ORDER — POTASSIUM CHLORIDE 29.8 MG/ML
40 INJECTION INTRAVENOUS ONCE
Status: COMPLETED | OUTPATIENT
Start: 2019-06-12 | End: 2019-06-13

## 2019-06-12 RX ORDER — HYDRALAZINE HYDROCHLORIDE 20 MG/ML
10 INJECTION INTRAMUSCULAR; INTRAVENOUS EVERY 4 HOURS PRN
Status: DISCONTINUED | OUTPATIENT
Start: 2019-06-12 | End: 2019-07-01

## 2019-06-12 RX ORDER — DILTIAZEM HYDROCHLORIDE 5 MG/ML
INJECTION INTRAVENOUS
Status: COMPLETED
Start: 2019-06-12 | End: 2019-06-12

## 2019-06-12 RX ORDER — LIDOCAINE HYDROCHLORIDE 20 MG/ML
INJECTION, SOLUTION EPIDURAL; INFILTRATION; INTRACAUDAL; PERINEURAL
Status: COMPLETED
Start: 2019-06-12 | End: 2019-06-12

## 2019-06-12 RX ORDER — POTASSIUM CHLORIDE 14.9 MG/ML
20 INJECTION INTRAVENOUS ONCE
Status: COMPLETED | OUTPATIENT
Start: 2019-06-12 | End: 2019-06-12

## 2019-06-12 RX ORDER — POTASSIUM CHLORIDE 14.9 MG/ML
20 INJECTION INTRAVENOUS ONCE
Status: COMPLETED | OUTPATIENT
Start: 2019-06-13 | End: 2019-06-13

## 2019-06-12 RX ORDER — BUMETANIDE 0.25 MG/ML
1 INJECTION, SOLUTION INTRAMUSCULAR; INTRAVENOUS DAILY
Status: DISCONTINUED | OUTPATIENT
Start: 2019-06-12 | End: 2019-06-14

## 2019-06-12 RX ORDER — POTASSIUM CHLORIDE 29.8 MG/ML
40 INJECTION INTRAVENOUS ONCE
Status: COMPLETED | OUTPATIENT
Start: 2019-06-12 | End: 2019-06-12

## 2019-06-12 RX ORDER — DILTIAZEM HYDROCHLORIDE 60 MG/1
60 TABLET, FILM COATED ORAL AS NEEDED
Status: COMPLETED | OUTPATIENT
Start: 2019-06-12 | End: 2019-06-30

## 2019-06-12 NOTE — PLAN OF CARE
Tmax 99.4, episode of diaphoresis, lethargy, tachypnea  Normotensive  A fib, rates ; gave coreg x1 dose per nocturnist; metoprolol prn x1 dose  Trach, stable  Abdomen distended  Morphine prn for pain  L wrist restraint d/t pulling at NG tube    Probl hearing aids, eye glasses  - Promote highest level of mobility daily  - Provide frequent reorientation  - Promote wakefulness i.e. lights on, blinds open  - Promote sleep, encourage patient's normal rest cycle i.e. lights off, TV off, minimize noise and in hypoglycemia  - Administer ordered medications to maintain glucose within target range  - Assess barriers to adequate nutritional intake and initiate nutrition consult as needed  - Instruct patient on self management of diabetes  Outcome: Progressing     P partner in discharge planning  - Arrange for needed discharge resources and transportation as appropriate  - Identify discharge learning needs (meds, wound care, etc)  - Arrange for interpreters to assist at discharge as needed  - Consider post-discharge p

## 2019-06-12 NOTE — OCCUPATIONAL THERAPY NOTE
New orders rcvd for re-evaluation; discussed with RN early in the AM- when attempting to work with patient later in the morning, RN requested therapy HOLD evaluation as patient unstable. Pt converted to af rates up to 150- sbp also elevated.  Will continue

## 2019-06-12 NOTE — PROGRESS NOTES
Patient was Alert, Denies pain. Procedure for hemodialysis removal with Dr. Truong Ba, consent was signed. Patient agreeable and understands the procedure. Patient's R chest was prepped and draped in sterile fashion.  Lidocaine 2% 10cc subq was given for lo

## 2019-06-12 NOTE — PROGRESS NOTES
Pulmonary/Critical Care Follow Up Note    HPI:   Orquidea Pascal is a 60year old male with Patient presents with:  Fever (infectious)  Altered Mental Status (neurologic)      PCP LATIA Kendrick MD  Admission Attending No admitting provider for patient encoun powder, , Topical, [email or web address]  •  morphINE sulfate (PF) 2 MG/ML injection 2 mg, 2 mg, Intravenous, Q2H PRN  •  levETIRAcetam (KEPPRA) 100 MG/ML solution 1,000 mg, 1,000 mg, Per NG Tube, BID  •  0.9%  NaCl infusion, , Intravenous, Once  •  insulin detemi pain  Rocephin [Ceftriaxo*    UNKNOWN    Comment:Tolerated Zosyn  Thimerosal              RASH        PHYSICAL EXAM:   Blood pressure (!) 154/95, pulse 93, temperature 99.5 °F (37.5 °C), temperature source Axillary, resp.  rate 26, height 5' 5.67\" (1.668 m tube  Persistent  Bedside flex sig with ulcerations but mainly pooling blood in colon  S/p FFP and cryo  D/w with GI and no new ideas  Seems to have slowed/stopped  Plan      Serial H/H    GI and surgery following          Opoid dependence  Weaned off over 1/17/2011    per NG   • Morbid obesity ([facility] [address]    • Obesity    • Rectal fissure     colorectal fistula - surgery   • Renal disorder    • Seizure disorder (HCC)          Medications:    Current Facility-Administered Medications:   •  diltiazem 100mg/100ml in Subcutaneous, Once per day on Mon Wed Fri  •  dextrose 50 % injection 50 mL, 50 mL, Intravenous, PRN  •  Glucose-Vitamin C (DEX-4) 4-6 GM-MG chewable tab 4 tablet, 4 tablet, Oral, Q15 Min PRN  •  pregabalin (LYRICA) cap 100 mg, 100 mg, Oral, Nightly  •  Va 18.9 06/12/2019    HGB 10.9 06/12/2019    HCT 33.8 06/12/2019    .0 06/12/2019    CREATSERUM 1.15 06/12/2019    BUN 32 06/12/2019     06/12/2019    K 3.0 06/12/2019     06/12/2019    CO2 21.0 06/12/2019     06/12/2019    CA 7.7 06 Restart hep or warfarin when safe                 GIB  Coffee ground emesis but resolved  Hb 17-->16--> 14 --> 12.9--> 9-->8.8--> 8.3--> 8.2  GI and surgery following  Still bleeding intermittently  Plan      PPI    NG to LIS   PRBC prn       Morbid obesit

## 2019-06-12 NOTE — PROGRESS NOTES
[facility] ID PROGRESS NOTE    Tinsley Begin Patient Status:  Inpatient    1960 MRN [id number]   Location [facility] 2W/SW Attending Lanette Bean, [address] [facility] [address] Day # 39 PCP LATIA Maldonado MD     Subjective:  Awake, 40% on ventilator.  Febrile yester Beth's syndrome     Acute GI bleeding     Acute blood loss anemia     Rectal bleeding      ASSESSMENT:    Antibiotics:  OVP, Vancomycin  IV zosyn 5/2-5/11, IV vancomycin 5/2-5/7, IV meropenem 5/12-5/26, metronidazole 5/12-5/25, IV vancomycin 5/12-5/25, of acute colonic pseudoobstruction, pseudomembranes. Dusky mucosa. No mechanical obstruction. Pathology with concern for ischemic colitis. Underwent tracheostomy 5/22.   Now with downtrending hemoglobin with rectal bleeding with hemoglobin as low as 6.5 to run susceptibilities on positive blood cx results. -  FU CT A/P.  -  Follow fever curve, wbc. -  Reviewed labs, micro, imaging reports.  -  Case d/w patient, RN, micro.      Emigdio Daley PA-C  [facility]  [phone]  5/30

## 2019-06-12 NOTE — PROGRESS NOTES
[facility]    Progress Note      Subjective:     Awake and alert. A-fib with tachy last night. Regular brown color BM last night     S/p CTAP     Review of Systems:     Unable to obtain - tracheostomy     Objective:   Temp:  [97.9 °F (36. Continuous   Normal Saline Flush 0.9 % injection 10 mL 10 mL Intravenous PRN   Vancomycin HCl (VANCOCIN) 1,500 mg in sodium chloride 0.9% 500 mL IVPB 15 mg/kg (Adjusted) Intravenous Q24H   DOPamine in D5W (INTROPIN) 800 mg/250 ml premix infusion 1.5-20 mcg (PAMELOR) cap 50 mg 50 mg Oral Nightly   Promethazine HCl (PHENERGAN) tab 12.5 mg 12.5 mg Oral Q4H PRN        Results:     Recent Labs   Lab 06/09/19  0527  06/10/19  0542  06/11/19  0406 06/11/19  1658 06/12/19  0556   RBC 2.85*  --  3.15*  --   --   -- - 3rd episode of KOMAL -  Creatinine improving and now stable at 1.2 mg/dl   -- creatinine 1.0 mg/dl at baseline  - started on HD on 5/14 - last HD 5/31  - has temporary dialysis catheter - will dc catheter today   - KOMAL secondary to septic shock and ATN

## 2019-06-12 NOTE — PROGRESS NOTES
[facility] - [facility]    Progress Note    Luly Deluca Patient Status:  Inpatient    1960 MRN [id number]   Location [facility] 2W/SW Attending Kiarra Patrick,  [facility] Se Day # 39 PCP LATIA Damon MD            Subjective:         Pt m CREATSERUM 1.21 1.19 1.15   GFRAA 75 77 80   GFRNAA 65 66 69   CA 8.1* 7.9* 7.7*   ALB 2.5* 2.9*  --     140 140   K 3.5 3.7 3.0*    110 111   CO2 22.0 22.0 21.0   ALKPHO  --  92  --    AST  --  36  --    ALT  --  39  --    BILT  --  0.9  -- intermittently,  Still coagulopathic,  Receiving blood products intermittently  PT 17.6  6/3  Tube feedings held again for present time    GI bleeding.       Hg more stable recently  Pt having intermittient transfusion of blood products in attempt to revers

## 2019-06-12 NOTE — PHYSICAL THERAPY NOTE
New orders [facility] for PT re-evaluation- pt presenting with improved alertness; discussed with RN early in the AM- when attempting to work with patient later in the morning, RN requested therapy HOLD evaluation as patient unstable.  Pt converted to af rates up

## 2019-06-12 NOTE — PROCEDURES
[facility]  Procedure Note    Magda Ko Patient Status:  Inpatient    1960 MRN [id number]   Location [facility] Attending Gustavo Bee,  [facility] Se Day # 39 PCP LATIA Jesus MD     Procedure:  Melburn Quiet

## 2019-06-12 NOTE — DIETARY NOTE
ADULT NUTRITION REASSESSMENT     Pt is at high nutrition risk. Pt does not meet malnutrition criteria. RECOMMENDATIONS TO MD: See Nutrition Intervention for CPN rx.       NUTRITION DIAGNOSIS/PROBLEM: Inadequate protein energy intake related to respira tomorrow. 5/20 ADDENDUM @1500:  Received call/Dr. Chin Fam to discuss starting low rate tube feedings  this pm and holding till tomorrow before advance further to assess tolerance.  Will begin at low rate 10 ml/hr (per Dr Марина Drake)  X ave 22 hr infusion time wi Coordination of nutrition care: collaboration with other providers and Pt care rounds.    - Discharge and transfer of nutrition care to new setting or provider: monitor plans    PERTINENT PAST MEDICAL HISTORY:  has a past medical history of Cardiomyopathy ( BM yesterday. High risk for Surgery. Per MD, possible ischemic colitis,  6/12: CT A/P noted. Await if decide on treatment plans. FOOD/NUTRITION RELATED HISTORY:  Appetite: PTA good. Intake: Via  CPN   Intake Meeting Needs: 100% via CPN .    Food Allerg  110 111   CO2 22.0 22.0 21.0   PHOS 3.8  --   --    OSMOCALC 314* 305* 300*       NUTRITION RELATED PHYSICAL FINDINGS:  - Body Fat/Muscle Mass: well nourished and morbid obestiy per visual exam.  - Fluid Accumulation: +2 upper extremity edema,+2 l

## 2019-06-12 NOTE — PROGRESS NOTES
[facility] [address]     Gastroenterology Progress Note    Jai Carbajal Patient Status:  Inpatient    1960 MRN [id number]   Location [facility] 2W/SW Attending Luis M Alonzo, [address] [facility] [address] Day # 39 PCP C. Stephani Boas, MD       Sub definite free intraperitoneal air or drainable intra-abdominal fluid collection. Findings may reflect generalized colonic ileus or [place] syndrome. 2. Sigmoid colonic anastomosis. 3. Enteric tube with tip in the gastric fundus. Small hiatal hernia. 4.  Jose Kathy satisfactory    Dictated by (CST): Kim Garcia MD on 6/12/2019 at 8:00     Approved by (CST): Kim Garcia MD on 6/12/2019 at 8:04          Xr Chest Ap Portable  (cpt=71045)    Result Date: 6/11/2019  CONCLUSION:   Partially imaged enteric t status, sepsis, respiratory failure, acute renal failure initially seen by the GI service 5/3 by Dr. John Gallo for coffee-ground emesis and a small bowel obstruction though repeat imaging suggestive of ileus and eventually undergoing colonoscopy with biopsy a risk which she is to want to pursue it.   Wife stated that given he is so awake and alert she would like to pursue all interventions except compressions if he ever had a cardiac arrest.  She is okay with EGD/colonoscopy and surgery if needed even though she

## 2019-06-12 NOTE — PLAN OF CARE
Pt converted to af rates up to 150- sbp also elevated will start Cardizem gtt protocol- if sbp still elevated use prn hydralazine - will follow

## 2019-06-12 NOTE — PROGRESS NOTES
[facility] AND CLINICS  [facility] Cardiology Progress Note    Gustavo Parada Patient Status:  Inpatient    1960 MRN [id number]   Location [facility] 2W/SW Attending Jonathan Madrigal, [address]  [address] Day # 39 PCP LATIA Aguilera MD     61 yo male with multipl Chronic pain     chronic narcotics   • Congestive heart disease (HCC)    • Diabetes ([facility] [address]    • Diverticulitis    • Esophageal reflux    • Essential hypertension    • Fibromyalgia    • Hepatic Encephalopathy (HCC)     lactulose   • High blood pressure    •

## 2019-06-13 ENCOUNTER — APPOINTMENT (OUTPATIENT)
Dept: GENERAL RADIOLOGY | Facility: HOSPITAL | Age: 59
DRG: 003 | End: 2019-06-13
Attending: INTERNAL MEDICINE
Payer: MEDICARE

## 2019-06-13 LAB
ANION GAP SERPL CALC-SCNC: 5 MMOL/L (ref 0–18)
BASOPHILS # BLD AUTO: 0.02 X10(3) UL (ref 0–0.2)
BASOPHILS NFR BLD AUTO: 0.2 %
BUN BLD-MCNC: 31 MG/DL (ref 7–18)
BUN/CREAT SERPL: 32.6 (ref 10–20)
CALCIUM BLD-MCNC: 7.5 MG/DL (ref 8.5–10.1)
CHLORIDE SERPL-SCNC: 115 MMOL/L (ref 98–112)
CO2 SERPL-SCNC: 23 MMOL/L (ref 21–32)
CREAT BLD-MCNC: 0.95 MG/DL (ref 0.7–1.3)
DEPRECATED RDW RBC AUTO: 61.5 FL (ref 35.1–46.3)
EOSINOPHIL # BLD AUTO: 0.43 X10(3) UL (ref 0–0.7)
EOSINOPHIL NFR BLD AUTO: 4.6 %
ERYTHROCYTE [DISTWIDTH] IN BLOOD BY AUTOMATED COUNT: 17.4 % (ref 11–15)
GLUCOSE BLD-MCNC: 150 MG/DL (ref 70–99)
GLUCOSE BLDC GLUCOMTR-MCNC: 140 MG/DL (ref 70–99)
GLUCOSE BLDC GLUCOMTR-MCNC: 150 MG/DL (ref 70–99)
GLUCOSE BLDC GLUCOMTR-MCNC: 151 MG/DL (ref 70–99)
GLUCOSE BLDC GLUCOMTR-MCNC: 190 MG/DL (ref 70–99)
HCT VFR BLD AUTO: 28.8 % (ref 39–53)
HCT VFR BLD AUTO: 31.3 % (ref 39–53)
HGB BLD-MCNC: 10.1 G/DL (ref 13–17.5)
HGB BLD-MCNC: 9.2 G/DL (ref 13–17.5)
IMM GRANULOCYTES # BLD AUTO: 0.04 X10(3) UL (ref 0–1)
IMM GRANULOCYTES NFR BLD: 0.4 %
LYMPHOCYTES # BLD AUTO: 0.71 X10(3) UL (ref 1–4)
LYMPHOCYTES NFR BLD AUTO: 7.6 %
MCH RBC QN AUTO: 31.4 PG (ref 26–34)
MCHC RBC AUTO-ENTMCNC: 31.9 G/DL (ref 31–37)
MCV RBC AUTO: 98.3 FL (ref 80–100)
MONOCYTES # BLD AUTO: 0.72 X10(3) UL (ref 0.1–1)
MONOCYTES NFR BLD AUTO: 7.7 %
NEUTROPHILS # BLD AUTO: 7.47 X10 (3) UL (ref 1.5–7.7)
NEUTROPHILS # BLD AUTO: 7.47 X10(3) UL (ref 1.5–7.7)
NEUTROPHILS NFR BLD AUTO: 79.5 %
OSMOLALITY SERPL CALC.SUM OF ELEC: 305 MOSM/KG (ref 275–295)
PLATELET # BLD AUTO: 143 10(3)UL (ref 150–450)
POTASSIUM SERPL-SCNC: 3.6 MMOL/L (ref 3.5–5.1)
POTASSIUM SERPL-SCNC: 4.1 MMOL/L (ref 3.5–5.1)
RBC # BLD AUTO: 2.93 X10(6)UL (ref 4.3–5.7)
SODIUM SERPL-SCNC: 143 MMOL/L (ref 136–145)
WBC # BLD AUTO: 9.4 X10(3) UL (ref 4–11)

## 2019-06-13 PROCEDURE — 99233 SBSQ HOSP IP/OBS HIGH 50: CPT | Performed by: INTERNAL MEDICINE

## 2019-06-13 PROCEDURE — 99232 SBSQ HOSP IP/OBS MODERATE 35: CPT | Performed by: INTERNAL MEDICINE

## 2019-06-13 PROCEDURE — 71045 X-RAY EXAM CHEST 1 VIEW: CPT | Performed by: INTERNAL MEDICINE

## 2019-06-13 PROCEDURE — 99291 CRITICAL CARE FIRST HOUR: CPT | Performed by: INTERNAL MEDICINE

## 2019-06-13 RX ORDER — POTASSIUM CHLORIDE 1.5 G/1.77G
40 POWDER, FOR SOLUTION ORAL EVERY 4 HOURS
Status: COMPLETED | OUTPATIENT
Start: 2019-06-13 | End: 2019-06-13

## 2019-06-13 NOTE — PROGRESS NOTES
[facility] [facility]    Progress Note    Guido Cui Patient Status:  Inpatient    1960 MRN [id number]   Location [facility] 2W/SW Attending Amy Alanis,  [facility] Se Day # 43 PCP LATIA Stevens MD            Subjective:         Pt m 56* 44* 32*  --  31*   CREATSERUM 1.21 1.19 1.15  --  0.95   GFRAA 75 77 80  --  101   GFRNAA 65 66 69  --  87   CA 8.1* 7.9* 7.7*  --  7.5*   ALB 2.5* 2.9*  --   --   --     140 140  --  143   K 3.5 3.7 3.0* 2.9* 3.6    110 111  --  115*   CO2 Dictated by (CST): Anna Pelayo MD on 6/12/2019 at 9:41     Approved by (CST): Anna Pelayo MD on 6/12/2019 at 10:02          Xr Chest Ap Portable  (cpt=71045)    Result Date: 6/13/2019  CONCLUSION:  1. Persistent left basilar opacification.     Angelica Robles 6/11/2019 at 13:44     Approved by (CST):  Nik Rao MD on 6/11/2019 at 13:48                    Assessment and Plan:     Sepsis due to undetermined organism [facility])  SBO (small bowel obstruction) ([facility] [address] vs ileus  Septic shock, shock liver, ARF, [facility]  6/13/2019  11:12 AM

## 2019-06-13 NOTE — PLAN OF CARE
Problem: Patient Centered Care  Goal: Patient preferences are identified and integrated in the patient's plan of care  Description  Interventions:  - What would you like us to know as we care for you?  Keep wife involved in his care  - Provide timely, com and interruptions  - Encourage family to assist in orientation and promotion of home routines  Outcome: Progressing     Problem: Safety Risk - Non-Violent Restraints  Goal: Patient will remain free from self-harm  Description  INTERVENTIONS:  - Apply the l Assess for changes in mentation and behavior  - Position to facilitate oxygenation and minimize respiratory effort  - Oxygen supplementation based on oxygen saturation or ABGs  - Provide Smoking Cessation handout, if applicable  - Encourage broncho-pulmona

## 2019-06-13 NOTE — PLAN OF CARE
Pt remains in a left hand restraint to preserve his NG tube. Discussed with wife Bing Miranda and she is aware of discontinuation criteria.   Problem: Safety Risk - Non-Violent Restraints  Goal: Patient will remain free from self-harm  Description  INTERVENTIONS

## 2019-06-13 NOTE — PROGRESS NOTES
[place] [facility]    Progress Note      Subjective:     Awake and alert.  BMs in brown color - no blood     Review of Systems:     Unable to obtain - tracheostomy     Objective:   Temp:  [97 °F (36.1 °C)-98.9 °F (37.2 °C)] 98.3 °F (36.8 °C)  Puls chloride 0.9% 500 mL IVPB 15 mg/kg (Adjusted) Intravenous Q24H   Miconazole Nitrate 2 % powder  [email or web address] [email or web address]   morphINE sulfate (PF) 2 MG/ML injection 2 mg 2 mg Intravenous Q2H PRN   levETIRAcetam (KEPPRA) 100 MG/ML solution 1,000 mg 1,000 mg Per --  143.0*    < > = values in this interval not displayed.      Recent Labs   Lab 06/10/19  0542 06/11/19  0406 06/12/19  0556 06/12/19 2022 06/13/19 0627   * 172* 159*  --  150*   BUN 56* 44* 32*  --  31*   CREATSERUM 1.21 1.19 1.15  --  0.95   GF and monitor - replace potassium   - high risk of recurrent AKIs     2.  Septic shock  - parainfluenza +ve and pneumonia on admission with SBO/ileus   - likely recent shock from abdominal process / aspiration pneumonia  - s/p decompressive colonoscopy - acut

## 2019-06-13 NOTE — PROGRESS NOTES
[facility] AND [facility]  [facility] Cardiology Progress Note    Roberto Sickle Patient Status:  Inpatient    1960 MRN [id number]   Location [facility] 2W/SW Attending Rhiannon Bone,  [facility] Se Day # 43 PCP LATIA Galeas MD     61 yo male with multipl [facility])    • Diverticulitis    • Esophageal reflux    • Essential hypertension    • Fibromyalgia    • Hepatic Encephalopathy (HCC)     lactulose   • High blood pressure    • Hyperlipidemia    • Lipid screening 1/17/2011    per NG   • Morbid obesity ([facility] [address]

## 2019-06-13 NOTE — PROGRESS NOTES
[address]     Gastroenterology Progress Note    Dayanara Jordan Patient Status:  Inpatient    1960 MRN [id number]   Location [facility] 2W/SW Attending Betty Drake, [address] [facility] Se Day # 43 PCP LATIA Guan MD       Sub intra-abdominal fluid collection. Findings may reflect generalized colonic ileus or Beth syndrome. 2. Sigmoid colonic anastomosis. 3. Enteric tube with tip in the gastric fundus. Small hiatal hernia.  4. Mild residual nonspecific perihepatic free fluid lower lobe regions with moderate elevation left diaphragm. Differential includes pulmonary congestion, contained congestive failure  versus multifocal pneumonitis.   New line remaining support tubes and catheters are satisfactory    Dictated by (CST): [employer] failure, chronic pain on chronic narcotics, prior colon surgery for a colovesical fistula years ago who was admitted 5/2/2019 after presenting from a facility for cough, fever, confusion found to have altered mental status, sepsis, respiratory failure, acu electrolytes, turn in bed, avoid narcotics  -consider EGD - timing to be determined  -discussed colonoscopy with wife and feels having BM and stable, ok to do if needed but hold off now    Discussed with nursing and MD Angel Willams

## 2019-06-13 NOTE — PROGRESS NOTES
Pulmonary/Critical Care Follow Up Note    HPI:   Dayanara Gentleman is a 60year old male with Patient presents with:  Fever (infectious)  Altered Mental Status (neurologic)      PCP LATIA Guan MD  Admission Attending No admitting provider for patient encoun PRN  •  levETIRAcetam (KEPPRA) 100 MG/ML solution 1,000 mg, 1,000 mg, Per NG Tube, BID  •  insulin detemir (LEVEMIR) 100 UNIT/ML flextouch 55 Units, 55 Units, Subcutaneous, Daily  •  calcium acetate (PHOSLO) cap 667 mg, 1 capsule, Per G Tube, TID CC  •  Al %.    Intake/Output Summary (Last 24 hours) at 6/13/2019 1013  Last data filed at 6/13/2019 0600  Gross per 24 hour   Intake 3951 ml   Output 2200 ml   Net 1751 ml     NAD  Awake, follows commands  Lung dec b/l bases  CV  reg   Abd distended but better,  m Bumex prn   Follow SCR and UOP   Renal following and daily eval for RRT    Encephalopathy  following commands consistently  Head CT neg  Plan      Follow        H/o CHF  Systolic EF 12% in 3701  Followed by cards  Now central congestion edema vs PNA  ECHO (APRESOLINE) injection 10 mg, 10 mg, Intravenous, Q4H PRN  •  dilTIAZem HCl (CARDIZEM) tab 30 mg, 30 mg, Per NG Tube, 4 times per day  •  adult 3 in 1 TPN, , Intravenous, Continuous TPN  •  bumetanide (BUMEX) 0.25 MG/ML injection 1 mg, 1 mg, Intravenous, D acetaminophen (TYLENOL EXTRA STRENGTH) tab 1,000 mg, 1,000 mg, Oral, Q6H PRN  •  Pantoprazole Sodium (PROTONIX) 40 mg in Sodium Chloride 0.9 % 10 mL IV push, 40 mg, Intravenous, Q12H  •  Normal Saline Flush 0.9 % injection 10 mL, 10 mL, Intravenous, PRN  • parainfluenza, abdominal embarrassment  CXR stable/improve  Failed extubation  S/p trach  Plan     Vent weaning as tolerated   Nebs       GI  High grade pseudo obstruction  ?  Narcotic related and areas of Bx suggestive of ischemic colitis  S/p decomp lower 30 min spent during hx, exam, and documentation          Caryle Parsons, MD

## 2019-06-13 NOTE — PROGRESS NOTES
[facility] ID PROGRESS NOTE    Carmen Castellanos Patient Status:  Inpatient    1960 MRN [id number]   Location [facility] 2W/SW Attending Shorty Fuller MD   [facility] Day # 43 PCP LATIA Alcantara MD     Subjective:  Awake, 40% on ventilator.  One green BM o syndrome     Acute GI bleeding     Acute blood loss anemia     Rectal bleeding      ASSESSMENT:    Antibiotics:  OVP, Vancomycin, Meropenem  IV zosyn 5/2-5/11, IV vancomycin 5/2-5/7, IV meropenem 5/12-5/26, metronidazole 5/12-5/25, IV vancomycin 5/12-5/25, findings of acute colonic pseudoobstruction, pseudomembranes. Dusky mucosa. No mechanical obstruction. Pathology with concern for ischemic colitis. Underwent tracheostomy 5/22.   Now with downtrending hemoglobin with rectal bleeding with hemoglobin as l and anemia    PLAN:  -  Continue on vancomycin, meropenem, and OVP.  -  FU final blood cx susceptibilities. -  CT A/P 6/12 reviewed. -  Follow fever curve, wbc. -  Reviewed labs, micro, imaging reports.  -  Case d/w patient, RN.     Derick Stevens PA-C

## 2019-06-13 NOTE — PHYSICAL THERAPY NOTE
PHYSICAL THERAPY RE-EVALUATION - INPATIENT     Room Number: 224/224-A  Evaluation Date: 6/13/2019  Type of Evaluation: Re-evaluation   Physician Order: PT Eval and Treat    Presenting Problem: sepsis due to parainfluenza, + VE,  pneumonia(KOMAL with dialysi (RN to find an appropriate chair for the pt this evening). The patient's Approx Degree of Impairment: 100% has been calculated based on documentation in the [facility] '6 clicks' Inpatient Basic Mobility Short Form.   Research supports that patients with t Tayla Yan MD at [address]   • TRACHEOSTOMY N/A 5/22/2019    Performed by Jordan Sharma MD at [facility]  Type of Home: House   Home Layout: One level  Stairs to Enter :  2(unclear how from another person does the patient currently need. ..   -   Moving to and from a bed to a chair (including a wheelchair)?: Total   -   Need to walk in hospital room?: Total   -   Climbing 3-5 steps with a railing?: Total     AM-PAC Score:  Raw Score: 6

## 2019-06-13 NOTE — OCCUPATIONAL THERAPY NOTE
OCCUPATIONAL THERAPY RE EVALUATION - INPATIENT        Room Number: 456/266-I    Presenting Problem: sepsis    Problem List  Principal Problem:    Sepsis due to undetermined organism [facility])  Active Problems:    Atrial fibrillation with rapid ventricular resp Restriction: None    PAIN ASSESSMENT  Rating: Unable to rate  Location: (generalized)  Management Techniques:  Activity promotion;Repositioning     ACTIVITY TOLERANCE  Good for aarom ex     ACTIVITIES OF DAILY LIVING ASSESSMENT  AM-PAC ‘6-Clicks’ Inpatient

## 2019-06-14 ENCOUNTER — APPOINTMENT (OUTPATIENT)
Dept: GENERAL RADIOLOGY | Facility: HOSPITAL | Age: 59
DRG: 003 | End: 2019-06-14
Attending: SURGERY
Payer: MEDICARE

## 2019-06-14 LAB
ANION GAP SERPL CALC-SCNC: 6 MMOL/L (ref 0–18)
BUN BLD-MCNC: 31 MG/DL (ref 7–18)
BUN/CREAT SERPL: 36.9 (ref 10–20)
CALCIUM BLD-MCNC: 8 MG/DL (ref 8.5–10.1)
CHLORIDE SERPL-SCNC: 115 MMOL/L (ref 98–112)
CK SERPL-CCNC: 24 U/L (ref 39–308)
CO2 SERPL-SCNC: 25 MMOL/L (ref 21–32)
CREAT BLD-MCNC: 0.84 MG/DL (ref 0.7–1.3)
GLUCOSE BLD-MCNC: 110 MG/DL (ref 70–99)
GLUCOSE BLDC GLUCOMTR-MCNC: 109 MG/DL (ref 70–99)
GLUCOSE BLDC GLUCOMTR-MCNC: 115 MG/DL (ref 70–99)
GLUCOSE BLDC GLUCOMTR-MCNC: 117 MG/DL (ref 70–99)
GLUCOSE BLDC GLUCOMTR-MCNC: 87 MG/DL (ref 70–99)
GLUCOSE BLDC GLUCOMTR-MCNC: 89 MG/DL (ref 70–99)
HAV IGM SER QL: 1.9 MG/DL (ref 1.6–2.6)
HCT VFR BLD AUTO: 28.3 % (ref 39–53)
HCT VFR BLD AUTO: 29.9 % (ref 39–53)
HGB BLD-MCNC: 9 G/DL (ref 13–17.5)
HGB BLD-MCNC: 9.5 G/DL (ref 13–17.5)
OSMOLALITY SERPL CALC.SUM OF ELEC: 309 MOSM/KG (ref 275–295)
PHOSPHATE SERPL-MCNC: 2 MG/DL (ref 2.5–4.9)
POTASSIUM SERPL-SCNC: 4.2 MMOL/L (ref 3.5–5.1)
SODIUM SERPL-SCNC: 146 MMOL/L (ref 136–145)
WBC # BLD AUTO: 7.8 X10(3) UL (ref 4–11)

## 2019-06-14 PROCEDURE — 99233 SBSQ HOSP IP/OBS HIGH 50: CPT | Performed by: INTERNAL MEDICINE

## 2019-06-14 PROCEDURE — 99291 CRITICAL CARE FIRST HOUR: CPT | Performed by: INTERNAL MEDICINE

## 2019-06-14 PROCEDURE — 74018 RADEX ABDOMEN 1 VIEW: CPT | Performed by: SURGERY

## 2019-06-14 PROCEDURE — 0DJD8ZZ INSPECTION OF LOWER INTESTINAL TRACT, VIA NATURAL OR ARTIFICIAL OPENING ENDOSCOPIC: ICD-10-PCS | Performed by: INTERNAL MEDICINE

## 2019-06-14 PROCEDURE — 99232 SBSQ HOSP IP/OBS MODERATE 35: CPT | Performed by: INTERNAL MEDICINE

## 2019-06-14 PROCEDURE — 0D7E8ZZ DILATION OF LARGE INTESTINE, VIA NATURAL OR ARTIFICIAL OPENING ENDOSCOPIC: ICD-10-PCS | Performed by: INTERNAL MEDICINE

## 2019-06-14 PROCEDURE — G0500 MOD SEDAT ENDO SERVICE >5YRS: HCPCS | Performed by: INTERNAL MEDICINE

## 2019-06-14 PROCEDURE — 45378 DIAGNOSTIC COLONOSCOPY: CPT | Performed by: INTERNAL MEDICINE

## 2019-06-14 RX ORDER — MIDAZOLAM HYDROCHLORIDE 1 MG/ML
INJECTION INTRAMUSCULAR; INTRAVENOUS
Status: DISCONTINUED | OUTPATIENT
Start: 2019-06-14 | End: 2019-07-01

## 2019-06-14 RX ORDER — BUMETANIDE 0.25 MG/ML
1 INJECTION, SOLUTION INTRAMUSCULAR; INTRAVENOUS
Status: DISCONTINUED | OUTPATIENT
Start: 2019-06-14 | End: 2019-06-18

## 2019-06-14 RX ORDER — HEPARIN SODIUM 5000 [USP'U]/ML
5000 INJECTION, SOLUTION INTRAVENOUS; SUBCUTANEOUS EVERY 12 HOURS
Status: DISCONTINUED | OUTPATIENT
Start: 2019-06-14 | End: 2019-06-15

## 2019-06-14 NOTE — PROGRESS NOTES
Short GI note    Patient seen this AM   Abdomen more distended. Reviewed KUB and cecum >15 cm. Stable from CT so likely closer to 10-11 cm.   VS improved  Patient in more pain  Moving around and was doing pressure trial and possibly up to chair    /

## 2019-06-14 NOTE — PROGRESS NOTES
Pulmonary/Critical Care Follow Up Note    HPI:   Magda Ko is a 60year old male with Patient presents with:  Fever (infectious)  Altered Mental Status (neurologic)      PCP LATIA Jesus MD  Admission Attending No admitting provider for patient encoun , Topical, [email or web address]  •  morphINE sulfate (PF) 2 MG/ML injection 2 mg, 2 mg, Intravenous, Q2H PRN  •  levETIRAcetam (KEPPRA) 100 MG/ML solution 1,000 mg, 1,000 mg, Per NG Tube, BID  •  insulin detemir (LEVEMIR) 100 UNIT/ML flextouch 55 Units, 55 Units, rate 19, height 5' 5.67\" (1.668 m), weight (!) 331 lb 14.4 oz (150.5 kg), SpO2 100 %.     Intake/Output Summary (Last 24 hours) at 6/14/2019 1403  Last data filed at 6/14/2019 0528  Gross per 24 hour   Intake 3351 ml   Output 2475 ml   Net 876 ml     NAD urgency  Better  Low side now  Plan      Prn anti HTN meds      KOMAL  3nd episode  Now progressive and severe  Presumed from septic shock  Plan     Bumex prn   follow    Encephalopathy  following commands consistently  Head CT neg  Plan      Follow        H

## 2019-06-14 NOTE — PHYSICAL THERAPY NOTE
Attempted PT treatment- pt undergoing colonoscopy with sedation- will re-attempt on 6/15/19  Thank you,  Marina Dad, PT, DPT

## 2019-06-14 NOTE — INTERVAL H&P NOTE
Pre-op Diagnosis: For decompression    The above referenced H&P was reviewed by Magda Tang MD on 6/14/2019, the patient was examined and no significant changes have occurred in the patient's condition since the H&P was performed.   I discussed with the pa

## 2019-06-14 NOTE — PLAN OF CARE
Problem: Patient Centered Care  Goal: Patient preferences are identified and integrated in the patient's plan of care  Description  Interventions:  - What would you like us to know as we care for you?  Keep wife involved in his care  - Provide timely, com cycle i.e. lights off, TV off, minimize noise and interruptions  - Encourage family to assist in orientation and promotion of home routines  Outcome: Progressing  Note:   Follows commands and is appropriate     Problem: Safety Risk - Non-Violent Restraints RESPIRATORY - ADULT  Goal: Achieves optimal ventilation and oxygenation  Description  INTERVENTIONS:  - Assess for changes in respiratory status  - Assess for changes in mentation and behavior  - Position to facilitate oxygenation and minimize respiratory redness and/or skin breakdown  - Initiate interventions, skin care algorithm/standards of care as needed  Outcome: Progressing  Note:   Turning q2h     Problem: NEUROLOGICAL - ADULT  Goal: Achieves stable or improved neurological status  Description  INTER coordinating discharge planning if the patient needs post-hospital services based on physician/LIP order or complex needs related to functional status, cognitive ability or social support system  Outcome: Progressing     Problem: HEMATOLOGIC - ADULT  Goal:

## 2019-06-14 NOTE — PLAN OF CARE
Up to chair with use of sling, on pressure support most of the shift, ABD soft after scope done at bedside, wife updated on plan of care, denies pain, able to follow commands, active ROM with left arm only.    Problem: Patient Centered Care  Goal: Patient p level of mobility daily  - Provide frequent reorientation  - Promote wakefulness i.e. lights on, blinds open  - Promote sleep, encourage patient's normal rest cycle i.e. lights off, TV off, minimize noise and interruptions  - Encourage family to assist in replacement therapy as ordered  Outcome: Progressing     Problem: RESPIRATORY - ADULT  Goal: Achieves optimal ventilation and oxygenation  Description  INTERVENTIONS:  - Assess for changes in respiratory status  - Assess for changes in mentation and behavi patient safety including physical limitations  - Instruct pt to call for assistance with activity based on assessment  - Modify environment to reduce risk of injury  - Provide assistive devices as appropriate  - Consider OT/PT consult to assist with streng

## 2019-06-14 NOTE — OCCUPATIONAL THERAPY NOTE
OT attempted to work with patient, however was in procedure in room; will follow up later schedule permitting.     Ruth Flores, OTR/L   [facility]

## 2019-06-14 NOTE — OPERATIVE REPORT
COLONOSCOPY REPORT    Migdalia Evans     1960 Age 60year old   PCP LATIA Felipe MD Endoscopist Agustina Marquez MD     Date of procedure: 19    Procedure: Colonoscopy w/ IV sedation and placement of decompression tube    Pre-operative diagnosis Surgical anastomosis, unclear anatomy, with ischemia at the anastomosis and in second blind lumen. Normal colon tissue distal and proximal to anastomosis. 2. Could not visualize small bowel    3.  Successful placement of decompression tube distal to isc

## 2019-06-14 NOTE — PROGRESS NOTES
[facility] ID PROGRESS NOTE    Esther Toro Patient Status:  Inpatient    1960 MRN [id number]   Location [facility] 2W/SW Attending Laurance Goodpasture, MD   [facility] Day # 37 PCP LATIA Rocha MD     Subjective:  Awake, 40% on ventilator.  Two BMs overni kidney injury) ([facility] [address]     Beth's syndrome     Acute GI bleeding     Acute blood loss anemia     Rectal bleeding      ASSESSMENT:    Antibiotics:  OVP, Vancomycin, Meropenem  IV zosyn 5/2-5/11, IV vancomycin 5/2-5/7, IV meropenem 5/12-5/26, metronidazole placement by GI on 5/16 with findings of acute colonic pseudoobstruction, pseudomembranes. Dusky mucosa. No mechanical obstruction. Pathology with concern for ischemic colitis. Underwent tracheostomy 5/22.   Now with downtrending hemoglobin with rectal opoid dependence  # CHF  # GI bleed and anemia    PLAN:  -  Continue on meropenem. Staph epidermidis susceptibilities noted with vanc WAYLON of 2 so will change to daptomycin at this time. Micro to run daptomycin susceptibilities.  Continue on OVP.  -  KUB rev

## 2019-06-14 NOTE — PRE-SEDATION ASSESSMENT
Physician Pre-Sedation Assessment    Pre-Sedation Assessment:    Sedation History: Previous Sedation with No Complications and Airway Assessed    Cardiac: normal S1, S2  Respiratory: trach, breath sounds clear bilaterally   Abdomen:hypoactive, severe diste

## 2019-06-14 NOTE — RESPIRATORY THERAPY NOTE
Patient placed on CPAP5 PSV 12 40%. Tolerating well at this time. Parameters charted. Rt will monitor

## 2019-06-14 NOTE — PROGRESS NOTES
[facility] AND [facility]  MHS/AMG Cardiology Progress Note    Aloha Numbers Patient Status:  Inpatient    1960 MRN [id number]   Location [facility] 2W/SW Attending Gurpreet Cedeno MD   [facility] Day # 37 PCP LATIA Jack MD     61 yo male with multipl pain     chronic narcotics   • Congestive heart disease (HCC)    • Diabetes ([facility] [address]    • Diverticulitis    • Esophageal reflux    • Essential hypertension    • Fibromyalgia    • Hepatic Encephalopathy (HCC)     lactulose   • High blood pressure    • Hyperlip

## 2019-06-14 NOTE — PROGRESS NOTES
[facility]    Progress Note    Jefferson Jeffers Patient Status:  Inpatient    1960 MRN [id number]   Location [facility] 2W/SW Attending Lainey Hope MD   [facility] Day # 37 PCP LATIA Joel MD            Subjective:          Pt w displayed.      Lab Results   Component Value Date    INR 1.65 (H) 06/12/2019       Recent Labs   Lab 06/10/19  0542 06/11/19  0406 06/12/19  0556  06/13/19  0627 06/13/19  1854 06/14/19  0408   * 172* 159*  --  150*  --  110*   BUN 56* 44* 32*  -- ileus  Septic shock, shock liver, ARF, Coagulopathy  Diarrhea - r/o C. Diff  Leucocytosis    Patient has had bleeding intermittently,  The less bleeding at the present time. Still coagulopathic Tendency. ,     Tube feedings held again for present time    G

## 2019-06-15 LAB
ANION GAP SERPL CALC-SCNC: 6 MMOL/L (ref 0–18)
BASOPHILS # BLD AUTO: 0.03 X10(3) UL (ref 0–0.2)
BASOPHILS NFR BLD AUTO: 0.5 %
BUN BLD-MCNC: 28 MG/DL (ref 7–18)
BUN/CREAT SERPL: 36.4 (ref 10–20)
CALCIUM BLD-MCNC: 7.9 MG/DL (ref 8.5–10.1)
CHLORIDE SERPL-SCNC: 112 MMOL/L (ref 98–112)
CO2 SERPL-SCNC: 28 MMOL/L (ref 21–32)
CREAT BLD-MCNC: 0.77 MG/DL (ref 0.7–1.3)
DEPRECATED RDW RBC AUTO: 62.2 FL (ref 35.1–46.3)
EOSINOPHIL # BLD AUTO: 0.53 X10(3) UL (ref 0–0.7)
EOSINOPHIL NFR BLD AUTO: 8.9 %
ERYTHROCYTE [DISTWIDTH] IN BLOOD BY AUTOMATED COUNT: 17.3 % (ref 11–15)
GLUCOSE BLD-MCNC: 91 MG/DL (ref 70–99)
GLUCOSE BLDC GLUCOMTR-MCNC: 101 MG/DL (ref 70–99)
GLUCOSE BLDC GLUCOMTR-MCNC: 90 MG/DL (ref 70–99)
GLUCOSE BLDC GLUCOMTR-MCNC: 93 MG/DL (ref 70–99)
GLUCOSE BLDC GLUCOMTR-MCNC: 98 MG/DL (ref 70–99)
HCT VFR BLD AUTO: 29.7 % (ref 39–53)
HGB BLD-MCNC: 9.3 G/DL (ref 13–17.5)
IMM GRANULOCYTES # BLD AUTO: 0.02 X10(3) UL (ref 0–1)
IMM GRANULOCYTES NFR BLD: 0.3 %
LYMPHOCYTES # BLD AUTO: 0.66 X10(3) UL (ref 1–4)
LYMPHOCYTES NFR BLD AUTO: 11.1 %
MCH RBC QN AUTO: 30.9 PG (ref 26–34)
MCHC RBC AUTO-ENTMCNC: 31.3 G/DL (ref 31–37)
MCV RBC AUTO: 98.7 FL (ref 80–100)
MONOCYTES # BLD AUTO: 0.49 X10(3) UL (ref 0.1–1)
MONOCYTES NFR BLD AUTO: 8.2 %
NEUTROPHILS # BLD AUTO: 4.22 X10 (3) UL (ref 1.5–7.7)
NEUTROPHILS # BLD AUTO: 4.22 X10(3) UL (ref 1.5–7.7)
NEUTROPHILS NFR BLD AUTO: 71 %
OSMOLALITY SERPL CALC.SUM OF ELEC: 307 MOSM/KG (ref 275–295)
PLATELET # BLD AUTO: 160 10(3)UL (ref 150–450)
POTASSIUM SERPL-SCNC: 3.7 MMOL/L (ref 3.5–5.1)
RBC # BLD AUTO: 3.01 X10(6)UL (ref 4.3–5.7)
SODIUM SERPL-SCNC: 146 MMOL/L (ref 136–145)
WBC # BLD AUTO: 6 X10(3) UL (ref 4–11)

## 2019-06-15 PROCEDURE — 99232 SBSQ HOSP IP/OBS MODERATE 35: CPT | Performed by: INTERNAL MEDICINE

## 2019-06-15 PROCEDURE — 99233 SBSQ HOSP IP/OBS HIGH 50: CPT | Performed by: INTERNAL MEDICINE

## 2019-06-15 RX ORDER — ACETAMINOPHEN 10 MG/ML
1000 INJECTION, SOLUTION INTRAVENOUS EVERY 6 HOURS
Status: DISCONTINUED | OUTPATIENT
Start: 2019-06-16 | End: 2019-06-19

## 2019-06-15 RX ORDER — HEPARIN SODIUM 5000 [USP'U]/ML
5000 INJECTION, SOLUTION INTRAVENOUS; SUBCUTANEOUS EVERY 12 HOURS
Status: DISCONTINUED | OUTPATIENT
Start: 2019-06-16 | End: 2019-07-01

## 2019-06-15 RX ORDER — METOCLOPRAMIDE HYDROCHLORIDE 5 MG/ML
5 INJECTION INTRAMUSCULAR; INTRAVENOUS EVERY 8 HOURS
Status: DISCONTINUED | OUTPATIENT
Start: 2019-06-16 | End: 2019-07-01

## 2019-06-15 RX ORDER — POTASSIUM CHLORIDE 1.5 G/1.77G
40 POWDER, FOR SOLUTION ORAL ONCE
Status: COMPLETED | OUTPATIENT
Start: 2019-06-15 | End: 2019-06-15

## 2019-06-15 NOTE — PROGRESS NOTES
[place] [facility]    Progress Note    Julian Shepherd Patient Status:  Inpatient    1960 MRN [id number]   Location [facility] 2W/SW Attending Anuj Coker, [address] [facility] [address] Day # 40 PCP C. Auther Boast, MD            Subjective:          Pt w 172*   < > 150*  --  110* 91   BUN 56* 44*   < > 31*  --  31* 28*   CREATSERUM 1.21 1.19   < > 0.95  --  0.84 0.77   GFRAA 75 77   < > 101  --  111 115   GFRNAA 65 66   < > 87  --  96 99   CA 8.1* 7.9*   < > 7.5*  --  8.0* 7.9*   ALB 2.5* 2.9*  --   --   - remains possibility that situation of colon may worsen, rebleeding or perforation, however, due to high risk of surgery and pt generally condition, cont support for present time  Plan to cont hyperal for present time          Repeat CT scan  No evidence of bilaterally  CARDIOVASCULAR: S1, S2   ABDOMEN: BS+, cont abd distension,The distension has been larger during last several days, difficult to know if actually increased.  Pt does have mild to mod tenderness deep palpation right side today, However, he has h Similar findings noted previously. . 2. Nasogastric tube within the stomach. 3. Cardiomegaly.   Bibasilar parenchymal abnormality persists    Dictated by (CST): Kim Garcia MD on 6/14/2019 at 8:10     Approved by (CST): Kim Garcia MD on 6/1 sudden worsening of situation such as perforation, bleeding      Antonio Christina MD [facility]  General Surgery  [facility]  6/14/2019  9:46 AM

## 2019-06-15 NOTE — PROGRESS NOTES
[facility]    Progress Note    Jesus Villa Patient Status:  Inpatient    1960 MRN [id number]   Location [facility] 2W/SW Attending Freeman Aceves MD   [facility] Day # 37 PCP LATIA Ceron MD       Subjective:   Jesus Villa is >4.00 (H) 05/30/2019    MG 1.9 06/14/2019    PHOS 2.0 (L) 06/14/2019    CK 24 (L) 06/14/2019    B12 >2,000 (H) 05/11/2019       Imaging:  [unfilled]   Xr Abdomen (1 View) (cpt=74018)    Result Date: 6/14/2019  CONCLUSION:  1.  Generalized colonic ileus

## 2019-06-15 NOTE — PROGRESS NOTES
[facility] [address]     Gastroenterology Progress Note    Julian Joao Patient Status:  Inpatient    1960 MRN [id number]   Location [facility] 2W/SW Attending Anuj Coker, [address] [facility] [address] Day # 40 PCP C. Auther Boast, MD       Sub by (CST): Anais Augustin MD on 6/14/2019 at 8:10     Approved by (CST): Anais Augustin MD on 6/14/2019 at 8:19                Assessment and Plan:   Patient is a 60year old man with BMI 52, sleep apnea, diabetes, pulmonary hypertension, high blo

## 2019-06-15 NOTE — PROGRESS NOTES
Pulmonary/Critical Care Follow Up Note    HPI:   Luly Deluca is a 60year old male with Patient presents with:  Fever (infectious)  Altered Mental Status (neurologic)      PCP LATIA Damon MD  Admission Attending No admitting provider for patient encoun Continuous  •  Normal Saline Flush 0.9 % injection 10 mL, 10 mL, Intravenous, PRN  •  Miconazole Nitrate 2 % powder, , Topical, [email or web address]  •  morphINE sulfate (PF) 2 MG/ML injection 2 mg, 2 mg, Intravenous, Q2H PRN  •  levETIRAcetam (KEPPRA) 100 MG/ML RASH        PHYSICAL EXAM:   Blood pressure 142/79, pulse 73, temperature 97 °F (36.1 °C), temperature source Temporal, resp. rate (!) 27, height 5' 5.67\" (1.668 m), weight (!) 331 lb 14.4 oz (150.5 kg), SpO2 100 %.     Intake/Output Summary (Last 24 hours dependence  Weaned off over 20+ days in CCU  Plan prn morphine    HTN urgency  Better  Low side now  Plan      Prn anti HTN meds      KOMAL  3nd episode  Now progressive and severe  Presumed from septic shock  Plan     Bumex prn   follow    Encephalopathy  f

## 2019-06-15 NOTE — PROGRESS NOTES
[place] [facility]    Cardiology Progress Note    Mary Alice Jha Patient Status:  Inpatient    1960 MRN [id number]   Location [facility] 2W/SW Attending Candida Parker,  [facility] Se Day # 40 PCP LATIA Slaughter MD         Assessment and P • Heparin Sodium (Porcine)  5,000 Units Subcutaneous Q12H   • dilTIAZem HCl  30 mg Per NG Tube 4 times per day   • meropenem  1 g Intravenous Q8H   • acetaminophen  1,000 mg Intravenous Q6H   • Miconazole Nitrate   Topical [email or web address]   • levETIRAcetam

## 2019-06-15 NOTE — PROGRESS NOTES
06/15/19 0812   Readings   Total RR 16   Minute Ventilation (L/min) 9.1 L/min   Expiratory Tidal Volume 580 mL   PIP Observed (cm H2O) 20 cm H2O   MAP (cm H2O) 10   Auto PEEP Observed (cm H2O) 5 cm H2O   Plateau Pressure (cm H2O) 20 cm H2O

## 2019-06-15 NOTE — PLAN OF CARE
Problem: Patient Centered Care  Goal: Patient preferences are identified and integrated in the patient's plan of care  Description  Interventions:  - What would you like us to know as we care for you?  Keep wife involved in his care  - Provide timely, com encourage patient's normal rest cycle i.e. lights off, TV off, minimize noise and interruptions  - Encourage family to assist in orientation and promotion of home routines  Outcome: Progressing     Problem: Safety Risk - Non-Violent Restraints  Goal: Patie Spirometry  - Assess the need for suctioning and perform as needed  - Assess and instruct to report SOB or any respiratory difficulty  - Respiratory Therapy support as indicated  - Manage/alleviate anxiety  - Monitor for signs/symptoms of CO2 retention  Diana Varghese development  - Assess and document skin integrity  - Monitor for areas of redness and/or skin breakdown  - Initiate interventions, skin care algorithm/standards of care as needed  Outcome: Progressing     Problem: NEUROLOGICAL - ADULT  Goal: Achieves stabl health  - Refer to Case Management Department for coordinating discharge planning if the patient needs post-hospital services based on physician/LIP order or complex needs related to functional status, cognitive ability or social support system  Outcome: P

## 2019-06-16 LAB
ANION GAP SERPL CALC-SCNC: 5 MMOL/L (ref 0–18)
BASOPHILS # BLD AUTO: 0.03 X10(3) UL (ref 0–0.2)
BASOPHILS NFR BLD AUTO: 0.6 %
BUN BLD-MCNC: 25 MG/DL (ref 7–18)
BUN/CREAT SERPL: 33.8 (ref 10–20)
CALCIUM BLD-MCNC: 7.9 MG/DL (ref 8.5–10.1)
CHLORIDE SERPL-SCNC: 112 MMOL/L (ref 98–112)
CO2 SERPL-SCNC: 29 MMOL/L (ref 21–32)
CREAT BLD-MCNC: 0.74 MG/DL (ref 0.7–1.3)
DEPRECATED RDW RBC AUTO: 61.6 FL (ref 35.1–46.3)
EOSINOPHIL # BLD AUTO: 0.48 X10(3) UL (ref 0–0.7)
EOSINOPHIL NFR BLD AUTO: 9 %
ERYTHROCYTE [DISTWIDTH] IN BLOOD BY AUTOMATED COUNT: 17.1 % (ref 11–15)
GLUCOSE BLD-MCNC: 111 MG/DL (ref 70–99)
GLUCOSE BLDC GLUCOMTR-MCNC: 128 MG/DL (ref 70–99)
GLUCOSE BLDC GLUCOMTR-MCNC: 131 MG/DL (ref 70–99)
GLUCOSE BLDC GLUCOMTR-MCNC: 93 MG/DL (ref 70–99)
GLUCOSE BLDC GLUCOMTR-MCNC: 96 MG/DL (ref 70–99)
HAV IGM SER QL: 1.9 MG/DL (ref 1.6–2.6)
HCT VFR BLD AUTO: 30.7 % (ref 39–53)
HGB BLD-MCNC: 9.5 G/DL (ref 13–17.5)
IMM GRANULOCYTES # BLD AUTO: 0.02 X10(3) UL (ref 0–1)
IMM GRANULOCYTES NFR BLD: 0.4 %
LYMPHOCYTES # BLD AUTO: 0.72 X10(3) UL (ref 1–4)
LYMPHOCYTES NFR BLD AUTO: 13.6 %
MCH RBC QN AUTO: 30.6 PG (ref 26–34)
MCHC RBC AUTO-ENTMCNC: 30.9 G/DL (ref 31–37)
MCV RBC AUTO: 99 FL (ref 80–100)
MONOCYTES # BLD AUTO: 0.47 X10(3) UL (ref 0.1–1)
MONOCYTES NFR BLD AUTO: 8.9 %
NEUTROPHILS # BLD AUTO: 3.59 X10 (3) UL (ref 1.5–7.7)
NEUTROPHILS # BLD AUTO: 3.59 X10(3) UL (ref 1.5–7.7)
NEUTROPHILS NFR BLD AUTO: 67.5 %
OSMOLALITY SERPL CALC.SUM OF ELEC: 307 MOSM/KG (ref 275–295)
PHOSPHATE SERPL-MCNC: 3.2 MG/DL (ref 2.5–4.9)
PLATELET # BLD AUTO: 163 10(3)UL (ref 150–450)
POTASSIUM SERPL-SCNC: 3.9 MMOL/L (ref 3.5–5.1)
RBC # BLD AUTO: 3.1 X10(6)UL (ref 4.3–5.7)
SODIUM SERPL-SCNC: 146 MMOL/L (ref 136–145)
WBC # BLD AUTO: 5.3 X10(3) UL (ref 4–11)

## 2019-06-16 PROCEDURE — 99232 SBSQ HOSP IP/OBS MODERATE 35: CPT | Performed by: INTERNAL MEDICINE

## 2019-06-16 PROCEDURE — 99233 SBSQ HOSP IP/OBS HIGH 50: CPT | Performed by: INTERNAL MEDICINE

## 2019-06-16 RX ORDER — SODIUM CHLORIDE 9 MG/ML
INJECTION, SOLUTION INTRAVENOUS CONTINUOUS
Status: DISCONTINUED | OUTPATIENT
Start: 2019-06-16 | End: 2019-07-01

## 2019-06-16 RX ORDER — SODIUM CHLORIDE 9 MG/ML
INJECTION, SOLUTION INTRAVENOUS CONTINUOUS
Status: DISCONTINUED | OUTPATIENT
Start: 2019-06-16 | End: 2019-06-19

## 2019-06-16 NOTE — PROGRESS NOTES
[facility]    Progress Note    Curt Frias Patient Status:  Inpatient    1960 MRN [id number]   Location [facility] 2W/SW Attending Donald Hurd MD   [facility] Day # 39 PCP LATIA Hayes MD            Subjective: 1.65 (H) 06/12/2019       Recent Labs   Lab 06/10/19  0542 06/11/19  0406  06/14/19  0408 06/15/19  0412 06/16/19  0546   * 172*   < > 110* 91 111*   BUN 56* 44*   < > 31* 28* 25*   CREATSERUM 1.21 1.19   < > 0.84 0.77 0.74   GFRAA 75 77   < > 111 1 distended previously the right side and now more diffusely distended. plain filmportable.      Xray today shows cont dilitation of colon, read as 15 cm but difficult to see actual wall of cecum due to overlapping dialated small bowel  She had colonoscopy

## 2019-06-16 NOTE — PROGRESS NOTES
06/16/19 0755   Readings   Total RR 18   Minute Ventilation (L/min) 8.6 L/min   Vt Spontaneous (mL) 510 mL   MAP (cm H2O) 10   Auto PEEP Observed (cm H2O) 6 cm H2O   Plateau Pressure (cm H2O) 21 cm H2O

## 2019-06-16 NOTE — PROGRESS NOTES
[facility] - [facility]    Cardiology Progress Note    [facility] Patient Status:  Inpatient    1960 MRN [id number]   Location [facility] 2W/SW Attending Ruthann Amaya MD   [facility] Day # 39 PCP LATIA Velazquez MD         Assessment and P cyanosis  Neuro: no focal deficits  Skin: no rashes or lesions      Scheduled Meds:   • acetaminophen  1,000 mg Intravenous Q6H   • Heparin Sodium (Porcine)  5,000 Units Subcutaneous Q12H   • Metoclopramide HCl  5 mg Intravenous Q8H   • pantoprazole (MEAGAN

## 2019-06-16 NOTE — PLAN OF CARE
Continues to require soft wrist restraint on left wrist to prevent removal of necessary medical equipment.     Problem: Safety Risk - Non-Violent Restraints  Goal: Patient will remain free from self-harm  Description  INTERVENTIONS:  - Apply the least restr

## 2019-06-16 NOTE — PROGRESS NOTES
[facility]     Gastroenterology Progress Note    Curt Frias Patient Status:  Inpatient    1960 MRN [id number]   Location [facility] 2W/SW Attending Donald Hurd, [address] [facility] Se Day # 39 PCP LATIA Hayes MD       Sub admitted 5/2/2019 after presenting from a facility for cough, fever, confusion found to have altered mental status, sepsis, respiratory failure, acute renal failure initially seen by the GI service 5/3 by Dr. Dmitriy Healy for coffee-ground emesis and a small bow

## 2019-06-16 NOTE — PLAN OF CARE
Monitor reveals first degree AV block, HR 50-80 bpm, PAC's noted in trigeminal pattern, Diltiazem held. BP elevated this a.m., Hydralazine 10 mg IVP x1 with improvement. Tolerates CPAP settings during the day, full AC setting overnight.  Abdomen soft, flexi Therapy support as indicated  - Manage/alleviate anxiety  - Monitor for signs/symptoms of CO2 retention  Outcome: Progressing     Problem: GASTROINTESTINAL - ADULT  Goal: Maintains or returns to baseline bowel function  Description  INTERVENTIONS:  - Asses

## 2019-06-16 NOTE — PROGRESS NOTES
Pulmonary/Critical Care Follow Up Note    HPI:   Rm Calle is a 60year old male with Patient presents with:  Fever (infectious)  Altered Mental Status (neurologic)      PCP LATIA Mcmahon MD  Admission Attending No admitting provider for patient encoun 60 mg, Oral, PRN  •  hydrALAzine HCl (APRESOLINE) injection 10 mg, 10 mg, Intravenous, Q4H PRN  •  dilTIAZem HCl (CARDIZEM) tab 30 mg, 30 mg, Per NG Tube, 4 times per day  •  meropenem (MERREM) 1 g in sodium chloride 0.9% 100 mL MBP, 1 g, Intravenous, Q8H Comment:Tolerated Zosyn  Thimerosal              RASH        PHYSICAL EXAM:   Blood pressure (!) 152/93, pulse 77, temperature 97 °F (36.1 °C), temperature source Temporal, resp.  rate 22, height 5' 5.67\" (1.668 m), weight (!) 322 lb 12.8 oz (146.4 kg), Sp following        GIB  BRB in rectal tube  S/p FFP and cryo  Stopped now  Plan      Serial H/H    GI and surgery following        Opioid dependence  Weaned off over 20+ days in CCU  Plan prn morphine    HTN urgency  Better  Low side now  Plan      Prn anti

## 2019-06-17 ENCOUNTER — MED REC SCAN ONLY (OUTPATIENT)
Dept: GASTROENTEROLOGY | Facility: CLINIC | Age: 59
End: 2019-06-17

## 2019-06-17 ENCOUNTER — APPOINTMENT (OUTPATIENT)
Dept: GENERAL RADIOLOGY | Facility: HOSPITAL | Age: 59
DRG: 003 | End: 2019-06-17
Attending: SURGERY
Payer: MEDICARE

## 2019-06-17 ENCOUNTER — APPOINTMENT (OUTPATIENT)
Dept: GENERAL RADIOLOGY | Facility: HOSPITAL | Age: 59
DRG: 003 | End: 2019-06-17
Attending: INTERNAL MEDICINE
Payer: MEDICARE

## 2019-06-17 LAB
ANION GAP SERPL CALC-SCNC: 4 MMOL/L (ref 0–18)
BASOPHILS # BLD AUTO: 0.02 X10(3) UL (ref 0–0.2)
BASOPHILS NFR BLD AUTO: 0.4 %
BUN BLD-MCNC: 21 MG/DL (ref 7–18)
BUN/CREAT SERPL: 29.6 (ref 10–20)
CALCIUM BLD-MCNC: 7.9 MG/DL (ref 8.5–10.1)
CHLORIDE SERPL-SCNC: 110 MMOL/L (ref 98–112)
CO2 SERPL-SCNC: 31 MMOL/L (ref 21–32)
CREAT BLD-MCNC: 0.71 MG/DL (ref 0.7–1.3)
DEPRECATED RDW RBC AUTO: 60.5 FL (ref 35.1–46.3)
EOSINOPHIL # BLD AUTO: 0.57 X10(3) UL (ref 0–0.7)
EOSINOPHIL NFR BLD AUTO: 10.5 %
ERYTHROCYTE [DISTWIDTH] IN BLOOD BY AUTOMATED COUNT: 17 % (ref 11–15)
GLUCOSE BLD-MCNC: 92 MG/DL (ref 70–99)
GLUCOSE BLDC GLUCOMTR-MCNC: 106 MG/DL (ref 70–99)
GLUCOSE BLDC GLUCOMTR-MCNC: 92 MG/DL (ref 70–99)
GLUCOSE BLDC GLUCOMTR-MCNC: 93 MG/DL (ref 70–99)
HAV IGM SER QL: 1.8 MG/DL (ref 1.6–2.6)
HCT VFR BLD AUTO: 31.1 % (ref 39–53)
HGB BLD-MCNC: 9.8 G/DL (ref 13–17.5)
IMM GRANULOCYTES # BLD AUTO: 0.04 X10(3) UL (ref 0–1)
IMM GRANULOCYTES NFR BLD: 0.7 %
LYMPHOCYTES # BLD AUTO: 0.84 X10(3) UL (ref 1–4)
LYMPHOCYTES NFR BLD AUTO: 15.5 %
MCH RBC QN AUTO: 30.9 PG (ref 26–34)
MCHC RBC AUTO-ENTMCNC: 31.5 G/DL (ref 31–37)
MCV RBC AUTO: 98.1 FL (ref 80–100)
MONOCYTES # BLD AUTO: 0.45 X10(3) UL (ref 0.1–1)
MONOCYTES NFR BLD AUTO: 8.3 %
NEUTROPHILS # BLD AUTO: 3.51 X10 (3) UL (ref 1.5–7.7)
NEUTROPHILS # BLD AUTO: 3.51 X10(3) UL (ref 1.5–7.7)
NEUTROPHILS NFR BLD AUTO: 64.6 %
OSMOLALITY SERPL CALC.SUM OF ELEC: 303 MOSM/KG (ref 275–295)
PHOSPHATE SERPL-MCNC: 3.2 MG/DL (ref 2.5–4.9)
PLATELET # BLD AUTO: 150 10(3)UL (ref 150–450)
POTASSIUM SERPL-SCNC: 3.5 MMOL/L (ref 3.5–5.1)
RBC # BLD AUTO: 3.17 X10(6)UL (ref 4.3–5.7)
SODIUM SERPL-SCNC: 145 MMOL/L (ref 136–145)
WBC # BLD AUTO: 5.4 X10(3) UL (ref 4–11)

## 2019-06-17 PROCEDURE — 74018 RADEX ABDOMEN 1 VIEW: CPT | Performed by: SURGERY

## 2019-06-17 PROCEDURE — 99232 SBSQ HOSP IP/OBS MODERATE 35: CPT | Performed by: INTERNAL MEDICINE

## 2019-06-17 PROCEDURE — 71045 X-RAY EXAM CHEST 1 VIEW: CPT | Performed by: INTERNAL MEDICINE

## 2019-06-17 PROCEDURE — 99233 SBSQ HOSP IP/OBS HIGH 50: CPT | Performed by: INTERNAL MEDICINE

## 2019-06-17 RX ORDER — POTASSIUM CHLORIDE 1.5 G/1.77G
40 POWDER, FOR SOLUTION ORAL EVERY 4 HOURS
Status: DISCONTINUED | OUTPATIENT
Start: 2019-06-17 | End: 2019-06-17

## 2019-06-17 RX ORDER — MAGNESIUM SULFATE HEPTAHYDRATE 40 MG/ML
2 INJECTION, SOLUTION INTRAVENOUS ONCE
Status: COMPLETED | OUTPATIENT
Start: 2019-06-17 | End: 2019-06-17

## 2019-06-17 RX ORDER — LEVETIRACETAM 500 MG/1
1000 TABLET ORAL 2 TIMES DAILY
Status: DISCONTINUED | OUTPATIENT
Start: 2019-06-17 | End: 2019-07-01

## 2019-06-17 RX ORDER — POTASSIUM CHLORIDE 29.8 MG/ML
40 INJECTION INTRAVENOUS ONCE
Status: COMPLETED | OUTPATIENT
Start: 2019-06-17 | End: 2019-06-17

## 2019-06-17 NOTE — RESPIRATORY THERAPY NOTE
Patient placed on trach colllar 40% per RN  without complication . HR 79 RR17 Sat 100%. Patient up in chair. No distress noted. Rn notified.

## 2019-06-17 NOTE — PROGRESS NOTES
Pulmonary/Critical Care Follow Up Note    HPI:   Dayanara Gentleman is a 60year old male with Patient presents with:  Fever (infectious)  Altered Mental Status (neurologic)      PCP LATIA Guan MD  Admission Attending No admitting provider for patient encoun premix/add-vantage, 2.5-20 mg/hr, Intravenous, Continuous  •  dilTIAZem HCl (CARDIZEM) tab 60 mg, 60 mg, Oral, PRN  •  hydrALAzine HCl (APRESOLINE) injection 10 mg, 10 mg, Intravenous, Q4H PRN  •  dilTIAZem HCl (CARDIZEM) tab 30 mg, 30 mg, Per NG Tube, 4 t UNKNOWN    Comment:Tolerated Zosyn  Thimerosal              RASH        PHYSICAL EXAM:   Blood pressure 158/90, pulse 84, temperature 98.5 °F (36.9 °C), temperature source Temporal, resp. rate 19, height 5' 5.67\" (1.668 m), weight (!) 314 lb 9.6 oz (142. 7 near future     suregery and GI following        GIB  BRB in rectal tube  S/p FFP and cryo  Stopped now  Stable H/H  Plan      daily H/H    GI and surgery following        Opioid dependence  Weaned off over 20+ days in CCU  Plan prn morphine    HTN urgency

## 2019-06-17 NOTE — PLAN OF CARE
Problem: Safety Risk - Non-Violent Restraints  Goal: Patient will remain free from self-harm  Description  INTERVENTIONS:  - Apply the least restrictive restraint to prevent harm  - Notify patient and family of reasons restraints applied  - Assess for an optimal ventilation and oxygenation  Description  INTERVENTIONS:  - Assess for changes in respiratory status  - Assess for changes in mentation and behavior  - Position to facilitate oxygenation and minimize respiratory effort  - Oxygen supplementation bas status  Description  INTERVENTIONS  - Assess for and report changes in neurological status  - Initiate measures to prevent increased intracranial pressure  - Maintain blood pressure and fluid volume within ordered parameters to optimize cerebral perfusion

## 2019-06-17 NOTE — PHYSICAL THERAPY NOTE
PHYSICAL THERAPY TREATMENT NOTE - INPATIENT     Room Number: 310/922-W       Presenting Problem: sepsis due to parainfluenza, + VE,  pneumonia(KOMAL with dialysis 5/14-5/31; s/p trach, SBO/ileus)    Problem List  Principal Problem:    Sepsis due to [facility] Static Sitting: Fair +  Dynamic Sitting: Not tested           Static Standing: Not tested  Dynamic Standing: Not tested    ACTIVITY TOLERANCE  Pulse: 84  Heart Rate Source: Monitor  Resp 1.    Goal #3   Current Status  NT

## 2019-06-17 NOTE — OCCUPATIONAL THERAPY NOTE
OCCUPATIONAL THERAPY TREATMENT NOTE - INPATIENT        Room Number: 763/802-M           Presenting Problem: sepsis    Problem List  Principal Problem:    Sepsis due to undetermined organism [facility])  Active Problems:    Atrial fibrillation with rapid ventricu Techniques: Repositioning     ACTIVITY TOLERANCE       /92 supine-> 147/87 seated up in chair; -02 95-98% trach to vent; HR 80-83    Patient fatiguing quickly with therapeutic exercises for BUE; tolerated x1 set ~8-10 repetitions before requiring inc

## 2019-06-17 NOTE — SPIRITUAL CARE NOTE
Pt was alone in bed at the time of visit. Pt was unresponsive. When asked his feeling, Pt shakes his head no in response. When asked if I could pray for him, he nod his head yes. THE  shared a scripture and a prayer. ML

## 2019-06-17 NOTE — PROGRESS NOTES
[facility]    Cardiology Progress Note    Brayden Garcia Patient Status:  Inpatient    1960 MRN [id number]   Location [facility] 2W/SW Attending Yang Zimmerman, [address] [facility] Se Day # 55 PCP LATIA Cornell MD         Assessment and P Sodium (Porcine)  5,000 Units Subcutaneous Q12H   • Metoclopramide HCl  5 mg Intravenous Q8H   • pantoprazole (PROTONIX) IV push  40 mg Intravenous Q12H   • bumetanide  1 mg Intravenous BID (Diuretic)   • DAPTOmycin  6 mg/kg (Adjusted) Intravenous Q24H   •

## 2019-06-17 NOTE — CM/SW NOTE
Care Coordination Note    Progression of Care: Hospital Day # 55  Saw pt at bedside earlier to assess his progress. Pt sitting up in a chair, alert, awake, orientated to person/place, follows simple commands, on trach collar, appearing comfortable.  He gave

## 2019-06-17 NOTE — PROGRESS NOTES
[facility] ID PROGRESS NOTE    Carmen Castellanos Patient Status:  Inpatient    1960 MRN [id number]   Location [facility] 2W/SW Attending Shorty Fuller MD   [facility] Day # 55 PCP LATIA Alcantara MD     Subjective:  Awake, 40% on ventilator. Tmax 99.6. bleeding      ASSESSMENT:    Antibiotics:  OVP, Vancomycin, Meropenem  IV zosyn 5/2-5/11, IV vancomycin 5/2-5/7, IV meropenem 5/12-5/26, metronidazole 5/12-5/25, IV vancomycin 5/12-5/25, PO vancomycin 1112    60year old male.  Patient is a 39-year-old male No mechanical obstruction. Pathology with concern for ischemic colitis. Underwent tracheostomy 5/22. Now with downtrending hemoglobin with rectal bleeding with hemoglobin as low as 6.5.   With the bleeding noted to have low-grade fevers up to 100.8 with meropenem, day 7 of 10, and daptomycin, day 4 of 7. Continue on OVP.   -  KUB reviewed with decreased amount of distension.  -  Follow fever curve, wbc. -  Reviewed labs, micro, imaging reports.  -  Case d/w patient, RN, micro.     Funmilayo Buitrago PA-C  Met

## 2019-06-17 NOTE — DIETARY NOTE
ADULT NUTRITION REASSESSMENT     Pt is at high nutrition risk. Pt does not meet malnutrition criteria. RECOMMENDATIONS TO MD: See Nutrition Intervention for CPN rx. Recommend decrease levemir or can decrease insulin in CPN.       NUTRITION DIAGNOSIS/P continue same insulin regime/endocrine svc. Plan for hemodialysis tomorrow. 5/20 ADDENDUM @1500:  Received call/Dr. Kendy Avendaño to discuss starting low rate tube feedings  this pm and holding till tomorrow before advance further to assess tolerance.  Will begin fat kcals, 1910 total kcals, ~95% kcals goal and 100 % protein goal.  - Medical Food Supplements-NPO  - Vitamin and mineral supplements: none  - Feeding assistance: NPO  - Nutrition education: assess education needs in future if appropriate.    - Coordinati colonoscpy  5/28- no perf but  ulceration + inflammation and surgery notes potential surgery though high risk. Potential Xfr to tertiary care transplant center if continue GIB.       6/7: Still with GI Bleed. 2 bloody BM yesterday. High risk for Surgery. reviewed. Hypokalemia d/t bumex and GI losses. TPN K+ increased accordingly. Mg replaced and increased in TPN. co2 increasing- will need to adjust TPN for less bicarb tomorrow and increase chloride.  BG acceptable but trending down- recommend decrease cristelai acceptable limits,  ( LTG: weight loss). euglycemia Transition to EN or po intake(if extubated in future)  if/when able to safely utilize GI tract      DIETITIAN FOLLOW UP: RD to follow up within 5 days . Manage TPN daily.    March Raf RD, [address] [facility], [address] [place]  (

## 2019-06-17 NOTE — PROGRESS NOTES
[facility]     Gastroenterology Progress Note    Rosita Hillman Patient Status:  Inpatient    1960 MRN [id number]   Location [facility] 2W/SW Attending Lennox Portillo, [address] [facility] Se Day # 55 PCP LATIA Augustine MD       Sub Approved by (CST): Sven Villanueva MD on 6/17/2019 at 8:07                Assessment and Plan:   Patient is a 60year old man with BMI 52, sleep apnea, diabetes, pulmonary hypertension, high blood pressure, high cholesterol, heart failure, chronic rectal catheter.     Recommend:  -continued supportive care - nutrition, blood transfusion prn   -correct electrolytes, turn in bed, avoid narcotics  -surgery following       Xavier Ornelas MD  [facility], [facility] - Gastroenterology

## 2019-06-17 NOTE — PROGRESS NOTES
[facility]    Progress Note    Vanessa Tavarez Patient Status:  Inpatient    1960 MRN [id number]   Location [facility] 2W/SW Attending Sunny Ibrahim,  [facility] Se Day # 55 PCP LATIA Campbell MD            Subjective: < > 28.0 29.0 31.0   ALKPHO 92  --   --   --   --    AST 36  --   --   --   --    ALT 39  --   --   --   --    BILT 0.9  --   --   --   --    TP 7.5  --   --   --   --     < > = values in this interval not displayed.              Xr Abdomen (1 View) (cpt=74 PM

## 2019-06-18 LAB
ANION GAP SERPL CALC-SCNC: 4 MMOL/L (ref 0–18)
BASOPHILS # BLD AUTO: 0.02 X10(3) UL (ref 0–0.2)
BASOPHILS NFR BLD AUTO: 0.3 %
BUN BLD-MCNC: 20 MG/DL (ref 7–18)
BUN/CREAT SERPL: 29 (ref 10–20)
CALCIUM BLD-MCNC: 8.1 MG/DL (ref 8.5–10.1)
CHLORIDE SERPL-SCNC: 109 MMOL/L (ref 98–112)
CO2 SERPL-SCNC: 31 MMOL/L (ref 21–32)
CREAT BLD-MCNC: 0.69 MG/DL (ref 0.7–1.3)
DEPRECATED RDW RBC AUTO: 61.3 FL (ref 35.1–46.3)
EOSINOPHIL # BLD AUTO: 0.65 X10(3) UL (ref 0–0.7)
EOSINOPHIL NFR BLD AUTO: 10.7 %
ERYTHROCYTE [DISTWIDTH] IN BLOOD BY AUTOMATED COUNT: 17.2 % (ref 11–15)
GLUCOSE BLD-MCNC: 73 MG/DL (ref 70–99)
GLUCOSE BLDC GLUCOMTR-MCNC: 75 MG/DL (ref 70–99)
GLUCOSE BLDC GLUCOMTR-MCNC: 89 MG/DL (ref 70–99)
GLUCOSE BLDC GLUCOMTR-MCNC: 94 MG/DL (ref 70–99)
GLUCOSE BLDC GLUCOMTR-MCNC: 95 MG/DL (ref 70–99)
HAV IGM SER QL: 2.3 MG/DL (ref 1.6–2.6)
HCT VFR BLD AUTO: 29.6 % (ref 39–53)
HGB BLD-MCNC: 9.4 G/DL (ref 13–17.5)
IMM GRANULOCYTES # BLD AUTO: 0.04 X10(3) UL (ref 0–1)
IMM GRANULOCYTES NFR BLD: 0.7 %
LYMPHOCYTES # BLD AUTO: 1.04 X10(3) UL (ref 1–4)
LYMPHOCYTES NFR BLD AUTO: 17.1 %
MCH RBC QN AUTO: 31.1 PG (ref 26–34)
MCHC RBC AUTO-ENTMCNC: 31.8 G/DL (ref 31–37)
MCV RBC AUTO: 98 FL (ref 80–100)
MONOCYTES # BLD AUTO: 0.62 X10(3) UL (ref 0.1–1)
MONOCYTES NFR BLD AUTO: 10.2 %
NEUTROPHILS # BLD AUTO: 3.7 X10 (3) UL (ref 1.5–7.7)
NEUTROPHILS # BLD AUTO: 3.7 X10(3) UL (ref 1.5–7.7)
NEUTROPHILS NFR BLD AUTO: 61 %
OSMOLALITY SERPL CALC.SUM OF ELEC: 299 MOSM/KG (ref 275–295)
PHOSPHATE SERPL-MCNC: 2.8 MG/DL (ref 2.5–4.9)
PLATELET # BLD AUTO: 162 10(3)UL (ref 150–450)
POTASSIUM SERPL-SCNC: 4.3 MMOL/L (ref 3.5–5.1)
RBC # BLD AUTO: 3.02 X10(6)UL (ref 4.3–5.7)
SODIUM SERPL-SCNC: 144 MMOL/L (ref 136–145)
TRIGL SERPL-MCNC: 104 MG/DL (ref 30–149)
WBC # BLD AUTO: 6.1 X10(3) UL (ref 4–11)

## 2019-06-18 PROCEDURE — 99233 SBSQ HOSP IP/OBS HIGH 50: CPT | Performed by: INTERNAL MEDICINE

## 2019-06-18 PROCEDURE — 99232 SBSQ HOSP IP/OBS MODERATE 35: CPT | Performed by: INTERNAL MEDICINE

## 2019-06-18 RX ORDER — BUMETANIDE 0.25 MG/ML
1 INJECTION, SOLUTION INTRAMUSCULAR; INTRAVENOUS 3 TIMES DAILY
Status: DISCONTINUED | OUTPATIENT
Start: 2019-06-18 | End: 2019-07-01

## 2019-06-18 NOTE — PROGRESS NOTES
[place] [facility] - [facility]    Cardiology Progress Note    Luly Deluca Patient Status:  Inpatient    1960 MRN [id number]   Location [facility] 2W/SW Attending Kiarra Patrick,  [facility] Se Day # 52 PCP LATIA Damon MD         Assessment and P pantoprazole (PROTONIX) IV push  40 mg Intravenous Q12H   • bumetanide  1 mg Intravenous BID (Diuretic)   • DAPTOmycin  6 mg/kg (Adjusted) Intravenous Q24H   • dilTIAZem HCl  30 mg Per NG Tube 4 times per day   • meropenem  1 g Intravenous Q8H   • Miconazo discrepancies. Dictated by (CST): Lucia Mcconnell MD on 6/18/2019 at 6:21     Approved by (CST): Lucia Mcconnell MD on 6/18/2019 at 6:25          Xr Chest Ap Portable  (cpt=71045)    Result Date: 6/17/2019  CONCLUSION:  1.  The nasogastric tube is po

## 2019-06-18 NOTE — PROGRESS NOTES
[facility]    Progress Note    Roel Alexandra Patient Status:  Inpatient    1960 MRN [id number]   Location [facility] 2W/SW Attending Quan Arreguin, [address] [facility] Se Day # 52 PCP LATIA Britt MD            Subjective: placement of rectal catheter. Dictated by (CST): José Miguel Esteves MD on 6/17/2019 at 8:05     Approved by (CST): José Miguel Esteves MD on 6/17/2019 at 8:07          Xr Chest Ap Portable  (cpt=71045)    Result Date: 6/18/2019  CONCLUSION:  1. source confirmed. Appears clinically to be from right colon. I previously discussed options including extended right hemicolectomy to remove the persistently dialated colon and prob source of bleeding;   However, pt will be high risk due to general condit

## 2019-06-18 NOTE — PLAN OF CARE
Pt requiring left sided soft wrist restraint in order to prevent pulling of Ng, PICC, and trach/vent tube. This safety precaution will remain in place until patient is able to safely comply with medical plan.

## 2019-06-18 NOTE — PROGRESS NOTES
[facility] ID PROGRESS NOTE    Minerva Score Patient Status:  Inpatient    1960 MRN [id number]   Location [facility] 2W/SW Attending Michael , [address] [facility] Se Day # 52 PCP LATIA Linares MD     Subjective:  Awake, tolerated trach collar yesterday. bleeding      ASSESSMENT:    Antibiotics:  OVP, Daptomycin, Meropenem  IV zosyn 5/2-5/11, IV vancomycin 5/2-5/7, IV meropenem 5/12-5/26, metronidazole 5/12-5/25, IV vancomycin 5/12-5/25, PO vancomycin 1112    60year old male.  Patient is a 43-year-old male No mechanical obstruction. Pathology with concern for ischemic colitis. Underwent tracheostomy 5/22. Now with downtrending hemoglobin with rectal bleeding with hemoglobin as low as 6.5.   With the bleeding noted to have low-grade fevers up to 100.8 with meropenem, day 8 of 10, and daptomycin, day 5 of 7 (EOT 6/20/19). Continue on OVP. -  Follow fever curve, wbc. -  Reviewed labs, micro, imaging reports.  -  Case d/w patient, RN.     Hilton Aguilar PA-C  [facility]  [phone]

## 2019-06-18 NOTE — DIETARY NOTE
ADULT NUTRITION REASSESSMENT   Addendum tube feeding start 6/18  Pt is at high nutrition risk. Pt does not meet malnutrition criteria. RECOMMENDATIONS TO MD: See Nutrition Intervention for CPN  And new EN rx.  Will need to re-eval insulin rx tomorrow feed enteral route. TPN to continue per surgeon providing 100% nutrition needs. X1 low BG @MN rx with 25 gm dextrose, otherwise BG levels acceptable and to continue same insulin regime/endocrine svc. Plan for hemodialysis tomorrow.    5/20 ADDENDUM @1500: needed in future. 6/18 Update: Received MD consult to initiate low rate tube feeds. TPN already ordered.  Will begin using low residue Osmolite 1.2 formula:   NUTRITION INTERVENTION:  - Enteral Nutrition: Osmolite 1.2 start trickle rate: 20 ml/hr X ave 22 Weight(s) for the past 336 hrs:   Weight   06/17/19 0443 (!) 142.7 kg (314 lb 9.6 oz)   06/16/19 0600 (!) 146.4 kg (322 lb 12.8 oz)   06/14/19 0500 (!) 150.5 kg (331 lb 14.4 oz)   06/07/19 0600 (!) 149.9 kg (330 lb 8 oz)   06/06/19 0400 (!) 148.6 kg (327 l • artificial tears   Both Eyes TID   • Nortriptyline HCl  50 mg Oral Nightly   Meds infusing:   • adult 3 in 1 TPN     • dextrose     • adult 3 in 1 TPN 75 mL/hr at 06/17/19 2156   • sodium chloride 5 mL/hr at 06/16/19 0600   • sodium chloride 5 mL/hr at per kg Ideal body wt (IBW) for pt with morbid obesity and moderate hypocaloric rx. Fluid needs: ~2000 ml (20 ml/kg using adjusted IBW for morbid obesity)-standard needs. Need to  adjust per clinical status noting hx CHF, improved u/o, much edema.      MON

## 2019-06-18 NOTE — PROGRESS NOTES
Received call from radiologist Dr. Melania Noe. NG tube placed during AM shift was in the right bronchus. Recommended tube be removed and reinserted.

## 2019-06-18 NOTE — PROGRESS NOTES
NG tube was reinserted via right nare at 52 cm.  Radiologist called stating she could not really visualize where the NG tube was, but that there was a '83% certainty it was in the stomach'.       This RN verified placement by auscultating - audible bubble s

## 2019-06-18 NOTE — PLAN OF CARE
Problem: ALTERED NUTRIENT INTAKE - ADULT  Goal: Nutrient intake appropriate for improving, restoring or maintaining nutritional needs  Description  INTERVENTIONS:  - Assess nutritional status and recommend course of action  - Monitor oral intake if resum

## 2019-06-18 NOTE — PLAN OF CARE
Pt Aox3. VSS. NSR to SB with pauses. Rectal tube and flexiseal in place. Burks catheter in place. Accucheck q6h with TPN infusing at 75 mL/hr. Turn q2h. Bed locked in lowest position, side rail x2. Call light within reach.  Increased frequency of nursin delirium  Description  Interventions:  - Encourage use of hearing aids, eye glasses  - Promote highest level of mobility daily  - Provide frequent reorientation  - Promote wakefulness i.e. lights on, blinds open  - Promote sleep, encourage patient's normal Assess the need for suctioning and perform as needed  - Assess and instruct to report SOB or any respiratory difficulty  - Respiratory Therapy support as indicated  - Manage/alleviate anxiety  - Monitor for signs/symptoms of CO2 retention  Outcome: Rachelle Initiate interventions, skin care algorithm/standards of care as needed  Outcome: Progressing     Problem: NEUROLOGICAL - ADULT  Goal: Achieves stable or improved neurological status  Description  INTERVENTIONS  - Assess for and report changes in neurologi post-hospital services based on physician/LIP order or complex needs related to functional status, cognitive ability or social support system  Outcome: Progressing     Problem: HEMATOLOGIC - ADULT  Goal: Free from bleeding injury  Description  (Example Apolinaria

## 2019-06-18 NOTE — PROGRESS NOTES
[place] [facility]    Progress Note    Orquidea Pascal Patient Status:  Inpatient    1960 MRN [id number]   Location [facility] 2W/SW Attending Laci Pickard,  [facility] Se Day # 52 PCP LATIA Kendrick MD        Subjective:     Constituti obesity BMI 51 and Liver cirrhosis                   5- h/p Opioid dependence  Weaned off     6- HFpEF with LVEF 65 % / PAF / HTN   Cardiology following   IV Bumex / good urine out put and less edema with stable BUn/Cr         7- s/p toxic and metabolic En (CST): Gabriela Ko MD on 6/18/2019 at 6:25          Xr Chest Ap Portable  (cpt=71045)    Result Date: 6/17/2019  CONCLUSION:  1. The nasogastric tube is positioned in the right mainstem bronchus and extends to the right lung base.  Removal and reposit

## 2019-06-19 ENCOUNTER — APPOINTMENT (OUTPATIENT)
Dept: GENERAL RADIOLOGY | Facility: HOSPITAL | Age: 59
DRG: 003 | End: 2019-06-19
Attending: PHYSICIAN ASSISTANT
Payer: MEDICARE

## 2019-06-19 LAB
ANION GAP SERPL CALC-SCNC: 2 MMOL/L (ref 0–18)
BASOPHILS # BLD AUTO: 0.02 X10(3) UL (ref 0–0.2)
BASOPHILS NFR BLD AUTO: 0.3 %
BUN BLD-MCNC: 23 MG/DL (ref 7–18)
BUN/CREAT SERPL: 27.7 (ref 10–20)
CALCIUM BLD-MCNC: 8.5 MG/DL (ref 8.5–10.1)
CHLORIDE SERPL-SCNC: 103 MMOL/L (ref 98–112)
CO2 SERPL-SCNC: 33 MMOL/L (ref 21–32)
CREAT BLD-MCNC: 0.83 MG/DL (ref 0.7–1.3)
DEPRECATED RDW RBC AUTO: 61.2 FL (ref 35.1–46.3)
EOSINOPHIL # BLD AUTO: 0.87 X10(3) UL (ref 0–0.7)
EOSINOPHIL NFR BLD AUTO: 13.3 %
ERYTHROCYTE [DISTWIDTH] IN BLOOD BY AUTOMATED COUNT: 17 % (ref 11–15)
GLUCOSE BLD-MCNC: 103 MG/DL (ref 70–99)
GLUCOSE BLDC GLUCOMTR-MCNC: 112 MG/DL (ref 70–99)
GLUCOSE BLDC GLUCOMTR-MCNC: 118 MG/DL (ref 70–99)
GLUCOSE BLDC GLUCOMTR-MCNC: 121 MG/DL (ref 70–99)
GLUCOSE BLDC GLUCOMTR-MCNC: 92 MG/DL (ref 70–99)
HAV IGM SER QL: 2.3 MG/DL (ref 1.6–2.6)
HCT VFR BLD AUTO: 31 % (ref 39–53)
HGB BLD-MCNC: 9.6 G/DL (ref 13–17.5)
IMM GRANULOCYTES # BLD AUTO: 0.03 X10(3) UL (ref 0–1)
IMM GRANULOCYTES NFR BLD: 0.5 %
LYMPHOCYTES # BLD AUTO: 1 X10(3) UL (ref 1–4)
LYMPHOCYTES NFR BLD AUTO: 15.3 %
MCH RBC QN AUTO: 30.6 PG (ref 26–34)
MCHC RBC AUTO-ENTMCNC: 31 G/DL (ref 31–37)
MCV RBC AUTO: 98.7 FL (ref 80–100)
MONOCYTES # BLD AUTO: 0.62 X10(3) UL (ref 0.1–1)
MONOCYTES NFR BLD AUTO: 9.5 %
NEUTROPHILS # BLD AUTO: 3.98 X10 (3) UL (ref 1.5–7.7)
NEUTROPHILS # BLD AUTO: 3.98 X10(3) UL (ref 1.5–7.7)
NEUTROPHILS NFR BLD AUTO: 61.1 %
OSMOLALITY SERPL CALC.SUM OF ELEC: 290 MOSM/KG (ref 275–295)
PHOSPHATE SERPL-MCNC: 4 MG/DL (ref 2.5–4.9)
PLATELET # BLD AUTO: 169 10(3)UL (ref 150–450)
POTASSIUM SERPL-SCNC: 4.3 MMOL/L (ref 3.5–5.1)
RBC # BLD AUTO: 3.14 X10(6)UL (ref 4.3–5.7)
SODIUM SERPL-SCNC: 138 MMOL/L (ref 136–145)
WBC # BLD AUTO: 6.5 X10(3) UL (ref 4–11)

## 2019-06-19 PROCEDURE — 99232 SBSQ HOSP IP/OBS MODERATE 35: CPT | Performed by: INTERNAL MEDICINE

## 2019-06-19 PROCEDURE — 99233 SBSQ HOSP IP/OBS HIGH 50: CPT | Performed by: INTERNAL MEDICINE

## 2019-06-19 PROCEDURE — 71045 X-RAY EXAM CHEST 1 VIEW: CPT | Performed by: PHYSICIAN ASSISTANT

## 2019-06-19 NOTE — OCCUPATIONAL THERAPY NOTE
OCCUPATIONAL THERAPY TREATMENT NOTE - INPATIENT        Room Number: 856/459-O           Presenting Problem: sepsis    Problem List  Principal Problem:    Sepsis due to undetermined organism [facility])  Active Problems:    Atrial fibrillation with rapid ventricu PAIN ASSESSMENT  Rating: Unable to rate  Location: generalized  Management Techniques: Repositioning     ACTIVITY TOLERANCE           Patient tolerated tasks presented; -02 >95% throughout vent to [address]

## 2019-06-19 NOTE — PROGRESS NOTES
[facility]    Cardiology Progress Note    Isaiah Sesay Patient Status:  Inpatient    1960 MRN [id number]   Location [facility] 2W/SW Attending Rental[employer] Nikolay,  [facility] Se Day # 50 PCP LATIA Chand MD         Assessment and P Intravenous Q6H   • Heparin Sodium (Porcine)  5,000 Units Subcutaneous Q12H   • Metoclopramide HCl  5 mg Intravenous Q8H   • pantoprazole (PROTONIX) IV push  40 mg Intravenous Q12H   • DAPTOmycin  6 mg/kg (Adjusted) Intravenous Q24H   • meropenem  1 g Intr and extends to the right lung base. Removal and repositioning is recommended. 2. Small left pleural effusion and bibasilar atelectasis, with or without superimposed pneumonia, not significantly changed. 3. Stable cardiomegaly and hypoinflated lungs.     R

## 2019-06-19 NOTE — PHYSICAL THERAPY NOTE
PHYSICAL THERAPY TREATMENT NOTE - INPATIENT     Room Number: 825/352-D       Presenting Problem: sepsis due to parainfluenza, + VE,  pneumonia(KOMAL with dialysis 5/14-5/31; s/p trach, SBO/ileus)    Problem List  Principal Problem:    Sepsis due to [facility] \"I'm sore. My back, my legs, my arms\"  Management Techniques: Activity promotion; Body mechanics;Repositioning    BALANCE                                                                                                                     Static Sitting: F GOALS   Goals to be met by: 6/27/19  Patient Goal Patient's self-stated goal is: did not state   Goal #1 Pt able to actively participate in LE exercises following commands 75% of the time.    Goal #1   Current Status  GOAL met   Goal #2 Pt to perform bed m

## 2019-06-19 NOTE — PROGRESS NOTES
[facility]    Progress Note    Curt Frias Patient Status:  Inpatient    1960 MRN [id number]   Location [facility] 2W/SW Attending Donald Hurd,  [facility] Se Day # 50 PCP LATIA Hayes MD            Subjective:   States decrease in bibasilar scarring/atelectasis. Mild-to-moderate cardiomegaly and normal pulmonary vascularity.     Dictated by (CST): Imtiaz Mendoza MD on 6/19/2019 at 17:33     Approved by (CST): Imtiaz Mendoza MD on 6/19/2019 at 17:35          Xr Chest Ap P not know source of bleeding for certain. CT with IV contrast - no source confirmed. Appears clinically to be from right colon.    Dr. Chaka Brooks previously discussed options including extended right hemicolectomy to remove the persistently dialated colon and pro

## 2019-06-19 NOTE — PROGRESS NOTES
Pulmonary/Critical Care Follow Up Note    HPI:   Migdalia Evans is a 60year old male with Patient presents with:  Fever (infectious)  Altered Mental Status (neurologic)      PCP LATIA Felipe MD  Admission Attending No admitting provider for patient encoun premix/add-vantage, 2.5-20 mg/hr, Intravenous, Continuous  •  dilTIAZem HCl (CARDIZEM) tab 60 mg, 60 mg, Oral, PRN  •  hydrALAzine HCl (APRESOLINE) injection 10 mg, 10 mg, Intravenous, Q4H PRN  •  meropenem (MERREM) 1 g in sodium chloride 0.9% 100 mL MBP, EXAM:   Blood pressure (!) 145/98, pulse 60, temperature 97.2 °F (36.2 °C), temperature source Temporal, resp. rate 18, height 5' 5.67\" (1.668 m), weight (!) 304 lb 1.6 oz (137.9 kg), SpO2 100 %.     Intake/Output Summary (Last 24 hours) at 6/19/2019 0906 in rectal tube  S/p FFP and cryo  Stopped now  Stable H/H  Plan      daily H/H    GI and surgery following        Opioid dependence  Weaned off over 20+ days in CCU  Plan prn morphine    HTN urgency  Better  Low side now  Plan      Prn anti HTN meds      A

## 2019-06-19 NOTE — PLAN OF CARE
Problem: Patient Centered Care  Goal: Patient preferences are identified and integrated in the patient's plan of care  Description  Interventions:  - What would you like us to know as we care for you?  Keep wife involved in his care  - Provide timely, com home routines  Outcome: Progressing  Note:   Blinds open during the day. Up in the chair for about an hour today. Tolerated well. Put back in bed using lift equipment. Frequent reorientation.       Problem: CARDIOVASCULAR - ADULT  Goal: Absence of cardiac a Progressing  Note:   Accu checks q 6 hours. Decreased levemir dose for tomorrow, decreasing TPN for tonight, and slightly increased tube feeds to 30 cc/hr. Tolerating well.       Problem: SKIN/TISSUE INTEGRITY - ADULT  Goal: Skin integrity remains intact  D Patient frequently reaches for NG tube.       Problem: GASTROINTESTINAL - ADULT  Goal: Maintains or returns to baseline bowel function  Description  INTERVENTIONS:  - Assess bowel function  - Maintain adequate hydration with IV or PO as ordered and tolerate

## 2019-06-19 NOTE — RESPIRATORY THERAPY NOTE
Patient placed on Trach Collar 25% without complication. HR 58 RR28 %. Suction scant tan prior to change. RN notified . RN at bedside.

## 2019-06-19 NOTE — PLAN OF CARE
Problem: Patient Centered Care  Goal: Patient preferences are identified and integrated in the patient's plan of care  Description  Interventions:  - What would you like us to know as we care for you?  Keep wife involved in his care  - Provide timely, com and interruptions  - Encourage family to assist in orientation and promotion of home routines  Outcome: Progressing     Problem: Safety Risk - Non-Violent Restraints  Goal: Patient will remain free from self-harm  Description  INTERVENTIONS:  - Apply the l optimal ventilation and oxygenation  Description  INTERVENTIONS:  - Assess for changes in respiratory status  - Assess for changes in mentation and behavior  - Position to facilitate oxygenation and minimize respiratory effort  - Oxygen supplementation bas repeat lab results as appropriate  - Fluid restriction as ordered  - Instruct patient on fluid and nutrition restrictions as appropriate  Outcome: Progressing     Problem: SKIN/TISSUE INTEGRITY - ADULT  Goal: Skin integrity remains intact  Description  INT appropriate  - Identify discharge learning needs (meds, wound care, etc)  - Arrange for interpreters to assist at discharge as needed  - Consider post-discharge preferences of patient/family/discharge partner  - Complete POLST form as appropriate  - Assess

## 2019-06-19 NOTE — PROGRESS NOTES
[facility] ID PROGRESS NOTE    Esther Toro Patient Status:  Inpatient    1960 MRN [id number]   Location [facility] 2W/SW Attending Laurance Goodpasture, MD   [facility] Day # 50 PCP LATIA Rocha MD     Subjective:  No acute events.  Started on tube feeds y 5/2-5/11, IV vancomycin 5/2-5/7, IV meropenem 5/12-5/26, metronidazole 5/12-5/25, IV vancomycin 5/12-5/25, PO vancomycin 1112    60year old male.  Patient is a 58-year-old male with a history of seizure disorder, moderate obesity, hypertension, hyperlipide Underwent tracheostomy 5/22. Now with downtrending hemoglobin with rectal bleeding with hemoglobin as low as 6.5.   With the bleeding noted to have low-grade fevers up to 100.8 with no increased leukocytosis and hypotension on 5/29 early morning without re OVP.   -  Follow fever curve, wbc. -  Reviewed labs, micro, imaging reports.  -  Case d/w patient, RN.     Townsend Mohs, MD  [facility]  [phone]  5/30/2019

## 2019-06-20 ENCOUNTER — APPOINTMENT (OUTPATIENT)
Dept: GENERAL RADIOLOGY | Facility: HOSPITAL | Age: 59
DRG: 003 | End: 2019-06-20
Attending: INTERNAL MEDICINE
Payer: MEDICARE

## 2019-06-20 LAB
ANION GAP SERPL CALC-SCNC: 5 MMOL/L (ref 0–18)
BASOPHILS # BLD AUTO: 0.03 X10(3) UL (ref 0–0.2)
BASOPHILS NFR BLD AUTO: 0.5 %
BUN BLD-MCNC: 24 MG/DL (ref 7–18)
BUN/CREAT SERPL: 28.6 (ref 10–20)
CALCIUM BLD-MCNC: 8.6 MG/DL (ref 8.5–10.1)
CHLORIDE SERPL-SCNC: 103 MMOL/L (ref 98–112)
CO2 SERPL-SCNC: 32 MMOL/L (ref 21–32)
CREAT BLD-MCNC: 0.84 MG/DL (ref 0.7–1.3)
DEPRECATED RDW RBC AUTO: 59.4 FL (ref 35.1–46.3)
EOSINOPHIL # BLD AUTO: 0.93 X10(3) UL (ref 0–0.7)
EOSINOPHIL NFR BLD AUTO: 14.8 %
ERYTHROCYTE [DISTWIDTH] IN BLOOD BY AUTOMATED COUNT: 16.6 % (ref 11–15)
GLUCOSE BLD-MCNC: 130 MG/DL (ref 70–99)
GLUCOSE BLDC GLUCOMTR-MCNC: 119 MG/DL (ref 70–99)
GLUCOSE BLDC GLUCOMTR-MCNC: 130 MG/DL (ref 70–99)
GLUCOSE BLDC GLUCOMTR-MCNC: 143 MG/DL (ref 70–99)
GLUCOSE BLDC GLUCOMTR-MCNC: 146 MG/DL (ref 70–99)
HCT VFR BLD AUTO: 33.7 % (ref 39–53)
HGB BLD-MCNC: 10.4 G/DL (ref 13–17.5)
IMM GRANULOCYTES # BLD AUTO: 0.04 X10(3) UL (ref 0–1)
IMM GRANULOCYTES NFR BLD: 0.6 %
LYMPHOCYTES # BLD AUTO: 1.08 X10(3) UL (ref 1–4)
LYMPHOCYTES NFR BLD AUTO: 17.1 %
MCH RBC QN AUTO: 30.3 PG (ref 26–34)
MCHC RBC AUTO-ENTMCNC: 30.9 G/DL (ref 31–37)
MCV RBC AUTO: 98.3 FL (ref 80–100)
MONOCYTES # BLD AUTO: 0.67 X10(3) UL (ref 0.1–1)
MONOCYTES NFR BLD AUTO: 10.6 %
NEUTROPHILS # BLD AUTO: 3.55 X10 (3) UL (ref 1.5–7.7)
NEUTROPHILS # BLD AUTO: 3.55 X10(3) UL (ref 1.5–7.7)
NEUTROPHILS NFR BLD AUTO: 56.4 %
OSMOLALITY SERPL CALC.SUM OF ELEC: 296 MOSM/KG (ref 275–295)
PLATELET # BLD AUTO: 170 10(3)UL (ref 150–450)
POTASSIUM SERPL-SCNC: 4.1 MMOL/L (ref 3.5–5.1)
RBC # BLD AUTO: 3.43 X10(6)UL (ref 4.3–5.7)
SODIUM SERPL-SCNC: 140 MMOL/L (ref 136–145)
WBC # BLD AUTO: 6.3 X10(3) UL (ref 4–11)

## 2019-06-20 PROCEDURE — 99233 SBSQ HOSP IP/OBS HIGH 50: CPT | Performed by: INTERNAL MEDICINE

## 2019-06-20 PROCEDURE — 74018 RADEX ABDOMEN 1 VIEW: CPT | Performed by: INTERNAL MEDICINE

## 2019-06-20 RX ORDER — MAGNESIUM OXIDE 400 MG (241.3 MG MAGNESIUM) TABLET
3 TABLET NIGHTLY
Status: DISCONTINUED | OUTPATIENT
Start: 2019-06-20 | End: 2019-07-01

## 2019-06-20 NOTE — PLAN OF CARE
Pt alert. Pain controlled with prn morphine. Breathing freely, first on trach collar, now on assist control vent. Tolerating present settings. TPN infusing as ordered. PEG patent and in place. Tolerating present feedings.   Turned and positioned q2h f

## 2019-06-20 NOTE — RESPIRATORY THERAPY NOTE
Patient returned to previous vent settings of AC16/600/30/+5 at 0050 without issue. Total time on TC40% was 16hrs and 20min.

## 2019-06-20 NOTE — PLAN OF CARE
Problem: Patient Centered Care  Goal: Patient preferences are identified and integrated in the patient's plan of care  Description  Interventions:  - What would you like us to know as we care for you?  Keep wife involved in his care  - Provide timely, com Progressing  Note:   Patient on trach collar for four hours this morning. Patient complained of shortness of breath and stating \"I can't breathe\". Placed on vent for three hours while sleeping this afternoon.  Now, back on trach collar and tolerating well hemorrhage  - Monitor temperature, glucose, and sodium. Initiate appropriate interventions as ordered  Outcome: Progressing  Note:   Patient interactive and following commands. Resting comfortably, watching tv.      Problem: HEMATOLOGIC - ADULT  Goal: Free 12 lead EKG if indicated  - Evaluate effectiveness of antiarrhythmic and heart rate control medications as ordered  - Initiate emergency measures for life threatening arrhythmias  - Monitor electrolytes and administer replacement therapy as ordered  Outcom

## 2019-06-20 NOTE — PROGRESS NOTES
Pulmonary/Critical Care Follow Up Note    HPI:   Dayanara Gentleman is a 60year old male with Patient presents with:  Fever (infectious)  Altered Mental Status (neurologic)      PCP LATIA Guan MD  Admission Attending No admitting provider for patient encoun syringe, 6 mg/kg (Adjusted), Intravenous, Q24H  •  diltiazem 100mg/100ml in NaCl (CARDIZEM) 1 mg/mL premix/add-vantage, 2.5-20 mg/hr, Intravenous, Continuous  •  dilTIAZem HCl (CARDIZEM) tab 60 mg, 60 mg, Oral, PRN  •  hydrALAzine HCl (APRESOLINE) injectio RASH        PHYSICAL EXAM:   Blood pressure (!) 129/96, pulse 73, temperature 96.8 °F (36 °C), temperature source Temporal, resp. rate 16, height 5' 5.67\" (1.668 m), weight (!) 304 lb 1.6 oz (137.9 kg), SpO2 100 %.     Intake/Output Summary (Last 24 hours) tube  S/p FFP and cryo  Stopped now  Stable H/H  Plan      daily H/H    GI and surgery following        Opioid dependence  Weaned off over 20+ days in CCU  Plan prn morphine    HTN urgency  Better  Low side now  Plan      Prn anti HTN meds      KOMAL  3nd ep

## 2019-06-20 NOTE — PROGRESS NOTES
[facility]  GI SERVICE PROGRESS NOTE    Carmen Castellanos Patient Status:  Inpatient    1960 MRN [id number]   Location [facility] 2W/SW Attending Shorty Fuller, [address] [facility] Se Day # 52 PCP LATIA Alcantara MD       Subjective:     Stab 33.0* 32.0       No results for input(s): DANTE, LIP in the last 168 hours. Recent Labs   Lab 06/17/19  0450 06/18/19  0508 06/19/19  0700   MG 1.8 2.3 2.3   PHOS 3.2 2.8 4.0       No results for input(s): URINE, CULTI, BLDSMR in the last 168 hours. improve gut motility now that he is improving  · Repeat abdominal x-ray today; then likely discontinuation of last week's colonic decompression tube  · Elective follow-up liver imaging of possible cirrhotic liver morphology with 26 x 42 x 52 mm segment 4 l

## 2019-06-20 NOTE — RESPIRATORY THERAPY NOTE
RESPIRATORY THERAPY MECHANICAL VENTILATION PROGRESS NOTE    Ventilator Weaning:  Patient meets criteria for weaning? yes Weaning was attempted yes- patient on trach collar 40%,o2 sat 97%.

## 2019-06-21 LAB
ANION GAP SERPL CALC-SCNC: 5 MMOL/L (ref 0–18)
BUN BLD-MCNC: 25 MG/DL (ref 7–18)
BUN/CREAT SERPL: 31.6 (ref 10–20)
CALCIUM BLD-MCNC: 8.2 MG/DL (ref 8.5–10.1)
CHLORIDE SERPL-SCNC: 104 MMOL/L (ref 98–112)
CO2 SERPL-SCNC: 32 MMOL/L (ref 21–32)
CREAT BLD-MCNC: 0.79 MG/DL (ref 0.7–1.3)
GLUCOSE BLD-MCNC: 129 MG/DL (ref 70–99)
GLUCOSE BLDC GLUCOMTR-MCNC: 119 MG/DL (ref 70–99)
GLUCOSE BLDC GLUCOMTR-MCNC: 157 MG/DL (ref 70–99)
GLUCOSE BLDC GLUCOMTR-MCNC: 157 MG/DL (ref 70–99)
GLUCOSE BLDC GLUCOMTR-MCNC: 171 MG/DL (ref 70–99)
GLUCOSE BLDC GLUCOMTR-MCNC: 172 MG/DL (ref 70–99)
GLUCOSE BLDC GLUCOMTR-MCNC: 182 MG/DL (ref 70–99)
HAV IGM SER QL: 1.9 MG/DL (ref 1.6–2.6)
OSMOLALITY SERPL CALC.SUM OF ELEC: 298 MOSM/KG (ref 275–295)
PHOSPHATE SERPL-MCNC: 3.3 MG/DL (ref 2.5–4.9)
POTASSIUM SERPL-SCNC: 4.1 MMOL/L (ref 3.5–5.1)
SODIUM SERPL-SCNC: 141 MMOL/L (ref 136–145)

## 2019-06-21 PROCEDURE — 99233 SBSQ HOSP IP/OBS HIGH 50: CPT | Performed by: INTERNAL MEDICINE

## 2019-06-21 RX ORDER — SODIUM CHLORIDE, SODIUM LACTATE, POTASSIUM CHLORIDE, CALCIUM CHLORIDE 600; 310; 30; 20 MG/100ML; MG/100ML; MG/100ML; MG/100ML
INJECTION, SOLUTION INTRAVENOUS CONTINUOUS
Status: DISCONTINUED | OUTPATIENT
Start: 2019-06-21 | End: 2019-06-21

## 2019-06-21 RX ORDER — SODIUM CHLORIDE, SODIUM LACTATE, POTASSIUM CHLORIDE, CALCIUM CHLORIDE 600; 310; 30; 20 MG/100ML; MG/100ML; MG/100ML; MG/100ML
INJECTION, SOLUTION INTRAVENOUS CONTINUOUS
Status: ACTIVE | OUTPATIENT
Start: 2019-06-21 | End: 2019-06-24

## 2019-06-21 NOTE — PROGRESS NOTES
Pulmonary/Critical Care Follow Up Note    HPI:   Isaiah Sesay is a 60year old male with Patient presents with:  Fever (infectious)  Altered Mental Status (neurologic)      PCP LATIA Chand MD  Admission Attending No admitting provider for patient encoun (CARDIZEM) 1 mg/mL premix/add-vantage, 2.5-20 mg/hr, Intravenous, Continuous  •  dilTIAZem HCl (CARDIZEM) tab 60 mg, 60 mg, Oral, PRN  •  hydrALAzine HCl (APRESOLINE) injection 10 mg, 10 mg, Intravenous, Q4H PRN  •  dextrose 10 % infusion, , Intravenous, C lb 1.6 oz (137.9 kg), SpO2 99 %.     Intake/Output Summary (Last 24 hours) at 6/21/2019 1231  Last data filed at 6/21/2019 0914  Gross per 24 hour   Intake 2828.4 ml   Output 4050 ml   Net -1221.6 ml     NAD  Smiling today  Awake, follows commands  Lung dec urgency  Better  Low side now  Plan      Prn anti HTN meds      KOMAL  3nd episode  Now progressive and severe  Presumed from septic shock  Plan     Bumex TID now   follow    Encephalopathy  following commands consistently  Head CT neg  Plan      Follow

## 2019-06-21 NOTE — PLAN OF CARE
Problem: Patient Centered Care  Goal: Patient preferences are identified and integrated in the patient's plan of care  Description  Interventions:  - What would you like us to know as we care for you?  Keep wife involved in his care  - Provide timely, com Safety Risk - Non-Violent Restraints  Goal: Patient will remain free from self-harm  Description  INTERVENTIONS:  - Apply the least restrictive restraint to prevent harm  - Notify patient and family of reasons restraints applied  - Assess for any contribut arrhythmias  - Monitor electrolytes and administer replacement therapy as ordered  Outcome: Progressing     Problem: RESPIRATORY - ADULT  Goal: Achieves optimal ventilation and oxygenation  Description  INTERVENTIONS:  - Assess for changes in respiratory s medications to maintain glucose within target range  - Assess barriers to adequate nutritional intake and initiate nutrition consult as needed  - Instruct patient on self management of diabetes  Outcome: Progressing  Note:   Accuchecks performed Q6H per or assessment  - Modify environment to reduce risk of injury  - Provide assistive devices as appropriate  - Consider OT/PT consult to assist with strengthening/mobility  - Encourage toileting schedule   Outcome: Progressing     Problem: HEMATOLOGIC - ADULT  G

## 2019-06-21 NOTE — OCCUPATIONAL THERAPY NOTE
OCCUPATIONAL THERAPY TREATMENT NOTE - INPATIENT        Room Number: 183/638-W           Presenting Problem: sepsis    Problem List  Principal Problem:    Sepsis due to undetermined organism [facility])  Active Problems:    Atrial fibrillation with rapid ventricu using toilet, bedpan or urinal? : Total  -   Putting on and taking off regular upper body clothing?: Total  -   Taking care of personal grooming such as brushing teeth?: Total  -   Eating meals?: Total    AM-PAC Score:  Score: 6  Approx Degree of Impairmen

## 2019-06-21 NOTE — PROGRESS NOTES
[facility] ID PROGRESS NOTE    Ezekiel Fraction Patient Status:  Inpatient    1960 MRN [id number]   Location [facility] 2W/SW Attending Esthela Smith, [address] [facility] Se Day # 48 PCP LATIA Moya MD     Subjective:  No acute events.  Tolerating tube feeds a 5/2-5/11, IV vancomycin 5/2-5/7, IV meropenem 5/12-5/26, metronidazole 5/12-5/25, IV vancomycin 5/12-5/25, PO vancomycin 1112    60year old male.  Patient is a 56-year-old male with a history of seizure disorder, moderate obesity, hypertension, hyperlipide Underwent tracheostomy 5/22. Now with downtrending hemoglobin with rectal bleeding with hemoglobin as low as 6.5.   With the bleeding noted to have low-grade fevers up to 100.8 with no increased leukocytosis and hypotension on 5/29 early morning without re monitor off  -  Continue on OVP. -  Follow fever curve, wbc. -  Reviewed labs, micro, imaging reports.  -  Case d/w patient, RN.     Sara Hoff MD  [facility]  [phone]  5/30/2019

## 2019-06-21 NOTE — PLAN OF CARE
Problem: Diabetes/Glucose Control  Goal: Glucose maintained within prescribed range  Description  INTERVENTIONS:  - Monitor Blood Glucose as ordered  - Assess for signs and symptoms of hyperglycemia and hypoglycemia  - Administer ordered medications to m Evaluate effectiveness of vasoactive medications to optimize hemodynamic stability  - Monitor arterial and/or venous puncture sites for bleeding and/or hematoma  - Assess quality of pulses, skin color and temperature  - Assess for signs of decreased corona Monitor Blood Glucose as ordered  - Assess for signs and symptoms of hyperglycemia and hypoglycemia  - Administer ordered medications to maintain glucose within target range  - Assess barriers to adequate nutritional intake and initiate nutrition consult a cognitive and physical deficits and behaviors that affect risk of falls.   - Baltic fall precautions as indicated by assessment.  - Educate pt/family on patient safety including physical limitations  - Instruct pt to call for assistance with activity bas

## 2019-06-21 NOTE — PROGRESS NOTES
Was paged by the patient's nurse about changes in his nutrition regimen. He is on TF at 54 cc/h today ( increased from 45 cc/hr) this am. Is also on TPN ( with insulin in the bag). Tonight.  TPN will be stopped he will be on TFs at 65 cc/hr and then 75

## 2019-06-21 NOTE — DIETARY NOTE
ADULT NUTRITION REASSESSMENT     Pt is at high nutrition risk. Pt does not meet malnutrition criteria. RECOMMENDATIONS TO MD: See Nutrition Intervention . Tube feeds advancing and DC TPN after this bag.  Endocrine service or other to eval need for add tube draining. TPN #7 tonight. GI status improved but not yet able to feed enteral route. TPN to continue per surgeon providing 100% nutrition needs.  X1 low BG @[facility] rx with 25 gm dextrose, otherwise BG levels acceptable and to continue same insulin regime/e hrs: increase tpn volume back to 1800 ml with further increase if needed in future. 6/18 Update: Received MD consult to initiate low rate tube feeds. TPN already ordered.  Will begin using low residue Osmolite 1.2 formula:  6/21 Update: Enteral nutrition a transfer of nutrition care to new setting or provider: monitor plans    PERTINENT PAST MEDICAL HISTORY:  has a past medical history of Cardiomyopathy ([facility] Chronic pain, Congestive heart disease ([facility] Diabetes ([facility] Diverticulitis, Esophageal reflux, Es reviewed. Currently no corrective insulin scale.  RN to discuss with MD.   • methylnaltrexone bromide  20 mg Subcutaneous Q48H   • melatonin  3 mg Oral Nightly   • insulin detemir  45 Units Subcutaneous Noon   • bumetanide  1 mg Intravenous TID   • levETIR buttocks. NUTRITION PRESCRIPTION:  Diet: Parenteral Nutrition (PN)/NPO  Oral Supplements: none  ESTIMATED NUTRITION NEEDS:  Calories:2000- 2324 calories/day ([facility] using est dry wt 136 kg or 15-17calories per kg Estimated dry wt of 136 kg).  This i

## 2019-06-21 NOTE — PROGRESS NOTES
[facility]    Progress Note    Dia Parisi Patient Status:  Inpatient    1960 MRN [id number]   Location [facility] 2W/SW Attending Lillian Tristan, [address] [facility] Se Day # 48 PCP LATIA Haider MD            Subjective:   States small bowel.     Dictated by (CST): Jeny Moya MD on 6/20/2019 at 15:37     Approved by (CST): Jaiden Blum MD on 6/20/2019 at 15:38          Xr Chest Ap Portable  (cpt=71045)    Result Date: 6/19/2019  CONCLUSION:  1. NG tube termin

## 2019-06-21 NOTE — PROGRESS NOTES
The patients nutrition status has been the topic of most conversations between RN and MD's and other care givers today.    The conclusion that has been finalized for tonight/this weekend has been talked about with Dr. Riley Rodriguez, RN and Barbie Workman from Nutrition thi

## 2019-06-21 NOTE — PHYSICAL THERAPY NOTE
PHYSICAL THERAPY TREATMENT NOTE - INPATIENT     Room Number: 660/529-R       Presenting Problem: sepsis due to parainfluenza, + VE,  pneumonia(KOMAL with dialysis 5/14-5/31; s/p trach, SBO/ileus)    Problem List  Principal Problem:    Sepsis due to [facility] Static Sitting: Not tested  Dynamic Sitting: Not tested           Static Standing: Not tested  Dynamic Standing: Not tested    ACTIVITY TOLERANCE #2  Current Status  NT   Goal #3 Pt to tolerate sitting EOB for 5 mins with Max A of 1.    Goal #3   Current Status  NT

## 2019-06-21 NOTE — PROGRESS NOTES
[facility]  GI SERVICE PROGRESS NOTE    Malinda Flores Patient Status:  Inpatient    1960 MRN [id number]   Location [facility] 2W/SW Attending Niko Ojeda,  [facility] Se Day # 48 PCP LATIA Bess MD       Subjective:     Stab 2.3 2.3 1.9   PHOS 2.8 4.0 3.3       No results for input(s): URINE, CULTI, BLDSMR in the last 168 hours. Xr Abdomen (1 View) (cpt=74018)    Result Date: 6/20/2019  CONCLUSION:  1. Decompression of the colon.  2. Mild diffuse gaseous distension of the tube feedings 30 mL/hr; abdomen soft with active bowel sounds, stable WBC, appears to be improving  21 2019: Tube feedings increased to 55 mL/hr; ongoing liquid diarrhea output; stable    Suggest:    · Trial of Relistor opiate ileus treatment 6/20/19 to im

## 2019-06-21 NOTE — RESPIRATORY THERAPY NOTE
RESPIRATORY THERAPY MECHANICAL VENTILATION PROGRESS NOTE    Ventilator Weaning:  Patient meets criteria for weaning? yes Weaning was attempted yes using Aerosol trach collar. The patient tolerated well for Start time 3330- as tolerated .        RCP recommen

## 2019-06-22 ENCOUNTER — APPOINTMENT (OUTPATIENT)
Dept: GENERAL RADIOLOGY | Facility: HOSPITAL | Age: 59
DRG: 003 | End: 2019-06-22
Attending: INTERNAL MEDICINE
Payer: MEDICARE

## 2019-06-22 LAB
ANION GAP SERPL CALC-SCNC: 5 MMOL/L (ref 0–18)
BASOPHILS # BLD: 0 X10(3) UL (ref 0–0.2)
BASOPHILS NFR BLD: 0 %
BUN BLD-MCNC: 29 MG/DL (ref 7–18)
BUN/CREAT SERPL: 33.3 (ref 10–20)
CALCIUM BLD-MCNC: 8.5 MG/DL (ref 8.5–10.1)
CHLORIDE SERPL-SCNC: 105 MMOL/L (ref 98–112)
CO2 SERPL-SCNC: 33 MMOL/L (ref 21–32)
CREAT BLD-MCNC: 0.87 MG/DL (ref 0.7–1.3)
DEPRECATED RDW RBC AUTO: 60.3 FL (ref 35.1–46.3)
EOSINOPHIL # BLD: 0.85 X10(3) UL (ref 0–0.7)
EOSINOPHIL NFR BLD: 13 %
ERYTHROCYTE [DISTWIDTH] IN BLOOD BY AUTOMATED COUNT: 16.8 % (ref 11–15)
GLUCOSE BLD-MCNC: 122 MG/DL (ref 70–99)
GLUCOSE BLDC GLUCOMTR-MCNC: 107 MG/DL (ref 70–99)
GLUCOSE BLDC GLUCOMTR-MCNC: 109 MG/DL (ref 70–99)
GLUCOSE BLDC GLUCOMTR-MCNC: 111 MG/DL (ref 70–99)
GLUCOSE BLDC GLUCOMTR-MCNC: 124 MG/DL (ref 70–99)
GLUCOSE BLDC GLUCOMTR-MCNC: 137 MG/DL (ref 70–99)
GLUCOSE BLDC GLUCOMTR-MCNC: 137 MG/DL (ref 70–99)
GLUCOSE BLDC GLUCOMTR-MCNC: 142 MG/DL (ref 70–99)
GLUCOSE BLDC GLUCOMTR-MCNC: 144 MG/DL (ref 70–99)
GLUCOSE BLDC GLUCOMTR-MCNC: 148 MG/DL (ref 70–99)
GLUCOSE BLDC GLUCOMTR-MCNC: 166 MG/DL (ref 70–99)
HCT VFR BLD AUTO: 33.6 % (ref 39–53)
HGB BLD-MCNC: 10.6 G/DL (ref 13–17.5)
LYMPHOCYTES NFR BLD: 0.59 X10(3) UL (ref 1–4)
LYMPHOCYTES NFR BLD: 8 %
MCH RBC QN AUTO: 31.2 PG (ref 26–34)
MCHC RBC AUTO-ENTMCNC: 31.5 G/DL (ref 31–37)
MCV RBC AUTO: 98.8 FL (ref 80–100)
MONOCYTES # BLD: 0.52 X10(3) UL (ref 0.1–1)
MONOCYTES NFR BLD: 8 %
MORPHOLOGY: NORMAL
NEUTROPHILS # BLD AUTO: 3.76 X10 (3) UL (ref 1.5–7.7)
NEUTROPHILS NFR BLD: 66 %
NEUTS BAND NFR BLD: 4 %
NEUTS HYPERSEG # BLD: 4.55 X10(3) UL (ref 1.5–7.7)
OSMOLALITY SERPL CALC.SUM OF ELEC: 303 MOSM/KG (ref 275–295)
PLATELET # BLD AUTO: 170 10(3)UL (ref 150–450)
PLATELET MORPHOLOGY: NORMAL
POTASSIUM SERPL-SCNC: 3.9 MMOL/L (ref 3.5–5.1)
RBC # BLD AUTO: 3.4 X10(6)UL (ref 4.3–5.7)
SODIUM SERPL-SCNC: 143 MMOL/L (ref 136–145)
TOTAL CELLS COUNTED: 100
VARIANT LYMPHS NFR BLD MANUAL: 1 %
WBC # BLD AUTO: 6.5 X10(3) UL (ref 4–11)

## 2019-06-22 PROCEDURE — 99233 SBSQ HOSP IP/OBS HIGH 50: CPT | Performed by: INTERNAL MEDICINE

## 2019-06-22 PROCEDURE — 71045 X-RAY EXAM CHEST 1 VIEW: CPT | Performed by: INTERNAL MEDICINE

## 2019-06-22 PROCEDURE — 74018 RADEX ABDOMEN 1 VIEW: CPT | Performed by: INTERNAL MEDICINE

## 2019-06-22 PROCEDURE — 99232 SBSQ HOSP IP/OBS MODERATE 35: CPT | Performed by: INTERNAL MEDICINE

## 2019-06-22 NOTE — PROGRESS NOTES
[facility]    Progress Note    Tinsley Begin Patient Status:  Inpatient    1960 MRN [id number]   Location [facility] 2W/SW Attending Lanette Bean,  [facility] Se Day # 46 PCP LATIA Maldonado MD            Subjective:   States the stomach. 2. Diffuse ileus.     Dictated by (CST): Ramona Rachel MD on 6/22/2019 at 10:14     Approved by (CST): Hilda Blum MD on 6/22/2019 at 10:14          Xr Abdomen (1 View) (cpt=74018)    Result Date: 6/20/2019  CONCLUSION:  1

## 2019-06-22 NOTE — PROGRESS NOTES
[facility]  GI SERVICE PROGRESS NOTE    Malinda Flores Patient Status:  Inpatient    1960 MRN [id number]   Location [facility] 2W/SW Attending Niko Ojeda,  [facility] Se Day # 46 PCP LATIA Bess MD       Subjective:     Cont results for input(s): URINE, CULTI, BLDSMR in the last 168 hours. Xr Abdomen (1 View) (cpt=74018)    Result Date: 6/22/2019  CONCLUSION:  1. Successful placement of nasogastric tube into the stomach. 2. Diffuse ileus.     Dictated by (CST): Kulwant tube discontinued 6/20/19  · Apparently has completed most recent courses of antibiotics per ID service. Stopping broad-spectrum meropenem and daptomycin antibiotics.   · Would hold tube feedings at 55 mL/hr this weekend, watch closely for ileus abdominal

## 2019-06-22 NOTE — RESPIRATORY THERAPY NOTE
RESPIRATORY THERAPY MECHANICAL VENTILATION PROGRESS NOTE    Ventilator Weaning:  Patient meets criteria for weaning? yes Weaning was attempted yes using Aerosol trach collar - start time 1010 am - (as tolerated)

## 2019-06-22 NOTE — PLAN OF CARE
Problem: Diabetes/Glucose Control  Goal: Glucose maintained within prescribed range  Description  INTERVENTIONS:  - Monitor Blood Glucose as ordered  - Assess for signs and symptoms of hyperglycemia and hypoglycemia  - Administer ordered medications to m wrist restraints to prevent him from pulling out his lines and drains. His right arm is weak. Will continue to monitor.   6/22/2019 1159 by Angel Lo RN  Outcome: Progressing     Problem: Delirium  Goal: Minimize duration of delirium  Description the need for suctioning and perform as needed  - Assess and instruct to report SOB or any respiratory difficulty  - Respiratory Therapy support as indicated  - Manage/alleviate anxiety  - Monitor for signs/symptoms of CO2 retention  6/22/2019 1202 by Kim integrity  - Monitor for areas of redness and/or skin breakdown  - Initiate interventions, skin care algorithm/standards of care as needed  6/22/2019 1202 by Lee Fine RN  Outcome: Progressing  6/22/2019 1159 by Lee Fine, RN  Outcome: Pro

## 2019-06-22 NOTE — PLAN OF CARE
Problem: Patient Centered Care  Goal: Patient preferences are identified and integrated in the patient's plan of care  Description  Interventions:  - What would you like us to know as we care for you?  Keep wife involved in his care  - Provide timely, com and interruptions  - Encourage family to assist in orientation and promotion of home routines  Outcome: Progressing     Problem: Safety Risk - Non-Violent Restraints  Goal: Patient will remain free from self-harm  Description  INTERVENTIONS:  - Apply the l ventilation and oxygenation  Description  INTERVENTIONS:  - Assess for changes in respiratory status  - Assess for changes in mentation and behavior  - Position to facilitate oxygenation and minimize respiratory effort  - Oxygen supplementation based on ox results as appropriate  - Fluid restriction as ordered  - Instruct patient on fluid and nutrition restrictions as appropriate  Outcome: Progressing     Problem: SKIN/TISSUE INTEGRITY - ADULT  Goal: Skin integrity remains intact  Description  INTERVENTIONS appropriate  - Identify discharge learning needs (meds, wound care, etc)  - Arrange for interpreters to assist at discharge as needed  - Consider post-discharge preferences of patient/family/discharge partner  - Complete POLST form as appropriate  - Assess

## 2019-06-23 LAB
GLUCOSE BLDC GLUCOMTR-MCNC: 124 MG/DL (ref 70–99)
GLUCOSE BLDC GLUCOMTR-MCNC: 127 MG/DL (ref 70–99)
GLUCOSE BLDC GLUCOMTR-MCNC: 130 MG/DL (ref 70–99)
GLUCOSE BLDC GLUCOMTR-MCNC: 147 MG/DL (ref 70–99)
GLUCOSE BLDC GLUCOMTR-MCNC: 150 MG/DL (ref 70–99)

## 2019-06-23 PROCEDURE — 99233 SBSQ HOSP IP/OBS HIGH 50: CPT | Performed by: INTERNAL MEDICINE

## 2019-06-23 PROCEDURE — 99232 SBSQ HOSP IP/OBS MODERATE 35: CPT | Performed by: INTERNAL MEDICINE

## 2019-06-23 NOTE — PROGRESS NOTES
[place] [facility]    Progress Note    Minerva Score Patient Status:  Inpatient    1960 MRN [id number]   Location [facility] 2W/SW Attending Maliha Palmer MD   [address] Day # 46 PCP LATIA Linares MD     Subjective:  Is on TFs at 54 Alesia Crane MD

## 2019-06-23 NOTE — PROGRESS NOTES
[place] [facility] - [facility]    Progress Note    Natalia Barajas Patient Status:  Inpatient    1960 MRN [id number]   Location [facility] 2W/SW Attending Chu Vides,  [facility] [address] Day # 46 PCP LATIA Abernathy MD            Subjective:   States the stomach. 2. Diffuse ileus. Dictated by (CST): Ashley Corona MD on 6/22/2019 at 10:14     Approved by (CST): Dante Blum MD on 6/22/2019 at 10:14                    Assessment and Plan:         GI bleeding.      Hg more stable re

## 2019-06-23 NOTE — PROGRESS NOTES
[place] [facility] - [facility]    Progress Note    Tinsley Begin Patient Status:  Inpatient    1960 MRN [id number]   Location [facility] 2W/SW Attending Cecile Morgan MD   [address] Day # 46 PCP LATIA Maldonado MD     Subjective:  Is on TFs at 54 nutrition plan     Will follow    Barbara Zavala MD

## 2019-06-23 NOTE — PLAN OF CARE
Patient remains with his left hand in a restraint, he continues to remove his necessary lines when he is out of restraint. He removed his NG tube yesterday.   Problem: Safety Risk - Non-Violent Restraints  Goal: Patient will remain free from self-harm  Desc

## 2019-06-23 NOTE — PROGRESS NOTES
[facility]    Progress Note      Assessment and Plan:   1. Respiratory failure–presented with parainfluenza virus, required tracheostomy and repeat intubation. The patient is now tolerating trach collar 24/7.   Chest x-ray only shows poor nontender, without hepatosplenomegaly and no mass appreciable. Extremities without clubbing cyanosis nor edema. Neurologic patient can be aroused to open eyes but not follow command. Diminished tone diffusely. .  Skin without gross abnormality     Resu

## 2019-06-23 NOTE — PLAN OF CARE
Problem: Patient Centered Care  Goal: Patient preferences are identified and integrated in the patient's plan of care  Description  Interventions:  - What would you like us to know as we care for you?  Keep wife involved in his care  - Provide timely, com and interruptions  - Encourage family to assist in orientation and promotion of home routines  Outcome: Progressing     Problem: Safety Risk - Non-Violent Restraints  Goal: Patient will remain free from self-harm  Description  INTERVENTIONS:  - Apply the l Problem: RESPIRATORY - ADULT  Goal: Achieves optimal ventilation and oxygenation  Description  INTERVENTIONS:  - Assess for changes in respiratory status  - Assess for changes in mentation and behavior  - Position to facilitate oxygenation and minimize r electrolyte replacements, including rhythm and repeat lab results as appropriate  - Fluid restriction as ordered  - Instruct patient on fluid and nutrition restrictions as appropriate  Outcome: Progressing     Problem: SKIN/TISSUE INTEGRITY - ADULT  Goal: needed discharge resources and transportation as appropriate  - Identify discharge learning needs (meds, wound care, etc)  - Arrange for interpreters to assist at discharge as needed  - Consider post-discharge preferences of patient/family/discharge partne

## 2019-06-24 ENCOUNTER — APPOINTMENT (OUTPATIENT)
Dept: GENERAL RADIOLOGY | Facility: HOSPITAL | Age: 59
DRG: 003 | End: 2019-06-24
Attending: INTERNAL MEDICINE
Payer: MEDICARE

## 2019-06-24 LAB
ANION GAP SERPL CALC-SCNC: 4 MMOL/L (ref 0–18)
BASOPHILS # BLD: 0 X10(3) UL (ref 0–0.2)
BASOPHILS NFR BLD: 0 %
BUN BLD-MCNC: 23 MG/DL (ref 7–18)
BUN/CREAT SERPL: 29.9 (ref 10–20)
CALCIUM BLD-MCNC: 8 MG/DL (ref 8.5–10.1)
CHLORIDE SERPL-SCNC: 104 MMOL/L (ref 98–112)
CO2 SERPL-SCNC: 32 MMOL/L (ref 21–32)
CREAT BLD-MCNC: 0.77 MG/DL (ref 0.7–1.3)
DEPRECATED RDW RBC AUTO: 59.4 FL (ref 35.1–46.3)
EOSINOPHIL # BLD: 1.17 X10(3) UL (ref 0–0.7)
EOSINOPHIL NFR BLD: 15 %
ERYTHROCYTE [DISTWIDTH] IN BLOOD BY AUTOMATED COUNT: 16.4 % (ref 11–15)
GLUCOSE BLD-MCNC: 107 MG/DL (ref 70–99)
GLUCOSE BLDC GLUCOMTR-MCNC: 120 MG/DL (ref 70–99)
GLUCOSE BLDC GLUCOMTR-MCNC: 152 MG/DL (ref 70–99)
GLUCOSE BLDC GLUCOMTR-MCNC: 88 MG/DL (ref 70–99)
GLUCOSE BLDC GLUCOMTR-MCNC: 99 MG/DL (ref 70–99)
HAV IGM SER QL: 1.7 MG/DL (ref 1.6–2.6)
HCT VFR BLD AUTO: 33.7 % (ref 39–53)
HGB BLD-MCNC: 10.6 G/DL (ref 13–17.5)
LYMPHOCYTES NFR BLD: 1.17 X10(3) UL (ref 1–4)
LYMPHOCYTES NFR BLD: 15 %
MCH RBC QN AUTO: 31 PG (ref 26–34)
MCHC RBC AUTO-ENTMCNC: 31.5 G/DL (ref 31–37)
MCV RBC AUTO: 98.5 FL (ref 80–100)
MONOCYTES # BLD: 0.7 X10(3) UL (ref 0.1–1)
MONOCYTES NFR BLD: 9 %
MORPHOLOGY: NORMAL
NEUTROPHILS # BLD AUTO: 4.39 X10 (3) UL (ref 1.5–7.7)
NEUTROPHILS NFR BLD: 56 %
NEUTS BAND NFR BLD: 5 %
NEUTS HYPERSEG # BLD: 4.76 X10(3) UL (ref 1.5–7.7)
OSMOLALITY SERPL CALC.SUM OF ELEC: 294 MOSM/KG (ref 275–295)
PLATELET # BLD AUTO: 178 10(3)UL (ref 150–450)
PLATELET MORPHOLOGY: NORMAL
POTASSIUM SERPL-SCNC: 3.4 MMOL/L (ref 3.5–5.1)
RBC # BLD AUTO: 3.42 X10(6)UL (ref 4.3–5.7)
SODIUM SERPL-SCNC: 140 MMOL/L (ref 136–145)
TOTAL CELLS COUNTED: 100
WBC # BLD AUTO: 7.8 X10(3) UL (ref 4–11)

## 2019-06-24 PROCEDURE — 71045 X-RAY EXAM CHEST 1 VIEW: CPT | Performed by: INTERNAL MEDICINE

## 2019-06-24 PROCEDURE — 99233 SBSQ HOSP IP/OBS HIGH 50: CPT | Performed by: INTERNAL MEDICINE

## 2019-06-24 PROCEDURE — 99232 SBSQ HOSP IP/OBS MODERATE 35: CPT | Performed by: INTERNAL MEDICINE

## 2019-06-24 RX ORDER — POTASSIUM CHLORIDE 29.8 MG/ML
40 INJECTION INTRAVENOUS ONCE
Status: COMPLETED | OUTPATIENT
Start: 2019-06-24 | End: 2019-06-24

## 2019-06-24 RX ORDER — MAGNESIUM SULFATE HEPTAHYDRATE 40 MG/ML
2 INJECTION, SOLUTION INTRAVENOUS ONCE
Status: COMPLETED | OUTPATIENT
Start: 2019-06-24 | End: 2019-06-24

## 2019-06-24 NOTE — PLAN OF CARE
Problem: Patient Centered Care  Goal: Patient preferences are identified and integrated in the patient's plan of care  Description  Interventions:  - What would you like us to know as we care for you?  Keep wife involved in his care  - Provide timely, com Encourage use of hearing aids, eye glasses  - Promote highest level of mobility daily  - Provide frequent reorientation  - Promote wakefulness i.e. lights on, blinds open  - Promote sleep, encourage patient's normal rest cycle i.e. lights off, TV off, mini pressures have been a  bit elevated and heart rate is controlled. Will contact MD if blood pressure trend continues after dose of Bumex given/and has had ample opportunity to work.    Goal: Absence of cardiac arrhythmias or at baseline  Description  INTERVE Progressing  Note:   Flexiseal in place to capture liquidy stools.       Problem: METABOLIC/FLUID AND ELECTROLYTES - ADULT  Goal: Glucose maintained within prescribed range  Description  INTERVENTIONS:  - Monitor Blood Glucose as ordered  - Assess for signs Problem: SAFETY ADULT - FALL  Goal: Free from fall injury  Description  INTERVENTIONS:  - Assess pt frequently for physical needs  - Identify cognitive and physical deficits and behaviors that affect risk of falls.   - [place] fall precautions as indica Progressing

## 2019-06-24 NOTE — OCCUPATIONAL THERAPY NOTE
OCCUPATIONAL THERAPY TREATMENT NOTE - INPATIENT        Room Number: 806/382-W           Presenting Problem: sepsis    Problem List  Principal Problem:    Sepsis due to undetermined organism [facility])  Active Problems:    Atrial fibrillation with rapid ventricu education    SUBJECTIVE  Well hello ladies!       OBJECTIVE  Precautions: (trach-)    WEIGHT BEARING RESTRICTION  Weight Bearing Restriction: None                PAIN ASSESSMENT  Ratin  Location: generalized  Management Techniques: Repositioning     ACT

## 2019-06-24 NOTE — PROGRESS NOTES
[facility]  GI SERVICE PROGRESS NOTE    Esther Muna Patient Status:  Inpatient    1960 MRN [id number]   Location [facility] 2W/SW Attending Laurance Goodpasture, [address] [facility] [address] Day # 48 PCP LATIA Rocha MD       Subjective:     Edwin Serrano No results for input(s): DANTE, LIP in the last 168 hours.     Recent Labs   Lab 06/18/19  0508 06/19/19  0700 06/21/19  0450 06/24/19  0515   MG 2.3 2.3 1.9 1.7   PHOS 2.8 4.0 3.3  --        No results for input(s): URINE, CULTI, BLDSMR in the last 168 to improve gut motility now that he is improving. Apparently there is a shortage, medication is on back order. Continue to minimize narcotics. Remarkable recent decrease in narcotic use.   · Apparently has completed most recent courses of antibiotics per

## 2019-06-24 NOTE — PLAN OF CARE
Patient got up to the chair for approximately 6 hours. Pulse oxygen saturation within normal limits on room air. Trach cuff deflated and intermittent talking when PMV placed on trach. Humidified oxygen given to patient for comfort. Suctioned trach PRN.  Haylee Hendricks diabetes  Outcome: Progressing     Problem: Patient/Family Goals  Goal: Patient/Family Long Term Goal  Description  Patient's Long Term Goal: To go home    Interventions:  - Follow MD orders  - Follow medication regimen  - See additional Care Plan goals fo vasoactive medications to optimize hemodynamic stability  - Monitor arterial and/or venous puncture sites for bleeding and/or hematoma  - Assess quality of pulses, skin color and temperature  - Assess for signs of decreased coronary artery perfusion - ex. medication profile  Outcome: Progressing     Problem: METABOLIC/FLUID AND ELECTROLYTES - ADULT  Goal: Glucose maintained within prescribed range  Description  INTERVENTIONS:  - Monitor Blood Glucose as ordered  - Assess for signs and symptoms of hyperglyce needs  - Identify cognitive and physical deficits and behaviors that affect risk of falls.   - [place] fall precautions as indicated by assessment.  - Educate pt/family on patient safety including physical limitations  - Instruct pt to call for assistance

## 2019-06-24 NOTE — PHYSICAL THERAPY NOTE
PHYSICAL THERAPY TREATMENT NOTE - INPATIENT     Room Number: 021/634-C       Presenting Problem: sepsis due to parainfluenza, + VE,  pneumonia(KOMAL with dialysis 5/14-5/31; s/p trach, SBO/ileus)    Problem List  Principal Problem:    Sepsis due to [facility] progress). DISCHARGE RECOMMENDATIONS  PT Discharge Recommendations: LTAC;Sub-acute rehabilitation(LTAC vs KOLBY pending progress)     PLAN  PT Treatment Plan: Bed mobility; Patient education; Endurance;Range of motion;Strengthening;Transfer training    SUBJ met;Call light within reach;RN aware of session/findings    CURRENT GOALS   Goals to be met by: 6/27/19  Patient Goal Patient's self-stated goal is: did not state   Goal #1 Pt able to actively participate in LE exercises following commands 75% of the time.

## 2019-06-24 NOTE — PROGRESS NOTES
Pulmonary/Critical Care Follow Up Note    HPI:   Magda Ko is a 60year old male with Patient presents with:  Fever (infectious)  Altered Mental Status (neurologic)      PCP LATIA Jesus MD  Admission Attending No admitting provider for patient encoun Continuous  •  dilTIAZem HCl (CARDIZEM) tab 60 mg, 60 mg, Oral, PRN  •  hydrALAzine HCl (APRESOLINE) injection 10 mg, 10 mg, Intravenous, Q4H PRN  •  dextrose 10 % infusion, , Intravenous, Continuous  •  Normal Saline Flush 0.9 % injection 10 mL, 10 mL, In NAD  Smiling  Awake, follows commands  Lung dec b/l bases  CV  Reg   Abd less distended,, + BS and more normoactive  Ext warm, + edema        LABS:    Lab Results   Component Value Date    WBC 7.8 06/24/2019    HGB 10.6 06/24/2019    HCT 33.7 06/24/201 follow      KOMAL  3nd episode  Now progressive and severe  Presumed from septic shock  Plan     Bumex TID now   follow    Encephalopathy  following commands consistently  Head CT neg  Plan      Follow        H/o CHF  Systolic EF 61% in 2828  Followed by car

## 2019-06-24 NOTE — PROGRESS NOTES
[facility] ID PROGRESS NOTE    Denise Villanueva Patient Status:  Inpatient    1960 MRN [id number]   Location [facility] 2W/SW Attending Kelle Kennedy, [address] [facility] [address] Day [address] 48 PCP LATIA Sawant MD     Subjective:  No acute events.  Remains on tube feeding vancomycin 5/2-5/7, IV meropenem 5/12-5/26, metronidazole 5/12-5/25, IV vancomycin 5/12-5/25, PO vancomycin 1112    60year old male.  Patient is a 58-year-old male with a history of seizure disorder, moderate obesity, hypertension, hyperlipidemia, cardiomy tracheostomy 5/22. Now with downtrending hemoglobin with rectal bleeding with hemoglobin as low as 6.5.   With the bleeding noted to have low-grade fevers up to 100.8 with no increased leukocytosis and hypotension on 5/29 early morning without requiring va curve, wbc. -  Reviewed labs, micro, imaging reports.  -  Case d/w patient, RN.  -  ID will continue to follow along peripherally.     Wilber Leyva PA-C  [facility]  [phone]

## 2019-06-24 NOTE — PROGRESS NOTES
[place] [facility] - [facility]    Progress Note    Rm Calle Patient Status:  Inpatient    1960 MRN [id number]   Location [facility] 2W/SW Attending Baron Collins MD   [facility] Day # 48 PCP LATIA Mcmahon MD     Subjective:  Is on TFs at 54 MD

## 2019-06-24 NOTE — PROGRESS NOTES
[place] [facility] - [facility]    Progress Note    Mary Alice Jha Patient Status:  Inpatient    1960 MRN [id number]   Location [facility] 2W/SW Attending Juancarlos Peguero MD   [facility] Day # 47 PCP LATIA Slaughter MD     Subjective:  Is on TFs at 72 questions.      Brian Blanco MD

## 2019-06-25 ENCOUNTER — APPOINTMENT (OUTPATIENT)
Dept: CT IMAGING | Facility: HOSPITAL | Age: 59
DRG: 003 | End: 2019-06-25
Attending: INTERNAL MEDICINE
Payer: MEDICARE

## 2019-06-25 LAB
ANION GAP SERPL CALC-SCNC: 4 MMOL/L (ref 0–18)
BASOPHILS # BLD: 0.06 X10(3) UL (ref 0–0.2)
BASOPHILS NFR BLD: 1 %
BUN BLD-MCNC: 18 MG/DL (ref 7–18)
BUN/CREAT SERPL: 23.7 (ref 10–20)
CALCIUM BLD-MCNC: 7.7 MG/DL (ref 8.5–10.1)
CHLORIDE SERPL-SCNC: 106 MMOL/L (ref 98–112)
CO2 SERPL-SCNC: 31 MMOL/L (ref 21–32)
CREAT BLD-MCNC: 0.76 MG/DL (ref 0.7–1.3)
DEPRECATED RDW RBC AUTO: 57.3 FL (ref 35.1–46.3)
EOSINOPHIL # BLD: 0.99 X10(3) UL (ref 0–0.7)
EOSINOPHIL NFR BLD: 17 %
ERYTHROCYTE [DISTWIDTH] IN BLOOD BY AUTOMATED COUNT: 16 % (ref 11–15)
GLUCOSE BLD-MCNC: 151 MG/DL (ref 70–99)
GLUCOSE BLDC GLUCOMTR-MCNC: 120 MG/DL (ref 70–99)
GLUCOSE BLDC GLUCOMTR-MCNC: 131 MG/DL (ref 70–99)
GLUCOSE BLDC GLUCOMTR-MCNC: 133 MG/DL (ref 70–99)
GLUCOSE BLDC GLUCOMTR-MCNC: 139 MG/DL (ref 70–99)
GLUCOSE BLDC GLUCOMTR-MCNC: 148 MG/DL (ref 70–99)
HAV IGM SER QL: 1.9 MG/DL (ref 1.6–2.6)
HCT VFR BLD AUTO: 32 % (ref 39–53)
HGB BLD-MCNC: 10.1 G/DL (ref 13–17.5)
LYMPHOCYTES NFR BLD: 0.93 X10(3) UL (ref 1–4)
LYMPHOCYTES NFR BLD: 14 %
MCH RBC QN AUTO: 31.1 PG (ref 26–34)
MCHC RBC AUTO-ENTMCNC: 31.6 G/DL (ref 31–37)
MCV RBC AUTO: 98.5 FL (ref 80–100)
MONOCYTES # BLD: 0.35 X10(3) UL (ref 0.1–1)
MONOCYTES NFR BLD: 6 %
MORPHOLOGY: NORMAL
NEUTROPHILS # BLD AUTO: 3.19 X10 (3) UL (ref 1.5–7.7)
NEUTROPHILS NFR BLD: 57 %
NEUTS BAND NFR BLD: 3 %
NEUTS HYPERSEG # BLD: 3.48 X10(3) UL (ref 1.5–7.7)
OSMOLALITY SERPL CALC.SUM OF ELEC: 297 MOSM/KG (ref 275–295)
PLATELET # BLD AUTO: 147 10(3)UL (ref 150–450)
PLATELET MORPHOLOGY: NORMAL
POTASSIUM SERPL-SCNC: 3.3 MMOL/L (ref 3.5–5.1)
RBC # BLD AUTO: 3.25 X10(6)UL (ref 4.3–5.7)
SODIUM SERPL-SCNC: 141 MMOL/L (ref 136–145)
TOTAL CELLS COUNTED: 100
TREATCELL: 10
TRIGL SERPL-MCNC: 112 MG/DL (ref 30–149)
VARIANT LYMPHS NFR BLD MANUAL: 2 %
WBC # BLD AUTO: 5.8 X10(3) UL (ref 4–11)

## 2019-06-25 PROCEDURE — 99233 SBSQ HOSP IP/OBS HIGH 50: CPT | Performed by: INTERNAL MEDICINE

## 2019-06-25 PROCEDURE — 74176 CT ABD & PELVIS W/O CONTRAST: CPT | Performed by: INTERNAL MEDICINE

## 2019-06-25 PROCEDURE — 99232 SBSQ HOSP IP/OBS MODERATE 35: CPT | Performed by: INTERNAL MEDICINE

## 2019-06-25 RX ORDER — MINERAL OIL AND PETROLATUM 150; 830 MG/G; MG/G
OINTMENT OPHTHALMIC 3 TIMES DAILY PRN
Status: DISCONTINUED | OUTPATIENT
Start: 2019-06-25 | End: 2019-07-01

## 2019-06-25 RX ORDER — POTASSIUM CHLORIDE 29.8 MG/ML
40 INJECTION INTRAVENOUS ONCE
Status: COMPLETED | OUTPATIENT
Start: 2019-06-25 | End: 2019-06-25

## 2019-06-25 NOTE — PROGRESS NOTES
[facility]  GI SERVICE PROGRESS NOTE    Isaiah Sesay Patient Status:  Inpatient    1960 MRN [id number]   Location [facility] 2W/SW Attending [employer], [address] [facility] [address] Day # 47 PCP LATIA Chand MD       Subjective:     Daryl Duque 151*   BUN 29* 23* 18   CREATSERUM 0.87 0.77 0.76   GFRAA 109 115 115   GFRNAA 94 99 100   CA 8.5 8.0* 7.7*    140 141   K 3.9 3.4* 3.3*    104 106   CO2 33.0* 32.0 31.0       No results for input(s): DANTE, LIP in the last 168 hours.     Recent L 6/24/2019 at 7:55              Assessment and Plan:     60year old man with initial BMI 52, sleep apnea, diabetes, pulmonary hypertension, high blood pressure, high cholesterol, heart failure, chronic pain on very long-term chronic narcotics, prior colon was discontinued last week by the nutrition service. · Trial of Relistor opiate ileus treatment 6/20/19 to improve gut motility now that he is improving. Continue to minimize narcotics. Remarkable recent decrease in narcotic use.   · Apparently has compl

## 2019-06-25 NOTE — SPIRITUAL CARE NOTE
Pt was in bed and he was vocal at the time of visit. He was been in the hospital for 54 days. Pt stated that he has been getting better but still his abdominal pain is consistent. The  offered emotional support and a prayer.  Pt was very tearful whe

## 2019-06-25 NOTE — PHYSICAL THERAPY NOTE
PHYSICAL THERAPY TREATMENT NOTE - INPATIENT     Room Number: 445/842-M       Presenting Problem: sepsis due to parainfluenza, + VE,  pneumonia(KOMAL with dialysis 5/14-5/31; s/p trach, SBO/ileus)    Problem List  Principal Problem:    Sepsis due to [facility] Treatment Plan: Bed mobility; Patient education; Endurance;Range of motion;Strengthening;Transfer training    SUBJECTIVE  \"play Free Binshwetha Jaramillokeoer on your phone\"    OBJECTIVE  Precautions: (flexiseal; trach with PMV)    WEIGHT BEARING RESTRICTION  W session/findings    CURRENT GOALS   Goals to be met by: UPDATED GOALS TO REFLECT PROGRESS  Patient Goal Patient's self-stated goal is: did not state   Goal #1 Pt able to actively participate in LE exercises following commands 75% of the time.    Goal #1

## 2019-06-25 NOTE — OCCUPATIONAL THERAPY NOTE
OCCUPATIONAL THERAPY TREATMENT NOTE - INPATIENT        Room Number: 846/232-E           Presenting Problem: sepsis    Problem List  Principal Problem:    Sepsis due to undetermined organism [facility])  Active Problems:    Atrial fibrillation with rapid ventricu RESTRICTION  Weight Bearing Restriction: None                PAIN ASSESSMENT  Ratin  Location: generalized  Management Techniques: Repositioning     ACTIVITY TOLERANCE  Pulse: 88        BP: 159/53  BP Location: Right arm  BP Method: Automatic  Patient

## 2019-06-25 NOTE — PROGRESS NOTES
[place] [facility] - [facility]    Progress Note    Jefferson Jeffers Patient Status:  Inpatient    1960 MRN [id number]   Location [facility] 2W/SW Attending Lainey Hope,  [facility] Day # 47 PCP LATIA Joel MD            Subjective:   States indeterminate hypoattenuating lesion in segment IV. Consider MRI of the abdomen with and without contrast when clinically feasible, and when renal function permits, for further evaluation.   2. Gaseous distention of the colon has intervally improved from pr Cont observation, there remains possibility that situation of colon may worsen, rebleeding or perforation, however, due to high risk of surgery and pt generally condition, cont support for present time    He had colonoscopy  with decompression.   Abd merlyn

## 2019-06-25 NOTE — PLAN OF CARE
Patient cooperative and being explained care to assist in orientation. Lights bright and patient encouraged to speak with family. On room air, tolerating well. Encouraged to cough and deep breathe.  PMV utilized to improve communication with caretakers and on self management of diabetes  Outcome: Progressing     Problem: Patient/Family Goals  Goal: Patient/Family Long Term Goal  Description  Patient's Long Term Goal: To go home    Interventions:  - Follow MD orders  - Follow medication regimen  - See nello medications as ordered  - Initiate emergency measures for life threatening arrhythmias  - Monitor electrolytes and administer replacement therapy as ordered  Outcome: Progressing     Problem: RESPIRATORY - ADULT  Goal: Achieves optimal ventilation and oxyg remains intact  Description  INTERVENTIONS  - Assess and document risk factors for pressure ulcer development  - Assess and document skin integrity  - Monitor for areas of redness and/or skin breakdown  - Initiate interventions, skin care algorithm/standar pressure on venipuncture sites to achieve adequate hemostasis  - Assess for signs and symptoms of internal bleeding  - Monitor lab trends  Outcome: Progressing     Problem: SAFETY ADULT - FALL  Goal: Free from fall injury  Description  INTERVENTIONS:  - As

## 2019-06-26 LAB
BASOPHILS # BLD AUTO: 0.03 X10(3) UL (ref 0–0.2)
BASOPHILS NFR BLD AUTO: 0.5 %
DEPRECATED RDW RBC AUTO: 58.5 FL (ref 35.1–46.3)
EOSINOPHIL # BLD AUTO: 1.19 X10(3) UL (ref 0–0.7)
EOSINOPHIL NFR BLD AUTO: 19.4 %
ERYTHROCYTE [DISTWIDTH] IN BLOOD BY AUTOMATED COUNT: 15.9 % (ref 11–15)
GLUCOSE BLDC GLUCOMTR-MCNC: 130 MG/DL (ref 70–99)
GLUCOSE BLDC GLUCOMTR-MCNC: 142 MG/DL (ref 70–99)
GLUCOSE BLDC GLUCOMTR-MCNC: 186 MG/DL (ref 70–99)
HCT VFR BLD AUTO: 33.8 % (ref 39–53)
HGB BLD-MCNC: 10.7 G/DL (ref 13–17.5)
IMM GRANULOCYTES # BLD AUTO: 0.02 X10(3) UL (ref 0–1)
IMM GRANULOCYTES NFR BLD: 0.3 %
LYMPHOCYTES # BLD AUTO: 0.99 X10(3) UL (ref 1–4)
LYMPHOCYTES NFR BLD AUTO: 16.2 %
MCH RBC QN AUTO: 31.4 PG (ref 26–34)
MCHC RBC AUTO-ENTMCNC: 31.7 G/DL (ref 31–37)
MCV RBC AUTO: 99.1 FL (ref 80–100)
MONOCYTES # BLD AUTO: 0.64 X10(3) UL (ref 0.1–1)
MONOCYTES NFR BLD AUTO: 10.5 %
NEUTROPHILS # BLD AUTO: 3.25 X10 (3) UL (ref 1.5–7.7)
NEUTROPHILS # BLD AUTO: 3.25 X10(3) UL (ref 1.5–7.7)
NEUTROPHILS NFR BLD AUTO: 53.1 %
PLATELET # BLD AUTO: 149 10(3)UL (ref 150–450)
POTASSIUM SERPL-SCNC: 3.6 MMOL/L (ref 3.5–5.1)
RBC # BLD AUTO: 3.41 X10(6)UL (ref 4.3–5.7)
WBC # BLD AUTO: 6.1 X10(3) UL (ref 4–11)

## 2019-06-26 PROCEDURE — 99233 SBSQ HOSP IP/OBS HIGH 50: CPT | Performed by: INTERNAL MEDICINE

## 2019-06-26 RX ORDER — POTASSIUM CHLORIDE 1.5 G/1.77G
40 POWDER, FOR SOLUTION ORAL EVERY 4 HOURS
Status: COMPLETED | OUTPATIENT
Start: 2019-06-26 | End: 2019-06-26

## 2019-06-26 NOTE — DIETARY NOTE
ADULT NUTRITION REASSESSMENT     Pt is at high nutrition risk. Pt does not meet malnutrition criteria. RECOMMENDATIONS TO MD: Advance tube feeds as tolerated towards goal of 75 ml/hr.  If tube feed rate remains at <50% of needs (<40 ml/hr) for extende and labs. 5/20 Update: s/p decompressive colonoscopy 5/19- decompressive colon tube remains in place. OG tube draining. TPN #7 tonight. GI status improved but not yet able to feed enteral route. TPN to continue per surgeon providing 100% nutrition needs. 6/17 Update; decompression tube in place (6/14) and decreased colon distention. Stool output: 775 ml/24 hrs: increase tpn volume back to 1800 ml with further increase if needed in future. 6/18 Update: Received MD consult to initiate low rate tube feeds. 1833ml/24 hrs, 92-99% of nutrition needs. )   - Central Parenteral Nutrition  Currently at 50 ml/hr and will be discontinued this pm as tube feeds advance towards goal.   - Medical Food Supplements-NPO  - Vitamin and mineral supplements: none  - Feeding ass (297 lb)  09/12/17 : 134.8 kg (297 lb 3.2 oz)  08/14/17 : 129.3 kg (285 lb)  06/27/17 : 128.5 kg (283 lb 6.4 oz)      GASTROINTESTINAL: Low GRV's, no nausea. Rectal tube in place, loose BM's low ouput (300 ml/24 hrs), abd pain is +/- at different times.  a 32.0 31.0  --    OSMOCALC 294 297*  --    POC BG acceptable. Other labs noted and acceptable.      NUTRITION RELATED PHYSICAL FINDINGS:  - Body Fat/Muscle Mass: well nourished and morbid obesity per visual exam.  - Fluid Accumulation: +1-2 upper extremity e

## 2019-06-26 NOTE — PLAN OF CARE
Problem: Patient Centered Care  Goal: Patient preferences are identified and integrated in the patient's plan of care  Description  Interventions:  - What would you like us to know as we care for you?  Keep wife involved in his care  - Provide timely, com lights off, TV off, minimize noise and interruptions  - Encourage family to assist in orientation and promotion of home routines  Outcome: Progressing     Problem: CARDIOVASCULAR - ADULT  Goal: Maintains optimal cardiac output and hemodynamic stability  Joesph Knox GASTROINTESTINAL - ADULT  Goal: Maintains or returns to baseline bowel function  Description  INTERVENTIONS:  - Assess bowel function  - Maintain adequate hydration with IV or PO as ordered and tolerated  - Evaluate effectiveness of GI medications  - Encou measures to prevent increased intracranial pressure  - Maintain blood pressure and fluid volume within ordered parameters to optimize cerebral perfusion and minimize risk of hemorrhage  - Monitor temperature, glucose, and sodium.  Initiate appropriate inter platelets)  INTERVENTIONS:  - Avoid intramuscular injections, enemas and rectal medication administration  - Ensure safe mobilization of patient  - Hold pressure on venipuncture sites to achieve adequate hemostasis  - Assess for signs and symptoms of inter

## 2019-06-26 NOTE — PROGRESS NOTES
[facility] Pharmacy Note:  Causes of eosinophilia    Pharmacy was consulted for possible drug-related causes of eosinophilia.  There are many medications associated with eosinophilia, however the patient has not received many of these drugs during his hospitalizat

## 2019-06-26 NOTE — PROGRESS NOTES
[facility]  GI SERVICE PROGRESS NOTE    Gustavo Dixon Patient Status:  Inpatient    1960 MRN [id number]   Location [facility] 2W/SW Attending Bonny Murillo, [address] 54 PCP LATIA Gonzalez MD       Subjective:     Marco A Alexandre 149.0*       Lab Results   Component Value Date    INR 1.65 (H) 06/12/2019    INR 1.42 (H) 06/08/2019    INR 1.44 (H) 06/07/2019       Recent Labs   Lab 06/22/19  0445 06/24/19  0515 06/25/19  0530 06/26/19  0628   * 107* 151*  --    BUN 29* 23* 18 Assessment and Plan:     60year old man with initial BMI 52, sleep apnea, diabetes, pulmonary hypertension, high blood pressure, high cholesterol, heart failure, chronic pain on very long-term chronic narcotics, prior colon surgery for a colovesic tomorrow morning 6/27 to 50 mL/hr  · Trial of Relistor opiate ileus treatment 6/20/19 to improve gut motility now that he is improving. Continue to minimize narcotics. Remarkable recent decrease in narcotic use.   · Apparently has completed most recent co

## 2019-06-26 NOTE — RESPIRATORY THERAPY NOTE
Patient placed on PMV with no distress noted. Patient on room air saturation 99%. Suctioned small amount of secretions prior to speaking valve. Patient tolerating well.

## 2019-06-26 NOTE — PROGRESS NOTES
Pulmonary/Critical Care Follow Up Note    HPI:   Malinda Flores is a 60year old male with Patient presents with:  Fever (infectious)  Altered Mental Status (neurologic)      PCP LATIA Bess MD  Admission Attending No admitting provider for patient encoun HCl (VERSED) 2 MG/2ML injection, , , PRN  •  diltiazem 100mg/100ml in NaCl (CARDIZEM) 1 mg/mL premix/add-vantage, 2.5-20 mg/hr, Intravenous, Continuous  •  dilTIAZem HCl (CARDIZEM) tab 60 mg, 60 mg, Oral, PRN  •  hydrALAzine HCl (APRESOLINE) injection 10 m -1082 ml     NAD  Smiling and using words  Awake, follows commands  Lung dec b/l bases  CV  Reg   Abd soft and non distended and + BDS  Ext warm, + edema        LABS:    Lab Results   Component Value Date    WBC 6.1 06/26/2019    HGB 10.7 06/26/2019    HCT cards notes  Plan       Follow rate                               Sz  Plan Keppra     FM/chronic pain  H/o chronic narcotics  Weaned off morphine gtt  Plan prn morphine    HL  Plan statin    DM  Endo following  Plan insulin    FEN  TPN     DVT px  SCDs   h

## 2019-06-27 LAB
GLUCOSE BLDC GLUCOMTR-MCNC: 137 MG/DL (ref 70–99)
GLUCOSE BLDC GLUCOMTR-MCNC: 144 MG/DL (ref 70–99)
GLUCOSE BLDC GLUCOMTR-MCNC: 165 MG/DL (ref 70–99)
GLUCOSE BLDC GLUCOMTR-MCNC: 166 MG/DL (ref 70–99)
GLUCOSE BLDC GLUCOMTR-MCNC: 194 MG/DL (ref 70–99)
POTASSIUM SERPL-SCNC: 3.6 MMOL/L (ref 3.5–5.1)

## 2019-06-27 PROCEDURE — 99233 SBSQ HOSP IP/OBS HIGH 50: CPT | Performed by: INTERNAL MEDICINE

## 2019-06-27 RX ORDER — POTASSIUM CHLORIDE 1.5 G/1.77G
40 POWDER, FOR SOLUTION ORAL EVERY 4 HOURS
Status: COMPLETED | OUTPATIENT
Start: 2019-06-27 | End: 2019-06-27

## 2019-06-27 RX ORDER — CARVEDILOL 3.12 MG/1
3.12 TABLET ORAL 2 TIMES DAILY WITH MEALS
Status: DISCONTINUED | OUTPATIENT
Start: 2019-06-27 | End: 2019-07-01

## 2019-06-27 NOTE — SLP NOTE
ADULT SWALLOWING EVALUATION    ASSESSMENT    ASSESSMENT/OVERALL IMPRESSION:      This BSE was ordered post extubation/placement of new trach. Initial BSE 5/09/19 recommended pureed/nectar-thick liquids. Pt has been NPO since placement of trach.  Pt addition safety/efficiency of the swallow. Will monitor for any new CXR results. Will complete VFSS as appropriate. Diet to be upgraded as tolerated. Family education to be continued as available.             RECOMMENDATIONS   Diet Recommendations - Solids: Patrick  D opacification/subsegmental atelectasis.          OBJECTIVE   ORAL MOTOR EXAMINATION  Dentition: Natural(missing upper/lower partials)  Symmetry: Within Functional Limits  Strength: (weak)  Tone: (weak)  Range of Motion: (weak)  Rate of Motion: (weak)    Voi precautions  Number of Visits to Meet Established Goals: 4  Follow Up Needed: Yes  SLP Follow-up Date: 06/28/19    Thank you for your referral.   If you have any questions, please contact       Bubba Michael M.S. CARLI/SLP  Speech-Language Pathologist

## 2019-06-27 NOTE — PROGRESS NOTES
[facility] [address]  GI SERVICE PROGRESS NOTE    Jai Carbajal Patient Status:  Inpatient    1960 MRN [id number]   Location [facility] 2W/SW Attending Luis M Alonzo, [address] [facility] [address] PCP C. Stephani Boas, MD       Subjective:     Look CREATSERUM 0.87 0.77 0.76  --   --    GFRAA 109 115 115  --   --    GFRNAA 94 99 100  --   --    CA 8.5 8.0* 7.7*  --   --     140 141  --   --    K 3.9 3.4* 3.3* 3.6 3.6    104 106  --   --    CO2 33.0* 32.0 31.0  --   --        No results f to slowly advancing tube feeding rate. CT scan performed showing ongoing dilation of colon with air, improved from previous scans above. 6/26/2019: Doing well at 40 mL/hr feeding rate. Labs including WBC stable.       Suggest:    · Increased abdominal sy

## 2019-06-27 NOTE — PLAN OF CARE
Problem: Patient Centered Care  Goal: Patient preferences are identified and integrated in the patient's plan of care  Description  Interventions:  - What would you like us to know as we care for you?  Keep wife involved in his care  - Provide timely, com lights off, TV off, minimize noise and interruptions  - Encourage family to assist in orientation and promotion of home routines  Outcome: Progressing     Problem: Patient/Family Goals  Goal: Patient/Family Long Term Goal  Description  Patient's Long Term obtain 12 lead EKG if indicated  - Evaluate effectiveness of antiarrhythmic and heart rate control medications as ordered  - Initiate emergency measures for life threatening arrhythmias  - Monitor electrolytes and administer replacement therapy as ordered diabetes  Outcome: Progressing  Goal: Electrolytes maintained within normal limits  Description  INTERVENTIONS:  - Monitor labs and rhythm and assess patient for signs and symptoms of electrolyte imbalances  - Administer electrolyte replacement as ordered Progressing     Problem: DISCHARGE PLANNING  Goal: Discharge to home or other facility with appropriate resources  Description  INTERVENTIONS:  - Identify barriers to discharge w/pt and caregiver  - Include patient/family/discharge partner in discharge erna

## 2019-06-27 NOTE — SLP NOTE
SPEECH DAILY NOTE - INPATIENT    ASSESSMENT & PLAN   ASSESSMENT    Pt seen for tolerance of PMV. Pt in chair with PMV in place upon SLP entrance to room. Pt with audible phonation during conversation. Minimal verbal cues required for breath breaks/pacing. on/off of PMV during this first session. In Progress   Goal #3 Pt will tolerate 4-6 hours of PMV placement during the day with removal of valve during naps/sleeping. Pt reported continuous use of PMV for \"several hours\" and confirmed by RN.  Recomm

## 2019-06-27 NOTE — PROGRESS NOTES
Pulmonary/Critical Care Follow Up Note    HPI:   Donelda Dakins is a 60year old male with Patient presents with:  Fever (infectious)  Altered Mental Status (neurologic)      PCP LATIA Ness MD  Admission Attending No admitting provider for patient encoun 2 MG/2ML injection, , , PRN  •  diltiazem 100mg/100ml in NaCl (CARDIZEM) 1 mg/mL premix/add-vantage, 2.5-20 mg/hr, Intravenous, Continuous  •  dilTIAZem HCl (CARDIZEM) tab 60 mg, 60 mg, Oral, PRN  •  hydrALAzine HCl (APRESOLINE) injection 10 mg, 10 mg, Int ml     NAD  Smiling and using words  Awake, follows commands  Lung dec b/l bases  CV  Reg   Abd soft and non distended and + BDS  Ext warm, + edema        LABS:    Lab Results   Component Value Date    K 3.6 06/27/2019     Lab Results   Component Value Keyur Add Coreg back    Afib  Rate controlled  Non compliant with a/c from past cards notes  Plan       Follow rate                               Sz  Plan Keppra     FM/chronic pain  H/o chronic narcotics  Weaned off morphine gtt  Plan prn morphine    HL

## 2019-06-27 NOTE — PROGRESS NOTES
[facility]    Progress Note    Migdaliakeith Delarosaing Patient Status:  Inpatient    1960 MRN [id number]   Location [facility] 2W/SW Attending Chioma Marks,  [facility] Se Day [address] PCP LATIA Felipe MD            Subjective:   States 3.6    104 106  --   --    CO2 33.0* 32.0 31.0  --   --                            Assessment and Plan:         GI bleeding.      Hg more stable recently  Pt having intermittient transfusion of blood products in attempt to reverse elevated INR  We do

## 2019-06-27 NOTE — SLP NOTE
SPEECH/LANGUAGE/COGNITIVE EVALUATION - INPATIENT    Admission Date: 5/2/2019  Evaluation Date: 06/24/19    Reason for Referral: Other (Comment)(new trach)    ASSESSMENT & PLAN   ASSESSMENT & IMPRESSION  Orders rec'd, chart reviewed.  Pt with known prolonged cues with training over 2 consecutive sessions. In Progress   Goal #3 Pt will tolerate 4-6 hours of PMV placement during the day with removal of valve during naps/sleeping.   In Progress   Goal #4 Further BSSE with PO trials once patient appropriate and

## 2019-06-28 ENCOUNTER — APPOINTMENT (OUTPATIENT)
Dept: MRI IMAGING | Facility: HOSPITAL | Age: 59
DRG: 003 | End: 2019-06-28
Attending: INTERNAL MEDICINE
Payer: MEDICARE

## 2019-06-28 LAB
ANION GAP SERPL CALC-SCNC: 8 MMOL/L (ref 0–18)
BASOPHILS # BLD AUTO: 0.03 X10(3) UL (ref 0–0.2)
BASOPHILS NFR BLD AUTO: 0.4 %
BUN BLD-MCNC: 17 MG/DL (ref 7–18)
BUN/CREAT SERPL: 23.3 (ref 10–20)
CALCIUM BLD-MCNC: 8.4 MG/DL (ref 8.5–10.1)
CHLORIDE SERPL-SCNC: 103 MMOL/L (ref 98–112)
CO2 SERPL-SCNC: 28 MMOL/L (ref 21–32)
CORTIS SERPL-MCNC: 17.7 UG/DL
CREAT BLD-MCNC: 0.73 MG/DL (ref 0.7–1.3)
DEPRECATED RDW RBC AUTO: 56.6 FL (ref 35.1–46.3)
EOSINOPHIL # BLD AUTO: 1.35 X10(3) UL (ref 0–0.7)
EOSINOPHIL NFR BLD AUTO: 18.1 %
ERYTHROCYTE [DISTWIDTH] IN BLOOD BY AUTOMATED COUNT: 15.9 % (ref 11–15)
GLUCOSE BLD-MCNC: 196 MG/DL (ref 70–99)
GLUCOSE BLDC GLUCOMTR-MCNC: 136 MG/DL (ref 70–99)
GLUCOSE BLDC GLUCOMTR-MCNC: 180 MG/DL (ref 70–99)
GLUCOSE BLDC GLUCOMTR-MCNC: 188 MG/DL (ref 70–99)
GLUCOSE BLDC GLUCOMTR-MCNC: 194 MG/DL (ref 70–99)
HCT VFR BLD AUTO: 35.5 % (ref 39–53)
HGB BLD-MCNC: 11.3 G/DL (ref 13–17.5)
IGA SERPL-MCNC: 365 MG/DL (ref 70–312)
IGM SERPL-MCNC: 68.5 MG/DL (ref 43–279)
IMM GRANULOCYTES # BLD AUTO: 0.02 X10(3) UL (ref 0–1)
IMM GRANULOCYTES NFR BLD: 0.3 %
IMMUNOGLOBULIN PNL SER-MCNC: 2070 MG/DL (ref 791–1643)
LYMPHOCYTES # BLD AUTO: 0.88 X10(3) UL (ref 1–4)
LYMPHOCYTES NFR BLD AUTO: 11.8 %
MCH RBC QN AUTO: 31 PG (ref 26–34)
MCHC RBC AUTO-ENTMCNC: 31.8 G/DL (ref 31–37)
MCV RBC AUTO: 97.5 FL (ref 80–100)
MONOCYTES # BLD AUTO: 0.76 X10(3) UL (ref 0.1–1)
MONOCYTES NFR BLD AUTO: 10.2 %
NEUTROPHILS # BLD AUTO: 4.42 X10 (3) UL (ref 1.5–7.7)
NEUTROPHILS # BLD AUTO: 4.42 X10(3) UL (ref 1.5–7.7)
NEUTROPHILS NFR BLD AUTO: 59.2 %
OSMOLALITY SERPL CALC.SUM OF ELEC: 295 MOSM/KG (ref 275–295)
PLATELET # BLD AUTO: 147 10(3)UL (ref 150–450)
POTASSIUM SERPL-SCNC: 3.6 MMOL/L (ref 3.5–5.1)
POTASSIUM SERPL-SCNC: 4 MMOL/L (ref 3.5–5.1)
RBC # BLD AUTO: 3.64 X10(6)UL (ref 4.3–5.7)
SODIUM SERPL-SCNC: 139 MMOL/L (ref 136–145)
VIT B12 SERPL-MCNC: 1229 PG/ML (ref 193–986)
WBC # BLD AUTO: 7.5 X10(3) UL (ref 4–11)

## 2019-06-28 PROCEDURE — 74183 MRI ABD W/O CNTR FLWD CNTR: CPT | Performed by: INTERNAL MEDICINE

## 2019-06-28 PROCEDURE — 99233 SBSQ HOSP IP/OBS HIGH 50: CPT | Performed by: INTERNAL MEDICINE

## 2019-06-28 PROCEDURE — 99221 1ST HOSP IP/OBS SF/LOW 40: CPT | Performed by: INTERNAL MEDICINE

## 2019-06-28 RX ORDER — POTASSIUM CHLORIDE 1.5 G/1.77G
40 POWDER, FOR SOLUTION ORAL EVERY 4 HOURS
Status: COMPLETED | OUTPATIENT
Start: 2019-06-28 | End: 2019-06-28

## 2019-06-28 NOTE — PROGRESS NOTES
[facility]    Progress Note    Isaiah Sesay Patient Status:  Inpatient    1960 MRN [id number]   Location [facility] 2W/SW Attending Omate, [address] [facility] [address] PCP LATIA Chand MD            Subjective:   States  106  --   --  103   CO2 32.0 31.0  --   --  28.0                           Assessment and Plan:         GI bleeding.      Hg more stable recently  Pt having intermittient transfusion of blood products in attempt to reverse elevated INR  We do not k

## 2019-06-28 NOTE — PROGRESS NOTES
Pulmonary/Critical Care Follow Up Note    HPI:   Yosef De Leon is a 60year old male with Patient presents with:  Fever (infectious)  Altered Mental Status (neurologic)      PCP LATIA Campos MD  Admission Attending No admitting provider for patient encoun Q12H  •  Midazolam HCl (VERSED) 5 MG/5ML injection, , , PRN  •  Midazolam HCl (VERSED) 2 MG/2ML injection, , , PRN  •  diltiazem 100mg/100ml in NaCl (CARDIZEM) 1 mg/mL premix/add-vantage, 2.5-20 mg/hr, Intravenous, Continuous  •  dilTIAZem HCl (CARDIZEM) t NAD  Smiling and using words  Awake, follows commands  Lung dec b/l bases  CV  Reg   Abd soft and non distended and + BDS  Ext warm, + edema        LABS:    Lab Results   Component Value Date    WBC 7.5 06/28/2019    HGB 11.3 06/28/2019    HCT 35.5 06/ episode  Now progressive and severe  Presumed from septic shock  Plan     Bumex TID now   follow    Encephalopathy  following commands consistently  Head CT neg  Plan      Follow        H/o CHF  Systolic EF 00% in 1677  Followed by cards  Now central conge

## 2019-06-28 NOTE — OCCUPATIONAL THERAPY NOTE
OCCUPATIONAL THERAPY TREATMENT NOTE - INPATIENT        Room Number: 928/200-O           Presenting Problem: sepsis    Problem List  Principal Problem:    Sepsis due to undetermined organism [facility])  Active Problems:    Atrial fibrillation with rapid ventricu ACTIVITIES OF DAILY LIVING ASSESSMENT  AM-PAC ‘6-Clicks’ Inpatient Daily Activity Short Form  How much help from another person does the patient currently need…  -   Putting on and taking off regular lower body clothing?: Total  -   Bathing (including

## 2019-06-28 NOTE — PLAN OF CARE
Problem: Patient Centered Care  Goal: Patient preferences are identified and integrated in the patient's plan of care  Description  Interventions:  - What would you like us to know as we care for you?  Keep wife involved in his care  - Provide timely, com lights off, TV off, minimize noise and interruptions  - Encourage family to assist in orientation and promotion of home routines  Outcome: Progressing     Problem: CARDIOVASCULAR - ADULT  Goal: Maintains optimal cardiac output and hemodynamic stability  Fidel Patricia mask 6L 28% fio2     Problem: GASTROINTESTINAL - ADULT  Goal: Maintains or returns to baseline bowel function  Description  INTERVENTIONS:  - Assess bowel function  - Maintain adequate hydration with IV or PO as ordered and tolerated  - Evaluate effectiven status  - Initiate measures to prevent increased intracranial pressure  - Maintain blood pressure and fluid volume within ordered parameters to optimize cerebral perfusion and minimize risk of hemorrhage  - Monitor temperature, glucose, and sodium.  Initiat from bleeding injury  Description  (Example usage: patient with low platelets)  INTERVENTIONS:  - Avoid intramuscular injections, enemas and rectal medication administration  - Ensure safe mobilization of patient  - Hold pressure on venipuncture sites to a

## 2019-06-28 NOTE — SLP NOTE
SPEECH DAILY NOTE - INPATIENT    ASSESSMENT & PLAN   ASSESSMENT    Pt seen for tolerance of PMV. Pt in bed with PMV in place upon SLP entrance to room (per RN, PMV had been in place for 1 1/2 hours already.  Pt with audible phonation during conversation and sessions. Pt observed to self-cue for breath pacing/breaks with PMV in place during conversation. Functional vocal intensity and quality observed with functional speech intelligibility during this second session.        GOAL MET     Goal #2 Pt and/or fam

## 2019-06-28 NOTE — CM/SW NOTE
6/28: DA followed up with RN/Sabi, patient may be ready for discharge on Monday, 7/1 to [address]. DA called Cristy/Liaison to follow up on possibly discharge plan. Updates sent via [employer].  DA/RALPH will contact [facility] on Monday with updates, if patie

## 2019-06-28 NOTE — PHYSICAL THERAPY NOTE
PHYSICAL THERAPY TREATMENT NOTE - INPATIENT     Room Number: 850/848-L       Presenting Problem: sepsis due to parainfluenza, + VE,  pneumonia([facility] with dialysis 5/14-5/31; s/p trach, SBO/ileus)    Problem List  Principal Problem:    Sepsis due to [facility] Drain(s)(flexiseal)    WEIGHT BEARING RESTRICTION  Weight Bearing Restriction: None                PAIN ASSESSMENT   Ratin  Location: \"I'm sore\"  Management Techniques: Activity promotion; Body mechanics;Repositioning    BALANCE exercises following commands 75% of the time.    Goal #1   Current Status  GOAL met   Goal #2 Pt to perform bed mobility with Max A Of 2      Goal #2  Current Status Max A x 2-3   Goal #3 Pt to tolerate sitting EOB for 5 mins with Max A of 1.    Goal #3

## 2019-06-28 NOTE — RESPIRATORY THERAPY NOTE
Patient was received on trach collar 6L 28%, with a portex 8 and cuff deflated. Around 0820 patient was suctioned and speaking valve was placed, and patient is on room air.  Patient vocalized he was okay and did not need any other assistance from respir

## 2019-06-28 NOTE — PROGRESS NOTES
[facility] ID PROGRESS NOTE    Guido Cui Patient Status:  Inpatient    1960 MRN [id number]   Location [facility] 2W/SW Attending Amy Alanis, [address] [facility] [address] PCP LATIA Stevens MD     Subjective:  Awake, eating breakfast. TF were slowed 5/2-5/7, IV meropenem 5/12-5/26, metronidazole 5/12-5/25, IV vancomycin 5/12-5/25, PO vancomycin 1112    60year old male.  Patient is a 58-year-old male with a history of seizure disorder, moderate obesity, hypertension, hyperlipidemia, cardiomyopathy with Now with downtrending hemoglobin with rectal bleeding with hemoglobin as low as 6.5. With the bleeding noted to have low-grade fevers up to 100.8 with no increased leukocytosis and hypotension on 5/29 early morning without requiring vasopressors.   Remains fever curve, wbc.  -  FU MRI abdomen.  -  Reviewed labs, micro, imaging reports. Eosinophilia noted. Heme/onc to evaluate. -  Case d/w patient, RN.  -  ID will continue to follow along peripherally.     TEODORO Acuna Infectious Disease Consul

## 2019-06-28 NOTE — SLP NOTE
SPEECH DAILY NOTE - INPATIENT    ASSESSMENT & PLAN   ASSESSMENT    Pt alert, afebrile and seen with PMV in place (prior to SLP entering room).  Pt seen for swallow analysis per BSE recommendations (after consulting with RN) and candidacy for upgrade of diet Diet Recommendations - Solids: ground  Diet Recommendations - Liquid: Thin(liquids via tsp )    Compensatory Strategies Recommended: No straws; Slow rate  Aspiration Precautions: Upright position; Slow rate; No straw  Medication Administration and safe swallowing strategies. In Progress   Goal #4 The patient will tolerate trial upgrade of chopped/ground consistency and thin via cup liquids without overt signs or symptoms of aspiration with 100 % accuracy over 2 session(s).     Mastication not

## 2019-06-29 LAB
BASOPHILS # BLD AUTO: 0.04 X10(3) UL (ref 0–0.2)
BASOPHILS NFR BLD AUTO: 0.5 %
DEPRECATED RDW RBC AUTO: 55.3 FL (ref 35.1–46.3)
EOSINOPHIL # BLD AUTO: 1.24 X10(3) UL (ref 0–0.7)
EOSINOPHIL NFR BLD AUTO: 16.6 %
ERYTHROCYTE [DISTWIDTH] IN BLOOD BY AUTOMATED COUNT: 15.5 % (ref 11–15)
GLUCOSE BLDC GLUCOMTR-MCNC: 128 MG/DL (ref 70–99)
GLUCOSE BLDC GLUCOMTR-MCNC: 170 MG/DL (ref 70–99)
GLUCOSE BLDC GLUCOMTR-MCNC: 174 MG/DL (ref 70–99)
GLUCOSE BLDC GLUCOMTR-MCNC: 182 MG/DL (ref 70–99)
HAV IGM SER QL: 1.3 MG/DL (ref 1.6–2.6)
HCT VFR BLD AUTO: 36 % (ref 39–53)
HGB BLD-MCNC: 11.7 G/DL (ref 13–17.5)
IMM GRANULOCYTES # BLD AUTO: 0.03 X10(3) UL (ref 0–1)
IMM GRANULOCYTES NFR BLD: 0.4 %
LYMPHOCYTES # BLD AUTO: 1.52 X10(3) UL (ref 1–4)
LYMPHOCYTES NFR BLD AUTO: 20.4 %
MCH RBC QN AUTO: 31.5 PG (ref 26–34)
MCHC RBC AUTO-ENTMCNC: 32.5 G/DL (ref 31–37)
MCV RBC AUTO: 96.8 FL (ref 80–100)
MONOCYTES # BLD AUTO: 0.77 X10(3) UL (ref 0.1–1)
MONOCYTES NFR BLD AUTO: 10.3 %
NEUTROPHILS # BLD AUTO: 3.85 X10 (3) UL (ref 1.5–7.7)
NEUTROPHILS # BLD AUTO: 3.85 X10(3) UL (ref 1.5–7.7)
NEUTROPHILS NFR BLD AUTO: 51.8 %
PLATELET # BLD AUTO: 184 10(3)UL (ref 150–450)
POTASSIUM SERPL-SCNC: 3.4 MMOL/L (ref 3.5–5.1)
RBC # BLD AUTO: 3.72 X10(6)UL (ref 4.3–5.7)
WBC # BLD AUTO: 7.5 X10(3) UL (ref 4–11)

## 2019-06-29 PROCEDURE — 99231 SBSQ HOSP IP/OBS SF/LOW 25: CPT | Performed by: INTERNAL MEDICINE

## 2019-06-29 PROCEDURE — 99233 SBSQ HOSP IP/OBS HIGH 50: CPT | Performed by: INTERNAL MEDICINE

## 2019-06-29 PROCEDURE — 99232 SBSQ HOSP IP/OBS MODERATE 35: CPT | Performed by: INTERNAL MEDICINE

## 2019-06-29 RX ORDER — MAGNESIUM OXIDE 400 MG (241.3 MG MAGNESIUM) TABLET
800 TABLET ONCE
Status: COMPLETED | OUTPATIENT
Start: 2019-06-29 | End: 2019-06-29

## 2019-06-29 RX ORDER — POTASSIUM CHLORIDE 1.5 G/1.77G
40 POWDER, FOR SOLUTION ORAL EVERY 4 HOURS
Status: COMPLETED | OUTPATIENT
Start: 2019-06-29 | End: 2019-06-29

## 2019-06-29 NOTE — SPIRITUAL CARE NOTE
Per patient, he was recently told about a spot on his lung that may be cancerous. He is very concerned, he wants to go home for a few days before going to [facility] \"to take care of things,\" and is upset that he cannot.   encouraged pt to process the re

## 2019-06-29 NOTE — PLAN OF CARE
Problem: Patient Centered Care  Goal: Patient preferences are identified and integrated in the patient's plan of care  Description  Interventions:  - What would you like us to know as we care for you?  Keep wife involved in his care  - Provide timely, com Promote highest level of mobility daily  - Provide frequent reorientation  - Promote wakefulness i.e. lights on, blinds open  - Promote sleep, encourage patient's normal rest cycle i.e. lights off, TV off, minimize noise and interruptions  - Encourage fami Incentive Spirometry  - Assess the need for suctioning and perform as needed  - Assess and instruct to report SOB or any respiratory difficulty  - Respiratory Therapy support as indicated  - Manage/alleviate anxiety  - Monitor for signs/symptoms of CO2 ret needed  Outcome: Progressing  Note:   mepilex to sacrum/coccyx. BUE/BLE elevated on pillows. Increase activity as tolerated. Reposition q2hr and prn. Skin kept clean and dry.  Redness to buttocks, incontinence excoriation to perineum noted- flexiseal in erna appropriate resources  Description  INTERVENTIONS:  - Identify barriers to discharge w/pt and caregiver  - Include patient/family/discharge partner in discharge planning  - Arrange for needed discharge resources and transportation as appropriate  - Identif

## 2019-06-29 NOTE — PROGRESS NOTES
[facility]    Progress Note    Denise Villanueva Patient Status:  Inpatient    1960 MRN [id number]   Location [facility] 2W/SW Attending Kelle Kennedy, [address] [facility] [address] PCP LATIA Sawant MD     Denise Villanueva is a  60year old m Plan:       Eosinophilia   Biopsy of the transverse colon was reviewed with Dr. Marita Urrutia today   No increased eosinophils - no other recent tissue available   Absolute eosinophil count 1.2   Will await additional labs  Advised continued monitoring after transf guided biopsy. 3. Gallstone. 4. Nonspecific periportal and portacaval lymphadenopathy. 5. Multiple cystic pancreatic lesions-main consideration R side branch IPMNs, and less likely small pseudocysts.   Follow-up MRI could be obtained in 1 year for further e

## 2019-06-29 NOTE — PLAN OF CARE
Pt leaving room 224  For MRI. Pt stable on Room Air and accompanied by transport. MD order for pt to go on own if stable.  Pt content and eager for ordered imaging

## 2019-06-29 NOTE — PROGRESS NOTES
[address]  GI SERVICE PROGRESS NOTE    Aime Thomas Patient Status:  Inpatient    1960 MRN [id number]   Location [facility] 2W/SW Attending Azucena Vasques, [address] [facility] [address] Day [address] PCP LATIA Gonzales MD       Subjective:     Nestor Pradhan DANTE, LIP in the last 168 hours. Recent Labs   Lab 06/24/19  0515 06/25/19  0530 06/28/19  0351 06/29/19  0359   MG 1.7 1.9  --  1.3*   B12  --   --  1,229*  --        No results for input(s): URINE, CULTI, BLDSMR in the last 168 hours.       Mri Abdomen renal failure. Initially seen by the GI service 5/3 by Dr. Hayden De Luna for coffee-ground emesis and question of a small bowel obstruction though repeat imaging suggestive of colonic ileus.  During hospitalization, patient had colonoscopies (5/16, 5/28 and 6/14

## 2019-06-29 NOTE — PLAN OF CARE
Problem: Patient Centered Care  Goal: Patient preferences are identified and integrated in the patient's plan of care  Description  Interventions:  - What would you like us to know as we care for you?  Keep wife involved in his care  - Provide timely, com lights off, TV off, minimize noise and interruptions  - Encourage family to assist in orientation and promotion of home routines  Outcome: Completed     Problem: CARDIOVASCULAR - ADULT  Goal: Maintains optimal cardiac output and hemodynamic stability  Desc GASTROINTESTINAL - ADULT  Goal: Maintains or returns to baseline bowel function  Description  INTERVENTIONS:  - Assess bowel function  - Maintain adequate hydration with IV or PO as ordered and tolerated  - Evaluate effectiveness of GI medications  - Encou measures to prevent increased intracranial pressure  - Maintain blood pressure and fluid volume within ordered parameters to optimize cerebral perfusion and minimize risk of hemorrhage  - Monitor temperature, glucose, and sodium.  Initiate appropriate inter platelets)  INTERVENTIONS:  - Avoid intramuscular injections, enemas and rectal medication administration  - Ensure safe mobilization of patient  - Hold pressure on venipuncture sites to achieve adequate hemostasis  - Assess for signs and symptoms of inter

## 2019-06-29 NOTE — PROGRESS NOTES
[facility]     Progress Note        [facility] Patient Status:  Inpatient    1960 MRN [id number]   Location [facility] 2W/SW Attending Ruthann Amaya,  [facility] Se Day [address] PCP LATIA Velazquez MD       Subjective:   Patient is PRN   Midazolam HCl (VERSED) 2 MG/2ML injection   PRN   diltiazem 100mg/100ml in NaCl (CARDIZEM) 1 mg/mL premix/add-vantage 2.5-20 mg/hr Intravenous Continuous   dilTIAZem HCl (CARDIZEM) tab 60 mg 60 mg Oral PRN   hydrALAzine HCl (APRESOLINE) injection 10 Hepatic cirrhosis. 2. Segment 4 poorly defined hepatic lesion with thrombosed segment 4 intrahepatic portal vein branches. Mild dilatation of segment 4 intrahepatic ducts. Unable to accurately characterize the lesion secondary to motion artifact.   Main c control.  -Unclear etiology of eosinophilia. Further recommendations per hematology. Allergy to have pathology review: Biopsy results. No clear evidence of medication induced eosinophilia.   -Closely monitor renal function  -Encephalopathy appears to be

## 2019-06-29 NOTE — PLAN OF CARE
Problem: Patient Centered Care  Goal: Patient preferences are identified and integrated in the patient's plan of care  Description  Interventions:  - What would you like us to know as we care for you?  Keep wife involved in his care  - Provide timely, com normal rest cycle i.e. lights off, TV off, minimize noise and interruptions  - Encourage family to assist in orientation and promotion of home routines  Outcome: Progressing     Problem: CARDIOVASCULAR - ADULT  Goal: Maintains optimal cardiac output and he Progressing     Problem: GASTROINTESTINAL - ADULT  Goal: Maintains or returns to baseline bowel function  Description  INTERVENTIONS:  - Assess bowel function  - Maintain adequate hydration with IV or PO as ordered and tolerated  - Evaluate effectiveness o report changes in neurological status  - Initiate measures to prevent increased intracranial pressure  - Maintain blood pressure and fluid volume within ordered parameters to optimize cerebral perfusion and minimize risk of hemorrhage  - Monitor temperatur injury  Description  (Example usage: patient with low platelets)  INTERVENTIONS:  - Avoid intramuscular injections, enemas and rectal medication administration  - Ensure safe mobilization of patient  - Hold pressure on venipuncture sites to achieve adequat

## 2019-06-30 LAB
ANION GAP SERPL CALC-SCNC: 7 MMOL/L (ref 0–18)
BASOPHILS # BLD AUTO: 0.06 X10(3) UL (ref 0–0.2)
BASOPHILS NFR BLD AUTO: 0.9 %
BUN BLD-MCNC: 16 MG/DL (ref 7–18)
BUN/CREAT SERPL: 21.6 (ref 10–20)
CALCIUM BLD-MCNC: 8.3 MG/DL (ref 8.5–10.1)
CHLORIDE SERPL-SCNC: 103 MMOL/L (ref 98–112)
CO2 SERPL-SCNC: 29 MMOL/L (ref 21–32)
CREAT BLD-MCNC: 0.74 MG/DL (ref 0.7–1.3)
DEPRECATED RDW RBC AUTO: 55.7 FL (ref 35.1–46.3)
EOSINOPHIL # BLD AUTO: 1.2 X10(3) UL (ref 0–0.7)
EOSINOPHIL NFR BLD AUTO: 17.3 %
ERYTHROCYTE [DISTWIDTH] IN BLOOD BY AUTOMATED COUNT: 15.4 % (ref 11–15)
GLUCOSE BLD-MCNC: 120 MG/DL (ref 70–99)
GLUCOSE BLDC GLUCOMTR-MCNC: 126 MG/DL (ref 70–99)
GLUCOSE BLDC GLUCOMTR-MCNC: 165 MG/DL (ref 70–99)
GLUCOSE BLDC GLUCOMTR-MCNC: 182 MG/DL (ref 70–99)
GLUCOSE BLDC GLUCOMTR-MCNC: 187 MG/DL (ref 70–99)
GLUCOSE BLDC GLUCOMTR-MCNC: 198 MG/DL (ref 70–99)
HAV IGM SER QL: 1.5 MG/DL (ref 1.6–2.6)
HCT VFR BLD AUTO: 38.1 % (ref 39–53)
HGB BLD-MCNC: 12 G/DL (ref 13–17.5)
IMM GRANULOCYTES # BLD AUTO: 0.02 X10(3) UL (ref 0–1)
IMM GRANULOCYTES NFR BLD: 0.3 %
LYMPHOCYTES # BLD AUTO: 1.32 X10(3) UL (ref 1–4)
LYMPHOCYTES NFR BLD AUTO: 19 %
MCH RBC QN AUTO: 30.7 PG (ref 26–34)
MCHC RBC AUTO-ENTMCNC: 31.5 G/DL (ref 31–37)
MCV RBC AUTO: 97.4 FL (ref 80–100)
MONOCYTES # BLD AUTO: 0.87 X10(3) UL (ref 0.1–1)
MONOCYTES NFR BLD AUTO: 12.5 %
MYELOPEROX ANTIBODIES, IGG: 0 AU/ML
NEUTROPHILS # BLD AUTO: 3.48 X10 (3) UL (ref 1.5–7.7)
NEUTROPHILS # BLD AUTO: 3.48 X10(3) UL (ref 1.5–7.7)
NEUTROPHILS NFR BLD AUTO: 50 %
OSMOLALITY SERPL CALC.SUM OF ELEC: 290 MOSM/KG (ref 275–295)
PLATELET # BLD AUTO: 187 10(3)UL (ref 150–450)
POTASSIUM SERPL-SCNC: 3.2 MMOL/L (ref 3.5–5.1)
RBC # BLD AUTO: 3.91 X10(6)UL (ref 4.3–5.7)
SERINE PROTEASE3, IGG: 2 AU/ML
SODIUM SERPL-SCNC: 139 MMOL/L (ref 136–145)
WBC # BLD AUTO: 7 X10(3) UL (ref 4–11)

## 2019-06-30 PROCEDURE — 99232 SBSQ HOSP IP/OBS MODERATE 35: CPT | Performed by: INTERNAL MEDICINE

## 2019-06-30 PROCEDURE — 99233 SBSQ HOSP IP/OBS HIGH 50: CPT | Performed by: INTERNAL MEDICINE

## 2019-06-30 RX ORDER — DEXTROSE MONOHYDRATE 25 G/50ML
50 INJECTION, SOLUTION INTRAVENOUS AS NEEDED
Status: DISCONTINUED | OUTPATIENT
Start: 2019-06-30 | End: 2019-07-01

## 2019-06-30 RX ORDER — POTASSIUM CHLORIDE 20 MEQ/1
40 TABLET, EXTENDED RELEASE ORAL EVERY 4 HOURS
Status: COMPLETED | OUTPATIENT
Start: 2019-06-30 | End: 2019-06-30

## 2019-06-30 RX ORDER — PANTOPRAZOLE SODIUM 40 MG/1
40 TABLET, DELAYED RELEASE ORAL
Status: DISCONTINUED | OUTPATIENT
Start: 2019-07-01 | End: 2019-07-01

## 2019-06-30 RX ORDER — MAGNESIUM OXIDE 400 MG (241.3 MG MAGNESIUM) TABLET
800 TABLET ONCE
Status: COMPLETED | OUTPATIENT
Start: 2019-06-30 | End: 2019-06-30

## 2019-06-30 NOTE — PROGRESS NOTES
[facility]    Progress Note    Orquidea Pascal Patient Status:  Inpatient    1960 MRN [id number]   Location [facility] 2W/SW Attending Laci Pickard,  [facility] Se Day # 61 PCP LATIA Kendrick MD            Subjective:     Michi Gannon 3.2*     --  103  --   --  103   CO2 31.0  --  28.0  --   --  29.0    < > = values in this interval not displayed. Mri Abdomen (w+wo) (SGC=[id number])    Result Date: 6/28/2019  CONCLUSION:  1. Hepatic cirrhosis.  2. Segment 4 poorly defined he observation, there remains possibility that situation of colon may worsen, rebleeding or perforation, however, due to high risk of surgery and pt generally condition, cont support for present time    Colonic pseudoobstruction  He had colonoscopy  with deco

## 2019-06-30 NOTE — PROGRESS NOTES
[facility] [address]  GI SERVICE PROGRESS NOTE    Minerva Score Patient Status:  Inpatient    1960 MRN [id number]   Location [facility] 2W/SW Attending Michael ,  [facility] Se Day # [address] PCP LATIA Linares MD       Subjective:   Nausea 06/30/19  0449   MG 1.9  --  1.3* 1.5*   B12  --  1,229*  --   --        No results for input(s): URINE, CULTI, BLDSMR in the last 168 hours. Mri Abdomen (w+wo) (NVV=[id number])    Result Date: 6/28/2019  CONCLUSION:  1. Hepatic cirrhosis.  2. Segment 4 poo question of a small bowel obstruction though repeat imaging suggestive of colonic ileus. During hospitalization, patient had colonoscopies (5/16, 5/28 and 6/14 with decompression and tube placements). There are ischemic changes to the colon.      #Colonic i

## 2019-06-30 NOTE — PROGRESS NOTES
[facility]     Progress Note        Roel Alexandra Patient Status:  Inpatient    1960 MRN [id number]   Location [facility] 2W/SW Attending Quan Arreguin,  [facility] Se Day # [address] PCP LATIA Britt MD       Subjective:   Patient is mL IV push 40 mg Intravenous Q12H   Midazolam HCl (VERSED) 5 MG/5ML injection   PRN   Midazolam HCl (VERSED) 2 MG/2ML injection   PRN   diltiazem 100mg/100ml in NaCl (CARDIZEM) 1 mg/mL premix/add-vantage 2.5-20 mg/hr Intravenous Continuous   hydrALAzine HC  06/30/2019    CO2 29.0 06/30/2019     06/30/2019    CA 8.3 06/30/2019    MG 1.5 06/30/2019       Mri Abdomen (w+wo) (cpt=[id number])    Result Date: 6/28/2019  CONCLUSION:  1. Hepatic cirrhosis.  2. Segment 4 poorly defined hepatic lesion with thro this time.  -Status post tracheostomy. Tolerating trach collar during the day. PMV as tolerated.  -Nebulizer treatments  -Tube feedings as tolerated. -Further recognitions per GI  -Pain control.  -Unclear etiology of eosinophilia.   Further recommendatio

## 2019-06-30 NOTE — PLAN OF CARE
Problem: Patient Centered Care  Goal: Patient preferences are identified and integrated in the patient's plan of care  Description  Interventions:  - What would you like us to know as we care for you?  Keep wife involved in his care  - Provide timely, com antiarrhythmic and heart rate control medications as ordered  - Initiate emergency measures for life threatening arrhythmias  - Monitor electrolytes and administer replacement therapy as ordered  Outcome: Progressing  Note:   Pt currently in afib, rate con ADULT  Goal: Glucose maintained within prescribed range  Description  INTERVENTIONS:  - Monitor Blood Glucose as ordered  - Assess for signs and symptoms of hyperglycemia and hypoglycemia  - Administer ordered medications to maintain glucose within target SAFETY ADULT - FALL  Goal: Free from fall injury  Description  INTERVENTIONS:  - Assess pt frequently for physical needs  - Identify cognitive and physical deficits and behaviors that affect risk of falls.   - [place] fall precautions as indicated by asse trends  Outcome: Progressing

## 2019-06-30 NOTE — PLAN OF CARE
Problem: Patient Centered Care  Goal: Patient preferences are identified and integrated in the patient's plan of care  Description  Interventions:  - What would you like us to know as we care for you?  Keep wife involved in his care  - Provide timely, com stability  - Monitor arterial and/or venous puncture sites for bleeding and/or hematoma  - Assess quality of pulses, skin color and temperature  - Assess for signs of decreased coronary artery perfusion - ex.  Angina  - Evaluate fluid balance, assess for ed medication profile  Outcome: Progressing, tube feeds stopped, clear liquid diet  Problem: METABOLIC/FLUID AND ELECTROLYTES - ADULT  Goal: Glucose maintained within prescribed range  Description  INTERVENTIONS:  - Monitor Blood Glucose as ordered  - Assess frequently for physical needs  - Identify cognitive and physical deficits and behaviors that affect risk of falls.   - [place] fall precautions as indicated by assessment.  - Educate pt/family on patient safety including physical limitations  - Instruct p

## 2019-06-30 NOTE — SLP NOTE
SPEECH DAILY NOTE - INPATIENT    ASSESSMENT & PLAN   ASSESSMENT        Pt alert, afebrile and seen with PMV in place (prior to SLP entering room).  Pt seen for swallow analysis per BSE recommendations (after consulting with RN) and candidacy for continual u Recommendations: Whole in puree    Patient Experiencing Pain: No                Discharge Recommendations  Discharge Recommendations/Plan: Undetermined    Treatment Plan  Treatment Plan/Recommendations: Dysphagia therapy; Aspiration precautions    Interdisc

## 2019-07-01 VITALS
BODY MASS INDEX: 46.5 KG/M2 | SYSTOLIC BLOOD PRESSURE: 141 MMHG | RESPIRATION RATE: 20 BRPM | OXYGEN SATURATION: 95 % | DIASTOLIC BLOOD PRESSURE: 82 MMHG | HEIGHT: 65.67 IN | WEIGHT: 285.88 LBS | TEMPERATURE: 98 F | HEART RATE: 106 BPM

## 2019-07-01 LAB
ANION GAP SERPL CALC-SCNC: 7 MMOL/L (ref 0–18)
BASOPHILS # BLD AUTO: 0.05 X10(3) UL (ref 0–0.2)
BASOPHILS NFR BLD AUTO: 0.7 %
BUN BLD-MCNC: 21 MG/DL (ref 7–18)
BUN/CREAT SERPL: 23.3 (ref 10–20)
CALCIUM BLD-MCNC: 8.3 MG/DL (ref 8.5–10.1)
CHLORIDE SERPL-SCNC: 104 MMOL/L (ref 98–112)
CO2 SERPL-SCNC: 25 MMOL/L (ref 21–32)
CREAT BLD-MCNC: 0.9 MG/DL (ref 0.7–1.3)
DEPRECATED RDW RBC AUTO: 54.4 FL (ref 35.1–46.3)
EOSINOPHIL # BLD AUTO: 1.18 X10(3) UL (ref 0–0.7)
EOSINOPHIL NFR BLD AUTO: 15.6 %
ERYTHROCYTE [DISTWIDTH] IN BLOOD BY AUTOMATED COUNT: 15.1 % (ref 11–15)
GLUCOSE BLD-MCNC: 113 MG/DL (ref 70–99)
GLUCOSE BLDC GLUCOMTR-MCNC: 118 MG/DL (ref 70–99)
GLUCOSE BLDC GLUCOMTR-MCNC: 202 MG/DL (ref 70–99)
HAV IGM SER QL: 1.7 MG/DL (ref 1.6–2.6)
HCT VFR BLD AUTO: 40.4 % (ref 39–53)
HGB BLD-MCNC: 12.7 G/DL (ref 13–17.5)
IMM GRANULOCYTES # BLD AUTO: 0.02 X10(3) UL (ref 0–1)
IMM GRANULOCYTES NFR BLD: 0.3 %
LYMPHOCYTES # BLD AUTO: 1.24 X10(3) UL (ref 1–4)
LYMPHOCYTES NFR BLD AUTO: 16.4 %
MCH RBC QN AUTO: 30.7 PG (ref 26–34)
MCHC RBC AUTO-ENTMCNC: 31.4 G/DL (ref 31–37)
MCV RBC AUTO: 97.6 FL (ref 80–100)
MONOCYTES # BLD AUTO: 0.82 X10(3) UL (ref 0.1–1)
MONOCYTES NFR BLD AUTO: 10.8 %
NEUTROPHILS # BLD AUTO: 4.26 X10 (3) UL (ref 1.5–7.7)
NEUTROPHILS # BLD AUTO: 4.26 X10(3) UL (ref 1.5–7.7)
NEUTROPHILS NFR BLD AUTO: 56.2 %
OSMOLALITY SERPL CALC.SUM OF ELEC: 286 MOSM/KG (ref 275–295)
PLATELET # BLD AUTO: 188 10(3)UL (ref 150–450)
POTASSIUM SERPL-SCNC: 3.9 MMOL/L (ref 3.5–5.1)
RBC # BLD AUTO: 4.14 X10(6)UL (ref 4.3–5.7)
SODIUM SERPL-SCNC: 136 MMOL/L (ref 136–145)
TRYPTASE: 9.1 UG/L
WBC # BLD AUTO: 7.6 X10(3) UL (ref 4–11)

## 2019-07-01 PROCEDURE — 99233 SBSQ HOSP IP/OBS HIGH 50: CPT | Performed by: INTERNAL MEDICINE

## 2019-07-01 PROCEDURE — 99232 SBSQ HOSP IP/OBS MODERATE 35: CPT | Performed by: INTERNAL MEDICINE

## 2019-07-01 RX ORDER — MAGNESIUM SULFATE HEPTAHYDRATE 40 MG/ML
2 INJECTION, SOLUTION INTRAVENOUS ONCE
Status: COMPLETED | OUTPATIENT
Start: 2019-07-01 | End: 2019-07-01

## 2019-07-01 RX ORDER — ZOLPIDEM TARTRATE 5 MG/1
5 TABLET ORAL NIGHTLY PRN
Refills: 0 | Status: ON HOLD | COMMUNITY
Start: 2019-07-01 | End: 2019-07-26

## 2019-07-01 RX ORDER — BISACODYL 10 MG
10 SUPPOSITORY, RECTAL RECTAL
Refills: 0 | Status: SHIPPED | COMMUNITY
Start: 2019-07-01 | End: 2019-11-10

## 2019-07-01 RX ORDER — HEPARIN SODIUM 5000 [USP'U]/ML
5000 INJECTION, SOLUTION INTRAVENOUS; SUBCUTANEOUS EVERY 12 HOURS
Refills: 0 | Status: ON HOLD | COMMUNITY
Start: 2019-07-02 | End: 2019-10-24

## 2019-07-01 RX ORDER — ACETAMINOPHEN 500 MG
1000 TABLET ORAL EVERY 6 HOURS PRN
Refills: 0 | Status: SHIPPED | COMMUNITY
Start: 2019-07-01

## 2019-07-01 RX ORDER — PROMETHAZINE HYDROCHLORIDE 12.5 MG/1
12.5 TABLET ORAL EVERY 4 HOURS PRN
Refills: 0 | Status: ON HOLD | COMMUNITY
Start: 2019-07-01 | End: 2019-11-10

## 2019-07-01 RX ORDER — MORPHINE SULFATE 100 MG/1
100 TABLET ORAL EVERY 8 HOURS
Qty: 90 TABLET | Refills: 0 | Status: ON HOLD | OUTPATIENT
Start: 2019-07-01 | End: 2019-07-16

## 2019-07-01 RX ORDER — CARVEDILOL 3.12 MG/1
3.12 TABLET ORAL 2 TIMES DAILY WITH MEALS
Refills: 0 | Status: ON HOLD | COMMUNITY
Start: 2019-07-01 | End: 2019-07-26

## 2019-07-01 RX ORDER — ZOLPIDEM TARTRATE 5 MG/1
5 TABLET ORAL NIGHTLY PRN
Status: DISCONTINUED | OUTPATIENT
Start: 2019-07-01 | End: 2019-07-01

## 2019-07-01 RX ORDER — IPRATROPIUM BROMIDE AND ALBUTEROL SULFATE 2.5; .5 MG/3ML; MG/3ML
3 SOLUTION RESPIRATORY (INHALATION) EVERY 6 HOURS PRN
Refills: 0 | Status: SHIPPED | COMMUNITY
Start: 2019-07-01 | End: 2019-11-10

## 2019-07-01 RX ORDER — CASTOR OIL AND BALSAM, PERU 788; 87 MG/G; MG/G
OINTMENT TOPICAL 2 TIMES DAILY PRN
Refills: 0 | Status: SHIPPED | COMMUNITY
Start: 2019-07-01

## 2019-07-01 RX ORDER — LEVETIRACETAM 1000 MG/1
1000 TABLET ORAL 2 TIMES DAILY
Refills: 0 | Status: SHIPPED | COMMUNITY
Start: 2019-07-01

## 2019-07-01 RX ORDER — PANTOPRAZOLE SODIUM 40 MG/1
40 TABLET, DELAYED RELEASE ORAL
Refills: 0 | Status: SHIPPED | COMMUNITY
Start: 2019-07-02

## 2019-07-01 RX ORDER — MINERAL OIL AND PETROLATUM 150; 830 MG/G; MG/G
OINTMENT OPHTHALMIC 3 TIMES DAILY PRN
Refills: 0 | Status: SHIPPED | COMMUNITY
Start: 2019-07-01 | End: 2019-11-10

## 2019-07-01 RX ORDER — MORPHINE SULFATE 60 MG/1
60 TABLET, FILM COATED, EXTENDED RELEASE ORAL EVERY 8 HOURS
Qty: 90 TABLET | Refills: 0 | Status: ON HOLD | OUTPATIENT
Start: 2019-07-01 | End: 2019-07-16

## 2019-07-01 RX ORDER — NORTRIPTYLINE HYDROCHLORIDE 50 MG/1
50 CAPSULE ORAL NIGHTLY
Refills: 0 | Status: ON HOLD | COMMUNITY
Start: 2019-07-01 | End: 2019-07-16

## 2019-07-01 NOTE — PROGRESS NOTES
[facility] [facility] [address]  GI SERVICE PROGRESS NOTE    Rosita Hillman Patient Status:  Inpatient    1960 MRN [id number]   Location [facility] 2W/SW Attending Lennox Portillo, [address] [facility] [address] 61 PCP LATIA Augustine MD       Subjective:   Nausea ZEFERINO in the last 168 hours.               Assessment and Plan:   60year old man with initial BMI 52, sleep apnea, diabetes, pulmonary hypertension, high blood pressure, high cholesterol, heart failure, cirrhosis, chronic pain on very long-term chronic na to d/c, f/u with Dr. Cathrine Cranker on discharge in 6-8 weeks.      [address] [place]

## 2019-07-01 NOTE — OCCUPATIONAL THERAPY NOTE
OCCUPATIONAL THERAPY TREATMENT NOTE - INPATIENT        Room Number: 751/210-T           Presenting Problem: sepsis    Problem List  Principal Problem:    Sepsis due to undetermined organism [facility])  Active Problems:    Atrial fibrillation with rapid ventricu RECOMMENDATIONS  OT Discharge Recommendations: 24 hour care/supervision;Sub-acute rehabilitation  OT Device Recommendations: TBD     PLAN  OT Treatment Plan: Balance activities; Energy conservation/work simplification techniques;ADL training;Functional umaña exercise  Comment: MET    Updated goal: Pt will complete STS tfr with max a x1 in preparation for increased independence with toilet tfr               Goals  on: 7/15/19  Frequency: 3x/week     Trung CANALES/L  [facility] AND [facility]

## 2019-07-01 NOTE — PLAN OF CARE
Problem: Patient Centered Care  Goal: Patient preferences are identified and integrated in the patient's plan of care  Description  Interventions:  - What would you like us to know as we care for you?  Keep wife involved in his care  - Provide timely, com stability  - Monitor arterial and/or venous puncture sites for bleeding and/or hematoma  - Assess quality of pulses, skin color and temperature  - Assess for signs of decreased coronary artery perfusion - ex.  Angina  - Evaluate fluid balance, assess for ed Problem: METABOLIC/FLUID AND ELECTROLYTES - ADULT  Goal: Glucose maintained within prescribed range  Description  INTERVENTIONS:  - Monitor Blood Glucose as ordered  - Assess for signs and symptoms of hyperglycemia and hypoglycemia  - Administer ordered deficits and behaviors that affect risk of falls.   - [place] fall precautions as indicated by assessment.  - Educate pt/family on patient safety including physical limitations  - Instruct pt to call for assistance with activity based on assessment  - Mod monitor. picc line roved per order. rectal tube removed. pt tolerated well.

## 2019-07-01 NOTE — PLAN OF CARE
Problem: Patient Centered Care  Goal: Patient preferences are identified and integrated in the patient's plan of care  Description  Interventions:  - What would you like us to know as we care for you?  Keep wife involved in his care  - Provide timely, com stability  - Monitor arterial and/or venous puncture sites for bleeding and/or hematoma  - Assess quality of pulses, skin color and temperature  - Assess for signs of decreased coronary artery perfusion - ex.  Angina  - Evaluate fluid balance, assess for ed routine/schedule  - Consider collaborating with pharmacy to review patient's medication profile  Outcome: Progressing  Note:   Blair po foods so far.   No longer receiving tube feedings     Problem: METABOLIC/FLUID AND ELECTROLYTES - ADULT  Goal: Glucose main perfusion and minimize risk of hemorrhage  - Monitor temperature, glucose, and sodium.  Initiate appropriate interventions as ordered  Outcome: Progressing     Problem: SAFETY ADULT - FALL  Goal: Free from fall injury  Description  INTERVENTIONS:  - Assess mobilization of patient  - Hold pressure on venipuncture sites to achieve adequate hemostasis  - Assess for signs and symptoms of internal bleeding  - Monitor lab trends  Outcome: Progressing

## 2019-07-01 NOTE — PROGRESS NOTES
[facility]    Progress Note      Assessment and Plan:   1. Respiratory failure–presented with parainfluenza virus, required tracheostomy and repeat intubation. The patient is now tolerating trach collar 24/7.   Chest x-ray only shows poor percussion. Cardiac regular rate and rhythm no murmur. Abdomen nontender, without hepatosplenomegaly and no mass appreciable. Extremities without clubbing cyanosis nor edema. Neurologic patient can be aroused to open eyes but not follow command.   Xin Britton

## 2019-07-01 NOTE — CM/SW NOTE
Pt does not meet clinical criteria now for LTAC per Ghada Pedro, RN liaison at [facility], since pt has progressed. Will discuss subacute rehab options with pt and wife.       DEBORAH Clarke, [facility]   Clinical Transitions Leader  [phone]  Addendum 7/1/2019 1:

## 2019-07-01 NOTE — DIETARY NOTE
ADULT NUTRITION REASSESSMENT     Pt is at moderate nutrition risk. Pt does not meet malnutrition criteria. RECOMMENDATIONS TO MD: CPM. Pt downgraded to moderate nutrition risk as no longer receiving nutrition support. surgeon providing 100% nutrition needs. X1 low BG @MN rx with 25 gm dextrose, otherwise BG levels acceptable and to continue same insulin regime/endocrine svc. Plan for hemodialysis tomorrow.    5/20 ADDENDUM @1500:  Received call/Dr. Cherie Forte to discuss start consult to initiate low rate tube feeds. TPN already ordered.  Will begin using low residue Osmolite 1.2 formula:  6/21 Update: Enteral nutrition advancing towards goal  and TPN has been tapered-can discontinue after current bag hanging is completed at 2100 care: collaboration with other providers and discussed at care rounds. - Discharge and transfer of nutrition care to new setting or provider: monitor plans . Referral to [facility] newest update.      PERTINENT PAST MEDICAL HISTORY:  has a past medical sugar free shakes to assure meeting nutrition needs. Food Allergies: No  Cultural/Ethnic/Yarsanism Preferences: Not Obtained     MEDICATIONS: reviewed.    • Insulin Aspart Pen  1-7 Units Subcutaneous TID CC   • Pantoprazole Sodium  40 mg Oral QAM AC   • a kcal DM diet, ground, no straw  Oral Supplements: sugar free shakes BID added. ESTIMATED NUTRITION NEEDS:  Calories:1800- 2100 calories/day (  15-17calories per kg Estimated dry wt of 126  kg). This is a moderate hypocaloric rx.    Protein: 100-123  grams

## 2019-07-01 NOTE — PHYSICAL THERAPY NOTE
PHYSICAL THERAPY TREATMENT NOTE - INPATIENT     Room Number: 465/326-P       Presenting Problem: sepsis due to parainfluenza, + VE,  pneumonia(KOMAL with dialysis 5/14-5/31; s/p trach, SBO/ileus)    Problem List  Principal Problem:    Sepsis due to [facility] mobility; Patient education; Endurance;Range of motion;Strengthening;Transfer training    SUBJECTIVE  \"I am tired. I did not sleep well last night.  I am afraid to fall\"    OBJECTIVE  Precautions: Drain(s)(flexiseal)    WEIGHT BEARING RESTRICTION  Weight Be state   Goal #1 Pt able to actively participate in LE exercises following commands 75% of the time.    Goal #1   Current Status  GOAL met   Goal #2 Pt to perform bed mobility with Max A Of 2      Goal #2  Current Status Max A x 2 met 7/1/2019     Goal #3 P

## 2019-07-01 NOTE — PROGRESS NOTES
[facility] ID PROGRESS NOTE    Roberto Sickle Patient Status:  Inpatient    1960 MRN [id number]   Location [facility] 2W/SW Attending Rhiannon Bone, [address] [facility] [address] 61 PCP LATIA Galeas MD     Subjective:  Awake, talking, eating breakfast. On tommy blood loss anemia     Rectal bleeding      ASSESSMENT:    Antibiotics:  OFF  Daptomycin, Meropenem, OVP  IV zosyn 5/2-5/11, IV vancomycin 5/2-5/7, IV meropenem 5/12-5/26, metronidazole 5/12-5/25, IV vancomycin 5/12-5/25, PO vancomycin 1112    60year old m pseudoobstruction, pseudomembranes. Dusky mucosa. No mechanical obstruction. Pathology with concern for ischemic colitis. Underwent tracheostomy 5/22. Now with downtrending hemoglobin with rectal bleeding with hemoglobin as low as 6.5.   With the bleed 5/28   -s/p GI decompression tube 6/14  # Possible ischemic colitis  # Fibromyalgia with opoid dependence  # CHF  # GI bleed and anemia    PLAN:  -  Continue to monitor off of IV antibiotics. Can remove PICC prior to DC.  -  Follow fever curve, wbc.   -  Re

## 2019-07-01 NOTE — PLAN OF CARE
Problem: ALTERED NUTRIENT INTAKE - ADULT  Goal: Nutrient intake appropriate for improving, restoring or maintaining nutritional needs  Description  INTERVENTIONS:  - Assess nutritional status and recommend course of action  - Monitor oral intake,  labs,

## 2019-07-02 ENCOUNTER — TELEPHONE (OUTPATIENT)
Dept: GASTROENTEROLOGY | Facility: CLINIC | Age: 59
End: 2019-07-02

## 2019-07-02 ENCOUNTER — EXTERNAL FACILITY (OUTPATIENT)
Dept: INTERNAL MEDICINE CLINIC | Facility: CLINIC | Age: 59
End: 2019-07-02

## 2019-07-02 DIAGNOSIS — I48.91 ATRIAL FIBRILLATION WITH RAPID VENTRICULAR RESPONSE (HCC): ICD-10-CM

## 2019-07-02 DIAGNOSIS — R74.8 ELEVATED LIVER ENZYMES: ICD-10-CM

## 2019-07-02 DIAGNOSIS — K92.2 ACUTE GI BLEEDING: ICD-10-CM

## 2019-07-02 DIAGNOSIS — K56.7 ILEUS (HCC): ICD-10-CM

## 2019-07-02 DIAGNOSIS — K92.0 COFFEE GROUND EMESIS: ICD-10-CM

## 2019-07-02 LAB — IGE SERPL-ACNC: 13.5 KU/L (ref 2–214)

## 2019-07-02 PROCEDURE — 99306 1ST NF CARE HIGH MDM 50: CPT | Performed by: INTERNAL MEDICINE

## 2019-07-02 NOTE — TELEPHONE ENCOUNTER
Medication PA Requested: Relistor 150 mg                                                  Pt insurance/number to contact: [employer]   CoverMyMeds Used:  Yes Key:  [facility] ID# and group: [id number]  Dx.: OIC with chronic non cancer pain K59.03  Me

## 2019-07-02 NOTE — TELEPHONE ENCOUNTER
Current Outpatient Medications:  Methylnaltrexone Bromide (RELISTOR) 150 MG Oral Tab Take 450 mg by mouth daily. Disp: 90 tablet Rfl: 2     Per pharmacy to complete Prior Auth go to:    [email or web address]    [place]: Elbert Beltran

## 2019-07-02 NOTE — TELEPHONE ENCOUNTER
PATIENT OF DR. Britton Yan (see inpatient progress note from 7/1/2019 under Dr. Olamide Gutiérrez). Pt has Medicare and does NOT qualify for copay cards.  Discussed w/ Dr. Olamide Gutiérrez and it would be preferable for pt to continue on this medication d/t chronic narcotics and it wor

## 2019-07-03 ENCOUNTER — INITIAL APN SNF VISIT (OUTPATIENT)
Dept: INTERNAL MEDICINE CLINIC | Facility: SKILLED NURSING FACILITY | Age: 59
End: 2019-07-03

## 2019-07-03 VITALS
OXYGEN SATURATION: 98 % | DIASTOLIC BLOOD PRESSURE: 81 MMHG | TEMPERATURE: 97 F | RESPIRATION RATE: 20 BRPM | HEART RATE: 84 BPM | SYSTOLIC BLOOD PRESSURE: 160 MMHG

## 2019-07-03 DIAGNOSIS — A41.9 SEPSIS DUE TO UNDETERMINED ORGANISM (HCC): ICD-10-CM

## 2019-07-03 DIAGNOSIS — M79.7 FIBROMYALGIA: ICD-10-CM

## 2019-07-03 DIAGNOSIS — R26.2 UNABLE TO WALK: ICD-10-CM

## 2019-07-03 DIAGNOSIS — R53.1 GENERALIZED WEAKNESS: ICD-10-CM

## 2019-07-03 DIAGNOSIS — G47.30 SLEEP APNEA, UNSPECIFIED TYPE: ICD-10-CM

## 2019-07-03 DIAGNOSIS — G40.009 PARTIAL IDIOPATHIC EPILEPSY WITH SEIZURES OF LOCALIZED ONSET, NOT INTRACTABLE, WITHOUT STATUS EPILEPTICUS (HCC): ICD-10-CM

## 2019-07-03 DIAGNOSIS — K56.7 ILEUS (HCC): ICD-10-CM

## 2019-07-03 DIAGNOSIS — Z93.0 TRACHEOSTOMY IN PLACE (HCC): ICD-10-CM

## 2019-07-03 DIAGNOSIS — K56.609 SBO (SMALL BOWEL OBSTRUCTION) (HCC): ICD-10-CM

## 2019-07-03 PROCEDURE — 1123F ACP DISCUSS/DSCN MKR DOCD: CPT | Performed by: NURSE PRACTITIONER

## 2019-07-03 PROCEDURE — 99310 SBSQ NF CARE HIGH MDM 45: CPT | Performed by: NURSE PRACTITIONER

## 2019-07-03 NOTE — PROGRESS NOTES
Amoraron Muna  : 1960  Age 60year old  male patient is admitted to [facility] 6 19 for rehab and strengthening     [address] [place] [address] Admission : 19 to 19    Chief complaint:Sepsis, hypotension, atrial fibrillation. Resp failure, trache, right sided w via CRRT. Underwent colonoscopy with biopsy and decompression tube placement by GI on 5/16 with findings of acute colonic pseudoobstruction, pseudomembranes. Dusky mucosa. No mechanical obstruction. Pathology with concern for ischemic colitis.   [facility] • Chronic pain     chronic narcotics   • Congestive heart disease (HCC)    • Diabetes ([facility] [address]    • Diverticulitis    • Esophageal reflux    • Essential hypertension    • Fibromyalgia    • Hepatic Encephalopathy (HCC)     lactulose   • High blood pressure total) by Per NG Tube route 2 (two) times daily. Disp:  Rfl: 0   carvedilol 3.125 MG Oral Tab 1 tablet (3.125 mg total) by Per NG Tube route 2 (two) times daily with meals.  Disp:  Rfl: 0   Nortriptyline HCl 50 MG Oral Cap Take 1 capsule (50 mg total) by mo each Rfl: 0   Insulin Pen Needle (BD PEN NEEDLE ROMMEL U/F) 32G X 4 MM Does not apply Misc USE TO INJECT INSULIN THREE TIMES DAILY.  Disp: 300 each Rfl: 0   MICROLET LANCETS Does not apply Misc USE LANCET TO CHECK BLOOD SUGAR THREE TIMES DAILY Disp: 300 each trache in place, capped   NECK: supple; FROM; no JVD, no TMG, no carotid bruits  BREAST: deferred  RESPIRATORY:clear to percussion and auscultation  CARDIOVASCULAR: S1, S2 normal, RRR; no S3, no S4;   ABDOMEN:  normal active BS+, soft, nondistended; no org monitor  -cbc and bmp weekly, due in am   5. Diabetes mellitus  –Humalog sliding scale 1 to 7 U tid   -cont Basaglar Kwik Pen 45 U sq daily   -accu checks tid. 182 to 230    - monitor   6. Severe cardiomyopathy–ejection fraction 20%.    -cards consulted,

## 2019-07-05 ENCOUNTER — EXTERNAL FACILITY (OUTPATIENT)
Dept: INTERNAL MEDICINE CLINIC | Facility: CLINIC | Age: 59
End: 2019-07-05

## 2019-07-05 DIAGNOSIS — I48.91 ATRIAL FIBRILLATION WITH RAPID VENTRICULAR RESPONSE (HCC): ICD-10-CM

## 2019-07-05 DIAGNOSIS — M79.7 FIBROMYALGIA: ICD-10-CM

## 2019-07-05 DIAGNOSIS — R74.8 ELEVATED LIVER ENZYMES: ICD-10-CM

## 2019-07-05 DIAGNOSIS — Z98.890 S/P TRACHEOPLASTY: ICD-10-CM

## 2019-07-05 PROCEDURE — 99309 SBSQ NF CARE MODERATE MDM 30: CPT | Performed by: INTERNAL MEDICINE

## 2019-07-09 ENCOUNTER — HOSPITAL ENCOUNTER (INPATIENT)
Facility: HOSPITAL | Age: 59
LOS: 6 days | Discharge: NURSING, REHAB OR LONG-TERM CARE | DRG: 389 | End: 2019-07-16
Attending: EMERGENCY MEDICINE | Admitting: HOSPITALIST
Payer: MEDICARE

## 2019-07-09 ENCOUNTER — APPOINTMENT (OUTPATIENT)
Dept: CT IMAGING | Facility: HOSPITAL | Age: 59
DRG: 389 | End: 2019-07-09
Attending: EMERGENCY MEDICINE
Payer: MEDICARE

## 2019-07-09 ENCOUNTER — EXTERNAL FACILITY (OUTPATIENT)
Dept: INTERNAL MEDICINE CLINIC | Facility: CLINIC | Age: 59
End: 2019-07-09

## 2019-07-09 DIAGNOSIS — I48.91 ATRIAL FIBRILLATION WITH RAPID VENTRICULAR RESPONSE (HCC): ICD-10-CM

## 2019-07-09 DIAGNOSIS — R10.9 ABDOMINAL PAIN, ACUTE: ICD-10-CM

## 2019-07-09 DIAGNOSIS — K56.7 ILEUS (HCC): Primary | ICD-10-CM

## 2019-07-09 DIAGNOSIS — R74.8 ELEVATED LIVER ENZYMES: ICD-10-CM

## 2019-07-09 DIAGNOSIS — K56.7 ILEUS (HCC): ICD-10-CM

## 2019-07-09 DIAGNOSIS — M79.7 FIBROMYALGIA: ICD-10-CM

## 2019-07-09 DIAGNOSIS — K92.2 ACUTE GI BLEEDING: ICD-10-CM

## 2019-07-09 PROBLEM — E87.6 HYPOKALEMIA: Status: ACTIVE | Noted: 2019-07-09

## 2019-07-09 PROBLEM — E87.1 HYPONATREMIA: Status: ACTIVE | Noted: 2019-07-09

## 2019-07-09 LAB
ALBUMIN SERPL-MCNC: 2.9 G/DL (ref 3.4–5)
ALP LIVER SERPL-CCNC: 89 U/L (ref 45–117)
ALT SERPL-CCNC: 25 U/L (ref 16–61)
ANION GAP SERPL CALC-SCNC: 11 MMOL/L (ref 0–18)
AST SERPL-CCNC: 19 U/L (ref 15–37)
BASOPHILS # BLD AUTO: 0.04 X10(3) UL (ref 0–0.2)
BASOPHILS NFR BLD AUTO: 0.4 %
BILIRUB DIRECT SERPL-MCNC: 0.3 MG/DL (ref 0–0.2)
BILIRUB SERPL-MCNC: 0.6 MG/DL (ref 0.1–2)
BUN BLD-MCNC: 11 MG/DL (ref 7–18)
BUN/CREAT SERPL: 13.1 (ref 10–20)
CALCIUM BLD-MCNC: 8.9 MG/DL (ref 8.5–10.1)
CHLORIDE SERPL-SCNC: 93 MMOL/L (ref 98–112)
CO2 SERPL-SCNC: 24 MMOL/L (ref 21–32)
CREAT BLD-MCNC: 0.84 MG/DL (ref 0.7–1.3)
DEPRECATED RDW RBC AUTO: 47.8 FL (ref 35.1–46.3)
EOSINOPHIL # BLD AUTO: 0.83 X10(3) UL (ref 0–0.7)
EOSINOPHIL NFR BLD AUTO: 8 %
ERYTHROCYTE [DISTWIDTH] IN BLOOD BY AUTOMATED COUNT: 14.4 % (ref 11–15)
GLUCOSE BLD-MCNC: 149 MG/DL (ref 70–99)
GLUCOSE BLDC GLUCOMTR-MCNC: 158 MG/DL (ref 70–99)
HCT VFR BLD AUTO: 37.5 % (ref 39–53)
HGB BLD-MCNC: 12.7 G/DL (ref 13–17.5)
IMM GRANULOCYTES # BLD AUTO: 0.11 X10(3) UL (ref 0–1)
IMM GRANULOCYTES NFR BLD: 1.1 %
LYMPHOCYTES # BLD AUTO: 1.56 X10(3) UL (ref 1–4)
LYMPHOCYTES NFR BLD AUTO: 15 %
M PROTEIN MFR SERPL ELPH: 7.9 G/DL (ref 6.4–8.2)
MCH RBC QN AUTO: 30.5 PG (ref 26–34)
MCHC RBC AUTO-ENTMCNC: 33.9 G/DL (ref 31–37)
MCV RBC AUTO: 89.9 FL (ref 80–100)
MONOCYTES # BLD AUTO: 1.49 X10(3) UL (ref 0.1–1)
MONOCYTES NFR BLD AUTO: 14.3 %
NEUTROPHILS # BLD AUTO: 6.37 X10 (3) UL (ref 1.5–7.7)
NEUTROPHILS # BLD AUTO: 6.37 X10(3) UL (ref 1.5–7.7)
NEUTROPHILS NFR BLD AUTO: 61.2 %
OSMOLALITY SERPL CALC.SUM OF ELEC: 268 MOSM/KG (ref 275–295)
PLATELET # BLD AUTO: 292 10(3)UL (ref 150–450)
POTASSIUM SERPL-SCNC: 3.2 MMOL/L (ref 3.5–5.1)
RBC # BLD AUTO: 4.17 X10(6)UL (ref 4.3–5.7)
SODIUM SERPL-SCNC: 128 MMOL/L (ref 136–145)
WBC # BLD AUTO: 10.4 X10(3) UL (ref 4–11)

## 2019-07-09 PROCEDURE — 99223 1ST HOSP IP/OBS HIGH 75: CPT | Performed by: INTERNAL MEDICINE

## 2019-07-09 PROCEDURE — 99309 SBSQ NF CARE MODERATE MDM 30: CPT | Performed by: INTERNAL MEDICINE

## 2019-07-09 PROCEDURE — 74177 CT ABD & PELVIS W/CONTRAST: CPT | Performed by: EMERGENCY MEDICINE

## 2019-07-09 RX ORDER — SODIUM CHLORIDE 9 MG/ML
INJECTION, SOLUTION INTRAVENOUS CONTINUOUS
Status: DISCONTINUED | OUTPATIENT
Start: 2019-07-09 | End: 2019-07-13

## 2019-07-10 ENCOUNTER — APPOINTMENT (OUTPATIENT)
Dept: GENERAL RADIOLOGY | Facility: HOSPITAL | Age: 59
DRG: 389 | End: 2019-07-10
Attending: HOSPITALIST
Payer: MEDICARE

## 2019-07-10 PROBLEM — R10.9 ABDOMINAL PAIN, ACUTE: Status: ACTIVE | Noted: 2019-07-10

## 2019-07-10 LAB
ADENOVIRUS F 40/41 PCR: NEGATIVE
ALBUMIN SERPL-MCNC: 2.6 G/DL (ref 3.4–5)
ALBUMIN/GLOB SERPL: 0.6 {RATIO} (ref 1–2)
ALP LIVER SERPL-CCNC: 83 U/L (ref 45–117)
ALT SERPL-CCNC: 19 U/L (ref 16–61)
ANION GAP SERPL CALC-SCNC: 10 MMOL/L (ref 0–18)
AST SERPL-CCNC: 20 U/L (ref 15–37)
ASTROVIRUS PCR: NEGATIVE
BASOPHILS # BLD AUTO: 0.03 X10(3) UL (ref 0–0.2)
BASOPHILS NFR BLD AUTO: 0.3 %
BILIRUB SERPL-MCNC: 0.6 MG/DL (ref 0.1–2)
BILIRUB UR QL: NEGATIVE
BUN BLD-MCNC: 12 MG/DL (ref 7–18)
BUN/CREAT SERPL: 15.4 (ref 10–20)
C CAYETANENSIS DNA SPEC QL NAA+PROBE: NEGATIVE
C. DIFFICILE TOXIN A/B PCR: NEGATIVE
CALCIUM BLD-MCNC: 8.7 MG/DL (ref 8.5–10.1)
CAMPY SP DNA.DIARRHEA STL QL NAA+PROBE: NEGATIVE
CHLORIDE SERPL-SCNC: 98 MMOL/L (ref 98–112)
CO2 SERPL-SCNC: 25 MMOL/L (ref 21–32)
COLOR UR: YELLOW
CREAT BLD-MCNC: 0.78 MG/DL (ref 0.7–1.3)
CRYPTOSP DNA SPEC QL NAA+PROBE: NEGATIVE
DEPRECATED RDW RBC AUTO: 49.1 FL (ref 35.1–46.3)
EAEC PAA PLAS AGGR+AATA ST NAA+NON-PRB: NEGATIVE
EC STX1+STX2 + H7 FLIC SPEC NAA+PROBE: NEGATIVE
ENTAMOEBA HISTOLYTICA PCR: NEGATIVE
EOSINOPHIL # BLD AUTO: 0.68 X10(3) UL (ref 0–0.7)
EOSINOPHIL NFR BLD AUTO: 7.4 %
EPEC EAE GENE STL QL NAA+NON-PROBE: NEGATIVE
ERYTHROCYTE [DISTWIDTH] IN BLOOD BY AUTOMATED COUNT: 14.5 % (ref 11–15)
EST. AVERAGE GLUCOSE BLD GHB EST-MCNC: 111 MG/DL (ref 68–126)
ETEC LTA+ST1A+ST1B TOX ST NAA+NON-PROBE: NEGATIVE
GIARDIA LAMBLIA PCR: NEGATIVE
GLOBULIN PLAS-MCNC: 4.5 G/DL (ref 2.8–4.4)
GLUCOSE BLD-MCNC: 157 MG/DL (ref 70–99)
GLUCOSE BLDC GLUCOMTR-MCNC: 150 MG/DL (ref 70–99)
GLUCOSE BLDC GLUCOMTR-MCNC: 155 MG/DL (ref 70–99)
GLUCOSE BLDC GLUCOMTR-MCNC: 230 MG/DL (ref 70–99)
GLUCOSE BLDC GLUCOMTR-MCNC: 274 MG/DL (ref 70–99)
GLUCOSE BLDC GLUCOMTR-MCNC: 276 MG/DL (ref 70–99)
GLUCOSE UR-MCNC: NEGATIVE MG/DL
HAV IGM SER QL: 2 MG/DL (ref 1.6–2.6)
HBA1C MFR BLD HPLC: 5.5 % (ref ?–5.7)
HCT VFR BLD AUTO: 34.6 % (ref 39–53)
HGB BLD-MCNC: 11.5 G/DL (ref 13–17.5)
IMM GRANULOCYTES # BLD AUTO: 0.1 X10(3) UL (ref 0–1)
IMM GRANULOCYTES NFR BLD: 1.1 %
KETONES UR-MCNC: NEGATIVE MG/DL
LYMPHOCYTES # BLD AUTO: 1.26 X10(3) UL (ref 1–4)
LYMPHOCYTES NFR BLD AUTO: 13.7 %
M PROTEIN MFR SERPL ELPH: 7.1 G/DL (ref 6.4–8.2)
MCH RBC QN AUTO: 30.5 PG (ref 26–34)
MCHC RBC AUTO-ENTMCNC: 33.2 G/DL (ref 31–37)
MCV RBC AUTO: 91.8 FL (ref 80–100)
MONOCYTES # BLD AUTO: 1.2 X10(3) UL (ref 0.1–1)
MONOCYTES NFR BLD AUTO: 13.1 %
NEUTROPHILS # BLD AUTO: 5.91 X10 (3) UL (ref 1.5–7.7)
NEUTROPHILS # BLD AUTO: 5.91 X10(3) UL (ref 1.5–7.7)
NEUTROPHILS NFR BLD AUTO: 64.4 %
NITRITE UR QL STRIP.AUTO: NEGATIVE
NOROVIRUS GI/GII PCR: NEGATIVE
OSMOLALITY SERPL CALC.SUM OF ELEC: 279 MOSM/KG (ref 275–295)
P SHIGELLOIDES DNA STL QL NAA+PROBE: NEGATIVE
PH UR: 6 [PH] (ref 5–8)
PLATELET # BLD AUTO: 224 10(3)UL (ref 150–450)
POTASSIUM SERPL-SCNC: 3.4 MMOL/L (ref 3.5–5.1)
PROT UR-MCNC: NEGATIVE MG/DL
RBC # BLD AUTO: 3.77 X10(6)UL (ref 4.3–5.7)
RBC #/AREA URNS AUTO: 11 /HPF
ROTAVIRUS A PCR: NEGATIVE
SALMONELLA DNA SPEC QL NAA+PROBE: NEGATIVE
SAPOVIRUS PCR: NEGATIVE
SHIGELLA SP+EIEC IPAH ST NAA+NON-PROBE: NEGATIVE
SODIUM SERPL-SCNC: 133 MMOL/L (ref 136–145)
SP GR UR STRIP: 1.01 (ref 1–1.03)
UROBILINOGEN UR STRIP-ACNC: <2
V CHOLERAE DNA SPEC QL NAA+PROBE: NEGATIVE
VIBRIO DNA SPEC NAA+PROBE: NEGATIVE
VIT C UR-MCNC: NEGATIVE MG/DL
WBC # BLD AUTO: 9.2 X10(3) UL (ref 4–11)
WBC #/AREA URNS AUTO: 226 /HPF
YERSINIA DNA SPEC NAA+PROBE: NEGATIVE

## 2019-07-10 PROCEDURE — 74019 RADEX ABDOMEN 2 VIEWS: CPT | Performed by: HOSPITALIST

## 2019-07-10 PROCEDURE — 99232 SBSQ HOSP IP/OBS MODERATE 35: CPT | Performed by: INTERNAL MEDICINE

## 2019-07-10 PROCEDURE — 99223 1ST HOSP IP/OBS HIGH 75: CPT | Performed by: HOSPITALIST

## 2019-07-10 RX ORDER — BISACODYL 10 MG
10 SUPPOSITORY, RECTAL RECTAL
Status: DISCONTINUED | OUTPATIENT
Start: 2019-07-10 | End: 2019-07-16

## 2019-07-10 RX ORDER — MORPHINE SULFATE 2 MG/ML
1 INJECTION, SOLUTION INTRAMUSCULAR; INTRAVENOUS EVERY 4 HOURS PRN
Status: DISCONTINUED | OUTPATIENT
Start: 2019-07-10 | End: 2019-07-10

## 2019-07-10 RX ORDER — NORTRIPTYLINE HYDROCHLORIDE 50 MG/1
50 CAPSULE ORAL NIGHTLY
Status: DISCONTINUED | OUTPATIENT
Start: 2019-07-10 | End: 2019-07-16

## 2019-07-10 RX ORDER — DEXTROSE MONOHYDRATE 25 G/50ML
50 INJECTION, SOLUTION INTRAVENOUS AS NEEDED
Status: DISCONTINUED | OUTPATIENT
Start: 2019-07-10 | End: 2019-07-16

## 2019-07-10 RX ORDER — MINERAL OIL AND PETROLATUM 150; 830 MG/G; MG/G
OINTMENT OPHTHALMIC 3 TIMES DAILY PRN
Status: DISCONTINUED | OUTPATIENT
Start: 2019-07-10 | End: 2019-07-16

## 2019-07-10 RX ORDER — MORPHINE SULFATE 2 MG/ML
2 INJECTION, SOLUTION INTRAMUSCULAR; INTRAVENOUS EVERY 4 HOURS PRN
Status: DISCONTINUED | OUTPATIENT
Start: 2019-07-10 | End: 2019-07-10

## 2019-07-10 RX ORDER — IPRATROPIUM BROMIDE AND ALBUTEROL SULFATE 2.5; .5 MG/3ML; MG/3ML
3 SOLUTION RESPIRATORY (INHALATION) EVERY 6 HOURS PRN
Status: DISCONTINUED | OUTPATIENT
Start: 2019-07-10 | End: 2019-07-16

## 2019-07-10 RX ORDER — MAGNESIUM OXIDE 400 MG (241.3 MG MAGNESIUM) TABLET
3 TABLET NIGHTLY
Status: DISCONTINUED | OUTPATIENT
Start: 2019-07-10 | End: 2019-07-16

## 2019-07-10 RX ORDER — SODIUM CHLORIDE 0.9 % (FLUSH) 0.9 %
3 SYRINGE (ML) INJECTION AS NEEDED
Status: DISCONTINUED | OUTPATIENT
Start: 2019-07-10 | End: 2019-07-16

## 2019-07-10 RX ORDER — DIPHENHYDRAMINE HCL 25 MG
25 TABLET ORAL EVERY 6 HOURS PRN
Status: ON HOLD | COMMUNITY
End: 2019-10-24

## 2019-07-10 RX ORDER — METOPROLOL TARTRATE 50 MG/1
50 TABLET, FILM COATED ORAL
Status: DISCONTINUED | OUTPATIENT
Start: 2019-07-10 | End: 2019-07-16

## 2019-07-10 RX ORDER — HEPARIN SODIUM 5000 [USP'U]/ML
5000 INJECTION, SOLUTION INTRAVENOUS; SUBCUTANEOUS EVERY 12 HOURS SCHEDULED
Status: DISCONTINUED | OUTPATIENT
Start: 2019-07-10 | End: 2019-07-16

## 2019-07-10 RX ORDER — METOPROLOL TARTRATE 5 MG/5ML
2.5 INJECTION INTRAVENOUS AS NEEDED
Status: DISCONTINUED | OUTPATIENT
Start: 2019-07-10 | End: 2019-07-16

## 2019-07-10 RX ORDER — ACETAMINOPHEN 10 MG/ML
1000 INJECTION, SOLUTION INTRAVENOUS EVERY 6 HOURS PRN
Status: DISCONTINUED | OUTPATIENT
Start: 2019-07-10 | End: 2019-07-15

## 2019-07-10 RX ORDER — METOPROLOL TARTRATE 5 MG/5ML
5 INJECTION INTRAVENOUS AS NEEDED
Status: DISCONTINUED | OUTPATIENT
Start: 2019-07-10 | End: 2019-07-16

## 2019-07-10 RX ORDER — SODIUM CHLORIDE 9 MG/ML
INJECTION, SOLUTION INTRAVENOUS CONTINUOUS
Status: DISCONTINUED | OUTPATIENT
Start: 2019-07-10 | End: 2019-07-12

## 2019-07-10 RX ORDER — METOCLOPRAMIDE HYDROCHLORIDE 5 MG/ML
10 INJECTION INTRAMUSCULAR; INTRAVENOUS EVERY 8 HOURS PRN
Status: DISCONTINUED | OUTPATIENT
Start: 2019-07-10 | End: 2019-07-16

## 2019-07-10 RX ORDER — ONDANSETRON 2 MG/ML
4 INJECTION INTRAMUSCULAR; INTRAVENOUS EVERY 6 HOURS PRN
Status: DISCONTINUED | OUTPATIENT
Start: 2019-07-10 | End: 2019-07-16

## 2019-07-10 RX ORDER — METOPROLOL TARTRATE 5 MG/5ML
2.5 INJECTION INTRAVENOUS
Status: DISCONTINUED | OUTPATIENT
Start: 2019-07-10 | End: 2019-07-16

## 2019-07-10 RX ORDER — CASTOR OIL AND BALSAM, PERU 788; 87 MG/G; MG/G
OINTMENT TOPICAL 2 TIMES DAILY PRN
Status: DISCONTINUED | OUTPATIENT
Start: 2019-07-10 | End: 2019-07-16

## 2019-07-10 RX ORDER — NALOXONE HYDROCHLORIDE 0.4 MG/ML
0.4 INJECTION, SOLUTION INTRAMUSCULAR; INTRAVENOUS; SUBCUTANEOUS AS NEEDED
Status: DISCONTINUED | OUTPATIENT
Start: 2019-07-10 | End: 2019-07-16

## 2019-07-10 RX ORDER — CARVEDILOL 3.12 MG/1
3.12 TABLET ORAL 2 TIMES DAILY WITH MEALS
Status: DISCONTINUED | OUTPATIENT
Start: 2019-07-10 | End: 2019-07-16

## 2019-07-10 RX ORDER — METOPROLOL TARTRATE 5 MG/5ML
5 INJECTION INTRAVENOUS
Status: DISCONTINUED | OUTPATIENT
Start: 2019-07-10 | End: 2019-07-16

## 2019-07-10 RX ORDER — MORPHINE SULFATE 2 MG/ML
INJECTION, SOLUTION INTRAMUSCULAR; INTRAVENOUS
Status: DISPENSED
Start: 2019-07-10 | End: 2019-07-10

## 2019-07-10 NOTE — ED PROVIDER NOTES
Patient Seen in: [facility] AND [facility] Emergency Department    History   Patient presents with:  Abdomen/Flank Pain (GI/)    Stated Complaint:     HPI    Patient complains of increased abd distension and  abdominal pain that began to get worse today.   Note 2 (two) times daily with meals. Nortriptyline HCl 50 MG Oral Cap,  Take 1 capsule (50 mg total) by mouth nightly.    Pantoprazole Sodium 40 MG Oral Tab EC,  Take 1 tablet (40 mg total) by mouth every morning before breakfast.   Promethazine HCl 12.5 MG Or Does not apply Misc,  1 each by Does not apply route continuous.  Order for Wayne compression sock, medium   Alcohol Swabs 70 % Does not apply Pads,     Blood Glucose Calibration (TALIA CONTOUR NEXT CONTROL) NORMAL In Vitro Solution,     Blood Glucose Monitor exam differential diagnosis was considered for  obstruction vs. Ileus vs. Colonic volvulus          ED Course     Labs Reviewed   BASIC METABOLIC PANEL (8) - Abnormal; Notable for the following components:       Result Value    Glucose 149 (*)     Sodium 1 be placed. Admit. Disposition and Plan     Clinical Impression:  Ileus ([address]  (primary encounter diagnosis)  Abdominal pain, acute    Disposition:  Admit    Follow-up:  No follow-up provider specified.     Medications Prescribed:  Current Discharge

## 2019-07-10 NOTE — CONSULTS
[facility] AND [facility]  Report of Consultation    Hebron Gilbert Patient Status:  Emergency    1960 MRN [id number]   Location [address] Attending Isha Sharif MD   Hosp Day # 0 PCP Martha Blanca     Reason for Consult MD at [address] [place] [address] ENDOSCOPY   • COLONOSCOPY N/A 5/28/2019    Performed by Kenny Kenney MD at [address] [place] [address] ENDOSCOPY   • COLONOSCOPY N/A 5/16/2019    Performed by Riana Pardo MD at [address]   • TRACHEOSTOMY N/A 5/22/2019 syndrome     Acute GI bleeding     Acute blood loss anemia     Rectal bleeding     Hypokalemia     Hyponatremia    The patient is a 30-year-old male who has a history of multiple medical problems who is being readmitted with ileus, likely multifactorial gi

## 2019-07-10 NOTE — PLAN OF CARE
Patient admitted to unit from Ed. Patient here with ileus. Adominal distention noted. Rectal tube and Burks in place. NPO status maintained. Trach in place no suctioning necessary, due to come out soom. Tylenol and Morphine PRN for pain. IVF initiated.  Madhavi

## 2019-07-10 NOTE — PROGRESS NOTES
[address]     Gastroenterology Progress Note    [facility] Patient Status:  Inpatient    1960 MRN [id number]   Location [facility] 4W/SW/SE Attending Gato Bose MD   Hosp Day # 0 PCP Fortino Alexis regular rate and rhythm   Lung- Clear to auscultation bilaterally  Abdomen-Less-distended. Bowel sounds are present and now normal. Not tender now. Skin- No jaundice. Warm and dry. Ext: No cyanosis, clubbing or edema is evident.    Neuro- Alert and int artifactual.  Other consideration is pyelonephritis. 8. Cystic pancreatic lesions unchanged. 9. Atherosclerotic vascular calcification including coronary artery calcification. 10. Small fat containing inguinal hernias and Marianne incisional umbilical hernia.

## 2019-07-10 NOTE — RESPIRATORY THERAPY NOTE
IRMA ASSESSMENT:     Pt does have a previous diagnosis of IRMA. Pt does routinely use a CPAP device at home. His wife will bring his own CPAP device.

## 2019-07-10 NOTE — PROGRESS NOTES
Following on night admission, for details please see H&P  Burks was not draining well on admission, with increased patient discomfort, Burks replaced today 7/10 with good results >1 L immediately drained  Patient much more comfortable now  Seen by GI  Cont

## 2019-07-10 NOTE — PROGRESS NOTES
ADMISSION NOTE  Late entry patient seen early this am  60year old male with h/o DM HTN pseudoobstruction, seizure disorder  Trach had  prolonged hospitalization may until 7/1 for sepsis afib R weakness sleep apena presents with abdominal distension and pa

## 2019-07-10 NOTE — H&P
[facility]    PATIENT'S NAME: Caitlinjuan jose Zamudio   ATTENDING PHYSICIAN: Gomez Raygoza MD   PATIENT ACCOUNT#:   [de-identified]    LOCATION:  [address] [place] [address] #:   [id number]       YOB: 1960  ADMISSION DATE:       07/09/2 and Dr. Abhishek Vera service. A rectal tube was placed and the patient was admitted for further evaluation and monitoring. PAST MEDICAL HISTORY:  Significant for cardiomyopathy, ejection fraction of 20%.   Chronic pain with chronic narcotic use, congestive h . He does not smoke, drink alcohol, or use drugs. REVIEW OF SYSTEMS:  Twelve systems were reviewed. The patient reports he does snore at night, also has heartburn on and off.   There are otherwise no additional pertinent positives 10.4, hemoglobin 12.7 with a platelet count 801,410. There were 61% neutrophils. Urinalysis is pending. CT scan of the abdomen and pelvis was previously mentioned. ASSESSMENT AND PLAN:    1.    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

## 2019-07-10 NOTE — PLAN OF CARE
Pt ok this morning . VSS, Beta blocker protocol continued, Potassium covered per protocol,  Accuchecks WNL, abdomen is still distended by less firm, decompressing stomach with rectal tube- + for BM and gas.  GI panel collected- Excoriation to buttox- prompt

## 2019-07-10 NOTE — ED INITIAL ASSESSMENT (HPI)
Pt presents to ED via [facility] for an ileus r/o small bowel obstruction. Pt was seen by his doctor today at [facility] and had a KUB done and dx with an ileus. Per pt his abdomen is more distended than usual. Pt last bm was today.  Pt states he is

## 2019-07-11 LAB
BILIRUB UR QL: NEGATIVE
COLOR UR: YELLOW
GLUCOSE BLDC GLUCOMTR-MCNC: 143 MG/DL (ref 70–99)
GLUCOSE BLDC GLUCOMTR-MCNC: 147 MG/DL (ref 70–99)
GLUCOSE BLDC GLUCOMTR-MCNC: 165 MG/DL (ref 70–99)
GLUCOSE BLDC GLUCOMTR-MCNC: 215 MG/DL (ref 70–99)
GLUCOSE BLDC GLUCOMTR-MCNC: 240 MG/DL (ref 70–99)
GLUCOSE UR-MCNC: NEGATIVE MG/DL
HYALINE CASTS #/AREA URNS AUTO: 3 /LPF
KETONES UR-MCNC: NEGATIVE MG/DL
NITRITE UR QL STRIP.AUTO: POSITIVE
PH UR: 6 [PH] (ref 5–8)
POTASSIUM SERPL-SCNC: 3.7 MMOL/L (ref 3.5–5.1)
PROT UR-MCNC: NEGATIVE MG/DL
RBC #/AREA URNS AUTO: 34 /HPF
SP GR UR STRIP: 1.01 (ref 1–1.03)
UROBILINOGEN UR STRIP-ACNC: <2
VIT C UR-MCNC: NEGATIVE MG/DL
WBC #/AREA URNS AUTO: 538 /HPF

## 2019-07-11 PROCEDURE — 99233 SBSQ HOSP IP/OBS HIGH 50: CPT | Performed by: HOSPITALIST

## 2019-07-11 PROCEDURE — 99232 SBSQ HOSP IP/OBS MODERATE 35: CPT | Performed by: INTERNAL MEDICINE

## 2019-07-11 RX ORDER — POTASSIUM CHLORIDE 14.9 MG/ML
20 INJECTION INTRAVENOUS ONCE
Status: COMPLETED | OUTPATIENT
Start: 2019-07-11 | End: 2019-07-11

## 2019-07-11 RX ORDER — DIPHENHYDRAMINE HYDROCHLORIDE 50 MG/ML
25 INJECTION INTRAMUSCULAR; INTRAVENOUS ONCE
Status: COMPLETED | OUTPATIENT
Start: 2019-07-11 | End: 2019-07-11

## 2019-07-11 RX ORDER — TRAMADOL HYDROCHLORIDE 50 MG/1
50 TABLET ORAL ONCE
Status: COMPLETED | OUTPATIENT
Start: 2019-07-11 | End: 2019-07-11

## 2019-07-11 NOTE — PROGRESS NOTES
[address]     Gastroenterology Progress Note    Brayden Garcia Patient Status:  Inpatient    1960 MRN [id number]   Location [facility] 4W/SW/SE Attending Avery Britton MD   Hosp Day # 1 PCP [facility]       KWAN 4.17* 3.77*   HGB 12.7* 11.5*   HCT 37.5* 34.6*   MCV 89.9 91.8   MCH 30.5 30.5   MCHC 33.9 33.2   RDW 14.4 14.5   NEPRELIM 6.37 5.91   WBC 10.4 9.2   .0 224.0         Recent Labs   Lab 07/09/19  2049 07/10/19  0545 07/11/19  0538   * 157*  - lobe.  12. Regions of subsegmental atelectasis in left lower lung.     Dictated by (CST): Priyakn Hurst MD on 7/10/2019 at 5:41     Approved by (CST): Priyank Hurst MD on 7/10/2019 at 6:23          Xr Abdomen Obstructive Series Routine(2 Vw)(cpt=74019)

## 2019-07-11 NOTE — PLAN OF CARE
No acute changes throughout shift.  Stomach less distended, bowel movements continued, IVF continued, Clears re-started, IV tylenol given for pain, Burks in place, rectal tube removed, PT/OT on consult, pt up to the chair today with lift, seizure precaution precautions as indicated by assessment.  - Educate pt/family on patient safety including physical limitations  - Instruct pt to call for assistance with activity based on assessment  - Modify environment to reduce risk of injury  - Provide assistive device

## 2019-07-11 NOTE — PROGRESS NOTES
[facility]    Progress Note    Jefferson Jeffers Patient Status:  Inpatient    1960 MRN [id number]   Location [facility] 4W/SW/SE Attending Jero Lezama MD   Hosp Day # 1 PCP ARVIND CASTRO       Subjective:     Not feeling related to poor p.o. intake, dehydration. Hydrate and recheck. Hypokalemia. Check magnesium level and replace. Malnutrition. Start Glucerna when taking p.o. Anemia, chronic and stable.     H/o narcotics dependence, on very high doses of morphi 6. Gallstone-unchanged. 7. Slight patchiness to bilateral renal nephrograms-some of this may be artifactual.  Other consideration is pyelonephritis. 8. Cystic pancreatic lesions unchanged.  9. Atherosclerotic vascular calcification including coronary artery

## 2019-07-11 NOTE — CM/SW NOTE
7/16 250pm: The pt. Is scheduled to discharge to [facility] today 7/16 at 5p, via ambulance due to being a max assist and not being able to sit up for duration of trip. The pt's nurse will send an extra canula for his trach for transport.   The pt

## 2019-07-11 NOTE — CONSULTS
[facility]    Report of Consultation    Tinsley Yumiko Patient Status:  Inpatient    1960 MRN [id number]   Location [facility] 4W/SW/SE Attending Deng Garcia MD   Hosp Day # 1 PCP Richard Kim     Date of Admission: ([facility] 75.)     lactulose   • High blood pressure    • Hyperlipidemia    • Lipid screening 1/17/2011    per NG   • Morbid obesity (HCC)    • Obesity    • Rectal fissure     colorectal fistula - surgery   • Renal disorder    • Seizure disorder ([facility] 75.)        Past S 3 mL 3 mL Nebulization Q6H PRN   Miconazole Nitrate 2 % powder  Topical BID PRN   metoprolol Tartrate (LOPRESSOR) 5 MG/5ML injection 5 mg 5 mg Intravenous TID Beta Blocker/Cardiac   Or      metoprolol Tartrate (LOPRESSOR) 5 MG/5ML injection 2.5 mg 2.5 mg I (1,000 mg total) by Per NG Tube route 2 (two) times daily. carvedilol 3.125 MG Oral Tab 1 tablet (3.125 mg total) by Per NG Tube route 2 (two) times daily with meals. Nortriptyline HCl 50 MG Oral Cap Take 1 capsule (50 mg total) by mouth nightly.    Pan apply Misc USE TO INJECT INSULIN THREE TIMES DAILY. MICROLET LANCETS Does not apply Misc USE LANCET TO CHECK BLOOD SUGAR THREE TIMES DAILY   Naloxone HCl 4 MG/0.1ML Nasal Liquid 4 mg by Nasal route as needed.    Elastic Bandages & Supports ([address] normal bowel sounds, soft,   Massive distention, not tender,   no masses palpated, no hepatosplenomegaly, no hernia    RECTAL:     Rectal tube in place, loose stool in bag    MUSCULOSKELETAL: Full range of motion noted.   Motor strength is 5 out of 5 all fluid distending the esophagus. Findings either related reflux or dysmotility. 5. Distended urinary bladder despite Burks. 6. Gallstone-unchanged.  7. Slight patchiness to bilateral renal nephrograms-some of this may be artifactual.  Other consideration is

## 2019-07-11 NOTE — OCCUPATIONAL THERAPY NOTE
OCCUPATIONAL THERAPY EVALUATION - INPATIENT     Room Number: 465/465-A  Evaluation Date: 7/11/2019  Type of Evaluation: Initial  Presenting Problem: (abdominal pain)    Physician Order: IP Consult to Occupational Therapy  Reason for Therapy: ADL/IADL Dysfu Approx Degree of Impairment: 85.69% has been calculated based on documentation in the [facility] '6 clicks' Inpatient Daily Activity Short Form. Research supports that patients with this level of impairment may benefit from KOLBY.      DISCHARGE RECOMMENDATIONS OBJECTIVE  Precautions: Bed/chair alarm(Trach; rectal tube)  Fall Risk: Standard fall risk    PAIN ASSESSMENT  Rating: Unable to rate  Location: (unable to localize)  Management Techniques: Relaxation    ACTIVITY TOLERANCE  Poor    COGNITION  Alert, O

## 2019-07-11 NOTE — PLAN OF CARE
Problem: Patient/Family Goals  Goal: Patient/Family Long Term Goal  Description  Patient's Long Term Goal: Resolve abd pain     Interventions:  - Rectal tube  - NPO status   - IVF   - Monitor labs/tests  - See additional Care Plan goals for specific inte Dewane Bodily remained NPO through the night. IV tylenol administered for pain as needed. IVF running. Rectal tube in place. Burks intact and draining. Prompt perineal care provided. Received one time dose of benadryl for itching.  Abd remains hard and distended-G

## 2019-07-11 NOTE — PHYSICAL THERAPY NOTE
PHYSICAL THERAPY EVALUATION - INPATIENT     Room Number: 465/465-A  Evaluation Date: 7/11/2019  Type of Evaluation: Initial   Physician Order: PT Eval and Treat    Presenting Problem: Ileus-rectal tube placed(recent 2 month hospital stay for resp failure Impairment: 92.36% has been calculated based on documentation in the [facility] '6 clicks' Inpatient Basic Mobility Short Form. Research supports that patients with this level of impairment may benefit from LTAC.  However recommend KOLBY/cont'd PT as prior to las with wife)             Prior Level of Dunn: Recently pt reports he has not been getting OOB.  During last hospital stay was sitting EOB with assist of 2-3. ~3 months ago was independent with mobility with use of quad cane    SUBJECTIVE  \"I want to rolling    Transfers: NT    Exercise/Education Provided:  Bed mobility  Strengthening  Lower therapeutic exercise: AAROM BLE's X10 each in all planes    Patient End of Session: In bed;Needs met;Call light within reach; All patient questions and concerns add

## 2019-07-12 ENCOUNTER — APPOINTMENT (OUTPATIENT)
Dept: GENERAL RADIOLOGY | Facility: HOSPITAL | Age: 59
DRG: 389 | End: 2019-07-12
Attending: SURGERY
Payer: MEDICARE

## 2019-07-12 LAB
ALBUMIN SERPL-MCNC: 2.1 G/DL (ref 3.4–5)
ANION GAP SERPL CALC-SCNC: 7 MMOL/L (ref 0–18)
BASOPHILS # BLD AUTO: 0.03 X10(3) UL (ref 0–0.2)
BASOPHILS NFR BLD AUTO: 0.6 %
BUN BLD-MCNC: 8 MG/DL (ref 7–18)
BUN/CREAT SERPL: 13.8 (ref 10–20)
CALCIUM BLD-MCNC: 8 MG/DL (ref 8.5–10.1)
CHLORIDE SERPL-SCNC: 106 MMOL/L (ref 98–112)
CO2 SERPL-SCNC: 23 MMOL/L (ref 21–32)
CREAT BLD-MCNC: 0.58 MG/DL (ref 0.7–1.3)
DEPRECATED RDW RBC AUTO: 51.9 FL (ref 35.1–46.3)
EOSINOPHIL # BLD AUTO: 0.69 X10(3) UL (ref 0–0.7)
EOSINOPHIL NFR BLD AUTO: 12.7 %
ERYTHROCYTE [DISTWIDTH] IN BLOOD BY AUTOMATED COUNT: 14.7 % (ref 11–15)
GLUCOSE BLD-MCNC: 97 MG/DL (ref 70–99)
GLUCOSE BLDC GLUCOMTR-MCNC: 113 MG/DL (ref 70–99)
GLUCOSE BLDC GLUCOMTR-MCNC: 163 MG/DL (ref 70–99)
GLUCOSE BLDC GLUCOMTR-MCNC: 171 MG/DL (ref 70–99)
GLUCOSE BLDC GLUCOMTR-MCNC: 388 MG/DL (ref 70–99)
HAV IGM SER QL: 1.7 MG/DL (ref 1.6–2.6)
HCT VFR BLD AUTO: 31.4 % (ref 39–53)
HGB BLD-MCNC: 10.1 G/DL (ref 13–17.5)
IMM GRANULOCYTES # BLD AUTO: 0.05 X10(3) UL (ref 0–1)
IMM GRANULOCYTES NFR BLD: 0.9 %
LYMPHOCYTES # BLD AUTO: 1.03 X10(3) UL (ref 1–4)
LYMPHOCYTES NFR BLD AUTO: 18.9 %
MCH RBC QN AUTO: 30.4 PG (ref 26–34)
MCHC RBC AUTO-ENTMCNC: 32.2 G/DL (ref 31–37)
MCV RBC AUTO: 94.6 FL (ref 80–100)
MONOCYTES # BLD AUTO: 0.35 X10(3) UL (ref 0.1–1)
MONOCYTES NFR BLD AUTO: 6.4 %
NEUTROPHILS # BLD AUTO: 3.3 X10 (3) UL (ref 1.5–7.7)
NEUTROPHILS # BLD AUTO: 3.3 X10(3) UL (ref 1.5–7.7)
NEUTROPHILS NFR BLD AUTO: 60.5 %
OSMOLALITY SERPL CALC.SUM OF ELEC: 280 MOSM/KG (ref 275–295)
PHOSPHATE SERPL-MCNC: 3.9 MG/DL (ref 2.5–4.9)
PLATELET # BLD AUTO: 169 10(3)UL (ref 150–450)
POTASSIUM SERPL-SCNC: 2.8 MMOL/L (ref 3.5–5.1)
RBC # BLD AUTO: 3.32 X10(6)UL (ref 4.3–5.7)
SODIUM SERPL-SCNC: 136 MMOL/L (ref 136–145)
WBC # BLD AUTO: 5.5 X10(3) UL (ref 4–11)

## 2019-07-12 PROCEDURE — 99232 SBSQ HOSP IP/OBS MODERATE 35: CPT | Performed by: INTERNAL MEDICINE

## 2019-07-12 PROCEDURE — 74021 RADEX ABDOMEN 3+ VIEWS: CPT | Performed by: SURGERY

## 2019-07-12 PROCEDURE — 99233 SBSQ HOSP IP/OBS HIGH 50: CPT | Performed by: HOSPITALIST

## 2019-07-12 RX ORDER — POTASSIUM CHLORIDE 20 MEQ/1
40 TABLET, EXTENDED RELEASE ORAL EVERY 4 HOURS
Status: COMPLETED | OUTPATIENT
Start: 2019-07-12 | End: 2019-07-12

## 2019-07-12 RX ORDER — MAGNESIUM OXIDE 400 MG (241.3 MG MAGNESIUM) TABLET
400 TABLET ONCE
Status: COMPLETED | OUTPATIENT
Start: 2019-07-12 | End: 2019-07-12

## 2019-07-12 NOTE — PLAN OF CARE
Patient's abodmen a lot less distended. Bowel movements continued overnight. Pain being managed with PRN ofirmev and PRN tramadol given x1. Tolerating clear liquids and no complaints of nausea/vomiting. IVF running. Burks intact and draining.     Problem: P with activity based on assessment  - Modify environment to reduce risk of injury  - Provide assistive devices as appropriate  - Consider OT/PT consult to assist with strengthening/mobility  - Encourage toileting schedule  Outcome: Progressing     Problem:

## 2019-07-12 NOTE — PROGRESS NOTES
[facility]     Gastroenterology Progress Note    Fidelina Viry Patient Status:  Inpatient    1960 MRN [id number]   Location [facility] 4W/SW/SE Attending Almas Schwab MD   Hosp Day # 2 PCP [facility]       KWAN HGB 12.7* 11.5* 10.1*   HCT 37.5* 34.6* 31.4*   MCV 89.9 91.8 94.6   MCH 30.5 30.5 30.4   MCHC 33.9 33.2 32.2   RDW 14.4 14.5 14.7   NEPRELIM 6.37 5.91 3.30   WBC 10.4 9.2 5.5   .0 224.0 169.0         Recent Labs   Lab 07/09/19 2049 07/10/19  054 distending the esophagus. Findings either related reflux or dysmotility. 5. Distended urinary bladder despite Burks. 6. Gallstone-unchanged.  7. Slight patchiness to bilateral renal nephrograms-some of this may be artifactual.  Other consideration is pyelo

## 2019-07-12 NOTE — DIETARY NOTE
ADULT NUTRITION INITIAL ASSESSMENT    Pt is at moderate nutrition risk. Pt does not meet malnutrition criteria.       RECOMMENDATIONS TO MD:  See Nutrition Intervention  Pt recently left hospital on ground diet with thin liquids by tsp--may need swallow IBW  Usual Body Wt: 285 lbs       100% UBW    WEIGHT HISTORY:  Patient Weight(s) for the past 336 hrs:   Weight   07/09/19 2048 129.7 kg (285 lb 15 oz)     Wt Readings from Last 10 Encounters:  07/09/19 : 129.7 kg (285 lb 15 oz)  06/30/19 : 129.7 kg (285 l PHYSICAL FINDINGS:  - Body Fat/Muscle Mass: well nourished per visual exam.    - Fluid Accumulation: none per visual exam    - Skin Integrity: reddened.   - Arnoldo score 14    NUTRITION PRESCRIPTION:  Diet: Full Liquid  Oral Supplements: bid as above  Estim

## 2019-07-12 NOTE — PLAN OF CARE
Problem: Patient/Family Goals  Goal: Patient/Family Long Term Goal  Description  Patient's Long Term Goal: Resolve abd pain     Interventions:  - Rectal tube  - NPO status   - IVF   - Monitor labs/tests  - See additional Care Plan goals for specific inte with IV or PO as ordered and tolerated  - Evaluate effectiveness of GI medications  - Encourage mobilization and activity  - Obtain nutritional consult as needed  - Establish a toileting routine/schedule  - Consider collaborating with pharmacy to review pa

## 2019-07-12 NOTE — PROGRESS NOTES
[facility]    Progress Note    Natalia Barajas Patient Status:  Inpatient    1960 MRN [id number]   Location [facility] 4W/SW/SE Attending Filiberto Ascencio MD   Hosp Day # 2 PCP             Subjective:     Pt wel 06/12/2019       Recent Labs   Lab 07/09/19  2049 07/10/19  0545 07/11/19  0538 07/12/19  0602   * 157*  --  97   BUN 11 12  --  8   CREATSERUM 0.84 0.78  --  0.58*   GFRAA 111 114  --  129   GFRNAA 96 99  --  112   CA 8.9 8.7  --  8.0*   ALB 2.9* 2

## 2019-07-12 NOTE — PROGRESS NOTES
[facility]    Progress Note    Roel Alexandra Patient Status:  Inpatient    1960 MRN [id number]   Location [facility] 4W/SW/SE Attending Gee Newell MD   Hosp Day # 2 PCP Moris Mendoza        Subjective:   Roel Alexandra CT does not show abrupt transition     Continued noticeable improvement clinically with decreased distention, discomfort and release of stool.  decompression with this in acute setting     Consider colonoscopy or neostigmine for acute decompression if [facility]

## 2019-07-12 NOTE — PROGRESS NOTES
[facility]    Progress Note    Gustavo Dixon Patient Status:  Inpatient    1960 MRN [id number]   Location [facility] 4W/SW/SE Attending Pantera Up MD   Hosp Day # 2 PCP Marya Jimenez       Subjective:     feeling bet precautions. Hyponatremia, likely related to poor p.o. intake, dehydration. Hydrate and recheck. Hypokalemia. Check magnesium level and replace. Malnutrition. Start Glucerna when taking p.o. Anemia, chronic and stable.     H/o narcotics dep

## 2019-07-13 LAB
ANION GAP SERPL CALC-SCNC: 7 MMOL/L (ref 0–18)
BUN BLD-MCNC: 8 MG/DL (ref 7–18)
BUN/CREAT SERPL: 10.4 (ref 10–20)
CALCIUM BLD-MCNC: 8.2 MG/DL (ref 8.5–10.1)
CHLORIDE SERPL-SCNC: 114 MMOL/L (ref 98–112)
CO2 SERPL-SCNC: 23 MMOL/L (ref 21–32)
CREAT BLD-MCNC: 0.77 MG/DL (ref 0.7–1.3)
GLUCOSE BLD-MCNC: 128 MG/DL (ref 70–99)
GLUCOSE BLDC GLUCOMTR-MCNC: 128 MG/DL (ref 70–99)
GLUCOSE BLDC GLUCOMTR-MCNC: 131 MG/DL (ref 70–99)
GLUCOSE BLDC GLUCOMTR-MCNC: 156 MG/DL (ref 70–99)
GLUCOSE BLDC GLUCOMTR-MCNC: 177 MG/DL (ref 70–99)
HAV IGM SER QL: 1.8 MG/DL (ref 1.6–2.6)
OSMOLALITY SERPL CALC.SUM OF ELEC: 298 MOSM/KG (ref 275–295)
POTASSIUM SERPL-SCNC: 3.7 MMOL/L (ref 3.5–5.1)
SODIUM SERPL-SCNC: 144 MMOL/L (ref 136–145)

## 2019-07-13 PROCEDURE — 99232 SBSQ HOSP IP/OBS MODERATE 35: CPT | Performed by: INTERNAL MEDICINE

## 2019-07-13 PROCEDURE — 99233 SBSQ HOSP IP/OBS HIGH 50: CPT | Performed by: HOSPITALIST

## 2019-07-13 RX ORDER — MAGNESIUM OXIDE 400 MG (241.3 MG MAGNESIUM) TABLET
400 TABLET ONCE
Status: COMPLETED | OUTPATIENT
Start: 2019-07-13 | End: 2019-07-13

## 2019-07-13 RX ORDER — POTASSIUM CHLORIDE 20 MEQ/1
40 TABLET, EXTENDED RELEASE ORAL ONCE
Status: COMPLETED | OUTPATIENT
Start: 2019-07-13 | End: 2019-07-13

## 2019-07-13 RX ORDER — ZOLPIDEM TARTRATE 5 MG/1
5 TABLET ORAL NIGHTLY PRN
Status: DISCONTINUED | OUTPATIENT
Start: 2019-07-13 | End: 2019-07-16

## 2019-07-13 RX ORDER — METOCLOPRAMIDE HYDROCHLORIDE 5 MG/ML
10 INJECTION INTRAMUSCULAR; INTRAVENOUS ONCE
Status: COMPLETED | OUTPATIENT
Start: 2019-07-13 | End: 2019-07-13

## 2019-07-13 NOTE — PLAN OF CARE
Patient alert and oriented, forgetful at times. Patient incontinent of bowels, incontinence care provided. Full liquid diet cont. Burks intact. IVF cont. Tylenol IV and Reglan administered for abdominal discomfort. Patient is max assist for all transfers. by assessment.  - Educate pt/family on patient safety including physical limitations  - Instruct pt to call for assistance with activity based on assessment  - Modify environment to reduce risk of injury  - Provide assistive devices as appropriate  - Consi Monitor Blood Glucose as ordered  - Assess for signs and symptoms of hyperglycemia and hypoglycemia  - Administer ordered medications to maintain glucose within target range  - Assess barriers to adequate nutritional intake and initiate nutrition consult a

## 2019-07-13 NOTE — PROGRESS NOTES
[facility]     Gastroenterology Progress Note    Jai Carbajal Patient Status:  Inpatient    1960 MRN [id number]   Location [facility] 4W/SW/SE Attending Jodie Siddiqui MD   Hosp Day # 3 PCP Fortino Wilcox 90 5. 91 3.30   WBC 10.4 9.2 5.5   .0 224.0 169.0         Recent Labs   Lab 07/09/19  2049 07/10/19  0545 07/11/19  0538 07/12/19  0602 07/13/19  0742   * 157*  --  97 128*   BUN 11 12  --  8 8   CREATSERUM 0.84 0.78  --  0.58* 0.77   GFRAA 111 1

## 2019-07-13 NOTE — PLAN OF CARE
Problem: Patient/Family Goals  Goal: Patient/Family Long Term Goal  Description  Patient's Long Term Goal: Resolve abd pain     Interventions:  - Rectal tube  - NPO status   - IVF   - Monitor labs/tests  - See additional Care Plan goals for specific inte Problem: GASTROINTESTINAL - ADULT  Goal: Maintains or returns to baseline bowel function  Description  INTERVENTIONS:  - Assess bowel function  - Maintain adequate hydration with IV or PO as ordered and tolerated  - Evaluate effectiveness of GI medicatio

## 2019-07-13 NOTE — PROGRESS NOTES
[facility]    Progress Note    Luly Deluca Patient Status:  Inpatient    1960 MRN [id number]   Location [facility] 4W/SW/SE Attending Bouchra Castro MD   Hosp Day # 3 PCP ARVIND CASTRO       Subjective:     feeling bet CPAP.  Seizure disorder. Continue Keppra. Place on seizure precautions. Hyponatremia, likely related to poor p.o. intake, dehydration. Hydrate and recheck. Hypokalemia. Check magnesium level and replace. Malnutrition.   Start Glucerna when ta

## 2019-07-13 NOTE — PROGRESS NOTES
[facility]    Progress Note    Gustavoasaf Parada Patient Status:  Inpatient    1960 MRN [id number]   Location [facility] 4W/SW/SE Attending Meryle Guarneri, MD   Hosp Day # 3 PCP Irma Hodgson            Subjective:     Pt wel 07/13/19  0742   * 157*  --  97 128*   BUN 11 12  --  8 8   CREATSERUM 0.84 0.78  --  0.58* 0.77   GFRAA 111 114  --  129 115   GFRNAA 96 99  --  112 99   CA 8.9 8.7  --  8.0* 8.2*   ALB 2.9* 2.6*  --  2.1*  --    * 133*  --  136 144   K 3.2*

## 2019-07-14 LAB
ANION GAP SERPL CALC-SCNC: 8 MMOL/L (ref 0–18)
BUN BLD-MCNC: 8 MG/DL (ref 7–18)
BUN/CREAT SERPL: 11.9 (ref 10–20)
CALCIUM BLD-MCNC: 7.8 MG/DL (ref 8.5–10.1)
CHLORIDE SERPL-SCNC: 108 MMOL/L (ref 98–112)
CO2 SERPL-SCNC: 23 MMOL/L (ref 21–32)
CREAT BLD-MCNC: 0.67 MG/DL (ref 0.7–1.3)
GLUCOSE BLD-MCNC: 128 MG/DL (ref 70–99)
GLUCOSE BLDC GLUCOMTR-MCNC: 109 MG/DL (ref 70–99)
GLUCOSE BLDC GLUCOMTR-MCNC: 118 MG/DL (ref 70–99)
GLUCOSE BLDC GLUCOMTR-MCNC: 192 MG/DL (ref 70–99)
GLUCOSE BLDC GLUCOMTR-MCNC: 212 MG/DL (ref 70–99)
HAV IGM SER QL: 1.7 MG/DL (ref 1.6–2.6)
OSMOLALITY SERPL CALC.SUM OF ELEC: 288 MOSM/KG (ref 275–295)
POTASSIUM SERPL-SCNC: 3.6 MMOL/L (ref 3.5–5.1)
SODIUM SERPL-SCNC: 139 MMOL/L (ref 136–145)

## 2019-07-14 PROCEDURE — 99233 SBSQ HOSP IP/OBS HIGH 50: CPT | Performed by: HOSPITALIST

## 2019-07-14 RX ORDER — POTASSIUM CHLORIDE 20 MEQ/1
40 TABLET, EXTENDED RELEASE ORAL EVERY 4 HOURS
Status: COMPLETED | OUTPATIENT
Start: 2019-07-14 | End: 2019-07-14

## 2019-07-14 RX ORDER — MAGNESIUM OXIDE 400 MG (241.3 MG MAGNESIUM) TABLET
400 TABLET ONCE
Status: COMPLETED | OUTPATIENT
Start: 2019-07-14 | End: 2019-07-14

## 2019-07-14 NOTE — PROGRESS NOTES
[facility]    Progress Note    Dayanara Jordan Patient Status:  Inpatient    1960 MRN [id number]   Location [facility] 4W/SW/SE Attending Erick Key MD   Hosp Day # 4 PCP ARVIND CASTRO       Subjective:     Had arrived drained, with much patient relief    Abn UA, but cx neg  Keep off antibiotics    Diabetes mellitus type 2.    -on Levemir, add AC novolog as oral intake is increasing, adjust as needed    Obesity, probable obstructive sleep apnea. Place on auto CPAP.   [employer]

## 2019-07-14 NOTE — PROGRESS NOTES
[facility]    Progress Note    Gustavo Dixon Patient Status:  Inpatient    1960 MRN [id number]   Location [facility] 4W/SW/SE Attending Pantera Up MD   Hosp Day # 4 PCP ARVIND CASTRO            Subjective:     Pt wel 07/12/19  0602 07/13/19  0742 07/14/19  0600   * 157*  --  97 128* 128*   BUN 11 12  --  8 8 8   CREATSERUM 0.84 0.78  --  0.58* 0.77 0.67*   GFRAA 111 114  --  129 115 122   GFRNAA 96 99  --  112 99 105   CA 8.9 8.7  --  8.0* 8.2* 7.8*   ALB 2.9* 2

## 2019-07-14 NOTE — PLAN OF CARE
Tolerating meals, no complaints of nausea or pain, no BM today, alvarado intact, monitoring blood sugars, up to chair with lift. Potassium and magnesium covered. Plan for X-ray obstructive series tomorrow.     Problem: Patient/Family Goals  Goal: Patient/Fami Modify environment to reduce risk of injury  - Provide assistive devices as appropriate  - Consider OT/PT consult to assist with strengthening/mobility  - Encourage toileting schedule  Outcome: Progressing     Problem: Patient Centered Care  Goal: Patient barriers to adequate nutritional intake and initiate nutrition consult as needed  - Instruct patient on self management of diabetes  Outcome: Progressing

## 2019-07-14 NOTE — PLAN OF CARE
Problem: Patient/Family Goals  Goal: Patient/Family Long Term Goal  Description  Patient's Long Term Goal: Resolve abd pain     Interventions:  - low fiber/ soft diet  - IVF   - Monitor labs/tests  - See additional Care Plan goals for specific interventi Problem: Patient Centered Care  Goal: Patient preferences are identified and integrated in the patient's plan of care  Description  Interventions:  - What would you like us to know as we care for you?  I can't sleep  - Provide timely, complete, and accura Medicated with IV Reglan and Zofran PRN. Advised to take clear liquids for now until nausea has subsided. Medicated with IV Tylenol PRN for pain. On bathroom privileges. Assisted in turning on bed, patient on his left side at this time.  Prompt skin care r

## 2019-07-15 ENCOUNTER — APPOINTMENT (OUTPATIENT)
Dept: GENERAL RADIOLOGY | Facility: HOSPITAL | Age: 59
DRG: 389 | End: 2019-07-15
Attending: SURGERY
Payer: MEDICARE

## 2019-07-15 LAB
ANION GAP SERPL CALC-SCNC: 9 MMOL/L (ref 0–18)
BUN BLD-MCNC: 9 MG/DL (ref 7–18)
BUN/CREAT SERPL: 12.5 (ref 10–20)
CALCIUM BLD-MCNC: 7.9 MG/DL (ref 8.5–10.1)
CHLORIDE SERPL-SCNC: 110 MMOL/L (ref 98–112)
CO2 SERPL-SCNC: 21 MMOL/L (ref 21–32)
CREAT BLD-MCNC: 0.72 MG/DL (ref 0.7–1.3)
GLUCOSE BLD-MCNC: 153 MG/DL (ref 70–99)
GLUCOSE BLDC GLUCOMTR-MCNC: 133 MG/DL (ref 70–99)
GLUCOSE BLDC GLUCOMTR-MCNC: 161 MG/DL (ref 70–99)
GLUCOSE BLDC GLUCOMTR-MCNC: 190 MG/DL (ref 70–99)
GLUCOSE BLDC GLUCOMTR-MCNC: 361 MG/DL (ref 70–99)
HAV IGM SER QL: 1.8 MG/DL (ref 1.6–2.6)
OSMOLALITY SERPL CALC.SUM OF ELEC: 292 MOSM/KG (ref 275–295)
PHOSPHATE SERPL-MCNC: 3.2 MG/DL (ref 2.5–4.9)
POTASSIUM SERPL-SCNC: 4.3 MMOL/L (ref 3.5–5.1)
SODIUM SERPL-SCNC: 140 MMOL/L (ref 136–145)

## 2019-07-15 PROCEDURE — 99232 SBSQ HOSP IP/OBS MODERATE 35: CPT | Performed by: HOSPITALIST

## 2019-07-15 PROCEDURE — 74021 RADEX ABDOMEN 3+ VIEWS: CPT | Performed by: SURGERY

## 2019-07-15 PROCEDURE — 99232 SBSQ HOSP IP/OBS MODERATE 35: CPT | Performed by: INTERNAL MEDICINE

## 2019-07-15 RX ORDER — MAGNESIUM OXIDE 400 MG (241.3 MG MAGNESIUM) TABLET
400 TABLET ONCE
Status: COMPLETED | OUTPATIENT
Start: 2019-07-15 | End: 2019-07-15

## 2019-07-15 RX ORDER — HYDROCODONE BITARTRATE AND ACETAMINOPHEN 5; 325 MG/1; MG/1
1 TABLET ORAL EVERY 6 HOURS PRN
Status: DISCONTINUED | OUTPATIENT
Start: 2019-07-15 | End: 2019-07-16

## 2019-07-15 NOTE — PROGRESS NOTES
[facility]     Gastroenterology Progress Note    Esther Muna Patient Status:  Inpatient    1960 MRN [id number]   Location [facility] 4W/SW/SE Attending Kahlil Pena MD   Hosp Day # 5 PCP [facility]       KWAN MCHC 33.9 33.2 32.2   RDW 14.4 14.5 14.7   NEPRELIM 6.37 5.91 3.30   WBC 10.4 9.2 5.5   .0 224.0 169.0         Recent Labs   Lab 07/09/19 2049 07/10/19  0545  07/12/19  0602 07/13/19  0742 07/14/19  0600 07/15/19  0516   * 157*  --  97 128

## 2019-07-15 NOTE — PLAN OF CARE
Problem: Patient/Family Goals  Goal: Patient/Family Long Term Goal  Description  Patient's Long Term Goal: Resolve abd pain     Interventions:  - Monitor labs/tests  - See additional Care Plan goals for specific interventions   Outcome: Progressing  Goal Care  Goal: Patient preferences are identified and integrated in the patient's plan of care  Description  Interventions:  - What would you like us to know as we care for you?  - Provide timely, complete, and accurate information to patient/family  - Incorp on IV Keppra. Medicated with IV Tylenol PRN for pain. Trache intact. No glycemic reactions noted. Safety measures in place. Continue to monitor.

## 2019-07-15 NOTE — PHYSICAL THERAPY NOTE
PHYSICAL THERAPY TREATMENT NOTE - INPATIENT     Room Number: 465/465-A       Presenting Problem: Ileus-rectal tube placed(recent 2 month hospital stay for resp failure s/p trach)    Problem List  Principal Problem:    Ileus ([address]  Active Problems:    Hypok lying on back to sitting on the side of the bed?: Unable   How much help from another person does the patient currently need. ..   -   Moving to and from a bed to a chair (including a wheelchair)?: Total   -   Need to walk in hospital room?: Total   -   Cli

## 2019-07-15 NOTE — PROGRESS NOTES
[facility]    Progress Note    Ezekiel Fraction Patient Status:  Inpatient    1960 MRN [id number]   Location [facility] 4W/SW/SE Attending Brianna John MD   Hosp Day # 5 PCP ARVIND CASTRO       Subjective:     Seems marie CPAP.  Seizure disorder. Continue Keppra. Place on seizure precautions. Hyponatremia, likely related to poor p.o. intake, dehydration. Hydrate and recheck. Hypokalemia. Check magnesium level and replace. Malnutrition.   Start Glucerna when ta

## 2019-07-15 NOTE — PLAN OF CARE
Problem: Patient/Family Goals  Goal: Patient/Family Long Term Goal  Description  Patient's Long Term Goal: Resolve abd pain     Interventions:  - Rectal tube  - NPO status   - IVF   - Monitor labs/tests  - See additional Care Plan goals for specific inte Problem: Patient Centered Care  Goal: Patient preferences are identified and integrated in the patient's plan of care  Description  Interventions:  - What would you like us to know as we care for you? Please keep pt informed.   - Provide timely, complete, reports of N/V or pain. Up in chair via lift. Trach and alvarado in place. Tolerating diet. Evening blood sugar high - MD called. No changes, call with evening sugar. Plan to D/C to rehab once placement secured.

## 2019-07-15 NOTE — PROGRESS NOTES
[facility]    Progress Note    Aime Thomas Patient Status:  Inpatient    1960 MRN [id number]   Location [facility] 4W/SW/SE Attending Charisse Cristina MD   Hosp Day # 5 PCP ARVIND CASTRO            Subjective:     Pt wel 07/10/19  0545  07/12/19  0602 07/13/19  0742 07/14/19  0600 07/15/19  0516   * 157*  --  97 128* 128* 153*   BUN 11 12  --  8 8 8 9   CREATSERUM 0.84 0.78  --  0.58* 0.77 0.67* 0.72   GFRAA 111 114  --  129 115 122 118   GFRNAA 96 99  --  112 99 10 distended    Improved, possibly home soon  Instructions given    Jeromy Manzo 13 Surgery  [facility]  7/15/2019

## 2019-07-16 ENCOUNTER — TELEPHONE (OUTPATIENT)
Dept: INTERNAL MEDICINE UNIT | Facility: HOSPITAL | Age: 59
End: 2019-07-16

## 2019-07-16 VITALS
TEMPERATURE: 98 F | SYSTOLIC BLOOD PRESSURE: 140 MMHG | DIASTOLIC BLOOD PRESSURE: 82 MMHG | HEIGHT: 66 IN | RESPIRATION RATE: 16 BRPM | BODY MASS INDEX: 46.61 KG/M2 | HEART RATE: 82 BPM | OXYGEN SATURATION: 94 % | WEIGHT: 290 LBS

## 2019-07-16 PROBLEM — R10.9 ABDOMINAL PAIN, ACUTE: Status: RESOLVED | Noted: 2019-07-10 | Resolved: 2019-07-16

## 2019-07-16 PROBLEM — E87.1 HYPONATREMIA: Status: RESOLVED | Noted: 2019-07-09 | Resolved: 2019-07-16

## 2019-07-16 PROBLEM — E87.6 HYPOKALEMIA: Status: RESOLVED | Noted: 2019-07-09 | Resolved: 2019-07-16

## 2019-07-16 LAB
BASOPHILS # BLD AUTO: 0.02 X10(3) UL (ref 0–0.2)
BASOPHILS NFR BLD AUTO: 0.3 %
DEPRECATED RDW RBC AUTO: 50.9 FL (ref 35.1–46.3)
EOSINOPHIL # BLD AUTO: 0.33 X10(3) UL (ref 0–0.7)
EOSINOPHIL NFR BLD AUTO: 5.7 %
ERYTHROCYTE [DISTWIDTH] IN BLOOD BY AUTOMATED COUNT: 14.6 % (ref 11–15)
GLUCOSE BLDC GLUCOMTR-MCNC: 126 MG/DL (ref 70–99)
GLUCOSE BLDC GLUCOMTR-MCNC: 135 MG/DL (ref 70–99)
GLUCOSE BLDC GLUCOMTR-MCNC: 157 MG/DL (ref 70–99)
HAV IGM SER QL: 1.9 MG/DL (ref 1.6–2.6)
HCT VFR BLD AUTO: 31 % (ref 39–53)
HGB BLD-MCNC: 10.2 G/DL (ref 13–17.5)
IMM GRANULOCYTES # BLD AUTO: 0.04 X10(3) UL (ref 0–1)
IMM GRANULOCYTES NFR BLD: 0.7 %
LYMPHOCYTES # BLD AUTO: 1.06 X10(3) UL (ref 1–4)
LYMPHOCYTES NFR BLD AUTO: 18.3 %
MCH RBC QN AUTO: 31.2 PG (ref 26–34)
MCHC RBC AUTO-ENTMCNC: 32.9 G/DL (ref 31–37)
MCV RBC AUTO: 94.8 FL (ref 80–100)
MONOCYTES # BLD AUTO: 0.56 X10(3) UL (ref 0.1–1)
MONOCYTES NFR BLD AUTO: 9.7 %
NEUTROPHILS # BLD AUTO: 3.79 X10 (3) UL (ref 1.5–7.7)
NEUTROPHILS # BLD AUTO: 3.79 X10(3) UL (ref 1.5–7.7)
NEUTROPHILS NFR BLD AUTO: 65.3 %
PLATELET # BLD AUTO: 149 10(3)UL (ref 150–450)
RBC # BLD AUTO: 3.27 X10(6)UL (ref 4.3–5.7)
WBC # BLD AUTO: 5.8 X10(3) UL (ref 4–11)

## 2019-07-16 PROCEDURE — 90792 PSYCH DIAG EVAL W/MED SRVCS: CPT | Performed by: OTHER

## 2019-07-16 PROCEDURE — 99232 SBSQ HOSP IP/OBS MODERATE 35: CPT | Performed by: INTERNAL MEDICINE

## 2019-07-16 PROCEDURE — 99239 HOSP IP/OBS DSCHRG MGMT >30: CPT | Performed by: HOSPITALIST

## 2019-07-16 RX ORDER — MIRTAZAPINE 15 MG/1
15 TABLET, FILM COATED ORAL NIGHTLY
Status: DISCONTINUED | OUTPATIENT
Start: 2019-07-16 | End: 2019-07-16

## 2019-07-16 RX ORDER — BUPROPION HYDROCHLORIDE 150 MG/1
150 TABLET ORAL DAILY
Status: DISCONTINUED | OUTPATIENT
Start: 2019-07-16 | End: 2019-07-16

## 2019-07-16 RX ORDER — ACETAMINOPHEN 325 MG/1
650 TABLET ORAL EVERY 6 HOURS PRN
Qty: 30 TABLET | Refills: 0 | Status: ON HOLD | OUTPATIENT
Start: 2019-07-16 | End: 2019-10-24

## 2019-07-16 RX ORDER — MIRTAZAPINE 15 MG/1
15 TABLET, FILM COATED ORAL NIGHTLY
Qty: 30 TABLET | Refills: 0 | Status: SHIPPED | OUTPATIENT
Start: 2019-07-16

## 2019-07-16 RX ORDER — ALPRAZOLAM 0.25 MG/1
0.25 TABLET ORAL 2 TIMES DAILY PRN
Status: DISCONTINUED | OUTPATIENT
Start: 2019-07-16 | End: 2019-07-16

## 2019-07-16 RX ORDER — ACETAMINOPHEN 325 MG/1
650 TABLET ORAL EVERY 6 HOURS PRN
Status: DISCONTINUED | OUTPATIENT
Start: 2019-07-16 | End: 2019-07-16

## 2019-07-16 NOTE — PLAN OF CARE
Problem: Patient/Family Goals  Goal: Patient/Family Long Term Goal  Description  Patient's Long Term Goal: Resolve abd pain     Interventions:  - Rectal tube  - NPO status   - IVF   - Monitor labs/tests  - See additional Care Plan goals for specific inte Problem: Patient Centered Care  Goal: Patient preferences are identified and integrated in the patient's plan of care  Description  Interventions:  - What would you like us to know as we care for you?   - Provide timely, complete, and accurate informatio HS. Up with max assist with lift.

## 2019-07-16 NOTE — CONSULTS
[facility]    Report of Consultation    Laureano Calderon Patient Status:  Inpatient    1960 MRN [id number]   Location [facility] 4W/SW/SE Attending Nury Mcintosh MD   [facility] Day # 6 PCP Richard Kim     Date of Admission:  20 otherwise deny any homicidal or suicidal ideation. He admitted excessive worry and concern with difficulty sleeping. The patient has been on nortriptyline 50 mg nightly and he has been also taking Ambien 5 mg as needed for insomnia.       Medical Histor Units Subcutaneous Daily   Normal Saline Flush 0.9 % injection 3 mL 3 mL Intravenous PRN   Heparin Sodium (Porcine) 5000 UNIT/ML injection 5,000 Units 5,000 Units Subcutaneous 2 times per day   ondansetron HCl (ZOFRAN) injection 4 mg 4 mg Intravenous Q6H P mouth every 6 (six) hours as needed for Itching. Methylnaltrexone Bromide (RELISTOR) 150 MG Oral Tab Take 450 mg by mouth daily. levETIRAcetam 1000 MG Oral Tab 1 tablet (1,000 mg total) by Per NG Tube route 2 (two) times daily.    carvedilol 3.125 MG Or Pen-injector Inject 45 Units into the skin Noon.    Insulin Pen Needle (BD PEN NEEDLE ROMMEL U/F) 32G X 4 MM Does not apply Misc TEST THREE TIMES DAILY AS DIRECTED   TRUEPLUS PEN NEEDLES 32G X 4 MM Does not apply Misc USE TO INJECT INSULIN THREE TIMES DAILY 7/15/2019  CONCLUSION:  1. Moderate stool throughout the colon consistent with constipation/fecal retention. 2. Small-bowel distention has resolved.   Nonspecific nonobstructive abdominal gas pattern    Dictated by (CST): Silvano Kahn MD on 7/15/2019 Encounter      CBC With Differential With Platelet      Basic Metabolic Panel (8)      Hepatic Function Panel (7)      Comp Metabolic Panel (14)      Magnesium      CBC With Differential With Platelet      Hemoglobin A1C      Potassium      Urinalysis with

## 2019-07-16 NOTE — TELEPHONE ENCOUNTER
Pharmacy called requesting to change detemir insulin prescribed by dr Sheree Harris to Lantus due to insurance issues. Discussed w Hospitalist DEBORAH Helm whom approved the change.

## 2019-07-16 NOTE — BH PROGRESS NOTE
Behavioral Health Note  CHIEF COMPLAINT  Abdominal distention and pain    REASON FOR ADMISSION  Abdominal distention and pain    SOCIAL HISTORY  Haydee Zapata lives at home with his wife. He was working at [employer] until he broke his leg 8 years ago.   He d referrals provided, plan for SNF on d/c.    Tess Patterson LCSW

## 2019-07-16 NOTE — DISCHARGE SUMMARY
[facility]    Discharge Summary    Denise Villanueva Patient Status:  Inpatient    1960 MRN [id number]   Location [facility] 4W/SW/SE Attending Donnie Ruby MD   [address] Day # 6 PCP Zeeshan Francisco     Date of Admission: 2019     D He also described some nausea. He denied any melena or hematochezia. No dysuria, increased frequency of urination, or hematuria.   In the ER, his CT scan of the abdomen and pelvis revealed significant distention of the colon with gas and fluid without a Trach present at discharge. No problem talking and swallowing. Physical Examination:  Vital Signs:  Blood pressure 140/82, pulse 82, temperature 98.1 °F (36.7 °C), temperature source Oral, resp.  rate 16, height 167.6 cm (5' 6\"), weight 290 lb (131.5 kg Sensitive        IMAGING STUDIES:   Xr Abdomen, Obstructive Series 3 Views(cpt=74021)    Result Date: 7/15/2019  CONCLUSION:  1. Moderate stool throughout the colon consistent with constipation/fecal retention. 2. Small-bowel distention has resolved.   Nons coronary artery calcification. 10. Small fat containing inguinal hernias and Marianne incisional umbilical hernia. 11. Pneumonia and atelectasis in the right lower lobe. 12. Regions of subsegmental atelectasis in left lower lung.     Dictated by (CST): Claudine 8. 0* 8.2* 7.8* 7.9*   ALB 2.9* 2.6*  --  2.1*  --   --   --    * 133*  --  136 144 139 140   K 3.2* 3.4*   < > 2.8* 3.7 3.6 4.3   CL 93* 98  --  106 114* 108 110   CO2 24.0 25.0  --  23.0 23.0 23.0 21.0   ALKPHO 89 83  --   --   --   --   --    AST 1 Heparin Sodium (Porcine) 5000 UNIT/ML Soln      Inject 1 mL (5,000 Units total) into the skin every 12 (twelve) hours.    Refills:  0     Insulin Aspart Pen 100 UNIT/ML Sopn  Commonly known as:  NOVOLOG      Inject 1-7 Units into the skin 3 (three) times INSULIN THREE TIMES DAILY.    Quantity:  300 each  Refills:  0     Insulin Pen Needle 32G X 4 MM Misc  Commonly known as:  BD PEN NEEDLE ROMMEL U/F      TEST THREE TIMES DAILY AS DIRECTED   Quantity:  300 each  Refills:  0     Zolpidem Tartrate 5 MG Tabs  Com

## 2019-07-18 ENCOUNTER — TELEPHONE (OUTPATIENT)
Dept: PAIN CLINIC | Facility: HOSPITAL | Age: 59
End: 2019-07-18

## 2019-07-18 NOTE — TELEPHONE ENCOUNTER
Spoke to APN at rehab facility regarding patients medications. Recommended avoiding all narcotics. Recommended aggressive PT/OT.

## 2019-07-18 NOTE — TELEPHONE ENCOUNTER
----- Message from [email or web address] sent at 7/18/2019  8:31 AM CDT -----  Regarding: Med Refill Request  PT left a message this morning that he has been in the hospital for the last 3 months and is now in a rehab facility on [address] [facility], he

## 2019-07-18 NOTE — TELEPHONE ENCOUNTER
Spoke to patient's wife. Discussed with her that we will not be able to give him any pain medication.  We have not seen him since February and it looks like he has weaned off all his morphine while in the hospital.  Told her that he can call and make an ap

## 2019-07-23 ENCOUNTER — APPOINTMENT (OUTPATIENT)
Dept: CT IMAGING | Facility: HOSPITAL | Age: 59
DRG: 445 | End: 2019-07-23
Attending: EMERGENCY MEDICINE
Payer: MEDICARE

## 2019-07-23 ENCOUNTER — APPOINTMENT (OUTPATIENT)
Dept: ULTRASOUND IMAGING | Facility: HOSPITAL | Age: 59
DRG: 445 | End: 2019-07-23
Attending: EMERGENCY MEDICINE
Payer: MEDICARE

## 2019-07-23 ENCOUNTER — HOSPITAL ENCOUNTER (INPATIENT)
Facility: HOSPITAL | Age: 59
LOS: 3 days | Discharge: NURSING, REHAB OR LONG-TERM CARE | DRG: 445 | End: 2019-07-26
Attending: EMERGENCY MEDICINE | Admitting: HOSPITALIST
Payer: MEDICARE

## 2019-07-23 DIAGNOSIS — K81.0 ACUTE CHOLECYSTITIS: Primary | ICD-10-CM

## 2019-07-23 LAB
ALBUMIN SERPL-MCNC: 2.5 G/DL (ref 3.4–5)
ALP LIVER SERPL-CCNC: 93 U/L (ref 45–117)
ALT SERPL-CCNC: 21 U/L (ref 16–61)
ANION GAP SERPL CALC-SCNC: 5 MMOL/L (ref 0–18)
AST SERPL-CCNC: 24 U/L (ref 15–37)
BASOPHILS # BLD AUTO: 0.03 X10(3) UL (ref 0–0.2)
BASOPHILS NFR BLD AUTO: 0.5 %
BILIRUB DIRECT SERPL-MCNC: 0.2 MG/DL (ref 0–0.2)
BILIRUB SERPL-MCNC: 0.5 MG/DL (ref 0.1–2)
BILIRUB UR QL: NEGATIVE
BUN BLD-MCNC: 5 MG/DL (ref 7–18)
BUN/CREAT SERPL: 7.6 (ref 10–20)
CALCIUM BLD-MCNC: 9 MG/DL (ref 8.5–10.1)
CHLORIDE SERPL-SCNC: 107 MMOL/L (ref 98–112)
CO2 SERPL-SCNC: 28 MMOL/L (ref 21–32)
COLOR UR: YELLOW
CREAT BLD-MCNC: 0.66 MG/DL (ref 0.7–1.3)
DEPRECATED RDW RBC AUTO: 50.6 FL (ref 35.1–46.3)
EOSINOPHIL # BLD AUTO: 0.6 X10(3) UL (ref 0–0.7)
EOSINOPHIL NFR BLD AUTO: 9.2 %
ERYTHROCYTE [DISTWIDTH] IN BLOOD BY AUTOMATED COUNT: 14.6 % (ref 11–15)
EST. AVERAGE GLUCOSE BLD GHB EST-MCNC: 128 MG/DL (ref 68–126)
GLUCOSE BLD-MCNC: 149 MG/DL (ref 70–99)
GLUCOSE BLDC GLUCOMTR-MCNC: 155 MG/DL (ref 70–99)
GLUCOSE BLDC GLUCOMTR-MCNC: 162 MG/DL (ref 70–99)
GLUCOSE BLDC GLUCOMTR-MCNC: 168 MG/DL (ref 70–99)
GLUCOSE UR-MCNC: NEGATIVE MG/DL
HBA1C MFR BLD HPLC: 6.1 % (ref ?–5.7)
HCT VFR BLD AUTO: 35 % (ref 39–53)
HGB BLD-MCNC: 11.2 G/DL (ref 13–17.5)
IMM GRANULOCYTES # BLD AUTO: 0.02 X10(3) UL (ref 0–1)
IMM GRANULOCYTES NFR BLD: 0.3 %
KETONES UR-MCNC: NEGATIVE MG/DL
LIPASE SERPL-CCNC: 380 U/L (ref 73–393)
LYMPHOCYTES # BLD AUTO: 1.04 X10(3) UL (ref 1–4)
LYMPHOCYTES NFR BLD AUTO: 16 %
M PROTEIN MFR SERPL ELPH: 7.4 G/DL (ref 6.4–8.2)
MCH RBC QN AUTO: 30.3 PG (ref 26–34)
MCHC RBC AUTO-ENTMCNC: 32 G/DL (ref 31–37)
MCV RBC AUTO: 94.6 FL (ref 80–100)
MONOCYTES # BLD AUTO: 0.53 X10(3) UL (ref 0.1–1)
MONOCYTES NFR BLD AUTO: 8.2 %
MRSA DNA SPEC QL NAA+PROBE: NEGATIVE
NEUTROPHILS # BLD AUTO: 4.27 X10 (3) UL (ref 1.5–7.7)
NEUTROPHILS # BLD AUTO: 4.27 X10(3) UL (ref 1.5–7.7)
NEUTROPHILS NFR BLD AUTO: 65.8 %
NITRITE UR QL STRIP.AUTO: NEGATIVE
OSMOLALITY SERPL CALC.SUM OF ELEC: 290 MOSM/KG (ref 275–295)
PH UR: 7 [PH] (ref 5–8)
PLATELET # BLD AUTO: 237 10(3)UL (ref 150–450)
POTASSIUM SERPL-SCNC: 4 MMOL/L (ref 3.5–5.1)
PROT UR-MCNC: NEGATIVE MG/DL
RBC # BLD AUTO: 3.7 X10(6)UL (ref 4.3–5.7)
RBC #/AREA URNS AUTO: 1 /HPF
SODIUM SERPL-SCNC: 140 MMOL/L (ref 136–145)
SP GR UR STRIP: 1 (ref 1–1.03)
UROBILINOGEN UR STRIP-ACNC: <2
VIT C UR-MCNC: NEGATIVE MG/DL
WBC # BLD AUTO: 6.5 X10(3) UL (ref 4–11)
WBC #/AREA URNS AUTO: 11 /HPF

## 2019-07-23 PROCEDURE — 99223 1ST HOSP IP/OBS HIGH 75: CPT | Performed by: HOSPITALIST

## 2019-07-23 PROCEDURE — 74177 CT ABD & PELVIS W/CONTRAST: CPT | Performed by: EMERGENCY MEDICINE

## 2019-07-23 PROCEDURE — 76705 ECHO EXAM OF ABDOMEN: CPT | Performed by: EMERGENCY MEDICINE

## 2019-07-23 RX ORDER — ONDANSETRON 2 MG/ML
4 INJECTION INTRAMUSCULAR; INTRAVENOUS EVERY 6 HOURS PRN
Status: DISCONTINUED | OUTPATIENT
Start: 2019-07-23 | End: 2019-07-26

## 2019-07-23 RX ORDER — DEXTROSE AND SODIUM CHLORIDE 5; .45 G/100ML; G/100ML
INJECTION, SOLUTION INTRAVENOUS CONTINUOUS
Status: DISCONTINUED | OUTPATIENT
Start: 2019-07-23 | End: 2019-07-25

## 2019-07-23 RX ORDER — MORPHINE SULFATE 2 MG/ML
2 INJECTION, SOLUTION INTRAMUSCULAR; INTRAVENOUS EVERY 2 HOUR PRN
Status: DISCONTINUED | OUTPATIENT
Start: 2019-07-23 | End: 2019-07-26

## 2019-07-23 RX ORDER — LEVETIRACETAM 500 MG/1
1000 TABLET ORAL 2 TIMES DAILY
Status: DISCONTINUED | OUTPATIENT
Start: 2019-07-23 | End: 2019-07-26

## 2019-07-23 RX ORDER — CARVEDILOL 3.12 MG/1
3.12 TABLET ORAL 2 TIMES DAILY WITH MEALS
Status: DISCONTINUED | OUTPATIENT
Start: 2019-07-23 | End: 2019-07-26

## 2019-07-23 RX ORDER — MORPHINE SULFATE 2 MG/ML
1 INJECTION, SOLUTION INTRAMUSCULAR; INTRAVENOUS EVERY 2 HOUR PRN
Status: DISCONTINUED | OUTPATIENT
Start: 2019-07-23 | End: 2019-07-26

## 2019-07-23 RX ORDER — HEPARIN SODIUM 5000 [USP'U]/ML
5000 INJECTION, SOLUTION INTRAVENOUS; SUBCUTANEOUS EVERY 12 HOURS
Status: DISCONTINUED | OUTPATIENT
Start: 2019-07-23 | End: 2019-07-26

## 2019-07-23 RX ORDER — DEXTROSE MONOHYDRATE 25 G/50ML
50 INJECTION, SOLUTION INTRAVENOUS AS NEEDED
Status: DISCONTINUED | OUTPATIENT
Start: 2019-07-23 | End: 2019-07-26

## 2019-07-23 RX ORDER — MORPHINE SULFATE 4 MG/ML
4 INJECTION, SOLUTION INTRAMUSCULAR; INTRAVENOUS EVERY 2 HOUR PRN
Status: DISCONTINUED | OUTPATIENT
Start: 2019-07-23 | End: 2019-07-25

## 2019-07-23 RX ORDER — SODIUM CHLORIDE 9 MG/ML
INJECTION, SOLUTION INTRAVENOUS CONTINUOUS
Status: ACTIVE | OUTPATIENT
Start: 2019-07-23 | End: 2019-07-23

## 2019-07-23 RX ORDER — MORPHINE SULFATE 4 MG/ML
4 INJECTION, SOLUTION INTRAMUSCULAR; INTRAVENOUS ONCE
Status: COMPLETED | OUTPATIENT
Start: 2019-07-23 | End: 2019-07-23

## 2019-07-23 RX ORDER — ACETAMINOPHEN 500 MG
1000 TABLET ORAL EVERY 6 HOURS PRN
Status: DISCONTINUED | OUTPATIENT
Start: 2019-07-23 | End: 2019-07-26

## 2019-07-23 RX ORDER — MINERAL OIL AND PETROLATUM 150; 830 MG/G; MG/G
OINTMENT OPHTHALMIC 3 TIMES DAILY PRN
Status: DISCONTINUED | OUTPATIENT
Start: 2019-07-23 | End: 2019-07-26

## 2019-07-23 RX ORDER — ZOLPIDEM TARTRATE 5 MG/1
5 TABLET ORAL NIGHTLY PRN
Status: DISCONTINUED | OUTPATIENT
Start: 2019-07-23 | End: 2019-07-26

## 2019-07-23 RX ORDER — BISACODYL 10 MG
10 SUPPOSITORY, RECTAL RECTAL
Status: DISCONTINUED | OUTPATIENT
Start: 2019-07-23 | End: 2019-07-26

## 2019-07-23 RX ORDER — SODIUM CHLORIDE 0.9 % (FLUSH) 0.9 %
3 SYRINGE (ML) INJECTION AS NEEDED
Status: DISCONTINUED | OUTPATIENT
Start: 2019-07-23 | End: 2019-07-26

## 2019-07-23 RX ORDER — MIRTAZAPINE 15 MG/1
15 TABLET, FILM COATED ORAL NIGHTLY
Status: DISCONTINUED | OUTPATIENT
Start: 2019-07-23 | End: 2019-07-26

## 2019-07-23 NOTE — ED INITIAL ASSESSMENT (HPI)
Pt from [facility], c/o diffuse abd pain which onset today. Denies n/v/d. Rates abd pain 9/10. Pt recently treated for \"intestinal infection\".

## 2019-07-23 NOTE — H&P
[facility]    PATIENT'S NAME: Vin Martinez   ATTENDING PHYSICIAN: Jorgito Lancaster MD   PATIENT ACCOUNT#:   [id number]    LOCATION:  [place] [address]  MEDICAL RECORD #:   [id number]       YOB: 1960  ADMISSION DATE:       07/23/2019 see medication reconciliation list.    ALLERGIES:  Demerol and Rocephin. FAMILY HISTORY:  Father had hypertension and cerebrovascular accident. SOCIAL HISTORY:  No tobacco, alcohol, or drug use. Sedentary lifestyle. Minimal movement.   Currently res sleep apnea. 6.   Hypertension. PLAN:  The patient will be admitted to general medical floor. IV Zosyn. We will keep him on clear liquids for now. Obtain General Surgery consult. Monitor his Accu-Cheks. Pain control.   Further recommendations to fo

## 2019-07-23 NOTE — PROGRESS NOTES
[facility] Note: Antimicrobial Weight Dose Adjustment for: piperacillin/tazobactam (Charles Johnson)    Denise Villanueva is a 60year old male who has been prescribed piperacillin/tazobactam (ZOSYN) 3375 mg x 1 in ER.   CrCl is estimated creatinine clearance is 108.8 mL

## 2019-07-23 NOTE — CONSULTS
[place] [facility]    Report of Consultation    Malinda Flores Patient Status:  Inpatient    1960 MRN [id number]   Location [facility] 4W/SW/SE Attending Raj Castellanos MD   Hosp Day # 0 PCP Saurabh Dowd     Date of Admission:   • COLONOSCOPY N/A 5/28/2019    Performed by Hima Galicia MD at [facility] ENDOSCOPY   • COLONOSCOPY N/A 5/16/2019    Performed by Pamela Gonzalez MD at [address]   • TRACHEOSTOMY N/A 5/22/2019    Performed by Ailin Polanco bruising or excessive bleeding; anticoagulants: pt with abnormal coag secondary to cirrhossis   ENDOCRINE: denies excessive thirst or urination; denies unexpected wt gain or wt loss      Physical Exam:  Blood pressure (!) 164/94, pulse 80, temperature 99. 8 could reflect acute cholecystitis in the appropriate clinical setting. 2. Negative for hepatobiliary dilatation. 3. Hepatomegaly with underlying hepatic steatosis.     Dictated by (CST): Leonie Birmingham MD on 7/23/2019 at 13:30     Approved by (CST): Mike Norwood findings secondary to his nutrition and his portal htn  Plan hepatobilary scan in AM    #2 Cirrhosis and Portal HTN  Very high risk for any surgery, cont support, avoid surgery if possible    #3 chronic colonic inertia and chronic colon dilitation.   Previo

## 2019-07-23 NOTE — ED PROVIDER NOTES
Patient Seen in: [facility] AND [facility] Emergency Department    History   Patient presents with:  Abdominal Pain    Stated Complaint: abd pain    HPI    13-year-old male with past medical history significant for cardiomyopathy, chronic pain, CHF, diabetes, mo signs reviewed. All other systems reviewed and negative except as noted above.     Physical Exam     ED Triage Vitals   BP 07/23/19 1153 135/79   Pulse 07/23/19 1002 79   Resp 07/23/19 1002 18   Temp 07/23/19 1002 97.8 °F (36.6 °C)   Temp src 07/23/19 Moderate (*)     All other components within normal limits   HEPATIC FUNCTION PANEL (7) - Abnormal; Notable for the following components:    Albumin 2.5 (*)     All other components within normal limits   CBC W/ DIFFERENTIAL - Abnormal; Notable for the fol Cirrhotic liver morphology with indeterminate lesion in segment IV. 4. Stigmata of portal hypertension, including marked splenomegaly. 5. Status post subtotal distal colectomy with primary rectosigmoid colocolonic anastomosis.   6. Scattered reticular opa

## 2019-07-24 ENCOUNTER — APPOINTMENT (OUTPATIENT)
Dept: INTERVENTIONAL RADIOLOGY/VASCULAR | Facility: HOSPITAL | Age: 59
DRG: 445 | End: 2019-07-24
Attending: RADIOLOGY
Payer: MEDICARE

## 2019-07-24 ENCOUNTER — APPOINTMENT (OUTPATIENT)
Dept: NUCLEAR MEDICINE | Facility: HOSPITAL | Age: 59
DRG: 445 | End: 2019-07-24
Attending: SURGERY
Payer: MEDICARE

## 2019-07-24 LAB
AFP-TM SERPL-MCNC: 8.2 NG/ML (ref ?–8)
ANION GAP SERPL CALC-SCNC: 7 MMOL/L (ref 0–18)
APTT PPP: 31.3 SECONDS (ref 23.2–35.3)
BASOPHILS # BLD AUTO: 0.03 X10(3) UL (ref 0–0.2)
BASOPHILS NFR BLD AUTO: 0.6 %
BUN BLD-MCNC: 5 MG/DL (ref 7–18)
BUN/CREAT SERPL: 8.6 (ref 10–20)
CALCIUM BLD-MCNC: 8.8 MG/DL (ref 8.5–10.1)
CHLORIDE SERPL-SCNC: 108 MMOL/L (ref 98–112)
CO2 SERPL-SCNC: 28 MMOL/L (ref 21–32)
CREAT BLD-MCNC: 0.58 MG/DL (ref 0.7–1.3)
DEPRECATED RDW RBC AUTO: 51.6 FL (ref 35.1–46.3)
EOSINOPHIL # BLD AUTO: 0.55 X10(3) UL (ref 0–0.7)
EOSINOPHIL NFR BLD AUTO: 10.2 %
ERYTHROCYTE [DISTWIDTH] IN BLOOD BY AUTOMATED COUNT: 14.6 % (ref 11–15)
GLUCOSE BLD-MCNC: 155 MG/DL (ref 70–99)
GLUCOSE BLDC GLUCOMTR-MCNC: 140 MG/DL (ref 70–99)
GLUCOSE BLDC GLUCOMTR-MCNC: 147 MG/DL (ref 70–99)
GLUCOSE BLDC GLUCOMTR-MCNC: 221 MG/DL (ref 70–99)
HCT VFR BLD AUTO: 33.6 % (ref 39–53)
HGB BLD-MCNC: 10.7 G/DL (ref 13–17.5)
IMM GRANULOCYTES # BLD AUTO: 0.02 X10(3) UL (ref 0–1)
IMM GRANULOCYTES NFR BLD: 0.4 %
INR BLD: 1.22 (ref 0.9–1.2)
LYMPHOCYTES # BLD AUTO: 1.16 X10(3) UL (ref 1–4)
LYMPHOCYTES NFR BLD AUTO: 21.4 %
MCH RBC QN AUTO: 30.2 PG (ref 26–34)
MCHC RBC AUTO-ENTMCNC: 31.8 G/DL (ref 31–37)
MCV RBC AUTO: 94.9 FL (ref 80–100)
MONOCYTES # BLD AUTO: 0.58 X10(3) UL (ref 0.1–1)
MONOCYTES NFR BLD AUTO: 10.7 %
NEUTROPHILS # BLD AUTO: 3.07 X10 (3) UL (ref 1.5–7.7)
NEUTROPHILS # BLD AUTO: 3.07 X10(3) UL (ref 1.5–7.7)
NEUTROPHILS NFR BLD AUTO: 56.7 %
OSMOLALITY SERPL CALC.SUM OF ELEC: 296 MOSM/KG (ref 275–295)
PLATELET # BLD AUTO: 208 10(3)UL (ref 150–450)
POTASSIUM SERPL-SCNC: 3.7 MMOL/L (ref 3.5–5.1)
PROTHROMBIN TIME: 15.3 SECONDS (ref 11.8–14.5)
RBC # BLD AUTO: 3.54 X10(6)UL (ref 4.3–5.7)
SODIUM SERPL-SCNC: 143 MMOL/L (ref 136–145)
WBC # BLD AUTO: 5.4 X10(3) UL (ref 4–11)

## 2019-07-24 PROCEDURE — 0F9430Z DRAINAGE OF GALLBLADDER WITH DRAINAGE DEVICE, PERCUTANEOUS APPROACH: ICD-10-PCS | Performed by: RADIOLOGY

## 2019-07-24 PROCEDURE — 99233 SBSQ HOSP IP/OBS HIGH 50: CPT | Performed by: HOSPITALIST

## 2019-07-24 PROCEDURE — 78226 HEPATOBILIARY SYSTEM IMAGING: CPT | Performed by: SURGERY

## 2019-07-24 RX ORDER — SODIUM CHLORIDE 9 MG/ML
INJECTION, SOLUTION INTRAVENOUS
Status: COMPLETED
Start: 2019-07-24 | End: 2019-07-24

## 2019-07-24 RX ORDER — MIDAZOLAM HYDROCHLORIDE 1 MG/ML
INJECTION INTRAMUSCULAR; INTRAVENOUS
Status: COMPLETED
Start: 2019-07-24 | End: 2019-07-24

## 2019-07-24 RX ORDER — MIDAZOLAM HYDROCHLORIDE 1 MG/ML
INJECTION INTRAMUSCULAR; INTRAVENOUS
Status: DISCONTINUED
Start: 2019-07-24 | End: 2019-07-24 | Stop reason: WASHOUT

## 2019-07-24 RX ORDER — LIDOCAINE HYDROCHLORIDE 20 MG/ML
INJECTION, SOLUTION EPIDURAL; INFILTRATION; INTRACAUDAL; PERINEURAL
Status: COMPLETED
Start: 2019-07-24 | End: 2019-07-24

## 2019-07-24 NOTE — CM/SW NOTE
07/26 224pm: The pt. Is scheduled to discharge to [facility] today 7/26 at 430p, via ambulance. The pt. And his wife are aware and agreeable.     Report [phone]    PCS form is on the chart for ambulance and medical records  ------------------

## 2019-07-24 NOTE — PROGRESS NOTES
[place] [facility] - [facility]    Progress Note    Rosita Hillman Patient Status:  Inpatient    1960 MRN [id number]   Location [facility] 4W/SW/SE Attending Georgette Prajapati,  [facility] Day # 1 PCP Martha Blanca            Subjective:     Pt feels m (HCF=[id number])    Result Date: 7/23/2019  CONCLUSION:  1. Markedly distended gallbladder with diffuse wall thickening, suggestion of intraluminal calculi, and reportedly positive sonographic Winters's sign.  The constellation of findings could reflect acute cho respiratory failure, cont observation, consider removing trach if pt improves and no surgery performed.               Donal Gottron MD [facility]  General Surgery  [facility]  7/24/2019  10:54 AM

## 2019-07-24 NOTE — PRE-SEDATION ASSESSMENT
[place] [facility]  IR Pre-Procedure Sedation Assessment    History of snoring or sleep or apnea?    No    History of previous problems with anesthesia or sedation  No    Physical Findings:  Neck: nl ROM  CV: RRR  PULM: normal respiratory rate/effor

## 2019-07-24 NOTE — PROCEDURES
[facility]  Procedure Note    Tinsley Begin Patient Status:  Inpatient    1960 MRN [id number]   Location [facility] Attending Alka Mejia, [facility] Day # 1 PCP Richard Kim     Procedure: Lety Spindle

## 2019-07-24 NOTE — PLAN OF CARE
Problem: Patient/Family Goals  Goal: Patient/Family Long Term Goal  Description  Patient's Long Term Goal:Being able to return home not to stay in the Rehab     Interventions:  - follow plan of care   - See additional Care Plan goals for specific interve

## 2019-07-24 NOTE — PLAN OF CARE
Problem: Patient/Family Goals  Goal: Patient/Family Long Term Goal  Description  Patient's Long Term Goal: Wants trach removed    Interventions:    - See additional Care Plan goals for specific interventions  Outcome: Progressing  Goal: Patient/Family Sh pain management  - Manage/alleviate anxiety  - Utilize distraction and/or relaxation techniques  - Monitor for opioid side effects  - Notify MD/LIP if interventions unsuccessful or patient reports new pain  - Anticipate increased pain with activity and pre physician/LIP order or complex needs related to functional status, cognitive ability or social support system  Outcome: Progressing   Pt from [facility]. Morphine for abdominal pain, Zosyn, CLD. Plan for HIDA scan tomorrow and NPO after midnight.  ACHS blo

## 2019-07-24 NOTE — IVS NOTE
Procedure hand off report given to Melissa Pizarro RN. Procedure site remains dry and intact with no signs and symptoms of bleeding and hematoma. Bedrest maintained. Dr Nehemias Leahy spoke with pt post procedure.

## 2019-07-24 NOTE — PROGRESS NOTES
[facility]    Progress Note    Roel Alexandra Patient Status:  Inpatient    1960 MRN [id number]   Location [facility] 4W/SW/SE Attending Dayanara Peres, [facility] Day # 1 PCP Moris Mendoza       Subjective:     Pt notes RUQ ab study demonstrating nonvisualization of the gallbladder consistent with cystic duct obstruction. The common bile duct is patent. These findings are suspicious for acute cholecystitis.      Dictated by (CST): Alex Arceo MD on 7/24/2019 at 10:39

## 2019-07-25 LAB
ALBUMIN SERPL-MCNC: 2.3 G/DL (ref 3.4–5)
ALBUMIN/GLOB SERPL: 0.5 {RATIO} (ref 1–2)
ALP LIVER SERPL-CCNC: 82 U/L (ref 45–117)
ALT SERPL-CCNC: 17 U/L (ref 16–61)
ANION GAP SERPL CALC-SCNC: 8 MMOL/L (ref 0–18)
AST SERPL-CCNC: 16 U/L (ref 15–37)
BASOPHILS # BLD AUTO: 0.03 X10(3) UL (ref 0–0.2)
BASOPHILS NFR BLD AUTO: 0.5 %
BILIRUB SERPL-MCNC: 0.4 MG/DL (ref 0.1–2)
BUN BLD-MCNC: 10 MG/DL (ref 7–18)
BUN/CREAT SERPL: 14.1 (ref 10–20)
CALCIUM BLD-MCNC: 8.5 MG/DL (ref 8.5–10.1)
CHLORIDE SERPL-SCNC: 107 MMOL/L (ref 98–112)
CO2 SERPL-SCNC: 27 MMOL/L (ref 21–32)
CREAT BLD-MCNC: 0.71 MG/DL (ref 0.7–1.3)
DEPRECATED RDW RBC AUTO: 50.9 FL (ref 35.1–46.3)
EOSINOPHIL # BLD AUTO: 0.65 X10(3) UL (ref 0–0.7)
EOSINOPHIL NFR BLD AUTO: 10 %
ERYTHROCYTE [DISTWIDTH] IN BLOOD BY AUTOMATED COUNT: 14.6 % (ref 11–15)
GLOBULIN PLAS-MCNC: 4.2 G/DL (ref 2.8–4.4)
GLUCOSE BLD-MCNC: 194 MG/DL (ref 70–99)
GLUCOSE BLDC GLUCOMTR-MCNC: 167 MG/DL (ref 70–99)
GLUCOSE BLDC GLUCOMTR-MCNC: 196 MG/DL (ref 70–99)
GLUCOSE BLDC GLUCOMTR-MCNC: 203 MG/DL (ref 70–99)
GLUCOSE BLDC GLUCOMTR-MCNC: 262 MG/DL (ref 70–99)
HCT VFR BLD AUTO: 33 % (ref 39–53)
HGB BLD-MCNC: 10.4 G/DL (ref 13–17.5)
IMM GRANULOCYTES # BLD AUTO: 0.01 X10(3) UL (ref 0–1)
IMM GRANULOCYTES NFR BLD: 0.2 %
LYMPHOCYTES # BLD AUTO: 0.82 X10(3) UL (ref 1–4)
LYMPHOCYTES NFR BLD AUTO: 12.6 %
M PROTEIN MFR SERPL ELPH: 6.5 G/DL (ref 6.4–8.2)
MCH RBC QN AUTO: 30.1 PG (ref 26–34)
MCHC RBC AUTO-ENTMCNC: 31.5 G/DL (ref 31–37)
MCV RBC AUTO: 95.7 FL (ref 80–100)
MONOCYTES # BLD AUTO: 0.71 X10(3) UL (ref 0.1–1)
MONOCYTES NFR BLD AUTO: 10.9 %
NEUTROPHILS # BLD AUTO: 4.31 X10 (3) UL (ref 1.5–7.7)
NEUTROPHILS # BLD AUTO: 4.31 X10(3) UL (ref 1.5–7.7)
NEUTROPHILS NFR BLD AUTO: 65.8 %
OSMOLALITY SERPL CALC.SUM OF ELEC: 298 MOSM/KG (ref 275–295)
PLATELET # BLD AUTO: 204 10(3)UL (ref 150–450)
POTASSIUM SERPL-SCNC: 3.4 MMOL/L (ref 3.5–5.1)
POTASSIUM SERPL-SCNC: 4 MMOL/L (ref 3.5–5.1)
RBC # BLD AUTO: 3.45 X10(6)UL (ref 4.3–5.7)
SODIUM SERPL-SCNC: 142 MMOL/L (ref 136–145)
WBC # BLD AUTO: 6.5 X10(3) UL (ref 4–11)

## 2019-07-25 PROCEDURE — 99233 SBSQ HOSP IP/OBS HIGH 50: CPT | Performed by: HOSPITALIST

## 2019-07-25 RX ORDER — POTASSIUM CHLORIDE 20 MEQ/1
40 TABLET, EXTENDED RELEASE ORAL EVERY 4 HOURS
Status: COMPLETED | OUTPATIENT
Start: 2019-07-25 | End: 2019-07-25

## 2019-07-25 RX ORDER — ALFUZOSIN HYDROCHLORIDE 10 MG/1
10 TABLET, EXTENDED RELEASE ORAL
Status: DISCONTINUED | OUTPATIENT
Start: 2019-07-25 | End: 2019-07-26

## 2019-07-25 RX ORDER — POLYETHYLENE GLYCOL 3350 17 G/17G
17 POWDER, FOR SOLUTION ORAL DAILY
Status: DISCONTINUED | OUTPATIENT
Start: 2019-07-25 | End: 2019-07-26

## 2019-07-25 NOTE — PLAN OF CARE
Problem: Patient/Family Goals  Goal: Patient/Family Long Term Goal  Description  Patient's Long Term Goal:Being able to return home not to stay in the Rehab     Interventions:  - follow plan of care   - See additional Care Plan goals for specific interve difficulty. Morphine IVP for abdominal pain with good result. Continue Zosyn and IV fluid . Patient tolerating food , denies N/V. Slept well after Ambien given,Patient complete care turn and repositioned  frequently . Burks in place with good output.  Ese Crawford

## 2019-07-25 NOTE — PLAN OF CARE
Problem: Patient/Family Goals  Goal: Patient/Family Long Term Goal  Description  Patient's Long Term Goal:Being able to return home not to stay in the Rehab     Interventions:  - follow plan of care   - See additional Care Plan goals for specific interve appropriate and evaluate response  - Consider cultural and social influences on pain and pain management  - Manage/alleviate anxiety  - Utilize distraction and/or relaxation techniques  - Monitor for opioid side effects  - Notify MD/LIP if interventions un for coordinating discharge planning if the patient needs post-hospital services based on physician/LIP order or complex needs related to functional status, cognitive ability or social support system  Outcome: Progressing    Patient denies N/V.  Continues to

## 2019-07-25 NOTE — PROGRESS NOTES
[facility]  Antimicrobial Stewardship Bug-Drug Mismatch Recommendation Note    Saida Wen is a 60year old male    Current Antimicrobial Medications  Anti-infectives (From admission, onward)      Start     Dose/Rate Route Frequency Ordered Stop    07

## 2019-07-25 NOTE — PLAN OF CARE
Problem: Patient/Family Goals  Goal: Patient/Family Long Term Goal  Description  Patient's Long Term Goal:Being able to return home not to stay in the Rehab     Interventions:  - follow plan of care   - See additional Care Plan goals for specific interve non-pharmacological measures as appropriate and evaluate response  - Consider cultural and social influences on pain and pain management  - Manage/alleviate anxiety  - Utilize distraction and/or relaxation techniques  - Monitor for opioid side effects  - N Refer to Case Management Department for coordinating discharge planning if the patient needs post-hospital services based on physician/LIP order or complex needs related to functional status, cognitive ability or social support system  Outcome: Progressing

## 2019-07-25 NOTE — PROGRESS NOTES
[facility]    Progress Note      Subjective:     Pt feels better  Still with pain at drain site  No BM past 2 days    HEENT: denies nasal congestion or sore throat  RESPIRATORY: denies shortness of breath, no pleuritic pain  CARDIOVASCULAR: (VPH=[id number])    Result Date: 7/23/2019  CONCLUSION:  1. Markedly distended gallbladder with diffuse wall thickening, suggestion of intraluminal calculi, and reportedly positive sonographic Winters's sign.  The constellation of findings could reflect acute cho

## 2019-07-25 NOTE — PROGRESS NOTES
[facility] Dosing Service  Antibiotic Dosing    Rosita Hillman is a 60year old male for whom pharmacy is dosing Meropenem for treatment of  UTI.     Allergies: is allergic to demerol [meperidine]; januvia [sitagliptin]; rocephin [ceftriaxone]; and thimerosa

## 2019-07-26 VITALS
HEIGHT: 66 IN | BODY MASS INDEX: 48.21 KG/M2 | OXYGEN SATURATION: 93 % | TEMPERATURE: 99 F | RESPIRATION RATE: 20 BRPM | SYSTOLIC BLOOD PRESSURE: 139 MMHG | DIASTOLIC BLOOD PRESSURE: 77 MMHG | HEART RATE: 83 BPM | WEIGHT: 300 LBS

## 2019-07-26 LAB
ALBUMIN SERPL-MCNC: 2.2 G/DL (ref 3.4–5)
ALP LIVER SERPL-CCNC: 75 U/L (ref 45–117)
ALT SERPL-CCNC: 16 U/L (ref 16–61)
ALT SERPL-CCNC: 17 U/L (ref 16–61)
ANION GAP SERPL CALC-SCNC: 5 MMOL/L (ref 0–18)
AST SERPL-CCNC: 19 U/L (ref 15–37)
BASOPHILS # BLD AUTO: 0.03 X10(3) UL (ref 0–0.2)
BASOPHILS NFR BLD AUTO: 0.5 %
BILIRUB DIRECT SERPL-MCNC: 0.2 MG/DL (ref 0–0.2)
BILIRUB SERPL-MCNC: 0.3 MG/DL (ref 0.1–2)
BILIRUB SERPL-MCNC: 0.4 MG/DL (ref 0.1–2)
BUN BLD-MCNC: 8 MG/DL (ref 7–18)
BUN/CREAT SERPL: 12.3 (ref 10–20)
CALCIUM BLD-MCNC: 8.6 MG/DL (ref 8.5–10.1)
CHLORIDE SERPL-SCNC: 111 MMOL/L (ref 98–112)
CO2 SERPL-SCNC: 27 MMOL/L (ref 21–32)
CREAT BLD-MCNC: 0.65 MG/DL (ref 0.7–1.3)
DEPRECATED RDW RBC AUTO: 51.2 FL (ref 35.1–46.3)
EOSINOPHIL # BLD AUTO: 0.71 X10(3) UL (ref 0–0.7)
EOSINOPHIL NFR BLD AUTO: 13 %
ERYTHROCYTE [DISTWIDTH] IN BLOOD BY AUTOMATED COUNT: 14.6 % (ref 11–15)
GLUCOSE BLD-MCNC: 209 MG/DL (ref 70–99)
GLUCOSE BLDC GLUCOMTR-MCNC: 186 MG/DL (ref 70–99)
GLUCOSE BLDC GLUCOMTR-MCNC: 231 MG/DL (ref 70–99)
HAV IGM SER QL: 1.8 MG/DL (ref 1.6–2.6)
HCT VFR BLD AUTO: 33.5 % (ref 39–53)
HGB BLD-MCNC: 10.8 G/DL (ref 13–17.5)
IMM GRANULOCYTES # BLD AUTO: 0.02 X10(3) UL (ref 0–1)
IMM GRANULOCYTES NFR BLD: 0.4 %
LYMPHOCYTES # BLD AUTO: 0.83 X10(3) UL (ref 1–4)
LYMPHOCYTES NFR BLD AUTO: 15.2 %
M PROTEIN MFR SERPL ELPH: 6.7 G/DL (ref 6.4–8.2)
MCH RBC QN AUTO: 31 PG (ref 26–34)
MCHC RBC AUTO-ENTMCNC: 32.2 G/DL (ref 31–37)
MCV RBC AUTO: 96.3 FL (ref 80–100)
MONOCYTES # BLD AUTO: 0.58 X10(3) UL (ref 0.1–1)
MONOCYTES NFR BLD AUTO: 10.6 %
NEUTROPHILS # BLD AUTO: 3.29 X10 (3) UL (ref 1.5–7.7)
NEUTROPHILS # BLD AUTO: 3.29 X10(3) UL (ref 1.5–7.7)
NEUTROPHILS NFR BLD AUTO: 60.3 %
OSMOLALITY SERPL CALC.SUM OF ELEC: 300 MOSM/KG (ref 275–295)
PHOSPHATE SERPL-MCNC: 2.9 MG/DL (ref 2.5–4.9)
PLATELET # BLD AUTO: 217 10(3)UL (ref 150–450)
POTASSIUM SERPL-SCNC: 3.8 MMOL/L (ref 3.5–5.1)
RBC # BLD AUTO: 3.48 X10(6)UL (ref 4.3–5.7)
SODIUM SERPL-SCNC: 143 MMOL/L (ref 136–145)
WBC # BLD AUTO: 5.5 X10(3) UL (ref 4–11)

## 2019-07-26 PROCEDURE — 99222 1ST HOSP IP/OBS MODERATE 55: CPT | Performed by: INTERNAL MEDICINE

## 2019-07-26 PROCEDURE — 99239 HOSP IP/OBS DSCHRG MGMT >30: CPT | Performed by: HOSPITALIST

## 2019-07-26 RX ORDER — METRONIDAZOLE 500 MG/1
500 TABLET ORAL 2 TIMES DAILY
Qty: 10 TABLET | Refills: 0 | Status: SHIPPED | OUTPATIENT
Start: 2019-07-26 | End: 2019-07-31

## 2019-07-26 RX ORDER — LEVOFLOXACIN 500 MG/1
500 TABLET, FILM COATED ORAL DAILY
Qty: 5 TABLET | Refills: 0 | Status: SHIPPED | OUTPATIENT
Start: 2019-07-26 | End: 2019-07-31

## 2019-07-26 RX ORDER — ZOLPIDEM TARTRATE 5 MG/1
5 TABLET ORAL NIGHTLY PRN
Qty: 10 TABLET | Refills: 0 | Status: SHIPPED | OUTPATIENT
Start: 2019-07-26

## 2019-07-26 RX ORDER — CARVEDILOL 3.12 MG/1
3.12 TABLET ORAL 2 TIMES DAILY WITH MEALS
Qty: 30 TABLET | Refills: 0 | Status: ON HOLD | OUTPATIENT
Start: 2019-07-26 | End: 2019-11-17

## 2019-07-26 RX ORDER — ALFUZOSIN HYDROCHLORIDE 10 MG/1
10 TABLET, EXTENDED RELEASE ORAL
Qty: 30 TABLET | Refills: 0 | Status: SHIPPED | OUTPATIENT
Start: 2019-07-27

## 2019-07-26 RX ORDER — METRONIDAZOLE 500 MG/1
500 TABLET ORAL 2 TIMES DAILY
Qty: 10 TABLET | Refills: 0 | Status: SHIPPED | OUTPATIENT
Start: 2019-07-26 | End: 2019-07-26

## 2019-07-26 RX ORDER — LEVOFLOXACIN 500 MG/1
500 TABLET, FILM COATED ORAL DAILY
Qty: 5 TABLET | Refills: 0 | Status: SHIPPED | OUTPATIENT
Start: 2019-07-26 | End: 2019-07-26

## 2019-07-26 NOTE — CONSULTS
[facility]    Report of Consultation    Malinda Flores Patient Status:  Inpatient    1960 MRN [id number]   [facility] 4W/SW/SE Attending No att. providers found   Hosp Day # 3 PCP Saurabh Dowd     Date of Admission: Date   • COLONOSCOPY     • COLONOSCOPY N/A 6/14/2019    Performed by Kevin Flores MD at [address] [place] [address] ENDOSCOPY   • COLONOSCOPY N/A 5/28/2019    Performed by Marisela Vogt MD at [address] [place] [address] ENDOSCOPY   • COLONOSCOPY N/A 5/16/2019    Performed by Donovan Potts MD at artificial tears 83-15 % ophthalmic ointment  Both Eyes TID PRN   bisacodyl (DULCOLAX) rectal suppository 10 mg 10 mg Rectal Daily PRN       No medications prior to admission.     Allergies    Demerol [Meperidine]      Januvia [Sitaglipti*        Comment: MOSF   Required prolonged intubation and vent support then tracheostomy / ICU stay > 40 days     Hospitalist asking regarding possible future decannulation   Pt now with size 7 portx trach and Passey Moise valve   Pulmonary status stable and better / on ro

## 2019-07-26 NOTE — PROGRESS NOTES
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 Pt weaned off of opiates completely.   - on morphine prn now, wean asap after drain  - pt sti

## 2019-07-26 NOTE — DISCHARGE SUMMARY
[facility]    Discharge Summary    Gustavo Parada Patient Status:  Inpatient    1960 MRN [id number]   Location [facility] 4W/SW/SE Attending No att. providers found   Hosp Day # 3 PCP Irma Hodgson     Date of Admission:  acute cholecystitis.     HISTORY OF PRESENT ILLNESS:  The patient is a 62-year-old  male, morbidly obese, sedentary lifestyle, almost bedridden status who came into the emergency department for evaluation of abdominal pain.   He was seen in the hos START taking these medications      Instructions Prescription details   Alfuzosin HCl ER 10 MG Tb24  Commonly known as:  Karry Mohs  Start taking on:  7/27/2019      Take 1 tablet (10 mg total) by mouth daily with breakfast.   Quantity:  30 tablet  Ref mouth every 6 (six) hours as needed for Itching. Refills:  0     Heparin Sodium (Porcine) 5000 UNIT/ML Soln      Inject 1 mL (5,000 Units total) into the skin every 12 (twelve) hours.    Refills:  0     Insulin Aspart Pen 100 UNIT/ML Sopn  Commonly known INSULIN THREE TIMES DAILY   Quantity:  300 each  Refills:  0     Insulin Pen Needle 32G X 4 MM Misc      USE TO INJECT INSULIN THREE TIMES DAILY.    Quantity:  300 each  Refills:  0     Insulin Pen Needle 32G X 4 MM Misc      TEST THREE TIMES DAILY AS [facility]

## 2019-07-26 NOTE — PLAN OF CARE
Problem: Patient/Family Goals  Goal: Patient/Family Long Term Goal  Description  Patient's Long Term Goal:Being able to return home not to stay in the Rehab     Interventions:  - follow plan of care   - See additional Care Plan goals for specific interve appropriate and evaluate response  - Consider cultural and social influences on pain and pain management  - Manage/alleviate anxiety  - Utilize distraction and/or relaxation techniques  - Monitor for opioid side effects  - Notify MD/LIP if interventions un for coordinating discharge planning if the patient needs post-hospital services based on physician/LIP order or complex needs related to functional status, cognitive ability or social support system  Outcome: Paul Miranda is aware of his plan of ca

## 2019-07-26 NOTE — PROGRESS NOTES
[facility]    Progress Note      Subjective:     Pt feels better  Improved pain at drain site  2 BM yesterday    HEENT: denies nasal congestion or sore throat  RESPIRATORY: denies shortness of breath, no pleuritic pain  CARDIOVASCULAR: ad 143  --    K 4.0 3.7 3.4* 4.0  --  3.8    108 107  --  111  --    CO2 28.0 28.0 27.0  --  27.0  --    ALKPHO 93  --  82  --  75  --    AST 24  --  16  --   --  19   ALT 21  --  17  --  17 16   BILT 0.5  --  0.4  --  0.4 0.3   TP 7.4  --  6.5  --  6.7

## 2019-08-22 RX ORDER — GUAIFENESIN AND DEXTROMETHORPHAN HYDROBROMIDE 100; 10 MG/5ML; MG/5ML
5 SOLUTION ORAL EVERY 4 HOURS PRN
Status: ON HOLD | COMMUNITY
End: 2019-10-24

## 2019-08-22 RX ORDER — CALCIUM CARBONATE 200(500)MG
2 TABLET,CHEWABLE ORAL EVERY 4 HOURS PRN
Status: ON HOLD | COMMUNITY
End: 2019-10-24

## 2019-08-23 ENCOUNTER — HOSPITAL ENCOUNTER (OUTPATIENT)
Dept: INTERVENTIONAL RADIOLOGY/VASCULAR | Facility: HOSPITAL | Age: 59
Discharge: OTHER INSTITUTION | End: 2019-08-23
Attending: RADIOLOGY | Admitting: RADIOLOGY
Payer: MEDICARE

## 2019-08-23 VITALS
OXYGEN SATURATION: 95 % | SYSTOLIC BLOOD PRESSURE: 136 MMHG | HEART RATE: 74 BPM | WEIGHT: 281 LBS | RESPIRATION RATE: 18 BRPM | BODY MASS INDEX: 45 KG/M2 | DIASTOLIC BLOOD PRESSURE: 78 MMHG

## 2019-08-23 DIAGNOSIS — K81.9 CHOLECYSTITIS: ICD-10-CM

## 2019-08-23 LAB — GLUCOSE BLDC GLUCOMTR-MCNC: 170 MG/DL (ref 70–99)

## 2019-08-23 PROCEDURE — 47531 INJECTION FOR CHOLANGIOGRAM: CPT

## 2019-08-23 PROCEDURE — 3E0J7KZ INTRODUCTION OF OTHER DIAGNOSTIC SUBSTANCE INTO BILIARY AND PANCREATIC TRACT, VIA NATURAL OR ARTIFICIAL OPENING: ICD-10-PCS | Performed by: RADIOLOGY

## 2019-08-23 PROCEDURE — 36415 COLL VENOUS BLD VENIPUNCTURE: CPT

## 2019-08-23 PROCEDURE — 82962 GLUCOSE BLOOD TEST: CPT

## 2019-09-06 ENCOUNTER — HOSPITAL ENCOUNTER (OUTPATIENT)
Dept: INTERVENTIONAL RADIOLOGY/VASCULAR | Facility: HOSPITAL | Age: 59
Discharge: NURSING, REHAB OR LONG-TERM CARE | End: 2019-09-06
Attending: RADIOLOGY | Admitting: RADIOLOGY
Payer: MEDICARE

## 2019-09-06 VITALS
DIASTOLIC BLOOD PRESSURE: 80 MMHG | OXYGEN SATURATION: 95 % | HEART RATE: 86 BPM | RESPIRATION RATE: 16 BRPM | WEIGHT: 281.06 LBS | SYSTOLIC BLOOD PRESSURE: 125 MMHG | BODY MASS INDEX: 45 KG/M2

## 2019-09-06 DIAGNOSIS — K81.9 CHOLECYSTITIS: ICD-10-CM

## 2019-09-06 LAB — GLUCOSE BLDC GLUCOMTR-MCNC: 216 MG/DL (ref 70–99)

## 2019-09-06 PROCEDURE — 82962 GLUCOSE BLOOD TEST: CPT

## 2019-09-06 PROCEDURE — 47531 INJECTION FOR CHOLANGIOGRAM: CPT

## 2019-09-06 PROCEDURE — BF121ZZ FLUOROSCOPY OF GALLBLADDER USING LOW OSMOLAR CONTRAST: ICD-10-PCS | Performed by: RADIOLOGY

## 2019-09-06 RX ORDER — SODIUM CHLORIDE 9 MG/ML
INJECTION, SOLUTION INTRAVENOUS
Status: DISCONTINUED
Start: 2019-09-06 | End: 2019-09-06

## 2019-09-06 NOTE — IVS NOTE
Patient awake and alert, Report given to ananda at [facility], f/u appointment made given to patient an d family

## 2019-09-16 ENCOUNTER — OFFICE VISIT (OUTPATIENT)
Dept: PAIN CLINIC | Facility: HOSPITAL | Age: 59
End: 2019-09-16
Attending: ANESTHESIOLOGY
Payer: MEDICARE

## 2019-09-16 DIAGNOSIS — M79.7 FIBROMYALGIA: Primary | ICD-10-CM

## 2019-09-16 DIAGNOSIS — M79.89 PAIN AND SWELLING OF LEFT LOWER LEG: ICD-10-CM

## 2019-09-16 DIAGNOSIS — M79.662 PAIN AND SWELLING OF LEFT LOWER LEG: ICD-10-CM

## 2019-09-16 PROCEDURE — 99211 OFF/OP EST MAY X REQ PHY/QHP: CPT

## 2019-09-16 RX ORDER — PREGABALIN 50 MG/1
50 CAPSULE ORAL 2 TIMES DAILY
Qty: 60 CAPSULE | Refills: 0 | Status: ON HOLD | OUTPATIENT
Start: 2019-09-16 | End: 2019-11-04

## 2019-09-16 NOTE — PROGRESS NOTES
Patient came into the pain clinic today with wife. Patient w/c bound, was recently in the hospital for 3 months is currently at [facility] and is being d/c'd to go home on Wednesday.  States he has pain all over and it is 9/10 pain, with generalized num

## 2019-09-16 NOTE — CHRONIC PAIN
MEDICATION EVALUATION  HISTORY OF PRESENT ILLNESS:  Luly Deluca is a 60year old old male with history of chronic pain  fibromyalgia, joint pain who presents for medication evaluation.  He continues to report chronic joint pain he is currently in rehab and breakfast. Disp: 30 tablet Rfl: 0   Zolpidem Tartrate 5 MG Oral Tab Take 1 tablet (5 mg total) by mouth nightly as needed for Sleep. Disp: 10 tablet Rfl: 0   insulin detemir 100 UNIT/ML Subcutaneous Solution Pen-injector Inject 20 Units into the skin Noon. 32G X 4 MM Does not apply Misc TEST THREE TIMES DAILY AS DIRECTED Disp: 300 each Rfl: 0   TRUEPLUS PEN NEEDLES 32G X 4 MM Does not apply Misc USE TO INJECT INSULIN THREE TIMES DAILY Disp: 300 each Rfl: 0   Insulin Pen Needle (BD PEN NEEDLE ROMMEL U/F) 32G X Acute blood loss anemia     Rectal bleeding     Severe major depression, single episode, without psychotic features ([facility] [address]     Adjustment disorder with anxiety     Acute cholecystitis    Past Medical History:   Diagnosis Date   • [facility]    • C Alcohol use: No      Drug use: No      Sexual activity: Not on file    Lifestyle      Physical activity:        Days per week: Not on file        Minutes per session: Not on file      Stress: Not on file    Relationships      Social connections:         Ta been beneficial   Will plan to continue medications.   Discussed with patient the risks of opioid medications including but not limited to dependence, addiction, respiratory depression, and death.  Patient advised not to consume ETOH or operating heavy mach

## 2019-09-19 ENCOUNTER — TELEPHONE (OUTPATIENT)
Dept: ENDOCRINOLOGY CLINIC | Facility: CLINIC | Age: 59
End: 2019-09-19

## 2019-09-19 NOTE — TELEPHONE ENCOUNTER
Please call patient  Patient was in the hospital for many weeks. - Please find out if he is back on his old regimen?   What are his BG?   - Also, please book FU I n the next one month based on patient's convenience  Thanks

## 2019-09-19 NOTE — TELEPHONE ENCOUNTER
Pharm calling to confirm that fax was received yesterday for Continuous Glucose supplies. Rep. Requesting for RN to only call if fax was not received. Thank you.

## 2019-09-19 NOTE — TELEPHONE ENCOUNTER
Discharged Yesterday. Very tired.  this am  , 288- but ate pizza    Pt taking U500 80, 85, 30.  Pt has 2 pens left    Booked 10/29

## 2019-09-20 RX ORDER — INSULIN HUMAN 500 [IU]/ML
INJECTION, SOLUTION SUBCUTANEOUS
Qty: 36 ML | Refills: 0 | Status: ON HOLD | OUTPATIENT
Start: 2019-09-20 | End: 2019-10-24

## 2019-10-04 ENCOUNTER — HOSPITAL ENCOUNTER (OUTPATIENT)
Dept: INTERVENTIONAL RADIOLOGY/VASCULAR | Facility: HOSPITAL | Age: 59
Discharge: HOME | End: 2019-10-04
Attending: RADIOLOGY
Payer: MEDICARE

## 2019-10-04 ENCOUNTER — TELEPHONE (OUTPATIENT)
Dept: PULMONOLOGY | Facility: CLINIC | Age: 59
End: 2019-10-04

## 2019-10-04 DIAGNOSIS — R05.9 COUGH: Primary | ICD-10-CM

## 2019-10-04 NOTE — TELEPHONE ENCOUNTER
Spoke with pt/wife Clarisa (CORINA on file). Informed them of Dr. Joshua Amend message/order below. Advised wife if symptoms worsening for pt to be evaluated in the ED. Pt/wife verbalized understanding.

## 2019-10-04 NOTE — TELEPHONE ENCOUNTER
[facility] states pt  has a burning sensation on inspiration in the lungs. Se Flores also states pt recently had pneumonia.   Please call [phone] and the pt

## 2019-10-04 NOTE — TELEPHONE ENCOUNTER
Spoke with Merary GARCIA from [facility] regarding pt/message below. Erick Palacios states pt O2sat at 98%, has trach, resp 20, temp 98.0, mid-lobe congestion, coughing thick yellow-white sputum, dull mid-chest pain 1 out of 10.  Pt not using accessory muscles,

## 2019-10-07 NOTE — TELEPHONE ENCOUNTER
Spoke with Martina Fernandez RN [facility]). Informed her of Dr. Elyssa Santamaria message/order below. Martina Fernandez RN verbalized understanding, states she will be going to pt's house today.

## 2019-10-08 ENCOUNTER — APPOINTMENT (OUTPATIENT)
Dept: CV DIAGNOSTICS | Facility: HOSPITAL | Age: 59
DRG: 176 | End: 2019-10-08
Attending: EMERGENCY MEDICINE
Payer: MEDICARE

## 2019-10-08 ENCOUNTER — APPOINTMENT (OUTPATIENT)
Dept: ULTRASOUND IMAGING | Facility: HOSPITAL | Age: 59
DRG: 176 | End: 2019-10-08
Attending: INTERNAL MEDICINE
Payer: MEDICARE

## 2019-10-08 ENCOUNTER — APPOINTMENT (OUTPATIENT)
Dept: GENERAL RADIOLOGY | Facility: HOSPITAL | Age: 59
DRG: 176 | End: 2019-10-08
Attending: EMERGENCY MEDICINE
Payer: MEDICARE

## 2019-10-08 ENCOUNTER — APPOINTMENT (OUTPATIENT)
Dept: CT IMAGING | Facility: HOSPITAL | Age: 59
DRG: 176 | End: 2019-10-08
Attending: EMERGENCY MEDICINE
Payer: MEDICARE

## 2019-10-08 ENCOUNTER — HOSPITAL ENCOUNTER (INPATIENT)
Facility: HOSPITAL | Age: 59
LOS: 27 days | Discharge: HOME HEALTH | DRG: 176 | End: 2019-11-04
Attending: EMERGENCY MEDICINE | Admitting: INTERNAL MEDICINE
Payer: MEDICARE

## 2019-10-08 DIAGNOSIS — I48.91 ATRIAL FIBRILLATION WITH RAPID VENTRICULAR RESPONSE (HCC): ICD-10-CM

## 2019-10-08 DIAGNOSIS — R77.8 ELEVATED TROPONIN: ICD-10-CM

## 2019-10-08 DIAGNOSIS — K59.81 PSEUDO-OBSTRUCTION OF COLON: ICD-10-CM

## 2019-10-08 DIAGNOSIS — R04.2 HEMOPTYSIS: ICD-10-CM

## 2019-10-08 DIAGNOSIS — I26.99 OTHER ACUTE PULMONARY EMBOLISM, UNSPECIFIED WHETHER ACUTE COR PULMONALE PRESENT (HCC): ICD-10-CM

## 2019-10-08 DIAGNOSIS — I26.92 ACUTE SADDLE PULMONARY EMBOLISM, UNSPECIFIED WHETHER ACUTE COR PULMONALE PRESENT (HCC): Primary | ICD-10-CM

## 2019-10-08 PROBLEM — R79.89 ELEVATED TROPONIN: Status: ACTIVE | Noted: 2019-10-08

## 2019-10-08 PROCEDURE — 71260 CT THORAX DX C+: CPT | Performed by: EMERGENCY MEDICINE

## 2019-10-08 PROCEDURE — 71045 X-RAY EXAM CHEST 1 VIEW: CPT | Performed by: EMERGENCY MEDICINE

## 2019-10-08 PROCEDURE — 99222 1ST HOSP IP/OBS MODERATE 55: CPT | Performed by: INTERNAL MEDICINE

## 2019-10-08 PROCEDURE — 99291 CRITICAL CARE FIRST HOUR: CPT | Performed by: INTERNAL MEDICINE

## 2019-10-08 PROCEDURE — 93306 TTE W/DOPPLER COMPLETE: CPT | Performed by: EMERGENCY MEDICINE

## 2019-10-08 PROCEDURE — 93970 EXTREMITY STUDY: CPT | Performed by: INTERNAL MEDICINE

## 2019-10-08 RX ORDER — BISACODYL 10 MG
10 SUPPOSITORY, RECTAL RECTAL
Status: DISCONTINUED | OUTPATIENT
Start: 2019-10-08 | End: 2019-11-05

## 2019-10-08 RX ORDER — HEPARIN SODIUM AND DEXTROSE 10000; 5 [USP'U]/100ML; G/100ML
18 INJECTION INTRAVENOUS ONCE
Status: COMPLETED | OUTPATIENT
Start: 2019-10-08 | End: 2019-10-08

## 2019-10-08 RX ORDER — POLYETHYLENE GLYCOL 3350 17 G/17G
17 POWDER, FOR SOLUTION ORAL DAILY
Status: DISCONTINUED | OUTPATIENT
Start: 2019-10-08 | End: 2019-11-05

## 2019-10-08 RX ORDER — LEVETIRACETAM 500 MG/1
1000 TABLET ORAL 2 TIMES DAILY
Status: DISCONTINUED | OUTPATIENT
Start: 2019-10-08 | End: 2019-11-05

## 2019-10-08 RX ORDER — MIRTAZAPINE 15 MG/1
15 TABLET, FILM COATED ORAL NIGHTLY
Status: DISCONTINUED | OUTPATIENT
Start: 2019-10-08 | End: 2019-11-05

## 2019-10-08 RX ORDER — MORPHINE SULFATE 2 MG/ML
2 INJECTION, SOLUTION INTRAMUSCULAR; INTRAVENOUS EVERY 4 HOURS PRN
Status: DISCONTINUED | OUTPATIENT
Start: 2019-10-08 | End: 2019-10-12

## 2019-10-08 RX ORDER — DEXTROSE MONOHYDRATE 25 G/50ML
50 INJECTION, SOLUTION INTRAVENOUS AS NEEDED
Status: DISCONTINUED | OUTPATIENT
Start: 2019-10-08 | End: 2019-11-05

## 2019-10-08 RX ORDER — CARVEDILOL 3.12 MG/1
3.12 TABLET ORAL 2 TIMES DAILY WITH MEALS
Status: DISCONTINUED | OUTPATIENT
Start: 2019-10-08 | End: 2019-10-28

## 2019-10-08 RX ORDER — ALFUZOSIN HYDROCHLORIDE 10 MG/1
10 TABLET, EXTENDED RELEASE ORAL
Status: DISCONTINUED | OUTPATIENT
Start: 2019-10-08 | End: 2019-11-05

## 2019-10-08 RX ORDER — HEPARIN SODIUM AND DEXTROSE 10000; 5 [USP'U]/100ML; G/100ML
INJECTION INTRAVENOUS CONTINUOUS
Status: DISCONTINUED | OUTPATIENT
Start: 2019-10-08 | End: 2019-10-14

## 2019-10-08 RX ORDER — HEPARIN SODIUM 1000 [USP'U]/ML
80 INJECTION, SOLUTION INTRAVENOUS; SUBCUTANEOUS ONCE
Status: COMPLETED | OUTPATIENT
Start: 2019-10-08 | End: 2019-10-08

## 2019-10-08 RX ORDER — HYDROCODONE BITARTRATE AND ACETAMINOPHEN 5; 325 MG/1; MG/1
1 TABLET ORAL EVERY 6 HOURS PRN
Status: DISCONTINUED | OUTPATIENT
Start: 2019-10-08 | End: 2019-10-12

## 2019-10-08 RX ORDER — ACETAMINOPHEN 325 MG/1
650 TABLET ORAL EVERY 6 HOURS PRN
Status: DISCONTINUED | OUTPATIENT
Start: 2019-10-08 | End: 2019-11-05

## 2019-10-08 RX ORDER — ZOLPIDEM TARTRATE 5 MG/1
5 TABLET ORAL NIGHTLY PRN
Status: DISCONTINUED | OUTPATIENT
Start: 2019-10-08 | End: 2019-11-05

## 2019-10-08 RX ORDER — PREGABALIN 50 MG/1
50 CAPSULE ORAL 2 TIMES DAILY
Status: DISCONTINUED | OUTPATIENT
Start: 2019-10-08 | End: 2019-11-05

## 2019-10-08 RX ORDER — IPRATROPIUM BROMIDE AND ALBUTEROL SULFATE 2.5; .5 MG/3ML; MG/3ML
3 SOLUTION RESPIRATORY (INHALATION) EVERY 6 HOURS PRN
Status: DISCONTINUED | OUTPATIENT
Start: 2019-10-08 | End: 2019-11-05

## 2019-10-08 RX ORDER — PANTOPRAZOLE SODIUM 40 MG/1
40 TABLET, DELAYED RELEASE ORAL
Status: DISCONTINUED | OUTPATIENT
Start: 2019-10-08 | End: 2019-10-09

## 2019-10-08 RX ORDER — MINERAL OIL AND PETROLATUM 150; 830 MG/G; MG/G
OINTMENT OPHTHALMIC 3 TIMES DAILY PRN
Status: DISCONTINUED | OUTPATIENT
Start: 2019-10-08 | End: 2019-11-05

## 2019-10-08 NOTE — ED NOTES
Assumed care of Italo Quispe from [employer]. He denies new complaints. C/o upper chest soreness. No obvious s/s of respiratory distress. Awaiting CT scan results. Informed of  2hr cardiac workup. Will continue to monitor.

## 2019-10-08 NOTE — ED INITIAL ASSESSMENT (HPI)
Arrived via ems from home for coughing up blood x3 days. Collected sputum sample that needs to be sent down to lab. Recently in ICU x2 months and was dc 2 weeks ago. Has a gallbladder drain in place.

## 2019-10-08 NOTE — CONSULTS
[facility]    Consult Note    Date:  10/8/2019  Date of Admission:  10/8/2019      Chief Complaint:   Jesusita De La Torre is a(n) 60year old male with dyspnea and hemoptysis. HPI:   The patient has an extraordinary past medical history.   He amb COLONOSCOPY     • COLONOSCOPY N/A 6/14/2019    Performed by Sal Ascencio MD at [address] [place] [address] ENDOSCOPY   • COLONOSCOPY N/A 5/28/2019    Performed by Lisandra Roger MD at [address] [place] [address] ENDOSCOPY   • COLONOSCOPY N/A 5/16/2019    Performed by Jessica Deal MD at [address] [place] [address] diminished tone and reflex.   Skin without gross abnormality    Results:     Lab Results   Component Value Date    WBC 10.5 10/08/2019    HGB 15.3 10/08/2019    HCT 45.8 10/08/2019    .0 10/08/2019    CREATSERUM 1.06 10/08/2019    BUN 13 10/08/2019 opinion. Recommendations:  1. Intravenous heparin  2. Lower extremity venous ultrasound  3.  Echocardiogram to evaluate right heart function  4.   Ongoing discussion regarding possible role for catheter directed therapies; however, the patient has alre

## 2019-10-08 NOTE — CONSULTS
[facility]    Cardiology Consultation  [facility]    Dia Parisi Patient Status:  Inpatient    1960 MRN [id number]   [facility] 2W/SW Attending [address], [address] Day # 0 PCP Momo Urbina Morbid obesity ([facility] [address]    • Obesity    • Rectal fissure     colorectal fistula - surgery   • Renal disorder    • Seizure disorder [facility])        Past Surgical History  Past Surgical History:   Procedure Laterality Date   • COLONOSCOPY     • COLONOSCOPY N/A 6/1 50 mg 50 mg Oral BID   Zolpidem Tartrate (AMBIEN) tab 5 mg 5 mg Oral Nightly PRN       Medications Prior to Admission:  HUMULIN R U-500 KWIKPEN 500 UNIT/ML Subcutaneous Solution Pen-injector INJECT 80 UNITS WITH BREAKFAST, 85 UNITS WITH LUNCH AND 30 UNITS care.   bisacodyl 10 MG Rectal Suppos Place 1 suppository (10 mg total) rectally daily as needed. Heparin Sodium, Porcine, 5000 UNIT/ML Injection Solution Inject 1 mL (5,000 Units total) into the skin every 12 (twelve) hours.    Insulin Aspart Pen 100 UNI All other systems reviewed and are negative. 10 point review of systems completed and negative except as noted.     Physical Exam:     Patient Vitals for the past 24 hrs:   BP Temp Temp src Pulse Resp SpO2 Height Weight   10/08/19 0915 94/55 — — 75 17 oriented, normal affect. No focal defects  Skin: Warm and dry.      Results:     Laboratory Data:  Lab Results   Component Value Date    WBC 10.5 10/08/2019    HGB 15.3 10/08/2019    HCT 45.8 10/08/2019    .0 10/08/2019    CREATSERUM 1.06 10/08/2019 greater than left lungs. Suspect evolving pulmonary infarction right lower lobe. Other airspace disease may represent a combination of aspiration and atelectasis. Follow-up chest CT in 3 months recommended.   Findings suspicious for tracheobronchomalacia be on long-term anticoagulation. We will reassess whether he needs to be on antiarrhythmics depending on recurrence etc.    Discussed with him and wife extensively.   Discussed with Dr. Binu Gallardo      Thank you for allowing me to participate in the care of you

## 2019-10-08 NOTE — ED NOTES
Pt alert and interactive. C/o hemoptysis x3-5 days. Sputum sample collected at home, dark red in color, pt states he coughed up blood from his mouth. Trach in place with cap on. Speaking in full clear sentences. +dizziness with movement.  Also c/o sob and c

## 2019-10-08 NOTE — ED PROVIDER NOTES
Patient was signed out by previous shift to follow-up diagnostic work-up. Patient had initially presented with hemoptysis and increased cough. Patient's initial work-up showed an elevated troponin of 0.089 without acute EKG change.     Second EKG shows he Approved by (CST): Lyn Worthy MD on 10/08/2019 at 7:54            Patient has multiple bilateral PEs along with saddle embolus on CT scan which is likely the etiology of patient's hemoptysis as well as the elevated troponin.   Discussed with Dr. Natalie Mujica

## 2019-10-08 NOTE — ED PROVIDER NOTES
Patient Seen in: [facility] AND [facility] Emergency Department      History   Patient presents with:  Hemoptysis (respiratory)    Stated Complaint: coughing up blood    HPI    43-year-old male with chronic medical issues and prior prolonged hospitalization in t Temp src Oral   SpO2 94 %   O2 Device None (Room air)       Current:BP (!) 147/119 (BP Location: Right arm)   Pulse 71   Temp 98.8 °F (37.1 °C) (Oral)   Resp 18   Ht 167.6 cm (5' 6\")   Wt 121.2 kg   SpO2 94%   BMI 43.11 kg/m²         Physical Exam    Co components:    HgbA1C 7.5 (*)     Estimated Average Glucose 169 (*)     All other components within normal limits   PTT, ACTIVATED - Abnormal; Notable for the following components:    .0 (*)     All other components within normal limits   PLATELET C POCT GLUCOSE - Abnormal; Notable for the following components:    POC Glucose  191 (*)     All other components within normal limits   POCT GLUCOSE - Abnormal; Notable for the following components:    POC Glucose  148 (*)     All other components within ------                     CBC W/ DIFFERENTIAL[[id number]]          Abnormal            Final result                 Please view results for these tests on the individual orders. CBC WITH DIFFERENTIAL WITH PLATELET    Narrative:      The following orders w CT.  Admission disposition: 10/8/2019  8:24 AM                   Disposition and Plan     Clinical Impression:  Acute saddle pulmonary embolism, unspecified whether acute cor pulmonale present (HCC)  (primary encounter diagnosis)  Hemoptysis  Elevated trop

## 2019-10-08 NOTE — CM/SW NOTE
10/8: DA received MDO for POLST. DA placed POLST form on chart. MD to discuss w/ pt/family, fill out middle section of POLST form, and sign prior to SW/CTL/RN witnessing POLST form. Last admission (July 2019), pt and spouse declined completion with DA.

## 2019-10-09 ENCOUNTER — APPOINTMENT (OUTPATIENT)
Dept: GENERAL RADIOLOGY | Facility: HOSPITAL | Age: 59
DRG: 176 | End: 2019-10-09
Attending: INTERNAL MEDICINE
Payer: MEDICARE

## 2019-10-09 PROCEDURE — 74018 RADEX ABDOMEN 1 VIEW: CPT | Performed by: INTERNAL MEDICINE

## 2019-10-09 PROCEDURE — 99233 SBSQ HOSP IP/OBS HIGH 50: CPT | Performed by: INTERNAL MEDICINE

## 2019-10-09 PROCEDURE — 99232 SBSQ HOSP IP/OBS MODERATE 35: CPT | Performed by: INTERNAL MEDICINE

## 2019-10-09 RX ORDER — DICYCLOMINE HYDROCHLORIDE 10 MG/1
10 CAPSULE ORAL 3 TIMES DAILY PRN
Status: DISCONTINUED | OUTPATIENT
Start: 2019-10-09 | End: 2019-11-05

## 2019-10-09 RX ORDER — SODIUM CHLORIDE 9 MG/ML
INJECTION, SOLUTION INTRAVENOUS CONTINUOUS
Status: DISCONTINUED | OUTPATIENT
Start: 2019-10-10 | End: 2019-10-17

## 2019-10-09 NOTE — PROGRESS NOTES
[facility]    Progress Note      Assessment and Plan:   1. Venous thromboembolism– the patient is tolerating heparin fine. There is no evidence of GI bleeding. He does have extensive thrombus in the left lower extremity.   There was dilat examination is unremarkable with pupils equal round and reactive to light and accommodation. Neck without adenopathy, thyromegaly, JVD nor bruit. Lungs clear to auscultation and percussion. Cardiac regular rate and rhythm no murmur.    Abdomen nontende

## 2019-10-09 NOTE — PLAN OF CARE
Double RN skin check done prior to transfer off Unit. Skin check performed by this RN and Kei Cullen. Wounds are as follows: Right buttock stage 2 open area from pressure and friction and shear, wound is clean.  There is a reddened area on left buttock cl

## 2019-10-09 NOTE — H&P
[facility]    PATIENT'S NAME: Jadiel Nurse   ATTENDING PHYSICIAN: Shanta Osborne MD   PATIENT ACCOUNT#:   [id number]    LOCATION:  [address] [place] [address] RECORD #:   [id number]       YOB: 1960  ADMISSION DATE:       10/08/2019 LABORATORY DATA:  WBC 10.4, hemoglobin 15.3, platelets 062. BUN 13, creatinine 1.0, glucose 138. ASSESSMENT AND PLAN:    1. Acute bilateral pulmonary embolisms. CT scan of the chest showed extensive PE.   Had echo done which did not show any righ

## 2019-10-09 NOTE — PLAN OF CARE
VSS. A/O x 4. Calm and Pleasant. Shan Pritchard in place with PMV, on RA with stable SpO2. On heparin gtt for Pe. US of B/L LE's done, Left leg DVT, precaution taken. Trach care done.  Right shoulder pain radiating to back (not new) controlled with PRN morphine & No

## 2019-10-09 NOTE — PLAN OF CARE
Pt in bed, Biliary drain to right abdomen intact, pt able to turn in bed, and encouraged due to open areas. Mepilex to buttock intact, aloe vest applied to open and reddened areas. Pt trach patent, on room air.  IV heparin infusing well, pt has slight ches Monitor for signs/symptoms of CO2 retention  Outcome: Progressing     Problem: CARDIOVASCULAR - ADULT  Goal: Maintains optimal cardiac output and hemodynamic stability  Description  INTERVENTIONS:  - Monitor vital signs, rhythm, and trends  - Monitor for b Impaired Functional Mobility  Goal: Achieve highest/safest level of mobility/gait  Description  Interventions:  - Assess patient's functional ability and stability  - Promote increasing activity/tolerance for mobility and gait  - Educate and engage patient

## 2019-10-09 NOTE — PROGRESS NOTES
[place] [facility] - [facility]    Cardiology Progress Note  [facility]    Dieterich Numbers Patient Status:  Inpatient    1960 MRN [id number]   Location [facility] 3W/SW Attending [address], [address] Day # 1 PCP T meals   • levetiracetam  1,000 mg Oral BID   • mirtazapine  15 mg Oral Nightly   • Pantoprazole Sodium  40 mg Oral QAM AC   • pregabalin  50 mg Oral BID   • insulin detemir  20 Units Subcutaneous Noon   • PEG 3350  17 g Oral Daily   • Insulin Aspart Pen  1 of right lower extremity DVT. Dictated by (CST): Stan Doyle MD on 10/08/2019 at 20:54     Approved by (CST): Stan Doyle MD on 10/08/2019 at 20:57          Xr Chest Ap Portable  (cpt=71045)    Result Date: 10/8/2019  CONCLUSION:  1.  Suboptimal inspira -Poor R-wave progression -nonspecific -consider old anterior infarct.  -Nonspecific ST depression -Nondiagnostic.  ABNORMAL When compared with ECG of 10/08/2019 05:16:53 No significant changes have occurred Electronically signed on 10/09/2019 at 11:17 by Johnny

## 2019-10-09 NOTE — PROGRESS NOTES
[facility] [facility]    Progress Note    Delsie Factor Patient Status:  Inpatient    1960 MRN [id number]   [facility] 2W/SW Attending Hector Perez Day # 1 PCP Mago Mina DO       Subjective:   Delsie Factor HYDROcodone-acetaminophen (NORCO) 5-325 MG per tab 1 tablet 1 tablet Oral Q6H PRN   insulin detemir (LEVEMIR) 100 UNIT/ML flextouch 20 Units 20 Units Subcutaneous Noon   PEG 3350 (MIRALAX) powder packet 17 g 17 g Oral Daily   dextrose 50 % injection 50 m mediastinal widening.     Dictated by (CST): Michelle Montoya MD on 10/08/2019 at 8:21     Approved by (CST): Michelle Montoya MD on 10/08/2019 at 8:23          Ct Chest Pain/pe (iv Only) (cpt=71260)    Result Date: 10/8/2019  CONCLUSION:   Positive for pulmona and cholecystostomy tube     Hx of GI bleed   Monitor Hb         Certification      PHYSICIAN Certification of Need for Inpatient Hospitalization - Initial Certification    Patient will require inpatient services that will reasonably be expected to span tw

## 2019-10-10 ENCOUNTER — APPOINTMENT (OUTPATIENT)
Dept: GENERAL RADIOLOGY | Facility: HOSPITAL | Age: 59
DRG: 176 | End: 2019-10-10
Attending: SURGERY
Payer: MEDICARE

## 2019-10-10 ENCOUNTER — APPOINTMENT (OUTPATIENT)
Dept: GENERAL RADIOLOGY | Facility: HOSPITAL | Age: 59
DRG: 176 | End: 2019-10-10
Attending: CLINICAL NURSE SPECIALIST
Payer: MEDICARE

## 2019-10-10 ENCOUNTER — APPOINTMENT (OUTPATIENT)
Dept: CT IMAGING | Facility: HOSPITAL | Age: 59
DRG: 176 | End: 2019-10-10
Attending: INTERNAL MEDICINE
Payer: MEDICARE

## 2019-10-10 PROCEDURE — 74250 X-RAY XM SM INT 1CNTRST STD: CPT | Performed by: SURGERY

## 2019-10-10 PROCEDURE — 71045 X-RAY EXAM CHEST 1 VIEW: CPT | Performed by: CLINICAL NURSE SPECIALIST

## 2019-10-10 PROCEDURE — 99232 SBSQ HOSP IP/OBS MODERATE 35: CPT | Performed by: INTERNAL MEDICINE

## 2019-10-10 PROCEDURE — 74177 CT ABD & PELVIS W/CONTRAST: CPT | Performed by: INTERNAL MEDICINE

## 2019-10-10 RX ORDER — WARFARIN SODIUM 5 MG/1
5 TABLET ORAL NIGHTLY
Status: DISCONTINUED | OUTPATIENT
Start: 2019-10-10 | End: 2019-10-12

## 2019-10-10 NOTE — PROGRESS NOTES
[facility]    Progress Note      Assessment and Plan:   1. Venous thromboembolism– the patient is tolerating heparin fine. There is no evidence of GI bleeding. He does have extensive thrombus in the left lower extremity.   There was dilat height 5' 6\" (1.676 m), weight 267 lb 1.6 oz (121.2 kg), SpO2 98 %. Physical Exam alert white male  HEENT examination is unremarkable with pupils equal round and reactive to light and accommodation.    Neck without adenopathy, thyromegaly, JVD nor bruit

## 2019-10-10 NOTE — PROGRESS NOTES
Dr Asya Hathaway / f/u - on call   Called by the staff for reported abd XR with possible free air and significant distention in transverse colon   Pt with mild abd pain / underline chronic abd pain and distention  Had BM earlier   No f/c   No cp   No sob and he i

## 2019-10-10 NOTE — PLAN OF CARE
Problem: Patient/Family Goals  Goal: Patient/Family Long Term Goal  Description  Patient's Long Term Goal: Get stronger. Go back home. Interventions:  - Medical management  - monitor VS and labs.    - See additional Care Plan goals for specific interve hemostasis  - Assess for signs and symptoms of internal bleeding  - Monitor lab trends  Outcome: Progressing     Problem: SKIN/TISSUE INTEGRITY - ADULT  Goal: Skin integrity remains intact  Description  INTERVENTIONS  - Assess and document risk factors for effectiveness of vasoactive medications to optimize hemodynamic stability  - Monitor arterial and/or venous puncture sites for bleeding and/or hematoma  - Assess quality of pulses, skin color and temperature  - Assess for signs of decreased coronary artery

## 2019-10-10 NOTE — PLAN OF CARE
Problem: Diabetes/Glucose Control  Goal: Glucose maintained within prescribed range  Description  INTERVENTIONS:  - Monitor Blood Glucose as ordered  - Assess for signs and symptoms of hyperglycemia and hypoglycemia  - Administer ordered medications to m stability  Description  INTERVENTIONS:  - Monitor vital signs, rhythm, and trends  - Monitor for bleeding, hypotension and signs of decreased cardiac output  - Evaluate effectiveness of vasoactive medications to optimize hemodynamic stability  - Monitor ar stability  - Promote increasing activity/tolerance for mobility and gait  - Educate and engage patient/family in tolerated activity level and precautions    Outcome: Progressing     Problem: HEMATOLOGIC - ADULT  Goal: Maintains hematologic stability  Descr

## 2019-10-10 NOTE — DIETARY NOTE
ADULT NUTRITION INITIAL ASSESSMENT    Pt is at moderate nutrition risk. Pt does not meet malnutrition criteria.       CRITERIA FOR MALNUTRITION DIAGNOSIS:  Only meets 1 - hard to determine true wt loss d/t varying wt and + fluid  Energy intake <50% of EER provider: monitor plans    ADMITTING DIAGNOSIS:   Elevated troponin [R79.89]  Hemoptysis [R04.2]  Acute saddle pulmonary embolism, unspecified whether acute cor pulmonale present ([facility] [address] [I26.92]    PERTINENT PAST MEDICAL HISTORY:   Past Medical History:   D Obtained    MEDICATIONS: noted Miralax, Insulin, PPI, warfarin, remeron  LABS: reviewed    NUTRITION RELATED PHYSICAL FINDINGS:  - Body Fat/Muscle Mass: well nourished per visual exam.    - Fluid Accumulation: +1 BLE per visual exam    - Skin Integrity: RN

## 2019-10-10 NOTE — PROGRESS NOTES
10/10 Pt C/O ABD pain, CT of ABD done to r/o free air, no free air found. XR of small bowl performed to R/O SBO, because PT is so distended this will be done over several times tonight extending into 10/11: 1800, 2100 and 0730 on 10/11.   Patient will merlyn

## 2019-10-10 NOTE — CONSULTS
[facility]    Report of Consultation    Donelda Dakins Patient Status:  Inpatient    1960 MRN [id number]   [facility] 3W/SW Attending [address], [address] [facility] Day # 2 CISCO Colon DO     Date of Admissi gastrointestinal bleeding with colitis and also has a cholecystotomy tube in place with a cholecystitis.      History:    Past Medical History:   Diagnosis Date   • Cardiomyopathy ([facility] [address]    • Chronic pain     chronic narcotics   • Congestive heart disease (H CONTINUOUS, 200-3,000 Units/hr, Intravenous, Continuous  •  acetaminophen (TYLENOL) tab 650 mg, 650 mg, Oral, Q6H PRN  •  Alfuzosin HCl ER (UROXATRAL) 24 hr tab 10 mg, 10 mg, Oral, Daily with breakfast  •  artificial tears 83-15 % ophthalmic ointment, , [place] kg), SpO2 98 %. General: Alert, orientated x3. Cooperative. No apparent distress. Patient is obese  HEENT: Exam is unremarkable. Without scleral icterus. Neck: No tenderness to palpitation. No JVD. Lungs: Clear to auscultation bilaterally.   Ca drainage catheter right abdomen. 4. Postop changes in the pelvis. 5. A preliminary report was submitted and there is agreement without major discrepancies. Dictated by (CST): Lesley Hendrix MD on 10/10/2019 at 6:50     Approved by (CST):  Louann Church protocol MR surveillance is recommended. 4. Stable mild splenomegaly. Hypoenhancing lesion in the lateral spleen is unchanged and may reflect a perfusion defect or hemangioma.  5. Mild gastrohepatic ligament and orlando hepatis lymphadenopathy, similar in co without psychotic features ([facility] [address]     Adjustment disorder with anxiety     Acute cholecystitis     Hemoptysis     Pulmonary embolus (HCC)     Elevated troponin     Acute saddle pulmonary embolism, unspecified whether acute cor pulmonale present ([facility] [address]

## 2019-10-10 NOTE — PROGRESS NOTES
[place] [facility] - [facility]    Cardiology Progress Note  [facility]    Orquidea Pascal Patient Status:  Inpatient    1960 MRN [id number]   Location [facility] 3W/SW Attending [address], [address] Day # 2 PCP T Subcutaneous TID CC       Continuous Infusions:   • sodium chloride 100 mL/hr at 10/10/19 0040   • Continuous dose Heparin infusion 1,400 Units/hr (10/10/19 0649)       Results:     Lab Results   Component Value Date    WBC 5.5 10/10/2019    HGB 14.0 10/10 submitted and there is agreement without major discrepancies. Dictated by (CST): Babs Milian MD on 10/10/2019 at 6:50     Approved by (CST):  Babs Milian MD on 10/10/2019 at 6:57          Us Venous Doppler Leg Bilat - Diag Img (cpt=93970) 10/08/2019 at 7:54          Ekg 12-lead    Result Date: 10/8/2019  ECG Report  Interpretation  -------------------------- Sinus Rhythm -Nonspecific QRS widening and anterior fascicular block.  -Poor R-wave progression -nonspecific -consider old anterior in

## 2019-10-10 NOTE — PROGRESS NOTES
[facility]    Progress Note    Dia Parisi Patient Status:  Inpatient    1960 MRN [id number]   [facility] 2W/SW Attending [address], [address] [facility] Day # 2 PCP Osman Romano DO       Subjective:   Dia Parisi 15 mg Oral Nightly   pregabalin (LYRICA) cap 50 mg 50 mg Oral BID   Zolpidem Tartrate (AMBIEN) tab 5 mg 5 mg Oral Nightly PRN   morphINE sulfate (PF) 2 MG/ML injection 2 mg 2 mg Intravenous Q4H PRN   HYDROcodone-acetaminophen (NORCO) 5-325 MG per tab 1 tab Venous Doppler Leg Bilat - Diag Img (cpt=93970)    Result Date: 10/8/2019  CONCLUSION:  1.   Abnormal exam demonstrating extensive DVT within the left lower extremity involving all segments of the left femoral and popliteal veins as well as the left posteri

## 2019-10-11 PROCEDURE — 99232 SBSQ HOSP IP/OBS MODERATE 35: CPT | Performed by: INTERNAL MEDICINE

## 2019-10-11 PROCEDURE — 99233 SBSQ HOSP IP/OBS HIGH 50: CPT | Performed by: INTERNAL MEDICINE

## 2019-10-11 NOTE — DISCHARGE SUMMARY
Discharge Summary    Date of Admission: 5/2/2019    Date of Discharge: 7/1/2019    Hospital Course:     The patient was admitted with acute encephalopathy which was thought related to hypercapnia and hepatic encephalopathy with severe cardiomyopathy and acu artificial tears 83-15 % Oint     balsam peru-castor oil Oint     bisacodyl 10 MG Supp  Commonly known as:  DULCOLAX     Heparin Sodium (Porcine) 5000 UNIT/ML Soln     Insulin Aspart Pen 100 UNIT/ML Sopn  Commonly known as:  NOVOLOG     ipratropium-albuter COREG 12.5 MG Tabs  Generic drug:  carvedilol     furosemide 40 MG Tabs  Commonly known as:  LASIX     HUMULIN R U-500 KWIKPEN 500 UNIT/ML Sopn  Generic drug:  insulin regular human (conc)     morphINE Sulfate  MG Tbcr  Commonly known as:  MS CONTIN

## 2019-10-11 NOTE — PROGRESS NOTES
[place] [facility] - [facility]    Progress Note    Luly Deluca Patient Status:  Inpatient    1960 MRN [id number]   [facility] 3W/SW Attending [address], [address] [facility] Day # 3 PCP Barbara Painting DO            Subjective:     Pt 133*   BUN 7 7 6*   CREATSERUM 0.71 0.79 0.78   GFRAA 119 114 114   GFRNAA 103 98 99   CA 8.7 8.8 8.7   * 137 138   K 4.0 3.9 4.1    103 106   CO2 26.0 28.0 26.0             Xr Abdomen (1 View) (cpt=74018)    Result Date: 10/10/2019  CONCLUSION time course favors that this finding is related to hepatic perfusion differences (potentially from adjacent gallbladder inflammation). An additional ill-defined segment IV lesion appears similar in comparison to prior.   AFP correlation and continued liver though bowel and reached colon  Try rectal tube as well  Clear liquid diet        Viviane Ferrer MD FACS  General Surgery  [facility]  10/11/2019  2:44 PM

## 2019-10-11 NOTE — PROGRESS NOTES
Pulmonary/Critical Care Follow Up Note    HPI:   Vanessa Tavarez is a 60year old male with Patient presents with:  Hemoptysis (respiratory)      PCP Len Blocker, DO  Admission Attending Arac[address] Day #3    abd distention  Bloa Nightly  •  pregabalin (LYRICA) cap 50 mg, 50 mg, Oral, BID  •  Zolpidem Tartrate (AMBIEN) tab 5 mg, 5 mg, Oral, Nightly PRN  •  morphINE sulfate (PF) 2 MG/ML injection 2 mg, 2 mg, Intravenous, Q4H PRN  •  HYDROcodone-acetaminophen (NORCO) 5-325 MG per tab  Obstructive sleep apnea  Plan trach collar open at night    4.  History of colitis/pseudoobstruction  Severe   Nature not 100% clear   Source of recurrent septicshock last admission  Plan GI following     5.  History of cholecystitis and liver abnormality

## 2019-10-11 NOTE — PLAN OF CARE
Problem: Diabetes/Glucose Control  Goal: Glucose maintained within prescribed range  Description  INTERVENTIONS:  - Monitor Blood Glucose as ordered  - Assess for signs and symptoms of hyperglycemia and hypoglycemia  - Administer ordered medications to m vital signs, obtain 12 lead EKG if indicated  - Evaluate effectiveness of antiarrhythmic and heart rate control medications as ordered  - Initiate emergency measures for life threatening arrhythmias  - Monitor electrolytes and administer replacement therap redness and/or skin breakdown  - Initiate interventions, skin care algorithm/standards of care as needed  Outcome: Not Progressing  Goal: Incision(s), wounds(s) or drain site(s) healing without S/S of infection  Description  INTERVENTIONS:  - Assess and do

## 2019-10-11 NOTE — PROGRESS NOTES
[facility]    Cardiology Progress Note  [facility]  2  Gustavo Dixon Patient Status:  Inpatient    1960 MRN [id number]   [facility] 3W/SW Attending [address], [address] Day # 3 PCP sodium chloride 100 mL/hr at 10/11/19 0344   • Continuous dose Heparin infusion 1,400 Units/hr (10/10/19 0691)       Results:     Lab Results   Component Value Date    WBC 5.9 10/11/2019    HGB 14.1 10/11/2019    HCT 42.8 10/11/2019    .0 10/11/2019 changes in the pelvis. 5. A preliminary report was submitted and there is agreement without major discrepancies. Dictated by (CST): Luis Carlos Merlos MD on 10/10/2019 at 6:50     Approved by (CST):  Luis Carlos Merlos MD on 10/10/2019 at 6:57          Xr orlando hepatis lymphadenopathy, similar in comparison to prior. This may be reactive to liver disease. 6. Partially imaged right greater than left basilar pulmonary emboli, better characterized on recent prior chest CT.   Right greater than left lung depend Increased tenderness today. Hopefully will not have to have thrombectomy or catheter directed lysis of leg    HTN bp occasionally up.  If continue , inc coreg tho tendency towards bradycardia      Cardiomyopathy (in past) LV function has normalized and rem

## 2019-10-12 ENCOUNTER — APPOINTMENT (OUTPATIENT)
Dept: GENERAL RADIOLOGY | Facility: HOSPITAL | Age: 59
DRG: 176 | End: 2019-10-12
Attending: COLON & RECTAL SURGERY
Payer: MEDICARE

## 2019-10-12 PROCEDURE — 99223 1ST HOSP IP/OBS HIGH 75: CPT | Performed by: INTERNAL MEDICINE

## 2019-10-12 PROCEDURE — 74019 RADEX ABDOMEN 2 VIEWS: CPT | Performed by: COLON & RECTAL SURGERY

## 2019-10-12 PROCEDURE — 99232 SBSQ HOSP IP/OBS MODERATE 35: CPT | Performed by: INTERNAL MEDICINE

## 2019-10-12 PROCEDURE — 71045 X-RAY EXAM CHEST 1 VIEW: CPT | Performed by: COLON & RECTAL SURGERY

## 2019-10-12 RX ORDER — HYDROCODONE BITARTRATE AND ACETAMINOPHEN 5; 325 MG/1; MG/1
2 TABLET ORAL EVERY 6 HOURS PRN
Status: DISCONTINUED | OUTPATIENT
Start: 2019-10-12 | End: 2019-10-25

## 2019-10-12 RX ORDER — ONDANSETRON 2 MG/ML
INJECTION INTRAMUSCULAR; INTRAVENOUS
Status: COMPLETED
Start: 2019-10-12 | End: 2019-10-12

## 2019-10-12 RX ORDER — ONDANSETRON 2 MG/ML
4 INJECTION INTRAMUSCULAR; INTRAVENOUS EVERY 4 HOURS PRN
Status: DISCONTINUED | OUTPATIENT
Start: 2019-10-12 | End: 2019-11-05

## 2019-10-12 RX ORDER — MORPHINE SULFATE 2 MG/ML
2 INJECTION, SOLUTION INTRAMUSCULAR; INTRAVENOUS EVERY 6 HOURS PRN
Status: DISCONTINUED | OUTPATIENT
Start: 2019-10-12 | End: 2019-11-05

## 2019-10-12 RX ORDER — ONDANSETRON 4 MG/1
4 TABLET, FILM COATED ORAL EVERY 4 HOURS PRN
Status: DISCONTINUED | OUTPATIENT
Start: 2019-10-12 | End: 2019-10-12

## 2019-10-12 RX ORDER — BISACODYL 10 MG
10 SUPPOSITORY, RECTAL RECTAL ONCE
Status: COMPLETED | OUTPATIENT
Start: 2019-10-13 | End: 2019-10-13

## 2019-10-12 NOTE — PROGRESS NOTES
[facility]    Progress Note    Fidelina Baires Patient Status:  Inpatient    1960 MRN [id number]   [facility] 2W/SW Attending Cortes SahniMIGUEL [facility] Day # 4 PCP Taina Triana DO       Subjective:   Fidelina Baires rectal suppository 10 mg, 10 mg, Rectal, Daily PRN  carvedilol (COREG) tab 3.125 mg, 3.125 mg, Oral, BID with meals  ipratropium-albuterol (DUONEB) nebulizer solution 3 mL, 3 mL, Nebulization, Q6H PRN  levETIRAcetam (KEPPRA) tab 1,000 mg, 1,000 mg, Oral, B obstruction / chronic colonic ileus  Keep NPO   IV fluid   Patient refused rectal tube    GI consult ?  May need decompression by colonoscopy D/w Dr Luan Fine   D/w Dr Michael Jain b/c of the worsening of distension   Rec Obstructive series  Neostigmine IV   B/c of th

## 2019-10-12 NOTE — PROGRESS NOTES
[place] [facility] - [facility]     Progress Note        Malinda Flores Patient Status:  Inpatient    1960 MRN [id number]   [facility] 3W/SW Attending [address], [address] Day # 4 PCP Luz Elena Cordero DO       Subjective:   Hussain Villasenor PRN  levETIRAcetam (KEPPRA) tab 1,000 mg, 1,000 mg, Oral, BID  mirtazapine (REMERON) tab 15 mg, 15 mg, Oral, Nightly  pregabalin (LYRICA) cap 50 mg, 50 mg, Oral, BID  Zolpidem Tartrate (AMBIEN) tab 5 mg, 5 mg, Oral, Nightly PRN  morphINE sulfate (PF) 2 MG/ Cardiomyopathy       Plan   -Patient presents with evidence of significant vomiting embolism with some mild right heart strain.  -Continue anticoagulation with heparin gtt until INR therapeutic after starting warfarin.  -Continue trach collar which the pat

## 2019-10-12 NOTE — PROGRESS NOTES
[facility]    Progress Note    Ezekiel Fraction Patient Status:  Inpatient    1960 MRN [id number]   [facility]SW [facility] PCP Mikayla Vickers DO            Subjective:     He 10/12/19  0519   RBC 4.46 4.48 4.64 4.92   HGB 14.0 14.0 14.1 15.2   HCT 40.9 41.7 42.8 45.3   MCV 91.7 93.1 92.2 92.1   MCH 31.4 31.3 30.4 30.9   MCHC 34.2 33.6 32.9 33.6   RDW 13.1 13.2 13.2 13.4   NEPRELIM 2.97 3.04  --  5.13   WBC 5.7 5.5 5.9 7.2   PLT troponin  Pt remains on anticoagulation            Iveth Brunson MD

## 2019-10-12 NOTE — PLAN OF CARE
Problem: Diabetes/Glucose Control  Goal: Glucose maintained within prescribed range  Description  INTERVENTIONS:  - Monitor Blood Glucose as ordered  - Assess for signs and symptoms of hyperglycemia and hypoglycemia  - Administer ordered medications to m vital signs, obtain 12 lead EKG if indicated  - Evaluate effectiveness of antiarrhythmic and heart rate control medications as ordered  - Initiate emergency measures for life threatening arrhythmias  - Monitor electrolytes and administer replacement therap Short Term Goal: No PE and DVT on left leg. Stop stomach cramping.     Interventions:   - Heparin gtt  - Xray and CT abdomen  - General surgery placed on consult  -Monitor VS and labs  - See additional Care Plan goals for specific interventions   Outcome: N TO TRANSFER TO PCU AFTER ABDOMINAL XRAY

## 2019-10-12 NOTE — PROGRESS NOTES
[facility] - [facility]    Cardiology Progress Note    Denise Villanueva Patient Status:  Inpatient    1960 MRN [id number]   [facility] 3W/SW Attending Jacqueline Carbajal [address] Day # 4 PCP Ruth Eckert DO     Primary Cardiol abnormality very mild right ventricular dysfunction.       Abd pain and enlarged colon  - clear diet  - per surgery / GI    PAF  - currently rate/rhythm stable on BB Coreg 3.125 - consider change to Toprol but hold on further med changes with GI distress/cl neostigmine patient will be transferred to PCU for closer monitoring to ensure no bradycardia. Call if any questions thank you. Jona Isaac MD  [facility]. Interventional Cardiology.   [phone]

## 2019-10-12 NOTE — PLAN OF CARE
Problem: Diabetes/Glucose Control  Goal: Glucose maintained within prescribed range  Description  INTERVENTIONS:  - Monitor Blood Glucose as ordered  - Assess for signs and symptoms of hyperglycemia and hypoglycemia  - Administer ordered medications to m stability  Description  INTERVENTIONS:  - Monitor vital signs, rhythm, and trends  - Monitor for bleeding, hypotension and signs of decreased cardiac output  - Evaluate effectiveness of vasoactive medications to optimize hemodynamic stability  - Monitor ar stability  - Promote increasing activity/tolerance for mobility and gait  - Educate and engage patient/family in tolerated activity level and precautions  Outcome: Progressing     Problem: HEMATOLOGIC - ADULT  Goal: Maintains hematologic stability  Descrip Pt refused rectal tube to help alleviate abdominal cramping. Risk and benefits were explained to pt. Pt refused to ambulate to help with abdominal cramping.    Meds given via MAR, plan of care followed, and call light in reach        0645: tele reported

## 2019-10-12 NOTE — CONSULTS
[facility]  Report of GI Consultation    Isaiah Sesay Patient Status:  Inpatient    1960 MRN [id number]   [facility] 3W/SW Attending [address], [address] [facility] Day # 4 PCP Genevieve Batch, DO     Date of Admissio with IV contrast 10/10/2019 during this admission again describes ill-defined hypoenhancing lesion in segment 4 measuring 4 cm similar to prior. Prolonged intubation last summer and tracheostomy placement.   Severe ileus very slowly resolved, did tolerat admission and multiple doses per day morphine 2 mg IV push also since admission 10/8/2019. As above, previous Relistor has been held.     Over the first 4 days of admission, took 7 tablets of Norco 5s, then 4 more today; 4 mg of IV morphine per day for [facility] today as above. Bisacodyl suppository tonight and in am.  Wean off, minimize narcotics. Has taken very high doses of oral and IV narcotics since admission as above. Continues on Lyrica medication.   Colonoscopy examination with decompression tube placeme Performed by Pamela Gonzalez MD at [address] [place] [address] ENDOSCOPY   • KNEE REPLACEMENT SURGERY Left 2010   • TRACHEOSTOMY N/A 5/22/2019    Performed by Santiago Thorpe MD at [address] [place] [address] MAIN OR       Family History  Family History   Problem Relation Age of Onset   • Stroke Father Nightly PRN  morphINE sulfate (PF) 2 MG/ML injection 2 mg, 2 mg, Intravenous, Q4H PRN  PEG 3350 (MIRALAX) powder packet 17 g, 17 g, Oral, Daily  dextrose 50 % injection 50 mL, 50 mL, Intravenous, PRN  Glucose-Vitamin C (DEX-4) chewable tab 4 tablet, 4 tabl needed. acetaminophen 500 MG Oral Tab, Take 2 tablets (1,000 mg total) by mouth every 6 (six) hours as needed. artificial tears 83-15 % Ophthalmic Ointment, Place into both eyes 3 (three) times daily as needed.   balsam peru-castor oil External Ointment, Zosyn  Thimerosal              RASH          Review of Systems:     No fevers. No blood with the vomiting. No neurologic symptoms. 12 point review of systems is as above, otherwise negative.     Physical Exam:   Blood pressure (!) 148/92, pulse 80, tempe

## 2019-10-13 PROCEDURE — 99233 SBSQ HOSP IP/OBS HIGH 50: CPT | Performed by: INTERNAL MEDICINE

## 2019-10-13 PROCEDURE — 99232 SBSQ HOSP IP/OBS MODERATE 35: CPT | Performed by: INTERNAL MEDICINE

## 2019-10-13 NOTE — PROGRESS NOTES
[facility]    Progress Note    Vanessa Tavarez Patient Status:  Inpatient    1960 MRN [id number]   [facility] 2W/SW Attending [address], [address] Day # 5 PCP Len March DO       Subjective:   Vanessa Tavarez 10 mg, Oral, Daily with breakfast  artificial tears 83-15 % ophthalmic ointment, , Both Eyes, TID PRN  bisacodyl (DULCOLAX) rectal suppository 10 mg, 10 mg, Rectal, Daily PRN  carvedilol (COREG) tab 3.125 mg, 3.125 mg, Oral, BID with meals  ipratropium-alb 10/12/2019 at 19:44          Xr Abdomen Obstructive Series Routine(2 Vw)(cpt=74019)    Result Date: 10/12/2019  CONCLUSION: Small dilatation of small bowel loops throughout the majority of the abdomen which have increased in caliber since 10/10/19 small-neto

## 2019-10-13 NOTE — PROGRESS NOTES
[place] [facility]    Progress Note    Migdalia Evans Patient Status:  Inpatient    1960 MRN [id number]   [facility] 2W/SW Attending [address], [address] [facility] Day # 5 PCP Javan Kent DO        Subjective:     Hanna Best  (H) 10/13/2019    CA 8.6 10/13/2019    ALB 2.2 (L) 07/26/2019    ALKPHO 75 07/26/2019    BILT 0.3 07/26/2019    TP 6.7 07/26/2019    AST 19 07/26/2019    ALT 16 07/26/2019    .5 (H) 10/13/2019    .8 (H) 10/13/2019    INR 2.36 (H) 1

## 2019-10-13 NOTE — PLAN OF CARE
Patient alert and oriented. Complained of pain r/t general soreness & back pain, morphine given x1. Denied nausea. Had one loose bowel movement. NG to suction. On 5L NC. Remains NPO sips w/ meds. Heparin drip at 1100 units/hr. Accucheks q6, last .  En report SOB or any respiratory difficulty  - Respiratory Therapy support as indicated  - Manage/alleviate anxiety  - Monitor for signs/symptoms of CO2 retention  Outcome: Progressing     Problem: CARDIOVASCULAR - ADULT  Goal: Maintains optimal cardiac outpu precautions as appropriate  - Initiate Pressure Ulcer prevention bundle as indicated  Outcome: Progressing     Problem: Impaired Functional Mobility  Goal: Achieve highest/safest level of mobility/gait  Description  Interventions:  - Assess patient's funct

## 2019-10-13 NOTE — PROGRESS NOTES
[facility] [address]  GI SERVICE PROGRESS NOTE    Malinda Flores Patient Status:  Inpatient    1960 MRN [id number]   [facility] 2W/SW Attending [address], [address] [facility] Day # 5 PCP Luz Elena Cordero DO       Subjective: No results for input(s): URINE, CULTI, BLDSMR in the last 168 hours. Xr Chest Ap Portable  (cpt=71045)    Result Date: 10/12/2019  CONCLUSION:  1.  Nasogastric tube placement with side hole appearing to be just beyond the gastroesophageal junctio colonic pseudo-obstruction per CT scan 5/15/2019  · Emergent sigmoidoscopy with Dr. Constantine Curran 5/28/19 for blood per rectum with multiple extreme abnormalities/changes in the transverse colon most consistent with ischemia; see the CT scan of 5/28/2019  · Emerge leg DVT, multiple PEs on CT scan this admission     · High risk for sedation. · Continues on therapeutic heparin infusion for the multiple PEs, DVT. · Hold Coumadin while NPO     3.   Chronic hepatitis C with liver lesion, concern for malignancy     · No

## 2019-10-13 NOTE — PROGRESS NOTES
[facility]    Cardiology Progress Note    Jaiwill Carbajal Patient Status:  Inpatient    1960 MRN [id number]   [facility] 3W/SW Attending [address], [address] Day # 5 PCP Michelle Chen DO     Primary Cardiol heparin gtt per protocol  - monitor Hg   - continue telemetery  - patient with suspected liver Ca - repeat AFP in process  - Coumadin on hold per GI, NPO and may need biopsy of liver masses?     Abd pain and enlarged colon, with hepatitis C and liver masses Xr Abdomen Obstructive Series Routine(2 Vw)(cpt=74019)    Result Date: 10/12/2019  CONCLUSION: Small dilatation of small bowel loops throughout the majority of the abdomen which have increased in caliber since 10/10/19 small-bowel follow-through.   Diff

## 2019-10-13 NOTE — PROGRESS NOTES
[place] [facility] - [facility]    Progress Note    Dayanara Nikki Patient Status:  Inpatient    1960 MRN [id number]   [facility] 3W/SW Attending Jack Baez, [address] [facility] Day # 5 PCP Tamara Desai DO            Subjective:     He ABDOMEN: normal active BS+, moderate distention noticeably decreased from yesterday, no focal tenderness     Perc ja tube in place    EXTREMITIES: no leg swelling, no calf tenderness,   NEUROLOGIC: alert and oriented x 3; affect appropriate        Res at 16:03     Approved by (CST): Shayy Reyes MD on 10/12/2019 at 16:04                  Assessment and Plan:     Chronic colo megaly     Narcotic induced vs pseudoobstruction  No obstruction by work up  Has been on Relistor  Currently without focal finding

## 2019-10-14 ENCOUNTER — APPOINTMENT (OUTPATIENT)
Dept: INTERVENTIONAL RADIOLOGY/VASCULAR | Facility: HOSPITAL | Age: 59
DRG: 176 | End: 2019-10-14
Attending: CLINICAL NURSE SPECIALIST
Payer: MEDICARE

## 2019-10-14 PROCEDURE — 99232 SBSQ HOSP IP/OBS MODERATE 35: CPT | Performed by: INTERNAL MEDICINE

## 2019-10-14 PROCEDURE — 0F24X0Z CHANGE DRAINAGE DEVICE IN GALLBLADDER, EXTERNAL APPROACH: ICD-10-PCS | Performed by: RADIOLOGY

## 2019-10-14 PROCEDURE — BF121ZZ FLUOROSCOPY OF GALLBLADDER USING LOW OSMOLAR CONTRAST: ICD-10-PCS | Performed by: RADIOLOGY

## 2019-10-14 PROCEDURE — 99233 SBSQ HOSP IP/OBS HIGH 50: CPT | Performed by: INTERNAL MEDICINE

## 2019-10-14 RX ORDER — MIDAZOLAM HYDROCHLORIDE 1 MG/ML
INJECTION INTRAMUSCULAR; INTRAVENOUS
Status: COMPLETED
Start: 2019-10-14 | End: 2019-10-14

## 2019-10-14 RX ORDER — WARFARIN SODIUM 2.5 MG/1
2.5 TABLET ORAL NIGHTLY
Status: DISCONTINUED | OUTPATIENT
Start: 2019-10-14 | End: 2019-10-16

## 2019-10-14 RX ORDER — SODIUM CHLORIDE 9 MG/ML
INJECTION, SOLUTION INTRAVENOUS
Status: COMPLETED
Start: 2019-10-14 | End: 2019-10-14

## 2019-10-14 RX ORDER — LIDOCAINE HYDROCHLORIDE 20 MG/ML
INJECTION, SOLUTION EPIDURAL; INFILTRATION; INTRACAUDAL; PERINEURAL
Status: COMPLETED
Start: 2019-10-14 | End: 2019-10-14

## 2019-10-14 NOTE — PRE-SEDATION ASSESSMENT
[facility]  IR Pre-Procedure Sedation Assessment    History of snoring or sleep or apnea?    No    History of previous problems with anesthesia or sedation  No    Physical Findings:  Neck: nl ROM  CV: RRR  PULM: normal respiratory rate/effor

## 2019-10-14 NOTE — PROGRESS NOTES
[facility]    Progress Note    Yosef De Leon Patient Status:  Inpatient    1960 MRN [id number]   [facility] 3W/SW Attending Emelia Devlin [facility] Day # 6 PCP Juan Hoff DO            Subjective:     He distention noticeably, previous tenderness almost completely resolved     Perc ja tube in place, however, suture loose, and moved from skin area    EXTREMITIES: no leg swelling, no calf tenderness,   NEUROLOGIC: alert and oriented x 3; affect appropriat by (CST): Ayla Tang MD on 10/12/2019 at 16:04                  Assessment and Plan:     Chronic colo megaly     Narcotic induced vs pseudoobstruction  No obstruction by work up  Has been on Relistor  Currently without focal findings or signs of peritoni

## 2019-10-14 NOTE — PROGRESS NOTES
[facility]    Cardiology Progress Note  [facility]    Rm Calle Patient Status:  Inpatient    1960 MRN [id number]   [facility] 2W/SW Attending [address], [address] Day # 6 PCP T per day   • insulin detemir  10 Units Subcutaneous Noon   • pantoprazole (PROTONIX) IV push  40 mg Intravenous Daily   • Alfuzosin HCl ER  10 mg Oral Daily with breakfast   • carvedilol  3.125 mg Oral BID with meals   • levetiracetam  1,000 mg Oral BID   • (cpt=71045)    Result Date: 10/12/2019  CONCLUSION:  1. Nasogastric tube placement with side hole appearing to be just beyond the gastroesophageal junction.   2. Stable chest demonstrating bibasilar atelectasis without radiographically evident acute intrath We will continue to observe. Cardiomyopathy (in past) LV function has normalized and remains normal. No chf. Cont coreg.                               Porsche Fan MD  10/14/2019

## 2019-10-14 NOTE — PLAN OF CARE
Problem: Diabetes/Glucose Control  Goal: Glucose maintained within prescribed range  Description  INTERVENTIONS:  - Monitor Blood Glucose as ordered  - Assess for signs and symptoms of hyperglycemia and hypoglycemia  - Administer ordered medications to m document risk factors for pressure ulcer development  - Assess and document skin integrity  - Monitor for areas of redness and/or skin breakdown  - Initiate interventions, skin care algorithm/standards of care as needed  Outcome: Progressing     Problem: H

## 2019-10-14 NOTE — SPIRITUAL CARE NOTE
Patient was in bed, and alone at the time of visit. Patient appears to anxious and overwhelmed about his medical condition.  provided supportive presence, active listening, comfort and a prayer.  At the end of the visit, patient appears to be calmer

## 2019-10-14 NOTE — PROCEDURES
[facility]  Procedure Note    Yosef De Leon Patient Status:  Inpatient    1960 MRN [id number]   [facility] Day # 6 PCP Juan Hoff DO     Procedure:

## 2019-10-14 NOTE — PLAN OF CARE
Problem: Diabetes/Glucose Control  Goal: Glucose maintained within prescribed range  Description  INTERVENTIONS:  - Monitor Blood Glucose as ordered  - Assess for signs and symptoms of hyperglycemia and hypoglycemia  - Administer ordered medications to m vital signs, obtain 12 lead EKG if indicated  - Evaluate effectiveness of antiarrhythmic and heart rate control medications as ordered  - Initiate emergency measures for life threatening arrhythmias  - Monitor electrolytes and administer replacement therap

## 2019-10-14 NOTE — PLAN OF CARE
Double RN skin check done prior to transfer off Unit. Skin check performed by this RN and [facility]     Wounds are as follows: redness around t tube insertion site. Noted T tube was exchanged today. Two iv sites one to left antecub and one to his hand.  Scaly ski

## 2019-10-14 NOTE — PROGRESS NOTES
Pulmonary/Critical Care Follow Up Note    HPI:   Kelsi Quiles is a 60year old male with Patient presents with:  Hemoptysis (respiratory)      PCP Nira Spatz, DO  Admission Attending Araceli Lewis 15 Day #6     Less abd distention artificial tears 83-15 % ophthalmic ointment, , Both Eyes, TID PRN  •  bisacodyl (DULCOLAX) rectal suppository 10 mg, 10 mg, Rectal, Daily PRN  •  carvedilol (COREG) tab 3.125 mg, 3.125 mg, Oral, BID with meals  •  ipratropium-albuterol (DUONEB) nebulizer  Venous thromboembolism  HD stable  ECHO with some acute on chronic R heart failure  No tPA risk > benefits  INR 2.36  Plan    Intravenous heparin and can stop today   warfarin       2.  Chronic respiratory failure  S/p trach  Plan    trach collar with PMV

## 2019-10-15 PROCEDURE — 99231 SBSQ HOSP IP/OBS SF/LOW 25: CPT | Performed by: INTERNAL MEDICINE

## 2019-10-15 PROCEDURE — 99232 SBSQ HOSP IP/OBS MODERATE 35: CPT | Performed by: INTERNAL MEDICINE

## 2019-10-15 NOTE — PROGRESS NOTES
[facility]    Progress Note    Saida Wen Patient Status:  Inpatient    1960 MRN [id number]   [facility] 2W/SW Attending Oumar [facility] Day # 6 PCP Martha Reyes DO       Subjective:   Saida Wen mg, 10 mg, Oral, Daily with breakfast  artificial tears 83-15 % ophthalmic ointment, , Both Eyes, TID PRN  bisacodyl (DULCOLAX) rectal suppository 10 mg, 10 mg, Rectal, Daily PRN  carvedilol (COREG) tab 3.125 mg, 3.125 mg, Oral, BID with meals  ipratropium gravity drainage.      Dictated by (CST): Toan Toledo MD on 10/14/2019 at 15:24     Approved by (CST): Toan Toledo MD on 10/14/2019 at 15:28                    Assessment and Plan:       Pseudo obstruction / chronic colonic ileus  Keep NPO   IV

## 2019-10-15 NOTE — PROGRESS NOTES
[facility]    Progress Note    Guido Cui Patient Status:  Inpatient    1960 MRN [id number]   [facility] 2W/SW Attending Elise Franco [facility] Day # 7 PCP Franny Mon DO       Subjective:   Guido Cui PRN  bisacodyl (DULCOLAX) rectal suppository 10 mg, 10 mg, Rectal, Daily PRN  carvedilol (COREG) tab 3.125 mg, 3.125 mg, Oral, BID with meals  ipratropium-albuterol (DUONEB) nebulizer solution 3 mL, 3 mL, Nebulization, Q6H PRN  levETIRAcetam (KEPPRA) tab 1 (CST): Kerry Cardoso MD on 10/14/2019 at 15:28                    Assessment and Plan:       Pseudo obstruction / chronic colonic ileus  Due to narcotic use   Clear liquid diet   GI and surgery following   Clinically improving     Acute saddle pulmonar

## 2019-10-15 NOTE — PROGRESS NOTES
[facility]    Progress Note    Dia Parisi Patient Status:  Inpatient    1960 MRN [id number]   [facility] 3W/SW Attending Kit Marcelo [facility] Day # 7 PCP Osman Romano DO            Subjective:     He 10/13/19  0505 10/14/19  0428   RBC 4.92 4.80 4.09*   HGB 15.2 14.5 12.5*   HCT 45.3 44.9 39.1   MCV 92.1 93.5 95.6   MCH 30.9 30.2 30.6   MCHC 33.6 32.3 32.0   RDW 13.4 13.4 13.6   NEPRELIM 5.13 3.99 2.55   WBC 7.2 5.9 5.0   .0 197.0 145.0*     Lab 3-5 minutes, best to be done in monitored ICU setting            Contraindicated in presence of bundle branch block            Monitor for bradycardia, atropine available for symptomatic bradycardia            Monitor for hypoxia as bronchospasm may develo

## 2019-10-15 NOTE — PROGRESS NOTES
[place] [facility] - [facility]    Progress Note    Malinda Flores Patient Status:  Inpatient    1960 MRN [id number]   [facility] 3W/SW Attending [address], [address] [facility] Day # 7 PCP Luz Elena Cordero DO        Subjective:     Respi 19 07/26/2019    ALT 16 07/26/2019    .0 (H) 10/14/2019    INR 2.29 (H) 10/14/2019    INR 2.36 (H) 10/14/2019     07/23/2019    DDIMER >4.00 (H) 05/30/2019    MG 1.9 10/14/2019    PHOS 2.9 07/26/2019    TROP 0.088 (HH) 10/08/2019    CK 24 (L)

## 2019-10-15 NOTE — PROGRESS NOTES
[facility] [address]     Gastroenterology Progress Note    Gustavo Parada Patient Status:  Inpatient    1960 MRN [id number]   [facility] 3W/SW Attending [address], [address] [facility] Day # 7 PCP Gabby Owens DO cholecystogram confirms persistent occlusion of the cystic duct. 3. The 8.5 Spanish percutaneous cholecystostomy tube is to remain attached to bag for gravity drainage.      Dictated by (CST): Radha Rodriguez MD on 10/14/2019 at 15:24     Approved by (CST)

## 2019-10-15 NOTE — PROGRESS NOTES
[facility] Cardiology Progress Note    Jefferson Jeffers Patient Status:  Inpatient    1960 MRN [id number]   [facility] 3W/SW Attending [facility] Day # 7 PCP Sarath Grant DO     61 yo male present smokeless tobacco. He reports that he does not drink alcohol or use drugs.     Objective:   Temp: 97.7 °F (36.5 °C)  Pulse: 80  Resp: 20  BP: 126/92    Intake/Output:     Intake/Output Summary (Last 24 hours) at 10/15/2019 0836  Last data filed at 10/15/201

## 2019-10-15 NOTE — PLAN OF CARE
Pt A&O X4. VSS. NPO sips with meds and NG to LIS. Marne given for abdominal pain. RLQ biliary drain in place and draining well. On 2L O2. Accuchecks Q6, last .      Problem: Diabetes/Glucose Control  Goal: Glucose maintained within prescribed range  D CO2 retention  Outcome: Progressing     Problem: CARDIOVASCULAR - ADULT  Goal: Maintains optimal cardiac output and hemodynamic stability  Description  INTERVENTIONS:  - Monitor vital signs, rhythm, and trends  - Monitor for bleeding, hypotension and signs Maintains hematologic stability  Description  INTERVENTIONS  - Assess for signs and symptoms of bleeding or hemorrhage  - Monitor labs and vital signs for trends  - Administer supportive blood products/factors, fluids and medications as ordered and appropr

## 2019-10-15 NOTE — PROGRESS NOTES
Patient seemed comfortable, he expressed gratitude for being able to take fluids. Patient looks forward to returning home to rest in his own space.  Prayer was not desired but encouraging conversation welcomed     10/15/19 0654   Clinical Encounter Type   V

## 2019-10-15 NOTE — PLAN OF CARE
Problem: Diabetes/Glucose Control  Goal: Glucose maintained within prescribed range  Description  INTERVENTIONS:  - Monitor Blood Glucose as ordered  - Assess for signs and symptoms of hyperglycemia and hypoglycemia  - Administer ordered medications to m stability  Description  INTERVENTIONS:  - Monitor vital signs, rhythm, and trends  - Monitor for bleeding, hypotension and signs of decreased cardiac output  - Evaluate effectiveness of vasoactive medications to optimize hemodynamic stability  - Monitor ar stability  - Promote increasing activity/tolerance for mobility and gait  - Educate and engage patient/family in tolerated activity level and precautions  - Recommend use of chair position in bed 3 times per day  Outcome: Progressing     Problem: HEMATOLOG

## 2019-10-16 PROCEDURE — 99232 SBSQ HOSP IP/OBS MODERATE 35: CPT | Performed by: PHYSICIAN ASSISTANT

## 2019-10-16 PROCEDURE — 99232 SBSQ HOSP IP/OBS MODERATE 35: CPT | Performed by: INTERNAL MEDICINE

## 2019-10-16 PROCEDURE — 99232 SBSQ HOSP IP/OBS MODERATE 35: CPT | Performed by: NURSE PRACTITIONER

## 2019-10-16 NOTE — PROGRESS NOTES
Pulmonary/Critical Care Follow Up Note    HPI:   Dia Parisi is a 60year old male with Patient presents with:  Hemoptysis (respiratory)      PCP Osman Romano, DO  Admission Attending Trecia Boeck, [facility] Day #8     Denies abd pain  Baljit Stevens carvedilol (COREG) tab 3.125 mg, 3.125 mg, Oral, BID with meals  •  ipratropium-albuterol (DUONEB) nebulizer solution 3 mL, 3 mL, Nebulization, Q6H PRN  •  levETIRAcetam (KEPPRA) tab 1,000 mg, 1,000 mg, Oral, BID  •  mirtazapine (REMERON) tab 15 mg, 15 mg, failure  S/p trach  Stable  Plan    trach collar with PMV   Would not decannulate     3.  Obstructive sleep apnea  Plan trach collar open at night    4.  History of colitis/pseudoobstruction  Severe   Nature not 100% clear   Source of recurrent septicshock

## 2019-10-16 NOTE — PROGRESS NOTES
[facility]    Progress Note    Gustavo Parada Patient Status:  Inpatient    1960 MRN [id number]   [facility] 2W/SW Attending [facility] Day # 8 PCP Gabby Owens DO       Subjective:   Gustavo Parada suppository 10 mg, 10 mg, Rectal, Daily PRN  carvedilol (COREG) tab 3.125 mg, 3.125 mg, Oral, BID with meals  ipratropium-albuterol (DUONEB) nebulizer solution 3 mL, 3 mL, Nebulization, Q6H PRN  levETIRAcetam (KEPPRA) tab 1,000 mg, 1,000 mg, Oral, BID  juan david cholecystostomy tube   S/p exchange of cholecystostomy tube     PT/OT    Certification      PHYSICIAN Certification of Need for Inpatient Hospitalization - Initial Certification    Patient will require inpatient services that will reasonably be expected to

## 2019-10-16 NOTE — PROGRESS NOTES
[facility]    Progress Note    Yosef De Leon Patient Status:  Inpatient    1960 MRN [id number]   [facility] 3W/SW Attending [address], [address] Day # 8 PCP Juan Hoff DO        Subjective:     Respi 06/28/2019       Ir Cholecystostomy    Result Date: 10/14/2019  CONCLUSION:  1. Cholecystostomy tube check demonstrates retraction of existing 8 English catheter to the gallbladder fundus. 2. Successful exchange of percutaneous cholecystostomy tube.   In add

## 2019-10-16 NOTE — PROGRESS NOTES
[facility]     Gastroenterology Progress Note    Aloha Numbers Patient Status:  Inpatient    1960 MRN [id number]   [facility] 3W/SW Attending [address], [address] [facility] Day # 8 CISCO Kim DO monitoring  -minimize narcotics  -CPM with CLD for now - diet per surgery  -Relistor PRN (received today per RN) however counseled patient to reduce narcotic use as possible    Case discussed with RN Norma Padgett and BRITTNEY on-call, Dr. Denis Hurtado.     DAILY St-

## 2019-10-16 NOTE — DIETARY NOTE
ADULT NUTRITION REASSESSMENT     Pt is at moderate nutrition risk. Pt does not meet malnutrition criteria.       CRITERIA FOR MALNUTRITION DIAGNOSIS:  Only meets 1 - hard to determine true wt loss d/t varying wt and + fluid  Energy intake <50% of EER x5 da supplements discussed.   - Vitamin and mineral supplements: thiamin  - Feeding assistance: meal set up and feed  - Nutrition education: assess education needs  - Coordination of nutrition care: collaboration with other providers  - Discharge and transfer of reviewed includes IV fluid(NS) at 83 ml/hr  LABS: reviewed    NUTRITION RELATED PHYSICAL FINDINGS:  - Body Fat/Muscle Mass: well nourished per visual exam.  - Fluid Accumulation: +1 BUE and non-pitting BLE per RN documentation.  - Skin Integrity: RN docume

## 2019-10-17 PROCEDURE — 99232 SBSQ HOSP IP/OBS MODERATE 35: CPT | Performed by: INTERNAL MEDICINE

## 2019-10-17 PROCEDURE — 99232 SBSQ HOSP IP/OBS MODERATE 35: CPT | Performed by: PHYSICIAN ASSISTANT

## 2019-10-17 PROCEDURE — 99233 SBSQ HOSP IP/OBS HIGH 50: CPT | Performed by: INTERNAL MEDICINE

## 2019-10-17 RX ORDER — WARFARIN SODIUM 2 MG/1
2 TABLET ORAL NIGHTLY
Status: DISCONTINUED | OUTPATIENT
Start: 2019-10-17 | End: 2019-10-17

## 2019-10-17 RX ORDER — WARFARIN SODIUM 2 MG/1
2 TABLET ORAL NIGHTLY
Status: DISCONTINUED | OUTPATIENT
Start: 2019-10-17 | End: 2019-10-18

## 2019-10-17 NOTE — PROGRESS NOTES
[facility]    Progress Note    Yosef De Leon Patient Status:  Inpatient    1960 MRN [id number]   [facility] 3W/SW Attending [address], [address] Day # 9 PCP Juan Hoff DO            Subjective:     He MCH 30.9 30.2 30.6   MCHC 33.6 32.3 32.0   RDW 13.4 13.4 13.6   NEPRELIM 5.13 3.99 2.55   WBC 7.2 5.9 5.0   .0 197.0 145.0*     Lab Results   Component Value Date    INR 3.35 (H) 10/16/2019       Recent Labs   Lab 10/12/19  0519 10/13/19  0458 10/ FACS  General Surgery  [facility]  10/17/2019  8:48 AM

## 2019-10-17 NOTE — PLAN OF CARE
Problem: Diabetes/Glucose Control  Goal: Glucose maintained within prescribed range  Description  INTERVENTIONS:  - Monitor Blood Glucose as ordered  - Assess for signs and symptoms of hyperglycemia and hypoglycemia  - Administer ordered medications to m stability  Description  INTERVENTIONS:  - Monitor vital signs, rhythm, and trends  - Monitor for bleeding, hypotension and signs of decreased cardiac output  - Evaluate effectiveness of vasoactive medications to optimize hemodynamic stability  - Monitor ar stability  - Promote increasing activity/tolerance for mobility and gait  - Educate and engage patient/family in tolerated activity level and precautions  Outcome: Progressing     Problem: HEMATOLOGIC - ADULT  Goal: Maintains hematologic stability  Descrip

## 2019-10-17 NOTE — PROGRESS NOTES
[address]     Gastroenterology Progress Note    Aime Thomas Patient Status:  Inpatient    1960 MRN [id number]   [facility] 3W/SW Attending Nunu BestMIGUEL [facility] Day # 9 PCP Alpa Carrion DO with supportive care and monitoring  -minimize narcotics - had 2 norco this AM  -noted surgery advanced to low fiber soft diet   -Relistor PRN    Case discussed with RN Naz Moon and GI on-call, Dr. Estrada Bardales.     Annemarie Miranda PA-C  [facility]

## 2019-10-17 NOTE — PROGRESS NOTES
[facility]    Progress Note    Rositabrady Hillman Patient Status:  Inpatient    1960 MRN [id number]   [facility] 3W/SW Attending [address], [address] [facility] Day # 9 PCP Abida Stapleton DO        Subjective:     Respi 07/23/2019    DDIMER >4.00 (H) 05/30/2019    MG 1.9 10/14/2019    PHOS 2.9 07/26/2019    TROP 0.088 (HH) 10/08/2019    CK 24 (L) 06/14/2019    B12 1,229 (H) 06/28/2019                        DANA Brooks  10/17/2019          CARDIO ATTENDING ADDEND

## 2019-10-17 NOTE — PHYSICAL THERAPY NOTE
Attempted a second time today, with pt refusing again this pm, stating he did not want to get OOB due to having trouble with his breathing. Explained benefits of sitting and ambulating in halls, but pt continued to decline eval.  Will retry 10/18.

## 2019-10-17 NOTE — PHYSICAL THERAPY NOTE
Orders received, chart reviewed, however pt declined PT, stating that he is having 8/10 low back pain and doesn't want to get up and aggravate the pain. RN, Zarina Lozano states giving pt Norco about an hour ago.   Will follow and attempt to return this p.m., sche

## 2019-10-17 NOTE — PROGRESS NOTES
Pulmonary/Critical Care Follow Up Note    HPI:   Carmen Castellanos is a 60year old male with Patient presents with:  Hemoptysis (respiratory)      PCP Kim Mauricio, DO  Admission Attending Araceli Garcia 15 Day #9     Denies Abd pain Leotha Leyden Rectal, Daily PRN  •  carvedilol (COREG) tab 3.125 mg, 3.125 mg, Oral, BID with meals  •  ipratropium-albuterol (DUONEB) nebulizer solution 3 mL, 3 mL, Nebulization, Q6H PRN  •  levETIRAcetam (KEPPRA) tab 1,000 mg, 1,000 mg, Oral, BID  •  mirtazapine (ANTONIA GFRNAA 51* 93 102   CA 8.6 8.0* 8.3*    142 140   K 5.3* 3.6 3.5    111 108   CO2 23.0 26.0 27.0         ASSESSMENT/PLAN:     1.  Venous thromboembolism  HD stable  ECHO with some acute on chronic R heart failure  No tPA risk > benefits  INR

## 2019-10-18 PROCEDURE — 99232 SBSQ HOSP IP/OBS MODERATE 35: CPT | Performed by: INTERNAL MEDICINE

## 2019-10-18 RX ORDER — ENOXAPARIN SODIUM 150 MG/ML
1 INJECTION SUBCUTANEOUS ONCE
Status: COMPLETED | OUTPATIENT
Start: 2019-10-18 | End: 2019-10-18

## 2019-10-18 RX ORDER — WARFARIN SODIUM 5 MG/1
5 TABLET ORAL NIGHTLY
Status: DISCONTINUED | OUTPATIENT
Start: 2019-10-18 | End: 2019-10-21

## 2019-10-18 NOTE — RESPIRATORY THERAPY NOTE
Went in to check on patient at 1 and TC was on standby and patient was wearing NC 2lpm w/PMV.  stated he didn't like the heat,I offered to remove donut heater,but patient refused TC again despite.

## 2019-10-18 NOTE — PROGRESS NOTES
[facility]    Progress Note    Aloha Numbers Patient Status:  Inpatient    1960 MRN [id number]   [facility] 3W/SW Attending [address], [address] [facility] Day # 10 PCP Concepcion Kim DO            Subjective:     He 45.3 44.9 39.1 41.4   MCV 92.1 93.5 95.6 93.7   MCH 30.9 30.2 30.6 31.2   MCHC 33.6 32.3 32.0 33.3   RDW 13.4 13.4 13.6 13.2   NEPRELIM 5.13 3.99 2.55  --    WBC 7.2 5.9 5.0 6.9   .0 197.0 145.0* 188.0     Lab Results   Component Value Date    INR 1 troponin  Pt remains on anticoagulation    Gallbladder drain   IR drain, replaced and repositioned.     Aydee Chappell MD FACS  General Surgery  [facility]  10/18/2019  3:40 PM

## 2019-10-18 NOTE — OCCUPATIONAL THERAPY NOTE
OCCUPATIONAL THERAPY EVALUATION - INPATIENT     Room Number: 328/328-A  Evaluation Date: 10/18/2019  Type of Evaluation: Initial       Physician Order: IP Consult to Occupational Therapy  Reason for Therapy: ADL/IADL Dysfunction and Discharge Planning    O level of impairment may benefit from [facility], however, dependent upon progress, Pt may benefit from [facility] d/t impaired mobility.      DISCHARGE RECOMMENDATIONS  OT Discharge Recommendations: Sub-acute rehabilitation;Home with home health PT/OT(dependent upon pro Equipment: Standard height toilet;3-in-1 commode  Shower/Tub and Equipment: Tub-shower combo; Shower chair  Other Equipment: None                  Stairs in Home: one level home  Use of Assistive Device(s): RW    Prior Level of [place]: Pt lives w/ wif Putting on and taking off regular upper body clothing?: A Little  -   Taking care of personal grooming such as brushing teeth?: None  -   Eating meals?: None    AM-PAC Score:  Score: 17  Approx Degree of Impairment: 50.11%  Standardized Score (AM-PAC Scale

## 2019-10-18 NOTE — PROGRESS NOTES
[facility]    Progress Note    Aloha Numbers Patient Status:  Inpatient    1960 MRN [id number]   [facility] 2W/SW Attending [address], [address] [facility] Day # 10 PCP Concepcion Kim DO       Subjective:   Calixto Gu ophthalmic ointment, , Both Eyes, TID PRN  bisacodyl (DULCOLAX) rectal suppository 10 mg, 10 mg, Rectal, Daily PRN  carvedilol (COREG) tab 3.125 mg, 3.125 mg, Oral, BID with meals  ipratropium-albuterol (DUONEB) nebulizer solution 3 mL, 3 mL, Nebulization, cholecystostomy tube   S/p exchange of cholecystostomy tube     PT/OT    D/c planning home soon    Certification      PHYSICIAN Certification of Need for Inpatient Hospitalization - Initial Certification    Patient will require inpatient services that will

## 2019-10-18 NOTE — PHYSICAL THERAPY NOTE
PHYSICAL THERAPY EVALUATION - INPATIENT     Room Number: 328/328-A  Evaluation Date: 10/18/2019  Type of Evaluation: Initial   Physician Order: PT Eval and Treat    Presenting Problem: Acute saddle pulmonary embolism  Reason for Therapy: Mobility Dysfunct pending progress (limited by pain this session))    PLAN  PT Treatment Plan: Bed mobility; Body mechanics; Endurance; Energy conservation;Patient education; Family education;Gait training;Range of motion;Strengthening;Stoop training;Transfer training;Balance t Patient active with HHPT and OT. Spouse assists with driving.      SUBJECTIVE  \"I just bought a brand new lift chair at home\"    PHYSICAL THERAPY EXAMINATION     OBJECTIVE  Precautions: (soft wrist brace donned RLE (2*pain))  Fall Risk: High fall risk CMS Modifier (G-Code): CL    FUNCTIONAL ABILITY STATUS  Gait Assessment   Gait Assistance: Not tested           Stoop/Curb Assistance: Not tested       Exercise/Education Provided:  Energy conservation  Functional activity tolerated  Posture  Strengtheni

## 2019-10-18 NOTE — PROGRESS NOTES
Pulmonary/Critical Care Follow Up Note    HPI:   Brayden Garcia is a 60year old male with Patient presents with:  Hemoptysis (respiratory)      PCP Ange Camacho DO  Admission Attending Araceli Juares 15 Day #10      Abd discomfort to mg, Rectal, Daily PRN  •  carvedilol (COREG) tab 3.125 mg, 3.125 mg, Oral, BID with meals  •  ipratropium-albuterol (DUONEB) nebulizer solution 3 mL, 3 mL, Nebulization, Q6H PRN  •  levETIRAcetam (KEPPRA) tab 1,000 mg, 1,000 mg, Oral, BID  •  mirtazapine ( warfarin and adjust dose for INR 2-3       2.  Chronic respiratory failure  S/p trach  Stable  Plan     trach collar with PMV   Would not de cannulate     3.  Obstructive sleep apnea  Pt has been keeping PMV in place at night  Plan trach collar open at nig

## 2019-10-18 NOTE — PLAN OF CARE
Problem: Diabetes/Glucose Control  Goal: Glucose maintained within prescribed range  Description  INTERVENTIONS:  - Monitor Blood Glucose as ordered  - Assess for signs and symptoms of hyperglycemia and hypoglycemia  - Administer ordered medications to m stability  Description  INTERVENTIONS:  - Monitor vital signs, rhythm, and trends  - Monitor for bleeding, hypotension and signs of decreased cardiac output  - Evaluate effectiveness of vasoactive medications to optimize hemodynamic stability  - Monitor ar family perspectives and choices   Outcome: Progressing   Patient AOX4 abdomen distended, had soft diet for first time at lunch yesterday. Patient irritable, wife states he is upset because he thinks the soft diet is making him more distended.  Patient passi

## 2019-10-19 PROCEDURE — 99232 SBSQ HOSP IP/OBS MODERATE 35: CPT | Performed by: INTERNAL MEDICINE

## 2019-10-19 RX ORDER — ENOXAPARIN SODIUM 150 MG/ML
1 INJECTION SUBCUTANEOUS EVERY 12 HOURS SCHEDULED
Status: DISCONTINUED | OUTPATIENT
Start: 2019-10-19 | End: 2019-10-26

## 2019-10-19 RX ORDER — PANTOPRAZOLE SODIUM 40 MG/1
40 TABLET, DELAYED RELEASE ORAL
Status: DISCONTINUED | OUTPATIENT
Start: 2019-10-19 | End: 2019-11-05

## 2019-10-19 NOTE — PHYSICAL THERAPY NOTE
PHYSICAL THERAPY TREATMENT NOTE - INPATIENT     Room Number: 328/328-A       Presenting Problem: Acute saddle pulmonary embolism    Problem List  Principal Problem:    Acute saddle pulmonary embolism, unspecified whether acute cor pulmonale present ([facility] Recommendations: Sub-acute rehabilitation(vs HHPT pending progress (limited by pain this session))     PLAN  PT Treatment Plan: Bed mobility; Body mechanics; Endurance; Energy conservation;Patient education; Family education;Gait training;Range of motion;Stren Assistance: Minimum assistance  Distance (ft): 20ft with chair follow  Assistive Device: Rolling walker  Pattern: (wide base of support; forward flexed posture)  Stoop/Curb Assistance: Not tested     Patient End of Session: Up in chair;Needs met;Call light

## 2019-10-19 NOTE — PLAN OF CARE
Problem: Diabetes/Glucose Control  Goal: Glucose maintained within prescribed range  Description  INTERVENTIONS:  - Monitor Blood Glucose as ordered  - Assess for signs and symptoms of hyperglycemia and hypoglycemia  - Administer ordered medications to m stability  Description  INTERVENTIONS:  - Monitor vital signs, rhythm, and trends  - Monitor for bleeding, hypotension and signs of decreased cardiac output  - Evaluate effectiveness of vasoactive medications to optimize hemodynamic stability  - Monitor ar stability  - Promote increasing activity/tolerance for mobility and gait  - Educate and engage patient/family in tolerated activity level and precautions    Outcome: Progressing     Problem: HEMATOLOGIC - ADULT  Goal: Maintains hematologic stability  Descr belongings within reach. Non skid socks on. Encouraged to call for assistance when needed. Pt calls appropriately. Frequent rounds. TRACH IN PLACE.

## 2019-10-19 NOTE — PROGRESS NOTES
Pt's PIV out approx 0600. Pt refuses to allow this RN to place another PIV. Pt educated on the need for IV access in the event of an emergency during this admission, pt continues to refuse.

## 2019-10-19 NOTE — PROGRESS NOTES
[facility]    Progress Note    Adalmaria esther Robbinsqueta Patient Status:  Inpatient    1960 MRN [id number]   [facility] 2W/SW Attending [address], [address] [facility] Day # 11 CISCO Colon DO       Subjective:   Jordyn Wagner mg, Rectal, Daily PRN  carvedilol (COREG) tab 3.125 mg, 3.125 mg, Oral, BID with meals  ipratropium-albuterol (DUONEB) nebulizer solution 3 mL, 3 mL, Nebulization, Q6H PRN  levETIRAcetam (KEPPRA) tab 1,000 mg, 1,000 mg, Oral, BID  mirtazapine (REMERON) tab Certification of Need for Inpatient Hospitalization - Initial Certification    Patient will require inpatient services that will reasonably be expected to span two midnight's based on the clinical documentation in H+P.    Based on patients current state of

## 2019-10-19 NOTE — PLAN OF CARE
Problem: Diabetes/Glucose Control  Goal: Glucose maintained within prescribed range  Description  INTERVENTIONS:  - Monitor Blood Glucose as ordered  - Assess for signs and symptoms of hyperglycemia and hypoglycemia  - Administer ordered medications to m ordered  - Initiate emergency measures for life threatening arrhythmias  - Monitor electrolytes and administer replacement therapy as ordered  Outcome: Progressing     Problem: Impaired Functional Mobility  Goal: Achieve highest/safest level of mobility/ga Problem: RESPIRATORY - ADULT  Goal: Achieves optimal ventilation and oxygenation  Description  INTERVENTIONS:  - Assess for changes in respiratory status  - Assess for changes in mentation and behavior  - Position to facilitate oxygenation and minimize r

## 2019-10-19 NOTE — PROGRESS NOTES
[facility]    Progress Note    Aloha Numbers Patient Status:  Inpatient    1960 MRN [id number]   [facility] 3W/SW Attending [address], [address] [facility] Day # 11 PCP Concepcion Kim DO            Subjective:     He 93.7   MCH 30.2 30.6 31.2   MCHC 32.3 32.0 33.3   RDW 13.4 13.6 13.2   NEPRELIM 3.99 2.55  --    WBC 5.9 5.0 6.9   .0 145.0* 188.0     Lab Results   Component Value Date    INR 1.73 (H) 10/18/2019       Recent Labs   Lab 10/13/19  0458 10/14/19  042 anticoagulation    Gallbladder drain   IR drain, replaced and repositioned.     Abdullahi Matthew MD [facility]  General Surgery  [facility]  10/19/2019  7:31 AM

## 2019-10-20 PROCEDURE — 99232 SBSQ HOSP IP/OBS MODERATE 35: CPT | Performed by: INTERNAL MEDICINE

## 2019-10-20 RX ORDER — POTASSIUM CHLORIDE 20 MEQ/1
40 TABLET, EXTENDED RELEASE ORAL EVERY 4 HOURS
Status: COMPLETED | OUTPATIENT
Start: 2019-10-20 | End: 2019-10-20

## 2019-10-20 RX ORDER — FUROSEMIDE 40 MG/1
40 TABLET ORAL DAILY
Status: DISCONTINUED | OUTPATIENT
Start: 2019-10-20 | End: 2019-10-24

## 2019-10-20 NOTE — PROGRESS NOTES
Pulmonary/Critical Care Follow Up Note    HPI:   Roel Alexandra is a 60year old male with Patient presents with:  Hemoptysis (respiratory)      PCP Hazeline Eisenmenger, DO  Admission Attending Araceli Prince 15 Day #11     No abd discomfort PRN  •  bisacodyl (DULCOLAX) rectal suppository 10 mg, 10 mg, Rectal, Daily PRN  •  carvedilol (COREG) tab 3.125 mg, 3.125 mg, Oral, BID with meals  •  ipratropium-albuterol (DUONEB) nebulizer solution 3 mL, 3 mL, Nebulization, Q6H PRN  •  levETIRAcetam (K 7 11   CREATSERUM 0.90 0.72 0.83   GFRAA 108 118 111   GFRNAA 93 102 96   CA 8.0* 8.3* 8.1*    140 141   K 3.6 3.5 3.4*    108 107   CO2 26.0 27.0 29.0         ASSESSMENT/PLAN:     Venous thromboembolism  HD stable  ECHO with some acute on

## 2019-10-20 NOTE — PLAN OF CARE
Problem: Diabetes/Glucose Control  Goal: Glucose maintained within prescribed range  Description  INTERVENTIONS:  - Monitor Blood Glucose as ordered  - Assess for signs and symptoms of hyperglycemia and hypoglycemia  - Administer ordered medications to m stability  Description  INTERVENTIONS:  - Monitor vital signs, rhythm, and trends  - Monitor for bleeding, hypotension and signs of decreased cardiac output  - Evaluate effectiveness of vasoactive medications to optimize hemodynamic stability  - Monitor ar

## 2019-10-20 NOTE — PLAN OF CARE
Problem: Diabetes/Glucose Control  Goal: Glucose maintained within prescribed range  Description  INTERVENTIONS:  - Monitor Blood Glucose as ordered  - Assess for signs and symptoms of hyperglycemia and hypoglycemia  - Administer ordered medications to m stability  Description  INTERVENTIONS:  - Monitor vital signs, rhythm, and trends  - Monitor for bleeding, hypotension and signs of decreased cardiac output  - Evaluate effectiveness of vasoactive medications to optimize hemodynamic stability  - Monitor ar stability  - Promote increasing activity/tolerance for mobility and gait  - Educate and engage patient/family in tolerated activity level and precautions    Outcome: Progressing     Problem: HEMATOLOGIC - ADULT  Goal: Maintains hematologic stability  Descr assisted to turn q2h and prn to prevent skin breakdown.

## 2019-10-21 ENCOUNTER — APPOINTMENT (OUTPATIENT)
Dept: INTERVENTIONAL RADIOLOGY/VASCULAR | Facility: HOSPITAL | Age: 59
DRG: 176 | End: 2019-10-21
Attending: CLINICAL NURSE SPECIALIST
Payer: MEDICARE

## 2019-10-21 PROCEDURE — BF121ZZ FLUOROSCOPY OF GALLBLADDER USING LOW OSMOLAR CONTRAST: ICD-10-PCS | Performed by: RADIOLOGY

## 2019-10-21 PROCEDURE — 99232 SBSQ HOSP IP/OBS MODERATE 35: CPT | Performed by: INTERNAL MEDICINE

## 2019-10-21 RX ORDER — WARFARIN SODIUM 2.5 MG/1
2.5 TABLET ORAL NIGHTLY
Status: DISCONTINUED | OUTPATIENT
Start: 2019-10-21 | End: 2019-10-22

## 2019-10-21 NOTE — PROGRESS NOTES
[facility]    Progress Note    Kelsi Quiles Patient Status:  Inpatient    1960 MRN [id number]   [facility] 3W/SW Attending [address], [address] [facility] Day # 15 PCP Nira Spatz, DO            Subjective:     He 31.2 30.6 30.8   MCHC 33.3 32.9 33.0   RDW 13.2 13.5 13.6   NEPRELIM  --  5.16  --    WBC 6.9 8.5 6.3   .0 183.0 149.0*     Lab Results   Component Value Date    INR 1.71 (H) 10/21/2019       Recent Labs   Lab 10/17/19  0949 10/19/19  0917 10/20/19 Elevated troponin  Pt remains on anticoagulation    Gallbladder drain   IR drain, replaced and repositioned.   Pt had some odd sensation with injection and poor back return  Will check with IR, most likely will just leave cholecystomy in place without injec

## 2019-10-21 NOTE — PROGRESS NOTES
[facility]    Progress Note    Malinda Flores Patient Status:  Inpatient    1960 MRN [id number]   [facility] 2W/SW Attending [address], [address] Day # 12 PCP Luz Elena Cordero DO       Subjective:   Jacob Mini PRN  bisacodyl (DULCOLAX) rectal suppository 10 mg, 10 mg, Rectal, Daily PRN  carvedilol (COREG) tab 3.125 mg, 3.125 mg, Oral, BID with meals  ipratropium-albuterol (DUONEB) nebulizer solution 3 mL, 3 mL, Nebulization, Q6H PRN  levETIRAcetam (KEPPRA) tab 1 cholecystostomy tube   S/p exchange of cholecystostomy tube     PT/OT     iCertification      PHYSICIAN Certification of Need for Inpatient Hospitalization - Initial Certification    Patient will require inpatient services that will reasonably be expected

## 2019-10-21 NOTE — PROCEDURES
[facility]  Procedure Note    Magda Ko Patient Status:  Inpatient    1960 MRN [id number]   Location [facility] Attending Ashlyn Roman [facility] Day # 15 PCP Janel Hutchins DO     Procedure:

## 2019-10-21 NOTE — PROGRESS NOTES
[place] [facility]    Progress Note    Tinsley Begin Patient Status:  Inpatient    1960 MRN [id number]   [facility] 2W/SW Attending [address], [address] [facility] Day # 13 PCP Stefany Joyce DO       Subjective:   Roro Risk 24 hr tab 10 mg, 10 mg, Oral, Daily with breakfast  artificial tears 83-15 % ophthalmic ointment, , Both Eyes, TID PRN  bisacodyl (DULCOLAX) rectal suppository 10 mg, 10 mg, Rectal, Daily PRN  carvedilol (COREG) tab 3.125 mg, 3.125 mg, Oral, BID with meals Approved by (CST): Belem Medeiros MD on 10/21/2019 at 16:15                    Assessment and Plan:       Pseudo obstruction / chronic colonic ileus  Due to narcotic use   Avoid narcotic   Tylenol prn for pain   Tolerates po diet     Acute saddle pulmon

## 2019-10-21 NOTE — PROGRESS NOTES
[facility]    Progress Note      Assessment and Plan:   1. Venous thromboembolism– the patient is doing well clinically on full dose Lovenox but his INR is not between 2 and 3. Recommendations:  1. Lovenox  2.   Ongoing Coumadin dosing without hepatosplenomegaly and no mass appreciable. Extremities without clubbing cyanosis with lower extremity edema. Neurologic diffuse weakness.   Skin without gross abnormality     Results:     Lab Results   Component Value Date    WBC 6.3 10/20/2019

## 2019-10-21 NOTE — PHYSICAL THERAPY NOTE
Attempted PT treatment- pt declining PT intervention today, \"if my INR is better, I'm going home! \" Patient continued to decline despite therapists education on benefits of mobilization. \"No, I'm too sore. \"  Will re-attempt this p.m. as schedule permits

## 2019-10-21 NOTE — PLAN OF CARE
Problem: Patient Centered Care  Goal: Patient preferences are identified and integrated in the patient's plan of care  Description  Interventions:  - What would you like us to know as we care for you?  \" I was in the ICU for almost 2 months when I was he activity/tolerance for mobility and gait  - Educate and engage patient/family in tolerated activity level and precautions    10/21/2019 0941 by Hannah Samayoa RN  Outcome: Progressing  10/21/2019 0941 by Hannah Samayoa RN  Outcome: Progre arrhythmias  - Monitor electrolytes and administer replacement therapy as ordered  10/21/2019 0941 by Terell Olivera RN  Outcome: Progressing  10/21/2019 0941 by Terell Olivera RN  Outcome: Progressing     Problem: RESPIRATORY - ADULT  Go Diabetes/Glucose Control  Goal: Glucose maintained within prescribed range  Description  INTERVENTIONS:  - Monitor Blood Glucose as ordered  - Assess for signs and symptoms of hyperglycemia and hypoglycemia  - Administer ordered medications to maintain glu

## 2019-10-22 ENCOUNTER — APPOINTMENT (OUTPATIENT)
Dept: INTERVENTIONAL RADIOLOGY/VASCULAR | Facility: HOSPITAL | Age: 59
DRG: 176 | End: 2019-10-22
Attending: CLINICAL NURSE SPECIALIST
Payer: MEDICARE

## 2019-10-22 PROCEDURE — 0F9430Z DRAINAGE OF GALLBLADDER WITH DRAINAGE DEVICE, PERCUTANEOUS APPROACH: ICD-10-PCS | Performed by: RADIOLOGY

## 2019-10-22 PROCEDURE — 99232 SBSQ HOSP IP/OBS MODERATE 35: CPT | Performed by: INTERNAL MEDICINE

## 2019-10-22 RX ORDER — LIDOCAINE HYDROCHLORIDE 20 MG/ML
INJECTION, SOLUTION EPIDURAL; INFILTRATION; INTRACAUDAL; PERINEURAL
Status: COMPLETED
Start: 2019-10-22 | End: 2019-10-22

## 2019-10-22 RX ORDER — MIDAZOLAM HYDROCHLORIDE 1 MG/ML
INJECTION INTRAMUSCULAR; INTRAVENOUS
Status: DISCONTINUED
Start: 2019-10-22 | End: 2019-10-22 | Stop reason: WASHOUT

## 2019-10-22 RX ORDER — CLINDAMYCIN PHOSPHATE 900 MG/50ML
INJECTION INTRAVENOUS
Status: COMPLETED
Start: 2019-10-22 | End: 2019-10-22

## 2019-10-22 RX ORDER — SODIUM CHLORIDE 9 MG/ML
INJECTION, SOLUTION INTRAVENOUS
Status: COMPLETED
Start: 2019-10-22 | End: 2019-10-22

## 2019-10-22 RX ORDER — MIDAZOLAM HYDROCHLORIDE 1 MG/ML
INJECTION INTRAMUSCULAR; INTRAVENOUS
Status: COMPLETED
Start: 2019-10-22 | End: 2019-10-22

## 2019-10-22 RX ORDER — CEFAZOLIN SODIUM/WATER 2 G/20 ML
SYRINGE (ML) INTRAVENOUS
Status: DISCONTINUED
Start: 2019-10-22 | End: 2019-10-22 | Stop reason: WASHOUT

## 2019-10-22 NOTE — PROGRESS NOTES
[facility]    Progress Note    Vanessa Tavarez Patient Status:  Inpatient    1960 MRN [id number]   [facility] 2W/SW Attending [address], [address] Day # 14 PCP Len March DO       Subjective:   Kelsie Garcia breakfast  artificial tears 83-15 % ophthalmic ointment, , Both Eyes, TID PRN  bisacodyl (DULCOLAX) rectal suppository 10 mg, 10 mg, Rectal, Daily PRN  carvedilol (COREG) tab 3.125 mg, 3.125 mg, Oral, BID with meals  ipratropium-albuterol (DUONEB) nebulize 10/21/2019 at 16:15                    Assessment and Plan:       History of cholecystitis and cholecystostomy tube   cholecystostomy tube got retracted   s/p cholecystostomy tube exchange by IR     Pseudo obstruction / chronic colonic ileus  Due to narcot

## 2019-10-22 NOTE — PROGRESS NOTES
Brayden Garcia  [id number]  10/22/2019    Post procedure/ recovery hand-off report given to Lizz Chandler RN. Patient's vital signs are stable. Procedural  access site is dry and intact with no signs and symptoms of bleeding/ hematoma.  Deysi tube  Connected to drain

## 2019-10-22 NOTE — PROGRESS NOTES
Pulmonary/Critical Care Follow Up Note    HPI:   Gustavo Parada is a 60year old male with Patient presents with:  Hemoptysis (respiratory)      PCP Gabby Owens, DO  Admission Attending Angel Javier [facility] Day #14     No abd discomfort tears 83-15 % ophthalmic ointment, , Both Eyes, TID PRN  •  bisacodyl (DULCOLAX) rectal suppository 10 mg, 10 mg, Rectal, Daily PRN  •  carvedilol (COREG) tab 3.125 mg, 3.125 mg, Oral, BID with meals  •  ipratropium-albuterol (DUONEB) nebulizer solution 3 --  30.0         ASSESSMENT/PLAN:     Venous thromboembolism  HD stable  ECHO with some acute on chronic R heart failure  No tPA risk > benefits  INR high and now low   Plan   warfarin and adjust dose for INR 2-3   Lovenox in the mean time       Chronic re

## 2019-10-22 NOTE — PROGRESS NOTES
[facility]    Progress Note      Assessment and Plan:   1. Venous thromboembolism– the patient is doing well clinically on full dose Lovenox but his INR is not between 2 and 3. Little change clinically. Recommendations:  1.   Lovenox  2 murmur. Abdomen nontender, without hepatosplenomegaly and no mass appreciable. Extremities without clubbing cyanosis with lower extremity edema. Neurologic diffuse weakness.   Skin without gross abnormality     Results:     Lab Results   Component Lamar

## 2019-10-22 NOTE — PLAN OF CARE
Problem: Diabetes/Glucose Control  Goal: Glucose maintained within prescribed range  Description  INTERVENTIONS:  - Monitor Blood Glucose as ordered  - Assess for signs and symptoms of hyperglycemia and hypoglycemia  - Administer ordered medications to m stability  Description  INTERVENTIONS:  - Monitor vital signs, rhythm, and trends  - Monitor for bleeding, hypotension and signs of decreased cardiac output  - Evaluate effectiveness of vasoactive medications to optimize hemodynamic stability  - Monitor ar for signs and symptoms of internal bleeding  - Monitor lab trends  Outcome: Progressing     Problem: Patient Centered Care  Goal: Patient preferences are identified and integrated in the patient's plan of care  Description  Interventions:  - What would you

## 2019-10-22 NOTE — PLAN OF CARE
Problem: Diabetes/Glucose Control  Goal: Glucose maintained within prescribed range  Description  INTERVENTIONS:  - Monitor Blood Glucose as ordered  - Assess for signs and symptoms of hyperglycemia and hypoglycemia  - Administer ordered medications to m stability  Description  INTERVENTIONS:  - Monitor vital signs, rhythm, and trends  - Monitor for bleeding, hypotension and signs of decreased cardiac output  - Evaluate effectiveness of vasoactive medications to optimize hemodynamic stability  - Monitor ar stability  - Promote increasing activity/tolerance for mobility and gait  - Educate and engage patient/family in tolerated activity level and precautions  {Additional Mobility Interventions:  Outcome: Progressing     Problem: HEMATOLOGIC - ADULT  Goal: Memory Armando in place. Bed in lowest, locked position.

## 2019-10-23 RX ORDER — WARFARIN SODIUM 5 MG/1
5 TABLET ORAL NIGHTLY
Status: DISCONTINUED | OUTPATIENT
Start: 2019-10-23 | End: 2019-10-25

## 2019-10-23 RX ORDER — POTASSIUM CHLORIDE 20 MEQ/1
40 TABLET, EXTENDED RELEASE ORAL ONCE
Status: COMPLETED | OUTPATIENT
Start: 2019-10-23 | End: 2019-10-23

## 2019-10-23 NOTE — PLAN OF CARE
Problem: Diabetes/Glucose Control  Goal: Glucose maintained within prescribed range  Description  INTERVENTIONS:  - Monitor Blood Glucose as ordered  - Assess for signs and symptoms of hyperglycemia and hypoglycemia  - Administer ordered medications to m stability  Description  INTERVENTIONS:  - Monitor vital signs, rhythm, and trends  - Monitor for bleeding, hypotension and signs of decreased cardiac output  - Evaluate effectiveness of vasoactive medications to optimize hemodynamic stability  - Monitor ar and vital signs for trends  - Administer supportive blood products/factors, fluids and medications as ordered and appropriate  - Administer supportive blood products/factors as ordered and appropriate  Outcome: Progressing  Goal: Free from bleeding injury

## 2019-10-23 NOTE — CONSULTS
INFECTIOUS DISEASE CONSULT NOTE    Fidelina Baires Patient Status:  Inpatient    1960 MRN [id number]   [facility] 3W/SW Attending [address], [address] [facility] Day # 15 PCP Jacqueline Villela Now presents on 10/8 with shortness of breath, hemoptysis, chest pain with findings of bilateral PE on CT chest with concern for pulmonary infarction of the right lower lobe as well as some left leg DVT. Placed on heparin drip. No antibiotics started.   O Maternal Grandmother 79      reports that he has never smoked. He has never used smokeless tobacco. He reports that he does not drink alcohol or use drugs.     Allergies:    Demerol [Meperidine]      Januvia [Sitaglipti*        Comment:GI upset, abdominal p Nightly PRN  •  PEG 3350 (MIRALAX) powder packet 17 g, 17 g, Oral, Daily  •  dextrose 50 % injection 50 mL, 50 mL, Intravenous, PRN  •  Glucose-Vitamin C (DEX-4) chewable tab 4 tablet, 4 tablet, Oral, Q15 Min PRN  •  glucose (DEX4) oral liquid 15 g, 15 g, GFRNAA 102  --  99 97   CA 8.2*  --  8.4* 8.6   ALB  --   --  2.2* 2.4*     --  140 139   K 3.1* 4.0 3.8 3.7     --  106 103   CO2 29.0  --  30.0 30.0   ALKPHO  --   --  46 49   AST  --   --  18 19   ALT  --   --  10* 12*   BILT  --   --  0.2 presents on 10/8 with shortness of breath, hemoptysis, chest pain with findings of bilateral PE on CT chest with concern for pulmonary infarction of the right lower lobe as well as some left leg DVT. Placed on heparin drip. No antibiotics started.   On 10 ischemic colitis  # Fibromyalgia with opoid dependence  # CHF  # GI bleed and anemia      PLAN:    - continue to monitor off abx  - follow up on cx results  - follow diarrhea  - follow fever curve, WBC  - reviewed labs, micro, imaging report, recent record

## 2019-10-23 NOTE — PROGRESS NOTES
[facility]    Progress Note    Minerva Score Patient Status:  Inpatient    1960 MRN [id number]   [facility] 2W/SW Attending [address], [address] Day [address] 15 PCP Kieran Leyden, DO       Subjective:   Fabricio Weinstein mg, Oral, Q6H PRN  Alfuzosin HCl ER (UROXATRAL) 24 hr tab 10 mg, 10 mg, Oral, Daily with breakfast  artificial tears 83-15 % ophthalmic ointment, , Both Eyes, TID PRN  bisacodyl (DULCOLAX) rectal suppository 10 mg, 10 mg, Rectal, Daily PRN  carvedilol (COR Tolerates po diet     Acute saddle pulmonary embolism, unspecified whether acute cor pulmonale present (HCC)with extensive DVT of LLE  INR 1.5  Continue lovenox and coumadin     HTN   controlled   Continue lasix 40 mg po daily     Type 2 DM   Continue In

## 2019-10-23 NOTE — PHYSICAL THERAPY NOTE
PHYSICAL THERAPY TREATMENT NOTE - INPATIENT     Room Number: 328/328-A       Presenting Problem: Acute saddle pulmonary embolism    Problem List  Principal Problem:    Acute saddle pulmonary embolism, unspecified whether acute cor pulmonale present ([facility] training    SUBJECTIVE  \"I'm not getting up. Not with this pain! \"    OBJECTIVE  Precautions: Drain(s)(R abdomen)    WEIGHT BEARING RESTRICTION  Weight Bearing Restriction: None                PAIN ASSESSMENT   Rating: Unable to rate  Location: \"where my demonstrate transfers Sit to/from Stand at assistance level: supervision with walker - rolling      Goal #2  Current Status NT   Goal #3 Patient is able to ambulate 150 feet with assist device: walker - rolling at assistance level: CG   Goal #3   Current S

## 2019-10-23 NOTE — DIETARY NOTE
ADULT NUTRITION REASSESSMENT     Pt is at moderate nutrition risk. Pt does not meet malnutrition criteria.       CRITERIA FOR MALNUTRITION DIAGNOSIS:  Only meets 1 - hard to determine true wt loss d/t varying wt and + fluid  Energy intake <50% of EER x5 da Rational/use of oral supplements discussed.   - Vitamin and mineral supplements: thiamin  - Feeding assistance: meal set up and feed  - Nutrition education: assess education needs  - Coordination of nutrition care: collaboration with other providers  - Disc RELATED HISTORY:  Appetite: Fair and Improved  Intake: 100%   Intake Meeting Needs: Yes and supplement to maximize intake  Food Allergies: No  Cultural/Ethnic/Amish Preferences: Not Obtained  MEDICATIONS: noted and reviewed   LABS: reviewed    Maggie Weiner

## 2019-10-23 NOTE — PROGRESS NOTES
[facility]    Progress Note    Minerva Score Patient Status:  Inpatient    1960 MRN [id number]   [facility] 3W/SW Attending [address], [address] Day [address] 15 PCP Kieran Leyden, DO            Subjective:     He 10/19/19  0917 10/20/19  0621 10/21/19  0751 10/23/19  0605   RBC 4.34 4.26* 4.11* 4.09*   HGB 13.3 13.1 12.7* 12.4*   HCT 40.4 39.7 38.8* 38.4*   MCV 93.1 93.2 94.4 93.9   MCH 30.6 30.8 30.9 30.3   MCHC 32.9 33.0 32.7 32.3   RDW 13.5 13.6 13.7 13.7   NEPR not needing narcotics.        If colonic distention is increased from baseline, options are         1) Neostigmine, 2 2.5 mg IV over 3-5 minutes, best to be done in monitored ICU setting            Contraindicated in presence of bundle branch block

## 2019-10-24 RX ORDER — FUROSEMIDE 20 MG/1
20 TABLET ORAL DAILY
Qty: 30 TABLET | Refills: 0 | Status: SHIPPED | OUTPATIENT
Start: 2019-10-25 | End: 2019-11-04

## 2019-10-24 RX ORDER — FUROSEMIDE 20 MG/1
20 TABLET ORAL DAILY
Status: DISCONTINUED | OUTPATIENT
Start: 2019-10-25 | End: 2019-11-05

## 2019-10-24 RX ORDER — WARFARIN SODIUM 5 MG/1
5 TABLET ORAL NIGHTLY
Qty: 30 TABLET | Refills: 0 | Status: SHIPPED | OUTPATIENT
Start: 2019-10-24 | End: 2019-11-04

## 2019-10-24 NOTE — PROGRESS NOTES
[facility] [facility]    Progress Note    Natalia Barajas Patient Status:  Inpatient    1960 MRN [id number]   [facility] 2W/SW Attending [address], [address] [facility] Day # 16 PCP Imtiaz Shoemaker DO       Subjective:   Naa Garcia Q6H PRN  Alfuzosin HCl ER (UROXATRAL) 24 hr tab 10 mg, 10 mg, Oral, Daily with breakfast  artificial tears 83-15 % ophthalmic ointment, , Both Eyes, TID PRN  bisacodyl (DULCOLAX) rectal suppository 10 mg, 10 mg, Rectal, Daily PRN  carvedilol (COREG) tab 3. signed on 10/23/2019 at 16:58 by Melissa De Leon MD          Assessment and Plan:         ID Afebrile   Blood cx x2 no growth   Observe off Antibiotics   ID f/u noted     History of cholecystitis and cholecystostomy tube   s/p cholecystostomy tube exchange by

## 2019-10-24 NOTE — RESPIRATORY THERAPY NOTE
Patient continues to sleep with PMV on despite possible safety issues. RN aware,spare trachs and ambu at bedside.

## 2019-10-24 NOTE — CM/SW NOTE
Pt may be ready for d/c today - pending ID consult. SW spoke w/ pt and inquired about PT/OT rec for SNF - pt declines SNF. SW contacted pt's RN and req resume HHC orders prior to d/c. Need resume [facility] Posadas orders prior to d/c.     PLAN: [facility], need resume

## 2019-10-24 NOTE — PROGRESS NOTES
[facility]    [facility] Note    Roberto Sickle Patient Status:  Inpatient    1960 MRN [id number]   [facility] 3W/SW Attending [address], [address] Day [address] 16 PCP Osiel Stephens DO            Subjective:     He leg swelling, no calf tenderness,   NEUROLOGIC: alert and oriented x 3; affect appropriate        Results:       Recent Labs   Lab 10/19/19  0917 10/20/19  0621 10/21/19  0751 10/23/19  0605   RBC 4.34 4.26* 4.11* 4.09*   HGB 13.3 13.1 12.7* 12.4*   HCT 40 minimizing/eliminating narcotics, use of Relistor and suppositories that he may continue to improve. The patient's wife had reported to Dr. Saida Russo that he was doing well for about one month while he was in rehab and not needing narcotics.     From previous

## 2019-10-24 NOTE — PLAN OF CARE
Patient given norco for RLQ abd pain where cholecystostomy drain is located. Patient refuses to be turn or tilted with pillow support. Protective barrier ointment was applied during patient's bath this am. D/C held yesterday r/t fever, diarrhea, and INR 1. report SOB or any respiratory difficulty  - Respiratory Therapy support as indicated  - Manage/alleviate anxiety  - Monitor for signs/symptoms of CO2 retention  Outcome: Progressing     Problem: CARDIOVASCULAR - ADULT  Goal: Maintains optimal cardiac outpu precautions as appropriate  - Initiate Pressure Ulcer prevention bundle as indicated  Outcome: Progressing     Problem: Impaired Functional Mobility  Goal: Achieve highest/safest level of mobility/gait  Description  Interventions:  - Assess patient's funct

## 2019-10-24 NOTE — PROGRESS NOTES
[facility] ID PROGRESS NOTE    Aloha Numbers Patient Status:  Inpatient    1960 MRN [id number]   [facility] 3W/SW Attending [address], [address] [facility] Day # 16 PCP Concepcion Stage, DO     Subjective:  Awake, no diarrhea.  Having mild pa disorder with anxiety     Acute cholecystitis     Hemoptysis     Pulmonary embolus (HCC)     Elevated troponin     Acute saddle pulmonary embolism, unspecified whether acute cor pulmonale present (HCC)      ASSESSMENT:    Antibiotics: None    60year-old m the right lower lobe as well as some left leg DVT. Placed on heparin drip. No antibiotics started.   On 10/10 noted to have abdominal pain with CT A/P done with again colonic distention with rectal wall thickening, persistent gallbladder distention withou antibiotics. -  FU blood cx--NGTD. Biliary cx results reviewed. -  Follow fever curve, wbc. -  Reviewed labs, micro, imaging reports.  -  Case d/w patient, RN.     Kulwinder Naqvi PA-C  [facility]  [phone]  10/24/2019

## 2019-10-25 RX ORDER — HYDROCODONE BITARTRATE AND ACETAMINOPHEN 5; 325 MG/1; MG/1
1 TABLET ORAL EVERY 6 HOURS PRN
Status: DISCONTINUED | OUTPATIENT
Start: 2019-10-25 | End: 2019-11-05

## 2019-10-25 RX ORDER — WARFARIN SODIUM 10 MG/1
10 TABLET ORAL
Status: COMPLETED | OUTPATIENT
Start: 2019-10-25 | End: 2019-10-25

## 2019-10-25 NOTE — BH PROGRESS NOTE
Behavioral Health Note:  CHIEF COMPLAINT:   Hemoptysis, SOB, chest pain, Acute bilateral pulmonary embolisms. REASON FOR ADMISSION:   Acute bilateral pulmonary embolisms.     SOCIAL HISTORY:  Bola Saba and his wife of 35 years live at their home in [place] gets to go home.      MENTAL STATUS EXAM:  Appearance: sitting slouched on hospital bed  Attitude/Coorperation: cooperative  Behavior: No psychomotor abnormality   Speech: normal rate, rhythm, and volume  Mood: \"annoyed\", disinterested  Affect: irritable,

## 2019-10-25 NOTE — PLAN OF CARE
Patient a bit disgruntled that norco was discontinued by [address], per progress note, order still active in [facility] [address]. Tylenol was given for pain over night. The patient's cholecystostomy tube dressing was changed over night.  Plan for possible d/c home w oxygenation  Description  INTERVENTIONS:  - Assess for changes in respiratory status  - Assess for changes in mentation and behavior  - Position to facilitate oxygenation and minimize respiratory effort  - Oxygen supplementation based on oxygen saturation Monitor electrolytes and administer replacement therapy as ordered  10/25/2019 0653 by Mateo Montero RN  Outcome: Progressing  10/25/2019 0639 by Mateo Montero RN  Outcome: Progressing  10/25/2019 0500 by Mateo Montero RN  Outcome: Progressing ADULT  Goal: Maintains hematologic stability  Description  INTERVENTIONS  - Assess for signs and symptoms of bleeding or hemorrhage  - Monitor labs and vital signs for trends  - Administer supportive blood products/factors, fluids and medications as ordere

## 2019-10-25 NOTE — OCCUPATIONAL THERAPY NOTE
Attempted to see for OT treatment. Pt received in bed, had been pre-medicated by RN. Pt adamantly refusing to participate. Pt upset because his narcotic pain meds had been discontinued.  Pt reports persistent soreness at the incision site and is refusing to

## 2019-10-25 NOTE — PROGRESS NOTES
[facility]    Progress Note    Aloha Numbers Patient Status:  Inpatient    1960 MRN [id number]   [facility] 2W/SW Attending [address], [address] [facility] Day # 17 PCP Concepcion Kim DO       Subjective:   Calixto Gu mg, 10 mg, Oral, Daily with breakfast  artificial tears 83-15 % ophthalmic ointment, , Both Eyes, TID PRN  bisacodyl (DULCOLAX) rectal suppository 10 mg, 10 mg, Rectal, Daily PRN  carvedilol (COREG) tab 3.125 mg, 3.125 mg, Oral, BID with meals  ipratropium Acute saddle pulmonary embolism, unspecified whether acute cor pulmonale present (HCC)with extensive DVT of LLE  INR 1.6  Continue lovenox and coumadin     HTN   controlled     Type 2 DM   Continue Insulin   Controlled     Hx of chronic Respiratory hilton

## 2019-10-25 NOTE — CM/SW NOTE
Case Management /Progression of Care    CM informed patient possibility to discharge on Lovonox,   Informed RN will Need RX faxed to [facility] for Price check with insurance coverage prior to discharge.   Possible discharge this weekend with [address] and Lovon

## 2019-10-25 NOTE — PHYSICAL THERAPY NOTE
Attempted PT treatment- patient was pre-medicated with Tylenol prior to therapy. Patient supine in bed. Declining all mobility this date- \"I'm aggravated because I'm in pain and now they aren't giving me pain medication. So I am not working with you! \" Th

## 2019-10-25 NOTE — PROGRESS NOTES
[facility] ID PROGRESS NOTE    Dayanara Jordan Patient Status:  Inpatient    1960 MRN [id number]   [facility] 3W/SW Attending [address], [address] [facility] Day # 16 PCP Tamara Desai DO     Subjective:  Awake, none loose stool yesterday. psychotic features ([facility] [address] [facility]     Adjustment disorder with anxiety     Acute cholecystitis     Hemoptysis     Pulmonary embolus (HCC)     Elevated troponin     Acute saddle pulmonary embolism, unspecified whether acute cor pulmonale present (HCC)      ASSESSMEN with concern for pulmonary infarction of the right lower lobe as well as some left leg DVT. Placed on heparin drip. No antibiotics started.   On 10/10 noted to have abdominal pain with CT A/P done with again colonic distention with rectal wall thickening, anemia     PLAN:  -  Continue to maintain off IV antibiotics. Blood cx remain negative and temperatures improved. -  Biliary cx results reviewed. -  Follow fever curve, wbc. -  Reviewed labs, micro, imaging reports.  -  Case d/w patient, RN.     Adele Jimenez

## 2019-10-25 NOTE — PLAN OF CARE
Patient is A/O x 4. Vitals WDL except low grade fevers. Surgery to consult before discharge regarding high temps.  Pt asked if he would be able to give himself a Lovenox shot and pt stated \"No.\" Pt asked if his wife would be able to give him the Lovenox a the need for suctioning and perform as needed  - Assess and instruct to report SOB or any respiratory difficulty  - Respiratory Therapy support as indicated  - Manage/alleviate anxiety  - Monitor for signs/symptoms of CO2 retention  Outcome: Progressing tissue  - Implement wound care per orders  - Initiate isolation precautions as appropriate  - Initiate Pressure Ulcer prevention bundle as indicated  Outcome: Progressing     Problem: Impaired Functional Mobility  Goal: Achieve highest/safest level of mobi Progressing

## 2019-10-25 NOTE — PROGRESS NOTES
[place] [facility]    Progress Note    Guido Cui Patient Status:  Inpatient    1960 MRN [id number]   [facility] 3W/SW Attending [address], [address] [facility] Day # 16 PCP Franny Mon DO            Subjective:     He continues to improve    Perc ja tube in place, new catheter placed  EXTREMITIES: no leg swelling, no calf tenderness,   NEUROLOGIC: alert and oriented x 3; affect appropriate      Results:       Recent Labs   Lab 10/19/19  0917  10/21/19  0751 10/23/19 with him starting to move in the right direction, minimizing/eliminating narcotics, use of Relistor and suppositories that he may continue to improve.  The patient's wife had reported to Dr. Susan Terry that he was doing well for about one month while he was in

## 2019-10-25 NOTE — CM/SW NOTE
MD orders received regarding HHC. The pt. Is current with [facility]. HHC orders have been entered. Yvette with [facility] is aware of MD orders. Uncertain of discharge date at this time.       [phone]

## 2019-10-26 PROCEDURE — 99223 1ST HOSP IP/OBS HIGH 75: CPT | Performed by: INTERNAL MEDICINE

## 2019-10-26 RX ORDER — HEPARIN SODIUM AND DEXTROSE 10000; 5 [USP'U]/100ML; G/100ML
INJECTION INTRAVENOUS CONTINUOUS
Status: DISCONTINUED | OUTPATIENT
Start: 2019-10-26 | End: 2019-11-02

## 2019-10-26 RX ORDER — ENOXAPARIN SODIUM 150 MG/ML
1 INJECTION SUBCUTANEOUS EVERY 12 HOURS SCHEDULED
Qty: 3 SYRINGE | Refills: 0 | Status: SHIPPED | OUTPATIENT
Start: 2019-10-26 | End: 2019-11-04

## 2019-10-26 RX ORDER — HEPARIN SODIUM AND DEXTROSE 10000; 5 [USP'U]/100ML; G/100ML
18 INJECTION INTRAVENOUS ONCE
Status: COMPLETED | OUTPATIENT
Start: 2019-10-26 | End: 2019-10-27

## 2019-10-26 NOTE — OCCUPATIONAL THERAPY NOTE
Attempted to see pt for OT treatment. Pt refusing again. Pt with possible discharge today.     Annalisa Fay MA, OTR/L  Occupational Therapist

## 2019-10-26 NOTE — PROGRESS NOTES
[place] [facility]    Progress Note    Magda Ko Patient Status:  Inpatient    1960 MRN [id number]   [facility] 3W/ Attending [address], [address] [facility] Day # 18 PCP Janel Hutchins DO            Subjective:     He tenderness,   NEUROLOGIC: alert and oriented x 3; affect appropriate      Results:       Recent Labs   Lab 10/19/19  0917  10/21/19  0751 10/23/19  0605 10/25/19  0626 10/26/19  0547   RBC 4.34   < > 4.11* 4.09* 4.36 3.96*   HGB 13.3   < > 12.7* 12.4* 13.2 Contraindicated in presence of bundle branch block            Monitor for bradycardia, atropine available for symptomatic bradycardia            Monitor for hypoxia as bronchospasm may develop        2) Decompressive sigmoidoscopy with rectal tube placemen

## 2019-10-26 NOTE — PLAN OF CARE
Plan was for d/c today however, Dr. Santosh Muller was consulted for INR not reaching therapeutic while on Lovenox for a week. Heparin drip will be started this evening, once INR is therapeutic, plan is to keep patient on heparin drip for 48hrs and then discharge. the need for suctioning and perform as needed  - Assess and instruct to report SOB or any respiratory difficulty  - Respiratory Therapy support as indicated  - Manage/alleviate anxiety  - Monitor for signs/symptoms of CO2 retention  Outcome: Progressing tissue  - Implement wound care per orders  - Initiate isolation precautions as appropriate  - Initiate Pressure Ulcer prevention bundle as indicated  Outcome: Progressing     Problem: Impaired Functional Mobility  Goal: Achieve highest/safest level of mobi family perspectives and choices   Outcome: Progressing

## 2019-10-26 NOTE — CM/SW NOTE
CM made aware of dc plan for home today. [facility] notified. Followed up with [facility]. At this time it has not been filled out by the pt or the physician. Cathi GARCIA following up with pt to confirm if he would like it completed prior to dc.    RADHA templeton Lov

## 2019-10-26 NOTE — PROGRESS NOTES
[facility]    Progress Note    Minerva Score Patient Status:  Inpatient    1960 MRN [id number]   [facility] 2W/SW Attending [address], [address] Day [address] 18 PCP Kieran Leyden, DO       Subjective:   Fabricio Weinstein mg, Oral, Daily with breakfast  artificial tears 83-15 % ophthalmic ointment, , Both Eyes, TID PRN  bisacodyl (DULCOLAX) rectal suppository 10 mg, 10 mg, Rectal, Daily PRN  carvedilol (COREG) tab 3.125 mg, 3.125 mg, Oral, BID with meals  ipratropium-albute ID Low grade fever    Blood cx x2 no growth   Observe off Antibiotics   ID f/u noted    History of cholecystitis and cholecystostomy tube   s/p cholecystostomy tube exchange by IR     Pseudo obstruction / chronic colonic ileus  Due to narcotic use  Pt

## 2019-10-26 NOTE — PLAN OF CARE
This writer flushed the the cholecystostomy tube with 5 ml of normal saline and aspirated 5 ml with immediate return of patency, and 250 ml of clear green liquid was drained into the collection bag.

## 2019-10-26 NOTE — PROGRESS NOTES
[facility]    Progress Note    Aime Thomas Patient Status:  Inpatient    1960 MRN [id number]   [facility] 2W/SW Attending [address], [address] PCP Alpa Carrion DO       Subjective:   Cecelia Purvis (UROXATRAL) 24 hr tab 10 mg, 10 mg, Oral, Daily with breakfast  artificial tears 83-15 % ophthalmic ointment, , Both Eyes, TID PRN  bisacodyl (DULCOLAX) rectal suppository 10 mg, 10 mg, Rectal, Daily PRN  carvedilol (COREG) tab 3.125 mg, 3.125 mg, Oral, BI for pain   D/w pt about the risk of bowel obstruction due to narcotics     Acute saddle pulmonary embolism, unspecified whether acute cor pulmonale present (HCC)with extensive DVT of LLE  INR 1.6  Continue lovenox and coumadin 10 MG PO tonight   PT/INR in

## 2019-10-26 NOTE — CONSULTS
Hematology Consultation Note    Patient Name: Gustavo Dixon   YOB: 1960   Medical Record Number: [id number]   CSN: [id number]   Consulting Physician: Lamont Buitrago MD  Referring Physician(s): Shanta Osborne MD  Date of Consultation: 10/26 Procedure Laterality Date   • COLONOSCOPY     • COLONOSCOPY N/A 6/14/2019    Performed by Omid Oakley MD at [address] [place] [address] ENDOSCOPY   • COLONOSCOPY N/A 5/28/2019    Performed by Kenyetta Kwon MD at [address] [place] [address] ENDOSCOPY   • COLONOSCOPY N/A 5/16/2019    Performed by Caffeine Concern: No        Exercise: No        Seat Belt: Not Asked        Special Diet: Not Asked        Stress Concern: Not Asked        Weight Concern: Not Asked         Service: Not Asked        Blood Transfusions: Not Asked        Occupationa Obese body habitus  Psych:  Friendly, appropriate questions and responses  HEENT: EOMs intact. Oropharynx is clear. Tracheostomy. No signs of oropharyngeal bleeding  Neck: No palpable lymphadenopathy. Neck is supple. Lymphatics:  There is no palpable lymph --Currently, patient has not been able to achieve therapeutic INR with Lovenox which is typically 1 mg to 1.5 mg twice daily therapeutic dosing.  His lovenox is being discontinued as it was under dosed and not achieving a therapeutic INR    --pt was abl

## 2019-10-26 NOTE — PLAN OF CARE
Upon attempting to drain cholecystostomy tube bag, writer noticed that there is an increased amount of exposed tubing, the stitches are no longer intact, there is no drainage in the bag, and there is a significant amount of dried blood that was not present

## 2019-10-27 PROCEDURE — 99232 SBSQ HOSP IP/OBS MODERATE 35: CPT | Performed by: INTERNAL MEDICINE

## 2019-10-27 RX ORDER — WARFARIN SODIUM 10 MG/1
10 TABLET ORAL NIGHTLY
Status: DISCONTINUED | OUTPATIENT
Start: 2019-10-27 | End: 2019-10-29

## 2019-10-27 NOTE — PROGRESS NOTES
Pt incontinent of stool in bed. While cleaning pt up found gallbladder drain completely out. Dr. [address] notified and instructed this RN to notify surgeon. Dr. Riaz Joseph called, stated nothing can be done at this time.   Occlusive dressing remains int

## 2019-10-27 NOTE — PROGRESS NOTES
[facility]  Hematology/Oncology  Progress Note    Magda Ko Patient Status:  Inpatient    1960 MRN [id number]   [facility] 3W/SW Attending [address], [address] [facility] Day # 23 PCP DO Zayda [place] with use of heparin+warfarin; INR is currently at 1.81, continue same management and follow tomorrow    --continue heparin gtt with warfarin 10 mg/daily for now     --pt does not need immediate hypercoagulable work up as the patient is being planned for in

## 2019-10-27 NOTE — PLAN OF CARE
VS stable. Pain managed with PRN Norco. Gallbladder drain pulled out (unknown how, found it lying in bed next to pt), appropriate physicians notified.       Problem: Diabetes/Glucose Control  Goal: Glucose maintained within prescribed range  Description  IN weights  Outcome: Progressing  Goal: Absence of cardiac arrhythmias or at baseline  Description  INTERVENTIONS:  - Continuous cardiac monitoring, monitor vital signs, obtain 12 lead EKG if indicated  - Evaluate effectiveness of antiarrhythmic and heart rat adequate hemostasis  - Assess for signs and symptoms of internal bleeding  - Monitor lab trends  Outcome: Progressing     Problem: Patient Centered Care  Goal: Patient preferences are identified and integrated in the patient's plan of care  Description  In

## 2019-10-27 NOTE — PROGRESS NOTES
[place] [facility] - [facility]    Progress Note    Carmen Castellanos Patient Status:  Inpatient    1960 MRN [id number]   [facility] 2W/SW Attending [address], [address] Day # 19 PCP Kim Mauricio DO       Subjective:   Darryll Litten ointment, , Both Eyes, TID PRN  bisacodyl (DULCOLAX) rectal suppository 10 mg, 10 mg, Rectal, Daily PRN  carvedilol (COREG) tab 3.125 mg, 3.125 mg, Oral, BID with meals  ipratropium-albuterol (DUONEB) nebulizer solution 3 mL, 3 mL, Nebulization, Q6H PRN  l pain meds RTC    Change norco to 1 tab po Q6HPRN    Tylenol prn for pain   D/w pt about the risk of bowel obstruction due to narcotics      Type 2 DM   Continue Insulin   Controlled     Hx of chronic Respiratory failure   On Trach   Capped     PT/OT  Pt ha Bladder non-tender and non-distended. Urine clear yellow.

## 2019-10-27 NOTE — OCCUPATIONAL THERAPY NOTE
Pt refused OT services at this time even with MAX encouragement from OT and RN. Will attempt again as appropriate and as schedule permits.

## 2019-10-27 NOTE — PROGRESS NOTES
[facility]    Progress Note    Natalia Barajas Patient Status:  Inpatient    1960 MRN [id number]   [facility] 3W/SW Attending [address], [address] [facility] Day # 19 PCP Imtiaz Shoemaker DO            Subjective:     h/ 187.0    < > = values in this interval not displayed.      Lab Results   Component Value Date    INR 1.81 (H) 10/27/2019       Recent Labs   Lab 10/21/19  0600 10/23/19  0605  10/25/19  0626 10/26/19  0547 10/26/19  1411   * 134*  --  133* 131* 168* for therapeutic INR prior to discharge    OK to d/c home from surgery standpoint when INR therapeutic  Instructions given  F/u with Dr. Jose Betts in 2-3 weeks.       Estela Pruitt MD [id number]  10/27/19  10:15 AM

## 2019-10-28 PROCEDURE — 99232 SBSQ HOSP IP/OBS MODERATE 35: CPT | Performed by: INTERNAL MEDICINE

## 2019-10-28 RX ORDER — AMOXICILLIN AND CLAVULANATE POTASSIUM 875; 125 MG/1; MG/1
1 TABLET, FILM COATED ORAL EVERY 12 HOURS SCHEDULED
Status: DISCONTINUED | OUTPATIENT
Start: 2019-10-28 | End: 2019-11-05

## 2019-10-28 NOTE — PROGRESS NOTES
[facility] ID PROGRESS NOTE    Gustavo Dixon Patient Status:  Inpatient    1960 MRN [id number]   [facility] 3W/SW Attending [address] Day # 20 PCP Mala Gates DO     Subjective:  Afebrile. Improved abdominal pain. Hemoptysis     Pulmonary embolus (HCC)     Elevated troponin     Acute saddle pulmonary embolism, unspecified whether acute cor pulmonale present (HCC)      ASSESSMENT:    Antibiotics: None    62-year-old male well-known from a prolonged admission previous DVT.  Placed on heparin drip. No antibiotics started. On 10/10 noted to have abdominal pain with CT A/P done with again colonic distention with rectal wall thickening, persistent gallbladder distention without inflammation.   Noted to have some Ileana tube anticipate 10 day course  -  Biliary cx results reviewed. -  Follow fever curve, wbc.   -  Reviewed labs, micro, imaging reports.  -  Case d/w patient, RN, Chirag Jerome MD  [facility]  [phone]  10/24/2019

## 2019-10-28 NOTE — PHYSICAL THERAPY NOTE
PHYSICAL THERAPY TREATMENT NOTE - INPATIENT     Room Number: 328/328-A       Presenting Problem: Acute saddle pulmonary embolism    Problem List  Principal Problem:    Acute saddle pulmonary embolism, unspecified whether acute cor pulmonale present (RUST 75.) chanel JARQUIN (2*pain))    WEIGHT BEARING RESTRICTION  Weight Bearing Restriction: None   PAIN ASSESSMENT   Rating: Unable to rate  Location: R flank  Management Techniques: Activity promotion; Body mechanics;Breathing techniques    BALANCE Patient is able to demonstrate transfers Sit to/from Stand at assistance level: supervision with walker - rolling      Goal #2  Current Status NT   Goal #3 Patient is able to ambulate 150 feet with assist device: walker - rolling at assistance level: CG

## 2019-10-28 NOTE — PROGRESS NOTES
[place] [facility] - [facility]    Progress Note    Clovis Itzelqueta Patient Status:  Inpatient    1960 MRN [id number]   [facility] 3W/SW Attending [address], [address] [facility] Day # 20 CISCO Colon DO         Assessment and Plan: 10 Units Subcutaneous Noon   • Alfuzosin HCl ER  10 mg Oral Daily with breakfast   • levetiracetam  1,000 mg Oral BID   • mirtazapine  15 mg Oral Nightly   • pregabalin  50 mg Oral BID   • PEG 3350  17 g Oral Daily             Results:     Lab Results   Co

## 2019-10-28 NOTE — PROGRESS NOTES
[facility]    Progress Note    Roel Alexandra Patient Status:  Inpatient    1960 MRN [id number]   [facility] 3W/SW Attending [address], [address] [facility] Day # 20 PCP Hazeline Eisenmenger, DO            Subjective:     h/ Value Date    INR 1.43 (H) 10/28/2019       Recent Labs   Lab 10/23/19  0605  10/26/19  0547 10/26/19  1411 10/28/19  0554   *   < > 131* 168* 144*   BUN 10   < > 10 9 12   CREATSERUM 0.81   < > 0.76 0.74 0.76   GFRAA 112   < > 115 117 115   GFRNAA Chapincito Mcfarland MD [facility]  10/28/19  12:41 PM

## 2019-10-28 NOTE — PROGRESS NOTES
[facility]    Progress Note    Migdalia Evans Patient Status:  Inpatient    1960 MRN [id number]   [facility] 2W/SW Attending [address], [address] [facility] Day # 20 PCP Javan Kent DO       Subjective:   Juan Bhatt breakfast  artificial tears 83-15 % ophthalmic ointment, , Both Eyes, TID PRN  bisacodyl (DULCOLAX) rectal suppository 10 mg, 10 mg, Rectal, Daily PRN  ipratropium-albuterol (DUONEB) nebulizer solution 3 mL, 3 mL, Nebulization, Q6H PRN  levETIRAcetam (KEPP Pseudo obstruction / chronic colonic ileus  Due to narcotic use  Change norco to 1 tab po Q6HPRN    Tylenol prn for pain   D/w pt about the risk of bowel obstruction due to narcotics   Patient has been on relistor but refuses      Type 2 DM   Continue

## 2019-10-28 NOTE — CM/SW NOTE
Case Management/ Progression of Care    Current Plan is no longer for Lovenox, now bridging to Coumadin with Heparin Drip. INR sub therapeutic    1.43 today  10/28      SW/CM to remain available for support and/or discharge planning.          Radha

## 2019-10-28 NOTE — PLAN OF CARE
Patient remains on Heparin drip (DVT/ PE protocol). No S/S of bleeding. Large BM today. Patient non compliant with care at times (refusing lab draws), and refusing working with therapy and or/getting out of bed.  Patient educated on the importance of moving SOB or any respiratory difficulty  - Respiratory Therapy support as indicated  - Manage/alleviate anxiety  - Monitor for signs/symptoms of CO2 retention  Outcome: Progressing     Problem: CARDIOVASCULAR - ADULT  Goal: Maintains optimal cardiac output and h appropriate  - Initiate Pressure Ulcer prevention bundle as indicated  Outcome: Progressing     Problem: Impaired Functional Mobility  Goal: Achieve highest/safest level of mobility/gait  Description  Interventions:  - Assess patient's functional ability a

## 2019-10-28 NOTE — PROGRESS NOTES
[facility]  Hematology/Oncology  Progress Note    Yosef De Leon Patient Status:  Inpatient    1960 MRN [id number]   [facility] 3W/SW Attending [address], [address] [facility] Day # 20 DO Brittnee Rivas today; no new meds or interruption of heparin, continue with use of heparin+warfarin at same dose for now; will follow tomorrow as we MAY need to inc the warfarin dosing    --continue heparin gtt with warfarin 10 mg/daily for now     --pt does not need imm Gladys Caraballo MD  10/28/2019

## 2019-10-28 NOTE — OCCUPATIONAL THERAPY NOTE
OCCUPATIONAL THERAPY TREATMENT NOTE - INPATIENT        Room Number: 328/328-A           Presenting Problem: PE, DVT    Problem List  Principal Problem:    Acute saddle pulmonary embolism, unspecified whether acute cor pulmonale present (HCC)  Active Proble ASSESSMENT  Rating: Unable to rate  Location: right flank pain  Management Techniques: Relaxation;Repositioning     ACTIVITY TOLERANCE                         O2 SATURATIONS  SPO2 on Room Air at Rest: 95             ACTIVITIES OF DAILY LIVING ASSESSMENT  A Patient will complete LE dressing mod I, use of AE prn  Comment: Max A for socks     Goals  on: 10-28  Frequency: 3-5x/week

## 2019-10-28 NOTE — CM/SW NOTE
SW contacted pt's RN - POLST completed on pt's chart, just needs pt signature. SW/CM to remain available for support and/or discharge planning.        Serena Encinas, [facility]

## 2019-10-29 PROCEDURE — 99232 SBSQ HOSP IP/OBS MODERATE 35: CPT | Performed by: INTERNAL MEDICINE

## 2019-10-29 NOTE — PROGRESS NOTES
[facility]    Cardiology - [facility]  Progress Note    Yosef De Leon Patient Status:  Inpatient    1960 MRN [id number]   [facility] 3W/ Attending [address], [address] Day # 21 PCP DO ALYSSIA Banks

## 2019-10-29 NOTE — PROGRESS NOTES
[facility]    Progress Note    Ezekiel Fraction Patient Status:  Inpatient    1960 MRN [id number]   [facility] 2W/SW Attending [address], [address] [facility] Day # 21 PCP Mikayla Vickers DO       Subjective:   Peggy Zuniga Subcutaneous, Noon  Dicyclomine HCl (BENTYL) cap 10 mg, 10 mg, Oral, TID PRN  acetaminophen (TYLENOL) tab 650 mg, 650 mg, Oral, Q6H PRN  Alfuzosin HCl ER (UROXATRAL) 24 hr tab 10 mg, 10 mg, Oral, Daily with breakfast  artificial tears 83-15 % ophthalmic oi Cardiology eval noted   Stopped coreg and monitor   Rec event monitor as outpt     History of cholecystitis and cholecystostomy tube   cholecystostomy tube was out but pt is asymptomatic   Blood cx x2 no growth   Gallbladder cultures with enterococcus fa

## 2019-10-29 NOTE — PLAN OF CARE
Problem: Patient Centered Care  Goal: Patient preferences are identified and integrated in the patient's plan of care  Description  Interventions:  - What would you like us to know as we care for you?  \" I was in the ICU for almost 2 months when I was here stability  Description  INTERVENTIONS:  - Monitor vital signs, rhythm, and trends  - Monitor for bleeding, hypotension and signs of decreased cardiac output  - Evaluate effectiveness of vasoactive medications to optimize hemodynamic stability  - Monitor ar stability  - Promote increasing activity/tolerance for mobility and gait  - Educate and engage patient/family in tolerated activity level and precautions  Problem: Patient Centered Care  Goal: Patient preferences are identified and integrated in the patien Tracheostomy is intact. PTT and INR lab draws in the morning. Call light and urinal within reach. Will continue to monitor pt. Pt stated \"I feel like the oral antibiotics is making me poop more. I don't like it. \"

## 2019-10-29 NOTE — PROGRESS NOTES
[facility]  Hematology/Oncology  Progress Note    Minerva Score Patient Status:  Inpatient    1960 MRN [id number]   [facility] 3W/SW Attending [address], [address] [facility] Day # 21 PCP Kieran Leyden, DO Lorri Sandhoff today    --continue heparin gtt, I have increased the warfarin to 12.5 mg/daily starting tonight     --pt does not need immediate hypercoagulable work up as the patient is being planned for indefinite anticoagulation with warfarin which is the safest optio

## 2019-10-29 NOTE — PROGRESS NOTES
Patient refused to be turned today and refused to get up to the chair. Patient worked with PT but only sat at the side of the bed. Patient states, I just want to go home and get back to my normal life. \"

## 2019-10-30 PROCEDURE — 99232 SBSQ HOSP IP/OBS MODERATE 35: CPT | Performed by: INTERNAL MEDICINE

## 2019-10-30 NOTE — PROGRESS NOTES
[place] [facility] - [facility]    Cardiology Progress Note  [facility]    Tinsley Begin Patient Status:  Inpatient    1960 MRN [id number]   [facility] 3W/SW Attending [address], [address] Day # 22 PCP (10/29/19 2112)       Results:     Lab Results   Component Value Date    WBC 4.4 10/29/2019    HGB 12.2 (L) 10/29/2019    HCT 38.1 (L) 10/29/2019    .0 10/29/2019    CREATSERUM 0.79 10/29/2019    BUN 12 10/29/2019     10/29/2019    K 4.0 10/29 whether acute cor pulmonale present (HCC)  ON ANTICOAGS      PAROX AFIB, MAINTAINING NSR   INTERMITTENT BRADYCARDIA  AS PER NOTES, KEEP OFF THE COREG WHICH WAS LOW DOSE, REPEAT ECHO IN A FEW MO TO BE ASSURED THAT EF STAYS NL.             Porsche Fan MD

## 2019-10-30 NOTE — PROGRESS NOTES
[facility]    Progress Note    Migdalia Evans Patient Status:  Inpatient    1960 MRN [id number]   [facility] 2W/SW Attending [address], [address] [facility] Day # 22 PCP Javan Kent DO       Subjective:   Juan Bhatt 10 Units, Subcutaneous, Noon  Dicyclomine HCl (BENTYL) cap 10 mg, 10 mg, Oral, TID PRN  acetaminophen (TYLENOL) tab 650 mg, 650 mg, Oral, Q6H PRN  Alfuzosin HCl ER (UROXATRAL) 24 hr tab 10 mg, 10 mg, Oral, Daily with breakfast  artificial tears 83-15 % oph bradycardia   Resolved   Cardiology eval noted   Stopped coreg and monitor   Rec event monitor as outpt  Rpt Echo in few months      History of cholecystitis and cholecystostomy tube   cholecystostomy tube was out but pt is asymptomatic   Blood cx x2 no gr

## 2019-10-30 NOTE — DIETARY NOTE
ADULT NUTRITION REASSESSMENT     Pt is at moderate nutrition risk. Pt does not meet malnutrition criteria.       RECOMMENDATIONS TO MD:  See Nutrition Intervention     NUTRITION DIAGNOSIS/PROBLEM:  Inadequate protein energy intake related to physiological nutrition care: collaboration with other providers  - Discharge and transfer of nutrition care to new setting or provider: monitor plans    ADMITTING DIAGNOSIS:   Elevated troponin, Hemoptysis, Acute saddle pulmonary embolism, unspecified whether acute cor noted and reviewed   LABS: reviewed    NUTRITION RELATED PHYSICAL FINDINGS:  - Body Fat/Muscle Mass: well nourished per visual exam.  - Fluid Accumulation: +1 BUE and BLE per RN documentation.  - Skin Integrity: RN documentation reviewed and stage 2 R butt

## 2019-10-30 NOTE — PROGRESS NOTES
[facility]  Hematology/Oncology  Progress Note    Jesus Villa Patient Status:  Inpatient    1960 MRN [id number]   [facility] 3W/SW Attending [address], [address] [facility] Day # 25 PCP DO Fariba Pina today    --continue heparin gtt, continue the at warfarin to 12.5 mg/daily     --in light of the extent of his VTE, will order hypercoagulable work up to determine his status.  His brother had LE DVT    --the patient is being planned for indefinite anticoag

## 2019-10-30 NOTE — PROGRESS NOTES
[place] [facility] - [facility]    Progress Note    Saida Wen Patient Status:  Inpatient    1960 MRN [id number]   [facility] 3W/SW Attending [address], [address] [facility] Day # 21 PCP Martha Reyes DO            Subjective:     h/ 160.0 175.0 165.0 187.0 179.0 194.0     Lab Results   Component Value Date    INR 1.62 (H) 10/29/2019       Recent Labs   Lab 10/23/19  0605  10/26/19  1411 10/28/19  0554 10/29/19  0529   *   < > 168* 144* 240*   BUN 10   < > 9 12 12   CREATSERUM 0 therapeutic INR prior to discharge    OK to d/c home from surgery standpoint when INR therapeutic  Instructions given  F/u with Dr. Cherie Forte in 2-3 weeks.     Gaurav Hill MD [facility]  General Surgery  [facility]  10/29/2019  9:10 PM

## 2019-10-30 NOTE — PROGRESS NOTES
[facility] ID PROGRESS NOTE    Donelda Dakins Patient Status:  Inpatient    1960 MRN [id number]   [facility] 3W/SW Attending [address] Day # 25 PCP Rock Isaiah DO     Subjective:  Awake, no new complaints.  Has been disorder with anxiety     Acute cholecystitis     Hemoptysis     Pulmonary embolus (HCC)     Elevated troponin     Acute saddle pulmonary embolism, unspecified whether acute cor pulmonale present (HCC)      ASSESSMENT:    Antibiotics: Augmentin, OVP    58- infarction of the right lower lobe as well as some left leg DVT. Placed on heparin drip. No antibiotics started.   On 10/10 noted to have abdominal pain with CT A/P done with again colonic distention with rectal wall thickening, persistent gallbladder dis anemia     PLAN:  -  Continue on augmentin day 3 of 10 (EOT 11/6/19). OVP started and will continue until 11/13.  -  Biliary cx results reviewed. -  Follow fever curve, wbc. -  Reviewed labs, micro, imaging reports.  -  Case d/w patient, RN.     Jacques Wallace

## 2019-10-30 NOTE — RESPIRATORY THERAPY NOTE
Patient continues to refuse TC and sleep while wearing PMV. Ambu bag and spare trachs outside of room.

## 2019-10-30 NOTE — PLAN OF CARE
Problem: Patient Centered Care  Goal: Patient preferences are identified and integrated in the patient's plan of care  Description  Interventions:  - What would you like us to know as we care for you?  \" I was in the ICU for almost 2 months when I was here Monitor for signs/symptoms of CO2 retention  Outcome: Progressing     Problem: CARDIOVASCULAR - ADULT  Goal: Maintains optimal cardiac output and hemodynamic stability  Description  INTERVENTIONS:  - Monitor vital signs, rhythm, and trends  - Monitor for b Impaired Functional Mobility  Goal: Achieve highest/safest level of mobility/gait  Description  Interventions:  - Assess patient's functional ability and stability  - Promote increasing activity/tolerance for mobility and gait  - Educate and engage patient

## 2019-10-31 PROCEDURE — 99232 SBSQ HOSP IP/OBS MODERATE 35: CPT | Performed by: NURSE PRACTITIONER

## 2019-10-31 PROCEDURE — 99232 SBSQ HOSP IP/OBS MODERATE 35: CPT | Performed by: INTERNAL MEDICINE

## 2019-10-31 NOTE — PROGRESS NOTES
[place] [facility] - [facility]    Progress Note    Corrine Romero Patient Status:  Inpatient    1960 MRN [id number]   [facility] 3W/SW Attending [address], [address] [facility] Day # 23 PCP Cherry Villasenor DO            Subjective:     h/ 175.0 165.0 187.0 179.0 194.0     Lab Results   Component Value Date    INR 1.91 (H) 10/30/2019       Recent Labs   Lab 10/26/19  1411 10/28/19  0554 10/29/19  0529   * 144* 240*   BUN 9 12 12   CREATSERUM 0.74 0.76 0.79   GFRAA 117 115 114   GFRNAA DISPLAY PLAN FREE TEXT

## 2019-10-31 NOTE — PROGRESS NOTES
[facility]OS[facility] - [facility]  Hematology/Oncology  Progress Note    Aime Thomas Patient Status:  Inpatient    1960 MRN [id number]   [facility] 3W/ Attending [address], [address] [facility] Day # 23 PCP DO Bjorn Whittington 2.48 today    --continue heparin gtt, continue the at warfarin to 12.5 mg/daily     --in light of the extent of his VTE, his hypercoagulable work labs are in process to determine his status.  His brother had LE DVT    --the patient is being planned for inde

## 2019-10-31 NOTE — PROGRESS NOTES
[facility]    Progress Note    Roberto Sickle Patient Status:  Inpatient    1960 MRN [id number]   [facility] 2W/SW Attending [address], [address] Day # 23 PCP Osiel Stephens DO       Subjective:   Ad Lux 2 mg, Intravenous, Q6H PRN  Dicyclomine HCl (BENTYL) cap 10 mg, 10 mg, Oral, TID PRN  acetaminophen (TYLENOL) tab 650 mg, 650 mg, Oral, Q6H PRN  Alfuzosin HCl ER (UROXATRAL) 24 hr tab 10 mg, 10 mg, Oral, Daily with breakfast  artificial tears 83-15 % ophth bradycardia    Most likely due to IRMA   Stopped coreg and monitor   Rec event monitor as outpt  Rpt Echo in few months      History of cholecystitis    Blood cx x2 no growth   Gallbladder cultures with enterococcus faecalis and staph aureus  on Po augmenti

## 2019-10-31 NOTE — PROGRESS NOTES
[place] [facility] - [facility]    Progress Note    Mary Alice Jha Patient Status:  Inpatient    1960 MRN [id number]   [facility] 3W/SW Attending [address], [address] [facility] Day # 23 PCP Tiffany Chandra DO       Assessment and Plan: • Warfarin Sodium  12.5 mg Oral Nightly   • vancomycin HCl  125 mg Oral Daily   • Amoxicillin-Pot Clavulanate  1 tablet Oral 2 times per day   • furosemide  20 mg Oral Daily   • Pantoprazole Sodium  40 mg Oral QAM AC   • Insulin Aspart Pen  1-7 Units Sub

## 2019-10-31 NOTE — PHYSICAL THERAPY NOTE
Attempted treatment pt declined therapy today. Pt reported he was too sore to participate. Asked pt if therapy can return later he declined and stated \" I will be going home tomorrow. \" Therapy has been recommending subacute rehab facility. RN aware.

## 2019-11-01 PROCEDURE — 99232 SBSQ HOSP IP/OBS MODERATE 35: CPT | Performed by: INTERNAL MEDICINE

## 2019-11-01 RX ORDER — AMOXICILLIN AND CLAVULANATE POTASSIUM 875; 125 MG/1; MG/1
1 TABLET, FILM COATED ORAL EVERY 12 HOURS SCHEDULED
Qty: 10 TABLET | Refills: 0 | Status: SHIPPED | OUTPATIENT
Start: 2019-11-01 | End: 2019-11-06

## 2019-11-01 NOTE — PROGRESS NOTES
[facility]  Hematology/Oncology  Progress Note    Ezekiel Fraction Patient Status:  Inpatient    1960 MRN [id number]   [facility] 3W/SW Attending [address], [address] [facility] Day # 24 PCP DO Markus Bland to 2.93 today    --continue heparin gtt, continue the at warfarin to 12.5 mg/daily.  Heparin drip can be stopped tomorrow if INR remains in therapeutic range of 2-3     --in light of the extent of his VTE, his hypercoagulable work labs are in process to [facility] BUN 15 11/01/2019     11/01/2019    K 4.8 11/01/2019     11/01/2019    CO2 25.0 11/01/2019     (H) 11/01/2019    CA 9.3 11/01/2019    ALB 2.4 (L) 10/23/2019    ALKPHO 49 10/23/2019    BILT 0.5 10/23/2019    TP 6.7 10/23/2019    AST 19 10

## 2019-11-01 NOTE — PHYSICAL THERAPY NOTE
PHYSICAL THERAPY TREATMENT NOTE - INPATIENT     Room Number: 328/328-A       Presenting Problem: Acute saddle pulmonary embolism    Problem List  Principal Problem:    Acute saddle pulmonary embolism, unspecified whether acute cor pulmonale present ([facility] RECOMMENDATIONS  PT Discharge Recommendations: Sub-acute rehabilitation     PLAN  PT Treatment Plan: Bed mobility; Body mechanics; Endurance; Energy conservation;Patient education; Family education;Gait training;Range of motion;Strengthening;Stoop training;Tra speed)  Stoop/Curb Assistance: Not tested       Patient End of Session: Up in chair;Needs met;Call light within reach;RN aware of session/findings; Alarm set; With [facility] staff    CURRENT GOALS   Goals to be met by: 11/1/19  Patient Goal Patient's self-stated go

## 2019-11-01 NOTE — PROGRESS NOTES
[facility] ID PROGRESS NOTE    Rosita Hillman Patient Status:  Inpatient    1960 MRN [id number]   [facility] 3W/SW Attending [address], [address] [facility] Day # 24 PCP Abida Stapleton DO     Subjective:  Awake, no diarrhea. Afebrile.  INR cholecystitis     Hemoptysis     Pulmonary embolus (HCC)     Elevated troponin     Acute saddle pulmonary embolism, unspecified whether acute cor pulmonale present (HCC)      ASSESSMENT:    Antibiotics: Augmentin, OVP    39-year-old male well-known from a as well as some left leg DVT. Placed on heparin drip. No antibiotics started. On 10/10 noted to have abdominal pain with CT A/P done with again colonic distention with rectal wall thickening, persistent gallbladder distention without inflammation.   Note of 10 (EOT 11/6/19). Continue on OVP until 11/13.  -  Biliary cx results reviewed. -  Follow fever curve, wbc. -  Reviewed labs, micro, imaging reports.  -  Case d/w patient, RN.     Giulia Solis PA-C  [facility]  [phone]

## 2019-11-01 NOTE — OCCUPATIONAL THERAPY NOTE
OCCUPATIONAL THERAPY TREATMENT NOTE - INPATIENT        Room Number: 328/328-A           Presenting Problem: PE, DVT    Problem List  Principal Problem:    Acute saddle pulmonary embolism, unspecified whether acute cor pulmonale present (HCC)  Active Proble promotion     ACTIVITY TOLERANCE                         O2 SATURATIONS                ACTIVITIES OF DAILY LIVING ASSESSMENT  AM-PAC ‘6-Clicks’ Inpatient Daily Activity Short Form  How much help from another person does the patient currently need…  -   Put will tolerate standing for ~5 minutes in prep for adls with mod I  Pt tolerated standing 2 min before transferring to chair    Patient will complete item retrieval mod I w/ RW  Comment: ongoing    Patient will complete LE dressing mod I, use of AE prn  Com

## 2019-11-01 NOTE — PROGRESS NOTES
[facility]    Progress Note    [facility] Patient Status:  Inpatient    1960 MRN [id number]   [facility] 2W/SW Attending [address], [address] Day # 24 PCP Jorge Patterson, DO       Subjective:   Norma Yeboah mg, 2 mg, Intravenous, Q6H PRN  Dicyclomine HCl (BENTYL) cap 10 mg, 10 mg, Oral, TID PRN  acetaminophen (TYLENOL) tab 650 mg, 650 mg, Oral, Q6H PRN  Alfuzosin HCl ER (UROXATRAL) 24 hr tab 10 mg, 10 mg, Oral, Daily with breakfast  artificial tears 83-15 % o Nocturnal bradycardia    Most likely due to IRMA   Stopped coreg and monitor   Rec event monitor as outpt  Rpt Echo in few months    Cardiology f/u noted     History of cholecystitis    Cholecystostomy tube fell off   Pt is asymptomatic   Blood cx x2 no

## 2019-11-01 NOTE — PROGRESS NOTES
[facility]    Cardiology Progress Note  [facility]    Yosef De Leon Patient Status:  Inpatient    1960 MRN [id number]   [facility] 3W/ Attending [address], [address] Day # 24 PCP 11/01/2019    HCT 40.6 11/01/2019    .0 11/01/2019    CREATSERUM 0.79 11/01/2019    BUN 15 11/01/2019     11/01/2019    K 4.8 11/01/2019     11/01/2019    CO2 25.0 11/01/2019     (H) 11/01/2019    CA 9.3 11/01/2019    ALB 2.4 (L) REPEAT ECHO IN A FEW MO TO BE ASSURED THAT EF STAYS NL.        Will sign off. pls call if needed               Eduardo Freeman MD  11/1/2019

## 2019-11-02 PROCEDURE — 99232 SBSQ HOSP IP/OBS MODERATE 35: CPT | Performed by: INTERNAL MEDICINE

## 2019-11-02 NOTE — PROGRESS NOTES
[facility] HOSP - [facility]    Hematology/Oncology   Progress Note        Chief complaint  DVT/PE    Subjective:  No acute events overnight. No acute new issues. Vitals stable. Denies fevers, chills, Ns, bleeding, diarrhea. No modifying or associated sx. Q24H  morphINE sulfate (PF) 2 MG/ML injection 2 mg, 2 mg, Intravenous, Q6H PRN  Dicyclomine HCl (BENTYL) cap 10 mg, 10 mg, Oral, TID PRN  acetaminophen (TYLENOL) tab 650 mg, 650 mg, Oral, Q6H PRN  Alfuzosin HCl ER (UROXATRAL) 24 hr tab 10 mg, 10 mg, Oral,

## 2019-11-02 NOTE — PHYSICAL THERAPY NOTE
Pt was to be seen for PT treatment session. Consulted w/ RN prior to seeing pt. Pt was received supine in bed. Despite MAXIMAL education and encouragement, pt ADAMANTLY refused participating with therapy.  Pt said he got up to the chair yesterday and so he

## 2019-11-02 NOTE — PROGRESS NOTES
[place] [facility] - [facility]    Progress Note    Carmen Castellanos Patient Status:  Inpatient    1960 MRN [id number]   [facility] 2W/SW Attending [address], [address] Day # 25 PCP Kim Mauricio DO       Subjective:   Darryll Litten Q6H PRN  Alfuzosin HCl ER (UROXATRAL) 24 hr tab 10 mg, 10 mg, Oral, Daily with breakfast  artificial tears 83-15 % ophthalmic ointment, , Both Eyes, TID PRN  bisacodyl (DULCOLAX) rectal suppository 10 mg, 10 mg, Rectal, Daily PRN  ipratropium-albuterol (DU cholecystitis    Cholecystostomy tube fell off   Pt is asymptomatic   Blood cx x2 no growth   Gallbladder cultures with enterococcus faecalis and staph aureus  on Po augmentin bid for a total of 10 days     Pseudo obstruction / chronic colonic ileus  Due t

## 2019-11-02 NOTE — PLAN OF CARE
Patient is A/O x4. Vitals WDL. Pain controlled with norco. Refused IV replacement after multiple requests to change it by RN. Heparin stopped. INR levels keeping patient, and will not discharge home today.   Problem: Diabetes/Glucose Control  Goal: Glucose Manage/alleviate anxiety  - Monitor for signs/symptoms of CO2 retention  Outcome: Progressing     Problem: CARDIOVASCULAR - ADULT  Goal: Maintains optimal cardiac output and hemodynamic stability  Description  INTERVENTIONS:  - Monitor vital signs, rhythm, Progressing     Problem: Impaired Functional Mobility  Goal: Achieve highest/safest level of mobility/gait  Description  Interventions:  - Assess patient's functional ability and stability  - Promote increasing activity/tolerance for mobility and gait  - E

## 2019-11-02 NOTE — PLAN OF CARE
Patient is A/O x 4. Heparin drip at 14. Vitals WDL. Up in chair. Possible discharge tomorrow depending on INR. Norco for pain.    Problem: Diabetes/Glucose Control  Goal: Glucose maintained within prescribed range  Description  INTERVENTIONS:  - Monitor Blo Incentive Spirometry  - Assess the need for suctioning and perform as needed  - Assess and instruct to report SOB or any respiratory difficulty  - Respiratory Therapy support as indicated  - Manage/alleviate anxiety  - Monitor for signs/symptoms of CO2 ret needed  11/1/2019 1908 by Lala Doyle  Outcome: Progressing  11/1/2019 1907 by Lala Doyle  Outcome: Progressing  Goal: Incision(s), wounds(s) or drain site(s) healing without S/S of infection  Description  INTERVENTIONS:  - Assess and document ris bleeding  - Monitor lab trends  11/1/2019 1908 by Keshia Castellanos  Outcome: Progressing  11/1/2019 1907 by Keshia Castellanos  Outcome: Progressing     Problem: Patient Centered Care  Goal: Patient preferences are identified and integrated in the patient's pl

## 2019-11-02 NOTE — PLAN OF CARE
Pt aox4. Heparin gtt continued. Pt rested through out night. No complaints. VSS.   Problem: Diabetes/Glucose Control  Goal: Glucose maintained within prescribed range  Description  INTERVENTIONS:  - Monitor Blood Glucose as ordered  - Assess for signs and s Maintains optimal cardiac output and hemodynamic stability  Description  INTERVENTIONS:  - Monitor vital signs, rhythm, and trends  - Monitor for bleeding, hypotension and signs of decreased cardiac output  - Evaluate effectiveness of vasoactive medication mobility/gait  Description  Interventions:  - Assess patient's functional ability and stability  - Promote increasing activity/tolerance for mobility and gait  - Educate and engage patient/family in tolerated activity level and precautions    Outcome: Prog

## 2019-11-02 NOTE — PROGRESS NOTES
[facility]    Progress Note    Brayden Garcia Patient Status:  Inpatient    1960 MRN [id number]   [facility] 3W/SW Attending [address], [address] Day # 24 PCP Ange Camacho DO            Subjective:     h/ displayed. Lab Results   Component Value Date    INR 2.94 (H) 11/01/2019       Recent Labs   Lab 10/28/19  0554 10/29/19  0529 11/01/19  0539   * 240* 179*   BUN 12 12 15   CREATSERUM 0.76 0.79 0.79   GFRAA 115 114 114   GFRNAA 100 98 98   CA 8.

## 2019-11-03 PROCEDURE — 99232 SBSQ HOSP IP/OBS MODERATE 35: CPT | Performed by: INTERNAL MEDICINE

## 2019-11-03 RX ORDER — DIPHENHYDRAMINE HCL 25 MG
25 CAPSULE ORAL ONCE
Status: COMPLETED | OUTPATIENT
Start: 2019-11-03 | End: 2019-11-03

## 2019-11-03 RX ORDER — WARFARIN SODIUM 5 MG/1
5 TABLET ORAL NIGHTLY
Status: DISCONTINUED | OUTPATIENT
Start: 2019-11-03 | End: 2019-11-05

## 2019-11-03 RX ORDER — DIPHENHYDRAMINE HCL 25 MG
25 CAPSULE ORAL 2 TIMES DAILY PRN
Status: DISCONTINUED | OUTPATIENT
Start: 2019-11-03 | End: 2019-11-05

## 2019-11-03 NOTE — PROGRESS NOTES
[facility] HOSP - [facility]    Hematology/Oncology   Progress Note        Chief complaint  DVT/PE    Subjective:  No acute events overnight. No acute new issues. Vitals stable. Denies fevers, chills, Ns, bleeding, diarrhea. No modifying or associated sx. (PROTONIX) EC tab 40 mg, 40 mg, Oral, QAM AC  Insulin Aspart Pen (NOVOLOG) 100 UNIT/ML flexpen 1-7 Units, 1-7 Units, Subcutaneous, TID CC  ondansetron HCl (ZOFRAN) injection 4 mg, 4 mg, Intravenous, Q4H PRN  methylnaltrexone bromide (RELISTOR) injection 19

## 2019-11-03 NOTE — PROGRESS NOTES
[facility]    Progress Note    Minerva Score Patient Status:  Inpatient    1960 MRN [id number]   [facility] 2W/SW Attending [address], [address] Day # 26 PCP Kieran Leyden, DO       Subjective:   Fabricio Weinstein Q6H PRN  Alfuzosin HCl ER (UROXATRAL) 24 hr tab 10 mg, 10 mg, Oral, Daily with breakfast  artificial tears 83-15 % ophthalmic ointment, , Both Eyes, TID PRN  bisacodyl (DULCOLAX) rectal suppository 10 mg, 10 mg, Rectal, Daily PRN  ipratropium-albuterol (DU Cholecystostomy tube fell off   Pt is asymptomatic   Blood cx x2 no growth   Gallbladder cultures with enterococcus faecalis and staph aureus  on Po augmentin bid for a total of 10 days     Pseudo obstruction / chronic colonic ileus  Due to narcotic use

## 2019-11-03 NOTE — PHYSICAL THERAPY NOTE
Attempted PT Rx, as pt had refused yesterday and there is a plan for home d/c soon with wife. Upon encouraging OOB activity, pt refusing all attempts to mobilize, stating \"I am not walking today. \"  Explained detriment of immobility and need for activity

## 2019-11-03 NOTE — PLAN OF CARE
Problem: Patient/Family Goals  Goal: Patient/Family Long Term Goal  Description  Patient's Long Term Goal: Get stronger. Go back home. Interventions:  - Medical management  - monitor VS and labs.    - See additional Care Plan goals for specific interve edema, trend weights  Outcome: Progressing  Goal: Absence of cardiac arrhythmias or at baseline  Description  INTERVENTIONS:  - Continuous cardiac monitoring, monitor vital signs, obtain 12 lead EKG if indicated  - Evaluate effectiveness of antiarrhythmic to achieve adequate hemostasis  - Assess for signs and symptoms of internal bleeding  - Monitor lab trends  Outcome: Progressing     Problem: Patient Centered Care  Goal: Patient preferences are identified and integrated in the patient's plan of care  Desc

## 2019-11-03 NOTE — PROGRESS NOTES
Pulmonary/Critical Care Follow Up Note    HPI:   Jai Carbajal is a 60year old male with Patient presents with:  Fever (infectious)  Altered Mental Status (neurologic)      PCP C. Stephani Boas, MD  Admission Attending No admitting provider for patient encoun 100mg/100ml in NaCl (CARDIZEM) 1 mg/mL premix/add-vantage, 2.5-20 mg/hr, Intravenous, Continuous  •  dilTIAZem HCl (CARDIZEM) tab 60 mg, 60 mg, Oral, PRN  •  hydrALAzine HCl (APRESOLINE) injection 10 mg, 10 mg, Intravenous, Q4H PRN  •  dextrose 10 % infusi NAD  Smiling  Awake, follows commands  Lung dec b/l bases  CV  Reg   Abd more distended and + pain today  Ext warm, + edema        LABS:    Lab Results   Component Value Date    WBC 5.8 06/25/2019    HGB 10.1 06/25/2019    HCT 32.0 06/25/2019    PLT 14 0 related  Off Adapto  Worse to day  Plan follow      KOMAL  3nd episode  Now progressive and severe  Presumed from septic shock  Plan     Bumex TID now   follow    Encephalopathy  following commands consistently  Head CT neg  Plan      Follow        H/o CHF

## 2019-11-04 ENCOUNTER — ANTI-COAG (OUTPATIENT)
Dept: CARDIOLOGY | Age: 59
End: 2019-11-04

## 2019-11-04 VITALS
OXYGEN SATURATION: 98 % | HEART RATE: 86 BPM | DIASTOLIC BLOOD PRESSURE: 82 MMHG | HEIGHT: 66 IN | RESPIRATION RATE: 18 BRPM | WEIGHT: 257.13 LBS | TEMPERATURE: 99 F | SYSTOLIC BLOOD PRESSURE: 135 MMHG | BODY MASS INDEX: 41.32 KG/M2

## 2019-11-04 PROBLEM — I82.409 DVT (DEEP VENOUS THROMBOSIS) (CMD): Status: ACTIVE | Noted: 2019-11-04

## 2019-11-04 PROBLEM — I26.99 PULMONARY EMBOLISM (CMD): Status: ACTIVE | Noted: 2019-11-04

## 2019-11-04 PROCEDURE — 99232 SBSQ HOSP IP/OBS MODERATE 35: CPT | Performed by: INTERNAL MEDICINE

## 2019-11-04 RX ORDER — WARFARIN SODIUM 5 MG/1
5 TABLET ORAL NIGHTLY
Qty: 30 TABLET | Refills: 0 | Status: SHIPPED | OUTPATIENT
Start: 2019-11-04 | End: 2019-11-05

## 2019-11-04 RX ORDER — FUROSEMIDE 20 MG/1
20 TABLET ORAL DAILY
Qty: 30 TABLET | Refills: 0 | Status: SHIPPED | OUTPATIENT
Start: 2019-11-04

## 2019-11-04 NOTE — PROGRESS NOTES
[place] [facility] - [facility]    Progress Note    Mary Alice Jha Patient Status:  Inpatient    1960 MRN [id number]   [facility] 2W/SW Attending [address], [address] [facility] Day # 27 PCP Tiffany Chandra DO       Subjective:   Jane Calle TID PRN  acetaminophen (TYLENOL) tab 650 mg, 650 mg, Oral, Q6H PRN  Alfuzosin HCl ER (UROXATRAL) 24 hr tab 10 mg, 10 mg, Oral, Daily with breakfast  artificial tears 83-15 % ophthalmic ointment, , Both Eyes, TID PRN  bisacodyl (DULCOLAX) rectal suppository History of cholecystitis    Cholecystostomy tube fell off   Pt is asymptomatic   Blood cx x2 no growth   Gallbladder cultures with enterococcus faecalis and staph aureus  on Po augmentin bid for a total of 10 days     Pseudo obstruction / chronic colon

## 2019-11-04 NOTE — OCCUPATIONAL THERAPY NOTE
Attempted to see pt for OT treatment. Pt continues to refuse OOB activity.    Isaura Galeas MA, OTR/L  Occupational Therapist

## 2019-11-04 NOTE — PROGRESS NOTES
[facility] - [facility]    Hematology/Oncology   Progress Note        Chief complaint  DVT/PE    Subjective:  No acute events overnight. No acute new issues. Vitals stable. Denies fevers, chills, no reports of bleeding from any orifice.      Assessment as the pt had a normal PTT value on admission, so this PTT values are consistent with his INR values             Objective:  Blood pressure (!) 128/92, pulse 81, temperature 98 °F (36.7 °C), temperature source Oral, resp.  rate 18, height 1.676 m (5' 6\"), (DULCOLAX) rectal suppository 10 mg, 10 mg, Rectal, Daily PRN  ipratropium-albuterol (DUONEB) nebulizer solution 3 mL, 3 mL, Nebulization, Q6H PRN  levETIRAcetam (KEPPRA) tab 1,000 mg, 1,000 mg, Oral, BID  mirtazapine (REMERON) tab 15 mg, 15 mg, Oral, Nigh

## 2019-11-04 NOTE — PLAN OF CARE
Patient will be discharged home on warfarin. [facility] to manage warfarin outpatient. Patient has been set-up to be followed outpatient. Patient will be discharged home on warfarin 5 mg Monday and Tuesday evening.   [facility] to drawn

## 2019-11-04 NOTE — CM/SW NOTE
Received MDO for [place] [address] orders w/ INR check noted. SW contacted David Nichols w/ [facility] and provided pt update. PLAN: [facility], [facility] orders in    / to remain available for support and/or discharge planning.        aDnny Diallo, [address] [facility]

## 2019-11-04 NOTE — PLAN OF CARE
Problem: Patient/Family Goals  Goal: Patient/Family Long Term Goal  Description  Patient's Long Term Goal: Get stronger. Go back home. Interventions:  - Medical management  - monitor VS and labs.    - See additional Care Plan goals for specific interve maintain glucose within target range  - Assess barriers to adequate nutritional intake and initiate nutrition consult as needed  - Instruct patient on self management of diabetes  Outcome: Not Progressing     Problem: Impaired Functional Mobility  Goal: Ac cardiac output  - Evaluate effectiveness of vasoactive medications to optimize hemodynamic stability  - Monitor arterial and/or venous puncture sites for bleeding and/or hematoma  - Assess quality of pulses, skin color and temperature  - Assess for signs o his blood glucose levels to fluctuate. VS stable.

## 2019-11-04 NOTE — PLAN OF CARE
Problem: Diabetes/Glucose Control  Goal: Glucose maintained within prescribed range  Description  INTERVENTIONS:  - Monitor Blood Glucose as ordered  - Assess for signs and symptoms of hyperglycemia and hypoglycemia  - Administer ordered medications to m stability  Description  INTERVENTIONS:  - Monitor vital signs, rhythm, and trends  - Monitor for bleeding, hypotension and signs of decreased cardiac output  - Evaluate effectiveness of vasoactive medications to optimize hemodynamic stability  - Monitor ar stability  - Promote increasing activity/tolerance for mobility and gait  - Educate and engage patient/family in tolerated activity level and precautions  - Recommend use of  leonidas-walker for transfers and ambulation  Outcome: Progressing     Problem: HEMAT

## 2019-11-06 ENCOUNTER — ANTI-COAG (OUTPATIENT)
Dept: CARDIOLOGY | Age: 59
End: 2019-11-06

## 2019-11-06 ENCOUNTER — TELEPHONE (OUTPATIENT)
Dept: HEMATOLOGY/ONCOLOGY | Facility: HOSPITAL | Age: 59
End: 2019-11-06

## 2019-11-06 LAB — INR PPP: 3.1

## 2019-11-06 RX ORDER — WARFARIN SODIUM 5 MG/1
TABLET ORAL
Qty: 90 TABLET | Refills: 0 | Status: ON HOLD | OUTPATIENT
Start: 2019-11-06 | End: 2019-11-17

## 2019-11-07 ENCOUNTER — TELEPHONE (OUTPATIENT)
Dept: HEMATOLOGY/ONCOLOGY | Facility: HOSPITAL | Age: 59
End: 2019-11-07

## 2019-11-07 NOTE — TELEPHONE ENCOUNTER
2nd attempt: call message left to please call our office back to make a follow up appointment with Dr Sanjana Ruiz.

## 2019-11-08 ENCOUNTER — ANTI-COAG (OUTPATIENT)
Dept: CARDIOLOGY | Age: 59
End: 2019-11-08

## 2019-11-08 LAB — INR PPP: 2.7

## 2019-11-10 ENCOUNTER — HOSPITAL ENCOUNTER (INPATIENT)
Facility: HOSPITAL | Age: 59
LOS: 4 days | Discharge: HOME HEALTH | DRG: 205 | End: 2019-11-17
Attending: EMERGENCY MEDICINE | Admitting: INTERNAL MEDICINE
Payer: MEDICARE

## 2019-11-10 DIAGNOSIS — I26.92 ACUTE SADDLE PULMONARY EMBOLISM WITHOUT ACUTE COR PULMONALE (HCC): ICD-10-CM

## 2019-11-10 DIAGNOSIS — J95.00 TRACHEOSTOMY COMPLICATION, UNSPECIFIED COMPLICATION TYPE (HCC): Primary | ICD-10-CM

## 2019-11-10 DIAGNOSIS — I48.91 ATRIAL FIBRILLATION WITH RAPID VENTRICULAR RESPONSE (HCC): ICD-10-CM

## 2019-11-10 PROCEDURE — 99222 1ST HOSP IP/OBS MODERATE 55: CPT | Performed by: OTOLARYNGOLOGY

## 2019-11-10 PROCEDURE — 99222 1ST HOSP IP/OBS MODERATE 55: CPT | Performed by: INTERNAL MEDICINE

## 2019-11-10 RX ORDER — MAGNESIUM OXIDE 400 MG (241.3 MG MAGNESIUM) TABLET
400 TABLET ONCE
Status: COMPLETED | OUTPATIENT
Start: 2019-11-10 | End: 2019-11-10

## 2019-11-10 RX ORDER — DEXTROSE MONOHYDRATE 25 G/50ML
50 INJECTION, SOLUTION INTRAVENOUS AS NEEDED
Status: DISCONTINUED | OUTPATIENT
Start: 2019-11-10 | End: 2019-11-17

## 2019-11-10 RX ORDER — ALFUZOSIN HYDROCHLORIDE 10 MG/1
10 TABLET, EXTENDED RELEASE ORAL
Status: DISCONTINUED | OUTPATIENT
Start: 2019-11-10 | End: 2019-11-17

## 2019-11-10 RX ORDER — ZOLPIDEM TARTRATE 5 MG/1
5 TABLET ORAL NIGHTLY PRN
Status: DISCONTINUED | OUTPATIENT
Start: 2019-11-10 | End: 2019-11-17

## 2019-11-10 RX ORDER — MIRTAZAPINE 15 MG/1
15 TABLET, FILM COATED ORAL NIGHTLY
Status: DISCONTINUED | OUTPATIENT
Start: 2019-11-10 | End: 2019-11-17

## 2019-11-10 RX ORDER — ACETAMINOPHEN 500 MG
1000 TABLET ORAL EVERY 6 HOURS PRN
Status: DISCONTINUED | OUTPATIENT
Start: 2019-11-10 | End: 2019-11-17

## 2019-11-10 RX ORDER — CASTOR OIL AND BALSAM, PERU 788; 87 MG/G; MG/G
OINTMENT TOPICAL 2 TIMES DAILY PRN
Status: DISCONTINUED | OUTPATIENT
Start: 2019-11-10 | End: 2019-11-17

## 2019-11-10 RX ORDER — PANTOPRAZOLE SODIUM 40 MG/1
40 TABLET, DELAYED RELEASE ORAL
Status: DISCONTINUED | OUTPATIENT
Start: 2019-11-10 | End: 2019-11-17

## 2019-11-10 RX ORDER — CARVEDILOL 3.12 MG/1
3.12 TABLET ORAL 2 TIMES DAILY WITH MEALS
Status: DISCONTINUED | OUTPATIENT
Start: 2019-11-10 | End: 2019-11-12

## 2019-11-10 RX ORDER — POLYETHYLENE GLYCOL 3350 17 G/17G
17 POWDER, FOR SOLUTION ORAL
Status: DISCONTINUED | OUTPATIENT
Start: 2019-11-10 | End: 2019-11-14

## 2019-11-10 RX ORDER — WARFARIN SODIUM 5 MG/1
5 TABLET ORAL NIGHTLY
Status: DISCONTINUED | OUTPATIENT
Start: 2019-11-10 | End: 2019-11-11

## 2019-11-10 RX ORDER — FUROSEMIDE 20 MG/1
20 TABLET ORAL DAILY
Status: DISCONTINUED | OUTPATIENT
Start: 2019-11-10 | End: 2019-11-17

## 2019-11-10 RX ORDER — LEVETIRACETAM 500 MG/1
1000 TABLET ORAL 2 TIMES DAILY
Status: DISCONTINUED | OUTPATIENT
Start: 2019-11-10 | End: 2019-11-17

## 2019-11-10 NOTE — PLAN OF CARE
Trach @ home:  #7 speaking valve  luther #7 cuffed  PER PATIENT states his wife was trying to replace the inner cannula and accidentally took out the entire trach    Admitted from ED with trach site covered with vaseline gauze, gauze and tape.  No respirato

## 2019-11-10 NOTE — PLAN OF CARE
Patient tolerating room air. Trach site covered with Vaseline gauze, dry gauze and tape. Trach supplies at bedside, Ambu bag mask at bedside with suction set up. Patient states he wants to be a full code, information discussed with Dr. Eugene Smith.  Tolerating patient on self management of diabetes  Outcome: Progressing     Problem: Patient/Family Goals  Goal: Patient/Family Long Term Goal  Description  Patient's Long Term Goal: to tolerate decannulation of trach    Interventions:  - monitor oxygen saturations range  Description  INTERVENTIONS:  - Monitor Blood Glucose as ordered  - Assess for signs and symptoms of hyperglycemia and hypoglycemia  - Administer ordered medications to maintain glucose within target range  - Assess barriers to adequate nutritional i

## 2019-11-10 NOTE — CM/SW NOTE
11/10: DA received MDO for resuming [facility] orders. Patient is current with [facility]. DA notified Yumiko/EMELY, orders are in.     DA/RALPH to remain available for support and/or discharge planning.      [address] [place], [place] [id number]

## 2019-11-10 NOTE — ED INITIAL ASSESSMENT (HPI)
The patient arrived via EMS after the patient's wife pulled out his tracheostomy tube and was unable to reinsert it. The patient's airway is patent and is speaking in full sentences.

## 2019-11-10 NOTE — ED PROVIDER NOTES
Patient Seen in: [facility] AND [facility] Emergency Department    History   Patient presents with:   Other    Stated Complaint: trach tube out unable to get back in     HPI    61 yo F with PMH CHF DM, HTN, PE on warfarin, respiratory failure s/p trach placement se Inject 10 Units into the skin every morning. furosemide 20 MG Oral Tab,  Take 1 tablet (20 mg total) by mouth daily. vancomycin HCl 50 MG/ML Oral Recon Soln,  Take 2.5 mL (125 mg total) by mouth daily for 11 days.    Methylnaltrexone Bromide (RELISTOR) Misc,  USE LANCET TO CHECK BLOOD SUGAR THREE TIMES DAILY   Naloxone HCl 4 MG/0.1ML Nasal Liquid,  4 mg by Nasal route as needed. Elastic Bandages & Supports (MEDICAL COMPRESSION SOCKS) Does not apply Misc,  1 each by Does not apply route continuous.  Orde crepitant/cellulitic change. Cardiovascular: RRR. Pulmonary/Chest: Effort normal. CTAB. Abdominal: Soft. Musculoskeletal: No gross deformity. Neurological: Alert. Skin: Skin is warm. Psychiatric: Cooperative. Nursing note and vitals reviewed.

## 2019-11-10 NOTE — CONSULTS
[facility]    Report of Consultation    Saida Wen Patient Status:  Emergency    1960 MRN [id number]   Location [address] Attending Keerthi Maravilla MD   Hosp Day # 0 PCP Chris Mccoy MD less restricted and easier since the tracheostomy tube was removed by his wife. He has had no bleeding from the site and is currently occluded with Xeroform gauze and a dressing. No other signs, symptoms or complaints at this time.     Past Medical Histor Comment:Tolerated Zosyn  Thimerosal              RASH    Review of Systems:   A comprehensive review of systems was negative. Physical Exam:   Blood pressure (!) 137/99, pulse 76, temperature 98.4 °F (36.9 °C), temperature source Oral, resp.  rate 20, we 11/10/2019     11/10/2019    CO2 28.0 11/10/2019     (H) 11/10/2019    CA 8.9 11/10/2019    ALB 2.4 (L) 10/23/2019    ALKPHO 49 10/23/2019    TP 6.7 10/23/2019    AST 19 10/23/2019    ALT 12 (L) 10/23/2019    .1 (H) 11/02/2019    INR 1. normal limits. MEDIAST/ZIYAD: No visible mass or adenopathy. LUNGS/PLEURA: There are bibasilar reticular opacities which may be atelectatic or reflect scarring. No airspace consolidation, pleural effusion, or pneumothorax is evident.   BONES: Mild degenerati sensitivities. The drainage catheter was secured to the skin with suture, connected to external gravity drainage, sterile dressing applied. CONCLUSION:  1.  Ultrasound and fluoroscopic guided percutaneous cholecystostomy     Dictated by (CST): Samir 10/14/2019  PROCEDURE: IR CHOLECYSTOSTOMY TUBE CHECK WITH EXCHANGE  INDICATIONS: 21-year-old man with history of cirrhosis and calculus cholecystitis status post percutaneous cholecystostomy tube placement in July 2019.   The patient presents with cholecyst Cholecystostomy tube check demonstrates retraction of existing 8 [place] catheter to the gallbladder fundus. 2. Successful exchange of percutaneous cholecystostomy tube. In addition, cholecystogram confirms persistent occlusion of the cystic duct.  3. The 8 10/12/2019 at 16:03     Approved by (CST): Dominga Edouard MD on 10/12/2019 at 16:04              Impression:   Accidental removal of tracheostomy by family member. Inability to recannulate in the ED.     Recommendations:  Long discussion with patient and wif

## 2019-11-10 NOTE — CONSULTS
[facility]    Report of Consultation    Migdaliakeith Delarosamary Patient Status:  Observation    1960 MRN [id number]   [facility] 2W/SW Attending [address], [address] [facility] Day # 0 PCP Hoang Carrillo MD     Date COLONOSCOPY N/A 5/16/2019    Performed by Cathleen Cooper MD at [address] Left 2010   • TRACHEOSTOMY N/A 5/22/2019    Performed by Demarco Wallace MD at [facility] MAIN OR       Family History  Family History   Problem Relation Ag NIGHTLY (Patient taking differently: 2.5 mg nightly.  )  furosemide 20 MG Oral Tab, Take 1 tablet (20 mg total) by mouth daily. vancomycin HCl 50 MG/ML Oral Recon Soln, Take 2.5 mL (125 mg total) by mouth daily for 11 days.   Methylnaltrexone Bromide (RELI Misc, 1 each by Does not apply route continuous.  Order for Wayne compression sock, medium        Allergies    Demerol [Meperidine]      Januvia [Sitaglipti*        Comment:GI upset, abdominal pain  Rocephin [Ceftriaxo*    UNKNOWN    Comment:Tolerated Zosyn 10/28/2019     07/23/2019    DDIMER >4.00 (H) 05/30/2019    MG 1.8 11/10/2019    PHOS 2.9 07/26/2019    TROP 0.088 (HH) 10/08/2019    CK 24 (L) 06/14/2019    B12 1,229 (H) 06/28/2019         Imaging        Assessment   1.   Recent pulmonary embolism

## 2019-11-11 ENCOUNTER — APPOINTMENT (OUTPATIENT)
Dept: GENERAL RADIOLOGY | Facility: HOSPITAL | Age: 59
DRG: 205 | End: 2019-11-11
Attending: INTERNAL MEDICINE
Payer: MEDICARE

## 2019-11-11 ENCOUNTER — TELEPHONE (OUTPATIENT)
Dept: HEMATOLOGY/ONCOLOGY | Facility: HOSPITAL | Age: 59
End: 2019-11-11

## 2019-11-11 PROCEDURE — 99232 SBSQ HOSP IP/OBS MODERATE 35: CPT | Performed by: OTOLARYNGOLOGY

## 2019-11-11 PROCEDURE — 74018 RADEX ABDOMEN 1 VIEW: CPT | Performed by: INTERNAL MEDICINE

## 2019-11-11 PROCEDURE — 99222 1ST HOSP IP/OBS MODERATE 55: CPT | Performed by: INTERNAL MEDICINE

## 2019-11-11 PROCEDURE — 99233 SBSQ HOSP IP/OBS HIGH 50: CPT | Performed by: INTERNAL MEDICINE

## 2019-11-11 RX ORDER — HYDROCODONE BITARTRATE AND ACETAMINOPHEN 5; 325 MG/1; MG/1
1 TABLET ORAL ONCE
Status: COMPLETED | OUTPATIENT
Start: 2019-11-11 | End: 2019-11-11

## 2019-11-11 RX ORDER — BISACODYL 10 MG
10 SUPPOSITORY, RECTAL RECTAL
Status: DISCONTINUED | OUTPATIENT
Start: 2019-11-11 | End: 2019-11-17

## 2019-11-11 RX ORDER — POLYETHYLENE GLYCOL 3350 17 G/17G
17 POWDER, FOR SOLUTION ORAL DAILY
Status: DISCONTINUED | OUTPATIENT
Start: 2019-11-11 | End: 2019-11-17

## 2019-11-11 NOTE — PROGRESS NOTES
[facility]    Progress Note    Vanessa Tavarez Patient Status:  Observation    1960 MRN [id number]   [facility] 2W/SW Attending [address], [address] [facility] Day # 0 PCP Frances Kwon MD       Subjective: PRN  Glucose-Vitamin C (DEX-4) chewable tab 4 tablet, 4 tablet, Oral, Q15 Min PRN  glucose (DEX4) oral liquid 15 g, 15 g, Oral, Q15 Min PRN  insulin detemir (LEVEMIR) 100 UNIT/ML flextouch 10 Units, 10 Units, Subcutaneous, Nightly  Insulin Aspart Pen (ROSEMARY which was pulled out by pt     IRMA   Needs CPAP @ night     HX of DVT/PE   On coumadin   Hematology consult     Thrombocytopenia   ?  Antibiotics   R/o HIT     DVT prophylaxis   On coumadin     PT/OT     Certification      PHYSICIAN Certification of Need fo

## 2019-11-11 NOTE — PHYSICAL THERAPY NOTE
Attempted to see pt twice today for PT eval. Consulted w/ RN. In AM, pt was off the floor for x-ray. In PM, visited pt in room. Pt was received supine in bed. Despite MAXIMAL education and encouragement, pt ADAMANTLY refused participating with therapy.  Pt

## 2019-11-11 NOTE — H&P
[facility]    PATIENT'S NAME: Mtaeusz Peterson   ATTENDING PHYSICIAN: Ghazal Barnard MD   PATIENT ACCOUNT#:   [id number]    LOCATION:  [address] [place] [address] Street #:   [id number]       YOB: 1960  ADMISSION DATE:       11/10/2019 normally. ABDOMEN:  Soft and nondistended, nontender. Bowel sounds present. EXTREMITIES:  Trace edema. LABORATORY DATA:  Labs were reviewed. ASSESSMENT AND PLAN:    1. History of chronic respiratory failure. Garrel Daniel was capped.   Tracheostomy tub

## 2019-11-11 NOTE — CONSULTS
Hematology Consultation Note    Patient Name: Ezekiel Crouch   YOB: 1960   Medical Record Number: [id number]   CSN: [id number]   Consulting Physician: Miguel Cordova MD  Referring Physician(s): London Monique MD  Date of Consultation: 11/11 signs or symptoms suggestive of PE recurrence, no new leg pain in either extremity to suggest DVT recurrence. Patient was to follow-up with me in outpatient hematology clinic to discuss results of his hypercoagulable work-up.  He denies any new symptoms or Not on file    Tobacco Use      Smoking status: Never Smoker      Smokeless tobacco: Never Used    Substance and Sexual Activity      Alcohol use: No      Drug use: No      Sexual activity: Not on file    Lifestyle      Physical activity:        Days per w dyspnea on exertion. Gastrointestinal: Negative for dysphagia, odynophagia, reflux symptoms, nausea, vomiting, change in bowel habits, diarrhea, constipation and +abdominal pain and distention.   Integument/breast: Negative for rash, skin lesions, and prur RDW 13.6 11/11/2019 04:30 AM    NEPRELIM 3.86 11/11/2019 04:30 AM    .0 (L) 11/11/2019 04:30 AM       Lab Results   Component Value Date/Time     (H) 11/11/2019 04:30 AM    BUN 17 11/11/2019 04:30 AM    CREATSERUM 1.01 11/11/2019 04:30 AM recommended for indefinite anticoagulation with warfarin, we do not need to repeat these labs    --informed pt that he is NEGATIVE for factor V Leiden and prothrombin gene mutation; negative for antiphospholipid ab, normal values for anticardiolipin ab and

## 2019-11-11 NOTE — OCCUPATIONAL THERAPY NOTE
Orders received, chart reviewed for OT evaluation. Attempt this AM: pt was away at xray. Attempt this PM: RN cleared pt for participation in activity. Upon arrival, pt was supine in bed with complaint of abdominal pain.  Pt reported that he would not get up

## 2019-11-11 NOTE — CONSULTS
[facility]    Report of Consultation    Luly Deluca Patient Status:  Observation    1960 MRN [id number]   [facility] 2W/SW Attending [address], [address] [facility] Day # 0 PCP Arun Farmer MD     Da respiratory failure, he was hypotensive and eventually had a tracheostomy tube. I seen the patient for long periods of time both in the past and that last admission for chronic megacolon.   During his last stay earlier this year he had multiple colonoscopi to have extensive bilateral pulmonary emboli with extensive central burden. There was a family history of deep venous thrombosis in his brother. Patient has a very sedentary lifestyle.  He has a history of gastrointestinal bleeding with colitis and also has RASH    Medications:    Current Facility-Administered Medications:   •  PEG 3350 (MIRALAX) powder packet 17 g, 17 g, Oral, Daily  •  bisacodyl (DULCOLAX) rectal suppository 10 mg, 10 mg, Rectal, Daily PRN  •  Warfarin Sodium (COUMADIN) tab 7.5 mg, 7.5 mg, wt loss      Physical Exam:  Blood pressure (!) 142/106, pulse 90, temperature 98.5 °F (36.9 °C), temperature source Temporal, resp. rate 19, height 65\", weight 270 lb 1.6 oz (122.5 kg), SpO2 95 %. General: Alert, orientated x3. Cooperative.   No appar MD on 11/11/2019 at 11:46     Approved by (CST): Carlos Collier MD on 11/11/2019 at 11:52                Impression and Plan:  Patient Active Problem List:     Epilepsy [facility])     Pain and swelling of left lower leg     Fibromyalgia     Diabetes ([facility] [address]     S

## 2019-11-11 NOTE — PLAN OF CARE
Pt received at 0730. Alert and oriented x4. Saturating well on RA. NSR on tele. PIV patent. This AM c/o 9/10 pain in abdomen + distended. MD notified. One time dose norco given, X-ray dose shows possible ileus. Surgery consulted.  Pt had loose BM x1 and kermit of bed    Interventions:   - pt/ot eval  - use walker  - up to chair for meals  - See additional Care Plan goals for specific interventions   Outcome: Progressing     Problem: CARDIOVASCULAR - ADULT  Goal: Absence of cardiac arrhythmias or at baseline  Chas assess patient for signs and symptoms of electrolyte imbalances  - Administer electrolyte replacement as ordered  - Monitor response to electrolyte replacements, including rhythm and repeat lab results as appropriate  - Fluid restriction as ordered  - Inst

## 2019-11-11 NOTE — PROGRESS NOTES
Pulmonary/Critical Care Follow Up Note    HPI:   Fidelina Baires is a 60year old male with Patient presents with:   Other      PCP Macario Segura MD  Admission Attending Erick Chaudhary MD    Hospital Day #0    C/o abd pain  + abd distenti Min PRN  •  glucose (DEX4) oral liquid 15 g, 15 g, Oral, Q15 Min PRN  •  insulin detemir (LEVEMIR) 100 UNIT/ML flextouch 10 Units, 10 Units, Subcutaneous, Nightly  •  Insulin Aspart Pen (NOVOLOG) 100 UNIT/ML flexpen 1-7 Units, 1-7 Units, Subcutaneous, TID tele      Herb Gaspar MD

## 2019-11-11 NOTE — TELEPHONE ENCOUNTER
Call to patient's wife regarding f/u appointment with Dr Vianey Gamble regarding his possible clotting disorder. She states he is back in the hospital.  He may be discharged today.   She is trying to figure out how to arrange all of his f/u appointments, and she w

## 2019-11-12 ENCOUNTER — TELEPHONE (OUTPATIENT)
Dept: ENDOCRINOLOGY CLINIC | Facility: CLINIC | Age: 59
End: 2019-11-12

## 2019-11-12 ENCOUNTER — ANTI-COAG (OUTPATIENT)
Dept: CARDIOLOGY | Age: 59
End: 2019-11-12

## 2019-11-12 PROCEDURE — 99232 SBSQ HOSP IP/OBS MODERATE 35: CPT | Performed by: INTERNAL MEDICINE

## 2019-11-12 RX ORDER — POTASSIUM CHLORIDE 20 MEQ/1
40 TABLET, EXTENDED RELEASE ORAL EVERY 4 HOURS
Status: COMPLETED | OUTPATIENT
Start: 2019-11-12 | End: 2019-11-12

## 2019-11-12 RX ORDER — LISINOPRIL 10 MG/1
10 TABLET ORAL DAILY
Status: DISCONTINUED | OUTPATIENT
Start: 2019-11-12 | End: 2019-11-17

## 2019-11-12 NOTE — PROGRESS NOTES
[facility]    Progress Note    Vanessa Tavarez Patient Status:  Observation    1960 MRN [id number]   [facility] 2W/SW Attending [address], [address] [facility] Day # 0 PCP MD Roverto Benitez 3.3*    104 106   CO2 28.0 30.0 27.0   ALKPHO  --   --  59   AST  --   --  17   ALT  --   --  22   BILT  --   --  0.6   TP  --   --  7.1             Xr Abdomen (1 View) (cpt=74018)    Result Date: 11/11/2019  CONCLUSION:  1.  Persistent moderate small

## 2019-11-12 NOTE — PLAN OF CARE
Patient refusing to have rectal tube to be taken out for possible bowel movement per orders. Patient states he does not feel like he has to go to the bathroom and stated he will allow RN to remove when he has the sensation of a bowel movement.  He also stat

## 2019-11-12 NOTE — PROGRESS NOTES
[facility]    Progress Note    Natalia Barajas Patient Status:  Observation    1960 MRN [id number]   [facility] 2W/SW Attending [address], [address] Day # 0 PCP Nelsy Roberts MD       Subjective: tablet, 4 tablet, Oral, Q15 Min PRN  glucose (DEX4) oral liquid 15 g, 15 g, Oral, Q15 Min PRN  insulin detemir (LEVEMIR) 100 UNIT/ML flextouch 10 Units, 10 Units, Subcutaneous, Nightly  Insulin Aspart Pen (NOVOLOG) 100 UNIT/ML flexpen 1-7 Units, 1-7 Units, Nocturnal bradycardia   Due to IRMA   On CPAP @ night   Off coreg    Hx of cholecystitis   Rx with cholecystostomy tube which was pulled out by pt   Conservative management   Considered high risk for the procedure due to multiple co morbidities     IRMA

## 2019-11-12 NOTE — PLAN OF CARE
Problem: Patient Centered Care  Goal: Patient preferences are identified and integrated in the patient's plan of care  Description  Interventions:  - What would you like us to know as we care for you? Patient can walk a few steps with a walker.  He is nor of antiarrhythmic and heart rate control medications as ordered  - Initiate emergency measures for life threatening arrhythmias  - Monitor electrolytes and administer replacement therapy as ordered  Outcome: Progressing     Problem: RESPIRATORY - ADULT  Go Comfort rounds made. Bed in lowest position.

## 2019-11-12 NOTE — PROGRESS NOTES
[facility]     Progress Note        Gustavoasaf Parada Patient Status:  Observation    1960 MRN [id number]   [facility] 2W/SW Attending [address], [address] [facility] Day # 0 PCP MD Opal Rojas Subcutaneous, Nightly  Insulin Aspart Pen (NOVOLOG) 100 UNIT/ML flexpen 1-7 Units, 1-7 Units, Subcutaneous, TID CC        Continuous Infusions:     Physical Exam  Constitutional: no acute distress  Eyes: PERRL  ENT: nares patent  Cardio: RRR, S1 S2  Respir been tolerating capping trials for several months.  -Patient eval by ENT. Will monitor without trach at this time.   No significant respiratory distress.  -Nightly BiPAP therapy when safe  -Continue anticoagulation recent PE/DVT with Coumadin at this time

## 2019-11-12 NOTE — PLAN OF CARE
NSR; sinus bradycardia during sleep r/t desaturation secondary to IRMA  Rectal tube for decompression    Problem: Patient Centered Care  Goal: Patient preferences are identified and integrated in the patient's plan of care  Description  Interventions:  - Wh changes in respiratory status  - Assess for changes in mentation and behavior  - Position to facilitate oxygenation and minimize respiratory effort  - Oxygen supplementation based on oxygen saturation or ABGs  - Provide Smoking Cessation handout, if applic

## 2019-11-12 NOTE — PROGRESS NOTES
[facility] HOSP - [facility]  Hematology   Progress Note    Guido Cui Patient Status:  Observation    1960 MRN [id number]   [facility] 2W/SW Attending [address], [address] [facility] Day # 0 PCP Harika Anderson MD admitted withsymptoms of dyspnea and hemoptysis found to have acute saddle pulmonary embolism and LLE DVT involvement needs anticoagulation managment.     Plan:     1.) Bilateral PE and LLE DVT     --patient suffered an extensive bilateral PE and LLE DVT w possibly related to recent antibiotic exposure.  May be lowered due to blood loss from the trach site in light of recent trach tube being pulled   --HIT panel negative, there was low clinical suspicion for this process  --no signs of a microangiopathic proc

## 2019-11-12 NOTE — TELEPHONE ENCOUNTER
Received documentation request from [facility]. Requested LOV within last 6 months. Pt LOV 1/22/19. Okay to double book for sooner apt?

## 2019-11-12 NOTE — PROGRESS NOTES
Vanessa Tavarez is a 60year old male. Patient presents with: Other      HISTORY OF PRESENT ILLNESS  Patient is a 60year old male who was admitted to the hospital for observation after decannulation inadvertently.   He presents with very complex medical hist difficulty breathing. Accidentally decannulated by his wife 2 evenings ago and has done well since the trach removed. To be seen by pulmonary later on today. No other signs, symptoms or complaints.       Social History    Socioeconomic History      Sunshine Constitutional Negative Fatigue, fever and weight loss. ENMT Negative Drooling. Eyes Negative Blurred vision and vision changes. Respiratory Negative Dyspnea and wheezing. Cardio Negative Chest pain, irregular heartbeat/palpitations and syncope. Normal. Septum -Normal  Turbinates - Right: Normal, Left: Normal.     No current outpatient medications on file. ASSESSMENT AND PLAN    Accidental decannulation. Doing very well. No shortness of breath tolerating CPAP and slept well.   Actually feels t

## 2019-11-12 NOTE — PLAN OF CARE
Patient's K blood sample hemolyzed. Refusing to have it re-drawn despite education given on the importance of checking his levels.

## 2019-11-12 NOTE — PHYSICAL THERAPY NOTE
PHYSICAL THERAPY EVALUATION - INPATIENT     Room Number: 216/216-A  Evaluation Date: 11/12/2019  Type of Evaluation: Initial   Physician Order: PT Eval and Treat    Presenting Problem: capped trach tube was accidently pulled out by wife while she was cristiane conservation; Endurance; Coordination; Body mechanics;Balance training;Strengthening;Stair training;Stoop training  Rehab Potential : Fair  Frequency (Obs): 3x/week       PHYSICAL THERAPY MEDICAL/SOCIAL HISTORY     History related to current admission: Per H& level  Stairs to Enter : 1             Lives With: Spouse  Drives: No(Wife drives)  Patient Owned Equipment: Rolling walker(Lift chair)       Prior Level of [place]: Wife assists with lower body dressing. Pt can walk a few steps with RW. Wife drives. chair;Needs met;Call light within reach;RN aware of session/findings; All patient questions and concerns addressed    CURRENT GOALS    Goals to be met by: 11/26/19  Patient Goal Patient's self-stated goal is: to go home   Goal #1 Patient is able to [place]

## 2019-11-12 NOTE — OCCUPATIONAL THERAPY NOTE
OCCUPATIONAL THERAPY EVALUATION - INPATIENT     Room Number: 216/216-A  Evaluation Date: 11/12/2019  Type of Evaluation: Initial       Physician Order: IP Consult to Occupational Therapy  Reason for Therapy: ADL/IADL Dysfunction and Discharge Planning    O training;Equipment eval/education; Compensatory technique education       OCCUPATIONAL THERAPY MEDICAL/SOCIAL HISTORY     Problem List  Principal Problem:    Tracheostomy complication, unspecified complication type [facility])  Active Problems:    Tracheostomy co Upper extremity ROM is within functional limits     STRENGTH ASSESSMENT  Upper extremity strength is within functional limits     ACTIVITIES OF DAILY LIVING ASSESSMENT  AM-PAC ‘6-Clicks’ Inpatient Daily Activity Short Form  How much help from another per

## 2019-11-13 PROCEDURE — 99232 SBSQ HOSP IP/OBS MODERATE 35: CPT | Performed by: INTERNAL MEDICINE

## 2019-11-13 PROCEDURE — 99233 SBSQ HOSP IP/OBS HIGH 50: CPT | Performed by: INTERNAL MEDICINE

## 2019-11-13 RX ORDER — WARFARIN SODIUM 5 MG/1
5 TABLET ORAL NIGHTLY
Status: DISCONTINUED | OUTPATIENT
Start: 2019-11-13 | End: 2019-11-15

## 2019-11-13 NOTE — PROGRESS NOTES
Yes. Thanks  [facility] HOSP - [facility]    Progress Note    Donelda Dakins Patient Status:  Observation    1960 MRN [id number]   [facility] 2W/SW Attending Kim Meraz, [address] Day # 0 PCP Melissa Barfield MD powder packet 17 g, 17 g, Oral, Daily PRN  Zolpidem Tartrate (AMBIEN) tab 5 mg, 5 mg, Oral, Nightly PRN  dextrose 50 % injection 50 mL, 50 mL, Intravenous, PRN  Glucose-Vitamin C (DEX-4) chewable tab 4 tablet, 4 tablet, Oral, Q15 Min PRN  glucose (DEX4) or night     HX of DVT/PE    coumadin per hematology   INR 2.6    Thrombocytopenia   HIT negative     DVT prophylaxis   On coumadin     PT/OT         Certification      PHYSICIAN Certification of Need for Inpatient Hospitalization - Initial Certification    P

## 2019-11-13 NOTE — PROGRESS NOTES
Pt with frequent pauses, ranging from 3.31 sec to 4.89 sec, followed by episodes of bradycardia as low as 34 bpm.  Pt asymptomatic - reports feeling fine. Episode of tachyarrhythmias with a HR as high as 140.  Pt refusing lab draws to assess electrolyte

## 2019-11-13 NOTE — OCCUPATIONAL THERAPY NOTE
Attempted to see pt for OT tx. Pt adamantly refusing despite max encouragement from therapist. RADHA Rodriguez) aware of pt's refusal. Will continue to follow.

## 2019-11-13 NOTE — PROGRESS NOTES
[facility]    Progress Note    Jai Carbajal Patient Status:  Observation    1960 MRN [id number]   [facility] 2W/SW Attending [address], [address] [facility] Day # 0 PCP MD Codie Tyler  104 106   CO2 28.0 30.0 27.0   ALKPHO  --   --  59   AST  --   --  17   ALT  --   --  22   BILT  --   --  0.6   TP  --   --  7.1                         Assessment and Plan:       Tracheostomy complication, unspecified complication type ([facility]    T

## 2019-11-13 NOTE — PROGRESS NOTES
Pulmonary/Critical Care Follow Up Note    HPI:   Kelsi Quiles is a 60year old male with Patient presents with:   Other      PCP Esa Vanegas MD  Admission Attending Araceli Lewis 15 Day #0    C/o less abd pain and less a tablet, Oral, Q15 Min PRN  •  glucose (DEX4) oral liquid 15 g, 15 g, Oral, Q15 Min PRN  •  insulin detemir (LEVEMIR) 100 UNIT/ML flextouch 10 Units, 10 Units, Subcutaneous, Nightly  •  Insulin Aspart Pen (NOVOLOG) 100 UNIT/ML flexpen 1-7 Units, 1-7 Units,

## 2019-11-13 NOTE — PLAN OF CARE
Problem: Patient Centered Care  Goal: Patient preferences are identified and integrated in the patient's plan of care  Description  Interventions:  - What would you like us to know as we care for you? Patient can walk a few steps with a walker.  He is nor walker  - up to chair for meals  - See additional Care Plan goals for specific interventions   Outcome: Progressing     Problem: CARDIOVASCULAR - ADULT  Goal: Absence of cardiac arrhythmias or at baseline  Description  INTERVENTIONS:  - Continuous cardiac Instruct patient on self management of diabetes  11/13/2019 0751 by Leoncio Varghese  Outcome: Progressing  11/13/2019 0557 by Leoncio Varghese  Outcome: Not Progressing  Goal: Electrolytes maintained within normal limits  Description  INTE

## 2019-11-13 NOTE — TELEPHONE ENCOUNTER
Called to schedule apt. Spoke with pt wife. States he is currently hospitalized. Advised she will call back to schedule apt.

## 2019-11-14 ENCOUNTER — APPOINTMENT (OUTPATIENT)
Dept: GENERAL RADIOLOGY | Facility: HOSPITAL | Age: 59
DRG: 205 | End: 2019-11-14
Attending: SURGERY
Payer: MEDICARE

## 2019-11-14 PROCEDURE — 99222 1ST HOSP IP/OBS MODERATE 55: CPT | Performed by: INTERNAL MEDICINE

## 2019-11-14 PROCEDURE — 99233 SBSQ HOSP IP/OBS HIGH 50: CPT | Performed by: INTERNAL MEDICINE

## 2019-11-14 PROCEDURE — 74018 RADEX ABDOMEN 1 VIEW: CPT | Performed by: SURGERY

## 2019-11-14 PROCEDURE — 99232 SBSQ HOSP IP/OBS MODERATE 35: CPT | Performed by: INTERNAL MEDICINE

## 2019-11-14 NOTE — CDS QUERY
CDI UPDATE: Ayad Leiva ANSWERED IN PROGRESS NOTE      Dre Garcias    Dear Doctor:  Clinical information in the medical record is conflicting regarding the acuity status of the Saddle Pulmonary Embolus.  For accurate ICD-10-CM code a

## 2019-11-14 NOTE — PLAN OF CARE
Problem: Patient Centered Care  Goal: Patient preferences are identified and integrated in the patient's plan of care  Description  Interventions:  - What would you like us to know as we care for you? Patient can walk a few steps with a walker.  He is nor of antiarrhythmic and heart rate control medications as ordered  - Initiate emergency measures for life threatening arrhythmias  - Monitor electrolytes and administer replacement therapy as ordered  Outcome: Progressing     Problem: RESPIRATORY - ADULT  Go integrity remains intact  Description  INTERVENTIONS  - Assess and document risk factors for pressure ulcer development  - Assess and document skin integrity  - Monitor for areas of redness and/or skin breakdown  - Initiate interventions, skin care algorit

## 2019-11-14 NOTE — PROGRESS NOTES
[facility]  Hematology   Progress Note    Aloha Numbers Patient Status:  Observation    1960 MRN [id number]   [facility] 2W/SW Attending [address], [address] Day # 1 PCP Chris Cheng MD Assessment and Plan:   60year old M with complex medical history admitted withsymptoms of dyspnea and hemoptysis found to have acute saddle pulmonary embolism and LLE DVT involvement needs anticoagulation managment.     Plan:     1.) Bilateral PE and LL Thrombocytopenia     --suspect this to be multifactorial; mild, possibly related to recent antibiotic exposure.  May be lowered due to blood loss from the trach site in light of recent trach tube being pulled   --HIT panel negative, there was low clinical s prior exams.     Dictated by (CST): Checo Hendrix MD on 11/14/2019 at 10:48     Approved by (CST): Checo Hendrix MD on 11/14/2019 at 10:50                       Nicol Saenz MD  11/14/2019

## 2019-11-14 NOTE — OCCUPATIONAL THERAPY NOTE
Pt not seen for OT at this time secondary to pt declining session, discussed with pt benefits of activity to max strength and endurance to perform ADLS, pt continued to decline.  Will attempt to see pt next date as schedule permits

## 2019-11-14 NOTE — PROGRESS NOTES
Yes. Thanks  [facility]OSP - [facility]    Progress Note    Carmen Castellanos Patient Status:  Observation    1960 MRN [id number]   [facility] 2W/SW Attending [address], [address] [facility] Day # 1 PCP Kyra Cai MD (AMBIEN) tab 5 mg, 5 mg, Oral, Nightly PRN  dextrose 50 % injection 50 mL, 50 mL, Intravenous, PRN  Glucose-Vitamin C (DEX-4) chewable tab 4 tablet, 4 tablet, Oral, Q15 Min PRN  glucose (DEX4) oral liquid 15 g, 15 g, Oral, Q15 Min PRN  insulin detemir (LEV tube, laxatives, miralax and suppositories   Advance to full liquid diet    HTN controlled   On lisinopril 10 mg po once daily     Nocturnal bradycardia   Due to IRMA   D/w Dr Chuy Cabello and Dr Demond Kelly   Trial Auto BIPAP @ night     Hx of cholecystitis   Rx with c

## 2019-11-14 NOTE — PROGRESS NOTES
Double RN skin check done prior to transfer off Unit. Skin check performed by this RN and Berdie Sacks.      Wounds are as follows: scabbed skin tear L forearm and healing stage I pressure ulcer R buttock    Will remain available for any further questions or c

## 2019-11-14 NOTE — PROGRESS NOTES
[facility]    Progress Note    Dayanara Nikki Patient Status:  Observation    1960 MRN [id number]   [facility] 2W/SW Attending [address], [address] [facility] Day # 1 PCP MD Luis Alberto Alva 8.9 9.0 8.9   ALB  --   --  3.0*    138 138   K 4.1 4.0 3.3*    104 106   CO2 28.0 30.0 27.0   ALKPHO  --   --  59   AST  --   --  17   ALT  --   --  22   BILT  --   --  0.6   TP  --   --  7.1                         Assessment and Plan:

## 2019-11-14 NOTE — PROGRESS NOTES
Pulmonary/Critical Care Follow Up Note    HPI:   Curt Frias is a 60year old male with Patient presents with:   Other      PCP Kb Mota MD  Admission Attending Araceli Kenny 15 Day #1    C/o less abd pain and less a Intravenous, PRN  •  Glucose-Vitamin C (DEX-4) chewable tab 4 tablet, 4 tablet, Oral, Q15 Min PRN  •  glucose (DEX4) oral liquid 15 g, 15 g, Oral, Q15 Min PRN  •  insulin detemir (LEVEMIR) 100 UNIT/ML flextouch 10 Units, 10 Units, Subcutaneous, Nightly  •

## 2019-11-14 NOTE — CM/SW NOTE
Per nursing rounds pt was readmitted to [address] [place] [address] for accidental decannulation of tach by pts wife while cleaning trach. Previous admission and dc was for PE. Now c/o abdominal pain, ileus found on kub. Pt given laxitives. Rectal tube inserted.   Now with loo

## 2019-11-14 NOTE — CONSULTS
[facility]    Report of Cardiology Consultation    Donelda Dakins Patient Status:  Inpatient    1960 MRN [id number]   Location [facility] 4W/SW/SE Attending [address], [address] [facility] Day # 1 PCP Melissa Barfield, with Coumadin. 10/2019 for dyspnea with saddle PE and LLE DVT admitted now s/p tracheostomy tube being pulled accidentally by his wife. Pt has a SBO being managed conservatively, now has C. Difficle infection     Past Medical History  Past Medical History suppository 10 mg, 10 mg, Rectal, Daily PRN  acetaminophen (TYLENOL EXTRA STRENGTH) tab 1,000 mg, 1,000 mg, Oral, Q6H PRN  Alfuzosin HCl ER (UROXATRAL) 24 hr tab 10 mg, 10 mg, Oral, Daily with breakfast  balsam peru-castor oil (VENELEX) ointment, , Topical nightly. levETIRAcetam 1000 MG Oral Tab, Take 1,000 mg by mouth 2 (two) times daily.     Pantoprazole Sodium 40 MG Oral Tab EC, Take 1 tablet (40 mg total) by mouth every morning before breakfast.  Insulin Pen Needle (BD PEN NEEDLE ROMMEL U/F) 32G X 4 MM Dickens Nightly   • lisinopril  10 mg Oral Daily   • PEG 3350  17 g Oral Daily   • Alfuzosin HCl ER  10 mg Oral Daily with breakfast   • furosemide  20 mg Oral Daily   • levetiracetam  1,000 mg Oral BID   • mirtazapine  15 mg Oral Nightly   • Pantoprazole Sodium

## 2019-11-14 NOTE — PLAN OF CARE
Pt handed off to [facility] at 1600, bedside report done. No acute issues during my care, all VS stable. Rectal still in place, still have liquid stool. Vanco PO added for CDiff. Surgery changed diet to full liquid and then it will be soft foods tomorrow.  Wi walker  - up to chair for meals  - See additional Care Plan goals for specific interventions   Outcome: Progressing     Problem: CARDIOVASCULAR - ADULT  Goal: Absence of cardiac arrhythmias or at baseline  Description  INTERVENTIONS:  - Continuous cardiac imbalances  - Administer electrolyte replacement as ordered  - Monitor response to electrolyte replacements, including rhythm and repeat lab results as appropriate  - Fluid restriction as ordered  - Instruct patient on fluid and nutrition restrictions as a

## 2019-11-14 NOTE — PLAN OF CARE
Patient alert, oriented x 4, satting well on room air. BP stable. Complains of pain to rectum, flexiseal in place, sheeba care, cream applied, repositioned, tylenol given PO. Awaiting assignment to remote telemetry bed. Report given to Yumiko Hair RN.

## 2019-11-14 NOTE — PROGRESS NOTES
[facility]  Hematology   Progress Note    Mary Alice Jha Patient Status:  Observation    1960 MRN [id number]   [facility] 2W/SW Attending [address], [address] [facility] Day # 0 PCP Darlin Cm MD with complex medical history admitted withsymptoms of dyspnea and hemoptysis found to have acute saddle pulmonary embolism and LLE DVT involvement needs anticoagulation managment.     Plan:     1.) Bilateral PE and LLE DVT     --patient suffered an extensi recent antibiotic exposure.  May be lowered due to blood loss from the trach site in light of recent trach tube being pulled   --HIT panel negative, there was low clinical suspicion for this process  --no signs of a microangiopathic process like TTP/HUS as

## 2019-11-15 ENCOUNTER — TELEPHONE (OUTPATIENT)
Dept: ENDOCRINOLOGY CLINIC | Facility: CLINIC | Age: 59
End: 2019-11-15

## 2019-11-15 PROCEDURE — 99232 SBSQ HOSP IP/OBS MODERATE 35: CPT | Performed by: INTERNAL MEDICINE

## 2019-11-15 RX ORDER — POTASSIUM CHLORIDE 20 MEQ/1
40 TABLET, EXTENDED RELEASE ORAL EVERY 4 HOURS
Status: COMPLETED | OUTPATIENT
Start: 2019-11-15 | End: 2019-11-15

## 2019-11-15 RX ORDER — WARFARIN SODIUM 2.5 MG/1
2.5 TABLET ORAL NIGHTLY
Status: DISCONTINUED | OUTPATIENT
Start: 2019-11-15 | End: 2019-11-17

## 2019-11-15 NOTE — OCCUPATIONAL THERAPY NOTE
Patient adamantly refusing to participate despite a great deal of education- requested therapy not come back- is consistently refusing participation- will sign off at this time- if change occurs, please re-order therapy services.

## 2019-11-15 NOTE — PROGRESS NOTES
Yes. Thanks  [facility] HOSP - [facility]    Progress Note    Yosef De Leon Patient Status:  Observation    1960 MRN [id number]   [facility] 2W/SW Attending [address], [address] Day # 2 PCP Krystal Castro MD tablet, Oral, Q15 Min PRN  glucose (DEX4) oral liquid 15 g, 15 g, Oral, Q15 Min PRN  insulin detemir (LEVEMIR) 100 UNIT/ML flextouch 10 Units, 10 Units, Subcutaneous, Nightly  Insulin Aspart Pen (NOVOLOG) 100 UNIT/ML flexpen 1-7 Units, 1-7 Units, Subcutane for pacemaker since HR and conduction are normal during daytime   Trial Auto BIPAP @ night     Hx of cholecystitis   Rx with cholecystostomy tube which was pulled out by pt   Conservative management   Considered high risk for the procedure due to multiple

## 2019-11-15 NOTE — PHYSICAL THERAPY NOTE
Spoke to pt and he requested to be d/c from skilled PT intervention and had an extensive conversation with OT. Will d/c from skilled  PT. Nursing is aware.

## 2019-11-15 NOTE — TELEPHONE ENCOUNTER
Received request from [facility] for chart notes within last 6 months. Patient has not been seen since 01/2019. Per TE dated 11/12, we called patient to schedule follow up but wife stated he was currently hospitalized and will call back to schedule.

## 2019-11-16 PROCEDURE — 99232 SBSQ HOSP IP/OBS MODERATE 35: CPT | Performed by: INTERNAL MEDICINE

## 2019-11-16 PROCEDURE — 99231 SBSQ HOSP IP/OBS SF/LOW 25: CPT | Performed by: INTERNAL MEDICINE

## 2019-11-16 NOTE — PROGRESS NOTES
Yes. Thanks  [facility] HOSP - [facility]    Progress Note    Donelda Dakins Patient Status:  Observation    1960 MRN [id number]   [facility] 2W/SW Attending [address], [address] [facility] Day # 3 PCP Melissa Barfield MD (DEX-4) chewable tab 4 tablet, 4 tablet, Oral, Q15 Min PRN  glucose (DEX4) oral liquid 15 g, 15 g, Oral, Q15 Min PRN  insulin detemir (LEVEMIR) 100 UNIT/ML flextouch 10 Units, 10 Units, Subcutaneous, Nightly  Insulin Aspart Pen (NOVOLOG) 100 UNIT/ML flexpe planning home   Social sx consult       Certification      PHYSICIAN Certification of Need for Inpatient Hospitalization - Initial Certification    Patient will require inpatient services that will reasonably be expected to span two midnight's based on the

## 2019-11-16 NOTE — PROGRESS NOTES
Pulmonary/Critical Care Follow Up Note    HPI:   Brayden Garcia is a 60year old male with Patient presents with:   Other      PCP Marcelle Mars MD  Admission Attending [address] Day #2    C/o less abd pain and less a tablet, 4 tablet, Oral, Q15 Min PRN  •  glucose (DEX4) oral liquid 15 g, 15 g, Oral, Q15 Min PRN  •  insulin detemir (LEVEMIR) 100 UNIT/ML flextouch 10 Units, 10 Units, Subcutaneous, Nightly  •  Insulin Aspart Pen (NOVOLOG) 100 UNIT/ML flexpen 1-7 Units, 1 morbidities    6. Cardiomyopathy  Plan      follow I/O   Cont baseline meds    7.  Bradycardia  Only at night  Do not think related to poor control of IRMA  Plan trial of BiPAP auto          Devan Hansen MD

## 2019-11-16 NOTE — CM/SW NOTE
[facility] orders obtained and relayed to [facility]/Shazia [id number].     Devon Jara, MARIFER, [address] [place] [address]

## 2019-11-16 NOTE — PROGRESS NOTES
[facility] HOSP - [facility]  Hematology   Progress Note    Lufkinbrady Hillman Patient Status:  Observation    1960 MRN [id number]   [facility] 2W/SW Attending [address], [address] [facility] Day # 3 PCP Slim Guerra MD complex medical history admitted withsymptoms of dyspnea and hemoptysis found to have acute saddle pulmonary embolism and LLE DVT involvement needs anticoagulation managment.     Plan:     1.) Bilateral PE and LLE DVT     --patient suffered an extensive bi mild, possibly related to recent antibiotic exposure.  May be lowered due to blood loss from the trach site in light of recent trach tube being pulled   --HIT panel negative, there was low clinical suspicion for this process  --no signs of a microangiopathi

## 2019-11-16 NOTE — PROGRESS NOTES
[facility] HOSP - [facility]  Hematology   Progress Note    Anabhavna Salvadorabby Patient Status:  Observation    1960 MRN [id number]   [facility] 2W/SW Attending [address], [address] Day # 2 PCP Esa Vanegas MD complex medical history admitted withsymptoms of dyspnea and hemoptysis found to have acute saddle pulmonary embolism and LLE DVT involvement needs anticoagulation managment.     Plan:     1.) Bilateral PE and LLE DVT     --patient suffered an extensive bi mild, possibly related to recent antibiotic exposure.  May be lowered due to blood loss from the trach site in light of recent trach tube being pulled   --HIT panel negative, there was low clinical suspicion for this process  --no signs of a microangiopathi Manda Lorenz MD  11/15/2019

## 2019-11-16 NOTE — PLAN OF CARE
A&O X4. VSS. PRN tylenol given for pain. Patient refused blood draw at 0000, retimed for AM draw. Uses urinal/ bedpan, refusing to get out of bed. Vaseline gauze over previous trach site c/d/I. Coumadin held last night for INR of 3.24.   Received call from additional Care Plan goals for specific interventions   Outcome: Progressing     Problem: CARDIOVASCULAR - ADULT  Goal: Absence of cardiac arrhythmias or at baseline  Description  INTERVENTIONS:  - Continuous cardiac monitoring, monitor vital signs, obtain replacement as ordered  - Monitor response to electrolyte replacements, including rhythm and repeat lab results as appropriate  - Fluid restriction as ordered  - Instruct patient on fluid and nutrition restrictions as appropriate  Outcome: Progressing

## 2019-11-16 NOTE — PROGRESS NOTES
[facility]    Progress Note    Curt Frias Patient Status:  Observation    1960 MRN [id number]   [facility] 2W/SW Attending [address], [address] [facility] Day # 3 PCP [facility] MD Marcos Cortes 1. 01 0.83 0.91  --    GFRAA 94 111 106  --    GFRNAA 81 96 92  --    CA 9.0 8.9 8.7  --    ALB  --  3.0*  --   --     138 139  --    K 4.0 3.3* 3.4* 4.0    106 105  --    CO2 30.0 27.0 28.0  --    ALKPHO  --  59  --   --    AST  --  17  --   --

## 2019-11-17 VITALS
WEIGHT: 269.69 LBS | DIASTOLIC BLOOD PRESSURE: 82 MMHG | TEMPERATURE: 99 F | HEIGHT: 65 IN | OXYGEN SATURATION: 94 % | RESPIRATION RATE: 18 BRPM | HEART RATE: 84 BPM | SYSTOLIC BLOOD PRESSURE: 112 MMHG | BODY MASS INDEX: 44.93 KG/M2

## 2019-11-17 DIAGNOSIS — I48.91 ATRIAL FIBRILLATION WITH RAPID VENTRICULAR RESPONSE (HCC): ICD-10-CM

## 2019-11-17 DIAGNOSIS — I26.92 ACUTE SADDLE PULMONARY EMBOLISM WITHOUT ACUTE COR PULMONALE (HCC): ICD-10-CM

## 2019-11-17 PROCEDURE — 99232 SBSQ HOSP IP/OBS MODERATE 35: CPT | Performed by: INTERNAL MEDICINE

## 2019-11-17 RX ORDER — LISINOPRIL 10 MG/1
10 TABLET ORAL DAILY
Qty: 30 TABLET | Refills: 0 | Status: SHIPPED | OUTPATIENT
Start: 2019-11-18

## 2019-11-17 RX ORDER — VANCOMYCIN HYDROCHLORIDE 125 MG/1
CAPSULE ORAL
Qty: 65 CAPSULE | Refills: 0 | Status: SHIPPED | OUTPATIENT
Start: 2019-11-17

## 2019-11-17 RX ORDER — POTASSIUM CHLORIDE 20 MEQ/1
40 TABLET, EXTENDED RELEASE ORAL ONCE
Status: COMPLETED | OUTPATIENT
Start: 2019-11-17 | End: 2019-11-17

## 2019-11-17 RX ORDER — WARFARIN SODIUM 2.5 MG/1
2.5 TABLET ORAL NIGHTLY
Qty: 5 TABLET | Refills: 0 | Status: SHIPPED | OUTPATIENT
Start: 2019-11-17 | End: 2019-11-17

## 2019-11-17 NOTE — PLAN OF CARE
Patient denies pain and nausea. Refusing to get out of bed or work with PT/OT. Agreed to be bathed today. Accuchecks ACHS. Remote tele in place, no calls. Continued Ent/contact precautions.        Problem: Patient Centered Care  Goal: Patient preferences ar ADULT  Goal: Absence of cardiac arrhythmias or at baseline  Description  INTERVENTIONS:  - Continuous cardiac monitoring, monitor vital signs, obtain 12 lead EKG if indicated  - Evaluate effectiveness of antiarrhythmic and heart rate control medications as results as appropriate  - Fluid restriction as ordered  - Instruct patient on fluid and nutrition restrictions as appropriate  Outcome: Progressing     Problem: SKIN/TISSUE INTEGRITY - ADULT  Goal: Skin integrity remains intact  Description  INTERVENTIONS

## 2019-11-17 NOTE — PLAN OF CARE
Patient denies nausea or pain. Refusing to get out of bed or work with PT/OT. Tolerating diet, accuchecks ACHS. Dressings CDI on prev trach site. Brace on R arm. Plan to d/c home, wife and family member will transfer patient home.  Education complete, quest additional Care Plan goals for specific interventions   Outcome: Adequate for Discharge     Problem: CARDIOVASCULAR - ADULT  Goal: Absence of cardiac arrhythmias or at baseline  Description  INTERVENTIONS:  - Continuous cardiac monitoring, monitor vital si electrolyte imbalances  - Administer electrolyte replacement as ordered  - Monitor response to electrolyte replacements, including rhythm and repeat lab results as appropriate  - Fluid restriction as ordered  - Instruct patient on fluid and nutrition restr

## 2019-11-17 NOTE — PROGRESS NOTES
Yes. Thanks  [facility]    Progress Note    Esther Toro Patient Status:  Observation    1960 MRN [id number]   [facility] 2W/SW Attending [address], [address] [facility] Day # 4 PCP Leonel Price MD (DEX4) oral liquid 15 g, 15 g, Oral, Q15 Min PRN  insulin detemir (LEVEMIR) 100 UNIT/ML flextouch 10 Units, 10 Units, Subcutaneous, Nightly  Insulin Aspart Pen (NOVOLOG) 100 UNIT/ML flexpen 1-7 Units, 1-7 Units, Subcutaneous, TID CC          Results:     L if ok with consultants   Social sx consult       Certification      PHYSICIAN Certification of Need for Inpatient Hospitalization - Initial Certification    Patient will require inpatient services that will reasonably be expected to span two midnight's bas

## 2019-11-17 NOTE — PROGRESS NOTES
[facility] HOSP - [facility]  Hematology   Progress Note    Rosebud Gilmarkos Patient Status:  Observation    1960 MRN [id number]   [facility] 2W/SW Attending [address], [address] Day # 4 PCP Slim Guerra MD with complex medical history admitted withsymptoms of dyspnea and hemoptysis found to have acute saddle pulmonary embolism and LLE DVT involvement needs anticoagulation managment.     Plan:     1.) Bilateral PE and LLE DVT     --patient suffered an extensi does not     --low clinical concern for antiphospholipid ab syndrome or presence of the antibody initially as the pt had a normal PTT value on admission, so this PTT values are consistent with his INR values           2.) Thrombocytopenia     --suspect thi B12 1,062 (H) 06/28/2019                        Genna Hickey MD  11/17/2019

## 2019-11-17 NOTE — PLAN OF CARE
A&O X4. VSS. Denies pain. Uses urinal/ bedpan, refusing to get out of bed. Vaseline gauze over previous trach site c/d/I. Coumadin resumed.   Problem: Patient Centered Care  Goal: Patient preferences are identified and integrated in the patient's plan of ca baseline  Description  INTERVENTIONS:  - Continuous cardiac monitoring, monitor vital signs, obtain 12 lead EKG if indicated  - Evaluate effectiveness of antiarrhythmic and heart rate control medications as ordered  - Initiate emergency measures for life t ordered  - Instruct patient on fluid and nutrition restrictions as appropriate  Outcome: Progressing     Problem: SKIN/TISSUE INTEGRITY - ADULT  Goal: Skin integrity remains intact  Description  INTERVENTIONS  - Assess and document risk factors for pressur

## 2019-11-17 NOTE — CM/SW NOTE
DA informed by RN of dc to home today. Pt/family requesting transport by ambulance. There are 3 steps (including a landing) and the pt is too weak to maneuver those stairs without assistance.      DA spoke with the pt's wife to verify the address and the tr

## 2019-11-17 NOTE — PROGRESS NOTES
Pulmonary/Critical Care Follow Up Note    HPI:   Vanessa Tavarez is a 60year old male with Patient presents with:   Other      PCP Frances Kwon MD  Admission Attending [address] Day #3    C/o less abd pain and less a 4 tablet, 4 tablet, Oral, Q15 Min PRN  •  glucose (DEX4) oral liquid 15 g, 15 g, Oral, Q15 Min PRN  •  insulin detemir (LEVEMIR) 100 UNIT/ML flextouch 10 Units, 10 Units, Subcutaneous, Nightly  •  Insulin Aspart Pen (NOVOLOG) 100 UNIT/ML flexpen 1-7 Units, co morbidities    6. Cardiomyopathy  Plan      follow I/O   Cont baseline meds    7.  Bradycardia  Only at night  Do not think related to poor control of IRMA  Plan trial of BiPAP auto          Sondra Morin MD

## 2019-11-18 ENCOUNTER — TELEPHONE (OUTPATIENT)
Dept: ENDOCRINOLOGY CLINIC | Facility: CLINIC | Age: 59
End: 2019-11-18

## 2019-11-18 LAB — INR PPP: 1.6

## 2019-11-18 RX ORDER — WARFARIN SODIUM 2.5 MG/1
TABLET ORAL
Qty: 90 TABLET | Refills: 0 | Status: SHIPPED | OUTPATIENT
Start: 2019-11-18

## 2019-11-18 NOTE — TELEPHONE ENCOUNTER
Received forms from [place], faxed over [address] from 01/2019. Called pt to schedule appt, pt states he was hospitalized on 11/10 and just discharged yesterday 11/17. States he can not walk 4 steps without becoming breathless and overexerted.  Advised pt he is bharat

## 2019-11-19 ENCOUNTER — ANTI-COAG (OUTPATIENT)
Dept: CARDIOLOGY | Age: 59
End: 2019-11-19

## 2019-11-19 RX ORDER — POTASSIUM CHLORIDE 20 MEQ/1
TABLET, EXTENDED RELEASE ORAL
Qty: 120 TABLET | Refills: 0 | OUTPATIENT
Start: 2019-11-19

## 2019-11-21 ENCOUNTER — TELEPHONE (OUTPATIENT)
Dept: CARDIOLOGY | Age: 59
End: 2019-11-21

## 2019-11-25 ENCOUNTER — ANTI-COAG (OUTPATIENT)
Dept: CARDIOLOGY | Age: 59
End: 2019-11-25

## 2019-11-25 LAB — INR PPP: 3.4

## 2019-11-25 RX ORDER — POTASSIUM CHLORIDE 20 MEQ/1
TABLET, EXTENDED RELEASE ORAL
Qty: 120 TABLET | Refills: 0 | OUTPATIENT
Start: 2019-11-25

## 2019-11-29 ENCOUNTER — ANTI-COAG (OUTPATIENT)
Dept: CARDIOLOGY | Age: 59
End: 2019-11-29

## 2019-11-29 LAB — INR PPP: 1.3

## 2019-12-02 NOTE — DISCHARGE SUMMARY
Psychiatric    PATIENT'S NAME: Masha Signs   ATTENDING PHYSICIAN: Phyllis Pa MD   PATIENT ACCOUNT#:   [id number]    LOCATION:  [address] [place], [place] [address] RECORD #:   [id number]       YOB: 1960  ADMISSION DATE:       10/08/2019 symptoms improved, started on the clear liquid diet and advanced to a general diet as tolerated. Patient started having fever of 102.4, of Infectious Disease was consulted and had culture from the cholecystostomy tube which grew Enterococcus faecalis.   So

## 2019-12-04 ENCOUNTER — ANTI-COAG (OUTPATIENT)
Dept: CARDIOLOGY | Age: 59
End: 2019-12-04

## 2019-12-04 LAB — INR PPP: 1.7

## 2019-12-09 ENCOUNTER — ANTI-COAG (OUTPATIENT)
Dept: CARDIOLOGY | Age: 59
End: 2019-12-09

## 2019-12-09 LAB — INR PPP: 1.4

## 2019-12-10 NOTE — PLAN OF CARE
Problem: Diabetes/Glucose Control  Goal: Glucose maintained within prescribed range  Description  INTERVENTIONS:  - Monitor Blood Glucose as ordered  - Assess for signs and symptoms of hyperglycemia and hypoglycemia  - Administer ordered medications to m Term Goal  Description  Patient's Short Term Goal: Per wife extubation    Interventions:   - ABX as ordered  -weaning trials when appropriate  - See additional Care Plan goals for specific interventions  Outcome: Not Progressing     Problem: Delirium  Goal shift. Bradycardia/junctional rhythm with sleep,.  Beta blocker held     Problem: RESPIRATORY - ADULT  Goal: Achieves optimal ventilation and oxygenation  Description  INTERVENTIONS:  - Assess for changes in respiratory status  - Assess for changes in menta diabetes  Outcome: Not Progressing  Note:   Replaced potassium per renal MD orders     Problem: SKIN/TISSUE INTEGRITY - ADULT  Goal: Skin integrity remains intact  Description  INTERVENTIONS  - Assess and document risk factors for pressure ulcer developmen for needed discharge resources and transportation as appropriate  - Identify discharge learning needs (meds, wound care, etc)  - Arrange for interpreters to assist at discharge as needed  - Consider post-discharge preferences of patient/family/discharge pa intact

## 2019-12-13 ENCOUNTER — LAB REQUISITION (OUTPATIENT)
Dept: LAB | Facility: HOSPITAL | Age: 59
End: 2019-12-13
Payer: MEDICARE

## 2019-12-13 DIAGNOSIS — I10 ESSENTIAL (PRIMARY) HYPERTENSION: ICD-10-CM

## 2019-12-13 DIAGNOSIS — I48.0 PAROXYSMAL ATRIAL FIBRILLATION (HCC): ICD-10-CM

## 2019-12-13 DIAGNOSIS — I50.9 HEART FAILURE, UNSPECIFIED (HCC): ICD-10-CM

## 2019-12-13 DIAGNOSIS — E11.40 TYPE 2 DIABETES MELLITUS WITH DIABETIC NEUROPATHY, UNSPECIFIED (HCC): ICD-10-CM

## 2019-12-13 LAB
ANION GAP SERPL CALC-SCNC: 6 MMOL/L
BUN SERPL-MCNC: 12 MG/DL
BUN/CREAT SERPL: 13.6
CALCIUM SERPL-MCNC: 8.8 MG/DL
CHLORIDE SERPL-SCNC: 106 MMOL/L
CO2 SERPL-SCNC: 28 MMOL/L
CREAT SERPL-MCNC: 0.88 MG/DL
GLUCOSE SERPL-MCNC: 152 MG/DL
POTASSIUM SERPL-SCNC: 3.7 MMOL/L
SODIUM SERPL-SCNC: 140 MMOL/L

## 2019-12-13 PROCEDURE — 80048 BASIC METABOLIC PNL TOTAL CA: CPT | Performed by: INTERNAL MEDICINE

## 2019-12-13 PROCEDURE — 85025 COMPLETE CBC W/AUTO DIFF WBC: CPT | Performed by: INTERNAL MEDICINE

## 2019-12-16 ENCOUNTER — ANTI-COAG (OUTPATIENT)
Dept: CARDIOLOGY | Age: 59
End: 2019-12-16

## 2019-12-16 LAB — INR PPP: 1.6

## 2019-12-19 NOTE — DISCHARGE SUMMARY
[facility]    PATIENT'S NAME: Chasity Blood   ATTENDING PHYSICIAN: Daina Burk MD   PATIENT ACCOUNT#:   [id number]    LOCATION:  [address] [place] [address] [facility] [facility] RECORD #:   [id number]       YOB: 1960  ADMISSION DATE:       11/10/201 came back positive for C difficile colitis. Started on p.o. vancomycin. The patient's diarrhea improved. Had episodes of nocturnal bradycardia with pause, so Cardiology was consulted and recommended to stop the beta blocker.   Started on lisinopril 10 mg

## 2019-12-23 ENCOUNTER — ANTI-COAG (OUTPATIENT)
Dept: CARDIOLOGY | Age: 59
End: 2019-12-23

## 2019-12-23 LAB — INR PPP: 2.1

## 2019-12-24 RX ORDER — WARFARIN SODIUM 2.5 MG/1
TABLET ORAL
Qty: 5 TABLET | Refills: 0 | OUTPATIENT
Start: 2019-12-24

## 2019-12-24 RX ORDER — WARFARIN SODIUM 5 MG/1
5 TABLET ORAL NIGHTLY
Qty: 30 TABLET | Refills: 5 | Status: SHIPPED | OUTPATIENT
Start: 2019-12-24 | End: 2020-06-08

## 2019-12-30 ENCOUNTER — ANTI-COAG (OUTPATIENT)
Dept: CARDIOLOGY | Age: 59
End: 2019-12-30

## 2019-12-30 LAB — INR PPP: 2.4

## 2019-12-30 NOTE — PLAN OF CARE
Pt alert and oriented x4. VSS. On room air. Vaseline gauze over previous trach site in place, dressing C/D/I. On remote tele. Coumadin nightly for DVT prophylaxis. Tolerating low fiber, carb controlled diet, no nausea.  Accucheks ACHS, last blood sugar 153, out of bed    Interventions:   - pt/ot eval  - use walker  - up to chair for meals  - See additional Care Plan goals for specific interventions   Outcome: Progressing     Problem: CARDIOVASCULAR - ADULT  Goal: Absence of cardiac arrhythmias or at baseline assess patient for signs and symptoms of electrolyte imbalances  - Administer electrolyte replacement as ordered  - Monitor response to electrolyte replacements, including rhythm and repeat lab results as appropriate  - Fluid restriction as ordered  - Inst positive S2/positive S1

## 2020-01-01 ENCOUNTER — EXTERNAL RECORD (OUTPATIENT)
Dept: OTHER | Age: 60
End: 2020-01-01

## 2020-01-06 ENCOUNTER — ANTI-COAG (OUTPATIENT)
Dept: CARDIOLOGY | Age: 60
End: 2020-01-06

## 2020-01-06 LAB — INR PPP: 2.6

## 2020-01-13 ENCOUNTER — ANTI-COAG (OUTPATIENT)
Dept: CARDIOLOGY | Age: 60
End: 2020-01-13

## 2020-01-13 LAB — INR PPP: 2.8

## 2020-01-17 ENCOUNTER — TELEPHONE (OUTPATIENT)
Dept: ENDOCRINOLOGY CLINIC | Facility: CLINIC | Age: 60
End: 2020-01-17

## 2020-01-17 NOTE — TELEPHONE ENCOUNTER
Called pt and attempted to make fu gabe but pt states he was in hospital for 8 mo. And still barely ambulating 50ft with walker so unable to make it to us. But would like to get restarted on the freestyle Ray again.  Since on coumadin bleeds too much when

## 2020-01-20 ENCOUNTER — ANTI-COAG (OUTPATIENT)
Dept: CARDIOLOGY | Age: 60
End: 2020-01-20

## 2020-01-20 LAB — INR PPP: 2.4

## 2020-01-20 NOTE — TELEPHONE ENCOUNTER
Noted  Can refill for three months  However, suggest making an apt in the next 3 months. Also, how are his Bg doing?    Thanks

## 2020-01-22 ENCOUNTER — TELEPHONE (OUTPATIENT)
Dept: ENDOCRINOLOGY CLINIC | Facility: CLINIC | Age: 60
End: 2020-01-22

## 2020-01-22 RX ORDER — FLASH GLUCOSE SENSOR
1 KIT MISCELLANEOUS
Qty: 2 EACH | Refills: 2 | Status: SHIPPED | OUTPATIENT
Start: 2020-01-22 | End: 2020-04-06

## 2020-01-22 NOTE — TELEPHONE ENCOUNTER
Taking u500 in the evening. [facility] sched.  For     3/30/20 at 300pm W AM.     Fyi: Dr. Carisa Heredia

## 2020-01-22 NOTE — TELEPHONE ENCOUNTER
Pt. Is requesting to get Order to go to [place] on [address] [facility] for the supplies for the General Compression 14 days. Pt. States that she will pay for it out of pocket each month.

## 2020-01-22 NOTE — TELEPHONE ENCOUNTER
Called pt he is only on u500 insulin once a day 75 units. adivside per Medicare protocol will not pay for [facility] unless he's on 3 types of insulin. Pt verbalized understanding and will call pharm for amaya price of [facility] sensors.   Pt also states bs have been

## 2020-01-22 NOTE — TELEPHONE ENCOUNTER
Noted. BG look good  Please make apt at earliest convenience  Call if under 70 or persistently over 200. Also to conform what time of the day is he taking the U 500?    Thanks

## 2020-02-03 ENCOUNTER — ANTI-COAG (OUTPATIENT)
Dept: CARDIOLOGY | Age: 60
End: 2020-02-03

## 2020-02-03 LAB — INR PPP: 2.6 (ref 2–3)

## 2020-02-07 RX ORDER — SPIRONOLACTONE 25 MG/1
TABLET ORAL
Qty: 30 TABLET | Refills: 0 | OUTPATIENT
Start: 2020-02-07

## 2020-02-18 ENCOUNTER — ANTI-COAG (OUTPATIENT)
Dept: CARDIOLOGY | Age: 60
End: 2020-02-18

## 2020-02-18 LAB — INR PPP: 2.5 (ref 2–3)

## 2020-02-19 ENCOUNTER — TELEPHONE (OUTPATIENT)
Dept: ENDOCRINOLOGY CLINIC | Facility: CLINIC | Age: 60
End: 2020-02-19

## 2020-02-19 ENCOUNTER — ANTI-COAG (OUTPATIENT)
Dept: CARDIOLOGY | Age: 60
End: 2020-02-19

## 2020-02-20 NOTE — TELEPHONE ENCOUNTER
Lore Sepulveda. Discussed hemoglobin a1c and what information it tells us regarding blood sugar control. Reminded him of follow up visit in March. Last office visit was over 1 year ago, however he states he was hospitalized for 8 months.     LOV 01/22/2019

## 2020-02-27 ENCOUNTER — ANTI-COAG (OUTPATIENT)
Dept: CARDIOLOGY | Age: 60
End: 2020-02-27

## 2020-03-02 ENCOUNTER — ANTI-COAG (OUTPATIENT)
Dept: CARDIOLOGY | Age: 60
End: 2020-03-02

## 2020-03-02 ENCOUNTER — TELEPHONE (OUTPATIENT)
Dept: CARDIOLOGY | Age: 60
End: 2020-03-02

## 2020-03-02 LAB — INR PPP: 3 (ref 2–3)

## 2020-03-12 ENCOUNTER — ANTI-COAG (OUTPATIENT)
Dept: CARDIOLOGY | Age: 60
End: 2020-03-12

## 2020-03-13 ENCOUNTER — ANTI-COAG (OUTPATIENT)
Dept: CARDIOLOGY | Age: 60
End: 2020-03-13

## 2020-03-13 LAB — INR PPP: 2.7

## 2020-03-17 ENCOUNTER — ANTI-COAG (OUTPATIENT)
Dept: CARDIOLOGY | Age: 60
End: 2020-03-17

## 2020-03-17 LAB — INR PPP: 2.1

## 2020-03-24 ENCOUNTER — ANTI-COAG (OUTPATIENT)
Dept: CARDIOLOGY | Age: 60
End: 2020-03-24

## 2020-03-24 LAB — INR PPP: 1.3

## 2020-03-31 ENCOUNTER — TELEPHONE (OUTPATIENT)
Dept: ENDOCRINOLOGY CLINIC | Facility: CLINIC | Age: 60
End: 2020-03-31

## 2020-03-31 ENCOUNTER — ANTI-COAG (OUTPATIENT)
Dept: CARDIOLOGY | Age: 60
End: 2020-03-31

## 2020-03-31 LAB — INR PPP: 2

## 2020-04-01 NOTE — TELEPHONE ENCOUNTER
Patient was contacted and states he had an appointment yesterday but missed it because his wife fell so he didn't have transportation. RN explained that we are actually limiting patient from coming into the office due to the public health crisis.     Adam

## 2020-04-03 ENCOUNTER — TELEPHONE (OUTPATIENT)
Dept: ENDOCRINOLOGY CLINIC | Facility: CLINIC | Age: 60
End: 2020-04-03

## 2020-04-03 ENCOUNTER — VIRTUAL PHONE E/M (OUTPATIENT)
Dept: ENDOCRINOLOGY CLINIC | Facility: CLINIC | Age: 60
End: 2020-04-03
Payer: MEDICARE

## 2020-04-03 DIAGNOSIS — E11.649 TYPE 2 DIABETES MELLITUS WITH HYPOGLYCEMIA WITHOUT COMA, WITH LONG-TERM CURRENT USE OF INSULIN (HCC): Primary | ICD-10-CM

## 2020-04-03 DIAGNOSIS — Z79.4 TYPE 2 DIABETES MELLITUS WITH HYPOGLYCEMIA WITHOUT COMA, WITH LONG-TERM CURRENT USE OF INSULIN (HCC): Primary | ICD-10-CM

## 2020-04-03 PROCEDURE — G2012 BRIEF CHECK IN BY MD/QHP: HCPCS | Performed by: INTERNAL MEDICINE

## 2020-04-03 NOTE — TELEPHONE ENCOUNTER
Patient has the Theodore Barbosa  I will like to review his download in about 1-2 weeks since I made some changes to his regimen today  Could you please call him and let him know how to share the data with us?    Also, please book FU in 4 months  Thanks

## 2020-04-03 NOTE — PROGRESS NOTES
Virtual/Telephone Check-In    Dia Parisi verbally consents to a Virtual/Telephone Check-In service on 04/03/20. Patient understands and accepts financial responsibility for any deductible, co-insurance and/or co-pays associated with this service.     Dura and did not have any further questions.

## 2020-04-06 RX ORDER — FLASH GLUCOSE SENSOR
KIT MISCELLANEOUS
Qty: 2 EACH | Refills: 2 | Status: SHIPPED | OUTPATIENT
Start: 2020-04-06 | End: 2020-06-29

## 2020-04-07 ENCOUNTER — ANTI-COAG (OUTPATIENT)
Dept: CARDIOLOGY | Age: 60
End: 2020-04-07

## 2020-04-07 LAB — INR PPP: 2.7

## 2020-04-08 ENCOUNTER — TELEPHONE (OUTPATIENT)
Dept: CARDIOLOGY | Age: 60
End: 2020-04-08

## 2020-04-10 ENCOUNTER — OFFICE VISIT (OUTPATIENT)
Dept: CARDIOLOGY | Age: 60
End: 2020-04-10

## 2020-04-10 VITALS — HEIGHT: 66 IN | WEIGHT: 285 LBS | BODY MASS INDEX: 45.8 KG/M2

## 2020-04-10 DIAGNOSIS — I26.99 ACUTE PULMONARY EMBOLISM, UNSPECIFIED PULMONARY EMBOLISM TYPE, UNSPECIFIED WHETHER ACUTE COR PULMONALE PRESENT (CMD): Primary | ICD-10-CM

## 2020-04-10 DIAGNOSIS — I42.0 DILATED CARDIOMYOPATHY (CMD): ICD-10-CM

## 2020-04-10 PROBLEM — I42.9 CARDIOMYOPATHY (CMD): Status: ACTIVE | Noted: 2020-04-10

## 2020-04-10 PROCEDURE — 99441 TELEPHONE E&M BY PHYSICIAN EST PT NOT ORIG PREV 7 DAYS 5-10 MIN: CPT | Performed by: INTERNAL MEDICINE

## 2020-04-10 RX ORDER — PANTOPRAZOLE SODIUM 40 MG/1
40 TABLET, DELAYED RELEASE ORAL
COMMUNITY
Start: 2017-02-22

## 2020-04-10 RX ORDER — LISINOPRIL 10 MG/1
10 TABLET ORAL
COMMUNITY
Start: 2019-11-18 | End: 2020-04-28 | Stop reason: DRUGHIGH

## 2020-04-10 RX ORDER — INSULIN LISPRO 100 [IU]/ML
INJECTION, SOLUTION INTRAVENOUS; SUBCUTANEOUS
COMMUNITY
Start: 2019-01-09

## 2020-04-10 RX ORDER — POTASSIUM CHLORIDE 20 MEQ/1
20 TABLET, EXTENDED RELEASE ORAL
COMMUNITY
Start: 2017-02-24

## 2020-04-10 RX ORDER — FUROSEMIDE 20 MG/1
20 TABLET ORAL
COMMUNITY
Start: 2019-11-04 | End: 2020-05-04

## 2020-04-10 RX ORDER — LEVETIRACETAM 1000 MG/1
1000 TABLET ORAL
COMMUNITY
Start: 2010-02-03

## 2020-04-10 RX ORDER — GABAPENTIN 300 MG/1
300 CAPSULE ORAL 2 TIMES DAILY
COMMUNITY

## 2020-04-10 SDOH — HEALTH STABILITY: MENTAL HEALTH: HOW OFTEN DO YOU HAVE A DRINK CONTAINING ALCOHOL?: NEVER

## 2020-04-13 ENCOUNTER — APPOINTMENT (OUTPATIENT)
Dept: CARDIOLOGY | Age: 60
End: 2020-04-13

## 2020-04-13 NOTE — TELEPHONE ENCOUNTER
Called patient and he gave me the email address to account  [email or web address]    I sent invite to patient. Will wait a while and see if I get acces to his data.

## 2020-04-13 NOTE — TELEPHONE ENCOUNTER
Called patient and unable to upload date with his phone states is old phone  Gave numbers from [place] averages:  Last 5 days   Last 14 days 110    4/13  Now  93    4/12  lowest  69    4/11 69  -120    4/10    States humalog 90 units

## 2020-04-14 ENCOUNTER — ANTI-COAG (OUTPATIENT)
Dept: CARDIOLOGY | Age: 60
End: 2020-04-14

## 2020-04-14 DIAGNOSIS — I26.99 ACUTE PULMONARY EMBOLISM, UNSPECIFIED PULMONARY EMBOLISM TYPE, UNSPECIFIED WHETHER ACUTE COR PULMONALE PRESENT (CMD): ICD-10-CM

## 2020-04-14 LAB — INR PPP: 2.8

## 2020-04-14 NOTE — TELEPHONE ENCOUNTER
Noted. Blood sugars reviewed.      Since [place] is still experiencing some lower blood sugar readings, lets decrease his U500 dinner dose to 85 units with a regular size meal and if he has a lighter meal he can further decrease dose to 80units    Thank you

## 2020-04-14 NOTE — TELEPHONE ENCOUNTER
Patient was contacted and relayed below recommendation as outlined. He reports that he had another hypoglycemia event last night (40's) and was very symptomatic but didn't call the office and states he was able to correct it and felt better.   RN advised t

## 2020-04-17 ENCOUNTER — TELEPHONE (OUTPATIENT)
Dept: ENDOCRINOLOGY CLINIC | Facility: CLINIC | Age: 60
End: 2020-04-17

## 2020-04-17 NOTE — TELEPHONE ENCOUNTER
I am sorry to hear that patient is having persistent lower blood sugars at night after decreasing insulin dose. Lets decrease U500 insulin further with dinner dose to 75 units (from 85) and inject 70 units if he is eating a lighter meal.     Please clarify with patient what does he typically eat for dinner at night on most days? And based on the meal how many units of U500 does he inject? (previously he was doing 80 with regular meal and 80 units with lighter meal)    Will fwd msg to Dr. Geronimo Fernandez for review and any additional recommendations on present diabetes care plan.        Thank you

## 2020-04-17 NOTE — TELEPHONE ENCOUNTER
Called patient with new recommendations to lower insulin 70-75 depending if light or regular meal. Patient states he eats mainly one meal a day and snacks rest of day  So yesterday meal = shrimp fried rice, egg fooyoung, 2 cookies, 85 units  4/5  mostachioli with chicken parmesan, 85 u   4/14 [employer]gars BBQ pizza  85 units  4/13 dread MCKEON NP

## 2020-04-17 NOTE — TELEPHONE ENCOUNTER
Called patient states that in the past 3 days his sugars in the middle of the night have been down to 50's  Last night he ate chinese food full meal, took 85 units of insulin U500,  and 2 cookies in the middle of the night and his blood sugar this am is 124  Now after eating 175  Patient states he eats 1 full heavy meal a day. Will be getting new phone this week to provide us with [employer]. 4/16  6am 104   12p 124  158, 127 92 (pt has yonathan and ramdwain off numbers)    States his averages were 109 in the last 7 days with highest 125 (per Cory)  Last 14 days average is 111 with highest 133    Patient wondering if he should be going lower on insulin ? ? Asking 80? Please advise.

## 2020-04-21 ENCOUNTER — ANTI-COAG (OUTPATIENT)
Dept: CARDIOLOGY | Age: 60
End: 2020-04-21

## 2020-04-21 DIAGNOSIS — I26.99 ACUTE PULMONARY EMBOLISM, UNSPECIFIED PULMONARY EMBOLISM TYPE, UNSPECIFIED WHETHER ACUTE COR PULMONALE PRESENT (CMD): ICD-10-CM

## 2020-04-21 LAB — INR PPP: 2.3

## 2020-04-24 NOTE — TELEPHONE ENCOUNTER
Thank you for the detailed blood sugar readings. Please confirm that these are indeed blood sugar readings and not BP readings. If these readings are BG readings, then please advise the patient to decrease his dinner   U500 dose to 50 units (from 70 initially advised on 4/17)    Please clarify if patient been eating lesser amount of food through out the day/at dinner time than usual?     His insulin dosage has significantly decreased over a short period of time (from 90 units on 4/3 to 50units- now)    Will fwd msg to Dr. Valarie Jenkins for review and any additional recommendations on present diabetes care plan.      Thank you

## 2020-04-24 NOTE — TELEPHONE ENCOUNTER
Pt calling with BG readings    4/12 - 89, 87, 136, 64, 116, 146, 97, 92    4/13 - 118, 41, 40, 75, 54, 60, 82, 110, 69    4/14 - 107, 82, 72, 76, 82, 85, 98, 90    4/15 - 82, 85, 103, 136, 175, 179, 230, 185    4/16 -91, 87, 88, 96, 69, 74, 82    4/17 - 59, 91, 110, 124, 175, 152, 128,     4/18 - 107, 116, 128, 145, 129, 175, 206,     4/19 - 172, 98, 110, 121, 135, 184, 225    4/20 -136, 106, 135, 145, 176, 171, 119    4/21 - 127, 139, 117, 129, 173    4/22 - 176, 80, 78, 163    4/23 - 196, 87, 106    4/24- 133 134 137     Only sx is dry mouth     Averages  Last 30 days - 123    Last 14 days - 128    Last 7 days 148

## 2020-04-24 NOTE — TELEPHONE ENCOUNTER
Called patient back and gave him message to decrease insulin to 50 units with dinner. Patient agreed with this plan. Patient had high alarm on yonathan sensor at 130 , explained that is not high number for alarm , will increase to 190  Low setting will change to alert at 70. Patient will call back in 1 week with blood sugar readings.     almas Beatty

## 2020-04-24 NOTE — TELEPHONE ENCOUNTER
With his current blood sugars readings and significant hypoglycemia I would still advise that we decrease his U500 dinner dose to 50 units. Since patient eating less food than previously, his insulin requirements have also decreased, thus needing less insulin. Having blood sugars in 40-50s is very dangerous. I have tried to see if I can order agents for his A1C now plus machine, however there aren't any in our order database    Can we check and see if this is a DME supply?     Thank you

## 2020-04-24 NOTE — TELEPHONE ENCOUNTER
Called patient and verified these numbers are blood sugar readings. States going down to 50 units is too much. He feels 80 units is ideal for him but waiting for dr Joanna Guillen to okay this . patient states he eats only 1 large meal a day (dinner). He eats a couple of alen crackers in am but that's is basically it,only liquids. . He had a trache last fall , now done. Was very sick for a while , hospitalized for 8 months and has cut down on eating a lot. patient also states he did his own [facility] with a machine he has at home : 2/18/20 was 5.9  Patient wants to know if he can get reagents for this machine Brand :AIC Now Plus.   Advised not sure but would ask

## 2020-04-28 ENCOUNTER — ANTI-COAG (OUTPATIENT)
Dept: CARDIOLOGY | Age: 60
End: 2020-04-28

## 2020-04-28 ENCOUNTER — TELEPHONE (OUTPATIENT)
Dept: CARDIOLOGY | Age: 60
End: 2020-04-28

## 2020-04-28 DIAGNOSIS — I26.99 ACUTE PULMONARY EMBOLISM, UNSPECIFIED PULMONARY EMBOLISM TYPE, UNSPECIFIED WHETHER ACUTE COR PULMONALE PRESENT (CMD): ICD-10-CM

## 2020-04-28 LAB — INR PPP: 1.8

## 2020-04-28 RX ORDER — CAPTOPRIL 12.5 MG/1
12.5 TABLET ORAL EVERY 8 HOURS SCHEDULED
Qty: 60 TABLET | Refills: 11 | Status: SHIPPED | OUTPATIENT
Start: 2020-04-28

## 2020-05-01 ENCOUNTER — TELEPHONE (OUTPATIENT)
Dept: ENDOCRINOLOGY CLINIC | Facility: CLINIC | Age: 60
End: 2020-05-01

## 2020-05-01 NOTE — TELEPHONE ENCOUNTER
Please see below blood sugar update from the patient.     Current regimen per telephone encounter 04/17/20:  - U-500 50 units with dinner

## 2020-05-01 NOTE — TELEPHONE ENCOUNTER
Patient was contacted and discussed below recommendation as outlined. He states he will call next week Friday to report his A1C reading (he has an A1C kehinde at home) and will schedule the follow up visit appointment then.   Patient voiced understanding to

## 2020-05-01 NOTE — TELEPHONE ENCOUNTER
BG Readings - Before Meals    Date  BG Readings every half hour to an hour     5/1/20  151  148  161  174  175  158  4/30/20  98  104  143  155  81  100  4/29/20  150  159  156  258  205  154   181  260  257  122  158  167  4/28/20  115  186  190  154  123

## 2020-05-04 ENCOUNTER — TELEPHONE (OUTPATIENT)
Dept: ENDOCRINOLOGY CLINIC | Facility: CLINIC | Age: 60
End: 2020-05-04

## 2020-05-04 RX ORDER — FUROSEMIDE 20 MG/1
20 TABLET ORAL DAILY
Qty: 90 TABLET | Refills: 3 | Status: SHIPPED | OUTPATIENT
Start: 2020-05-04 | End: 2021-03-02

## 2020-05-04 NOTE — TELEPHONE ENCOUNTER
Dr. Biswas Class,    Patient was contacted and it appears he successfully connected his Elijah Dey to the clinic. RN assured him that we can see data now. He states he had one episode of hypoglycemia on Saturday after taking a walk/exercise and was able to correct it. States he has only been on [place] over the weekend and will call back again this Friday to report his A1C that he will do at home and to give more time for the yonathan to gather more data about his sugars. He is currently on U-500 50 units with dinner.     RN downloaded current data (05/02-current) and placed on your desk

## 2020-05-04 NOTE — TELEPHONE ENCOUNTER
Pt requesting to speak with RN regarding patient  ID number needed for freestyle yonathan. Pt states he has an iPhone 8.   please [phone]

## 2020-05-04 NOTE — TELEPHONE ENCOUNTER
As noted. Gretchen Watters has only two days of BG  Please call him in a week for download  He should scan before BF and before dinner and bedtime on a daily basis.    Till then CPM , call if Bg are under 70  Thanks

## 2020-05-05 ENCOUNTER — ANTI-COAG (OUTPATIENT)
Dept: CARDIOLOGY | Age: 60
End: 2020-05-05

## 2020-05-05 DIAGNOSIS — I26.99 ACUTE PULMONARY EMBOLISM, UNSPECIFIED PULMONARY EMBOLISM TYPE, UNSPECIFIED WHETHER ACUTE COR PULMONALE PRESENT (CMD): ICD-10-CM

## 2020-05-05 LAB — INR PPP: 1.9

## 2020-05-05 NOTE — TELEPHONE ENCOUNTER
Called the patient and informed him of Dr. Rehan Gomez message below. He verbalized his understanding. He will call on Friday with his A1C result and to have office download freestyle Chavarria Oppenheim again. Postponed until Friday.

## 2020-05-08 RX ORDER — ZOLPIDEM TARTRATE 10 MG/1
TABLET ORAL
Qty: 30 TABLET | OUTPATIENT
Start: 2020-05-08

## 2020-05-11 RX ORDER — ZOLPIDEM TARTRATE 10 MG/1
TABLET ORAL
Qty: 30 TABLET | OUTPATIENT
Start: 2020-05-11

## 2020-05-12 ENCOUNTER — ANTI-COAG (OUTPATIENT)
Dept: CARDIOLOGY | Age: 60
End: 2020-05-12

## 2020-05-12 DIAGNOSIS — I26.99 ACUTE PULMONARY EMBOLISM, UNSPECIFIED PULMONARY EMBOLISM TYPE, UNSPECIFIED WHETHER ACUTE COR PULMONALE PRESENT (CMD): ICD-10-CM

## 2020-05-12 LAB — INR PPP: 2.6

## 2020-05-13 NOTE — TELEPHONE ENCOUNTER
Printed [employer] and placed on Dr. Tanya Rai desk for review. Dr. Carina Bridges if you would like me to email it to you let me know and I can do that. Otherwise on your desk for next clinic day for review per below.

## 2020-05-14 NOTE — TELEPHONE ENCOUNTER
Freestyle [employer] reviewed and findings discussed with Dr. Clemente Valenzuela. Freestyle Surendra CGM download reviewed with patient. The results (4/30-5/13) revealed:     Average glucose: 158mg/dl   In target range:  (70-180mg/dl):69%     High glucose targets: (> 180mg/dl) : 30%     Very high glucose targets: (> 250mg/dl) 1%     Low glucose targets:  (less than 70mg/dl 0%     Very Low glucose targets: (< 54mg/dl ) : 0%       Hyperglycemia was noted on most days around 7am to 3pm.     Patient verbalizes that he usually eats gram crackers between 5-7am, and then eats an early dinner at 4-5pm and take insulin dose after completing meal.     Patient verbalizes that he usually goes for a walk before eating dinner. Patient was advised to inject insulin dose 5-10mins before he starts to eat, rather than after he completes his meal.     Also instructed that he walks after he eats dinner and on the days that he does plan to walk, to take 40 units of insulin instead of (50units). He was educated that walking will help decrease elevated blood sugar levels from meal. He is instructed to monitor blood sugar closely as he walks and report to us if he notices blood sugar readings <70 or has readings >250. Patient was given the option of either taking another insulin dose with early morning snack or adding a new medicine on board (trulicity). Patient verbalized preference of starting Trulicity. Patient was educated on administration technique and that it is to be taken once per week. Patient verbalizes that he will take it on Tuesdays, since it is a coumadin day for him and this way he will not forget to take it. rx was sent to his preferred pharmacy. Patient verbalized understanding and was in agreement with the new plan. Denied any questions. Will fwd msg to Dr. Clemente Valenzuela for review and any additional recommendations on present diabetes care plan.

## 2020-05-15 ENCOUNTER — TELEPHONE (OUTPATIENT)
Dept: ENDOCRINOLOGY CLINIC | Facility: CLINIC | Age: 60
End: 2020-05-15

## 2020-05-15 NOTE — TELEPHONE ENCOUNTER
That is a little elevated.  Will like it to be between 6.5 to 7 %  Hence, please start with the changes that Matthew Dorantes had recommended and please keep us posted about his BG  Thanks

## 2020-05-19 ENCOUNTER — ANTI-COAG (OUTPATIENT)
Dept: CARDIOLOGY | Age: 60
End: 2020-05-19

## 2020-05-19 DIAGNOSIS — I26.99 ACUTE PULMONARY EMBOLISM, UNSPECIFIED PULMONARY EMBOLISM TYPE, UNSPECIFIED WHETHER ACUTE COR PULMONALE PRESENT (CMD): ICD-10-CM

## 2020-05-19 LAB — INR PPP: 3.1

## 2020-05-26 ENCOUNTER — ANTI-COAG (OUTPATIENT)
Dept: CARDIOLOGY | Age: 60
End: 2020-05-26

## 2020-05-26 DIAGNOSIS — I26.99 ACUTE PULMONARY EMBOLISM, UNSPECIFIED PULMONARY EMBOLISM TYPE, UNSPECIFIED WHETHER ACUTE COR PULMONALE PRESENT (CMD): ICD-10-CM

## 2020-05-26 LAB — INR PPP: 3

## 2020-05-29 NOTE — TELEPHONE ENCOUNTER
Pt states he needs another prescription for Insulin Pen Needle (BD PEN NEEDLE ROMMEL U/F) 32G X 4 MM Does .  Please advise

## 2020-06-01 ENCOUNTER — TELEPHONE (OUTPATIENT)
Dept: ENDOCRINOLOGY CLINIC | Facility: CLINIC | Age: 60
End: 2020-06-01

## 2020-06-01 NOTE — TELEPHONE ENCOUNTER
Insulin regular human 50 units at dinner--> 40 at dinner  C/w trulicity    Call with BG in one week, sooner if under 70  Thanks

## 2020-06-01 NOTE — TELEPHONE ENCOUNTER
Spoke with staff member from [facility]. Pt had left a message regarding hypoglycemia. Attempted to call pt back although no answer. LMTCB.

## 2020-06-01 NOTE — TELEPHONE ENCOUNTER
Spoke with pt, he states he has had about 3 hypoglycemic events within the past 2 weeks, including this morning. (lowest in the 40's). BG levels tend to be low over night/early morning. Pt treating hypoglycemic events with crackers and candy.     RN attempt

## 2020-06-03 ENCOUNTER — ANTI-COAG (OUTPATIENT)
Dept: CARDIOLOGY | Age: 60
End: 2020-06-03

## 2020-06-03 DIAGNOSIS — I26.99 ACUTE PULMONARY EMBOLISM, UNSPECIFIED PULMONARY EMBOLISM TYPE, UNSPECIFIED WHETHER ACUTE COR PULMONALE PRESENT (CMD): ICD-10-CM

## 2020-06-03 LAB — INR PPP: 2.5

## 2020-06-08 RX ORDER — WARFARIN SODIUM 5 MG/1
TABLET ORAL
Qty: 30 TABLET | Refills: 5 | Status: SHIPPED | OUTPATIENT
Start: 2020-06-08 | End: 2020-12-09 | Stop reason: SDUPTHER

## 2020-06-09 ENCOUNTER — ANTI-COAG (OUTPATIENT)
Dept: CARDIOLOGY | Age: 60
End: 2020-06-09

## 2020-06-09 LAB — INR PPP: 2

## 2020-06-15 ENCOUNTER — TELEPHONE (OUTPATIENT)
Dept: ENDOCRINOLOGY CLINIC | Facility: CLINIC | Age: 60
End: 2020-06-15

## 2020-06-15 NOTE — TELEPHONE ENCOUNTER
Report dowloaded from [address] [place] [address] Zachary Prell. and placed on provider desk  Called patient states he is on trulicity once a week and   Insulin 40 units at dinner time.

## 2020-06-15 NOTE — TELEPHONE ENCOUNTER
Noted and agree  Since sugars are good, no need to send download again unless Bg go under 70, please call in that case  Thanks

## 2020-06-15 NOTE — TELEPHONE ENCOUNTER
Reviewed reports per AM request.     Patient in target range 94% of the time. Blood sugars at goal and no hypoglycemia on reports. Estimated HgA1c on reports is 6.4%.      Advised patient continue current insulin dose and trulicity as prescribed by AM.

## 2020-06-16 ENCOUNTER — ANTI-COAG (OUTPATIENT)
Dept: CARDIOLOGY | Age: 60
End: 2020-06-16

## 2020-06-16 LAB — INR PPP: 2.5

## 2020-06-16 NOTE — TELEPHONE ENCOUNTER
Called patient and verbalized understanding of message . Will call to make fu gabe in August /sept.   And if bs <70

## 2020-06-23 ENCOUNTER — ANTI-COAG (OUTPATIENT)
Dept: CARDIOLOGY | Age: 60
End: 2020-06-23

## 2020-06-23 LAB — INR PPP: 2.1

## 2020-06-23 NOTE — TELEPHONE ENCOUNTER
Humalin R U-500 UNIT/ML Subcutaneous Solution Pen-injector, Inject 85 units at dinner time with regular meal or 80 units at dinner time with light meal, Disp: , Rfl: 0

## 2020-06-29 RX ORDER — FLASH GLUCOSE SENSOR
KIT MISCELLANEOUS
Qty: 2 EACH | Refills: 2 | Status: SHIPPED | OUTPATIENT
Start: 2020-06-29 | End: 2020-09-19

## 2020-06-30 ENCOUNTER — ANTI-COAG (OUTPATIENT)
Dept: CARDIOLOGY | Age: 60
End: 2020-06-30

## 2020-06-30 LAB — INR PPP: 2.2

## 2020-07-07 ENCOUNTER — ANTI-COAG (OUTPATIENT)
Dept: CARDIOLOGY | Age: 60
End: 2020-07-07

## 2020-07-07 LAB — INR PPP: 2.2

## 2020-07-10 ENCOUNTER — TELEPHONE (OUTPATIENT)
Dept: ENDOCRINOLOGY CLINIC | Facility: CLINIC | Age: 60
End: 2020-07-10

## 2020-07-10 NOTE — TELEPHONE ENCOUNTER
Dexcom CGM download reviewed.  The results revealed:     Average glucose: 139mg/dl     In target range: (70-180mg/dl): 83%     High glucose targets: (> 180mg/dl):13%     Very high glucose targets: (> 250mg/dl): 2%     Low glucose targets: (less than 70mg/dl

## 2020-07-10 NOTE — TELEPHONE ENCOUNTER
Te CABRERA,     Per patient he had hypoglycemia this morning with blood sugar bouncing off between 50-70. He states he was symptomatic with feeling of dizziness but now he feels better and sugar are over 70. Last reading on yonathan was 107.   He denies doing a

## 2020-07-10 NOTE — TELEPHONE ENCOUNTER
Patient was contacted and relayed below findings to the patient along with new dose of U500. He voiced understanding and repeated back the new dose to RN correctly. RN advised to monitor blood sugar and call the office if still having readings under 70.

## 2020-07-10 NOTE — TELEPHONE ENCOUNTER
Sent call to RN - Patient states he has submitted his blood sugar readings and requesting RN to review immediately due to low blood sugars, around 50 - 60 mg/dL. Please call. Thank you.

## 2020-07-14 ENCOUNTER — ANTI-COAG (OUTPATIENT)
Dept: CARDIOLOGY | Age: 60
End: 2020-07-14

## 2020-07-14 LAB — INR PPP: 2.4

## 2020-07-21 ENCOUNTER — ANTI-COAG (OUTPATIENT)
Dept: CARDIOLOGY | Age: 60
End: 2020-07-21

## 2020-07-21 LAB — INR PPP: 2.6

## 2020-07-28 ENCOUNTER — ANTI-COAG (OUTPATIENT)
Dept: CARDIOLOGY | Age: 60
End: 2020-07-28

## 2020-07-28 LAB — INR PPP: 2.1

## 2020-08-04 ENCOUNTER — ANTI-COAG (OUTPATIENT)
Dept: CARDIOLOGY | Age: 60
End: 2020-08-04

## 2020-08-04 LAB — INR PPP: 2.7

## 2020-08-07 ENCOUNTER — TELEPHONE (OUTPATIENT)
Dept: ENDOCRINOLOGY CLINIC | Facility: CLINIC | Age: 60
End: 2020-08-07

## 2020-08-07 DIAGNOSIS — Z79.4 TYPE 2 DIABETES MELLITUS WITH HYPOGLYCEMIA WITHOUT COMA, WITH LONG-TERM CURRENT USE OF INSULIN (HCC): Primary | ICD-10-CM

## 2020-08-07 DIAGNOSIS — E11.649 TYPE 2 DIABETES MELLITUS WITH HYPOGLYCEMIA WITHOUT COMA, WITH LONG-TERM CURRENT USE OF INSULIN (HCC): Primary | ICD-10-CM

## 2020-08-07 NOTE — TELEPHONE ENCOUNTER
Sugars are mostly good.    CPM  Please FU in the clinic in the next 1-2 months, please book    Thanks

## 2020-08-07 NOTE — TELEPHONE ENCOUNTER
Pt calling to find out if with his Carina Delgadillo results if there is going to be any medication changes/ please advise

## 2020-08-07 NOTE — TELEPHONE ENCOUNTER
Report printed and on desk  Current doses   trulicity once a week and   Insulin 35 units at dinner time.

## 2020-08-07 NOTE — TELEPHONE ENCOUNTER
Patient was contacted and notified of below recommendation as outlined. He voiced understanding. Per patient his wife is the one who schedules his appointment and writes it down on the calendar and he will have her call us back to schedule.

## 2020-08-11 ENCOUNTER — ANTI-COAG (OUTPATIENT)
Dept: CARDIOLOGY | Age: 60
End: 2020-08-11

## 2020-08-11 LAB — INR PPP: 2.2

## 2020-08-11 RX ORDER — DULAGLUTIDE 0.75 MG/.5ML
INJECTION, SOLUTION SUBCUTANEOUS
Qty: 6 ML | Refills: 0 | Status: SHIPPED | OUTPATIENT
Start: 2020-08-11 | End: 2020-10-16

## 2020-08-11 NOTE — TELEPHONE ENCOUNTER
LOV 4/3/20  RTC: unknown per [address] visit note- we can call and schedule per your recommendation  Last A1c value was 7.3% done 11/10/2019.   Pended order for your review

## 2020-08-18 ENCOUNTER — ANTI-COAG (OUTPATIENT)
Dept: CARDIOLOGY | Age: 60
End: 2020-08-18

## 2020-08-18 LAB — INR PPP: 2.8

## 2020-08-25 ENCOUNTER — ANTI-COAG (OUTPATIENT)
Dept: CARDIOLOGY | Age: 60
End: 2020-08-25

## 2020-08-25 LAB — INR PPP: 2.2

## 2020-09-01 ENCOUNTER — ANTI-COAG (OUTPATIENT)
Dept: CARDIOLOGY | Age: 60
End: 2020-09-01

## 2020-09-01 LAB — INR PPP: 2.5

## 2020-09-04 ENCOUNTER — TELEPHONE (OUTPATIENT)
Dept: ENDOCRINOLOGY CLINIC | Facility: CLINIC | Age: 60
End: 2020-09-04

## 2020-09-04 PROCEDURE — 95251 CONT GLUC MNTR ANALYSIS I&R: CPT | Performed by: INTERNAL MEDICINE

## 2020-09-08 ENCOUNTER — ANTI-COAG (OUTPATIENT)
Dept: CARDIOLOGY | Age: 60
End: 2020-09-08

## 2020-09-08 LAB — INR PPP: 3.4

## 2020-09-08 NOTE — TELEPHONE ENCOUNTER
Dr. Elias Calderon,     Patient was contacted and states over the weekend he had 1-2 days of hypoglycemia episode (BG to 40's). This may be lack of carb intake those days; states when he has chicken he typically goes low. No more hypoglycemia episode today.   Destin Simmons

## 2020-09-08 NOTE — TELEPHONE ENCOUNTER
Patient was contacted and relayed below recommendation as outlined. He voiced understanding and denied further questions at this time.

## 2020-09-08 NOTE — TELEPHONE ENCOUNTER
- U 500 30 units daily  - Eat carb consistent meals    - Call with BG in one week, sooner if under 70                              Continuous Glucose Monitoring Interpretation    Tijerasmarie Dixon has undergone continuous glucose monitoring with the personal ash

## 2020-09-14 ENCOUNTER — TELEPHONE (OUTPATIENT)
Dept: ENDOCRINOLOGY CLINIC | Facility: CLINIC | Age: 60
End: 2020-09-14

## 2020-09-14 NOTE — TELEPHONE ENCOUNTER
Reviewed BG  Better, less lows  - U 500 30--> 25  units daily  - Eat carb consistent meals     - Call with BG  if under 70 or persistently over 180    Thanks

## 2020-09-14 NOTE — TELEPHONE ENCOUNTER
Patient was contacted and relayed below message as outlined below. Patient voiced understanding and repeated new dose to RN correctly.

## 2020-09-15 ENCOUNTER — ANTI-COAG (OUTPATIENT)
Dept: CARDIOLOGY | Age: 60
End: 2020-09-15

## 2020-09-15 LAB — INR PPP: 2.7

## 2020-09-18 NOTE — TELEPHONE ENCOUNTER
Patient calling , states since change in dose of insulin his blood sugars are averaging 170   Today 250 at 4 am  Wants to know if he can go back up to his old insulin dose of U 500  30 units dly  Printed last week bs report , on your desk

## 2020-09-18 NOTE — TELEPHONE ENCOUNTER
Noted, can increase to insulin U 500 30 units daily  Call if under 70    Also, please book first available apt    Thanks

## 2020-09-18 NOTE — TELEPHONE ENCOUNTER
Pt states sugars has been high after change in medication to 25 units. Pt states highest was 250.  Pt states he is urinating more frequently, every half hour

## 2020-09-19 RX ORDER — FLASH GLUCOSE SENSOR
KIT MISCELLANEOUS
Qty: 2 EACH | Refills: 2 | Status: SHIPPED | OUTPATIENT
Start: 2020-09-19 | End: 2020-12-08

## 2020-09-21 NOTE — TELEPHONE ENCOUNTER
rn called patient back and gave him message by dr Adam Fregoso. Patient verbalized understanding and offer to make fu gabe but said he will call tomorrow to schedule since he Is busy with wife procedure today.

## 2020-09-22 ENCOUNTER — ANTI-COAG (OUTPATIENT)
Dept: CARDIOLOGY | Age: 60
End: 2020-09-22

## 2020-09-22 LAB — INR PPP: 2.5

## 2020-09-29 ENCOUNTER — ANTI-COAG (OUTPATIENT)
Dept: CARDIOLOGY | Age: 60
End: 2020-09-29

## 2020-09-29 LAB — INR PPP: 1.3

## 2020-10-06 ENCOUNTER — ANTI-COAG (OUTPATIENT)
Dept: CARDIOLOGY | Age: 60
End: 2020-10-06

## 2020-10-06 LAB — INR PPP: 3.4

## 2020-10-13 ENCOUNTER — ANTI-COAG (OUTPATIENT)
Dept: CARDIOLOGY | Age: 60
End: 2020-10-13

## 2020-10-13 LAB — INR PPP: 3.4

## 2020-10-16 ENCOUNTER — TELEPHONE (OUTPATIENT)
Dept: ENDOCRINOLOGY CLINIC | Facility: CLINIC | Age: 60
End: 2020-10-16

## 2020-10-16 PROCEDURE — 95251 CONT GLUC MNTR ANALYSIS I&R: CPT | Performed by: INTERNAL MEDICINE

## 2020-10-16 RX ORDER — DULAGLUTIDE 1.5 MG/.5ML
1.5 INJECTION, SOLUTION SUBCUTANEOUS
Qty: 12 PEN | Refills: 0 | Status: SHIPPED | OUTPATIENT
Start: 2020-10-16 | End: 2021-01-14

## 2020-10-16 NOTE — TELEPHONE ENCOUNTER
Continuous Glucose Monitoring Interpretation    Saida Wen has undergone continuous glucose monitoring with the personal yonathan. He was evaluated with the yonathan from oct 3 to oct 16,2020.   His blood glucose tracings demonstrated :     Very high 2 %  High 1

## 2020-10-16 NOTE — TELEPHONE ENCOUNTER
Patient was contacted and relayed below message from Dr. Yany Alvarez. He already injected the 0.75 mg yesterday and wanted to know if he can inject another 0.75 mg today. RN discussed this with Dr. Yany Alvarez and she states ok to take it today.   He also sta

## 2020-10-16 NOTE — TELEPHONE ENCOUNTER
Dr. Mae Caraballo,     Per patient his BG has been ranging above 200 usually after eating. Denies being ill lately, denies missing insulin dose, no change in diet. Patient denies any symptoms of hyperglycemia. Surendra report printed and placed on your desk.

## 2020-10-20 ENCOUNTER — ANTI-COAG (OUTPATIENT)
Dept: CARDIOLOGY | Age: 60
End: 2020-10-20

## 2020-10-20 LAB — INR PPP: 2.2

## 2020-10-27 ENCOUNTER — ANTI-COAG (OUTPATIENT)
Dept: CARDIOLOGY | Age: 60
End: 2020-10-27

## 2020-10-27 LAB — INR PPP: 2.2

## 2020-11-01 NOTE — CONSULTS
[facility]    Report of Consultation    Rm Calle Patient Status:  Inpatient    1960 MRN [id number]   Location [facility] 2W/SW Attending Leo Simms, [address] PCP LATIA Mcmahon MD     Date of Admission:   Problem: Impaired Functional Mobility  Goal: Achieve highest/safest level of mobility/gait  Description: Interventions:  - Assess patient's functional ability and stability  - Promote increasing activity/tolerance for mobility and gait  - Educate and eng UNKNOWN    Comment:Tolerated Zosyn  Thimerosal              RASH    Medications Prior to Admission:  NORTRIPTYLINE HCL 25 MG Oral Cap TAKE 2 CAPSULES(50MG TOTAL) BY MOUTH NIGHTLY   Zolpidem Tartrate 10 MG Oral Tab Take 1 tablet (10 mg total) by mouth ni daily   Alcohol Swabs 70 % Does not apply Pads    Blood Glucose Calibration (TALIA CONTOUR NEXT CONTROL) NORMAL In Vitro Solution    Blood Glucose Monitoring Suppl (TALIA CONTOUR NEXT EZ) W/DEVICE Does not apply Kit    Pantoprazole Sodium (PROTONIX) 40 MG Conjunctivae and EOM are normal. No scleral icterus. Neck:   Trach in place    Cardiovascular: Normal rate and regular rhythm. Edema present. Pulmonary/Chest: Effort normal.   Abdominal: Soft. Bowel sounds are normal. He exhibits distension.    Midline review the colon biopsy. We will continue to follow his laboratory data. At this point we do not have a diagnosis of eosinophilic granulomatosis with polyangiitis or hyper eosinophilic syndrome     Mandeep Suarez MD  6/28/2019

## 2020-11-03 ENCOUNTER — ANTI-COAG (OUTPATIENT)
Dept: CARDIOLOGY | Age: 60
End: 2020-11-03

## 2020-11-03 LAB — INR PPP: 1.9

## 2020-11-10 ENCOUNTER — ANTI-COAG (OUTPATIENT)
Dept: CARDIOLOGY | Age: 60
End: 2020-11-10

## 2020-11-10 LAB — INR PPP: 2.9

## 2020-11-13 ENCOUNTER — TELEPHONE (OUTPATIENT)
Dept: ENDOCRINOLOGY CLINIC | Facility: CLINIC | Age: 60
End: 2020-11-13

## 2020-11-13 NOTE — TELEPHONE ENCOUNTER
I'm sorry - my fault. Thank you for clarification. No just increase the dinner U500 to 35 units. Thanks.

## 2020-11-13 NOTE — TELEPHONE ENCOUNTER
Reviewed Surendra - please change U500 to 35 units SQ QAM, lunch and 30 units SQ with dinner.  Thanks

## 2020-11-13 NOTE — TELEPHONE ENCOUNTER
Patient was contacted and relayed new dose of U500 to patient. RN advised to call the office if BG is still persistently over 200 or anything under 70. He voiced understanding to recommendation and denied further questions at this time.

## 2020-11-13 NOTE — TELEPHONE ENCOUNTER
Dr. Sherman Dang (Dr. Covington Spearing patient),     Pt c/o hyperglycemia between the 210-300's the past two weeks (mostly in the morning) ever since his U500 was decreased to 30 units. He denies changes in diet and has not been sick, not skipping meds.  He is asympto

## 2020-11-13 NOTE — TELEPHONE ENCOUNTER
Dr. Barbie Hahn,     Just to clarify --     He only injects U500 with dinner, which is his only main meal and only snack very little during the day. Are we adding dose in breakfast and lunch? Thank you.

## 2020-11-13 NOTE — TELEPHONE ENCOUNTER
Pt states sugars have been running high for the past two weeks with an average of 200.  Please call [phone]

## 2020-11-17 ENCOUNTER — ANTI-COAG (OUTPATIENT)
Dept: CARDIOLOGY | Age: 60
End: 2020-11-17

## 2020-11-17 LAB — INR PPP: 3.3

## 2020-11-24 ENCOUNTER — ANTI-COAG (OUTPATIENT)
Dept: CARDIOLOGY | Age: 60
End: 2020-11-24

## 2020-11-24 LAB — INR PPP: 2.7

## 2020-12-01 ENCOUNTER — ANTI-COAG (OUTPATIENT)
Dept: CARDIOLOGY | Age: 60
End: 2020-12-01

## 2020-12-01 LAB — INR PPP: 2.3

## 2020-12-08 ENCOUNTER — ANTI-COAG (OUTPATIENT)
Dept: CARDIOLOGY | Age: 60
End: 2020-12-08

## 2020-12-08 LAB — INR PPP: 1.8

## 2020-12-08 RX ORDER — FLASH GLUCOSE SENSOR
1 KIT MISCELLANEOUS
Qty: 2 EACH | Refills: 2 | Status: SHIPPED | OUTPATIENT
Start: 2020-12-08 | End: 2021-02-19

## 2020-12-08 NOTE — TELEPHONE ENCOUNTER
•  FREESTYLE RAUL 14 DAY SENSOR Does not apply Misc, APPLY NEW SENSOR TO SKIN EVERY 14 DAYS, Disp: 2 each, Rfl: 2 Manjinder Villarreal)

## 2020-12-09 RX ORDER — WARFARIN SODIUM 5 MG/1
TABLET ORAL
Qty: 30 TABLET | Refills: 5 | Status: SHIPPED | OUTPATIENT
Start: 2020-12-09

## 2020-12-14 NOTE — TELEPHONE ENCOUNTER
LOV 4/03/20. F/u 1/22/21. Pended 3 month supply. Per TE 11/13/20, pt is to take 35 units with dinner.

## 2020-12-15 ENCOUNTER — ANTI-COAG (OUTPATIENT)
Dept: CARDIOLOGY | Age: 60
End: 2020-12-15

## 2020-12-15 LAB — INR PPP: 2.9

## 2020-12-22 ENCOUNTER — ANTI-COAG (OUTPATIENT)
Dept: CARDIOLOGY | Age: 60
End: 2020-12-22

## 2020-12-22 LAB — INR PPP: 2.7

## 2020-12-29 ENCOUNTER — ANTI-COAG (OUTPATIENT)
Dept: CARDIOLOGY | Age: 60
End: 2020-12-29

## 2020-12-29 LAB — INR PPP: 3

## 2021-01-01 ENCOUNTER — EXTERNAL RECORD (OUTPATIENT)
Dept: OTHER | Age: 61
End: 2021-01-01

## 2021-01-05 ENCOUNTER — ANTI-COAG (OUTPATIENT)
Dept: CARDIOLOGY | Age: 61
End: 2021-01-05

## 2021-01-05 LAB — INR PPP: 2.8

## 2021-01-12 ENCOUNTER — ANTI-COAG (OUTPATIENT)
Dept: CARDIOLOGY | Age: 61
End: 2021-01-12

## 2021-01-12 LAB — INR PPP: 1.6

## 2021-01-14 RX ORDER — DULAGLUTIDE 1.5 MG/.5ML
1.5 INJECTION, SOLUTION SUBCUTANEOUS
Qty: 4 PEN | Refills: 0 | Status: SHIPPED | OUTPATIENT
Start: 2021-01-14 | End: 2021-01-29

## 2021-01-19 ENCOUNTER — ANTI-COAG (OUTPATIENT)
Dept: CARDIOLOGY | Age: 61
End: 2021-01-19

## 2021-01-19 LAB — INR PPP: 2.7

## 2021-01-26 ENCOUNTER — ANTI-COAG (OUTPATIENT)
Dept: CARDIOLOGY | Age: 61
End: 2021-01-26

## 2021-01-26 LAB — INR PPP: 2.5

## 2021-01-29 ENCOUNTER — TELEPHONE (OUTPATIENT)
Dept: ENDOCRINOLOGY CLINIC | Facility: CLINIC | Age: 61
End: 2021-01-29

## 2021-01-29 DIAGNOSIS — Z79.4 TYPE 2 DIABETES MELLITUS WITH HYPOGLYCEMIA WITHOUT COMA, WITH LONG-TERM CURRENT USE OF INSULIN (HCC): Primary | ICD-10-CM

## 2021-01-29 DIAGNOSIS — E11.649 TYPE 2 DIABETES MELLITUS WITH HYPOGLYCEMIA WITHOUT COMA, WITH LONG-TERM CURRENT USE OF INSULIN (HCC): Primary | ICD-10-CM

## 2021-01-29 PROCEDURE — 95251 CONT GLUC MNTR ANALYSIS I&R: CPT | Performed by: INTERNAL MEDICINE

## 2021-01-29 RX ORDER — DULAGLUTIDE 3 MG/.5ML
3 INJECTION, SOLUTION SUBCUTANEOUS WEEKLY
Qty: 6 ML | Refills: 0 | Status: SHIPPED | OUTPATIENT
Start: 2021-01-29

## 2021-01-29 NOTE — TELEPHONE ENCOUNTER
Dr. Maria R Phillips,     Patient was contacted and relayed below message as outlined. He voiced understanding to the new doses. He thinks that Trulicity is not working for him and he's paying for it ($>700).   He mentions his interest on discontinuing this medi

## 2021-01-29 NOTE — TELEPHONE ENCOUNTER
Reviewed download  Bg have been high  Increase trulicity to 3 mg once a week  ( can take two of the 1.5 mg injections, can also send prescription for the 3 mg once a week)  U500 40 --> 45 units with dinner (only injects once a day), injects 35 --> 40 units

## 2021-01-29 NOTE — TELEPHONE ENCOUNTER
Pt states that his BS are elevated this am it went up to 350 and he is not sure if his med needs to be upped.  Please advise going to try to transfer to RN

## 2021-01-29 NOTE — TELEPHONE ENCOUNTER
He can use the 1.5 mg pens to take the 3 mg dose for now  If he thinks he will run out before the apt in March 2021, we can move up his apt  Okay to [facility]

## 2021-01-29 NOTE — TELEPHONE ENCOUNTER
Spoke to patient to relay message below - patient stated he will continue trulicity at 1.6HS dose until f/u on 3/12/21. Patient not interested in moving apt sooner.   Patient stated he will take another 1.5 mg dose today (total 3.0 mg) and have 1 pen (1.5m

## 2021-01-29 NOTE — TELEPHONE ENCOUNTER
Dr. Chiquita Scales,     Called patient and states he's been having high sugars the past few weeks (200-300s). There has been no change in his diet, not missing doses of his medicines, no illness recently. BG right now is 202, he was in the 300's last night.

## 2021-02-02 ENCOUNTER — ANTI-COAG (OUTPATIENT)
Dept: CARDIOLOGY | Age: 61
End: 2021-02-02

## 2021-02-02 LAB — INR PPP: 3

## 2021-02-09 ENCOUNTER — ANTI-COAG (OUTPATIENT)
Dept: CARDIOLOGY | Age: 61
End: 2021-02-09

## 2021-02-09 LAB — INR PPP: 3.1

## 2021-02-16 ENCOUNTER — ANTI-COAG (OUTPATIENT)
Dept: CARDIOLOGY | Age: 61
End: 2021-02-16

## 2021-02-16 LAB — INR PPP: 2.8

## 2021-02-18 NOTE — TELEPHONE ENCOUNTER
•  FREESTYLE RAUL 14 DAY SENSOR Does not apply Misc, 1 Device by Does not apply route every 14 (fourteen) days. , Disp: 2 each, Rfl: 2    •  insulin regular human, conc, 500 UNIT/ML Subcutaneous Solution Pen-injector, Inject 0.07 mL (35 Units total) into

## 2021-02-19 ENCOUNTER — TELEPHONE (OUTPATIENT)
Dept: ENDOCRINOLOGY CLINIC | Facility: CLINIC | Age: 61
End: 2021-02-19

## 2021-02-19 RX ORDER — FLASH GLUCOSE SENSOR
1 KIT MISCELLANEOUS
Qty: 2 EACH | Refills: 0 | Status: SHIPPED | OUTPATIENT
Start: 2021-02-19 | End: 2021-03-31

## 2021-02-19 RX ORDER — PEN NEEDLE, DIABETIC 32GX 5/32"
NEEDLE, DISPOSABLE MISCELLANEOUS
Qty: 100 EACH | Refills: 0 | Status: SHIPPED | OUTPATIENT
Start: 2021-02-19

## 2021-02-19 NOTE — TELEPHONE ENCOUNTER
Tried calling patient again and even on wife's phone number. Still no answer. Will try to call patient again in an hour. There's also no voicemail set up. Dr. Elias Calderon -- New Rajinderuth was downloaded and his sugars have been high.   It was in the 3

## 2021-02-19 NOTE — TELEPHONE ENCOUNTER
Patient called in stating that he is having high blood sugar levels. He  Is wondering if he should take a different medication.  Please follow up

## 2021-02-19 NOTE — TELEPHONE ENCOUNTER
Current regimen per 1/29/21 telephone encounter:     - U500 45 units with dinner, 40 units if he's eating chicken  - Trulicity 3 mg weekly. RN tried calling patient twice, no answer, line went to a busy tone on both occasions.   Cory was downloaded and

## 2021-02-19 NOTE — TELEPHONE ENCOUNTER
Please call again    Please confirm he is on trulicty : cpm  And insulin U 500 45 units with dinner.    Please start insulin U 500 10 units with the first meal of the day and increase dinenr time U 500 from 45 to 50 units    Also, please remind him of [employer]

## 2021-02-23 ENCOUNTER — ANTI-COAG (OUTPATIENT)
Dept: CARDIOLOGY | Age: 61
End: 2021-02-23

## 2021-02-23 LAB — INR PPP: 2.8

## 2021-02-27 DIAGNOSIS — I42.0 DILATED CARDIOMYOPATHY (CMD): Primary | ICD-10-CM

## 2021-03-02 ENCOUNTER — ANTI-COAG (OUTPATIENT)
Dept: CARDIOLOGY | Age: 61
End: 2021-03-02

## 2021-03-02 LAB — INR PPP: 2.8

## 2021-03-02 RX ORDER — FUROSEMIDE 20 MG/1
20 TABLET ORAL DAILY
Qty: 90 TABLET | Refills: 0 | Status: SHIPPED | OUTPATIENT
Start: 2021-03-02

## 2021-03-09 ENCOUNTER — ANTI-COAG (OUTPATIENT)
Dept: CARDIOLOGY | Age: 61
End: 2021-03-09

## 2021-03-09 DIAGNOSIS — Z79.01 LONG TERM (CURRENT) USE OF ANTICOAGULANTS: ICD-10-CM

## 2021-03-09 LAB — INR PPP: 2.9

## 2021-03-12 ENCOUNTER — LAB ENCOUNTER (OUTPATIENT)
Dept: LAB | Facility: HOSPITAL | Age: 61
End: 2021-03-12
Attending: INTERNAL MEDICINE
Payer: MEDICARE

## 2021-03-12 ENCOUNTER — OFFICE VISIT (OUTPATIENT)
Dept: ENDOCRINOLOGY CLINIC | Facility: CLINIC | Age: 61
End: 2021-03-12
Payer: MEDICARE

## 2021-03-12 VITALS — HEART RATE: 59 BPM | SYSTOLIC BLOOD PRESSURE: 161 MMHG | DIASTOLIC BLOOD PRESSURE: 91 MMHG

## 2021-03-12 DIAGNOSIS — Z79.4 TYPE 2 DIABETES MELLITUS WITH HYPOGLYCEMIA WITHOUT COMA, WITH LONG-TERM CURRENT USE OF INSULIN (HCC): Primary | ICD-10-CM

## 2021-03-12 DIAGNOSIS — Z79.4 TYPE 2 DIABETES MELLITUS WITH HYPOGLYCEMIA WITHOUT COMA, WITH LONG-TERM CURRENT USE OF INSULIN (HCC): ICD-10-CM

## 2021-03-12 DIAGNOSIS — E11.649 TYPE 2 DIABETES MELLITUS WITH HYPOGLYCEMIA WITHOUT COMA, WITH LONG-TERM CURRENT USE OF INSULIN (HCC): ICD-10-CM

## 2021-03-12 DIAGNOSIS — E11.649 TYPE 2 DIABETES MELLITUS WITH HYPOGLYCEMIA WITHOUT COMA, WITH LONG-TERM CURRENT USE OF INSULIN (HCC): Primary | ICD-10-CM

## 2021-03-12 DIAGNOSIS — E11.9 TYPE 2 DIABETES MELLITUS WITHOUT COMPLICATION, WITHOUT LONG-TERM CURRENT USE OF INSULIN (HCC): ICD-10-CM

## 2021-03-12 DIAGNOSIS — I10 ESSENTIAL HYPERTENSION, BENIGN: ICD-10-CM

## 2021-03-12 DIAGNOSIS — R53.83 FATIGUE, UNSPECIFIED TYPE: ICD-10-CM

## 2021-03-12 LAB
ALBUMIN SERPL-MCNC: 3.4 G/DL (ref 3.4–5)
ALBUMIN/GLOB SERPL: 0.8 {RATIO} (ref 1–2)
ALP LIVER SERPL-CCNC: 99 U/L
ALT SERPL-CCNC: 26 U/L
ANION GAP SERPL CALC-SCNC: 4 MMOL/L (ref 0–18)
AST SERPL-CCNC: 20 U/L (ref 15–37)
BILIRUB SERPL-MCNC: 0.7 MG/DL (ref 0.1–2)
BUN BLD-MCNC: 14 MG/DL (ref 7–18)
BUN/CREAT SERPL: 13.2 (ref 10–20)
CALCIUM BLD-MCNC: 8.4 MG/DL (ref 8.5–10.1)
CARTRIDGE LOT#: ABNORMAL NUMERIC
CHLORIDE SERPL-SCNC: 101 MMOL/L (ref 98–112)
CHOLEST SMN-MCNC: 174 MG/DL (ref ?–200)
CO2 SERPL-SCNC: 30 MMOL/L (ref 21–32)
CREAT BLD-MCNC: 1.06 MG/DL
DEPRECATED RDW RBC AUTO: 43.5 FL (ref 35.1–46.3)
ERYTHROCYTE [DISTWIDTH] IN BLOOD BY AUTOMATED COUNT: 12.9 % (ref 11–15)
EST. AVERAGE GLUCOSE BLD GHB EST-MCNC: 258 MG/DL (ref 68–126)
GLOBULIN PLAS-MCNC: 4.1 G/DL (ref 2.8–4.4)
GLUCOSE BLD-MCNC: 260 MG/DL (ref 70–99)
GLUCOSE BLOOD: 251
HBA1C MFR BLD HPLC: 10.6 % (ref ?–5.7)
HCT VFR BLD AUTO: 49.1 %
HDLC SERPL-MCNC: 38 MG/DL (ref 40–59)
HEMOGLOBIN A1C: 10.3 % (ref 4.3–5.6)
HGB BLD-MCNC: 16.1 G/DL
LDLC SERPL CALC-MCNC: 103 MG/DL (ref ?–100)
M PROTEIN MFR SERPL ELPH: 7.5 G/DL (ref 6.4–8.2)
MCH RBC QN AUTO: 29.8 PG (ref 26–34)
MCHC RBC AUTO-ENTMCNC: 32.8 G/DL (ref 31–37)
MCV RBC AUTO: 90.8 FL
NONHDLC SERPL-MCNC: 136 MG/DL (ref ?–130)
OSMOLALITY SERPL CALC.SUM OF ELEC: 289 MOSM/KG (ref 275–295)
PATIENT FASTING Y/N/NP: YES
PATIENT FASTING Y/N/NP: YES
PLATELET # BLD AUTO: 155 10(3)UL (ref 150–450)
POTASSIUM SERPL-SCNC: 3.6 MMOL/L (ref 3.5–5.1)
RBC # BLD AUTO: 5.41 X10(6)UL
SODIUM SERPL-SCNC: 135 MMOL/L (ref 136–145)
TEST STRIP LOT #: NORMAL NUMERIC
TRIGL SERPL-MCNC: 167 MG/DL (ref 30–149)
VLDLC SERPL CALC-MCNC: 33 MG/DL (ref 0–30)
WBC # BLD AUTO: 7.1 X10(3) UL (ref 4–11)

## 2021-03-12 PROCEDURE — 95251 CONT GLUC MNTR ANALYSIS I&R: CPT | Performed by: INTERNAL MEDICINE

## 2021-03-12 PROCEDURE — 83036 HEMOGLOBIN GLYCOSYLATED A1C: CPT | Performed by: INTERNAL MEDICINE

## 2021-03-12 PROCEDURE — 80053 COMPREHEN METABOLIC PANEL: CPT

## 2021-03-12 PROCEDURE — 83036 HEMOGLOBIN GLYCOSYLATED A1C: CPT

## 2021-03-12 PROCEDURE — 80061 LIPID PANEL: CPT

## 2021-03-12 PROCEDURE — 99214 OFFICE O/P EST MOD 30 MIN: CPT | Performed by: INTERNAL MEDICINE

## 2021-03-12 PROCEDURE — 36415 COLL VENOUS BLD VENIPUNCTURE: CPT

## 2021-03-12 PROCEDURE — 85027 COMPLETE CBC AUTOMATED: CPT

## 2021-03-12 PROCEDURE — 36416 COLLJ CAPILLARY BLOOD SPEC: CPT | Performed by: INTERNAL MEDICINE

## 2021-03-12 RX ORDER — GABAPENTIN 300 MG/1
CAPSULE ORAL
COMMUNITY
Start: 2021-03-04

## 2021-03-12 NOTE — PATIENT INSTRUCTIONS
insulin U 500 10 units with the first meal of the day and increase dinner time U 500 from 60 units     Check sugars when you wake up and before dinner    Call with sugars in two weeks, sooner if under 70

## 2021-03-12 NOTE — PROGRESS NOTES
Return Office Visit - Diabetes    CHIEF COMPLAINT:    Diabetes     HISTORY OF PRESENT ILLNESS:   Orquidea Pascal is a 63year old male PMH as below including HTN, chronic CHF, chronic hepatitis C , diverticulitis who presents for a FU visit for DM.     DM HIST cap po daily x 7 days , 2 tab po daily x 7 days ,1 tab po daily x 10 days 65 capsule 0   • furosemide 20 MG Oral Tab Take 1 tablet (20 mg total) by mouth daily.  30 tablet 0   • Methylnaltrexone Bromide (RELISTOR) 150 MG Oral Tab Take 3 tablets by mouth britt reviewed. See past family history marked as reviewed. See past social history marked as reviewed.     ASSESSMENTS:     REVIEW OF SYSTEMS:  Constitutional: Negative for: weight change, fever, fatigue, cold/heat intolerance  Eyes: Negative for:  Visual hawley vascular and macrovascular complications. .   Continuous Glucose Monitoring Interpretation    Kelsi Quiles has undergone continuous glucose monitoring with the personal yonathan.   he was evaluated with the Merly Kebede from [facility] 6 to March 12, 2021 His blood gluc

## 2021-03-13 ENCOUNTER — TELEPHONE (OUTPATIENT)
Dept: ENDOCRINOLOGY CLINIC | Facility: CLINIC | Age: 61
End: 2021-03-13

## 2021-03-13 NOTE — TELEPHONE ENCOUNTER
Kidney function is normal  LDL is high, TG is high  High LDL can increase risk of HA and strokes  Please review a low fat diet   Also recommend starting atorvastatin 20 mg daily.   Please review SE of muscle cramping and abnormalities in liver enzymes  Than

## 2021-03-15 ENCOUNTER — MED REC SCAN ONLY (OUTPATIENT)
Dept: ENDOCRINOLOGY CLINIC | Facility: CLINIC | Age: 61
End: 2021-03-15

## 2021-03-16 ENCOUNTER — ANTI-COAG (OUTPATIENT)
Dept: CARDIOLOGY | Age: 61
End: 2021-03-16

## 2021-03-16 DIAGNOSIS — Z79.01 LONG TERM (CURRENT) USE OF ANTICOAGULANTS: ICD-10-CM

## 2021-03-16 LAB — INR PPP: 3.2

## 2021-03-16 RX ORDER — ATORVASTATIN CALCIUM 20 MG/1
20 TABLET, FILM COATED ORAL NIGHTLY
Qty: 90 TABLET | Refills: 0 | Status: SHIPPED | OUTPATIENT
Start: 2021-03-16 | End: 2021-06-13

## 2021-03-19 ENCOUNTER — TELEPHONE (OUTPATIENT)
Dept: ENDOCRINOLOGY CLINIC | Facility: CLINIC | Age: 61
End: 2021-03-19

## 2021-03-19 NOTE — TELEPHONE ENCOUNTER
Spoke to patient to relay message below - patient stated understanding to increase U500 to:  25 units with breakfast   70 units with dinner  Patient stated understanding to call with update in one week or sooner if BG readings <70

## 2021-03-19 NOTE — TELEPHONE ENCOUNTER
Patient returned call, states has not had any changes in diet, acute illnesses, steroid therapy and had not received Covid Vaccine lately. Confirmed patient is taking U500 10 units with breakfast, 60 units with dinner.  Patient states is following all instr

## 2021-03-19 NOTE — TELEPHONE ENCOUNTER
Left message to call back. Maria T 46 printed showing hyperglycemia. Needs to ask if there had been changes in diet, acute illness, steroid therapy, or Covid vaccine lately.     LOV: 03/12/21  U500 10 units with breakfast, 60 units with dinner

## 2021-03-19 NOTE — TELEPHONE ENCOUNTER
Surendra reviewed    Insulin U 500 10 --> 25 units with breakfast, 60 --> 70 units with dinner    Basia update with BG in one week  sooner if under 70    Thanks

## 2021-03-19 NOTE — TELEPHONE ENCOUNTER
Patient called in stating he is still having high blood sugar levels/fluctuating levels.  Please follow up

## 2021-03-26 ENCOUNTER — TELEPHONE (OUTPATIENT)
Dept: ENDOCRINOLOGY CLINIC | Facility: CLINIC | Age: 61
End: 2021-03-26

## 2021-03-26 NOTE — TELEPHONE ENCOUNTER
Pt states sugar readings have been high. Pt states this morning reading was around 190.  Please call [phone]

## 2021-03-26 NOTE — TELEPHONE ENCOUNTER
Let us do 25 --> 40 Bf and 70 --> 85 dinner  Please reports BG on monday and will increase further accordingly    We have to go up slowly , hence I can adjust further Monday  Also please make sure he is still eating two meals a day

## 2021-03-26 NOTE — TELEPHONE ENCOUNTER
Discussed Dr. Fermin Miller note with pt, pt verbalized understanding of dose adjustment. Pt is reluctant to check sugars regularly, only wants to check if sugars are high. Only wants to use yonathan.

## 2021-03-26 NOTE — TELEPHONE ENCOUNTER
Nasal spray is unlikely to work with extent of hyperglycemia.    We can address more at his appointment  Thanks

## 2021-03-26 NOTE — TELEPHONE ENCOUNTER
Spoke w/ pt [employer] given to provider to review. Pt takes insulin U500 25 units at breakfast and 70 with dinner but will correct with 5 additional units if he believes his sugar is too high.  Does not double check against yonathan with finger prickin

## 2021-03-26 NOTE — TELEPHONE ENCOUNTER
Dr. Lissette James -- prior to calling patient back, please advise on his question regarding inhaled insulin. Thank you.

## 2021-03-30 ENCOUNTER — ANTI-COAG (OUTPATIENT)
Dept: CARDIOLOGY | Age: 61
End: 2021-03-30

## 2021-03-30 DIAGNOSIS — Z79.01 LONG TERM (CURRENT) USE OF ANTICOAGULANTS: ICD-10-CM

## 2021-03-30 LAB — INR PPP: 2.3

## 2021-03-31 NOTE — TELEPHONE ENCOUNTER
Pharmacy refill request for:      •  Insulin Pen Needle (BD PEN NEEDLE ROMMEL U/F) 32G X 4 MM Does not apply Misc, TEST THREE TIMES DAILY AS DIRECTED, Disp: 300 each, Rfl: 0    Freestyle Surendra 14 day Sensor  Use Device every 14 days    Please follow up, than

## 2021-04-01 RX ORDER — FLASH GLUCOSE SENSOR
1 KIT MISCELLANEOUS
Qty: 2 EACH | Refills: 2 | Status: SHIPPED | OUTPATIENT
Start: 2021-04-01 | End: 2021-06-14

## 2021-04-01 RX ORDER — PEN NEEDLE, DIABETIC 32GX 5/32"
NEEDLE, DISPOSABLE MISCELLANEOUS
Qty: 300 EACH | Refills: 0 | Status: SHIPPED | OUTPATIENT
Start: 2021-04-01 | End: 2021-09-13

## 2021-04-02 ENCOUNTER — TELEPHONE (OUTPATIENT)
Dept: ENDOCRINOLOGY CLINIC | Facility: CLINIC | Age: 61
End: 2021-04-02

## 2021-04-02 NOTE — TELEPHONE ENCOUNTER
Routing encounter to CDE to review Nicol Cowan reports as per provider below. (does not show BG under 100, hyperglycemia (250- >300) were seen more).

## 2021-04-02 NOTE — TELEPHONE ENCOUNTER
FYI  Patient returned call. Reviewed below and he is agreeable with plan to increase doses as written below. Understands to call clinic if any sugars <80 or if persistently >250.

## 2021-04-02 NOTE — TELEPHONE ENCOUNTER
Dr. Leatha Chambers to patient regarding message below - Donnamarie Kras download (March 20 - April 2) placed on your desk for review. Patient stated understanding provider out of office until Monday and office will f/u with recommendations at that time.     Per p

## 2021-04-02 NOTE — TELEPHONE ENCOUNTER
Reviewed Neeraj Alanis reports:    19% glucose readings in target range ()  43% readings >250mg/dl  38% readings >180mg/dl  0% readings below 70mg/dl    Lowest glucose reading on reports was 101.     Trends:  Mainly having hyperglycemia as high as 300's and o

## 2021-04-06 ENCOUNTER — ANTI-COAG (OUTPATIENT)
Dept: CARDIOLOGY | Age: 61
End: 2021-04-06

## 2021-04-06 DIAGNOSIS — Z79.01 LONG TERM (CURRENT) USE OF ANTICOAGULANTS: ICD-10-CM

## 2021-04-09 ENCOUNTER — TELEPHONE (OUTPATIENT)
Dept: ENDOCRINOLOGY CLINIC | Facility: CLINIC | Age: 61
End: 2021-04-09

## 2021-04-09 NOTE — TELEPHONE ENCOUNTER
C/o hyperglycemia - \"better than before\" but \"I still have some high sugar days\"    Takes U500 - uses Surendra to test sugars.      Current DM Regimen  U500 - 60 units with breakfast  U500 - 90 units with dinner    This morning fasting   Took 60 with

## 2021-04-09 NOTE — TELEPHONE ENCOUNTER
Pt states his sugars have still been up and down. It was 333 this morning fasting. It is now 190. Please call.

## 2021-04-09 NOTE — TELEPHONE ENCOUNTER
Called patient as discussed with MD, will increase U500 insulin to 70 units in the morning with breakfast and 95 units with dinner. Patient will call if sugars continue to be persistently >250 or if <70.

## 2021-04-09 NOTE — TELEPHONE ENCOUNTER
Download reveiwed  Hyperglycemia, with only 26 % Bg in range  Will increase insulin U 500 dose  Forwarding to SUSAN Woods: as discussed  Thanks

## 2021-04-13 ENCOUNTER — ANTI-COAG (OUTPATIENT)
Dept: CARDIOLOGY | Age: 61
End: 2021-04-13

## 2021-04-13 DIAGNOSIS — Z79.01 LONG TERM (CURRENT) USE OF ANTICOAGULANTS: ICD-10-CM

## 2021-04-13 LAB — INR PPP: 2.2

## 2021-04-16 ENCOUNTER — TELEPHONE (OUTPATIENT)
Dept: ENDOCRINOLOGY CLINIC | Facility: CLINIC | Age: 61
End: 2021-04-16

## 2021-04-16 NOTE — TELEPHONE ENCOUNTER
Pharmacy refill request for:    Humulin R U-500 Kwikpen Inj    MESSAGE:  Patient states he is on a different dose, please send a new script with the directions he is stating 100 units in morning and 45 units at evening.      Please follow up, thank you

## 2021-04-17 NOTE — TELEPHONE ENCOUNTER
Per 4/6/21 TE w/ CDE, dose increase to 100 units in am and 105 units in pm.     Pended 3 month supply for new script. Please review.

## 2021-04-20 ENCOUNTER — ANTI-COAG (OUTPATIENT)
Dept: CARDIOLOGY | Age: 61
End: 2021-04-20

## 2021-04-20 DIAGNOSIS — Z79.01 LONG TERM (CURRENT) USE OF ANTICOAGULANTS: ICD-10-CM

## 2021-04-20 LAB — INR PPP: 2

## 2021-04-23 ENCOUNTER — TELEPHONE (OUTPATIENT)
Dept: ENDOCRINOLOGY CLINIC | Facility: CLINIC | Age: 61
End: 2021-04-23

## 2021-04-23 PROCEDURE — 95251 CONT GLUC MNTR ANALYSIS I&R: CPT | Performed by: INTERNAL MEDICINE

## 2021-04-23 NOTE — TELEPHONE ENCOUNTER
Reviewed yonathan data. Overall sugars are starting to improve with frequent insulin adjustments. Time in target range continues to increase. 36% readings in target range  64% readings above target range  0% below target range.      Overnight and morning s

## 2021-04-23 NOTE — TELEPHONE ENCOUNTER
Dr. Alesia Crane,     Please see below message. Called patient and states for the past 2 weeks his skin have been dry and sometimes itchy on the leg. He denies using new detergent, skin products.  He uses gold bond 24 hour sand lubriderm, which provides reli

## 2021-04-23 NOTE — TELEPHONE ENCOUNTER
Pt requesting RN to download yonathan readings. Pt states his skin has been very dry the past two weeks.  . Please call [phone]

## 2021-04-24 NOTE — TELEPHONE ENCOUNTER
Given day time hyperglycemia, insulin U 500 increased to 110 units with BF  Will c/w 110 units with dinner  Continue with dietary changes and exercise  Continue to check BG   Call if Bg under 70 or persistently over 250    Please book first available FU

## 2021-04-24 NOTE — TELEPHONE ENCOUNTER
Continuous Glucose Monitoring Interpretation    Roberto Gaston has undergone continuous glucose monitoring with the personal Felicie Chroman  She was evaluated with the Felicie Chroman from April 10 to April 23, 2021  Her blood glucose tracings demonstrated :   Very high 20 %

## 2021-04-26 NOTE — TELEPHONE ENCOUNTER
Called patient and relayed below recommendation as outlined. He voiced understanding. He refused to make a follow up appointment right now stating he would need to check with his wife and call back.

## 2021-04-27 ENCOUNTER — ANTI-COAG (OUTPATIENT)
Dept: CARDIOLOGY | Age: 61
End: 2021-04-27

## 2021-04-27 DIAGNOSIS — Z79.01 LONG TERM (CURRENT) USE OF ANTICOAGULANTS: ICD-10-CM

## 2021-04-27 LAB — INR PPP: 2.7

## 2021-05-04 ENCOUNTER — ANTI-COAG (OUTPATIENT)
Dept: CARDIOLOGY | Age: 61
End: 2021-05-04

## 2021-05-04 DIAGNOSIS — Z79.01 LONG TERM (CURRENT) USE OF ANTICOAGULANTS: ICD-10-CM

## 2021-05-04 LAB — INR PPP: 2.4

## 2021-05-11 ENCOUNTER — ANTI-COAG (OUTPATIENT)
Dept: CARDIOLOGY | Age: 61
End: 2021-05-11

## 2021-05-11 DIAGNOSIS — Z79.01 LONG TERM (CURRENT) USE OF ANTICOAGULANTS: ICD-10-CM

## 2021-05-11 LAB — INR PPP: 3.3

## 2021-05-18 ENCOUNTER — ANTI-COAG (OUTPATIENT)
Dept: CARDIOLOGY | Age: 61
End: 2021-05-18

## 2021-05-18 DIAGNOSIS — Z79.01 LONG TERM (CURRENT) USE OF ANTICOAGULANTS: ICD-10-CM

## 2021-05-18 LAB — INR PPP: 2.8

## 2021-05-21 ENCOUNTER — TELEPHONE (OUTPATIENT)
Dept: ENDOCRINOLOGY CLINIC | Facility: CLINIC | Age: 61
End: 2021-05-21

## 2021-05-21 NOTE — TELEPHONE ENCOUNTER
Spoke to patient about Dr. Josefina Brooks medication directions below. Told patient that he needs a follow up visit. Patient did not want to schedule at this time---said he has to talk to his wife and call back.     Prescription sent for 90 day supply

## 2021-05-21 NOTE — TELEPHONE ENCOUNTER
Please call with Peggy's recommendation  Continue 115 units of U500 insulin with breakfast and increase dinner dose to 115.     Also, please book a FU appointment  Patients on insulin U 500 are seen every three months  We are booking into July at this time

## 2021-05-21 NOTE — TELEPHONE ENCOUNTER
Dr. Trevino Head,  [facility] download printed and placed on your desk  Patient states he is taking 115 units of insulin U500 with breakfast and 110 units of insulin U500 with dinner    Patient stated he cannot make f/u apt without checking with wife since he no l

## 2021-05-21 NOTE — TELEPHONE ENCOUNTER
LMTCB-   Reviewed reports as requested per MD.     Glucose levels have continued to improve with insulin dose titrations. Still having some hyperglycemia overnight and into the mornings.      Recommend   Continue 115 units of U500 insulin with breakfast

## 2021-05-21 NOTE — TELEPHONE ENCOUNTER
Pt called to speak to RN about sugar readings. He is averaging 115 in morning and 110 at night. Please call.

## 2021-05-25 ENCOUNTER — ANTI-COAG (OUTPATIENT)
Dept: CARDIOLOGY | Age: 61
End: 2021-05-25

## 2021-05-25 ENCOUNTER — TELEPHONE (OUTPATIENT)
Dept: CARDIOLOGY | Age: 61
End: 2021-05-25

## 2021-05-25 DIAGNOSIS — Z79.01 LONG TERM (CURRENT) USE OF ANTICOAGULANTS: ICD-10-CM

## 2021-05-25 LAB — INR PPP: 2

## 2021-05-26 RX ORDER — FUROSEMIDE 20 MG/1
20 TABLET ORAL DAILY
Qty: 90 TABLET | Refills: 0 | OUTPATIENT
Start: 2021-05-26

## 2021-05-28 NOTE — PROGRESS NOTES
[facility]    Progress Note    Ezekiel Fraction Patient Status:  Inpatient    1960 MRN [id number]   Location [facility] 4W/SW/SE Attending Darin Nagel MD   [facility] Day # 6 PCP ARVIND CASTRO     Subjective:  Feels good.  Eating wel tracheostomy. The tube is out now. Hospital course was also c/b pseudo obstruction and acute urinary retention. Endocrinology is following for inpatient DM management.      PLAN:  - Levemir 10 daily  - Novolog 6 along with low dose CF with meals  - Acc no weight-bearing restrictions

## 2021-05-28 NOTE — TELEPHONE ENCOUNTER
Spoke to patient to relay message below - see other TE dtd 5/21/21  Patient not able to schedule f/u at this time

## 2021-05-31 LAB — INR PPP: 2.2

## 2021-06-01 ENCOUNTER — TELEPHONE (OUTPATIENT)
Dept: CARDIOLOGY | Age: 61
End: 2021-06-01

## 2021-06-01 ENCOUNTER — ANTI-COAG (OUTPATIENT)
Dept: CARDIOLOGY | Age: 61
End: 2021-06-01

## 2021-06-01 DIAGNOSIS — Z79.01 LONG TERM (CURRENT) USE OF ANTICOAGULANTS: ICD-10-CM

## 2021-06-08 ENCOUNTER — TELEPHONE (OUTPATIENT)
Dept: CARDIOLOGY | Age: 61
End: 2021-06-08

## 2021-06-08 ENCOUNTER — ANTI-COAG (OUTPATIENT)
Dept: CARDIOLOGY | Age: 61
End: 2021-06-08

## 2021-06-08 DIAGNOSIS — Z79.01 LONG TERM (CURRENT) USE OF ANTICOAGULANTS: ICD-10-CM

## 2021-06-08 LAB — INR PPP: 2.5

## 2021-06-12 ENCOUNTER — TELEPHONE (OUTPATIENT)
Dept: ENDOCRINOLOGY CLINIC | Facility: CLINIC | Age: 61
End: 2021-06-12

## 2021-06-13 RX ORDER — ATORVASTATIN CALCIUM 20 MG/1
TABLET, FILM COATED ORAL
Qty: 90 TABLET | Refills: 0 | Status: SHIPPED | OUTPATIENT
Start: 2021-06-13 | End: 2021-09-13

## 2021-06-14 RX ORDER — FLASH GLUCOSE SENSOR
KIT MISCELLANEOUS
Qty: 2 EACH | Refills: 2 | Status: SHIPPED | OUTPATIENT
Start: 2021-06-14 | End: 2021-09-16

## 2021-06-14 NOTE — TELEPHONE ENCOUNTER
Please call abd book first available  Patient has been requested multiple times to book an apt, but that has not been done yet  Please call and book now since we are booking into August 2021  I had discussed at his last appointment and multiple other times

## 2021-06-15 ENCOUNTER — ANTI-COAG (OUTPATIENT)
Dept: CARDIOLOGY | Age: 61
End: 2021-06-15

## 2021-06-15 ENCOUNTER — TELEPHONE (OUTPATIENT)
Dept: CARDIOLOGY | Age: 61
End: 2021-06-15

## 2021-06-15 DIAGNOSIS — Z79.01 LONG TERM (CURRENT) USE OF ANTICOAGULANTS: ICD-10-CM

## 2021-06-15 LAB — INR PPP: 2.4

## 2021-06-17 NOTE — TELEPHONE ENCOUNTER
Called mobile # on file and got busy tone. Called home # on file and LMTCB.      Noted 6/17/21 refill request.

## 2021-06-17 NOTE — TELEPHONE ENCOUNTER
LOV: 03/12/21  LR: 06/13/21 90 days w/ no additional refill    Per note from pharmacy:     \"ZERO refills remain on this prescription.  Your patient is requesting advance approval of refills for this medication to [address] DOSES\"    Will route to

## 2021-06-18 RX ORDER — ATORVASTATIN CALCIUM 20 MG/1
TABLET, FILM COATED ORAL
Qty: 90 TABLET | Refills: 1 | OUTPATIENT
Start: 2021-06-18

## 2021-06-18 NOTE — TELEPHONE ENCOUNTER
This was just refilled in June 2021, will be refilled at apt next  Patient needs appointment  Multiple requests have been made to the patient  Please call and book apt  Thanks

## 2021-06-21 NOTE — TELEPHONE ENCOUNTER
Called and spoke to patient who states he has to wait and speak to his wife before making appointments given that she is his transportation. Patient state he will call us back. Routed as WALDO.

## 2021-06-22 ENCOUNTER — TELEPHONE (OUTPATIENT)
Dept: CARDIOLOGY | Age: 61
End: 2021-06-22

## 2021-06-22 DIAGNOSIS — Z79.01 LONG TERM (CURRENT) USE OF ANTICOAGULANTS: ICD-10-CM

## 2021-06-22 LAB — INR PPP: 2

## 2021-06-22 NOTE — TELEPHONE ENCOUNTER
Called mobile #, busy line and unable to leave message. Tried home number also, spoke with patient who reports he will have his wife call back for a appointment. MD made aware.

## 2021-07-06 ENCOUNTER — ANTI-COAG (OUTPATIENT)
Dept: CARDIOLOGY | Age: 61
End: 2021-07-06

## 2021-07-06 ENCOUNTER — TELEPHONE (OUTPATIENT)
Dept: CARDIOLOGY | Age: 61
End: 2021-07-06

## 2021-07-06 DIAGNOSIS — Z79.01 LONG TERM (CURRENT) USE OF ANTICOAGULANTS: Primary | ICD-10-CM

## 2021-07-21 NOTE — TELEPHONE ENCOUNTER
As discussed  Thanks
Dr. Elias Calderon,     Pt calling to give an update on his BG. States there are days his sugars are under 200 and there are days it's over 200, closer to 300.   Sometimes he would eat \"good\" and the next day he would \"light\" (hamburger once a week, pasta o
Pt  Is calling  He wants RN to read the readings, he is still bouncing around with his sugars they go up to almost 300 , he also states he needs a script for his pens, please call
Reviewed yonathan download:     24% of readings in target range  41% high (180mg/dl)  35% very high (>250mg/dl)    Recommended increase in morning dose to 100 units and dinner dose to 105 units.      Reviewed diet and he is trying to watch carbohydrates as wel
Detail Level: Simple
Detail Level: Zone

## 2021-09-13 ENCOUNTER — TELEPHONE (OUTPATIENT)
Dept: ENDOCRINOLOGY CLINIC | Facility: CLINIC | Age: 61
End: 2021-09-13

## 2021-09-13 RX ORDER — ATORVASTATIN CALCIUM 20 MG/1
TABLET, FILM COATED ORAL
Qty: 90 TABLET | Refills: 0 | Status: SHIPPED | OUTPATIENT
Start: 2021-09-13 | End: 2021-09-16

## 2021-09-13 RX ORDER — PEN NEEDLE, DIABETIC 32GX 5/32"
NEEDLE, DISPOSABLE MISCELLANEOUS
Qty: 300 EACH | Refills: 0 | Status: SHIPPED | OUTPATIENT
Start: 2021-09-13

## 2021-09-13 NOTE — TELEPHONE ENCOUNTER
Patient has been contacted multiple times about making a FU  LOV 3/2021  He is on U 500 and q 3 months FU is recommended  I have spoken to the patient and his wife personally also, but on FU has been made  Please call and book FU with me or ricky   If no F

## 2021-09-15 NOTE — PROGRESS NOTES
[facility]    Progress Note    Kelsi Quiles Patient Status:  Inpatient    1960 MRN [id number]   Location [facility] 2W/SW Attending Ever Girard, [address] [facility] [address] Day # 52 PCP LATIA Mcclure MD            Subjective:   States diffuse gaseous distension of the small bowel.     Dictated by (CST): Amairani Hazel MD on 6/20/2019 at 15:37     Approved by (CST): Sofie Blum MD on 6/20/2019 at 15:38          Xr Chest Ap Portable  (cpt=71045)    Result Date: 6/19/20 Acute diarrhea

## 2021-09-16 RX ORDER — ATORVASTATIN CALCIUM 20 MG/1
TABLET, FILM COATED ORAL
Qty: 90 TABLET | Refills: 0 | Status: SHIPPED | OUTPATIENT
Start: 2021-09-16

## 2021-09-16 RX ORDER — FLASH GLUCOSE SENSOR
KIT MISCELLANEOUS
Qty: 2 EACH | Refills: 2 | Status: SHIPPED | OUTPATIENT
Start: 2021-09-16 | End: 2021-11-04

## 2021-09-16 NOTE — TELEPHONE ENCOUNTER
Called home # on filemand was unable to LM. Called mobile # on file and spoke to patient.  Booked for 11/29/21    Routed as [place]

## 2021-09-22 RX ORDER — INSULIN HUMAN 500 [IU]/ML
INJECTION, SOLUTION SUBCUTANEOUS
Qty: 42 ML | Refills: 0 | Status: SHIPPED | OUTPATIENT
Start: 2021-09-22

## 2021-10-28 ENCOUNTER — TELEPHONE (OUTPATIENT)
Dept: PULMONOLOGY | Facility: CLINIC | Age: 61
End: 2021-10-28

## 2021-10-28 NOTE — TELEPHONE ENCOUNTER
Received CMN for CPAP supplies from [facility]. Patient has not been seen since 8/17/16. Faxed back to [phone] unsigned advising that patient needs appointment.

## 2021-11-04 RX ORDER — FLASH GLUCOSE SENSOR
1 KIT MISCELLANEOUS
Qty: 2 EACH | Refills: 0 | Status: SHIPPED | OUTPATIENT
Start: 2021-11-04 | End: 2021-11-30

## 2021-11-29 RX ORDER — FLASH GLUCOSE SENSOR
KIT MISCELLANEOUS
Qty: 2 EACH | Refills: 0 | OUTPATIENT
Start: 2021-11-29

## 2021-11-29 NOTE — TELEPHONE ENCOUNTER
Pt called about appt that he cancelled for today. Pts wife is in the hospital.  Pt needs to speak to Dr. Chelsie Thomas about getting his sugar levels stabilized. Please call.

## 2021-11-29 NOTE — TELEPHONE ENCOUNTER
Patient reports patient is having a rough time with the blood sugars. He reports BG is fluctuating a lot. Patient's wife is in the hospital and she is his only form of transportation. Patient scheduled for video visit tomorrow. He will set up [address] Instructed patient on how to download gabe and complete video visit. [address] download placed on Dr. Royer Li desk.

## 2021-11-29 NOTE — TELEPHONE ENCOUNTER
Please schedule VV / in person visit tmrw  If VV, please download hislibre   Not seen since March 2021  Can not refill yonathan without FU. Medicare likes patients to be seen every 6 months for continued coverage.    The need for regular FU has been explained multiple times  Patient canceled apt today

## 2021-11-30 ENCOUNTER — TELEMEDICINE (OUTPATIENT)
Dept: ENDOCRINOLOGY CLINIC | Facility: CLINIC | Age: 61
End: 2021-11-30

## 2021-11-30 DIAGNOSIS — Z79.4 TYPE 2 DIABETES MELLITUS WITH HYPERGLYCEMIA, WITH LONG-TERM CURRENT USE OF INSULIN (HCC): Primary | ICD-10-CM

## 2021-11-30 DIAGNOSIS — E11.65 TYPE 2 DIABETES MELLITUS WITH HYPERGLYCEMIA, WITH LONG-TERM CURRENT USE OF INSULIN (HCC): Primary | ICD-10-CM

## 2021-11-30 DIAGNOSIS — E78.5 DYSLIPIDEMIA: ICD-10-CM

## 2021-11-30 PROCEDURE — 95251 CONT GLUC MNTR ANALYSIS I&R: CPT | Performed by: INTERNAL MEDICINE

## 2021-11-30 PROCEDURE — 99214 OFFICE O/P EST MOD 30 MIN: CPT | Performed by: INTERNAL MEDICINE

## 2021-11-30 RX ORDER — FLASH GLUCOSE SENSOR
1 KIT MISCELLANEOUS
Qty: 6 EACH | Refills: 0 | Status: SHIPPED | OUTPATIENT
Start: 2021-11-30

## 2021-11-30 RX ORDER — METFORMIN HYDROCHLORIDE 500 MG/1
500 TABLET, EXTENDED RELEASE ORAL
Qty: 90 TABLET | Refills: 0 | Status: SHIPPED | OUTPATIENT
Start: 2021-11-30 | End: 2022-01-10

## 2021-11-30 RX ORDER — FLASH GLUCOSE SENSOR
KIT MISCELLANEOUS
Qty: 2 EACH | Refills: 0 | Status: SHIPPED | OUTPATIENT
Start: 2021-11-30

## 2021-11-30 NOTE — PROGRESS NOTES
Lexii Chang verbally consents to a video visit on 11/30/2021     Patient understands and accepts financial responsibility for any deductible, co-insurance and/or co-pays associated with this service.   Patient has been referred to the [facility] website at [email or web address] EVERY 14 DAYS 2 each 0   • HUMULIN R U-500 KWIKPEN 500 UNIT/ML Subcutaneous Solution Pen-injector INJECT 115 UNITS INTO SKIN DAILY WITH BREAKFAST AND 115UNITS WITH DINNER 42 mL 0   • ATORVASTATIN 20 MG Oral Tab TAKE 1 TABLET(20 MG) BY MOUTH EVERY NIGHT 90 topically as needed.     0   • TRUEPLUS PEN NEEDLES 32G X 4 MM Does not apply Misc USE TO INJECT INSULIN THREE TIMES DAILY 300 each 0   • MICROLET LANCETS Does not apply Misc USE LANCET TO CHECK BLOOD SUGAR THREE TIMES DAILY 300 each 0   • Elastic Bandages speech  Respiratory:  Speaking in full sentences, non-labored.  no increased work of breathing, no audible wheezing    Skin:  normal moisture and skin texture, no visible lesions  Hematologic:  no excessive bruising  Neuro: motor grossly intact, moving all home  3. Fasting lipid panel    RTC in 3 months. Will check a lipid panel and Ur MA and CMP. Reminded to get fasting labs.      Call with Bg as discussed  RTC in 3 months        Orders Placed This Encounter      Hemoglobin A1C [E]      Comp Metabolic Panel

## 2021-12-13 ENCOUNTER — TELEPHONE (OUTPATIENT)
Dept: ENDOCRINOLOGY CLINIC | Facility: CLINIC | Age: 61
End: 2021-12-13

## 2021-12-13 NOTE — TELEPHONE ENCOUNTER
Dr. Chapito Zaragoza    Report placed on your desk.     Medications:  U-500 80 or 110 units dependent on carb content  Metformin  mg with breakfast.

## 2021-12-15 NOTE — TELEPHONE ENCOUNTER
rn called patient , gave him message by dr Christina Renee. Verbalized understanding. States dinner dose starts 40 units, will decrease to 35 , and said he goes up depending what he is eating.

## 2021-12-15 NOTE — TELEPHONE ENCOUNTER
Reviewed download  Some low Bg post dinner, higher Bg in the day    He takes u 500 only with dinner and is also on MTF  mg with BF  Please confirm    Recommend:   U 500: take 5 units less with diner  Start taking 10 units with first meal of the day

## 2022-01-06 ENCOUNTER — TELEPHONE (OUTPATIENT)
Dept: ENDOCRINOLOGY CLINIC | Facility: CLINIC | Age: 62
End: 2022-01-06

## 2022-01-06 PROCEDURE — 95251 CONT GLUC MNTR ANALYSIS I&R: CPT | Performed by: INTERNAL MEDICINE

## 2022-01-06 NOTE — TELEPHONE ENCOUNTER
Pt called to speak to RN about readings from his Heriberto Gasman and also about getting RX for something to help him sleep. Please call.

## 2022-01-07 NOTE — TELEPHONE ENCOUNTER
U 500: 10 with BF --> 15 with BF, cpm with dinner dose  MTF ER 1000 mg with BF  Check BG ac and hs  Update in 2 weeks, sooner is BG is under 70    Continuous Glucose Monitoring Interpretation    Jonathan Mc has undergone continuous glucose monitoring with

## 2022-01-10 RX ORDER — METFORMIN HYDROCHLORIDE 500 MG/1
TABLET, EXTENDED RELEASE ORAL
Qty: 90 TABLET | Refills: 0 | Status: SHIPPED | OUTPATIENT
Start: 2022-01-10 | End: 2022-01-18

## 2022-01-10 NOTE — TELEPHONE ENCOUNTER
Called patient and relayed below message from Dr. Emmanuelle Peguero as outlined. He voiced understanding and will call PCP.

## 2022-01-10 NOTE — TELEPHONE ENCOUNTER
I am very sorry to hear that  However, I do not prescribe sleep medicine, hence recommend that he please call his PCP for the same  Thanks

## 2022-01-10 NOTE — TELEPHONE ENCOUNTER
rn called patient with message below by dr Edwige Gu, patient verbalized understanding of instructions      Dr Edwige Gu patient said that he has not been sleeping since wife gone, and asked if you can prescribe ambien.   rn explained this is something you

## 2022-01-18 RX ORDER — METFORMIN HYDROCHLORIDE 500 MG/1
1000 TABLET, EXTENDED RELEASE ORAL
Qty: 180 TABLET | Refills: 0 | Status: SHIPPED | OUTPATIENT
Start: 2022-01-18 | End: 2022-01-21

## 2022-01-19 NOTE — PLAN OF CARE
Patient is in left hand wrist restraint to preserve NG tube which has twice been removed by the patient. Discontinuation criteria was discussed with the patients wife. Will continue to monitor.   Problem: Safety Risk - Non-Violent Restraints  Goal: Patient OB/GYN

## 2022-01-21 RX ORDER — METFORMIN HYDROCHLORIDE 500 MG/1
TABLET, EXTENDED RELEASE ORAL
Qty: 180 TABLET | Refills: 0 | Status: SHIPPED | OUTPATIENT
Start: 2022-01-21

## 2022-03-05 ENCOUNTER — TELEPHONE (OUTPATIENT)
Dept: ENDOCRINOLOGY CLINIC | Facility: CLINIC | Age: 62
End: 2022-03-05

## 2022-03-06 RX ORDER — ATORVASTATIN CALCIUM 20 MG/1
TABLET, FILM COATED ORAL
Qty: 90 TABLET | Refills: 0 | Status: SHIPPED | OUTPATIENT
Start: 2022-03-06 | End: 2022-03-10

## 2022-03-07 NOTE — TELEPHONE ENCOUNTER
Called pt and helped schedule an appt for   5/18/22. Pt stated his wife usually gives him rides but unfortunately she has a medical condition and won't be able to drive pt to his appointments.      Pt's appt for 5/18/22 was scheduled as a VV

## 2022-03-10 RX ORDER — ATORVASTATIN CALCIUM 20 MG/1
TABLET, FILM COATED ORAL
Qty: 90 TABLET | Refills: 0 | Status: SHIPPED | OUTPATIENT
Start: 2022-03-10 | End: 2022-09-07

## 2022-04-16 RX ORDER — FLASH GLUCOSE SENSOR
KIT MISCELLANEOUS
Qty: 2 EACH | Refills: 0 | Status: SHIPPED | OUTPATIENT
Start: 2022-04-16

## 2022-05-09 ENCOUNTER — TELEPHONE (OUTPATIENT)
Dept: ENDOCRINOLOGY CLINIC | Facility: CLINIC | Age: 62
End: 2022-05-09

## 2022-05-09 RX ORDER — FLASH GLUCOSE SENSOR
KIT MISCELLANEOUS
Qty: 2 EACH | Refills: 0 | Status: SHIPPED | OUTPATIENT
Start: 2022-05-09

## 2022-05-09 NOTE — TELEPHONE ENCOUNTER
Patient stated he heard from [employer] for sensors received  RN reminded patient of f/u on 5/18/22 - patient stated understanding and denied further questions at this time

## 2022-05-18 ENCOUNTER — TELEMEDICINE (OUTPATIENT)
Dept: ENDOCRINOLOGY CLINIC | Facility: CLINIC | Age: 62
End: 2022-05-18
Payer: MEDICARE

## 2022-05-18 DIAGNOSIS — E11.69 TYPE 2 DIABETES MELLITUS WITH OTHER SPECIFIED COMPLICATION, WITH LONG-TERM CURRENT USE OF INSULIN (HCC): Primary | ICD-10-CM

## 2022-05-18 DIAGNOSIS — Z79.4 TYPE 2 DIABETES MELLITUS WITH OTHER SPECIFIED COMPLICATION, WITH LONG-TERM CURRENT USE OF INSULIN (HCC): Primary | ICD-10-CM

## 2022-05-18 PROCEDURE — 95251 CONT GLUC MNTR ANALYSIS I&R: CPT | Performed by: INTERNAL MEDICINE

## 2022-05-18 PROCEDURE — 99213 OFFICE O/P EST LOW 20 MIN: CPT | Performed by: INTERNAL MEDICINE

## 2022-06-09 RX ORDER — FLASH GLUCOSE SENSOR
KIT MISCELLANEOUS
Qty: 6 EACH | Refills: 0 | Status: SHIPPED | OUTPATIENT
Start: 2022-06-09

## 2022-06-21 ENCOUNTER — TELEPHONE (OUTPATIENT)
Dept: ENDOCRINOLOGY CLINIC | Facility: CLINIC | Age: 62
End: 2022-06-21

## 2022-06-21 RX ORDER — METFORMIN HYDROCHLORIDE 500 MG/1
TABLET, EXTENDED RELEASE ORAL
Qty: 180 TABLET | Refills: 0 | Status: SHIPPED | OUTPATIENT
Start: 2022-06-21

## 2022-06-21 NOTE — TELEPHONE ENCOUNTER
Pt states Dr has doubled his dose of Metformin- pharmacy is not filling it - needs new rx sent with new dose     He is also having issues with scanning his disc - asking if he should re load gabe - he cannot read anything

## 2022-06-23 NOTE — TELEPHONE ENCOUNTER
Called patient and states he got his prescription for metformin straightened out. He already called [employer] and they are going to send him sensors. For now, he's doing monitoring his sugars via fingerstick. He did mention that he is still using U500 on prn basis. When his BG is >200 he injects 40 units and if >300 50 units. He only has done this once so far. Per LOV note Dr. Bran Wilkins recommended on stopping insulin but patient states he's still monitoring his BG and will use insulin prn based on BG. Routing to Dr. Bran Wilkins as Delfina Benites.

## 2022-06-24 NOTE — TELEPHONE ENCOUNTER
Reviewed download   Discussed with SUSAN Lemus:   Sugars are okay but per encounter with RN patient states he takes no insulin when Bg are uder 200, but takes 40-50 units with dinenr for pre dinner Bg over 300 and 400 respectively?   ( although stated he has only taken it once)  Please call him as discussed to go over insulin   If hi pre dinner Bg are over 200, he can take 10 units to start with, but I do not recommend taking very high doses since that can drop his sugars  Also please review the post BF spikes seen on some days    Thanks

## 2022-06-24 NOTE — TELEPHONE ENCOUNTER
Dr. Melissa Andres,     The download placed on your desk by Nguyen Berry will only have data from 06/02-06/15 since he's had issues with sensor that he's trying to fix. He's waiting on a shipment from [address] [place] [address] Avenue. He had been doing fingerstick and states yesterday his sugars \"hasn't been in the 200s\".

## 2022-06-27 RX ORDER — FLASH GLUCOSE SENSOR
KIT MISCELLANEOUS
Qty: 1 EACH | Refills: 0 | Status: SHIPPED | OUTPATIENT
Start: 2022-06-27

## 2022-06-27 NOTE — TELEPHONE ENCOUNTER
Current dosing:  Metformin 1000mg BF and Dinner  U500 PRN    Spoke with pt. Pt states he is only using the U500 about once a week; typically 40 units. Reviewed how U500 works with shorter acting insulin but can be in the system for around 12 hrs. He did admit that he sometimes has lower blood sugar the morning after. Discussed Dr Jazlyn Huitron recommendation of 10 units starting at 200 mg/dl. Pt refused saying it would not do anything. Reviewed the severity of low blood sugar with U500 and that he was having lower blood sugars in the morning from bolusing 40 units. Pt agreed to 20 units at 200 mg/dl and 25 units at 300 mg/dl. Stressed the importance of low blood sugar later on with this regimen. If he finds that he is using U500 multiple times a week, could consider using a fast acting insulin. Also, needs rx for NANCIE ALVARENGA [facility] reader sent into pharmacy. [employer] is sending replacement sensors. Pt was curious about Dexcom. Reviewed dexcom but that typically medicare wants pt to be on at least 1 injection a day.     Updated regimen:  Metformin 1000mg BF and Dinner  U500 PRN (> 200: 20 units, >300: 25 units)

## 2022-06-28 ENCOUNTER — TELEPHONE (OUTPATIENT)
Dept: ENDOCRINOLOGY CLINIC | Facility: CLINIC | Age: 62
End: 2022-06-28

## 2022-06-28 ENCOUNTER — APPOINTMENT (OUTPATIENT)
Dept: GENERAL RADIOLOGY | Facility: HOSPITAL | Age: 62
End: 2022-06-28
Attending: EMERGENCY MEDICINE
Payer: MEDICARE

## 2022-06-28 ENCOUNTER — HOSPITAL ENCOUNTER (EMERGENCY)
Facility: HOSPITAL | Age: 62
Discharge: HOME | End: 2022-06-28
Attending: EMERGENCY MEDICINE
Payer: MEDICARE

## 2022-06-28 VITALS
RESPIRATION RATE: 18 BRPM | SYSTOLIC BLOOD PRESSURE: 135 MMHG | HEART RATE: 69 BPM | OXYGEN SATURATION: 95 % | HEIGHT: 65 IN | WEIGHT: 315 LBS | DIASTOLIC BLOOD PRESSURE: 90 MMHG | TEMPERATURE: 99 F | BODY MASS INDEX: 52.48 KG/M2

## 2022-06-28 DIAGNOSIS — E11.628 DIABETIC FOOT INFECTION (HCC): Primary | ICD-10-CM

## 2022-06-28 DIAGNOSIS — L08.9 DIABETIC FOOT INFECTION (HCC): Primary | ICD-10-CM

## 2022-06-28 LAB
ANION GAP SERPL CALC-SCNC: 8 MMOL/L (ref 0–18)
BASOPHILS # BLD AUTO: 0.02 X10(3) UL (ref 0–0.2)
BASOPHILS NFR BLD AUTO: 0.3 %
BUN BLD-MCNC: 10 MG/DL (ref 7–18)
BUN/CREAT SERPL: 9.7 (ref 10–20)
CALCIUM BLD-MCNC: 8.5 MG/DL (ref 8.5–10.1)
CHLORIDE SERPL-SCNC: 105 MMOL/L (ref 98–112)
CO2 SERPL-SCNC: 22 MMOL/L (ref 21–32)
CREAT BLD-MCNC: 1.03 MG/DL
CRP SERPL-MCNC: 0.44 MG/DL (ref ?–0.3)
DEPRECATED RDW RBC AUTO: 46.9 FL (ref 35.1–46.3)
EOSINOPHIL # BLD AUTO: 0.24 X10(3) UL (ref 0–0.7)
EOSINOPHIL NFR BLD AUTO: 3.3 %
ERYTHROCYTE [DISTWIDTH] IN BLOOD BY AUTOMATED COUNT: 13.6 % (ref 11–15)
GLUCOSE BLD-MCNC: 185 MG/DL (ref 70–99)
HCT VFR BLD AUTO: 48.1 %
HGB BLD-MCNC: 15.7 G/DL
IMM GRANULOCYTES # BLD AUTO: 0.02 X10(3) UL (ref 0–1)
IMM GRANULOCYTES NFR BLD: 0.3 %
LYMPHOCYTES # BLD AUTO: 1.4 X10(3) UL (ref 1–4)
LYMPHOCYTES NFR BLD AUTO: 19.2 %
MCH RBC QN AUTO: 30.7 PG (ref 26–34)
MCHC RBC AUTO-ENTMCNC: 32.6 G/DL (ref 31–37)
MCV RBC AUTO: 93.9 FL
MONOCYTES # BLD AUTO: 0.43 X10(3) UL (ref 0.1–1)
MONOCYTES NFR BLD AUTO: 5.9 %
NEUTROPHILS # BLD AUTO: 5.17 X10 (3) UL (ref 1.5–7.7)
NEUTROPHILS # BLD AUTO: 5.17 X10(3) UL (ref 1.5–7.7)
NEUTROPHILS NFR BLD AUTO: 71 %
OSMOLALITY SERPL CALC.SUM OF ELEC: 284 MOSM/KG (ref 275–295)
PLATELET # BLD AUTO: 187 10(3)UL (ref 150–450)
RBC # BLD AUTO: 5.12 X10(6)UL
SARS-COV-2 RNA RESP QL NAA+PROBE: NOT DETECTED
SODIUM SERPL-SCNC: 135 MMOL/L (ref 136–145)
WBC # BLD AUTO: 7.3 X10(3) UL (ref 4–11)

## 2022-06-28 PROCEDURE — 87070 CULTURE OTHR SPECIMN AEROBIC: CPT | Performed by: EMERGENCY MEDICINE

## 2022-06-28 PROCEDURE — 80048 BASIC METABOLIC PNL TOTAL CA: CPT | Performed by: EMERGENCY MEDICINE

## 2022-06-28 PROCEDURE — 87205 SMEAR GRAM STAIN: CPT | Performed by: EMERGENCY MEDICINE

## 2022-06-28 PROCEDURE — 86140 C-REACTIVE PROTEIN: CPT | Performed by: EMERGENCY MEDICINE

## 2022-06-28 PROCEDURE — 99284 EMERGENCY DEPT VISIT MOD MDM: CPT

## 2022-06-28 PROCEDURE — 36415 COLL VENOUS BLD VENIPUNCTURE: CPT

## 2022-06-28 PROCEDURE — 73630 X-RAY EXAM OF FOOT: CPT | Performed by: EMERGENCY MEDICINE

## 2022-06-28 PROCEDURE — 87186 SC STD MICRODIL/AGAR DIL: CPT | Performed by: EMERGENCY MEDICINE

## 2022-06-28 PROCEDURE — 85025 COMPLETE CBC W/AUTO DIFF WBC: CPT | Performed by: EMERGENCY MEDICINE

## 2022-06-28 PROCEDURE — 87077 CULTURE AEROBIC IDENTIFY: CPT | Performed by: EMERGENCY MEDICINE

## 2022-06-28 RX ORDER — HYDROCODONE BITARTRATE AND ACETAMINOPHEN 5; 325 MG/1; MG/1
1 TABLET ORAL EVERY 6 HOURS PRN
Qty: 12 TABLET | Refills: 0 | Status: SHIPPED | OUTPATIENT
Start: 2022-06-28 | End: 2022-07-01

## 2022-06-28 RX ORDER — LEVOFLOXACIN 750 MG/1
750 TABLET ORAL DAILY
Qty: 7 TABLET | Refills: 0 | Status: SHIPPED | OUTPATIENT
Start: 2022-06-28 | End: 2022-07-05

## 2022-06-28 RX ORDER — KETOROLAC TROMETHAMINE 30 MG/ML
30 INJECTION, SOLUTION INTRAMUSCULAR; INTRAVENOUS ONCE
Status: DISCONTINUED | OUTPATIENT
Start: 2022-06-28 | End: 2022-06-28

## 2022-06-28 RX ORDER — LEVOFLOXACIN 750 MG/1
750 TABLET ORAL ONCE
Status: COMPLETED | OUTPATIENT
Start: 2022-06-28 | End: 2022-06-28

## 2022-06-28 RX ORDER — DOXYCYCLINE HYCLATE 100 MG/1
100 CAPSULE ORAL 2 TIMES DAILY
Qty: 14 CAPSULE | Refills: 0 | Status: SHIPPED | OUTPATIENT
Start: 2022-06-28 | End: 2022-07-05

## 2022-06-28 RX ORDER — HYDROCODONE BITARTRATE AND ACETAMINOPHEN 5; 325 MG/1; MG/1
1 TABLET ORAL ONCE
Status: COMPLETED | OUTPATIENT
Start: 2022-06-28 | End: 2022-06-28

## 2022-06-28 RX ORDER — DOXYCYCLINE HYCLATE 100 MG/1
100 CAPSULE ORAL ONCE
Status: COMPLETED | OUTPATIENT
Start: 2022-06-28 | End: 2022-06-28

## 2022-06-28 RX ORDER — KETOROLAC TROMETHAMINE 15 MG/ML
15 INJECTION, SOLUTION INTRAMUSCULAR; INTRAVENOUS ONCE
Status: DISCONTINUED | OUTPATIENT
Start: 2022-06-28 | End: 2022-06-28

## 2022-06-28 NOTE — ED INITIAL ASSESSMENT (HPI)
Dropped flashlight onto foot 3 weeks ago- skin removed from toes, + pain. + redness. + diabetic.  First time being seen for this injury

## 2022-06-28 NOTE — TELEPHONE ENCOUNTER
Dr. Yany Mendoza report placed on your desk for review.     Current medications:    Metformin 1000mg BF and Dinner  U500 PRN (> 200: 20 units, >300: 25 units)

## 2022-06-28 NOTE — TELEPHONE ENCOUNTER
Pt states he got NANCIE ALVARENGA [facility] sensors . updated and download was just sent to office a few minutes ago - please call with recommendations

## 2022-06-28 NOTE — CM/SW NOTE
The wound center closed at [address] [place] [address] will call tomorrow to set up an appt for the pt.

## 2022-06-29 ENCOUNTER — TELEPHONE (OUTPATIENT)
Dept: WOUND CARE | Facility: HOSPITAL | Age: 62
End: 2022-06-29

## 2022-06-29 NOTE — CM/SW NOTE
Remy Neal in the wound center contacted the pt and he is declining to make an appt due to the transportation not coming into the home and getting him in the car to get him to the wound center. CURT spoke to the pt and he also said the doxycycline that he took yesterday was making him itch, he took another dose this morning and is still c/o itching. He is taking benadryl at home. Inquired if the pt has any difficulty breathing or swallowing and he denies, CURT recommended to the pt to return to the ED but he said no at this time and if he has difficulty he will call 911. Instructed him to call his PCP to inform them of the medication and his reaction. Instructed the pt to stop taking the doxycycline for now. He is in agreement and will call Dr Man Larsen after we hang up.

## 2022-06-30 NOTE — TELEPHONE ENCOUNTER
Freestyle yonathan CGM download reviewed. The results revealed (6/17 to 6/30): Average glucose: 136 mg/dl     In target range: (70-180mg/dl): 95%     High glucose targets: (> 180mg/dl): 4%     Very high glucose targets: (> 250mg/dl): 1%     Low glucose targets: (less than 70mg/dl): 0%     Very Low glucose targets: (< 54mg/dl ): 0%     Glucose Management Indicator (GMI): --  Glucose Variability: 23.2%    % time CGM is Active: 22%      -CGM recordings noted from 6/27 to today. -BG readings overall well controlled in low 100s ranges both overnight and during the day between meals. -occasional PP hyperglycemia noted with either dinner (occurring 1x with reading in mid 200's) or breakfast meal (occurring 2x with readings in 180s). With PP hyperglycemia occurring with dinner in reading of mid 200s, BGr reading did stabilize to low 100s within 4-5hrs after meal and for the remainder of the night reading was in 110s range. BG readings stable; No medication changes needed at this time. Thank you!

## 2022-07-01 NOTE — TELEPHONE ENCOUNTER
Spoke to patient stating report given to APRN must be outdated because his Shaunna Goodman has not been working for a month. States he called Monday to let us know it's now working so there shouldn't be enough data to give a good determination. Pt reports that he's doing well with current regimen of metformin 1000 mg BID and U500 PRN >200 20 units, >300 25 units. States he has not administered U500 in about 10-15 days. RN informed him based on the Shaunna Goodman report that was reviewed as below there is no recommendation to change regimen. Pt is agreeable to this and will continue to monitor BG. Dr. Lopez Back -- do you want to print an updated Shaunna Goodman report? There is only 4 days worth of readings and overall were within goal.  Pasted them below.

## 2022-08-08 ENCOUNTER — TELEPHONE (OUTPATIENT)
Dept: ENDOCRINOLOGY CLINIC | Facility: CLINIC | Age: 62
End: 2022-08-08

## 2022-08-08 RX ORDER — METFORMIN HYDROCHLORIDE 500 MG/1
1000 TABLET, EXTENDED RELEASE ORAL
Qty: 180 TABLET | Refills: 0 | Status: SHIPPED | OUTPATIENT
Start: 2022-08-08

## 2022-08-08 NOTE — TELEPHONE ENCOUNTER
lov 5/18/22  No fu scheduled  Dr Nickerson Lat see below taking metformin 2 bid  Please advise if ok to order higher #

## 2022-08-11 RX ORDER — METFORMIN HYDROCHLORIDE 500 MG/1
TABLET, EXTENDED RELEASE ORAL
Qty: 360 TABLET | Refills: 0 | Status: SHIPPED | OUTPATIENT
Start: 2022-08-11

## 2022-08-16 ENCOUNTER — APPOINTMENT (OUTPATIENT)
Dept: CT IMAGING | Facility: HOSPITAL | Age: 62
End: 2022-08-16
Attending: EMERGENCY MEDICINE
Payer: MEDICARE

## 2022-08-16 ENCOUNTER — HOSPITAL ENCOUNTER (EMERGENCY)
Facility: HOSPITAL | Age: 62
Discharge: HOME | End: 2022-08-16
Attending: EMERGENCY MEDICINE
Payer: MEDICARE

## 2022-08-16 VITALS
BODY MASS INDEX: 51.85 KG/M2 | OXYGEN SATURATION: 94 % | TEMPERATURE: 99 F | HEART RATE: 75 BPM | WEIGHT: 315 LBS | RESPIRATION RATE: 20 BRPM | SYSTOLIC BLOOD PRESSURE: 165 MMHG | HEIGHT: 65.5 IN | DIASTOLIC BLOOD PRESSURE: 97 MMHG

## 2022-08-16 DIAGNOSIS — R10.84 ABDOMINAL PAIN, GENERALIZED: Primary | ICD-10-CM

## 2022-08-16 LAB
ALBUMIN SERPL-MCNC: 3.4 G/DL (ref 3.4–5)
ALP LIVER SERPL-CCNC: 57 U/L
ALT SERPL-CCNC: 19 U/L
ANION GAP SERPL CALC-SCNC: 11 MMOL/L (ref 0–18)
AST SERPL-CCNC: 14 U/L (ref 15–37)
BASOPHILS # BLD AUTO: 0.02 X10(3) UL (ref 0–0.2)
BASOPHILS NFR BLD AUTO: 0.3 %
BILIRUB DIRECT SERPL-MCNC: 0.4 MG/DL (ref 0–0.2)
BILIRUB SERPL-MCNC: 0.9 MG/DL (ref 0.1–2)
BILIRUB UR QL: NEGATIVE
BUN BLD-MCNC: 9 MG/DL (ref 7–18)
BUN/CREAT SERPL: 8.3 (ref 10–20)
CALCIUM BLD-MCNC: 8.7 MG/DL (ref 8.5–10.1)
CHLORIDE SERPL-SCNC: 103 MMOL/L (ref 98–112)
CLARITY UR: CLEAR
CO2 SERPL-SCNC: 25 MMOL/L (ref 21–32)
COLOR UR: YELLOW
CREAT BLD-MCNC: 1.09 MG/DL
DEPRECATED RDW RBC AUTO: 47 FL (ref 35.1–46.3)
EOSINOPHIL # BLD AUTO: 0.19 X10(3) UL (ref 0–0.7)
EOSINOPHIL NFR BLD AUTO: 2.6 %
ERYTHROCYTE [DISTWIDTH] IN BLOOD BY AUTOMATED COUNT: 13.6 % (ref 11–15)
GFR SERPLBLD BASED ON 1.73 SQ M-ARVRAT: 77 ML/MIN/1.73M2 (ref 60–?)
GLUCOSE BLD-MCNC: 200 MG/DL (ref 70–99)
GLUCOSE UR-MCNC: NEGATIVE MG/DL
HCT VFR BLD AUTO: 41.1 %
HGB BLD-MCNC: 13.5 G/DL
HGB UR QL STRIP.AUTO: NEGATIVE
HYALINE CASTS #/AREA URNS AUTO: PRESENT /LPF
IMM GRANULOCYTES # BLD AUTO: 0.02 X10(3) UL (ref 0–1)
IMM GRANULOCYTES NFR BLD: 0.3 %
INR BLD: 1.82 (ref 0.85–1.16)
KETONES UR-MCNC: NEGATIVE MG/DL
LEUKOCYTE ESTERASE UR QL STRIP.AUTO: NEGATIVE
LIPASE SERPL-CCNC: 231 U/L (ref 73–393)
LYMPHOCYTES # BLD AUTO: 1.17 X10(3) UL (ref 1–4)
LYMPHOCYTES NFR BLD AUTO: 15.8 %
MCH RBC QN AUTO: 31.2 PG (ref 26–34)
MCHC RBC AUTO-ENTMCNC: 32.8 G/DL (ref 31–37)
MCV RBC AUTO: 94.9 FL
MONOCYTES # BLD AUTO: 0.53 X10(3) UL (ref 0.1–1)
MONOCYTES NFR BLD AUTO: 7.1 %
NEUTROPHILS # BLD AUTO: 5.49 X10 (3) UL (ref 1.5–7.7)
NEUTROPHILS # BLD AUTO: 5.49 X10(3) UL (ref 1.5–7.7)
NEUTROPHILS NFR BLD AUTO: 73.9 %
NITRITE UR QL STRIP.AUTO: NEGATIVE
OSMOLALITY SERPL CALC.SUM OF ELEC: 292 MOSM/KG (ref 275–295)
PH UR: 7 [PH] (ref 5–8)
PLATELET # BLD AUTO: 176 10(3)UL (ref 150–450)
POTASSIUM SERPL-SCNC: 3.7 MMOL/L (ref 3.5–5.1)
PROT SERPL-MCNC: 7.7 G/DL (ref 6.4–8.2)
PROTHROMBIN TIME: 20.8 SECONDS (ref 11.6–14.8)
RBC # BLD AUTO: 4.33 X10(6)UL
SARS-COV-2 RNA RESP QL NAA+PROBE: NOT DETECTED
SODIUM SERPL-SCNC: 139 MMOL/L (ref 136–145)
SP GR UR STRIP: 1.01 (ref 1–1.03)
UROBILINOGEN UR STRIP-ACNC: 4
WBC # BLD AUTO: 7.4 X10(3) UL (ref 4–11)

## 2022-08-16 PROCEDURE — 83690 ASSAY OF LIPASE: CPT | Performed by: EMERGENCY MEDICINE

## 2022-08-16 PROCEDURE — 85025 COMPLETE CBC W/AUTO DIFF WBC: CPT | Performed by: EMERGENCY MEDICINE

## 2022-08-16 PROCEDURE — 99284 EMERGENCY DEPT VISIT MOD MDM: CPT

## 2022-08-16 PROCEDURE — 80076 HEPATIC FUNCTION PANEL: CPT | Performed by: EMERGENCY MEDICINE

## 2022-08-16 PROCEDURE — 81001 URINALYSIS AUTO W/SCOPE: CPT | Performed by: EMERGENCY MEDICINE

## 2022-08-16 PROCEDURE — 85610 PROTHROMBIN TIME: CPT | Performed by: EMERGENCY MEDICINE

## 2022-08-16 PROCEDURE — 80076 HEPATIC FUNCTION PANEL: CPT

## 2022-08-16 PROCEDURE — 80048 BASIC METABOLIC PNL TOTAL CA: CPT

## 2022-08-16 PROCEDURE — 81015 MICROSCOPIC EXAM OF URINE: CPT | Performed by: EMERGENCY MEDICINE

## 2022-08-16 PROCEDURE — 85025 COMPLETE CBC W/AUTO DIFF WBC: CPT

## 2022-08-16 PROCEDURE — 36415 COLL VENOUS BLD VENIPUNCTURE: CPT

## 2022-08-16 PROCEDURE — 83690 ASSAY OF LIPASE: CPT

## 2022-08-16 PROCEDURE — 80048 BASIC METABOLIC PNL TOTAL CA: CPT | Performed by: EMERGENCY MEDICINE

## 2022-08-16 PROCEDURE — 74177 CT ABD & PELVIS W/CONTRAST: CPT | Performed by: EMERGENCY MEDICINE

## 2022-08-17 ENCOUNTER — TELEPHONE (OUTPATIENT)
Dept: ENDOCRINOLOGY CLINIC | Facility: CLINIC | Age: 62
End: 2022-08-17

## 2022-08-17 NOTE — TELEPHONE ENCOUNTER
Received request for LOV notes from [facility] for CGM supplies. Faxes LOV note from 5/18/22 to [facility] Medicare CGM [phone].

## 2022-08-17 NOTE — TELEPHONE ENCOUNTER
Received response from [employer] stating that PA for Relistor was approved. \"Request Reference Number: [id number]. RELISTOR TAB 150MG is approved through 01/02/2020\"    The Cristinr and spoke to GUÉRET.  PA was approved and went through bu THEN RETREAT WITHIN 10 WEEKS TIMES 1-4 DOI.

## 2022-08-22 ENCOUNTER — HOSPITAL ENCOUNTER (INPATIENT)
Facility: HOSPITAL | Age: 62
LOS: 11 days | Discharge: HOME HEALTH | DRG: 389 | End: 2022-09-02
Attending: EMERGENCY MEDICINE | Admitting: INTERNAL MEDICINE
Payer: MEDICARE

## 2022-08-22 ENCOUNTER — HOSPITAL ENCOUNTER (INPATIENT)
Facility: HOSPITAL | Age: 62
LOS: 11 days | Discharge: HOME | DRG: 389 | End: 2022-09-02
Attending: EMERGENCY MEDICINE | Admitting: INTERNAL MEDICINE
Payer: MEDICARE

## 2022-08-22 ENCOUNTER — OFFICE VISIT (OUTPATIENT)
Dept: GASTROENTEROLOGY | Facility: CLINIC | Age: 62
End: 2022-08-22
Payer: MEDICARE

## 2022-08-22 ENCOUNTER — APPOINTMENT (OUTPATIENT)
Dept: GENERAL RADIOLOGY | Facility: HOSPITAL | Age: 62
DRG: 389 | End: 2022-08-22
Attending: INTERNAL MEDICINE
Payer: MEDICARE

## 2022-08-22 ENCOUNTER — APPOINTMENT (OUTPATIENT)
Dept: GENERAL RADIOLOGY | Facility: HOSPITAL | Age: 62
DRG: 389 | End: 2022-08-22
Attending: EMERGENCY MEDICINE
Payer: MEDICARE

## 2022-08-22 ENCOUNTER — APPOINTMENT (OUTPATIENT)
Dept: CT IMAGING | Facility: HOSPITAL | Age: 62
DRG: 389 | End: 2022-08-22
Attending: EMERGENCY MEDICINE
Payer: MEDICARE

## 2022-08-22 VITALS
HEART RATE: 93 BPM | HEIGHT: 65 IN | WEIGHT: 298 LBS | DIASTOLIC BLOOD PRESSURE: 103 MMHG | BODY MASS INDEX: 49.65 KG/M2 | SYSTOLIC BLOOD PRESSURE: 187 MMHG

## 2022-08-22 DIAGNOSIS — K56.609 LARGE BOWEL OBSTRUCTION (HCC): Primary | ICD-10-CM

## 2022-08-22 DIAGNOSIS — I10 PRIMARY HYPERTENSION: Primary | ICD-10-CM

## 2022-08-22 DIAGNOSIS — R33.9 URINARY RETENTION: ICD-10-CM

## 2022-08-22 DIAGNOSIS — E87.1 HYPONATREMIA: ICD-10-CM

## 2022-08-22 LAB
ALBUMIN SERPL-MCNC: 4.1 G/DL (ref 3.4–5)
ALP LIVER SERPL-CCNC: 61 U/L
ALT SERPL-CCNC: 25 U/L
ANION GAP SERPL CALC-SCNC: 10 MMOL/L (ref 0–18)
ANION GAP SERPL CALC-SCNC: 14 MMOL/L (ref 0–18)
AST SERPL-CCNC: 24 U/L (ref 15–37)
BASOPHILS # BLD AUTO: 0.02 X10(3) UL (ref 0–0.2)
BASOPHILS NFR BLD AUTO: 0.1 %
BILIRUB DIRECT SERPL-MCNC: 0.7 MG/DL (ref 0–0.2)
BILIRUB SERPL-MCNC: 1.6 MG/DL (ref 0.1–2)
BILIRUB UR QL: NEGATIVE
BUN BLD-MCNC: 8 MG/DL (ref 7–18)
BUN BLD-MCNC: 9 MG/DL (ref 7–18)
BUN/CREAT SERPL: 9.1 (ref 10–20)
BUN/CREAT SERPL: 9.6 (ref 10–20)
CALCIUM BLD-MCNC: 7.9 MG/DL (ref 8.5–10.1)
CALCIUM BLD-MCNC: 8.2 MG/DL (ref 8.5–10.1)
CHLORIDE SERPL-SCNC: 76 MMOL/L (ref 98–112)
CHLORIDE SERPL-SCNC: 80 MMOL/L (ref 98–112)
CLARITY UR: CLEAR
CO2 SERPL-SCNC: 24 MMOL/L (ref 21–32)
CO2 SERPL-SCNC: 27 MMOL/L (ref 21–32)
COLOR UR: YELLOW
CREAT BLD-MCNC: 0.83 MG/DL
CREAT BLD-MCNC: 0.99 MG/DL
DEPRECATED RDW RBC AUTO: 42.6 FL (ref 35.1–46.3)
EOSINOPHIL # BLD AUTO: 0 X10(3) UL (ref 0–0.7)
EOSINOPHIL NFR BLD AUTO: 0 %
ERYTHROCYTE [DISTWIDTH] IN BLOOD BY AUTOMATED COUNT: 13.2 % (ref 11–15)
EST. AVERAGE GLUCOSE BLD GHB EST-MCNC: 163 MG/DL (ref 68–126)
GFR SERPLBLD BASED ON 1.73 SQ M-ARVRAT: 86 ML/MIN/1.73M2 (ref 60–?)
GFR SERPLBLD BASED ON 1.73 SQ M-ARVRAT: 99 ML/MIN/1.73M2 (ref 60–?)
GLUCOSE BLD-MCNC: 149 MG/DL (ref 70–99)
GLUCOSE BLD-MCNC: 205 MG/DL (ref 70–99)
GLUCOSE BLDC GLUCOMTR-MCNC: 185 MG/DL (ref 70–99)
GLUCOSE BLDC GLUCOMTR-MCNC: 203 MG/DL (ref 70–99)
GLUCOSE UR-MCNC: 100 MG/DL
HBA1C MFR BLD: 7.3 % (ref ?–5.7)
HCT VFR BLD AUTO: 43.2 %
HGB BLD-MCNC: 14.8 G/DL
HYALINE CASTS #/AREA URNS AUTO: PRESENT /LPF
HYALINE CASTS #/AREA URNS AUTO: PRESENT /LPF
IMM GRANULOCYTES # BLD AUTO: 0.1 X10(3) UL (ref 0–1)
IMM GRANULOCYTES NFR BLD: 0.6 %
KETONES UR-MCNC: 40 MG/DL
LEUKOCYTE ESTERASE UR QL STRIP.AUTO: NEGATIVE
LIPASE SERPL-CCNC: 355 U/L (ref 73–393)
LYMPHOCYTES # BLD AUTO: 2.39 X10(3) UL (ref 1–4)
LYMPHOCYTES NFR BLD AUTO: 15.3 %
MCH RBC QN AUTO: 30.5 PG (ref 26–34)
MCHC RBC AUTO-ENTMCNC: 34.3 G/DL (ref 31–37)
MCV RBC AUTO: 89.1 FL
MONOCYTES # BLD AUTO: 1.22 X10(3) UL (ref 0.1–1)
MONOCYTES NFR BLD AUTO: 7.8 %
NEUTROPHILS # BLD AUTO: 11.9 X10 (3) UL (ref 1.5–7.7)
NEUTROPHILS # BLD AUTO: 11.9 X10(3) UL (ref 1.5–7.7)
NEUTROPHILS NFR BLD AUTO: 76.2 %
NITRITE UR QL STRIP.AUTO: NEGATIVE
OSMOLALITY SERPL CALC.SUM OF ELEC: 243 MOSM/KG (ref 275–295)
OSMOLALITY SERPL CALC.SUM OF ELEC: 245 MOSM/KG (ref 275–295)
PH UR: 6 [PH] (ref 5–8)
PLATELET # BLD AUTO: 328 10(3)UL (ref 150–450)
POTASSIUM SERPL-SCNC: 4.1 MMOL/L (ref 3.5–5.1)
POTASSIUM SERPL-SCNC: 4.2 MMOL/L (ref 3.5–5.1)
PROT SERPL-MCNC: 8.1 G/DL (ref 6.4–8.2)
RBC # BLD AUTO: 4.85 X10(6)UL
SARS-COV-2 RNA RESP QL NAA+PROBE: NOT DETECTED
SODIUM SERPL-SCNC: 114 MMOL/L (ref 136–145)
SODIUM SERPL-SCNC: 117 MMOL/L (ref 136–145)
SP GR UR STRIP: 1.02 (ref 1–1.03)
T4 FREE SERPL-MCNC: 1.1 NG/DL (ref 0.8–1.7)
TSI SER-ACNC: 13.7 MIU/ML (ref 0.36–3.74)
UROBILINOGEN UR STRIP-ACNC: 0.2
WBC # BLD AUTO: 15.6 X10(3) UL (ref 4–11)

## 2022-08-22 PROCEDURE — 71045 X-RAY EXAM CHEST 1 VIEW: CPT | Performed by: INTERNAL MEDICINE

## 2022-08-22 PROCEDURE — 71045 X-RAY EXAM CHEST 1 VIEW: CPT | Performed by: EMERGENCY MEDICINE

## 2022-08-22 PROCEDURE — 74177 CT ABD & PELVIS W/CONTRAST: CPT | Performed by: EMERGENCY MEDICINE

## 2022-08-22 RX ORDER — ACETAMINOPHEN 500 MG
1000 TABLET ORAL EVERY 6 HOURS PRN
Status: DISCONTINUED | OUTPATIENT
Start: 2022-08-22 | End: 2022-09-02

## 2022-08-22 RX ORDER — NICOTINE POLACRILEX 4 MG
30 LOZENGE BUCCAL
Status: DISCONTINUED | OUTPATIENT
Start: 2022-08-22 | End: 2022-09-02

## 2022-08-22 RX ORDER — ONDANSETRON 2 MG/ML
4 INJECTION INTRAMUSCULAR; INTRAVENOUS ONCE
Status: COMPLETED | OUTPATIENT
Start: 2022-08-22 | End: 2022-08-22

## 2022-08-22 RX ORDER — SODIUM CHLORIDE 9 MG/ML
INJECTION, SOLUTION INTRAVENOUS CONTINUOUS
Status: DISCONTINUED | OUTPATIENT
Start: 2022-08-22 | End: 2022-08-24

## 2022-08-22 RX ORDER — NICOTINE POLACRILEX 4 MG
15 LOZENGE BUCCAL
Status: DISCONTINUED | OUTPATIENT
Start: 2022-08-22 | End: 2022-09-02

## 2022-08-22 RX ORDER — ATORVASTATIN CALCIUM 20 MG/1
20 TABLET, FILM COATED ORAL NIGHTLY
Status: DISCONTINUED | OUTPATIENT
Start: 2022-08-22 | End: 2022-09-02

## 2022-08-22 RX ORDER — HEPARIN SODIUM 5000 [USP'U]/ML
5000 INJECTION, SOLUTION INTRAVENOUS; SUBCUTANEOUS EVERY 12 HOURS
Status: DISCONTINUED | OUTPATIENT
Start: 2022-08-23 | End: 2022-08-25

## 2022-08-22 RX ORDER — MORPHINE SULFATE 2 MG/ML
2 INJECTION, SOLUTION INTRAMUSCULAR; INTRAVENOUS EVERY 4 HOURS PRN
Status: DISCONTINUED | OUTPATIENT
Start: 2022-08-22 | End: 2022-08-24

## 2022-08-22 RX ORDER — MORPHINE SULFATE 4 MG/ML
4 INJECTION, SOLUTION INTRAMUSCULAR; INTRAVENOUS ONCE
Status: COMPLETED | OUTPATIENT
Start: 2022-08-22 | End: 2022-08-22

## 2022-08-22 RX ORDER — KETOROLAC TROMETHAMINE 30 MG/ML
30 INJECTION, SOLUTION INTRAMUSCULAR; INTRAVENOUS ONCE
Status: COMPLETED | OUTPATIENT
Start: 2022-08-22 | End: 2022-08-22

## 2022-08-22 RX ORDER — ONDANSETRON 2 MG/ML
4 INJECTION INTRAMUSCULAR; INTRAVENOUS EVERY 4 HOURS PRN
Status: DISCONTINUED | OUTPATIENT
Start: 2022-08-22 | End: 2022-09-02

## 2022-08-22 RX ORDER — LEVETIRACETAM 500 MG/5ML
1000 INJECTION, SOLUTION, CONCENTRATE INTRAVENOUS EVERY 12 HOURS
Status: DISCONTINUED | OUTPATIENT
Start: 2022-08-22 | End: 2022-08-25

## 2022-08-22 RX ORDER — APIXABAN 5 MG/1
5 TABLET, FILM COATED ORAL 2 TIMES DAILY
Status: ON HOLD | COMMUNITY
Start: 2022-07-24

## 2022-08-22 RX ORDER — DEXTROSE MONOHYDRATE 25 G/50ML
50 INJECTION, SOLUTION INTRAVENOUS
Status: DISCONTINUED | OUTPATIENT
Start: 2022-08-22 | End: 2022-09-02

## 2022-08-22 RX ORDER — ACETAMINOPHEN 10 MG/ML
1000 INJECTION, SOLUTION INTRAVENOUS EVERY 6 HOURS PRN
Status: DISCONTINUED | OUTPATIENT
Start: 2022-08-22 | End: 2022-09-01

## 2022-08-22 RX ORDER — FAMOTIDINE 10 MG/ML
20 INJECTION, SOLUTION INTRAVENOUS 2 TIMES DAILY
Status: DISCONTINUED | OUTPATIENT
Start: 2022-08-22 | End: 2022-09-01

## 2022-08-22 RX ORDER — CAPTOPRIL 12.5 MG/1
TABLET ORAL
Status: ON HOLD | COMMUNITY
Start: 2022-08-05

## 2022-08-22 NOTE — ED QUICK NOTES
Patient c/o of abdominal pain and nausea x1 week with constipation. Constipation resolved with medication but the pain is still there. Patient states he has had urinary retention since 1am. Patient states pain is in his entire abdomen 9/10. Patient able to urinate about 400 ml today with bladder scan showing 456 remaining. Patient denies dysuria, hematuria, vomiting, sob, chest pain, fever.

## 2022-08-22 NOTE — ED INITIAL ASSESSMENT (HPI)
Patient to Er from home with c/o abdominal pain for the last few days along with nausea. Patient also states he has not urinated at all today.

## 2022-08-22 NOTE — ED QUICK NOTES
Orders for admission, patient is aware of plan and ready to go upstairs. Any questions, please call ED RN Candis at extension [phone].      Patient Covid vaccination status: Unvaccinated     COVID Test Ordered in ED: Rapid SARS-CoV-2 by PCR    COVID Suspicion at Admission: No risk with negative rapid    Running Infusions:      Mental Status/LOC at time of transport: aaox4    Other pertinent information:   CIWA score: N/A   NIH score:  N/A   Covid pending

## 2022-08-23 ENCOUNTER — APPOINTMENT (OUTPATIENT)
Dept: GENERAL RADIOLOGY | Facility: HOSPITAL | Age: 62
DRG: 389 | End: 2022-08-23
Attending: SURGERY
Payer: MEDICARE

## 2022-08-23 LAB
ALBUMIN SERPL-MCNC: 3.3 G/DL (ref 3.4–5)
ALP LIVER SERPL-CCNC: 47 U/L
ALT SERPL-CCNC: 20 U/L
ANION GAP SERPL CALC-SCNC: 8 MMOL/L (ref 0–18)
AST SERPL-CCNC: 24 U/L (ref 15–37)
BASOPHILS # BLD AUTO: 0.01 X10(3) UL (ref 0–0.2)
BASOPHILS NFR BLD AUTO: 0.1 %
BILIRUB DIRECT SERPL-MCNC: 0.5 MG/DL (ref 0–0.2)
BILIRUB SERPL-MCNC: 1.1 MG/DL (ref 0.1–2)
BUN BLD-MCNC: 7 MG/DL (ref 7–18)
BUN/CREAT SERPL: 8.9 (ref 10–20)
CALCIUM BLD-MCNC: 7.7 MG/DL (ref 8.5–10.1)
CHLORIDE SERPL-SCNC: 83 MMOL/L (ref 98–112)
CO2 SERPL-SCNC: 30 MMOL/L (ref 21–32)
CREAT BLD-MCNC: 0.79 MG/DL
DEPRECATED RDW RBC AUTO: 44.1 FL (ref 35.1–46.3)
EOSINOPHIL # BLD AUTO: 0.08 X10(3) UL (ref 0–0.7)
EOSINOPHIL NFR BLD AUTO: 0.9 %
ERYTHROCYTE [DISTWIDTH] IN BLOOD BY AUTOMATED COUNT: 13.4 % (ref 11–15)
GFR SERPLBLD BASED ON 1.73 SQ M-ARVRAT: 100 ML/MIN/1.73M2 (ref 60–?)
GLUCOSE BLD-MCNC: 122 MG/DL (ref 70–99)
GLUCOSE BLDC GLUCOMTR-MCNC: 132 MG/DL (ref 70–99)
GLUCOSE BLDC GLUCOMTR-MCNC: 138 MG/DL (ref 70–99)
GLUCOSE BLDC GLUCOMTR-MCNC: 150 MG/DL (ref 70–99)
GLUCOSE BLDC GLUCOMTR-MCNC: 163 MG/DL (ref 70–99)
GLUCOSE BLDC GLUCOMTR-MCNC: 168 MG/DL (ref 70–99)
HCT VFR BLD AUTO: 37.5 %
HGB BLD-MCNC: 12.7 G/DL
IMM GRANULOCYTES # BLD AUTO: 0.05 X10(3) UL (ref 0–1)
IMM GRANULOCYTES NFR BLD: 0.6 %
LYMPHOCYTES # BLD AUTO: 1.72 X10(3) UL (ref 1–4)
LYMPHOCYTES NFR BLD AUTO: 20 %
MAGNESIUM SERPL-MCNC: 1.8 MG/DL (ref 1.6–2.6)
MCH RBC QN AUTO: 30.9 PG (ref 26–34)
MCHC RBC AUTO-ENTMCNC: 33.9 G/DL (ref 31–37)
MCV RBC AUTO: 91.2 FL
MONOCYTES # BLD AUTO: 1.02 X10(3) UL (ref 0.1–1)
MONOCYTES NFR BLD AUTO: 11.9 %
NEUTROPHILS # BLD AUTO: 5.72 X10 (3) UL (ref 1.5–7.7)
NEUTROPHILS # BLD AUTO: 5.72 X10(3) UL (ref 1.5–7.7)
NEUTROPHILS NFR BLD AUTO: 66.5 %
OSMOLALITY SERPL CALC.SUM OF ELEC: 251 MOSM/KG (ref 275–295)
OSMOLALITY UR: 138 MOSM/KG (ref 300–1100)
PHOSPHATE SERPL-MCNC: 2.5 MG/DL (ref 2.5–4.9)
PLATELET # BLD AUTO: 185 10(3)UL (ref 150–450)
POTASSIUM SERPL-SCNC: 3.7 MMOL/L (ref 3.5–5.1)
PROT SERPL-MCNC: 6.3 G/DL (ref 6.4–8.2)
RBC # BLD AUTO: 4.11 X10(6)UL
SODIUM SERPL-SCNC: 121 MMOL/L (ref 136–145)
SODIUM SERPL-SCNC: 125 MMOL/L (ref 136–145)
SODIUM SERPL-SCNC: 5 MMOL/L
URATE SERPL-MCNC: 3.6 MG/DL
WBC # BLD AUTO: 8.6 X10(3) UL (ref 4–11)

## 2022-08-23 PROCEDURE — 74270 X-RAY XM COLON 1CNTRST STD: CPT | Performed by: SURGERY

## 2022-08-23 PROCEDURE — 99223 1ST HOSP IP/OBS HIGH 75: CPT | Performed by: INTERNAL MEDICINE

## 2022-08-23 RX ORDER — MAGNESIUM SULFATE HEPTAHYDRATE 40 MG/ML
2 INJECTION, SOLUTION INTRAVENOUS ONCE
Status: COMPLETED | OUTPATIENT
Start: 2022-08-23 | End: 2022-08-23

## 2022-08-23 RX ORDER — VANCOMYCIN HYDROCHLORIDE 125 MG/1
125 CAPSULE ORAL DAILY
Status: DISCONTINUED | OUTPATIENT
Start: 2022-08-23 | End: 2022-08-31

## 2022-08-23 NOTE — CM/SW NOTE
08/23/22 1700   CM/SW Referral Data   Referral Source    Reason for Referral Discharge planning   Informant EMR;Clinical Staff Member   Pertinent Medical Hx   Does patient have an established PCP? Yes  (Yovani Gautam)   Patient Info   Patient's Current Mental Status at Time of Assessment Alert;Oriented   Patient's [address]   Patient lives with Alone   Patient Status Prior to Admission   Independent with ADLs and Mobility Yes   Discharge Needs   Anticipated D/C needs To be determined     Pt discussed during nursing rounds. Dx LBO. From home alone, independent prior to dx. PT/OT evals needed for dc recommendation, RN is aware. Plan: TBD    / to remain available for support and/or discharge planning.      GRACE Boland    [phone]

## 2022-08-23 NOTE — PROGRESS NOTES
[facility]    CDI Prediction Tool Protocol (Vancomycin Initiated)    OVP (oral vancomycin prophylaxis) 125 mg PO Daily is being started in this patient based on a score of 13.1. Score Breakdown:  History of CDI > 1 year ago AND high risk antibiotic use (8 points)  High risk antibiotic use (5 points)  Long term care facility resident (1 point)  Hypoalbuminemia: < 3g/dL (1 point)  Age >/= 80 years (3 points)  PPI use in hospital (1 point)  Recently hospitalized: within 90 days (1 point)  History of CDI regardless of high risk antibiotic use (0.1 point)    This patient is currently at high risk for developing CDI due to his/her score being >/=13 points and is being started on prophylactic oral vancomycin to prevent Cdiff. This patient does not have C. difficile infection. All measures taken within this protocol are to decrease the risk of CDI development.     Griffin Mchugh, PharmD  8/23/2022  6:49 AM  [facility]  Pharmacy Extension: [phone]

## 2022-08-23 NOTE — CM/SW NOTE
BPCI Bundled Advanced Medicare Program    Plan of care reviewed for care coordination and discharge planning. Noted pt falls under  BPCI/Medicare program, with , W for Gastrointestinal Obstruction     NSOC JOHN Proposal: TBD pending progress    PT/OT evals pending. Plan: TBD    / to remain available for support and/or discharge planning.      GRACE Bernard    [phone]

## 2022-08-24 ENCOUNTER — APPOINTMENT (OUTPATIENT)
Dept: CV DIAGNOSTICS | Facility: HOSPITAL | Age: 62
DRG: 389 | End: 2022-08-24
Attending: INTERNAL MEDICINE
Payer: MEDICARE

## 2022-08-24 ENCOUNTER — APPOINTMENT (OUTPATIENT)
Dept: GENERAL RADIOLOGY | Facility: HOSPITAL | Age: 62
DRG: 389 | End: 2022-08-24
Attending: INTERNAL MEDICINE
Payer: MEDICARE

## 2022-08-24 ENCOUNTER — ANESTHESIA EVENT (OUTPATIENT)
Dept: ENDOSCOPY | Facility: HOSPITAL | Age: 62
End: 2022-08-24
Payer: MEDICARE

## 2022-08-24 ENCOUNTER — APPOINTMENT (OUTPATIENT)
Dept: GENERAL RADIOLOGY | Facility: HOSPITAL | Age: 62
DRG: 389 | End: 2022-08-24
Attending: SURGERY
Payer: MEDICARE

## 2022-08-24 ENCOUNTER — ANESTHESIA (OUTPATIENT)
Dept: ENDOSCOPY | Facility: HOSPITAL | Age: 62
End: 2022-08-24
Payer: MEDICARE

## 2022-08-24 LAB
AFP-TM SERPL-MCNC: 43.9 NG/ML (ref ?–8)
ANION GAP SERPL CALC-SCNC: 5 MMOL/L (ref 0–18)
BUN BLD-MCNC: 4 MG/DL (ref 7–18)
BUN/CREAT SERPL: 4.1 (ref 10–20)
C DIFF TOX B STL QL: NEGATIVE
CALCIUM BLD-MCNC: 7.9 MG/DL (ref 8.5–10.1)
CHLORIDE SERPL-SCNC: 94 MMOL/L (ref 98–112)
CO2 SERPL-SCNC: 34 MMOL/L (ref 21–32)
CREAT BLD-MCNC: 0.98 MG/DL
GFR SERPLBLD BASED ON 1.73 SQ M-ARVRAT: 87 ML/MIN/1.73M2 (ref 60–?)
GLUCOSE BLD-MCNC: 142 MG/DL (ref 70–99)
GLUCOSE BLDC GLUCOMTR-MCNC: 133 MG/DL (ref 70–99)
GLUCOSE BLDC GLUCOMTR-MCNC: 134 MG/DL (ref 70–99)
GLUCOSE BLDC GLUCOMTR-MCNC: 145 MG/DL (ref 70–99)
GLUCOSE BLDC GLUCOMTR-MCNC: 149 MG/DL (ref 70–99)
MAGNESIUM SERPL-MCNC: 2.6 MG/DL (ref 1.6–2.6)
OSMOLALITY SERPL CALC.SUM OF ELEC: 275 MOSM/KG (ref 275–295)
PHOSPHATE SERPL-MCNC: 2.9 MG/DL (ref 2.5–4.9)
POTASSIUM SERPL-SCNC: 3.3 MMOL/L (ref 3.5–5.1)
POTASSIUM SERPL-SCNC: 3.3 MMOL/L (ref 3.5–5.1)
SODIUM SERPL-SCNC: 133 MMOL/L (ref 136–145)

## 2022-08-24 PROCEDURE — 93306 TTE W/DOPPLER COMPLETE: CPT | Performed by: INTERNAL MEDICINE

## 2022-08-24 PROCEDURE — 0D7E8ZZ DILATION OF LARGE INTESTINE, VIA NATURAL OR ARTIFICIAL OPENING ENDOSCOPIC: ICD-10-PCS | Performed by: INTERNAL MEDICINE

## 2022-08-24 PROCEDURE — 45393 COLONOSCOPY W/DECOMPRESSION: CPT | Performed by: INTERNAL MEDICINE

## 2022-08-24 PROCEDURE — 74018 RADEX ABDOMEN 1 VIEW: CPT | Performed by: INTERNAL MEDICINE

## 2022-08-24 PROCEDURE — 74019 RADEX ABDOMEN 2 VIEWS: CPT | Performed by: SURGERY

## 2022-08-24 RX ORDER — SODIUM CHLORIDE 9 MG/ML
INJECTION, SOLUTION INTRAVENOUS CONTINUOUS
Status: DISCONTINUED | OUTPATIENT
Start: 2022-08-24 | End: 2022-08-26

## 2022-08-24 RX ORDER — SODIUM CHLORIDE, SODIUM LACTATE, POTASSIUM CHLORIDE, CALCIUM CHLORIDE 600; 310; 30; 20 MG/100ML; MG/100ML; MG/100ML; MG/100ML
INJECTION, SOLUTION INTRAVENOUS CONTINUOUS PRN
Status: DISCONTINUED | OUTPATIENT
Start: 2022-08-24 | End: 2022-08-24 | Stop reason: SURG

## 2022-08-24 RX ORDER — LIDOCAINE HYDROCHLORIDE 10 MG/ML
INJECTION, SOLUTION EPIDURAL; INFILTRATION; INTRACAUDAL; PERINEURAL AS NEEDED
Status: DISCONTINUED | OUTPATIENT
Start: 2022-08-24 | End: 2022-08-24 | Stop reason: SURG

## 2022-08-24 RX ORDER — CAPTOPRIL 25 MG/1
12.5 TABLET ORAL
Status: DISCONTINUED | OUTPATIENT
Start: 2022-08-24 | End: 2022-08-26

## 2022-08-24 RX ORDER — MORPHINE SULFATE 2 MG/ML
1 INJECTION, SOLUTION INTRAMUSCULAR; INTRAVENOUS EVERY 4 HOURS PRN
Status: DISCONTINUED | OUTPATIENT
Start: 2022-08-24 | End: 2022-09-02

## 2022-08-24 RX ADMIN — LIDOCAINE HYDROCHLORIDE 50 MG: 10 INJECTION, SOLUTION EPIDURAL; INFILTRATION; INTRACAUDAL; PERINEURAL at 09:26:00

## 2022-08-24 RX ADMIN — SODIUM CHLORIDE, SODIUM LACTATE, POTASSIUM CHLORIDE, CALCIUM CHLORIDE: 600; 310; 30; 20 INJECTION, SOLUTION INTRAVENOUS at 09:22:00

## 2022-08-24 RX ADMIN — SODIUM CHLORIDE, SODIUM LACTATE, POTASSIUM CHLORIDE, CALCIUM CHLORIDE: 600; 310; 30; 20 INJECTION, SOLUTION INTRAVENOUS at 09:50:00

## 2022-08-24 NOTE — CM/SW NOTE
08/24/22 1100   CM/SW Referral Data   Referral Source Social Work (self-referral)   Reason for Referral Discharge planning   Informant Patient   Pertinent Medical Hx   Does patient have an established PCP? Yes   Patient Info   Advanced directives? Yes   Patient's Current Mental Status at Time of Assessment Alert;Oriented   Patient's [address]   Number of Levels in Home 1   Number of Stair in Home 0   Patient lives with Alone   Patient Status Prior to Admission   Independent with ADLs and Mobility No   Pt. requires assistance with Bathing   Services in place prior to admission DME/Supplies at home   Type of DME/Supplies Straight Cane;Standard Walker; Wheelchair   Discharge Needs   Anticipated D/C needs To be determined     SW initiated self referral for DC planning. Met with patient at bedside, introduced self and role. Patient alert and oriented at time of visit. Patient lives alone at (address correct on face sheet). Reports that he usually lives with his wife, but she is ill and living with her sister at this time. Patient states that his Sister in law comes to check in with him and assist with grocery shopping/bathing 1x a week. Patient has a cane, walker, and wheel chair. Patient does not currently have home health or caregiver services in place. Patient is open to home health only for RN and home health aid, not PT/OT. Patient states that he had home PT/OT in the past and did not find it helpful. Patient is open to homemaker referral.     Patient states that his brother Jonnathan Chang is his [facility] [phone]. States that he has filled out POA paperwork with his . Patient states that his sister in law or neighbor will pick him up on day of DC. Called and LVM for DCSS for homemaker referral.     PLAN: PT/OT pending.  Only open to home health for home health RN and aid, referrals pending, need to follow up with choice list when avail, orders/f2f placed (RN and HHA only per request of patient).       MARIFER Quan, [facility]    [id number]

## 2022-08-24 NOTE — PHYSICAL THERAPY NOTE
PT evaluation orders received and chart reviewed; pt off the floor in x-ray; presented for large bowel obstruction; possible colonoscopy with decompression tube today. Will reschedule for 8/25.      Thank you,  Cezar Narvaez, PT, DPT

## 2022-08-24 NOTE — INTERVAL H&P NOTE
Pre-op Diagnosis: severe colonic ileus    The above referenced H&P was reviewed by Amanda Cordero MD on 8/24/2022, the patient was examined and no significant changes have occurred in the patient's condition since the H&P was performed. I discussed with the patient and/or legal representative the potential benefits, risks and side effects of this procedure; the likelihood of the patient achieving goals; and potential problems that might occur during recuperation. I discussed reasonable alternatives to the procedure, including risks, benefits and side effects related to the alternatives and risks related to not receiving this procedure. We will proceed with procedure as planned. INTERVAL IMAGING THIS MORNING SHOWS PERSISTENT SEVERE DILATION OF RIGHT COLON, NO OBSTRUCTION SEEN ON LGI SERIES. SIMILAR EPISODES IN PAST WHICH HAVE RESPONDED FAVORABLY TO ENDOSCOPIC DECOMPRESSION. GIVEN OF ISCHEMIA AND PERFORATION INCREASE WITH DURATION AND SEVERITY OF DILATION, RECOMMEND PROCEEDING WITH ENDOSCOPIC DECOMPRESSION TODAY. DISCUSSED WITH ANESTHESIA TEAM, THIS IS CONSIDERED AN EMERGENT PROCEDURE.

## 2022-08-24 NOTE — OCCUPATIONAL THERAPY NOTE
OT orders rcvd; pt off the floor in x-ray; presented for large bowel obstruction; possible colonoscopy with decompression tube today. Will hold OT evaluation today and continue to follow.      [address] [facility], OTR/L ext [phone]

## 2022-08-24 NOTE — PRE-SEDATION ASSESSMENT
Physician Pre-Sedation Assessment    Pre-Sedation Assessment:    Sedation History: Previous Sedation with No Complications and Airway Assessed    Cardiac: normal S1, S2  Respiratory: breath sounds clear bilaterally   Abdomen: soft, BS (+), non-tender    ASA Classification: 3.  Patient with severe systemic disease    Plan: MAC Sedation

## 2022-08-24 NOTE — HOME CARE LIAISON
Discussed with patient list of Gerald Posadas providers from [facility], patient choice is [address]. Agency reserved in [facility] and contact information placed on AVS. Financial interest disclosure provided to patient.

## 2022-08-24 NOTE — ANESTHESIA POSTPROCEDURE EVALUATION
Patient: Praful Coles    Procedure Summary     Date: 08/24/22 Room / Location: [address] [place] [address] ENDOSCOPY 04 / [facility] [place]    Anesthesia Start: 3054 Anesthesia Stop: 6670    Procedure: COLONOSCOPY with decompression tube insertion (N/A ) Diagnosis: (diverticulosis, colonic bowel dilation)    Surgeons:  Sulma Andino MD Anesthesiologist: Denis Cha CRNA    Anesthesia Type: MAC ASA Status: 4 - Emergent          Anesthesia Type: MAC    Vitals Value Taken Time   /86 08/24/22 0957   Temp 97 08/24/22 0957   Pulse 82 08/24/22 0957   Resp 16 08/24/22 0957   SpO2 93 08/24/22 0957       [facility] AN Post Evaluation:   Patient Evaluated in PACU  Patient Participation: complete - patient participated  Level of Consciousness: awake  Pain Score: 0  Pain Management: adequate  Airway Patency:patent  Dental exam unchanged from preop  Yes    Cardiovascular Status: acceptable  Respiratory Status: acceptable  Postoperative Hydration acceptable      Mitchell Guthrie CRNA  8/24/2022 9:57 AM

## 2022-08-25 ENCOUNTER — APPOINTMENT (OUTPATIENT)
Dept: GENERAL RADIOLOGY | Facility: HOSPITAL | Age: 62
DRG: 389 | End: 2022-08-25
Attending: INTERNAL MEDICINE
Payer: MEDICARE

## 2022-08-25 LAB
ALBUMIN SERPL-MCNC: 3.3 G/DL (ref 3.4–5)
ANION GAP SERPL CALC-SCNC: 8 MMOL/L (ref 0–18)
BASOPHILS # BLD AUTO: 0.01 X10(3) UL (ref 0–0.2)
BASOPHILS NFR BLD AUTO: 0.2 %
BUN BLD-MCNC: 4 MG/DL (ref 7–18)
BUN/CREAT SERPL: 5.3 (ref 10–20)
CALCIUM BLD-MCNC: 8 MG/DL (ref 8.5–10.1)
CHLORIDE SERPL-SCNC: 98 MMOL/L (ref 98–112)
CO2 SERPL-SCNC: 30 MMOL/L (ref 21–32)
CREAT BLD-MCNC: 0.75 MG/DL
DEPRECATED RDW RBC AUTO: 49.7 FL (ref 35.1–46.3)
EOSINOPHIL # BLD AUTO: 0.08 X10(3) UL (ref 0–0.7)
EOSINOPHIL NFR BLD AUTO: 1.2 %
ERYTHROCYTE [DISTWIDTH] IN BLOOD BY AUTOMATED COUNT: 14.5 % (ref 11–15)
GFR SERPLBLD BASED ON 1.73 SQ M-ARVRAT: 102 ML/MIN/1.73M2 (ref 60–?)
GLUCOSE BLD-MCNC: 135 MG/DL (ref 70–99)
GLUCOSE BLDC GLUCOMTR-MCNC: 138 MG/DL (ref 70–99)
GLUCOSE BLDC GLUCOMTR-MCNC: 148 MG/DL (ref 70–99)
GLUCOSE BLDC GLUCOMTR-MCNC: 153 MG/DL (ref 70–99)
GLUCOSE BLDC GLUCOMTR-MCNC: 179 MG/DL (ref 70–99)
GLUCOSE BLDC GLUCOMTR-MCNC: 217 MG/DL (ref 70–99)
HCT VFR BLD AUTO: 41.6 %
HGB BLD-MCNC: 13.1 G/DL
IMM GRANULOCYTES # BLD AUTO: 0.02 X10(3) UL (ref 0–1)
IMM GRANULOCYTES NFR BLD: 0.3 %
LYMPHOCYTES # BLD AUTO: 1.07 X10(3) UL (ref 1–4)
LYMPHOCYTES NFR BLD AUTO: 16.5 %
MAGNESIUM SERPL-MCNC: 2.5 MG/DL (ref 1.6–2.6)
MCH RBC QN AUTO: 30.2 PG (ref 26–34)
MCHC RBC AUTO-ENTMCNC: 31.5 G/DL (ref 31–37)
MCV RBC AUTO: 95.9 FL
MONOCYTES # BLD AUTO: 0.76 X10(3) UL (ref 0.1–1)
MONOCYTES NFR BLD AUTO: 11.7 %
NEUTROPHILS # BLD AUTO: 4.53 X10 (3) UL (ref 1.5–7.7)
NEUTROPHILS # BLD AUTO: 4.53 X10(3) UL (ref 1.5–7.7)
NEUTROPHILS NFR BLD AUTO: 70.1 %
OSMOLALITY SERPL CALC.SUM OF ELEC: 281 MOSM/KG (ref 275–295)
PHOSPHATE SERPL-MCNC: 2.7 MG/DL (ref 2.5–4.9)
PHOSPHATE SERPL-MCNC: 2.7 MG/DL (ref 2.5–4.9)
PLATELET # BLD AUTO: 179 10(3)UL (ref 150–450)
POTASSIUM SERPL-SCNC: 3.6 MMOL/L (ref 3.5–5.1)
POTASSIUM SERPL-SCNC: 3.6 MMOL/L (ref 3.5–5.1)
RBC # BLD AUTO: 4.34 X10(6)UL
SODIUM SERPL-SCNC: 136 MMOL/L (ref 136–145)
WBC # BLD AUTO: 6.5 X10(3) UL (ref 4–11)

## 2022-08-25 PROCEDURE — 99233 SBSQ HOSP IP/OBS HIGH 50: CPT | Performed by: INTERNAL MEDICINE

## 2022-08-25 PROCEDURE — 74018 RADEX ABDOMEN 1 VIEW: CPT | Performed by: INTERNAL MEDICINE

## 2022-08-25 RX ORDER — ZOLPIDEM TARTRATE 5 MG/1
5 TABLET ORAL NIGHTLY PRN
Status: DISCONTINUED | OUTPATIENT
Start: 2022-08-25 | End: 2022-09-02

## 2022-08-25 RX ORDER — GABAPENTIN 300 MG/1
300 CAPSULE ORAL NIGHTLY
Status: DISCONTINUED | OUTPATIENT
Start: 2022-08-25 | End: 2022-09-02

## 2022-08-25 RX ORDER — ENOXAPARIN SODIUM 100 MG/ML
130 INJECTION SUBCUTANEOUS EVERY 12 HOURS SCHEDULED
Status: DISCONTINUED | OUTPATIENT
Start: 2022-08-25 | End: 2022-08-26 | Stop reason: ALTCHOICE

## 2022-08-25 RX ORDER — LEVETIRACETAM 500 MG/1
1000 TABLET ORAL 2 TIMES DAILY
Status: DISCONTINUED | OUTPATIENT
Start: 2022-08-25 | End: 2022-09-02

## 2022-08-25 RX ORDER — TAMSULOSIN HYDROCHLORIDE 0.4 MG/1
0.4 CAPSULE ORAL DAILY
Refills: 0 | Status: DISCONTINUED | OUTPATIENT
Start: 2022-08-25 | End: 2022-09-02

## 2022-08-25 NOTE — PHYSICAL THERAPY NOTE
Pt denies need for PT in presence of RN. \"I've been walking I don't need this! \" RN agrees that pt has been up to baseline and appears to be near baseline. PT to discontinue order per discussion.  Please re consult if pt develops need or becomes agreeable to PT eval.

## 2022-08-25 NOTE — CM/SW NOTE
DA met with pt at bedside. DCSS rep at bedside. Information given for [facility]. DA discussed therapy recs for [facility]. Pt interested and agreeable for PT/OT/RN at home. DA discussed with vivian Calderon. Anticipating possible weekend DC. PLAN: pending medical course - home with [facility]    SW remains available for support and/or discharge planning. Please do not hesitate to call/chat SW if further DC needs arise.      Juan CAICEDO, [place], [place]   Ext [phone]

## 2022-08-26 ENCOUNTER — APPOINTMENT (OUTPATIENT)
Dept: GENERAL RADIOLOGY | Facility: HOSPITAL | Age: 62
DRG: 389 | End: 2022-08-26
Attending: INTERNAL MEDICINE
Payer: MEDICARE

## 2022-08-26 LAB
ALBUMIN SERPL-MCNC: 3.2 G/DL (ref 3.4–5)
ANION GAP SERPL CALC-SCNC: 6 MMOL/L (ref 0–18)
BUN BLD-MCNC: 4 MG/DL (ref 7–18)
BUN/CREAT SERPL: 4.8 (ref 10–20)
CALCIUM BLD-MCNC: 7.8 MG/DL (ref 8.5–10.1)
CHLORIDE SERPL-SCNC: 100 MMOL/L (ref 98–112)
CO2 SERPL-SCNC: 30 MMOL/L (ref 21–32)
CREAT BLD-MCNC: 0.84 MG/DL
GFR SERPLBLD BASED ON 1.73 SQ M-ARVRAT: 99 ML/MIN/1.73M2 (ref 60–?)
GLUCOSE BLD-MCNC: 172 MG/DL (ref 70–99)
GLUCOSE BLDC GLUCOMTR-MCNC: 155 MG/DL (ref 70–99)
GLUCOSE BLDC GLUCOMTR-MCNC: 174 MG/DL (ref 70–99)
GLUCOSE BLDC GLUCOMTR-MCNC: 237 MG/DL (ref 70–99)
GLUCOSE BLDC GLUCOMTR-MCNC: 263 MG/DL (ref 70–99)
OSMOLALITY SERPL CALC.SUM OF ELEC: 283 MOSM/KG (ref 275–295)
PHOSPHATE SERPL-MCNC: 2.4 MG/DL (ref 2.5–4.9)
POTASSIUM SERPL-SCNC: 3.6 MMOL/L (ref 3.5–5.1)
POTASSIUM SERPL-SCNC: 3.6 MMOL/L (ref 3.5–5.1)
SODIUM SERPL-SCNC: 136 MMOL/L (ref 136–145)

## 2022-08-26 PROCEDURE — 74018 RADEX ABDOMEN 1 VIEW: CPT | Performed by: INTERNAL MEDICINE

## 2022-08-26 PROCEDURE — 99232 SBSQ HOSP IP/OBS MODERATE 35: CPT | Performed by: INTERNAL MEDICINE

## 2022-08-26 RX ORDER — LISINOPRIL 10 MG/1
10 TABLET ORAL DAILY
Status: DISCONTINUED | OUTPATIENT
Start: 2022-08-26 | End: 2022-08-26

## 2022-08-26 RX ORDER — CAPTOPRIL 12.5 MG/1
12.5 TABLET ORAL
Status: DISCONTINUED | OUTPATIENT
Start: 2022-08-26 | End: 2022-08-28

## 2022-08-26 RX ORDER — POTASSIUM CHLORIDE 14.9 MG/ML
20 INJECTION INTRAVENOUS ONCE
Status: COMPLETED | OUTPATIENT
Start: 2022-08-26 | End: 2022-08-26

## 2022-08-26 RX ORDER — ENOXAPARIN SODIUM 150 MG/ML
1 INJECTION SUBCUTANEOUS EVERY 12 HOURS SCHEDULED
Status: DISCONTINUED | OUTPATIENT
Start: 2022-08-26 | End: 2022-08-31

## 2022-08-26 RX ORDER — ALPRAZOLAM 0.25 MG/1
0.25 TABLET ORAL 2 TIMES DAILY PRN
Status: DISCONTINUED | OUTPATIENT
Start: 2022-08-26 | End: 2022-09-02

## 2022-08-26 RX ORDER — HYDRALAZINE HYDROCHLORIDE 20 MG/ML
10 INJECTION INTRAMUSCULAR; INTRAVENOUS EVERY 6 HOURS PRN
Status: DISCONTINUED | OUTPATIENT
Start: 2022-08-26 | End: 2022-09-02

## 2022-08-26 RX ORDER — METOCLOPRAMIDE HYDROCHLORIDE 5 MG/ML
10 INJECTION INTRAMUSCULAR; INTRAVENOUS EVERY 6 HOURS PRN
Status: DISCONTINUED | OUTPATIENT
Start: 2022-08-26 | End: 2022-09-02

## 2022-08-26 RX ORDER — CAPTOPRIL 25 MG/1
25 TABLET ORAL
Status: CANCELLED | OUTPATIENT
Start: 2022-08-26

## 2022-08-26 NOTE — PROGRESS NOTES
Patient received A&Ox3, resting in bed after XR. He has elevated BP. Cardiology APRN working with pharmacy about BP med Captoril which was sent to floor for morning. Concurrently, Dr Franne Sandhoff discontinued it and ordered Lisinopril. Waiting to see which medication is preferred by MD.     1960  Resume Captopril per Dr Franne Sandhoff.

## 2022-08-26 NOTE — PROGRESS NOTES
1100  Spoke with Gi, patient is still nauseated this morning on clear liquids. NGT will be removed when patient is no longer having nausea. GI also ordered an MRI. Discussed with patient and he verbalized his understanding. 1135  Patient agitated in room. He is alert and oriented but he wants to go home. Called RN to room. He is sitting on side of bed, tubes still in place. He reports feeling \"some\" nausea following his original morning nausea. Reglan was given but patient is still angry and wants to leave. RN cannot determine if anything occurred to upset the patient but he is just \"tired of being here\". Reaching out to MD to discuss. Explained to patient that he has a few tubes that will need to be removed but can he wait until RN discusses with MD and he verbalized that he will wait in room.

## 2022-08-26 NOTE — PROGRESS NOTES
RN was able to discuss medical need to remain in hospital with patient and he calmed down. Xanax placed on MAR in case of further anxiety attacks but not given. Patient agreed to walk to chair and is up in chair right now. He is sleeping.

## 2022-08-26 NOTE — PROGRESS NOTES
[address] to MD and orders given to remove alvarado. Explained that patient is likely just anxious, can he have anything for anxiety. MD said to take alvarado out and she will call RN back. Patient is refusing RN to take out alvarado but he reports anxiety as the reason for wanting to leave. Asked MD for medication for anxiety but she said she would have to call RN back. Waiting for MD to phone RN back.

## 2022-08-27 LAB
ALBUMIN SERPL-MCNC: 3 G/DL (ref 3.4–5)
ANION GAP SERPL CALC-SCNC: 3 MMOL/L (ref 0–18)
ANION GAP SERPL CALC-SCNC: 3 MMOL/L (ref 0–18)
BASOPHILS # BLD AUTO: 0.03 X10(3) UL (ref 0–0.2)
BASOPHILS NFR BLD AUTO: 0.4 %
BUN BLD-MCNC: 3 MG/DL (ref 7–18)
BUN BLD-MCNC: 3 MG/DL (ref 7–18)
BUN/CREAT SERPL: 3.8 (ref 10–20)
BUN/CREAT SERPL: 3.8 (ref 10–20)
CALCIUM BLD-MCNC: 8.1 MG/DL (ref 8.5–10.1)
CALCIUM BLD-MCNC: 8.1 MG/DL (ref 8.5–10.1)
CHLORIDE SERPL-SCNC: 102 MMOL/L (ref 98–112)
CHLORIDE SERPL-SCNC: 102 MMOL/L (ref 98–112)
CO2 SERPL-SCNC: 32 MMOL/L (ref 21–32)
CO2 SERPL-SCNC: 32 MMOL/L (ref 21–32)
CREAT BLD-MCNC: 0.79 MG/DL
CREAT BLD-MCNC: 0.79 MG/DL
DEPRECATED RDW RBC AUTO: 52.6 FL (ref 35.1–46.3)
EOSINOPHIL # BLD AUTO: 0.34 X10(3) UL (ref 0–0.7)
EOSINOPHIL NFR BLD AUTO: 5 %
ERYTHROCYTE [DISTWIDTH] IN BLOOD BY AUTOMATED COUNT: 14.6 % (ref 11–15)
GFR SERPLBLD BASED ON 1.73 SQ M-ARVRAT: 100 ML/MIN/1.73M2 (ref 60–?)
GFR SERPLBLD BASED ON 1.73 SQ M-ARVRAT: 100 ML/MIN/1.73M2 (ref 60–?)
GLUCOSE BLD-MCNC: 140 MG/DL (ref 70–99)
GLUCOSE BLD-MCNC: 140 MG/DL (ref 70–99)
GLUCOSE BLDC GLUCOMTR-MCNC: 129 MG/DL (ref 70–99)
GLUCOSE BLDC GLUCOMTR-MCNC: 131 MG/DL (ref 70–99)
GLUCOSE BLDC GLUCOMTR-MCNC: 171 MG/DL (ref 70–99)
HCT VFR BLD AUTO: 45.3 %
HGB BLD-MCNC: 13.9 G/DL
IMM GRANULOCYTES # BLD AUTO: 0.01 X10(3) UL (ref 0–1)
IMM GRANULOCYTES NFR BLD: 0.1 %
LYMPHOCYTES # BLD AUTO: 1.69 X10(3) UL (ref 1–4)
LYMPHOCYTES NFR BLD AUTO: 24.7 %
MAGNESIUM SERPL-MCNC: 2.1 MG/DL (ref 1.6–2.6)
MCH RBC QN AUTO: 30.2 PG (ref 26–34)
MCHC RBC AUTO-ENTMCNC: 30.7 G/DL (ref 31–37)
MCV RBC AUTO: 98.3 FL
MONOCYTES # BLD AUTO: 0.7 X10(3) UL (ref 0.1–1)
MONOCYTES NFR BLD AUTO: 10.2 %
NEUTROPHILS # BLD AUTO: 4.07 X10 (3) UL (ref 1.5–7.7)
NEUTROPHILS # BLD AUTO: 4.07 X10(3) UL (ref 1.5–7.7)
NEUTROPHILS NFR BLD AUTO: 59.6 %
OSMOLALITY SERPL CALC.SUM OF ELEC: 283 MOSM/KG (ref 275–295)
OSMOLALITY SERPL CALC.SUM OF ELEC: 283 MOSM/KG (ref 275–295)
PHOSPHATE SERPL-MCNC: 2.3 MG/DL (ref 2.5–4.9)
PHOSPHATE SERPL-MCNC: 2.3 MG/DL (ref 2.5–4.9)
PLATELET # BLD AUTO: 191 10(3)UL (ref 150–450)
POTASSIUM SERPL-SCNC: 3.6 MMOL/L (ref 3.5–5.1)
RBC # BLD AUTO: 4.61 X10(6)UL
SODIUM SERPL-SCNC: 137 MMOL/L (ref 136–145)
SODIUM SERPL-SCNC: 137 MMOL/L (ref 136–145)
WBC # BLD AUTO: 6.8 X10(3) UL (ref 4–11)

## 2022-08-27 PROCEDURE — 99232 SBSQ HOSP IP/OBS MODERATE 35: CPT | Performed by: INTERNAL MEDICINE

## 2022-08-27 RX ORDER — POTASSIUM CHLORIDE 14.9 MG/ML
20 INJECTION INTRAVENOUS ONCE
Status: COMPLETED | OUTPATIENT
Start: 2022-08-27 | End: 2022-08-27

## 2022-08-27 NOTE — PROGRESS NOTES
Spoke with sister-in-law on phone. She is very concerned that patient will not be able to take care of himself and that he cannot walk at home and he should not go home/should go to rehab. Discussed patient's ability to walk with walker and that he is A&Ox3 so he cannot be forced into rehab. Sister in law spoke to patient's wife who said that she believes patient is depressed. This RN agrees, despite patient denying depression or loss of enjoyment for previous activities once enjoyed. Earlier today patient verbalized distress at no longer having his relationship that he had for 40 years and the night shift nurse also feels like the patient has a very low motivation to take care of himself (he did not tell anyone about having a BM on himself and then when staff had to clean him he verbalized outrage over having to have been cleaned up for \"just a little poop\"). Will place Psych liason on case and endorsed to night RNViki.

## 2022-08-28 ENCOUNTER — APPOINTMENT (OUTPATIENT)
Dept: MRI IMAGING | Facility: HOSPITAL | Age: 62
DRG: 389 | End: 2022-08-28
Attending: INTERNAL MEDICINE
Payer: MEDICARE

## 2022-08-28 LAB
ALBUMIN SERPL-MCNC: 3 G/DL (ref 3.4–5)
ANION GAP SERPL CALC-SCNC: 6 MMOL/L (ref 0–18)
ANION GAP SERPL CALC-SCNC: 6 MMOL/L (ref 0–18)
BASOPHILS # BLD AUTO: 0.01 X10(3) UL (ref 0–0.2)
BASOPHILS NFR BLD AUTO: 0.2 %
BUN BLD-MCNC: 3 MG/DL (ref 7–18)
BUN BLD-MCNC: 3 MG/DL (ref 7–18)
BUN/CREAT SERPL: 3.9 (ref 10–20)
BUN/CREAT SERPL: 3.9 (ref 10–20)
CALCIUM BLD-MCNC: 8.2 MG/DL (ref 8.5–10.1)
CALCIUM BLD-MCNC: 8.2 MG/DL (ref 8.5–10.1)
CHLORIDE SERPL-SCNC: 104 MMOL/L (ref 98–112)
CHLORIDE SERPL-SCNC: 104 MMOL/L (ref 98–112)
CO2 SERPL-SCNC: 29 MMOL/L (ref 21–32)
CO2 SERPL-SCNC: 29 MMOL/L (ref 21–32)
CREAT BLD-MCNC: 0.76 MG/DL
CREAT BLD-MCNC: 0.76 MG/DL
DEPRECATED RDW RBC AUTO: 50.1 FL (ref 35.1–46.3)
EOSINOPHIL # BLD AUTO: 0.37 X10(3) UL (ref 0–0.7)
EOSINOPHIL NFR BLD AUTO: 6 %
ERYTHROCYTE [DISTWIDTH] IN BLOOD BY AUTOMATED COUNT: 14.2 % (ref 11–15)
GFR SERPLBLD BASED ON 1.73 SQ M-ARVRAT: 102 ML/MIN/1.73M2 (ref 60–?)
GFR SERPLBLD BASED ON 1.73 SQ M-ARVRAT: 102 ML/MIN/1.73M2 (ref 60–?)
GLUCOSE BLD-MCNC: 172 MG/DL (ref 70–99)
GLUCOSE BLD-MCNC: 172 MG/DL (ref 70–99)
GLUCOSE BLDC GLUCOMTR-MCNC: 132 MG/DL (ref 70–99)
GLUCOSE BLDC GLUCOMTR-MCNC: 147 MG/DL (ref 70–99)
GLUCOSE BLDC GLUCOMTR-MCNC: 158 MG/DL (ref 70–99)
GLUCOSE BLDC GLUCOMTR-MCNC: 160 MG/DL (ref 70–99)
HCT VFR BLD AUTO: 45.2 %
HGB BLD-MCNC: 14.4 G/DL
IMM GRANULOCYTES # BLD AUTO: 0.02 X10(3) UL (ref 0–1)
IMM GRANULOCYTES NFR BLD: 0.3 %
LYMPHOCYTES # BLD AUTO: 1.22 X10(3) UL (ref 1–4)
LYMPHOCYTES NFR BLD AUTO: 19.7 %
MAGNESIUM SERPL-MCNC: 2.1 MG/DL (ref 1.6–2.6)
MCH RBC QN AUTO: 30.8 PG (ref 26–34)
MCHC RBC AUTO-ENTMCNC: 31.9 G/DL (ref 31–37)
MCV RBC AUTO: 96.6 FL
MONOCYTES # BLD AUTO: 0.54 X10(3) UL (ref 0.1–1)
MONOCYTES NFR BLD AUTO: 8.7 %
NEUTROPHILS # BLD AUTO: 4.04 X10 (3) UL (ref 1.5–7.7)
NEUTROPHILS # BLD AUTO: 4.04 X10(3) UL (ref 1.5–7.7)
NEUTROPHILS NFR BLD AUTO: 65.1 %
OSMOLALITY SERPL CALC.SUM OF ELEC: 289 MOSM/KG (ref 275–295)
OSMOLALITY SERPL CALC.SUM OF ELEC: 289 MOSM/KG (ref 275–295)
PHOSPHATE SERPL-MCNC: 2.7 MG/DL (ref 2.5–4.9)
PHOSPHATE SERPL-MCNC: 2.7 MG/DL (ref 2.5–4.9)
PLATELET # BLD AUTO: 182 10(3)UL (ref 150–450)
POTASSIUM SERPL-SCNC: 3.8 MMOL/L (ref 3.5–5.1)
RBC # BLD AUTO: 4.68 X10(6)UL
SODIUM SERPL-SCNC: 139 MMOL/L (ref 136–145)
SODIUM SERPL-SCNC: 139 MMOL/L (ref 136–145)
WBC # BLD AUTO: 6.2 X10(3) UL (ref 4–11)

## 2022-08-28 PROCEDURE — 74183 MRI ABD W/O CNTR FLWD CNTR: CPT | Performed by: INTERNAL MEDICINE

## 2022-08-28 PROCEDURE — 99232 SBSQ HOSP IP/OBS MODERATE 35: CPT | Performed by: INTERNAL MEDICINE

## 2022-08-28 RX ORDER — CAPTOPRIL 25 MG/1
25 TABLET ORAL
Status: DISCONTINUED | OUTPATIENT
Start: 2022-08-29 | End: 2022-09-02

## 2022-08-28 RX ORDER — POTASSIUM CHLORIDE 14.9 MG/ML
20 INJECTION INTRAVENOUS ONCE
Status: COMPLETED | OUTPATIENT
Start: 2022-08-28 | End: 2022-08-28

## 2022-08-29 ENCOUNTER — APPOINTMENT (OUTPATIENT)
Dept: GENERAL RADIOLOGY | Facility: HOSPITAL | Age: 62
DRG: 389 | End: 2022-08-29
Attending: SURGERY
Payer: MEDICARE

## 2022-08-29 LAB
ALBUMIN SERPL-MCNC: 2.9 G/DL (ref 3.4–5)
ANION GAP SERPL CALC-SCNC: 7 MMOL/L (ref 0–18)
ANION GAP SERPL CALC-SCNC: 7 MMOL/L (ref 0–18)
BASOPHILS # BLD AUTO: 0.02 X10(3) UL (ref 0–0.2)
BASOPHILS NFR BLD AUTO: 0.3 %
BUN BLD-MCNC: 3 MG/DL (ref 7–18)
BUN BLD-MCNC: 3 MG/DL (ref 7–18)
BUN/CREAT SERPL: 3.8 (ref 10–20)
BUN/CREAT SERPL: 3.8 (ref 10–20)
CALCIUM BLD-MCNC: 8.1 MG/DL (ref 8.5–10.1)
CALCIUM BLD-MCNC: 8.1 MG/DL (ref 8.5–10.1)
CHLORIDE SERPL-SCNC: 105 MMOL/L (ref 98–112)
CHLORIDE SERPL-SCNC: 105 MMOL/L (ref 98–112)
CO2 SERPL-SCNC: 28 MMOL/L (ref 21–32)
CO2 SERPL-SCNC: 28 MMOL/L (ref 21–32)
CREAT BLD-MCNC: 0.78 MG/DL
CREAT BLD-MCNC: 0.78 MG/DL
DEPRECATED RDW RBC AUTO: 50.6 FL (ref 35.1–46.3)
EOSINOPHIL # BLD AUTO: 0.31 X10(3) UL (ref 0–0.7)
EOSINOPHIL NFR BLD AUTO: 5.4 %
ERYTHROCYTE [DISTWIDTH] IN BLOOD BY AUTOMATED COUNT: 14.3 % (ref 11–15)
GFR SERPLBLD BASED ON 1.73 SQ M-ARVRAT: 101 ML/MIN/1.73M2 (ref 60–?)
GFR SERPLBLD BASED ON 1.73 SQ M-ARVRAT: 101 ML/MIN/1.73M2 (ref 60–?)
GLUCOSE BLD-MCNC: 151 MG/DL (ref 70–99)
GLUCOSE BLD-MCNC: 151 MG/DL (ref 70–99)
GLUCOSE BLDC GLUCOMTR-MCNC: 134 MG/DL (ref 70–99)
GLUCOSE BLDC GLUCOMTR-MCNC: 136 MG/DL (ref 70–99)
GLUCOSE BLDC GLUCOMTR-MCNC: 149 MG/DL (ref 70–99)
GLUCOSE BLDC GLUCOMTR-MCNC: 159 MG/DL (ref 70–99)
HCT VFR BLD AUTO: 45.2 %
HGB BLD-MCNC: 14.2 G/DL
IMM GRANULOCYTES # BLD AUTO: 0.01 X10(3) UL (ref 0–1)
IMM GRANULOCYTES NFR BLD: 0.2 %
LYMPHOCYTES # BLD AUTO: 1.09 X10(3) UL (ref 1–4)
LYMPHOCYTES NFR BLD AUTO: 18.9 %
MAGNESIUM SERPL-MCNC: 2.1 MG/DL (ref 1.6–2.6)
MCH RBC QN AUTO: 30.5 PG (ref 26–34)
MCHC RBC AUTO-ENTMCNC: 31.4 G/DL (ref 31–37)
MCV RBC AUTO: 97 FL
MONOCYTES # BLD AUTO: 0.46 X10(3) UL (ref 0.1–1)
MONOCYTES NFR BLD AUTO: 8 %
NEUTROPHILS # BLD AUTO: 3.88 X10 (3) UL (ref 1.5–7.7)
NEUTROPHILS # BLD AUTO: 3.88 X10(3) UL (ref 1.5–7.7)
NEUTROPHILS NFR BLD AUTO: 67.2 %
OSMOLALITY SERPL CALC.SUM OF ELEC: 289 MOSM/KG (ref 275–295)
OSMOLALITY SERPL CALC.SUM OF ELEC: 289 MOSM/KG (ref 275–295)
PHOSPHATE SERPL-MCNC: 2.6 MG/DL (ref 2.5–4.9)
PHOSPHATE SERPL-MCNC: 2.6 MG/DL (ref 2.5–4.9)
PLATELET # BLD AUTO: 194 10(3)UL (ref 150–450)
POTASSIUM SERPL-SCNC: 3.8 MMOL/L (ref 3.5–5.1)
RBC # BLD AUTO: 4.66 X10(6)UL
SODIUM SERPL-SCNC: 140 MMOL/L (ref 136–145)
SODIUM SERPL-SCNC: 140 MMOL/L (ref 136–145)
WBC # BLD AUTO: 5.8 X10(3) UL (ref 4–11)

## 2022-08-29 PROCEDURE — 99232 SBSQ HOSP IP/OBS MODERATE 35: CPT | Performed by: STUDENT IN AN ORGANIZED HEALTH CARE EDUCATION/TRAINING PROGRAM

## 2022-08-29 PROCEDURE — 74019 RADEX ABDOMEN 2 VIEWS: CPT | Performed by: SURGERY

## 2022-08-29 RX ORDER — LEVOTHYROXINE SODIUM 0.05 MG/1
50 TABLET ORAL
Status: DISCONTINUED | OUTPATIENT
Start: 2022-08-30 | End: 2022-09-02

## 2022-08-29 NOTE — CM/SW NOTE
08/29/22 1200   Patient Info   Advanced directives? Yes  (HC-POA completed at bedside. [employer] is POA)     CM received VM from pts relative Yair Salazar stating she was pts HC-POA. Per previous SW note pt has elected [employer] as his HC-Agent. Met with pt to clarify and to request a copy of HC-POA. Pt states his brother was coming in this week to complete a POA with a . CM educated pt on HC-POA and offered to complete the form with him. Pt verbalized under standing and agreed to complete the form. Pt has elected [employer] as his HC-Agent and Manolo Tyrese to be his 2n agent. Original and 2 copies provided to Pt. Copy sent to ED registration to be scanned into pts chart. CM returned Lea's call with Brandon's permission, Yair Salazar requested to call this CM back after 2pm.     1615: Received call from Yair Salazar, she has concerns for pts safety upon dc. States he is home alone and she is unable to visit him daily. PT/OT eval pending at this time. Plan: Home with [facility] when stable. / to remain available for support and/or discharge planning. Alley Sal.  Earlene Ocasio RN, BSN  Nurse   [phone]

## 2022-08-30 LAB
ALBUMIN SERPL-MCNC: 2.9 G/DL (ref 3.4–5)
ANION GAP SERPL CALC-SCNC: 4 MMOL/L (ref 0–18)
BUN BLD-MCNC: 3 MG/DL (ref 7–18)
BUN/CREAT SERPL: 3.4 (ref 10–20)
CALCIUM BLD-MCNC: 8.2 MG/DL (ref 8.5–10.1)
CHLORIDE SERPL-SCNC: 104 MMOL/L (ref 98–112)
CO2 SERPL-SCNC: 30 MMOL/L (ref 21–32)
CREAT BLD-MCNC: 0.87 MG/DL
GFR SERPLBLD BASED ON 1.73 SQ M-ARVRAT: 98 ML/MIN/1.73M2 (ref 60–?)
GLUCOSE BLD-MCNC: 184 MG/DL (ref 70–99)
GLUCOSE BLDC GLUCOMTR-MCNC: 146 MG/DL (ref 70–99)
GLUCOSE BLDC GLUCOMTR-MCNC: 149 MG/DL (ref 70–99)
GLUCOSE BLDC GLUCOMTR-MCNC: 190 MG/DL (ref 70–99)
OSMOLALITY SERPL CALC.SUM OF ELEC: 287 MOSM/KG (ref 275–295)
PHOSPHATE SERPL-MCNC: 2.7 MG/DL (ref 2.5–4.9)
POTASSIUM SERPL-SCNC: 3.8 MMOL/L (ref 3.5–5.1)
SODIUM SERPL-SCNC: 138 MMOL/L (ref 136–145)

## 2022-08-31 LAB
GLUCOSE BLDC GLUCOMTR-MCNC: 130 MG/DL (ref 70–99)
GLUCOSE BLDC GLUCOMTR-MCNC: 140 MG/DL (ref 70–99)
GLUCOSE BLDC GLUCOMTR-MCNC: 179 MG/DL (ref 70–99)
GLUCOSE BLDC GLUCOMTR-MCNC: 218 MG/DL (ref 70–99)

## 2022-08-31 RX ORDER — FUROSEMIDE 10 MG/ML
20 INJECTION INTRAMUSCULAR; INTRAVENOUS ONCE
Status: COMPLETED | OUTPATIENT
Start: 2022-08-31 | End: 2022-08-31

## 2022-08-31 NOTE — PHYSICAL THERAPY NOTE
PHYSICAL THERAPY TREATMENT NOTE - INPATIENT     Room Number: 468/468-A       Presenting Problem: large bowel obstruction    Problem List  Principal Problem:    Large bowel obstruction (HCC)  Active Problems:    Hyponatremia    Urinary retention    Chronically on opiate therapy    Abdominal distention    Liver lesion      PHYSICAL THERAPY ASSESSMENT   Chart reviewed. RN approved participation in physical therapy. PPE worn by therapist: mask, gloves and goggles. Patient was wearing a mask during session. Patient presented in bed  eager to ambulate. Patient with fair  progress towards goals during this session. Education provided on Physical therapy plan of care and physiological benefits of out of bed mobility. Patient with fair carryover. Pt refused to sit in chair after hallway ambulation secondary to back pain    Bed mobility: Contact guard assist  Transfers: Contact guard assist  Gait Assistance: Supervision  Distance (ft): 160 reciprocal gait pattern, occasional standing rest break to catch breath  Assistive Device: Rolling walker  Pattern:  (decreased rosa, flexed posture)     Patient was left in bed at end of session with all needs in reach. The patient's Approx Degree of Impairment: 35.83% has been calculated based on documentation in the [facility] '6 clicks' Inpatient Basic Mobility Short Form. Research supports that patients with this level of impairment may benefit from Home with home health PT.  RN aware of patient status post session. DISCHARGE RECOMMENDATIONS  PT Discharge Recommendations: Home with home health PT/OT     PLAN  PT Treatment Plan: Bed mobility; Patient education; Family education;Gait training;Transfer training    SUBJECTIVE  \"I need to go home\"    OBJECTIVE  Precautions: Bed/chair alarm    WEIGHT BEARING RESTRICTION  Weight Bearing Restriction: None                PAIN ASSESSMENT   Ratin  Location: abdomen (c/o pressure)  Management Techniques:  Activity promotion    BALANCE Static Sitting: Good  Dynamic Sitting: Fair +           Static Standing: Fair -  Dynamic [place] '6-Clicks' INPATIENT SHORT FORM - BASIC MOBILITY  How much difficulty does the patient currently have. .. Patient Difficulty: Turning over in bed (including adjusting bedclothes, sheets and blankets)?: None   Patient Difficulty: Sitting down on and standing up from a chair with arms (e.g., wheelchair, bedside commode, etc.): A Little   Patient Difficulty: Moving from lying on back to sitting on the side of the bed?: None   How much help from another person does the patient currently need. .. Help from Another: Moving to and from a bed to a chair (including a wheelchair)?: A Little   Help from Another: Need to walk in hospital room?: A Little   Help from Another: Climbing 3-5 steps with a railing?: A Little     AM-PAC Score:  Raw Score: 20   Approx Degree of Impairment: 35.83%   Standardized Score (AM-PAC Scale): 47.67   CMS Modifier (G-Code): CJ    THERAPEUTIC EXERCISES  Lower Extremity Ankle pumps  LAQ  10xea   Position Sitting       Patient End of Session: Up in chair      Goals to be met by: 9/15  Patient Goal Patient's self-stated goal is: To go home and feel better.     Goal #1 Patient is able to demonstrate supine - sit EOB @ level: independent     Goal #1   Current Status    Goal #2 Patient is able to demonstrate transfers EOB to/from Chair/Wheelchair at assistance level: modified independent with walker - rolling     Goal #2  Current Status    Goal #3 Patient is able to ambulate 250 feet with assist device: walker - rolling at assistance level: modified independent   Goal #3   Current Status    Goal #4 Patient will negotiate 2 stairs/one curb w/ assistive device and supervision   Goal #4   Current Status    Goal #5 Patient to demonstrate independence with home activity/exercise instructions provided to patient in preparation for discharge.    Goal #5   Current Status    Goal #6    Goal #6  Current Status

## 2022-09-01 LAB
GLUCOSE BLDC GLUCOMTR-MCNC: 127 MG/DL (ref 70–99)
GLUCOSE BLDC GLUCOMTR-MCNC: 129 MG/DL (ref 70–99)
GLUCOSE BLDC GLUCOMTR-MCNC: 211 MG/DL (ref 70–99)

## 2022-09-01 RX ORDER — HYDROCODONE BITARTRATE AND ACETAMINOPHEN 5; 325 MG/1; MG/1
1-2 TABLET ORAL EVERY 8 HOURS PRN
Qty: 5 TABLET | Refills: 0 | Status: SHIPPED | OUTPATIENT
Start: 2022-09-01

## 2022-09-01 RX ORDER — LEVOTHYROXINE SODIUM 0.05 MG/1
50 TABLET ORAL
Qty: 90 TABLET | Refills: 0 | Status: SHIPPED | OUTPATIENT
Start: 2022-09-01

## 2022-09-01 RX ORDER — METOCLOPRAMIDE 10 MG/1
10 TABLET ORAL 3 TIMES DAILY PRN
Qty: 30 TABLET | Refills: 0 | Status: SHIPPED | OUTPATIENT
Start: 2022-09-01

## 2022-09-01 RX ORDER — FAMOTIDINE 20 MG/1
20 TABLET, FILM COATED ORAL 2 TIMES DAILY
Status: DISCONTINUED | OUTPATIENT
Start: 2022-09-01 | End: 2022-09-02

## 2022-09-01 RX ORDER — HYDROCODONE BITARTRATE AND ACETAMINOPHEN 5; 325 MG/1; MG/1
1 TABLET ORAL EVERY 6 HOURS PRN
Status: DISCONTINUED | OUTPATIENT
Start: 2022-09-01 | End: 2022-09-02

## 2022-09-01 RX ORDER — LEVETIRACETAM 1000 MG/1
1000 TABLET ORAL 2 TIMES DAILY
Qty: 60 TABLET | Refills: 0 | Status: SHIPPED | OUTPATIENT
Start: 2022-09-01

## 2022-09-01 RX ORDER — ACETAMINOPHEN 325 MG/1
650 TABLET ORAL EVERY 6 HOURS PRN
Status: DISCONTINUED | OUTPATIENT
Start: 2022-09-01 | End: 2022-09-02

## 2022-09-01 NOTE — CM/SW NOTE
Notified by RN that patient may DC today. Notified [address]  Yumiko of possible DC.      MARIFER Rocha, [facility]    [id number]

## 2022-09-01 NOTE — PROGRESS NOTES
[facility] Pharmacy Note: Route Optimization for Acetaminophen (OFIRMEV)    Patient is currently on Acetaminophen (OFIRMEV) 1000 mg IV every 6 hours PRN. The patient meets the criteria to convert to the oral equivalent as established by the IV to Oral conversion protocol approved by the P&T committee. Medication was changed from IV formulation to Acetaminophen 650 mg PO every 6 hours PRN per protocol.       Harris Louis PharmD  9/1/2022,  9:19 AM

## 2022-09-01 NOTE — PROGRESS NOTES
[facility] Pharmacy Note: Route Optimization for Famotidine (PEPCID)    Patient is currently on Famotidine (PEPCID) 20 mg IV every 12 hours. The patient meets the criteria to convert to the oral equivalent as established by the IV to Oral conversion protocol approved by the P&T committee. Medication was changed from IV formulation to Famotidine (PEPCID) 20 mg PO every 12 hours per protocol.       Mian Hernandez, PharmD  9/1/2022,  9:21 AM

## 2022-09-02 VITALS
HEIGHT: 66 IN | OXYGEN SATURATION: 97 % | WEIGHT: 289.69 LBS | BODY MASS INDEX: 46.56 KG/M2 | SYSTOLIC BLOOD PRESSURE: 124 MMHG | DIASTOLIC BLOOD PRESSURE: 95 MMHG | HEART RATE: 65 BPM | TEMPERATURE: 99 F | RESPIRATION RATE: 16 BRPM

## 2022-09-02 LAB
ALBUMIN SERPL-MCNC: 2.8 G/DL (ref 3.4–5)
ANION GAP SERPL CALC-SCNC: 2 MMOL/L (ref 0–18)
BASOPHILS # BLD AUTO: 0.02 X10(3) UL (ref 0–0.2)
BASOPHILS NFR BLD AUTO: 0.3 %
BUN BLD-MCNC: 9 MG/DL (ref 7–18)
BUN/CREAT SERPL: 10.6 (ref 10–20)
CALCIUM BLD-MCNC: 8.2 MG/DL (ref 8.5–10.1)
CHLORIDE SERPL-SCNC: 105 MMOL/L (ref 98–112)
CO2 SERPL-SCNC: 32 MMOL/L (ref 21–32)
CREAT BLD-MCNC: 0.85 MG/DL
DEPRECATED RDW RBC AUTO: 51.2 FL (ref 35.1–46.3)
EOSINOPHIL # BLD AUTO: 0.27 X10(3) UL (ref 0–0.7)
EOSINOPHIL NFR BLD AUTO: 3.9 %
ERYTHROCYTE [DISTWIDTH] IN BLOOD BY AUTOMATED COUNT: 14.1 % (ref 11–15)
GFR SERPLBLD BASED ON 1.73 SQ M-ARVRAT: 98 ML/MIN/1.73M2 (ref 60–?)
GLUCOSE BLD-MCNC: 192 MG/DL (ref 70–99)
GLUCOSE BLDC GLUCOMTR-MCNC: 139 MG/DL (ref 70–99)
GLUCOSE BLDC GLUCOMTR-MCNC: 159 MG/DL (ref 70–99)
GLUCOSE BLDC GLUCOMTR-MCNC: 198 MG/DL (ref 70–99)
HCT VFR BLD AUTO: 42.1 %
HGB BLD-MCNC: 12.9 G/DL
IMM GRANULOCYTES # BLD AUTO: 0.02 X10(3) UL (ref 0–1)
IMM GRANULOCYTES NFR BLD: 0.3 %
LYMPHOCYTES # BLD AUTO: 1.65 X10(3) UL (ref 1–4)
LYMPHOCYTES NFR BLD AUTO: 23.7 %
MCH RBC QN AUTO: 30.1 PG (ref 26–34)
MCHC RBC AUTO-ENTMCNC: 30.6 G/DL (ref 31–37)
MCV RBC AUTO: 98.4 FL
MONOCYTES # BLD AUTO: 0.67 X10(3) UL (ref 0.1–1)
MONOCYTES NFR BLD AUTO: 9.6 %
NEUTROPHILS # BLD AUTO: 4.33 X10 (3) UL (ref 1.5–7.7)
NEUTROPHILS # BLD AUTO: 4.33 X10(3) UL (ref 1.5–7.7)
NEUTROPHILS NFR BLD AUTO: 62.2 %
OSMOLALITY SERPL CALC.SUM OF ELEC: 292 MOSM/KG (ref 275–295)
PHOSPHATE SERPL-MCNC: 2.6 MG/DL (ref 2.5–4.9)
PLATELET # BLD AUTO: 168 10(3)UL (ref 150–450)
POTASSIUM SERPL-SCNC: 3.5 MMOL/L (ref 3.5–5.1)
RBC # BLD AUTO: 4.28 X10(6)UL
SODIUM SERPL-SCNC: 139 MMOL/L (ref 136–145)
WBC # BLD AUTO: 7 X10(3) UL (ref 4–11)

## 2022-09-02 RX ORDER — POTASSIUM CHLORIDE 20 MEQ/1
40 TABLET, EXTENDED RELEASE ORAL EVERY 4 HOURS
Status: COMPLETED | OUTPATIENT
Start: 2022-09-02 | End: 2022-09-02

## 2022-09-02 NOTE — CM/SW NOTE
DC order placed. Notified [address] [facility].      MARIFER Powell, [facility]    [id number]

## 2022-09-07 ENCOUNTER — HOSPITAL ENCOUNTER (EMERGENCY)
Facility: HOSPITAL | Age: 62
Discharge: HOME | End: 2022-09-07
Attending: EMERGENCY MEDICINE
Payer: MEDICARE

## 2022-09-07 VITALS
SYSTOLIC BLOOD PRESSURE: 163 MMHG | OXYGEN SATURATION: 97 % | DIASTOLIC BLOOD PRESSURE: 82 MMHG | HEART RATE: 69 BPM | TEMPERATURE: 99 F | HEIGHT: 66 IN | RESPIRATION RATE: 20 BRPM | BODY MASS INDEX: 46.61 KG/M2 | WEIGHT: 290 LBS

## 2022-09-07 DIAGNOSIS — N30.90 CYSTITIS: Primary | ICD-10-CM

## 2022-09-07 LAB
ALBUMIN SERPL-MCNC: 3.2 G/DL (ref 3.4–5)
ALP LIVER SERPL-CCNC: 53 U/L
ALT SERPL-CCNC: 22 U/L
ANION GAP SERPL CALC-SCNC: 5 MMOL/L (ref 0–18)
AST SERPL-CCNC: 16 U/L (ref 15–37)
BASOPHILS # BLD AUTO: 0.02 X10(3) UL (ref 0–0.2)
BASOPHILS NFR BLD AUTO: 0.3 %
BILIRUB DIRECT SERPL-MCNC: 0.2 MG/DL (ref 0–0.2)
BILIRUB SERPL-MCNC: 0.4 MG/DL (ref 0.1–2)
BILIRUB UR QL: NEGATIVE
BUN BLD-MCNC: 13 MG/DL (ref 7–18)
BUN/CREAT SERPL: 12.7 (ref 10–20)
CALCIUM BLD-MCNC: 7.8 MG/DL (ref 8.5–10.1)
CHLORIDE SERPL-SCNC: 108 MMOL/L (ref 98–112)
CO2 SERPL-SCNC: 27 MMOL/L (ref 21–32)
COLOR UR: YELLOW
CREAT BLD-MCNC: 1.02 MG/DL
DEPRECATED RDW RBC AUTO: 52.3 FL (ref 35.1–46.3)
EOSINOPHIL # BLD AUTO: 0.23 X10(3) UL (ref 0–0.7)
EOSINOPHIL NFR BLD AUTO: 3.3 %
ERYTHROCYTE [DISTWIDTH] IN BLOOD BY AUTOMATED COUNT: 14.4 % (ref 11–15)
GFR SERPLBLD BASED ON 1.73 SQ M-ARVRAT: 83 ML/MIN/1.73M2 (ref 60–?)
GLUCOSE BLD-MCNC: 230 MG/DL (ref 70–99)
GLUCOSE UR-MCNC: NEGATIVE MG/DL
HCT VFR BLD AUTO: 42 %
HGB BLD-MCNC: 12.8 G/DL
IMM GRANULOCYTES # BLD AUTO: 0.03 X10(3) UL (ref 0–1)
IMM GRANULOCYTES NFR BLD: 0.4 %
LIPASE SERPL-CCNC: 408 U/L (ref 73–393)
LYMPHOCYTES # BLD AUTO: 0.93 X10(3) UL (ref 1–4)
LYMPHOCYTES NFR BLD AUTO: 13.4 %
MCH RBC QN AUTO: 30.2 PG (ref 26–34)
MCHC RBC AUTO-ENTMCNC: 30.5 G/DL (ref 31–37)
MCV RBC AUTO: 99.1 FL
MONOCYTES # BLD AUTO: 0.68 X10(3) UL (ref 0.1–1)
MONOCYTES NFR BLD AUTO: 9.8 %
NEUTROPHILS # BLD AUTO: 5.03 X10 (3) UL (ref 1.5–7.7)
NEUTROPHILS # BLD AUTO: 5.03 X10(3) UL (ref 1.5–7.7)
NEUTROPHILS NFR BLD AUTO: 72.8 %
NITRITE UR QL STRIP.AUTO: POSITIVE
OSMOLALITY SERPL CALC.SUM OF ELEC: 297 MOSM/KG (ref 275–295)
PH UR: 6 [PH] (ref 5–8)
PLATELET # BLD AUTO: 167 10(3)UL (ref 150–450)
POTASSIUM SERPL-SCNC: 3.8 MMOL/L (ref 3.5–5.1)
PROT SERPL-MCNC: 7.1 G/DL (ref 6.4–8.2)
RBC # BLD AUTO: 4.24 X10(6)UL
RBC #/AREA URNS AUTO: >10 /HPF
RBC #/AREA URNS AUTO: >10 /HPF
SODIUM SERPL-SCNC: 140 MMOL/L (ref 136–145)
SP GR UR STRIP: 1.02 (ref 1–1.03)
UROBILINOGEN UR STRIP-ACNC: 0.2
WBC # BLD AUTO: 6.9 X10(3) UL (ref 4–11)
WBC #/AREA URNS AUTO: >50 /HPF
WBC #/AREA URNS AUTO: >50 /HPF
WBC CLUMPS UR QL AUTO: PRESENT /HPF
WBC CLUMPS UR QL AUTO: PRESENT /HPF

## 2022-09-07 PROCEDURE — 83690 ASSAY OF LIPASE: CPT | Performed by: EMERGENCY MEDICINE

## 2022-09-07 PROCEDURE — 93010 ELECTROCARDIOGRAM REPORT: CPT | Performed by: EMERGENCY MEDICINE

## 2022-09-07 PROCEDURE — 80048 BASIC METABOLIC PNL TOTAL CA: CPT

## 2022-09-07 PROCEDURE — 85025 COMPLETE CBC W/AUTO DIFF WBC: CPT | Performed by: EMERGENCY MEDICINE

## 2022-09-07 PROCEDURE — 87086 URINE CULTURE/COLONY COUNT: CPT | Performed by: EMERGENCY MEDICINE

## 2022-09-07 PROCEDURE — 81015 MICROSCOPIC EXAM OF URINE: CPT | Performed by: EMERGENCY MEDICINE

## 2022-09-07 PROCEDURE — 87077 CULTURE AEROBIC IDENTIFY: CPT | Performed by: EMERGENCY MEDICINE

## 2022-09-07 PROCEDURE — 99284 EMERGENCY DEPT VISIT MOD MDM: CPT

## 2022-09-07 PROCEDURE — 93005 ELECTROCARDIOGRAM TRACING: CPT

## 2022-09-07 PROCEDURE — 80076 HEPATIC FUNCTION PANEL: CPT | Performed by: EMERGENCY MEDICINE

## 2022-09-07 PROCEDURE — 87186 SC STD MICRODIL/AGAR DIL: CPT | Performed by: EMERGENCY MEDICINE

## 2022-09-07 PROCEDURE — 80048 BASIC METABOLIC PNL TOTAL CA: CPT | Performed by: EMERGENCY MEDICINE

## 2022-09-07 PROCEDURE — 36415 COLL VENOUS BLD VENIPUNCTURE: CPT

## 2022-09-07 PROCEDURE — 87086 URINE CULTURE/COLONY COUNT: CPT

## 2022-09-07 PROCEDURE — 81001 URINALYSIS AUTO W/SCOPE: CPT

## 2022-09-07 PROCEDURE — 81015 MICROSCOPIC EXAM OF URINE: CPT

## 2022-09-07 PROCEDURE — 85025 COMPLETE CBC W/AUTO DIFF WBC: CPT

## 2022-09-07 RX ORDER — CIPROFLOXACIN 500 MG/1
500 TABLET, FILM COATED ORAL 2 TIMES DAILY
Qty: 14 TABLET | Refills: 0 | Status: SHIPPED | OUTPATIENT
Start: 2022-09-07 | End: 2022-09-14

## 2022-09-07 RX ORDER — ATORVASTATIN CALCIUM 20 MG/1
TABLET, FILM COATED ORAL
Qty: 90 TABLET | Refills: 0 | Status: SHIPPED | OUTPATIENT
Start: 2022-09-07

## 2022-09-07 NOTE — ED INITIAL ASSESSMENT (HPI)
Patient is here with abdominal pain x2d. Patient was discharged on Friday. Patient was admitted for impacted colon. Patient states feeling same as the last time. Patient also reports difficulty breathing x2d. States having some chest pain.

## 2022-09-08 ENCOUNTER — TELEPHONE (OUTPATIENT)
Dept: ENDOCRINOLOGY CLINIC | Facility: CLINIC | Age: 62
End: 2022-09-08

## 2022-09-08 NOTE — TELEPHONE ENCOUNTER
Patient requesting rx for Elliquis - states Dr Alvin Rodney office recommended he reach out to Dr Chanelle Boyer for refills. Please call. THank you.

## 2022-09-09 NOTE — TELEPHONE ENCOUNTER
Tried to contact patient twice, phone rang--then busy signal. [employer] message sent to patient with Dr. Lucero aMrtin message below.

## 2022-09-09 NOTE — TELEPHONE ENCOUNTER
I am sorry but I do not know much about elliquis since this is not a medication I prescribe  He should please reach out to his PCP   Thanks

## 2022-09-18 RX ORDER — FLASH GLUCOSE SENSOR
KIT MISCELLANEOUS
Qty: 6 EACH | Refills: 0 | Status: SHIPPED | OUTPATIENT
Start: 2022-09-18

## 2022-10-07 ENCOUNTER — APPOINTMENT (OUTPATIENT)
Dept: CT IMAGING | Facility: HOSPITAL | Age: 62
End: 2022-10-07
Attending: STUDENT IN AN ORGANIZED HEALTH CARE EDUCATION/TRAINING PROGRAM
Payer: MEDICARE

## 2022-10-07 ENCOUNTER — HOSPITAL ENCOUNTER (INPATIENT)
Facility: HOSPITAL | Age: 62
LOS: 11 days | Discharge: NURSING, REHAB OR LONG-TERM CARE | End: 2022-10-18
Attending: STUDENT IN AN ORGANIZED HEALTH CARE EDUCATION/TRAINING PROGRAM | Admitting: INTERNAL MEDICINE
Payer: MEDICARE

## 2022-10-07 ENCOUNTER — APPOINTMENT (OUTPATIENT)
Dept: ULTRASOUND IMAGING | Facility: HOSPITAL | Age: 62
End: 2022-10-07
Attending: STUDENT IN AN ORGANIZED HEALTH CARE EDUCATION/TRAINING PROGRAM
Payer: MEDICARE

## 2022-10-07 ENCOUNTER — APPOINTMENT (OUTPATIENT)
Dept: GENERAL RADIOLOGY | Facility: HOSPITAL | Age: 62
End: 2022-10-07
Attending: STUDENT IN AN ORGANIZED HEALTH CARE EDUCATION/TRAINING PROGRAM
Payer: MEDICARE

## 2022-10-07 DIAGNOSIS — R65.20 SEVERE SEPSIS (HCC): ICD-10-CM

## 2022-10-07 DIAGNOSIS — A41.9 SEVERE SEPSIS (HCC): ICD-10-CM

## 2022-10-07 DIAGNOSIS — J18.9 COMMUNITY ACQUIRED PNEUMONIA, UNSPECIFIED LATERALITY: ICD-10-CM

## 2022-10-07 DIAGNOSIS — I50.9 ACUTE ON CHRONIC CONGESTIVE HEART FAILURE, UNSPECIFIED HEART FAILURE TYPE (HCC): Primary | ICD-10-CM

## 2022-10-07 LAB
ANION GAP SERPL CALC-SCNC: 11 MMOL/L (ref 0–18)
BASOPHILS # BLD AUTO: 0.02 X10(3) UL (ref 0–0.2)
BASOPHILS NFR BLD AUTO: 0.2 %
BUN BLD-MCNC: 10 MG/DL (ref 7–18)
BUN/CREAT SERPL: 7.9 (ref 10–20)
CALCIUM BLD-MCNC: 8.9 MG/DL (ref 8.5–10.1)
CHLORIDE SERPL-SCNC: 100 MMOL/L (ref 98–112)
CHOLEST SERPL-MCNC: 99 MG/DL (ref ?–200)
CO2 SERPL-SCNC: 27 MMOL/L (ref 21–32)
CREAT BLD-MCNC: 1.27 MG/DL
DEPRECATED RDW RBC AUTO: 51.6 FL (ref 35.1–46.3)
EOSINOPHIL # BLD AUTO: 0.28 X10(3) UL (ref 0–0.7)
EOSINOPHIL NFR BLD AUTO: 3.4 %
ERYTHROCYTE [DISTWIDTH] IN BLOOD BY AUTOMATED COUNT: 14.9 % (ref 11–15)
GFR SERPLBLD BASED ON 1.73 SQ M-ARVRAT: 64 ML/MIN/1.73M2 (ref 60–?)
GLUCOSE BLD-MCNC: 192 MG/DL (ref 70–99)
GLUCOSE BLDC GLUCOMTR-MCNC: 133 MG/DL (ref 70–99)
GLUCOSE BLDC GLUCOMTR-MCNC: 193 MG/DL (ref 70–99)
GLUCOSE BLDC GLUCOMTR-MCNC: 221 MG/DL (ref 70–99)
HCT VFR BLD AUTO: 47.4 %
HDLC SERPL-MCNC: 37 MG/DL (ref 40–59)
HGB BLD-MCNC: 14.9 G/DL
IMM GRANULOCYTES # BLD AUTO: 0.02 X10(3) UL (ref 0–1)
IMM GRANULOCYTES NFR BLD: 0.2 %
LACTATE SERPL-SCNC: 2.6 MMOL/L (ref 0.4–2)
LACTATE SERPL-SCNC: 4.7 MMOL/L (ref 0.4–2)
LDLC SERPL CALC-MCNC: 41 MG/DL (ref ?–100)
LYMPHOCYTES # BLD AUTO: 2.06 X10(3) UL (ref 1–4)
LYMPHOCYTES NFR BLD AUTO: 25.3 %
MCH RBC QN AUTO: 30 PG (ref 26–34)
MCHC RBC AUTO-ENTMCNC: 31.4 G/DL (ref 31–37)
MCV RBC AUTO: 95.6 FL
MONOCYTES # BLD AUTO: 0.62 X10(3) UL (ref 0.1–1)
MONOCYTES NFR BLD AUTO: 7.6 %
NEUTROPHILS # BLD AUTO: 5.13 X10 (3) UL (ref 1.5–7.7)
NEUTROPHILS # BLD AUTO: 5.13 X10(3) UL (ref 1.5–7.7)
NEUTROPHILS NFR BLD AUTO: 63.3 %
NONHDLC SERPL-MCNC: 62 MG/DL (ref ?–130)
NT-PROBNP SERPL-MCNC: 5837 PG/ML (ref ?–125)
OSMOLALITY SERPL CALC.SUM OF ELEC: 290 MOSM/KG (ref 275–295)
PLATELET # BLD AUTO: 203 10(3)UL (ref 150–450)
POTASSIUM SERPL-SCNC: 4.4 MMOL/L (ref 3.5–5.1)
PROCALCITONIN SERPL-MCNC: 0.07 NG/ML (ref ?–0.16)
RBC # BLD AUTO: 4.96 X10(6)UL
SARS-COV-2 RNA RESP QL NAA+PROBE: NOT DETECTED
SODIUM SERPL-SCNC: 138 MMOL/L (ref 136–145)
TRIGL SERPL-MCNC: 117 MG/DL (ref 30–149)
TROPONIN I HIGH SENSITIVITY: 540 NG/L
TROPONIN I HIGH SENSITIVITY: 661 NG/L
VLDLC SERPL CALC-MCNC: 16 MG/DL (ref 0–30)
WBC # BLD AUTO: 8.1 X10(3) UL (ref 4–11)

## 2022-10-07 PROCEDURE — 71260 CT THORAX DX C+: CPT | Performed by: STUDENT IN AN ORGANIZED HEALTH CARE EDUCATION/TRAINING PROGRAM

## 2022-10-07 PROCEDURE — 93970 EXTREMITY STUDY: CPT | Performed by: STUDENT IN AN ORGANIZED HEALTH CARE EDUCATION/TRAINING PROGRAM

## 2022-10-07 PROCEDURE — 5A09557 ASSISTANCE WITH RESPIRATORY VENTILATION, GREATER THAN 96 CONSECUTIVE HOURS, CONTINUOUS POSITIVE AIRWAY PRESSURE: ICD-10-PCS | Performed by: INTERNAL MEDICINE

## 2022-10-07 PROCEDURE — 71045 X-RAY EXAM CHEST 1 VIEW: CPT | Performed by: STUDENT IN AN ORGANIZED HEALTH CARE EDUCATION/TRAINING PROGRAM

## 2022-10-07 PROCEDURE — 74174 CTA ABD&PLVS W/CONTRAST: CPT | Performed by: STUDENT IN AN ORGANIZED HEALTH CARE EDUCATION/TRAINING PROGRAM

## 2022-10-07 RX ORDER — HYDROCODONE BITARTRATE AND ACETAMINOPHEN 5; 325 MG/1; MG/1
1-2 TABLET ORAL EVERY 8 HOURS PRN
Status: DISCONTINUED | OUTPATIENT
Start: 2022-10-07 | End: 2022-10-14

## 2022-10-07 RX ORDER — ATORVASTATIN CALCIUM 20 MG/1
20 TABLET, FILM COATED ORAL NIGHTLY
Status: DISCONTINUED | OUTPATIENT
Start: 2022-10-07 | End: 2022-10-11

## 2022-10-07 RX ORDER — LEVETIRACETAM 500 MG/1
1000 TABLET ORAL 2 TIMES DAILY
Status: DISCONTINUED | OUTPATIENT
Start: 2022-10-07 | End: 2022-10-18

## 2022-10-07 RX ORDER — METOCLOPRAMIDE 10 MG/1
10 TABLET ORAL EVERY 6 HOURS PRN
Status: DISCONTINUED | OUTPATIENT
Start: 2022-10-07 | End: 2022-10-15

## 2022-10-07 RX ORDER — LEVOTHYROXINE SODIUM 0.05 MG/1
50 TABLET ORAL
Status: DISCONTINUED | OUTPATIENT
Start: 2022-10-07 | End: 2022-10-18

## 2022-10-07 RX ORDER — POLYETHYLENE GLYCOL 3350 17 G/17G
17 POWDER, FOR SOLUTION ORAL
Status: DISCONTINUED | OUTPATIENT
Start: 2022-10-07 | End: 2022-10-08

## 2022-10-07 RX ORDER — PANTOPRAZOLE SODIUM 40 MG/1
40 TABLET, DELAYED RELEASE ORAL
Status: DISCONTINUED | OUTPATIENT
Start: 2022-10-07 | End: 2022-10-18

## 2022-10-07 RX ORDER — TAMSULOSIN HYDROCHLORIDE 0.4 MG/1
0.4 CAPSULE ORAL
Status: DISCONTINUED | OUTPATIENT
Start: 2022-10-08 | End: 2022-10-18

## 2022-10-07 RX ORDER — ONDANSETRON 2 MG/ML
4 INJECTION INTRAMUSCULAR; INTRAVENOUS EVERY 4 HOURS PRN
Status: DISCONTINUED | OUTPATIENT
Start: 2022-10-07 | End: 2022-10-18

## 2022-10-07 RX ORDER — MORPHINE SULFATE 2 MG/ML
1 INJECTION, SOLUTION INTRAMUSCULAR; INTRAVENOUS EVERY 4 HOURS PRN
Status: DISCONTINUED | OUTPATIENT
Start: 2022-10-07 | End: 2022-10-18

## 2022-10-07 RX ORDER — FUROSEMIDE 10 MG/ML
40 INJECTION INTRAMUSCULAR; INTRAVENOUS ONCE
Status: COMPLETED | OUTPATIENT
Start: 2022-10-07 | End: 2022-10-07

## 2022-10-07 RX ORDER — ASPIRIN 81 MG/1
324 TABLET, CHEWABLE ORAL ONCE
Status: COMPLETED | OUTPATIENT
Start: 2022-10-07 | End: 2022-10-07

## 2022-10-07 RX ORDER — GABAPENTIN 300 MG/1
300 CAPSULE ORAL NIGHTLY
Status: DISCONTINUED | OUTPATIENT
Start: 2022-10-07 | End: 2022-10-13

## 2022-10-07 RX ORDER — ZOLPIDEM TARTRATE 5 MG/1
5 TABLET ORAL NIGHTLY
Status: DISCONTINUED | OUTPATIENT
Start: 2022-10-07 | End: 2022-10-18

## 2022-10-07 RX ORDER — CAPTOPRIL 12.5 MG/1
12.5 TABLET ORAL
Status: DISCONTINUED | OUTPATIENT
Start: 2022-10-07 | End: 2022-10-18

## 2022-10-07 NOTE — ED INITIAL ASSESSMENT (HPI)
Pt to the ed with bilateral lower leg edema for the past two days. Pt aox4 denies any fevers at home.

## 2022-10-07 NOTE — ED QUICK NOTES
Orders for admission, patient is aware of plan and ready to go upstairs. Any questions, please call ED RN Conchis Shoemaker at extension [phone].      Patient Covid vaccination status: Unvaccinated     COVID Test Ordered in ED: Rapid SARS-CoV-2 by PCR    COVID Suspicion at Admission: Low clinical suspicion for COVID    Running Infusions:  None    Mental Status/LOC at time of transport: A&O x3    Other pertinent information:   CIWA score: N/A   NIH score:  N/A

## 2022-10-07 NOTE — CM/SW NOTE
BPCI-Advanced Medicare Program Note:   Plan of care reviewed for care coordination and discharge planning. Noted that patient is a BPCI-A readmission, previously enrolled under , W for Gastrointestinal Obstruction , currently on day 36 of 90 day in the current BPCI-A episode. JOHN tool was used to help determine next care setting. NSOC recommendation is for TBD pending patient progress. Case Management team is following for care coordination and discharge planning needs. PT/OT evals pending.     Plan: TBD    Madhu Strickland, BSN    [phone]

## 2022-10-07 NOTE — CM/SW NOTE
10/07/22 1600   CM/SW Referral Data   Referral Source    Reason for Referral Discharge planning   Informant Patient   Pertinent Medical Hx   Does patient have an established PCP? Yes  (Ondina Ireland)   Patient Info   Patient's Current Mental Status at Time of Assessment Alert;Oriented   Patient's [address]   Patient lives with Alone   Patient Status Prior to Admission   Independent with ADLs and Mobility No   Pt. requires assistance with Housework;Driving;Meals   Discharge Needs   Anticipated D/C needs To be determined     Pt discussed during nursing rounds. Dx CHF, LE venous ulcers. From home alone, pt's spouse lives with sister who is more capable caregiver for spouse. Pt's sister visits patient to assist w/shopping, laundry, etc. Pt was accepted by [facility]  last admission but did retain services after arriving home per [employer] [facility]. Pt agreeable to [facility] at dc if recommended. Pt has hx of OBHC. PT/OT evals needed for dc recommendation, RN is aware. Plan: TBD    / to remain available for support and/or discharge planning.      GRACE Tyson    [phone]

## 2022-10-07 NOTE — PLAN OF CARE
Patient alert and oriented. Started on a Lasix drip. Morphine given PRN for pain. Ambulates with one assist. Patient resting in bed with fall precautions in place and call light in reach. Plan to discharge home when medically clear. Problem: Patient Centered Care  Goal: Patient preferences are identified and integrated in the patient's plan of care  Description: Interventions:  - What would you like us to know as we care for you?  I have heart failure  - Provide timely, complete, and accurate information to patient/family  - Incorporate patient and family knowledge, values, beliefs, and cultural backgrounds into the planning and delivery of care  - Encourage patient/family to participate in care and decision-making at the level they choose  - Honor patient and family perspectives and choices  Outcome: Progressing     Problem: Diabetes/Glucose Control  Goal: Glucose maintained within prescribed range  Description: INTERVENTIONS:  - Monitor Blood Glucose as ordered  - Assess for signs and symptoms of hyperglycemia and hypoglycemia  - Administer ordered medications to maintain glucose within target range  - Assess barriers to adequate nutritional intake and initiate nutrition consult as needed  - Instruct patient on self management of diabetes  Outcome: Progressing     Problem: Patient/Family Goals  Goal: Patient/Family Long Term Goal  Description: Patient's Long Term Goal: Treat swelling in legs    Interventions:  - Lasix  - monitor labs and vitals  - wound care  - See additional Care Plan goals for specific interventions  Outcome: Progressing  Goal: Patient/Family Short Term Goal  Description: Patient's Short Term Goal: manage pain    Interventions:   - morphine  - pain medication  - monitor labs and vitals  - See additional Care Plan goals for specific interventions  Outcome: Progressing     Problem: CARDIOVASCULAR - ADULT  Goal: Maintains optimal cardiac output and hemodynamic stability  Description: INTERVENTIONS:  - Monitor vital signs, rhythm, and trends  - Monitor for bleeding, hypotension and signs of decreased cardiac output  - Evaluate effectiveness of vasoactive medications to optimize hemodynamic stability  - Monitor arterial and/or venous puncture sites for bleeding and/or hematoma  - Assess quality of pulses, skin color and temperature  - Assess for signs of decreased coronary artery perfusion - ex.  Angina  - Evaluate fluid balance, assess for edema, trend weights  Outcome: Progressing     Problem: Impaired Activities of Daily Living  Goal: Achieve highest/safest level of independence in self care  Description: Interventions:  - Assess ability and encourage patient to participate in ADLs to maximize function  - Promote sitting position while performing ADLs such as feeding, grooming, and bathing  - Educate and encourage patient/family in tolerated functional activity level and precautions during self-care  - Encourage patient to incorporate impaired side during daily activities to promote function  Outcome: Progressing

## 2022-10-08 LAB
ANION GAP SERPL CALC-SCNC: 10 MMOL/L (ref 0–18)
BASOPHILS # BLD AUTO: 0.02 X10(3) UL (ref 0–0.2)
BASOPHILS NFR BLD AUTO: 0.3 %
BUN BLD-MCNC: 10 MG/DL (ref 7–18)
BUN/CREAT SERPL: 9.3 (ref 10–20)
CALCIUM BLD-MCNC: 8.2 MG/DL (ref 8.5–10.1)
CHLORIDE SERPL-SCNC: 98 MMOL/L (ref 98–112)
CO2 SERPL-SCNC: 30 MMOL/L (ref 21–32)
CREAT BLD-MCNC: 1.08 MG/DL
DEPRECATED RDW RBC AUTO: 53.7 FL (ref 35.1–46.3)
EOSINOPHIL # BLD AUTO: 0.28 X10(3) UL (ref 0–0.7)
EOSINOPHIL NFR BLD AUTO: 3.6 %
ERYTHROCYTE [DISTWIDTH] IN BLOOD BY AUTOMATED COUNT: 15.3 % (ref 11–15)
GFR SERPLBLD BASED ON 1.73 SQ M-ARVRAT: 78 ML/MIN/1.73M2 (ref 60–?)
GLUCOSE BLD-MCNC: 163 MG/DL (ref 70–99)
GLUCOSE BLDC GLUCOMTR-MCNC: 139 MG/DL (ref 70–99)
GLUCOSE BLDC GLUCOMTR-MCNC: 140 MG/DL (ref 70–99)
GLUCOSE BLDC GLUCOMTR-MCNC: 205 MG/DL (ref 70–99)
GLUCOSE BLDC GLUCOMTR-MCNC: 249 MG/DL (ref 70–99)
HCT VFR BLD AUTO: 43.5 %
HGB BLD-MCNC: 13.2 G/DL
IMM GRANULOCYTES # BLD AUTO: 0.03 X10(3) UL (ref 0–1)
IMM GRANULOCYTES NFR BLD: 0.4 %
LACTATE SERPL-SCNC: 2 MMOL/L (ref 0.4–2)
LYMPHOCYTES # BLD AUTO: 1.15 X10(3) UL (ref 1–4)
LYMPHOCYTES NFR BLD AUTO: 14.9 %
MAGNESIUM SERPL-MCNC: 1.7 MG/DL (ref 1.6–2.6)
MCH RBC QN AUTO: 29.3 PG (ref 26–34)
MCHC RBC AUTO-ENTMCNC: 30.3 G/DL (ref 31–37)
MCV RBC AUTO: 96.5 FL
MONOCYTES # BLD AUTO: 0.85 X10(3) UL (ref 0.1–1)
MONOCYTES NFR BLD AUTO: 11 %
NEUTROPHILS # BLD AUTO: 5.37 X10 (3) UL (ref 1.5–7.7)
NEUTROPHILS # BLD AUTO: 5.37 X10(3) UL (ref 1.5–7.7)
NEUTROPHILS NFR BLD AUTO: 69.8 %
OSMOLALITY SERPL CALC.SUM OF ELEC: 289 MOSM/KG (ref 275–295)
PLATELET # BLD AUTO: 195 10(3)UL (ref 150–450)
POTASSIUM SERPL-SCNC: 4 MMOL/L (ref 3.5–5.1)
RBC # BLD AUTO: 4.51 X10(6)UL
SODIUM SERPL-SCNC: 138 MMOL/L (ref 136–145)
WBC # BLD AUTO: 7.7 X10(3) UL (ref 4–11)

## 2022-10-08 RX ORDER — SODIUM PHOSPHATE, DIBASIC AND SODIUM PHOSPHATE, MONOBASIC 7; 19 G/133ML; G/133ML
1 ENEMA RECTAL ONCE AS NEEDED
Status: DISCONTINUED | OUTPATIENT
Start: 2022-10-08 | End: 2022-10-18

## 2022-10-08 RX ORDER — POLYETHYLENE GLYCOL 3350 17 G/17G
17 POWDER, FOR SOLUTION ORAL DAILY PRN
Status: DISCONTINUED | OUTPATIENT
Start: 2022-10-08 | End: 2022-10-18

## 2022-10-08 RX ORDER — BISACODYL 10 MG
10 SUPPOSITORY, RECTAL RECTAL
Status: DISCONTINUED | OUTPATIENT
Start: 2022-10-08 | End: 2022-10-18

## 2022-10-08 RX ORDER — MAGNESIUM OXIDE 400 MG/1
400 TABLET ORAL ONCE
Status: COMPLETED | OUTPATIENT
Start: 2022-10-08 | End: 2022-10-08

## 2022-10-08 RX ORDER — DOCUSATE SODIUM 100 MG/1
100 CAPSULE, LIQUID FILLED ORAL 2 TIMES DAILY
Status: DISCONTINUED | OUTPATIENT
Start: 2022-10-08 | End: 2022-10-18

## 2022-10-08 RX ORDER — VANCOMYCIN HYDROCHLORIDE 125 MG/1
125 CAPSULE ORAL DAILY
Status: DISCONTINUED | OUTPATIENT
Start: 2022-10-08 | End: 2022-10-18

## 2022-10-08 NOTE — PROGRESS NOTES
[facility]    CDI Prediction Tool Protocol (Vancomycin Initiated)    OVP (oral vancomycin prophylaxis) 125 mg PO Daily is being started in this patient based on a score of 15.1. Score Breakdown:  History of CDI > 1 year ago AND high risk antibiotic use (8 points)  High risk antibiotic use (5 points)  PPI use in hospital (1 point)  Recently hospitalized: within 90 days (1 point)  History of CDI regardless of high risk antibiotic use (0.1 point)    This patient is currently at high risk for developing CDI due to his/her score being >/=13 points and is being started on prophylactic oral vancomycin to prevent Cdiff. This patient does not have C. difficile infection. All measures taken within this protocol are to decrease the risk of CDI development.     Consuelo Winchester, PharmD  10/8/2022  6:47 AM  [facility]: [phone]

## 2022-10-08 NOTE — PLAN OF CARE
Patient alert x4, RA. Morphine given for leg pain. Frequent education and reenforcement needed on fluid restriction. Lasix gtt & IV ABX running. No acute events throughout the night. Educated patient to call for assistance, call light within reach. Problem: Patient Centered Care  Goal: Patient preferences are identified and integrated in the patient's plan of care  Description: Interventions:  - What would you like us to know as we care for you?  I have heart failure  - Provide timely, complete, and accurate information to patient/family  - Incorporate patient and family knowledge, values, beliefs, and cultural backgrounds into the planning and delivery of care  - Encourage patient/family to participate in care and decision-making at the level they choose  - Honor patient and family perspectives and choices  Outcome: Progressing     Problem: Diabetes/Glucose Control  Goal: Glucose maintained within prescribed range  Description: INTERVENTIONS:  - Monitor Blood Glucose as ordered  - Assess for signs and symptoms of hyperglycemia and hypoglycemia  - Administer ordered medications to maintain glucose within target range  - Assess barriers to adequate nutritional intake and initiate nutrition consult as needed  - Instruct patient on self management of diabetes  Outcome: Progressing     Problem: Patient/Family Goals  Goal: Patient/Family Long Term Goal  Description: Patient's Long Term Goal: Treat swelling in legs    Interventions:  - Lasix  - monitor labs and vitals  - wound care  - See additional Care Plan goals for specific interventions  Outcome: Progressing  Goal: Patient/Family Short Term Goal  Description: Patient's Short Term Goal: manage pain    Interventions:   - morphine  - pain medication  - monitor labs and vitals  - See additional Care Plan goals for specific interventions  Outcome: Progressing     Problem: CARDIOVASCULAR - ADULT  Goal: Maintains optimal cardiac output and hemodynamic stability  Description: INTERVENTIONS:  - Monitor vital signs, rhythm, and trends  - Monitor for bleeding, hypotension and signs of decreased cardiac output  - Evaluate effectiveness of vasoactive medications to optimize hemodynamic stability  - Monitor arterial and/or venous puncture sites for bleeding and/or hematoma  - Assess quality of pulses, skin color and temperature  - Assess for signs of decreased coronary artery perfusion - ex.  Angina  - Evaluate fluid balance, assess for edema, trend weights  Outcome: Progressing     Problem: Impaired Activities of Daily Living  Goal: Achieve highest/safest level of independence in self care  Description: Interventions:  - Assess ability and encourage patient to participate in ADLs to maximize function  - Promote sitting position while performing ADLs such as feeding, grooming, and bathing  - Educate and encourage patient/family in tolerated functional activity level and precautions during self-care    Outcome: Progressing

## 2022-10-09 LAB
GLUCOSE BLDC GLUCOMTR-MCNC: 167 MG/DL (ref 70–99)
GLUCOSE BLDC GLUCOMTR-MCNC: 184 MG/DL (ref 70–99)
GLUCOSE BLDC GLUCOMTR-MCNC: 251 MG/DL (ref 70–99)
GLUCOSE BLDC GLUCOMTR-MCNC: 252 MG/DL (ref 70–99)
MAGNESIUM SERPL-MCNC: 1.5 MG/DL (ref 1.6–2.6)

## 2022-10-09 NOTE — PLAN OF CARE
Patient alert x4, wore cpap comfortably throughout the night. No acute events. Lasix gtt & IV ABX running. Norco given for BLE pain. Educated patient to call for assistance, call light within reach. Problem: Patient Centered Care  Goal: Patient preferences are identified and integrated in the patient's plan of care  Description: Interventions:  - What would you like us to know as we care for you?  I have heart failure  - Provide timely, complete, and accurate information to patient/family  - Incorporate patient and family knowledge, values, beliefs, and cultural backgrounds into the planning and delivery of care  - Encourage patient/family to participate in care and decision-making at the level they choose  - Honor patient and family perspectives and choices  Outcome: Progressing     Problem: Diabetes/Glucose Control  Goal: Glucose maintained within prescribed range  Description: INTERVENTIONS:  - Monitor Blood Glucose as ordered  - Assess for signs and symptoms of hyperglycemia and hypoglycemia  - Administer ordered medications to maintain glucose within target range  - Assess barriers to adequate nutritional intake and initiate nutrition consult as needed  - Instruct patient on self management of diabetes  Outcome: Progressing     Problem: Patient/Family Goals  Goal: Patient/Family Long Term Goal  Description: Patient's Long Term Goal: Treat swelling in legs    Interventions:  - Lasix  - monitor labs and vitals  - wound care  - See additional Care Plan goals for specific interventions  Outcome: Progressing  Goal: Patient/Family Short Term Goal  Description: Patient's Short Term Goal: manage pain    Interventions:   - morphine  - pain medication  - monitor labs and vitals  - See additional Care Plan goals for specific interventions  Outcome: Progressing     Problem: CARDIOVASCULAR - ADULT  Goal: Maintains optimal cardiac output and hemodynamic stability  Description: INTERVENTIONS:  - Monitor vital signs, rhythm, and trends  - Monitor for bleeding, hypotension and signs of decreased cardiac output  - Evaluate effectiveness of vasoactive medications to optimize hemodynamic stability  - Monitor arterial and/or venous puncture sites for bleeding and/or hematoma  - Assess quality of pulses, skin color and temperature  - Assess for signs of decreased coronary artery perfusion - ex.  Angina  - Evaluate fluid balance, assess for edema, trend weights  Outcome: Progressing     Problem: Impaired Activities of Daily Living  Goal: Achieve highest/safest level of independence in self care  Description: Interventions:  - Assess ability and encourage patient to participate in ADLs to maximize function  - Promote sitting position while performing ADLs such as feeding, grooming, and bathing  - Educate and encourage patient/family in tolerated functional activity level and precautions during self-care    Outcome: Progressing

## 2022-10-09 NOTE — PLAN OF CARE
Problem: Patient Centered Care  Goal: Patient preferences are identified and integrated in the patient's plan of care  Description: Interventions:  - What would you like us to know as we care for you?  I have heart failure  - Provide timely, complete, and accurate information to patient/family  - Incorporate patient and family knowledge, values, beliefs, and cultural backgrounds into the planning and delivery of care  - Encourage patient/family to participate in care and decision-making at the level they choose  - Honor patient and family perspectives and choices  Outcome: Progressing     Problem: Diabetes/Glucose Control  Goal: Glucose maintained within prescribed range  Description: INTERVENTIONS:  - Monitor Blood Glucose as ordered  - Assess for signs and symptoms of hyperglycemia and hypoglycemia  - Administer ordered medications to maintain glucose within target range  - Assess barriers to adequate nutritional intake and initiate nutrition consult as needed  - Instruct patient on self management of diabetes  Outcome: Progressing     Problem: Patient/Family Goals  Goal: Patient/Family Long Term Goal  Description: Patient's Long Term Goal: Treat swelling in legs    Interventions:  - Lasix  - monitor labs and vitals  - wound care  - See additional Care Plan goals for specific interventions  Outcome: Progressing  Goal: Patient/Family Short Term Goal  Description: Patient's Short Term Goal: manage pain    Interventions:   - morphine  - pain medication  - monitor labs and vitals  - See additional Care Plan goals for specific interventions  Outcome: Progressing     Problem: CARDIOVASCULAR - ADULT  Goal: Maintains optimal cardiac output and hemodynamic stability  Description: INTERVENTIONS:  - Monitor vital signs, rhythm, and trends  - Monitor for bleeding, hypotension and signs of decreased cardiac output  - Evaluate effectiveness of vasoactive medications to optimize hemodynamic stability  - Monitor arterial and/or venous puncture sites for bleeding and/or hematoma  - Assess quality of pulses, skin color and temperature  - Assess for signs of decreased coronary artery perfusion - ex.  Angina  - Evaluate fluid balance, assess for edema, trend weights  Outcome: Progressing     Problem: Impaired Activities of Daily Living  Goal: Achieve highest/safest level of independence in self care  Description: Interventions:  - Assess ability and encourage patient to participate in ADLs to maximize function  - Promote sitting position while performing ADLs such as feeding, grooming, and bathing  - Educate and encourage patient/family in tolerated functional activity level and precautions during self-care    Outcome: Progressing

## 2022-10-10 LAB
ANION GAP SERPL CALC-SCNC: 3 MMOL/L (ref 0–18)
BUN BLD-MCNC: 9 MG/DL (ref 7–18)
BUN/CREAT SERPL: 7.4 (ref 10–20)
CALCIUM BLD-MCNC: 8.1 MG/DL (ref 8.5–10.1)
CHLORIDE SERPL-SCNC: 96 MMOL/L (ref 98–112)
CO2 SERPL-SCNC: 37 MMOL/L (ref 21–32)
CREAT BLD-MCNC: 1.22 MG/DL
GFR SERPLBLD BASED ON 1.73 SQ M-ARVRAT: 67 ML/MIN/1.73M2 (ref 60–?)
GLUCOSE BLD-MCNC: 186 MG/DL (ref 70–99)
GLUCOSE BLDC GLUCOMTR-MCNC: 161 MG/DL (ref 70–99)
GLUCOSE BLDC GLUCOMTR-MCNC: 162 MG/DL (ref 70–99)
GLUCOSE BLDC GLUCOMTR-MCNC: 172 MG/DL (ref 70–99)
GLUCOSE BLDC GLUCOMTR-MCNC: 180 MG/DL (ref 70–99)
GLUCOSE BLDC GLUCOMTR-MCNC: 293 MG/DL (ref 70–99)
MAGNESIUM SERPL-MCNC: 1.8 MG/DL (ref 1.6–2.6)
OSMOLALITY SERPL CALC.SUM OF ELEC: 286 MOSM/KG (ref 275–295)
POTASSIUM SERPL-SCNC: 3.4 MMOL/L (ref 3.5–5.1)
POTASSIUM SERPL-SCNC: 3.5 MMOL/L (ref 3.5–5.1)
SODIUM SERPL-SCNC: 136 MMOL/L (ref 136–145)

## 2022-10-10 RX ORDER — POTASSIUM CHLORIDE 20 MEQ/1
40 TABLET, EXTENDED RELEASE ORAL EVERY 4 HOURS
Status: COMPLETED | OUTPATIENT
Start: 2022-10-10 | End: 2022-10-10

## 2022-10-10 RX ORDER — MAGNESIUM OXIDE 400 MG/1
400 TABLET ORAL ONCE
Status: COMPLETED | OUTPATIENT
Start: 2022-10-10 | End: 2022-10-10

## 2022-10-10 NOTE — PLAN OF CARE
Patient alert x4, RA. No acute events throughout the night. Norco given for leg pain. Lasix gtt & IV ABX running. Educated patient to call for assistance, call light within reach. Problem: Patient Centered Care  Goal: Patient preferences are identified and integrated in the patient's plan of care  Description: Interventions:  - What would you like us to know as we care for you?  I have heart failure  - Provide timely, complete, and accurate information to patient/family  - Incorporate patient and family knowledge, values, beliefs, and cultural backgrounds into the planning and delivery of care  - Encourage patient/family to participate in care and decision-making at the level they choose  - Honor patient and family perspectives and choices  Outcome: Progressing     Problem: Diabetes/Glucose Control  Goal: Glucose maintained within prescribed range  Description: INTERVENTIONS:  - Monitor Blood Glucose as ordered  - Assess for signs and symptoms of hyperglycemia and hypoglycemia  - Administer ordered medications to maintain glucose within target range  - Assess barriers to adequate nutritional intake and initiate nutrition consult as needed  - Instruct patient on self management of diabetes  Outcome: Progressing     Problem: Patient/Family Goals  Goal: Patient/Family Long Term Goal  Description: Patient's Long Term Goal: Treat swelling in legs    Interventions:  - Lasix  - monitor labs and vitals  - wound care  - See additional Care Plan goals for specific interventions  Outcome: Progressing  Goal: Patient/Family Short Term Goal  Description: Patient's Short Term Goal: manage pain    Interventions:   - morphine  - pain medication  - monitor labs and vitals  - See additional Care Plan goals for specific interventions  Outcome: Progressing     Problem: CARDIOVASCULAR - ADULT  Goal: Maintains optimal cardiac output and hemodynamic stability  Description: INTERVENTIONS:  - Monitor vital signs, rhythm, and trends  - Monitor for bleeding, hypotension and signs of decreased cardiac output  - Evaluate effectiveness of vasoactive medications to optimize hemodynamic stability  - Monitor arterial and/or venous puncture sites for bleeding and/or hematoma  - Assess quality of pulses, skin color and temperature  - Assess for signs of decreased coronary artery perfusion - ex.  Angina  - Evaluate fluid balance, assess for edema, trend weights  Outcome: Progressing     Problem: Impaired Activities of Daily Living  Goal: Achieve highest/safest level of independence in self care  Description: Interventions:  - Assess ability and encourage patient to participate in ADLs to maximize function  - Promote sitting position while performing ADLs such as feeding, grooming, and bathing  - Educate and encourage patient/family in tolerated functional activity level and   Outcome: Progressing

## 2022-10-11 ENCOUNTER — APPOINTMENT (OUTPATIENT)
Dept: CT IMAGING | Facility: HOSPITAL | Age: 62
End: 2022-10-11
Attending: Other
Payer: MEDICARE

## 2022-10-11 LAB
ANION GAP SERPL CALC-SCNC: 4 MMOL/L (ref 0–18)
BASE EXCESS BLD CALC-SCNC: 8.8 MMOL/L (ref ?–2)
BUN BLD-MCNC: 11 MG/DL (ref 7–18)
BUN/CREAT SERPL: 9.2 (ref 10–20)
CA-I BLD-SCNC: 1.13 MMOL/L (ref 0.95–1.32)
CALCIUM BLD-MCNC: 8.8 MG/DL (ref 8.5–10.1)
CHLORIDE SERPL-SCNC: 97 MMOL/L (ref 98–112)
CHOLEST SERPL-MCNC: 66 MG/DL (ref ?–200)
CO2 SERPL-SCNC: 36 MMOL/L (ref 21–32)
COHGB MFR BLD: 2.5 % (ref 0–3)
CREAT BLD-MCNC: 1.2 MG/DL
GFR SERPLBLD BASED ON 1.73 SQ M-ARVRAT: 68 ML/MIN/1.73M2 (ref 60–?)
GLUCOSE BLD-MCNC: 154 MG/DL (ref 70–99)
GLUCOSE BLDC GLUCOMTR-MCNC: 151 MG/DL (ref 70–99)
GLUCOSE BLDC GLUCOMTR-MCNC: 179 MG/DL (ref 70–99)
GLUCOSE BLDC GLUCOMTR-MCNC: 207 MG/DL (ref 70–99)
GLUCOSE BLDC GLUCOMTR-MCNC: 227 MG/DL (ref 70–99)
GLUCOSE BLDC GLUCOMTR-MCNC: 244 MG/DL (ref 70–99)
HCO3 BLDA-SCNC: 31.7 MEQ/L (ref 21–27)
HDLC SERPL-MCNC: 35 MG/DL (ref 40–59)
HGB BLD-MCNC: 14.2 G/DL
LACTATE BLD-SCNC: 1.5 MMOL/L (ref 0.5–2)
LDLC SERPL CALC-MCNC: 16 MG/DL (ref ?–100)
MAGNESIUM SERPL-MCNC: 2 MG/DL (ref 1.6–2.6)
METHGB MFR BLD: 1.2 % SAT (ref 0.4–1.5)
MODIFIED ALLEN TEST: POSITIVE
NONHDLC SERPL-MCNC: 31 MG/DL (ref ?–130)
O2 CT BLD-SCNC: 18.6 VOL% (ref 15–23)
OSMOLALITY SERPL CALC.SUM OF ELEC: 286 MOSM/KG (ref 275–295)
PCO2 BLDA: 45 MM HG (ref 35–45)
PH BLDA: 7.48 [PH] (ref 7.35–7.45)
PO2 BLDA: 73 MM HG (ref 80–100)
POTASSIUM BLD-SCNC: 4 MMOL/L (ref 3.6–5.1)
POTASSIUM SERPL-SCNC: 4 MMOL/L (ref 3.5–5.1)
PUNCTURE CHARGE: YES
SAO2 % BLDA: 96.6 % (ref 94–100)
SODIUM BLD-SCNC: 129 MMOL/L (ref 135–145)
SODIUM SERPL-SCNC: 137 MMOL/L (ref 136–145)
TRIGL SERPL-MCNC: 66 MG/DL (ref 30–149)
VLDLC SERPL CALC-MCNC: 8 MG/DL (ref 0–30)

## 2022-10-11 PROCEDURE — 70498 CT ANGIOGRAPHY NECK: CPT | Performed by: OTHER

## 2022-10-11 PROCEDURE — 70450 CT HEAD/BRAIN W/O DYE: CPT | Performed by: OTHER

## 2022-10-11 PROCEDURE — 99291 CRITICAL CARE FIRST HOUR: CPT | Performed by: HOSPITALIST

## 2022-10-11 PROCEDURE — 70496 CT ANGIOGRAPHY HEAD: CPT | Performed by: OTHER

## 2022-10-11 PROCEDURE — 99291 CRITICAL CARE FIRST HOUR: CPT | Performed by: OTHER

## 2022-10-11 RX ORDER — ACETAMINOPHEN 325 MG/1
650 TABLET ORAL EVERY 4 HOURS PRN
Status: DISCONTINUED | OUTPATIENT
Start: 2022-10-11 | End: 2022-10-18

## 2022-10-11 RX ORDER — ATORVASTATIN CALCIUM 40 MG/1
40 TABLET, FILM COATED ORAL NIGHTLY
Status: DISCONTINUED | OUTPATIENT
Start: 2022-10-11 | End: 2022-10-12

## 2022-10-11 RX ORDER — LABETALOL HYDROCHLORIDE 5 MG/ML
10 INJECTION, SOLUTION INTRAVENOUS EVERY 10 MIN PRN
Status: DISCONTINUED | OUTPATIENT
Start: 2022-10-11 | End: 2022-10-18

## 2022-10-11 RX ORDER — PROCHLORPERAZINE EDISYLATE 5 MG/ML
5 INJECTION INTRAMUSCULAR; INTRAVENOUS EVERY 8 HOURS PRN
Status: DISCONTINUED | OUTPATIENT
Start: 2022-10-11 | End: 2022-10-18

## 2022-10-11 RX ORDER — ACETAMINOPHEN 650 MG/1
650 SUPPOSITORY RECTAL EVERY 4 HOURS PRN
Status: DISCONTINUED | OUTPATIENT
Start: 2022-10-11 | End: 2022-10-18

## 2022-10-11 RX ORDER — ONDANSETRON 2 MG/ML
4 INJECTION INTRAMUSCULAR; INTRAVENOUS EVERY 6 HOURS PRN
Status: DISCONTINUED | OUTPATIENT
Start: 2022-10-11 | End: 2022-10-18

## 2022-10-11 RX ORDER — HYDRALAZINE HYDROCHLORIDE 20 MG/ML
10 INJECTION INTRAMUSCULAR; INTRAVENOUS EVERY 2 HOUR PRN
Status: DISCONTINUED | OUTPATIENT
Start: 2022-10-11 | End: 2022-10-18

## 2022-10-11 RX ORDER — SODIUM CHLORIDE 9 MG/ML
INJECTION, SOLUTION INTRAVENOUS CONTINUOUS
Status: DISCONTINUED | OUTPATIENT
Start: 2022-10-11 | End: 2022-10-11

## 2022-10-11 NOTE — PLAN OF CARE
Problem: Patient Centered Care  Goal: Patient preferences are identified and integrated in the patient's plan of care  Description: Interventions:  - What would you like us to know as we care for you?  I have heart failure  - Provide timely, complete, and accurate information to patient/family  - Incorporate patient and family knowledge, values, beliefs, and cultural backgrounds into the planning and delivery of care  - Encourage patient/family to participate in care and decision-making at the level they choose  - Honor patient and family perspectives and choices  Outcome: Progressing     Problem: Diabetes/Glucose Control  Goal: Glucose maintained within prescribed range  Description: INTERVENTIONS:  - Monitor Blood Glucose as ordered  - Assess for signs and symptoms of hyperglycemia and hypoglycemia  - Administer ordered medications to maintain glucose within target range  - Assess barriers to adequate nutritional intake and initiate nutrition consult as needed  - Instruct patient on self management of diabetes  Outcome: Progressing     Problem: Patient/Family Goals  Goal: Patient/Family Long Term Goal  Description: Patient's Long Term Goal: Treat swelling in legs    Interventions:  - Lasix  - monitor labs and vitals  - wound care  - See additional Care Plan goals for specific interventions  Outcome: Progressing  Goal: Patient/Family Short Term Goal  Description: Patient's Short Term Goal: manage pain    Interventions:   - morphine  - pain medication  - monitor labs and vitals  - See additional Care Plan goals for specific interventions  Outcome: Progressing     Problem: CARDIOVASCULAR - ADULT  Goal: Maintains optimal cardiac output and hemodynamic stability  Description: INTERVENTIONS:  - Monitor vital signs, rhythm, and trends  - Monitor for bleeding, hypotension and signs of decreased cardiac output  - Evaluate effectiveness of vasoactive medications to optimize hemodynamic stability  - Monitor arterial and/or venous puncture sites for bleeding and/or hematoma  - Assess quality of pulses, skin color and temperature  - Assess for signs of decreased coronary artery perfusion - ex.  Angina  - Evaluate fluid balance, assess for edema, trend weights  Outcome: Progressing     Problem: Impaired Activities of Daily Living  Goal: Achieve highest/safest level of independence in self care  Description: Interventions:  - Assess ability and encourage patient to participate in ADLs to maximize function  - Promote sitting position while performing ADLs such as feeding, grooming, and bathing  - Educate and encourage patient/family in tolerated functional activity level and precautions during self-care  {Additional ADL Interventions:  Outcome: Progressing

## 2022-10-11 NOTE — SIGNIFICANT EVENT
RRT - code stroke    RRT called at 1400 for a code stroke, pt having hard time finding his words, unable to recall his birthday, very confused. NIH and neuro check completed. CT brain/CTA brain came back negative, MRI ordered. Dysphaia screen ordered, pt passed. PT/OT/SLP ordered pre protocol.

## 2022-10-11 NOTE — PROGRESS NOTES
10/11/22 1442   Clinical Encounter Type   Visited With Patient not available   Referral From Other (Comment)  (RRT/ Stroke Alert)   Referral To        Discussion:  responded to RRT/ Stroke Alert. Pt unavailable - with staff and then taken to test. No family present. No needs identified for  to address at this time.  follow-up visit available prn and can be contacted at [phone].     Shahram Hedrick, Chaplain Resident

## 2022-10-11 NOTE — SIGNIFICANT EVENT
Rapid response note    Response called by nurse for change in neurologic status. Patient has been in the hospital for cellulitis, heart failure. Found to have possible speech difficulties, word finding difficulties. When I arrived the patient was awake and alert, answering questions but with some paraphasic errors, word finding difficulty. Moving all 4 limbs spontaneously, equal strength. He was not in any distress. Vitals were normal.  Lungs clear, heart regular. Legs were edematous, wrapped. Distal extremities warm. Given the change in mental status, there is significant concern for an acute stroke. Neurologist arrived at the bedside, agreed with CT head, CT angiogram stat. I reevaluated the patient after imaging, no changes to his neurologic status.   40 minutes critical care time spent on this encounter, most of the time spent interviewing examining the patient, reviewing the chart, discussing with neurology, nursing, rapid response team.

## 2022-10-11 NOTE — PLAN OF CARE
Problem: Patient Centered Care  Goal: Patient preferences are identified and integrated in the patient's plan of care  Description: Interventions:  - What would you like us to know as we care for you?  I have heart failure  - Provide timely, complete, and accurate information to patient/family  - Incorporate patient and family knowledge, values, beliefs, and cultural backgrounds into the planning and delivery of care  - Encourage patient/family to participate in care and decision-making at the level they choose  - Honor patient and family perspectives and choices  Outcome: Progressing     Problem: Diabetes/Glucose Control  Goal: Glucose maintained within prescribed range  Description: INTERVENTIONS:  - Monitor Blood Glucose as ordered  - Assess for signs and symptoms of hyperglycemia and hypoglycemia  - Administer ordered medications to maintain glucose within target range  - Assess barriers to adequate nutritional intake and initiate nutrition consult as needed  - Instruct patient on self management of diabetes  Outcome: Progressing     Problem: Patient/Family Goals  Goal: Patient/Family Long Term Goal  Description: Patient's Long Term Goal: Treat swelling in legs    Interventions:  - Lasix  - monitor labs and vitals  - wound care  - See additional Care Plan goals for specific interventions  Outcome: Progressing  Goal: Patient/Family Short Term Goal  Description: Patient's Short Term Goal: manage pain    Interventions:   - morphine  - pain medication  - monitor labs and vitals  - See additional Care Plan goals for specific interventions  Outcome: Progressing     Problem: CARDIOVASCULAR - ADULT  Goal: Maintains optimal cardiac output and hemodynamic stability  Description: INTERVENTIONS:  - Monitor vital signs, rhythm, and trends  - Monitor for bleeding, hypotension and signs of decreased cardiac output  - Evaluate effectiveness of vasoactive medications to optimize hemodynamic stability  - Monitor arterial and/or venous puncture sites for bleeding and/or hematoma  - Assess quality of pulses, skin color and temperature  - Assess for signs of decreased coronary artery perfusion - ex. Angina  - Evaluate fluid balance, assess for edema, trend weights  Outcome: Progressing     Problem: Impaired Activities of Daily Living  Goal: Achieve highest/safest level of independence in self care  Description: Interventions:  - Assess ability and encourage patient to participate in ADLs to maximize function  - Promote sitting position while performing ADLs such as feeding, grooming, and bathing  - Educate and encourage patient/family in tolerated functional activity level and precautions during self-care    Problem: NEUROLOGICAL - ADULT  Goal: Achieves stable or improved neurological status  Description: INTERVENTIONS  - Assess for and report changes in neurological status  - Initiate measures to prevent increased intracranial pressure  - Maintain blood pressure and fluid volume within ordered parameters to optimize cerebral perfusion and minimize risk of hemorrhage  - Monitor temperature, glucose, and sodium.  Initiate appropriate interventions as ordered  Outcome: Progressing  Goal: Absence of seizures  Description: INTERVENTIONS  - Monitor for seizure activity  - Administer anti-seizure medications as ordered  - Monitor neurological status  Outcome: Progressing  Goal: Remains free of injury related to seizure activity  Description: INTERVENTIONS:  - Maintain airway, patient safety  and administer oxygen as ordered  - Monitor patient for seizure activity, document and report duration and description of seizure to MD/LIP  - If seizure occurs, turn patient to side and suction secretions as needed  - Reorient patient post seizure  - Seizure pads on all 4 side rails  - Instruct patient/family to notify RN of any seizure activity  - Instruct patient/family to call for assistance with activity based on assessment  Outcome: Progressing  Goal: Achieves maximal functionality and self care  Description: INTERVENTIONS  - Monitor swallowing and airway patency with patient fatigue and changes in neurological status  - Encourage and assist patient to increase activity and self care with guidance from PT/OT  - Encourage visually impaired, hearing impaired and aphasic patients to use assistive/communication devices  Outcome: Progressing     Problem: Impaired Cognition  Goal: Patient will exhibit improved attention, thought processing and/or memory  Description: Interventions:    Problem: Impaired Communication  Goal: Patient will achieve maximal communication potential  Description: Interventions:    Problem: Impaired Communication  Goal: Patient will achieve maximal communication potential  Description: Interventions:     Problem: NEUROLOGICAL - ADULT  Goal: Achieves stable or improved neurological status  Description: INTERVENTIONS  - Assess for and report changes in neurological status  - Initiate measures to prevent increased intracranial pressure  - Maintain blood pressure and fluid volume within ordered parameters to optimize cerebral perfusion and minimize risk of hemorrhage  - Monitor temperature, glucose, and sodium.  Initiate appropriate interventions as ordered  Outcome: Progressing  Goal: Absence of seizures  Description: INTERVENTIONS  - Monitor for seizure activity  - Administer anti-seizure medications as ordered  - Monitor neurological status  Outcome: Progressing  Goal: Remains free of injury related to seizure activity  Description: INTERVENTIONS:  - Maintain airway, patient safety  and administer oxygen as ordered  - Monitor patient for seizure activity, document and report duration and description of seizure to MD/LIP  - If seizure occurs, turn patient to side and suction secretions as needed  - Reorient patient post seizure  - Seizure pads on all 4 side rails  - Instruct patient/family to notify RN of any seizure activity  - Instruct patient/family to call for assistance with activity based on assessment  Outcome: Progressing  Goal: Achieves maximal functionality and self care  Description: INTERVENTIONS  - Monitor swallowing and airway patency with patient fatigue and changes in neurological status  - Encourage and assist patient to increase activity and self care with guidance from PT/OT  - Encourage visually impaired, hearing impaired and aphasic patients to use assistive/communication devices  Outcome: Progressing

## 2022-10-11 NOTE — CM/SW NOTE
Department  notified of request for [facility] referrals started. Assigned CM/SW to follow up with pt/family on further discharge planning.      Porsche La   October 11, 2022   15:34

## 2022-10-11 NOTE — CM/SW NOTE
MD order received regarding C. SW requested [facility] send [facility] referrals in [facility]. [facility] orders and face to face have been entered. Will need to follow up with accepting agency list for choice. Plan: Home with [facility] pending choice.      Clair Lyn [place] ext [phone]

## 2022-10-11 NOTE — BH PROGRESS NOTE
2pm patient requested pain meds when asked what was hurting, patient speech was slurred/gibberish speech- stroke alert called. Patient was taken down to CT. CTA was negative. MRI screening completed. Fluids discontinued.  NPO status discontinued patient passed swallow eval.

## 2022-10-12 ENCOUNTER — APPOINTMENT (OUTPATIENT)
Dept: MRI IMAGING | Facility: HOSPITAL | Age: 62
End: 2022-10-12
Attending: Other
Payer: MEDICARE

## 2022-10-12 LAB
ANION GAP SERPL CALC-SCNC: 5 MMOL/L (ref 0–18)
BUN BLD-MCNC: 12 MG/DL (ref 7–18)
BUN/CREAT SERPL: 9.8 (ref 10–20)
CALCIUM BLD-MCNC: 9.2 MG/DL (ref 8.5–10.1)
CHLORIDE SERPL-SCNC: 93 MMOL/L (ref 98–112)
CO2 SERPL-SCNC: 34 MMOL/L (ref 21–32)
CREAT BLD-MCNC: 1.22 MG/DL
GFR SERPLBLD BASED ON 1.73 SQ M-ARVRAT: 67 ML/MIN/1.73M2 (ref 60–?)
GLUCOSE BLD-MCNC: 165 MG/DL (ref 70–99)
GLUCOSE BLDC GLUCOMTR-MCNC: 142 MG/DL (ref 70–99)
GLUCOSE BLDC GLUCOMTR-MCNC: 223 MG/DL (ref 70–99)
GLUCOSE BLDC GLUCOMTR-MCNC: 247 MG/DL (ref 70–99)
GLUCOSE BLDC GLUCOMTR-MCNC: 304 MG/DL (ref 70–99)
OSMOLALITY SERPL CALC.SUM OF ELEC: 277 MOSM/KG (ref 275–295)
POTASSIUM SERPL-SCNC: 3.6 MMOL/L (ref 3.5–5.1)
POTASSIUM SERPL-SCNC: 4.4 MMOL/L (ref 3.5–5.1)
SODIUM SERPL-SCNC: 132 MMOL/L (ref 136–145)

## 2022-10-12 PROCEDURE — 70551 MRI BRAIN STEM W/O DYE: CPT | Performed by: OTHER

## 2022-10-12 PROCEDURE — 99232 SBSQ HOSP IP/OBS MODERATE 35: CPT | Performed by: OTHER

## 2022-10-12 RX ORDER — ATORVASTATIN CALCIUM 20 MG/1
20 TABLET, FILM COATED ORAL NIGHTLY
Status: DISCONTINUED | OUTPATIENT
Start: 2022-10-12 | End: 2022-10-18

## 2022-10-12 RX ORDER — POTASSIUM CHLORIDE 20 MEQ/1
40 TABLET, EXTENDED RELEASE ORAL EVERY 4 HOURS
Status: COMPLETED | OUTPATIENT
Start: 2022-10-12 | End: 2022-10-12

## 2022-10-12 RX ORDER — FUROSEMIDE 10 MG/ML
40 INJECTION INTRAMUSCULAR; INTRAVENOUS
Status: DISCONTINUED | OUTPATIENT
Start: 2022-10-12 | End: 2022-10-13

## 2022-10-12 NOTE — PLAN OF CARE
Jordan Zepeda is alert and oriented. Neuros Q4H, no changes. Still has expressive aphasia. On RA, CPAP at HS, only tolerated for 4 hr. Leg pain, prn norco given with good results. Voiding freely. Lasix gtt 2.5ml/hr. Plan for MRI today. Discharge TBD. Problem: Patient Centered Care  Goal: Patient preferences are identified and integrated in the patient's plan of care  Description: Interventions:  - What would you like us to know as we care for you?  I have heart failure  - Provide timely, complete, and accurate information to patient/family  - Incorporate patient and family knowledge, values, beliefs, and cultural backgrounds into the planning and delivery of care  - Encourage patient/family to participate in care and decision-making at the level they choose  - Honor patient and family perspectives and choices  Outcome: Progressing     Problem: Diabetes/Glucose Control  Goal: Glucose maintained within prescribed range  Description: INTERVENTIONS:  - Monitor Blood Glucose as ordered  - Assess for signs and symptoms of hyperglycemia and hypoglycemia  - Administer ordered medications to maintain glucose within target range  - Assess barriers to adequate nutritional intake and initiate nutrition consult as needed  - Instruct patient on self management of diabetes  Outcome: Progressing     Problem: Patient/Family Goals  Goal: Patient/Family Long Term Goal  Description: Patient's Long Term Goal: Treat swelling in legs    Interventions:  - Lasix  - monitor labs and vitals  - wound care  - See additional Care Plan goals for specific interventions  Outcome: Progressing  Goal: Patient/Family Short Term Goal  Description: Patient's Short Term Goal: manage pain    Interventions:   - morphine  - pain medication  - monitor labs and vitals  - See additional Care Plan goals for specific interventions  Outcome: Progressing     Problem: CARDIOVASCULAR - ADULT  Goal: Maintains optimal cardiac output and hemodynamic stability  Description: INTERVENTIONS:  - Monitor vital signs, rhythm, and trends  - Monitor for bleeding, hypotension and signs of decreased cardiac output  - Evaluate effectiveness of vasoactive medications to optimize hemodynamic stability  - Monitor arterial and/or venous puncture sites for bleeding and/or hematoma  - Assess quality of pulses, skin color and temperature  - Assess for signs of decreased coronary artery perfusion - ex. Angina  - Evaluate fluid balance, assess for edema, trend weights  Outcome: Progressing     Problem: Impaired Activities of Daily Living  Goal: Achieve highest/safest level of independence in self care  Description: Interventions:  - Assess ability and encourage patient to participate in ADLs to maximize function  - Promote sitting position while performing ADLs such as feeding, grooming, and bathing  - Educate and encourage patient/family in tolerated functional activity level and precautions during self-care  - Provide support under elbow of weak side to prevent shoulder subluxation  Outcome: Progressing     Problem: NEUROLOGICAL - ADULT  Goal: Achieves stable or improved neurological status  Description: INTERVENTIONS  - Assess for and report changes in neurological status  - Initiate measures to prevent increased intracranial pressure  - Maintain blood pressure and fluid volume within ordered parameters to optimize cerebral perfusion and minimize risk of hemorrhage  - Monitor temperature, glucose, and sodium.  Initiate appropriate interventions as ordered  Outcome: Progressing  Goal: Absence of seizures  Description: INTERVENTIONS  - Monitor for seizure activity  - Administer anti-seizure medications as ordered  - Monitor neurological status  Outcome: Progressing  Goal: Remains free of injury related to seizure activity  Description: INTERVENTIONS:  - Maintain airway, patient safety  and administer oxygen as ordered  - Monitor patient for seizure activity, document and report duration and description of seizure to MD/LIP  - If seizure occurs, turn patient to side and suction secretions as needed  - Reorient patient post seizure  - Seizure pads on all 4 side rails  - Instruct patient/family to notify RN of any seizure activity  - Instruct patient/family to call for assistance with activity based on assessment  Outcome: Progressing  Goal: Achieves maximal functionality and self care  Description: INTERVENTIONS  - Monitor swallowing and airway patency with patient fatigue and changes in neurological status  - Encourage and assist patient to increase activity and self care with guidance from PT/OT  - Encourage visually impaired, hearing impaired and aphasic patients to use assistive/communication devices  Outcome: Progressing     Problem: Impaired Cognition  Goal: Patient will exhibit improved attention, thought processing and/or memory  Description: Interventions:  - Minimize distractions in the room when full attention is required  - Allow additional time for processing after asking questions or providing instructions  Outcome: Progressing     Problem: Impaired Communication  Goal: Patient will achieve maximal communication potential  Description: Interventions:  - Encourage use of alternative/augmentative communication to express basic wants and needs (use of communication/letter board/or smartphone/tablet/handwriting)  - Provide modeling for options to improve word retrieval in known context  - Allow additional time for processing after asking questions or providing instructions  - Ask \"yes/no\" questions to facilitate patient's ability to communicate wants and needs  Outcome: Progressing

## 2022-10-12 NOTE — CM/SW NOTE
PT/OT recommending [facility] at dc. Pt and family agreeable to dc recommendation. [facility] referrals sent in [facility]. List of accepting facilities will be needed for choice. PASRR level 1 screen completed and uploaded to aidin referral.    Plan: [facility] pending facility choice and medical clearance.     Teresa Cooper, BSN    [phone]

## 2022-10-12 NOTE — HOME CARE LIAISON
Patient provided with list of [facility] providers from [facility], patient choice is [address]. Agency reserved in [facility] and contact information placed on AVS.   Financial interest disclosure provided to patient. RALPH wynn.

## 2022-10-12 NOTE — PLAN OF CARE
Patient had bm today  Problem: Patient Centered Care  Goal: Patient preferences are identified and integrated in the patient's plan of care  Description: Interventions:  - What would you like us to know as we care for you?  I have heart failure  - Provide timely, complete, and accurate information to patient/family  - Incorporate patient and family knowledge, values, beliefs, and cultural backgrounds into the planning and delivery of care  - Encourage patient/family to participate in care and decision-making at the level they choose  - Honor patient and family perspectives and choices  Outcome: Progressing     Problem: Diabetes/Glucose Control  Goal: Glucose maintained within prescribed range  Description: INTERVENTIONS:  - Monitor Blood Glucose as ordered  - Assess for signs and symptoms of hyperglycemia and hypoglycemia  - Administer ordered medications to maintain glucose within target range  - Assess barriers to adequate nutritional intake and initiate nutrition consult as needed  - Instruct patient on self management of diabetes  Outcome: Progressing     Problem: Patient/Family Goals  Goal: Patient/Family Long Term Goal  Description: Patient's Long Term Goal: Treat swelling in legs    Interventions:  - Lasix  - monitor labs and vitals  - wound care  - See additional Care Plan goals for specific interventions  Outcome: Progressing  Goal: Patient/Family Short Term Goal  Description: Patient's Short Term Goal: manage pain    Interventions:   - morphine  - pain medication  - monitor labs and vitals  - See additional Care Plan goals for specific interventions  Outcome: Progressing     Problem: CARDIOVASCULAR - ADULT  Goal: Maintains optimal cardiac output and hemodynamic stability  Description: INTERVENTIONS:  - Monitor vital signs, rhythm, and trends  - Monitor for bleeding, hypotension and signs of decreased cardiac output  - Evaluate effectiveness of vasoactive medications to optimize hemodynamic stability  - Monitor arterial and/or venous puncture sites for bleeding and/or hematoma  - Assess quality of pulses, skin color and temperature  - Assess for signs of decreased coronary artery perfusion - ex. Angina  - Evaluate fluid balance, assess for edema, trend weights  Outcome: Progressing     Problem: Impaired Activities of Daily Living  Goal: Achieve highest/safest level of independence in self care  Description: Interventions:  - Assess ability and encourage patient to participate in ADLs to maximize function  - Promote sitting position while performing ADLs such as feeding, grooming, and bathing  - Educate and encourage patient/family in tolerated functional activity level and precautions during self-care  {Additional ADL Interventions:} ambulating with walker   Outcome: Progressing     Problem: NEUROLOGICAL - ADULT  Goal: Achieves stable or improved neurological status    Description: INTERVENTIONS  - Assess for and report changes in neurological status  - Initiate measures to prevent increased intracranial pressure  - Maintain blood pressure and fluid volume within ordered parameters to optimize cerebral perfusion and minimize risk of hemorrhage  - Monitor temperature, glucose, and sodium.  Initiate appropriate interventions as ordered  Outcome: Progressing  Goal: Absence of seizures  Description: INTERVENTIONS  - Monitor for seizure activity  - Administer anti-seizure medications as ordered  - Monitor neurological status  Outcome: Progressing  Goal: Remains free of injury related to seizure activity  Description: INTERVENTIONS:  - Maintain airway, patient safety  and administer oxygen as ordered  - Monitor patient for seizure activity, document and report duration and description of seizure to MD/LIP  - If seizure occurs, turn patient to side and suction secretions as needed  - Reorient patient post seizure  - Seizure pads on all 4 side rails  - Instruct patient/family to notify RN of any seizure activity  - Instruct patient/family to call for assistance with activity based on assessment  Outcome: Progressing  Goal: Achieves maximal functionality and self care  Description: INTERVENTIONS  - Monitor swallowing and airway patency with patient fatigue and changes in neurological status  - Encourage and assist patient to increase activity and self care with guidance from PT/OT  - Encourage visually impaired, hearing impaired and aphasic patients to use assistive/communication devices  Outcome: Progressing     Problem: Impaired Cognition  Goal: Patient will exhibit improved attention, thought processing and/or memory  Description: Interventions:  {Cognition Interventions:  Outcome: Progressing     Problem: Impaired Communication  Goal: Patient will achieve maximal communication potential  Description: Interventions:  {Communication Interventions:}  Outcome: Progressing

## 2022-10-13 ENCOUNTER — APPOINTMENT (OUTPATIENT)
Dept: CV DIAGNOSTICS | Facility: HOSPITAL | Age: 62
End: 2022-10-13
Attending: Other
Payer: MEDICARE

## 2022-10-13 LAB
ANION GAP SERPL CALC-SCNC: 5 MMOL/L (ref 0–18)
BUN BLD-MCNC: 13 MG/DL (ref 7–18)
BUN/CREAT SERPL: 11.4 (ref 10–20)
CALCIUM BLD-MCNC: 9 MG/DL (ref 8.5–10.1)
CHLORIDE SERPL-SCNC: 95 MMOL/L (ref 98–112)
CO2 SERPL-SCNC: 34 MMOL/L (ref 21–32)
CREAT BLD-MCNC: 1.14 MG/DL
GFR SERPLBLD BASED ON 1.73 SQ M-ARVRAT: 73 ML/MIN/1.73M2 (ref 60–?)
GLUCOSE BLD-MCNC: 141 MG/DL (ref 70–99)
GLUCOSE BLDC GLUCOMTR-MCNC: 138 MG/DL (ref 70–99)
GLUCOSE BLDC GLUCOMTR-MCNC: 149 MG/DL (ref 70–99)
GLUCOSE BLDC GLUCOMTR-MCNC: 159 MG/DL (ref 70–99)
GLUCOSE BLDC GLUCOMTR-MCNC: 219 MG/DL (ref 70–99)
OSMOLALITY SERPL CALC.SUM OF ELEC: 280 MOSM/KG (ref 275–295)
POTASSIUM SERPL-SCNC: 3.9 MMOL/L (ref 3.5–5.1)
SODIUM SERPL-SCNC: 134 MMOL/L (ref 136–145)

## 2022-10-13 PROCEDURE — 99231 SBSQ HOSP IP/OBS SF/LOW 25: CPT | Performed by: OTHER

## 2022-10-13 PROCEDURE — 93306 TTE W/DOPPLER COMPLETE: CPT | Performed by: OTHER

## 2022-10-13 RX ORDER — GABAPENTIN 300 MG/1
600 CAPSULE ORAL 2 TIMES DAILY
Status: DISCONTINUED | OUTPATIENT
Start: 2022-10-13 | End: 2022-10-18

## 2022-10-13 RX ORDER — FUROSEMIDE 20 MG/1
20 TABLET ORAL DAILY
Status: DISCONTINUED | OUTPATIENT
Start: 2022-10-14 | End: 2022-10-18

## 2022-10-13 RX ORDER — ASPIRIN 81 MG/1
81 TABLET ORAL DAILY
Status: DISCONTINUED | OUTPATIENT
Start: 2022-10-13 | End: 2022-10-18

## 2022-10-13 NOTE — OCCUPATIONAL THERAPY NOTE
Attempted to see pt for OT treatment today. Pt is upset about medical status per discussion with RN and CM. Requesting to return later when pt more inclined to participate. Will re-attempt at a later time.     Deloris Guillermo, OTR/L

## 2022-10-13 NOTE — PLAN OF CARE
Doc Millan is alert and oriented. Neuros Q4H, no changes. Voiding freely. Abdominal pain/cramps, prn meds and heat packs given. Pt received laxatives yesterday and now having frequent loose BMS. IV Lasix BID. Wound dressings changed. Plan to continue to diurese. Discharge to [facility] pending choice and medical clearance. Problem: Patient Centered Care  Goal: Patient preferences are identified and integrated in the patient's plan of care  Description: Interventions:  - What would you like us to know as we care for you?  I have heart failure  - Provide timely, complete, and accurate information to patient/family  - Incorporate patient and family knowledge, values, beliefs, and cultural backgrounds into the planning and delivery of care  - Encourage patient/family to participate in care and decision-making at the level they choose  - Honor patient and family perspectives and choices  Outcome: Progressing     Problem: Diabetes/Glucose Control  Goal: Glucose maintained within prescribed range  Description: INTERVENTIONS:  - Monitor Blood Glucose as ordered  - Assess for signs and symptoms of hyperglycemia and hypoglycemia  - Administer ordered medications to maintain glucose within target range  - Assess barriers to adequate nutritional intake and initiate nutrition consult as needed  - Instruct patient on self management of diabetes  Outcome: Progressing     Problem: Patient/Family Goals  Goal: Patient/Family Long Term Goal  Description: Patient's Long Term Goal: Treat swelling in legs    Interventions:  - Lasix  - monitor labs and vitals  - wound care  - See additional Care Plan goals for specific interventions  Outcome: Progressing  Goal: Patient/Family Short Term Goal  Description: Patient's Short Term Goal: manage pain    Interventions:   - morphine  - pain medication  - monitor labs and vitals  - See additional Care Plan goals for specific interventions  Outcome: Progressing     Problem: CARDIOVASCULAR - ADULT  Goal: Maintains optimal cardiac output and hemodynamic stability  Description: INTERVENTIONS:  - Monitor vital signs, rhythm, and trends  - Monitor for bleeding, hypotension and signs of decreased cardiac output  - Evaluate effectiveness of vasoactive medications to optimize hemodynamic stability  - Monitor arterial and/or venous puncture sites for bleeding and/or hematoma  - Assess quality of pulses, skin color and temperature  - Assess for signs of decreased coronary artery perfusion - ex. Angina  - Evaluate fluid balance, assess for edema, trend weights  Outcome: Progressing     Problem: Impaired Activities of Daily Living  Goal: Achieve highest/safest level of independence in self care  Description: Interventions:  - Assess ability and encourage patient to participate in ADLs to maximize function  - Promote sitting position while performing ADLs such as feeding, grooming, and bathing  - Educate and encourage patient/family in tolerated functional activity level and precautions during self-care  - Provide support under elbow of weak side to prevent shoulder subluxation  Outcome: Progressing     Problem: NEUROLOGICAL - ADULT  Goal: Achieves stable or improved neurological status  Description: INTERVENTIONS  - Assess for and report changes in neurological status  - Initiate measures to prevent increased intracranial pressure  - Maintain blood pressure and fluid volume within ordered parameters to optimize cerebral perfusion and minimize risk of hemorrhage  - Monitor temperature, glucose, and sodium.  Initiate appropriate interventions as ordered  Outcome: Progressing  Goal: Absence of seizures  Description: INTERVENTIONS  - Monitor for seizure activity  - Administer anti-seizure medications as ordered  - Monitor neurological status  Outcome: Progressing  Goal: Remains free of injury related to seizure activity  Description: INTERVENTIONS:  - Maintain airway, patient safety  and administer oxygen as ordered  - Monitor patient for seizure activity, document and report duration and description of seizure to MD/LIP  - If seizure occurs, turn patient to side and suction secretions as needed  - Reorient patient post seizure  - Seizure pads on all 4 side rails  - Instruct patient/family to notify RN of any seizure activity  - Instruct patient/family to call for assistance with activity based on assessment  Outcome: Progressing  Goal: Achieves maximal functionality and self care  Description: INTERVENTIONS  - Monitor swallowing and airway patency with patient fatigue and changes in neurological status  - Encourage and assist patient to increase activity and self care with guidance from PT/OT  - Encourage visually impaired, hearing impaired and aphasic patients to use assistive/communication devices  Outcome: Progressing     Problem: Impaired Cognition  Goal: Patient will exhibit improved attention, thought processing and/or memory  Description: Interventions:  - Minimize distractions in the room when full attention is required  - Allow additional time for processing after asking questions or providing instructions  Outcome: Progressing     Problem: Impaired Communication  Goal: Patient will achieve maximal communication potential  Description: Interventions:  - Encourage use of alternative/augmentative communication to express basic wants and needs (use of communication/letter board/or smartphone/tablet/handwriting)  - Allow additional time for processing after asking questions or providing instructions  - Ask \"yes/no\" questions to facilitate patient's ability to communicate wants and needs  Outcome: Progressing

## 2022-10-13 NOTE — PLAN OF CARE
Patient continues to have expressive aphasia. Patient frustrated by his difficulty with word finding. Patient complaining of bilateral leg pain. Prn morphine administered. Patient reported pain decreased. Patient reports he usually takes gabapentin 2 times per day but has only been getting it once a day here. Dr. Monaco Hands informed. Patient and POA updated on POC and verbalized understanding. Problem: Patient Centered Care  Goal: Patient preferences are identified and integrated in the patient's plan of care  Description: Interventions:  - What would you like us to know as we care for you?  I have heart failure  - Provide timely, complete, and accurate information to patient/family  - Incorporate patient and family knowledge, values, beliefs, and cultural backgrounds into the planning and delivery of care  - Encourage patient/family to participate in care and decision-making at the level they choose  - Honor patient and family perspectives and choices  Outcome: Progressing     Problem: Diabetes/Glucose Control  Goal: Glucose maintained within prescribed range  Description: INTERVENTIONS:  - Monitor Blood Glucose as ordered  - Assess for signs and symptoms of hyperglycemia and hypoglycemia  - Administer ordered medications to maintain glucose within target range  - Assess barriers to adequate nutritional intake and initiate nutrition consult as needed  - Instruct patient on self management of diabetes  Outcome: Progressing     Problem: Patient/Family Goals  Goal: Patient/Family Long Term Goal  Description: Patient's Long Term Goal: Treat swelling in legs    Interventions:  - Lasix  - Fluid restriction  - monitor labs and vitals  - wound care  - See additional Care Plan goals for specific interventions  Outcome: Progressing  Goal: Patient/Family Short Term Goal  Description: Patient's Short Term Goal: manage pain    Interventions:   - morphine  - pain medication  - monitor labs and vitals  - See additional Care Plan goals for specific interventions  Outcome: Progressing     Problem: CARDIOVASCULAR - ADULT  Goal: Maintains optimal cardiac output and hemodynamic stability  Description: INTERVENTIONS:  - Monitor vital signs, rhythm, and trends  - Monitor for bleeding, hypotension and signs of decreased cardiac output  - Evaluate effectiveness of vasoactive medications to optimize hemodynamic stability  - Monitor arterial and/or venous puncture sites for bleeding and/or hematoma  - Assess quality of pulses, skin color and temperature  - Assess for signs of decreased coronary artery perfusion - ex. Angina  - Evaluate fluid balance, assess for edema, trend weights  Outcome: Progressing     Problem: Impaired Activities of Daily Living  Goal: Achieve highest/safest level of independence in self care  Description: Interventions:  - Assess ability and encourage patient to participate in ADLs to maximize function  - Promote sitting position while performing ADLs such as feeding, grooming, and bathing  - Educate and encourage patient/family in tolerated functional activity level and precautions during self-care  Outcome: Progressing     Problem: NEUROLOGICAL - ADULT  Goal: Achieves stable or improved neurological status  Description: INTERVENTIONS  - Assess for and report changes in neurological status  - Initiate measures to prevent increased intracranial pressure  - Maintain blood pressure and fluid volume within ordered parameters to optimize cerebral perfusion and minimize risk of hemorrhage  - Monitor temperature, glucose, and sodium.  Initiate appropriate interventions as ordered  Outcome: Progressing  Goal: Absence of seizures  Description: INTERVENTIONS  - Monitor for seizure activity  - Administer anti-seizure medications as ordered  - Monitor neurological status  Outcome: Progressing  Goal: Remains free of injury related to seizure activity  Description: INTERVENTIONS:  - Maintain airway, patient safety  and administer oxygen as ordered  - Monitor patient for seizure activity, document and report duration and description of seizure to MD/LIP  - If seizure occurs, turn patient to side and suction secretions as needed  - Reorient patient post seizure  - Seizure pads on all 4 side rails  - Instruct patient/family to notify RN of any seizure activity  - Instruct patient/family to call for assistance with activity based on assessment  Outcome: Progressing  Goal: Achieves maximal functionality and self care  Description: INTERVENTIONS  - Monitor swallowing and airway patency with patient fatigue and changes in neurological status  - Encourage and assist patient to increase activity and self care with guidance from PT/OT  - Encourage visually impaired, hearing impaired and aphasic patients to use assistive/communication devices  Outcome: Progressing     Problem: Impaired Cognition  Goal: Patient will exhibit improved attention, thought processing and/or memory  Description: Interventions:  Outcome: Progressing     Problem: Impaired Communication  Goal: Patient will achieve maximal communication potential  Description: Interventions:  Outcome: Progressing

## 2022-10-13 NOTE — CM/SW NOTE
CM emailed list of accepting [facility] facilities to relative Melecio who is assisting in chose. Melecio agreed to review and provide choice later today. 1425: [facility] is chosen facility per patient and Melecio. Facility reserved in [address] and notified pt will be ready for dc in 1-2 days. Plan: [facility] for [facility] pending medical clearance.     Nidhi Sinha, PADMAN    [phone]

## 2022-10-14 LAB
ANION GAP SERPL CALC-SCNC: 6 MMOL/L (ref 0–18)
BUN BLD-MCNC: 12 MG/DL (ref 7–18)
BUN/CREAT SERPL: 10.8 (ref 10–20)
CALCIUM BLD-MCNC: 8.2 MG/DL (ref 8.5–10.1)
CHLORIDE SERPL-SCNC: 97 MMOL/L (ref 98–112)
CO2 SERPL-SCNC: 32 MMOL/L (ref 21–32)
CREAT BLD-MCNC: 1.11 MG/DL
GFR SERPLBLD BASED ON 1.73 SQ M-ARVRAT: 75 ML/MIN/1.73M2 (ref 60–?)
GLUCOSE BLD-MCNC: 148 MG/DL (ref 70–99)
GLUCOSE BLDC GLUCOMTR-MCNC: 122 MG/DL (ref 70–99)
GLUCOSE BLDC GLUCOMTR-MCNC: 229 MG/DL (ref 70–99)
GLUCOSE BLDC GLUCOMTR-MCNC: 230 MG/DL (ref 70–99)
GLUCOSE BLDC GLUCOMTR-MCNC: 230 MG/DL (ref 70–99)
OSMOLALITY SERPL CALC.SUM OF ELEC: 283 MOSM/KG (ref 275–295)
POTASSIUM SERPL-SCNC: 3.7 MMOL/L (ref 3.5–5.1)
SODIUM SERPL-SCNC: 135 MMOL/L (ref 136–145)

## 2022-10-14 RX ORDER — TRAMADOL HYDROCHLORIDE 50 MG/1
50 TABLET ORAL EVERY 8 HOURS PRN
Status: DISCONTINUED | OUTPATIENT
Start: 2022-10-14 | End: 2022-10-15

## 2022-10-14 RX ORDER — POTASSIUM CHLORIDE 20 MEQ/1
40 TABLET, EXTENDED RELEASE ORAL ONCE
Status: COMPLETED | OUTPATIENT
Start: 2022-10-14 | End: 2022-10-14

## 2022-10-14 NOTE — OCCUPATIONAL THERAPY NOTE
RN approving of pt participation in therapy. Pt received up in chair, alert and oriented x 4. Pt refusing all attempts to engage in functional activities stating that his legs were \"full of bumps and too painful\".  Treatment deferred per pt

## 2022-10-14 NOTE — PLAN OF CARE
Pt alert and oriented x3. Pt on room air. PRN norco given for leg pain. Primary nurse changed wound dressings. Pt states \"I want to go home to die\"; MD aware. Pt refusing [facility] at this time. PRN Zofran given for nausea. Problem: Patient Centered Care  Goal: Patient preferences are identified and integrated in the patient's plan of care  Description: Interventions:  - What would you like us to know as we care for you?  I have heart failure  - Provide timely, complete, and accurate information to patient/family  - Incorporate patient and family knowledge, values, beliefs, and cultural backgrounds into the planning and delivery of care  - Encourage patient/family to participate in care and decision-making at the level they choose  - Honor patient and family perspectives and choices  Outcome: Progressing     Problem: Diabetes/Glucose Control  Goal: Glucose maintained within prescribed range  Description: INTERVENTIONS:  - Monitor Blood Glucose as ordered  - Assess for signs and symptoms of hyperglycemia and hypoglycemia  - Administer ordered medications to maintain glucose within target range  - Assess barriers to adequate nutritional intake and initiate nutrition consult as needed  - Instruct patient on self management of diabetes  Outcome: Progressing     Problem: Patient/Family Goals  Goal: Patient/Family Long Term Goal  Description: Patient's Long Term Goal: Treat swelling in legs    Interventions:  - Lasix  - Fluid restriction  - monitor labs and vitals  - wound care  - See additional Care Plan goals for specific interventions  Outcome: Progressing  Goal: Patient/Family Short Term Goal  Description: Patient's Short Term Goal: manage pain    Interventions:   - morphine  - pain medication  - monitor labs and vitals  - See additional Care Plan goals for specific interventions  Outcome: Progressing     Problem: CARDIOVASCULAR - ADULT  Goal: Maintains optimal cardiac output and hemodynamic stability  Description: INTERVENTIONS:  - Monitor vital signs, rhythm, and trends  - Monitor for bleeding, hypotension and signs of decreased cardiac output  - Evaluate effectiveness of vasoactive medications to optimize hemodynamic stability  - Monitor arterial and/or venous puncture sites for bleeding and/or hematoma  - Assess quality of pulses, skin color and temperature  - Assess for signs of decreased coronary artery perfusion - ex. Angina  - Evaluate fluid balance, assess for edema, trend weights  Outcome: Progressing     Problem: Impaired Activities of Daily Living  Goal: Achieve highest/safest level of independence in self care  Description: Interventions:  - Assess ability and encourage patient to participate in ADLs to maximize function  - Promote sitting position while performing ADLs such as feeding, grooming, and bathing  - Educate and encourage patient/family in tolerated functional activity level and precautions during self-care  - Encourage patient to incorporate impaired side during daily activities to promote function  Outcome: Progressing     Problem: NEUROLOGICAL - ADULT  Goal: Achieves stable or improved neurological status  Description: INTERVENTIONS  - Assess for and report changes in neurological status  - Initiate measures to prevent increased intracranial pressure  - Maintain blood pressure and fluid volume within ordered parameters to optimize cerebral perfusion and minimize risk of hemorrhage  - Monitor temperature, glucose, and sodium.  Initiate appropriate interventions as ordered  Outcome: Progressing  Goal: Absence of seizures  Description: INTERVENTIONS  - Monitor for seizure activity  - Administer anti-seizure medications as ordered  - Monitor neurological status  Outcome: Progressing  Goal: Remains free of injury related to seizure activity  Description: INTERVENTIONS:  - Maintain airway, patient safety  and administer oxygen as ordered  - Monitor patient for seizure activity, document and report duration and description of seizure to MD/LIP  - If seizure occurs, turn patient to side and suction secretions as needed  - Reorient patient post seizure  - Seizure pads on all 4 side rails  - Instruct patient/family to notify RN of any seizure activity  - Instruct patient/family to call for assistance with activity based on assessment  Outcome: Progressing  Goal: Achieves maximal functionality and self care  Description: INTERVENTIONS  - Monitor swallowing and airway patency with patient fatigue and changes in neurological status  - Encourage and assist patient to increase activity and self care with guidance from PT/OT  - Encourage visually impaired, hearing impaired and aphasic patients to use assistive/communication devices  Outcome: Progressing     Problem: Impaired Cognition  Goal: Patient will exhibit improved attention, thought processing and/or memory  Description: Interventions:  - Allow additional time for processing after asking questions or providing instructions  Outcome: Progressing     Problem: Impaired Communication  Goal: Patient will achieve maximal communication potential  Description: Interventions:  - Ask \"yes/no\" questions to facilitate patient's ability to communicate wants and needs  Outcome: Progressing

## 2022-10-14 NOTE — PLAN OF CARE
Magda Dyson is alert and oriented. On RA. Neuros Q4H, no changes. Leg pain overnight, prn given. Wound dressings changed. Voiding freely. Plan to switch to PO diuretics. Discharge to [facility] once medically cleared. 0500- Potassium replaced per protocol. Problem: Patient Centered Care  Goal: Patient preferences are identified and integrated in the patient's plan of care  Description: Interventions:  - What would you like us to know as we care for you?  I have heart failure  - Provide timely, complete, and accurate information to patient/family  - Incorporate patient and family knowledge, values, beliefs, and cultural backgrounds into the planning and delivery of care  - Encourage patient/family to participate in care and decision-making at the level they choose  - Honor patient and family perspectives and choices  Outcome: Progressing     Problem: Diabetes/Glucose Control  Goal: Glucose maintained within prescribed range  Description: INTERVENTIONS:  - Monitor Blood Glucose as ordered  - Assess for signs and symptoms of hyperglycemia and hypoglycemia  - Administer ordered medications to maintain glucose within target range  - Assess barriers to adequate nutritional intake and initiate nutrition consult as needed  - Instruct patient on self management of diabetes  Outcome: Progressing     Problem: Patient/Family Goals  Goal: Patient/Family Long Term Goal  Description: Patient's Long Term Goal: Treat swelling in legs    Interventions:  - Lasix  - Fluid restriction  - monitor labs and vitals  - wound care  - See additional Care Plan goals for specific interventions  Outcome: Progressing  Goal: Patient/Family Short Term Goal  Description: Patient's Short Term Goal: manage pain    Interventions:   - morphine  - pain medication  - monitor labs and vitals  - See additional Care Plan goals for specific interventions  Outcome: Progressing     Problem: CARDIOVASCULAR - ADULT  Goal: Maintains optimal cardiac output and hemodynamic stability  Description: INTERVENTIONS:  - Monitor vital signs, rhythm, and trends  - Monitor for bleeding, hypotension and signs of decreased cardiac output  - Evaluate effectiveness of vasoactive medications to optimize hemodynamic stability  - Monitor arterial and/or venous puncture sites for bleeding and/or hematoma  - Assess quality of pulses, skin color and temperature  - Assess for signs of decreased coronary artery perfusion - ex. Angina  - Evaluate fluid balance, assess for edema, trend weights  Outcome: Progressing     Problem: Impaired Activities of Daily Living  Goal: Achieve highest/safest level of independence in self care  Description: Interventions:  - Assess ability and encourage patient to participate in ADLs to maximize function  - Promote sitting position while performing ADLs such as feeding, grooming, and bathing  - Educate and encourage patient/family in tolerated functional activity level and precautions during self-care  - Provide support under elbow of weak side to prevent shoulder subluxation  Outcome: Progressing     Problem: NEUROLOGICAL - ADULT  Goal: Achieves stable or improved neurological status  Description: INTERVENTIONS  - Assess for and report changes in neurological status  - Initiate measures to prevent increased intracranial pressure  - Maintain blood pressure and fluid volume within ordered parameters to optimize cerebral perfusion and minimize risk of hemorrhage  - Monitor temperature, glucose, and sodium.  Initiate appropriate interventions as ordered  Outcome: Progressing  Goal: Absence of seizures  Description: INTERVENTIONS  - Monitor for seizure activity  - Administer anti-seizure medications as ordered  - Monitor neurological status  Outcome: Progressing  Goal: Remains free of injury related to seizure activity  Description: INTERVENTIONS:  - Maintain airway, patient safety  and administer oxygen as ordered  - Monitor patient for seizure activity, document and report duration and description of seizure to MD/LIP  - If seizure occurs, turn patient to side and suction secretions as needed  - Reorient patient post seizure  - Seizure pads on all 4 side rails  - Instruct patient/family to notify RN of any seizure activity  - Instruct patient/family to call for assistance with activity based on assessment  Outcome: Progressing  Goal: Achieves maximal functionality and self care  Description: INTERVENTIONS  - Monitor swallowing and airway patency with patient fatigue and changes in neurological status  - Encourage and assist patient to increase activity and self care with guidance from PT/OT  - Encourage visually impaired, hearing impaired and aphasic patients to use assistive/communication devices  Outcome: Progressing     Problem: Impaired Cognition  Goal: Patient will exhibit improved attention, thought processing and/or memory  Description: Interventions:  - Minimize distractions in the room when full attention is required  - Consider use of a daily journal to improve memory and reduce confusion related to daily events and orientation  - Allow additional time for processing after asking questions or providing instructions  Outcome: Progressing     Problem: Impaired Communication  Goal: Patient will achieve maximal communication potential  Description: Interventions:  - Encourage use of alternative/augmentative communication to express basic wants and needs (use of communication/letter board/or smartphone/tablet/handwriting)  - Provide modeling for options to improve word retrieval in known context  - Allow additional time for processing after asking questions or providing instructions  - Ask \"yes/no\" questions to facilitate patient's ability to communicate wants and needs  Outcome: Progressing

## 2022-10-15 LAB
GLUCOSE BLDC GLUCOMTR-MCNC: 159 MG/DL (ref 70–99)
GLUCOSE BLDC GLUCOMTR-MCNC: 190 MG/DL (ref 70–99)
GLUCOSE BLDC GLUCOMTR-MCNC: 226 MG/DL (ref 70–99)
GLUCOSE BLDC GLUCOMTR-MCNC: 231 MG/DL (ref 70–99)

## 2022-10-15 PROCEDURE — 90792 PSYCH DIAG EVAL W/MED SRVCS: CPT | Performed by: OTHER

## 2022-10-15 RX ORDER — HYDROCODONE BITARTRATE AND ACETAMINOPHEN 5; 325 MG/1; MG/1
1 TABLET ORAL EVERY 8 HOURS PRN
Status: DISCONTINUED | OUTPATIENT
Start: 2022-10-15 | End: 2022-10-15

## 2022-10-15 RX ORDER — QUETIAPINE FUMARATE 25 MG/1
50 TABLET, FILM COATED ORAL NIGHTLY
Status: DISCONTINUED | OUTPATIENT
Start: 2022-10-15 | End: 2022-10-18

## 2022-10-15 RX ORDER — QUETIAPINE FUMARATE 25 MG/1
25 TABLET, FILM COATED ORAL 2 TIMES DAILY PRN
Status: DISCONTINUED | OUTPATIENT
Start: 2022-10-15 | End: 2022-10-18

## 2022-10-15 RX ORDER — HYDROCODONE BITARTRATE AND ACETAMINOPHEN 5; 325 MG/1; MG/1
2 TABLET ORAL EVERY 8 HOURS PRN
Status: DISCONTINUED | OUTPATIENT
Start: 2022-10-15 | End: 2022-10-18

## 2022-10-15 NOTE — PLAN OF CARE
Patient alert x4, RA. Morphine given x1 for pain. Patient very agitated and upset about change of pain medication (Norco to tramadol) Patient making statements about wanting to go home and die. Psych. On consult. Patient refused morning labs regardless of education- Patient stated \"If you guys are going to give me tramadol and mess with my stomach I'm not going to give any blood. \" No tramadol was given throughout the night. Educated patient to call for assistance, call light within reach. 0500: Pain medications changed back per MD    Problem: Patient Centered Care  Goal: Patient preferences are identified and integrated in the patient's plan of care  Description: Interventions:  - What would you like us to know as we care for you?  I have heart failure  - Provide timely, complete, and accurate information to patient/family  - Incorporate patient and family knowledge, values, beliefs, and cultural backgrounds into the planning and delivery of care  - Encourage patient/family to participate in care and decision-making at the level they choose  - Honor patient and family perspectives and choices  Outcome: Progressing     Problem: Diabetes/Glucose Control  Goal: Glucose maintained within prescribed range  Description: INTERVENTIONS:  - Monitor Blood Glucose as ordered  - Assess for signs and symptoms of hyperglycemia and hypoglycemia  - Administer ordered medications to maintain glucose within target range  - Assess barriers to adequate nutritional intake and initiate nutrition consult as needed  - Instruct patient on self management of diabetes  Outcome: Progressing     Problem: Patient/Family Goals  Goal: Patient/Family Long Term Goal  Description: Patient's Long Term Goal: Treat swelling in legs    Interventions:  - Lasix  - Fluid restriction  - monitor labs and vitals  - wound care  - See additional Care Plan goals for specific interventions  Outcome: Progressing  Goal: Patient/Family Short Term Goal  Description: Patient's Short Term Goal: manage pain    Interventions:   - morphine  - pain medication  - monitor labs and vitals  - See additional Care Plan goals for specific interventions  Outcome: Progressing     Problem: Impaired Activities of Daily Living  Goal: Achieve highest/safest level of independence in self care  Description: Interventions:  - Assess ability and encourage patient to participate in ADLs to maximize function  - Promote sitting position while performing ADLs such as feeding, grooming, and bathing  - Educate and encourage patient/family in tolerated functional activity level and precautions during self-care    Outcome: Progressing     Problem: NEUROLOGICAL - ADULT  Goal: Achieves stable or improved neurological status  Description: INTERVENTIONS  - Assess for and report changes in neurological status  - Initiate measures to prevent increased intracranial pressure  - Maintain blood pressure and fluid volume within ordered parameters to optimize cerebral perfusion and minimize risk of hemorrhage  - Monitor temperature, glucose, and sodium.  Initiate appropriate interventions as ordered  Outcome: Progressing  Goal: Absence of seizures  Description: INTERVENTIONS  - Monitor for seizure activity  - Administer anti-seizure medications as ordered  - Monitor neurological status  Outcome: Progressing  Goal: Remains free of injury related to seizure activity  Description: INTERVENTIONS:  - Maintain airway, patient safety  and administer oxygen as ordered  - Monitor patient for seizure activity, document and report duration and description of seizure to MD/LIP  - If seizure occurs, turn patient to side and suction secretions as needed  - Reorient patient post seizure  - Seizure pads on all 4 side rails  - Instruct patient/family to notify RN of any seizure activity  - Instruct patient/family to call for assistance with activity based on assessment  Outcome: Progressing  Goal: Achieves maximal functionality and self care  Description: INTERVENTIONS  - Monitor swallowing and airway patency with patient fatigue and changes in neurological status  - Encourage and assist patient to increase activity and self care with guidance from PT/OT  - Encourage visually impaired, hearing impaired and aphasic patients to use assistive/communication devices  Outcome: Progressing     Problem: Impaired Cognition  Goal: Patient will exhibit improved attention, thought processing and/or memory  Description: Interventions:    Outcome: Progressing     Problem: Impaired Communication  Goal: Patient will achieve maximal communication potential  Description: Interventions:    Outcome: Progressing

## 2022-10-15 NOTE — PLAN OF CARE
Patient alert & oriented x4. Patient complaining of bilateral leg and abdominal pain. Patient has received prn norco and morphine for the pain but abdominal pain remains uncontrolled. Patient is refusing tramadol/miralax as well as heat and/or ice packs. Dr. Sofia Burciaga notified. Patient updated on POC and verbalized understanding. Problem: Patient Centered Care  Goal: Patient preferences are identified and integrated in the patient's plan of care  Description: Interventions:  - What would you like us to know as we care for you?  I have heart failure  - Provide timely, complete, and accurate information to patient/family  - Incorporate patient and family knowledge, values, beliefs, and cultural backgrounds into the planning and delivery of care  - Encourage patient/family to participate in care and decision-making at the level they choose  - Honor patient and family perspectives and choices  Outcome: Progressing     Problem: Diabetes/Glucose Control  Goal: Glucose maintained within prescribed range  Description: INTERVENTIONS:  - Monitor Blood Glucose as ordered  - Assess for signs and symptoms of hyperglycemia and hypoglycemia  - Administer ordered medications to maintain glucose within target range  - Assess barriers to adequate nutritional intake and initiate nutrition consult as needed  - Instruct patient on self management of diabetes  Outcome: Progressing     Problem: Patient/Family Goals  Goal: Patient/Family Long Term Goal  Description: Patient's Long Term Goal: Treat swelling in legs    Interventions:  - Lasix  - Fluid restriction  - monitor labs and vitals  - wound care  - See additional Care Plan goals for specific interventions  Outcome: Progressing  Goal: Patient/Family Short Term Goal  Description: Patient's Short Term Goal: manage pain    Interventions:   - morphine  - pain medication  - monitor labs and vitals  - See additional Care Plan goals for specific interventions  Outcome: Progressing Problem: CARDIOVASCULAR - ADULT  Goal: Maintains optimal cardiac output and hemodynamic stability  Description: INTERVENTIONS:  - Monitor vital signs, rhythm, and trends  - Monitor for bleeding, hypotension and signs of decreased cardiac output  - Evaluate effectiveness of vasoactive medications to optimize hemodynamic stability  - Monitor arterial and/or venous puncture sites for bleeding and/or hematoma  - Assess quality of pulses, skin color and temperature  - Assess for signs of decreased coronary artery perfusion - ex. Angina  - Evaluate fluid balance, assess for edema, trend weights  Outcome: Progressing     Problem: Impaired Activities of Daily Living  Goal: Achieve highest/safest level of independence in self care  Description: Interventions:  - Assess ability and encourage patient to participate in ADLs to maximize function  - Promote sitting position while performing ADLs such as feeding, grooming, and bathing  - Educate and encourage patient/family in tolerated functional activity level and precautions during self-care  Outcome: Progressing     Problem: NEUROLOGICAL - ADULT  Goal: Achieves stable or improved neurological status  Description: INTERVENTIONS  - Assess for and report changes in neurological status  - Initiate measures to prevent increased intracranial pressure  - Maintain blood pressure and fluid volume within ordered parameters to optimize cerebral perfusion and minimize risk of hemorrhage  - Monitor temperature, glucose, and sodium.  Initiate appropriate interventions as ordered  Outcome: Progressing  Goal: Absence of seizures  Description: INTERVENTIONS  - Monitor for seizure activity  - Administer anti-seizure medications as ordered  - Monitor neurological status  Outcome: Progressing  Goal: Remains free of injury related to seizure activity  Description: INTERVENTIONS:  - Maintain airway, patient safety  and administer oxygen as ordered  - Monitor patient for seizure activity, document and report duration and description of seizure to MD/LIP  - If seizure occurs, turn patient to side and suction secretions as needed  - Reorient patient post seizure  - Seizure pads on all 4 side rails  - Instruct patient/family to notify RN of any seizure activity  - Instruct patient/family to call for assistance with activity based on assessment  Outcome: Progressing  Goal: Achieves maximal functionality and self care  Description: INTERVENTIONS  - Monitor swallowing and airway patency with patient fatigue and changes in neurological status  - Encourage and assist patient to increase activity and self care with guidance from PT/OT  - Encourage visually impaired, hearing impaired and aphasic patients to use assistive/communication devices  Outcome: Progressing     Problem: Impaired Cognition  Goal: Patient will exhibit improved attention, thought processing and/or memory  Description: Interventions:  Outcome: Progressing     Problem: Impaired Communication  Goal: Patient will achieve maximal communication potential  Description: Interventions:  Outcome: Progressing

## 2022-10-16 LAB
ANION GAP SERPL CALC-SCNC: 6 MMOL/L (ref 0–18)
BUN BLD-MCNC: 16 MG/DL (ref 7–18)
BUN/CREAT SERPL: 16.7 (ref 10–20)
CALCIUM BLD-MCNC: 8.5 MG/DL (ref 8.5–10.1)
CHLORIDE SERPL-SCNC: 98 MMOL/L (ref 98–112)
CO2 SERPL-SCNC: 32 MMOL/L (ref 21–32)
CREAT BLD-MCNC: 0.96 MG/DL
GFR SERPLBLD BASED ON 1.73 SQ M-ARVRAT: 89 ML/MIN/1.73M2 (ref 60–?)
GLUCOSE BLD-MCNC: 143 MG/DL (ref 70–99)
GLUCOSE BLDC GLUCOMTR-MCNC: 159 MG/DL (ref 70–99)
GLUCOSE BLDC GLUCOMTR-MCNC: 163 MG/DL (ref 70–99)
GLUCOSE BLDC GLUCOMTR-MCNC: 204 MG/DL (ref 70–99)
GLUCOSE BLDC GLUCOMTR-MCNC: 311 MG/DL (ref 70–99)
OSMOLALITY SERPL CALC.SUM OF ELEC: 286 MOSM/KG (ref 275–295)
POTASSIUM SERPL-SCNC: 4 MMOL/L (ref 3.5–5.1)
POTASSIUM SERPL-SCNC: 4 MMOL/L (ref 3.5–5.1)
SODIUM SERPL-SCNC: 136 MMOL/L (ref 136–145)

## 2022-10-16 PROCEDURE — 99233 SBSQ HOSP IP/OBS HIGH 50: CPT | Performed by: OTHER

## 2022-10-16 RX ORDER — CLONAZEPAM 0.25 MG/1
0.25 TABLET, ORALLY DISINTEGRATING ORAL NIGHTLY
Status: DISCONTINUED | OUTPATIENT
Start: 2022-10-16 | End: 2022-10-18

## 2022-10-16 NOTE — PLAN OF CARE
Patient alert x4, RA. Patient had an incontinent bowel episode throughout the night, and became verbally aggressive towards staff not letting us clean him. Morphine and norco given for BLE pain. Educated patient to call for assistance, call light within reach. Patient refused AM labs again. After speaking with patient, patient agreed to have labs drawn later in the morning. Notified phlebotomy to come back around 8am.    Problem: Patient Centered Care  Goal: Patient preferences are identified and integrated in the patient's plan of care  Description: Interventions:  - What would you like us to know as we care for you?  I have heart failure  - Provide timely, complete, and accurate information to patient/family  - Incorporate patient and family knowledge, values, beliefs, and cultural backgrounds into the planning and delivery of care  - Encourage patient/family to participate in care and decision-making at the level they choose  - Honor patient and family perspectives and choices  Outcome: Progressing     Problem: Diabetes/Glucose Control  Goal: Glucose maintained within prescribed range  Description: INTERVENTIONS:  - Monitor Blood Glucose as ordered  - Assess for signs and symptoms of hyperglycemia and hypoglycemia  - Administer ordered medications to maintain glucose within target range  - Assess barriers to adequate nutritional intake and initiate nutrition consult as needed  - Instruct patient on self management of diabetes  Outcome: Progressing     Problem: Patient/Family Goals  Goal: Patient/Family Long Term Goal  Description: Patient's Long Term Goal: Treat swelling in legs    Interventions:  - Lasix  - Fluid restriction  - monitor labs and vitals  - wound care  - See additional Care Plan goals for specific interventions  Outcome: Progressing  Goal: Patient/Family Short Term Goal  Description: Patient's Short Term Goal: manage pain    Interventions:   - morphine  - pain medication  - monitor labs and vitals  - See additional Care Plan goals for specific interventions  Outcome: Progressing     Problem: CARDIOVASCULAR - ADULT  Goal: Maintains optimal cardiac output and hemodynamic stability  Description: INTERVENTIONS:  - Monitor vital signs, rhythm, and trends  - Monitor for bleeding, hypotension and signs of decreased cardiac output  - Evaluate effectiveness of vasoactive medications to optimize hemodynamic stability  - Monitor arterial and/or venous puncture sites for bleeding and/or hematoma  - Assess quality of pulses, skin color and temperature  - Assess for signs of decreased coronary artery perfusion - ex. Angina  - Evaluate fluid balance, assess for edema, trend weights  Outcome: Progressing     Problem: Impaired Activities of Daily Living  Goal: Achieve highest/safest level of independence in self care  Description: Interventions:  - Assess ability and encourage patient to participate in ADLs to maximize function  - Promote sitting position while performing ADLs such as feeding, grooming, and bathing  - Educate and encourage patient/family in tolerated functional activity level and precautions during self-care    Outcome: Progressing     Problem: NEUROLOGICAL - ADULT  Goal: Achieves stable or improved neurological status  Description: INTERVENTIONS  - Assess for and report changes in neurological status  - Initiate measures to prevent increased intracranial pressure  - Maintain blood pressure and fluid volume within ordered parameters to optimize cerebral perfusion and minimize risk of hemorrhage  - Monitor temperature, glucose, and sodium.  Initiate appropriate interventions as ordered  Outcome: Progressing  Goal: Absence of seizures  Description: INTERVENTIONS  - Monitor for seizure activity  - Administer anti-seizure medications as ordered  - Monitor neurological status  Outcome: Progressing  Goal: Remains free of injury related to seizure activity  Description: INTERVENTIONS:  - Maintain airway, patient safety  and administer oxygen as ordered  - Monitor patient for seizure activity, document and report duration and description of seizure to MD/LIP  - If seizure occurs, turn patient to side and suction secretions as needed  - Reorient patient post seizure  - Seizure pads on all 4 side rails  - Instruct patient/family to notify RN of any seizure activity  - Instruct patient/family to call for assistance with activity based on assessment  Outcome: Progressing  Goal: Achieves maximal functionality and self care  Description: INTERVENTIONS  - Monitor swallowing and airway patency with patient fatigue and changes in neurological status  - Encourage and assist patient to increase activity and self care with guidance from PT/OT  - Encourage visually impaired, hearing impaired and aphasic patients to use assistive/communication devices  Outcome: Progressing     Problem: Impaired Cognition  Goal: Patient will exhibit improved attention, thought processing and/or memory  Description: Interventions:    Outcome: Progressing     Problem: Impaired Communication  Goal: Patient will achieve maximal communication potential  Description: Interventions:    Outcome: Progressing

## 2022-10-17 LAB
GLUCOSE BLDC GLUCOMTR-MCNC: 151 MG/DL (ref 70–99)
GLUCOSE BLDC GLUCOMTR-MCNC: 180 MG/DL (ref 70–99)
GLUCOSE BLDC GLUCOMTR-MCNC: 184 MG/DL (ref 70–99)
GLUCOSE BLDC GLUCOMTR-MCNC: 196 MG/DL (ref 70–99)

## 2022-10-17 PROCEDURE — 99233 SBSQ HOSP IP/OBS HIGH 50: CPT | Performed by: OTHER

## 2022-10-17 RX ORDER — HYDROCODONE BITARTRATE AND ACETAMINOPHEN 5; 325 MG/1; MG/1
2 TABLET ORAL EVERY 8 HOURS PRN
Qty: 10 TABLET | Refills: 0 | Status: SHIPPED | OUTPATIENT
Start: 2022-10-17

## 2022-10-17 RX ORDER — ASPIRIN 81 MG/1
81 TABLET ORAL DAILY
Qty: 30 TABLET | Refills: 0 | Status: SHIPPED | OUTPATIENT
Start: 2022-10-18

## 2022-10-17 NOTE — PLAN OF CARE
Problem: Patient Centered Care  Goal: Patient preferences are identified and integrated in the patient's plan of care  Description: Interventions:  - What would you like us to know as we care for you?  I have heart failure  - Provide timely, complete, and accurate information to patient/family  - Incorporate patient and family knowledge, values, beliefs, and cultural backgrounds into the planning and delivery of care  - Encourage patient/family to participate in care and decision-making at the level they choose  - Honor patient and family perspectives and choices  Outcome: Progressing     Problem: Diabetes/Glucose Control  Goal: Glucose maintained within prescribed range  Description: INTERVENTIONS:  - Monitor Blood Glucose as ordered  - Assess for signs and symptoms of hyperglycemia and hypoglycemia  - Administer ordered medications to maintain glucose within target range  - Assess barriers to adequate nutritional intake and initiate nutrition consult as needed  - Instruct patient on self management of diabetes  Outcome: Progressing     Problem: Patient/Family Goals  Goal: Patient/Family Long Term Goal  Description: Patient's Long Term Goal: Treat swelling in legs    Interventions:  - Lasix  - Fluid restriction  - monitor labs and vitals  - wound care  - See additional Care Plan goals for specific interventions  Outcome: Progressing  Goal: Patient/Family Short Term Goal  Description: Patient's Short Term Goal: manage pain    Interventions:   - morphine  - pain medication  - monitor labs and vitals  - See additional Care Plan goals for specific interventions  Outcome: Progressing     Problem: CARDIOVASCULAR - ADULT  Goal: Maintains optimal cardiac output and hemodynamic stability  Description: INTERVENTIONS:  - Monitor vital signs, rhythm, and trends  - Monitor for bleeding, hypotension and signs of decreased cardiac output  - Evaluate effectiveness of vasoactive medications to optimize hemodynamic stability  - Monitor arterial and/or venous puncture sites for bleeding and/or hematoma  - Assess quality of pulses, skin color and temperature  - Assess for signs of decreased coronary artery perfusion - ex. Angina  - Evaluate fluid balance, assess for edema, trend weights  Outcome: Progressing  Note: On PO lasix. Room air. Pt denies cp/sob. Problem: Impaired Activities of Daily Living  Goal: Achieve highest/safest level of independence in self care  Description: Interventions:  - Assess ability and encourage patient to participate in ADLs to maximize function  - Promote sitting position while performing ADLs such as feeding, grooming, and bathing  - Educate and encourage patient/family in tolerated functional activity level and precautions during self-care  Outcome: Progressing     Problem: NEUROLOGICAL - ADULT  Goal: Achieves stable or improved neurological status  Description: INTERVENTIONS  - Assess for and report changes in neurological status  - Initiate measures to prevent increased intracranial pressure  - Maintain blood pressure and fluid volume within ordered parameters to optimize cerebral perfusion and minimize risk of hemorrhage  - Monitor temperature, glucose, and sodium.  Initiate appropriate interventions as ordered  Outcome: Progressing  Goal: Absence of seizures  Description: INTERVENTIONS  - Monitor for seizure activity  - Administer anti-seizure medications as ordered  - Monitor neurological status  Outcome: Progressing  Goal: Remains free of injury related to seizure activity  Description: INTERVENTIONS:  - Maintain airway, patient safety  and administer oxygen as ordered  - Monitor patient for seizure activity, document and report duration and description of seizure to MD/LIP  - If seizure occurs, turn patient to side and suction secretions as needed  - Reorient patient post seizure  - Seizure pads on all 4 side rails  - Instruct patient/family to notify RN of any seizure activity  - Instruct patient/family to call for assistance with activity based on assessment  Outcome: Progressing  Goal: Achieves maximal functionality and self care  Description: INTERVENTIONS  - Monitor swallowing and airway patency with patient fatigue and changes in neurological status  - Encourage and assist patient to increase activity and self care with guidance from PT/OT  - Encourage visually impaired, hearing impaired and aphasic patients to use assistive/communication devices  Outcome: Progressing     Problem: Impaired Cognition  Goal: Patient will exhibit improved attention, thought processing and/or memory  Description: Interventions:  - Allow additional time for processing after asking questions or providing instructions  Outcome: Progressing  Note: Pt calm and cooperative during the day. No signs of SI or aggressive behavior. Family at bedside. Problem: Impaired Communication  Goal: Patient will achieve maximal communication potential  Description: Interventions:  - Allow additional time for processing after asking questions or providing instructions  Outcome: Progressing  Physical therapy recommended [facility] when medically cleared for discharge. Pt's wife is concerned about how she would take care of him at home. Discussed options for home vs [facility] with pt and family at the bedside. They are going to talk about it more.

## 2022-10-17 NOTE — PLAN OF CARE
Patient alert x4, RA. No acute events throughout the night. Educated patient to call for assistance, call light within reach. Problem: Patient Centered Care  Goal: Patient preferences are identified and integrated in the patient's plan of care  Description: Interventions:  - What would you like us to know as we care for you?  I have heart failure  - Provide timely, complete, and accurate information to patient/family  - Incorporate patient and family knowledge, values, beliefs, and cultural backgrounds into the planning and delivery of care  - Encourage patient/family to participate in care and decision-making at the level they choose  - Honor patient and family perspectives and choices  Outcome: Progressing     Problem: Diabetes/Glucose Control  Goal: Glucose maintained within prescribed range  Description: INTERVENTIONS:  - Monitor Blood Glucose as ordered  - Assess for signs and symptoms of hyperglycemia and hypoglycemia  - Administer ordered medications to maintain glucose within target range  - Assess barriers to adequate nutritional intake and initiate nutrition consult as needed  - Instruct patient on self management of diabetes  Outcome: Progressing     Problem: Patient/Family Goals  Goal: Patient/Family Long Term Goal  Description: Patient's Long Term Goal: Treat swelling in legs    Interventions:  - Lasix  - Fluid restriction  - monitor labs and vitals  - wound care  - See additional Care Plan goals for specific interventions  Outcome: Progressing  Goal: Patient/Family Short Term Goal  Description: Patient's Short Term Goal: manage pain    Interventions:   - morphine  - pain medication  - monitor labs and vitals  - See additional Care Plan goals for specific interventions  Outcome: Progressing     Problem: CARDIOVASCULAR - ADULT  Goal: Maintains optimal cardiac output and hemodynamic stability  Description: INTERVENTIONS:  - Monitor vital signs, rhythm, and trends  - Monitor for bleeding, hypotension and signs of decreased cardiac output  - Evaluate effectiveness of vasoactive medications to optimize hemodynamic stability  - Monitor arterial and/or venous puncture sites for bleeding and/or hematoma  - Assess quality of pulses, skin color and temperature  - Assess for signs of decreased coronary artery perfusion - ex. Angina  - Evaluate fluid balance, assess for edema, trend weights  Outcome: Progressing     Problem: Impaired Activities of Daily Living  Goal: Achieve highest/safest level of independence in self care  Description: Interventions:  - Assess ability and encourage patient to participate in ADLs to maximize function  - Promote sitting position while performing ADLs such as feeding, grooming, and bathing  - Educate and encourage patient/family in tolerated functional activity level and precautions during self-care    Outcome: Progressing     Problem: NEUROLOGICAL - ADULT  Goal: Achieves stable or improved neurological status  Description: INTERVENTIONS  - Assess for and report changes in neurological status  - Initiate measures to prevent increased intracranial pressure  - Maintain blood pressure and fluid volume within ordered parameters to optimize cerebral perfusion and minimize risk of hemorrhage  - Monitor temperature, glucose, and sodium.  Initiate appropriate interventions as ordered  Outcome: Progressing  Goal: Absence of seizures  Description: INTERVENTIONS  - Monitor for seizure activity  - Administer anti-seizure medications as ordered  - Monitor neurological status  Outcome: Progressing  Goal: Remains free of injury related to seizure activity  Description: INTERVENTIONS:  - Maintain airway, patient safety  and administer oxygen as ordered  - Monitor patient for seizure activity, document and report duration and description of seizure to MD/LIP  - If seizure occurs, turn patient to side and suction secretions as needed  - Reorient patient post seizure  - Seizure pads on all 4 side rails  - Instruct patient/family to notify RN of any seizure activity  - Instruct patient/family to call for assistance with activity based on assessment  Outcome: Progressing  Goal: Achieves maximal functionality and self care  Description: INTERVENTIONS  - Monitor swallowing and airway patency with patient fatigue and changes in neurological status  - Encourage and assist patient to increase activity and self care with guidance from PT/OT  - Encourage visually impaired, hearing impaired and aphasic patients to use assistive/communication devices  Outcome: Progressing     Problem: Impaired Communication  Goal: Patient will achieve maximal communication potential  Description: Interventions:    Outcome: Progressing

## 2022-10-17 NOTE — BH PROGRESS NOTE
[facility] provided previously discussed resources for outpatient therapy, psychiatry, community mental health, and [place] state help lines (both call and text). Provided outpatient therapy and psychiatry providers are in network with [employer] insurance plan, accepting new patients, and within 10 miles of Brandon's home in his discharge instructions. Neha BLACKMON L[facility]    Note to patient:  The [facility] makes medical notes like these available to patients in the interest of transparency. However, be advised this is a medical document. It is intended as peer to peer communication. It is written in medical language and may contain abbreviations or verbiage that are unfamiliar. It may appear blunt or direct. Medical documents are intended to carry relevant information, facts as evident, and the clinical opinion of the practitioner.

## 2022-10-18 VITALS
RESPIRATION RATE: 18 BRPM | TEMPERATURE: 99 F | HEIGHT: 65 IN | SYSTOLIC BLOOD PRESSURE: 107 MMHG | WEIGHT: 268.5 LBS | HEART RATE: 70 BPM | OXYGEN SATURATION: 95 % | DIASTOLIC BLOOD PRESSURE: 77 MMHG | BODY MASS INDEX: 44.73 KG/M2

## 2022-10-18 LAB
GLUCOSE BLDC GLUCOMTR-MCNC: 122 MG/DL (ref 70–99)
GLUCOSE BLDC GLUCOMTR-MCNC: 194 MG/DL (ref 70–99)

## 2022-10-18 PROCEDURE — 99233 SBSQ HOSP IP/OBS HIGH 50: CPT | Performed by: OTHER

## 2022-10-18 RX ORDER — FUROSEMIDE 20 MG/1
20 TABLET ORAL DAILY
Qty: 90 TABLET | Refills: 0 | Status: SHIPPED | OUTPATIENT
Start: 2022-10-19

## 2022-10-18 RX ORDER — QUETIAPINE FUMARATE 50 MG/1
50 TABLET, FILM COATED ORAL NIGHTLY
Qty: 30 TABLET | Refills: 1 | Status: SHIPPED | OUTPATIENT
Start: 2022-10-18

## 2022-10-18 RX ORDER — CLONAZEPAM 0.5 MG/1
0.25 TABLET, ORALLY DISINTEGRATING ORAL NIGHTLY
Qty: 30 TABLET | Refills: 1 | Status: SHIPPED | OUTPATIENT
Start: 2022-10-18

## 2022-10-18 NOTE — CM/SW NOTE
Per nursing pt is ready to transition to [facility]. CM sent secure message via [employer] requesting if room is ready.

## 2022-10-18 NOTE — PLAN OF CARE
Wound care consulted and saw pt-- dressing changed. Pain managed w/ PRNs today. Plan for discharge tomorrow. Problem: Patient Centered Care  Goal: Patient preferences are identified and integrated in the patient's plan of care  Description: Interventions:  - What would you like us to know as we care for you?  I have heart failure  - Provide timely, complete, and accurate information to patient/family  - Incorporate patient and family knowledge, values, beliefs, and cultural backgrounds into the planning and delivery of care  - Encourage patient/family to participate in care and decision-making at the level they choose  - Honor patient and family perspectives and choices  Outcome: Progressing     Problem: Diabetes/Glucose Control  Goal: Glucose maintained within prescribed range  Description: INTERVENTIONS:  - Monitor Blood Glucose as ordered  - Assess for signs and symptoms of hyperglycemia and hypoglycemia  - Administer ordered medications to maintain glucose within target range  - Assess barriers to adequate nutritional intake and initiate nutrition consult as needed  - Instruct patient on self management of diabetes  Outcome: Progressing     Problem: Patient/Family Goals  Goal: Patient/Family Long Term Goal  Description: Patient's Long Term Goal: Treat swelling in legs    Interventions:  - Lasix  - Fluid restriction  - monitor labs and vitals  - wound care  - See additional Care Plan goals for specific interventions  Outcome: Progressing  Goal: Patient/Family Short Term Goal  Description: Patient's Short Term Goal: manage pain    Interventions:   - morphine  - pain medication  - monitor labs and vitals  - See additional Care Plan goals for specific interventions  Outcome: Progressing     Problem: CARDIOVASCULAR - ADULT  Goal: Maintains optimal cardiac output and hemodynamic stability  Description: INTERVENTIONS:  - Monitor vital signs, rhythm, and trends  - Monitor for bleeding, hypotension and signs of decreased cardiac output  - Evaluate effectiveness of vasoactive medications to optimize hemodynamic stability  - Monitor arterial and/or venous puncture sites for bleeding and/or hematoma  - Assess quality of pulses, skin color and temperature  - Assess for signs of decreased coronary artery perfusion - ex. Angina  - Evaluate fluid balance, assess for edema, trend weights  Outcome: Progressing     Problem: Impaired Activities of Daily Living  Goal: Achieve highest/safest level of independence in self care  Description: Interventions:  - Assess ability and encourage patient to participate in ADLs to maximize function  - Promote sitting position while performing ADLs such as feeding, grooming, and bathing  - Educate and encourage patient/family in tolerated functional activity level and precautions during self-care  Outcome: Progressing     Problem: NEUROLOGICAL - ADULT  Goal: Achieves stable or improved neurological status  Description: INTERVENTIONS  - Assess for and report changes in neurological status  - Initiate measures to prevent increased intracranial pressure  - Maintain blood pressure and fluid volume within ordered parameters to optimize cerebral perfusion and minimize risk of hemorrhage  - Monitor temperature, glucose, and sodium.  Initiate appropriate interventions as ordered  Outcome: Progressing  Goal: Absence of seizures  Description: INTERVENTIONS  - Monitor for seizure activity  - Administer anti-seizure medications as ordered  - Monitor neurological status  Outcome: Progressing  Goal: Remains free of injury related to seizure activity  Description: INTERVENTIONS:  - Maintain airway, patient safety  and administer oxygen as ordered  - Monitor patient for seizure activity, document and report duration and description of seizure to MD/LIP  - If seizure occurs, turn patient to side and suction secretions as needed  - Reorient patient post seizure  - Seizure pads on all 4 side rails  - Instruct patient/family to notify RN of any seizure activity  - Instruct patient/family to call for assistance with activity based on assessment  Outcome: Progressing  Goal: Achieves maximal functionality and self care  Description: INTERVENTIONS  - Monitor swallowing and airway patency with patient fatigue and changes in neurological status  - Encourage and assist patient to increase activity and self care with guidance from PT/OT  - Encourage visually impaired, hearing impaired and aphasic patients to use assistive/communication devices  Outcome: Progressing     Problem: Impaired Cognition  Goal: Patient will exhibit improved attention, thought processing and/or memory  Description: Interventions:  Outcome: Progressing     Problem: Impaired Communication  Goal: Patient will achieve maximal communication potential  Description: Interventions:  Outcome: Progressing

## 2022-10-18 NOTE — PLAN OF CARE
Problem: Patient Centered Care  Goal: Patient preferences are identified and integrated in the patient's plan of care  Description: Interventions:  - What would you like us to know as we care for you?  I have heart failure  - Provide timely, complete, and accurate information to patient/family  - Incorporate patient and family knowledge, values, beliefs, and cultural backgrounds into the planning and delivery of care  - Encourage patient/family to participate in care and decision-making at the level they choose  - Honor patient and family perspectives and choices  Outcome: Progressing     Problem: Diabetes/Glucose Control  Goal: Glucose maintained within prescribed range  Description: INTERVENTIONS:  - Monitor Blood Glucose as ordered  - Assess for signs and symptoms of hyperglycemia and hypoglycemia  - Administer ordered medications to maintain glucose within target range  - Assess barriers to adequate nutritional intake and initiate nutrition consult as needed  - Instruct patient on self management of diabetes  Outcome: Progressing     Problem: Patient/Family Goals  Goal: Patient/Family Long Term Goal  Description: Patient's Long Term Goal: Treat swelling in legs    Interventions:  - Lasix  - Fluid restriction  - monitor labs and vitals  - wound care  - See additional Care Plan goals for specific interventions  Outcome: Progressing  Goal: Patient/Family Short Term Goal  Description: Patient's Short Term Goal: manage pain    Interventions:   - morphine  - pain medication  - monitor labs and vitals  - See additional Care Plan goals for specific interventions  Outcome: Progressing     Problem: CARDIOVASCULAR - ADULT  Goal: Maintains optimal cardiac output and hemodynamic stability  Description: INTERVENTIONS:  - Monitor vital signs, rhythm, and trends  - Monitor for bleeding, hypotension and signs of decreased cardiac output  - Evaluate effectiveness of vasoactive medications to optimize hemodynamic stability  - Monitor arterial and/or venous puncture sites for bleeding and/or hematoma  - Assess quality of pulses, skin color and temperature  - Assess for signs of decreased coronary artery perfusion - ex. Angina  - Evaluate fluid balance, assess for edema, trend weights  Outcome: Progressing     Problem: Impaired Activities of Daily Living  Goal: Achieve highest/safest level of independence in self care  Description: Interventions:  - Assess ability and encourage patient to participate in ADLs to maximize function  - Promote sitting position while performing ADLs such as feeding, grooming, and bathing  - Educate and encourage patient/family in tolerated functional activity level and precautions during self-care  - Encourage patient to incorporate impaired side during daily activities to promote function  Outcome: Progressing     Problem: NEUROLOGICAL - ADULT  Goal: Achieves stable or improved neurological status  Description: INTERVENTIONS  - Assess for and report changes in neurological status  - Initiate measures to prevent increased intracranial pressure  - Maintain blood pressure and fluid volume within ordered parameters to optimize cerebral perfusion and minimize risk of hemorrhage  - Monitor temperature, glucose, and sodium.  Initiate appropriate interventions as ordered  Outcome: Progressing  Goal: Absence of seizures  Description: INTERVENTIONS  - Monitor for seizure activity  - Administer anti-seizure medications as ordered  - Monitor neurological status  Outcome: Progressing  Goal: Remains free of injury related to seizure activity  Description: INTERVENTIONS:  - Maintain airway, patient safety  and administer oxygen as ordered  - Monitor patient for seizure activity, document and report duration and description of seizure to MD/LIP  - If seizure occurs, turn patient to side and suction secretions as needed  - Reorient patient post seizure  - Seizure pads on all 4 side rails  - Instruct patient/family to notify RN of any seizure activity  - Instruct patient/family to call for assistance with activity based on assessment  Outcome: Progressing  Goal: Achieves maximal functionality and self care  Description: INTERVENTIONS  - Monitor swallowing and airway patency with patient fatigue and changes in neurological status  - Encourage and assist patient to increase activity and self care with guidance from PT/OT  - Encourage visually impaired, hearing impaired and aphasic patients to use assistive/communication devices  Outcome: Progressing     Problem: Impaired Cognition  Goal: Patient will exhibit improved attention, thought processing and/or memory  Description: Interventions:  - Minimize distractions in the room when full attention is required  Outcome: Progressing     Problem: Impaired Communication  Goal: Patient will achieve maximal communication potential  Description: Interventions:  - Encourage use of alternative/augmentative communication to express basic wants and needs (use of communication/letter board/or smartphone/tablet/handwriting)  Outcome: Progressing   Patient alert and oriented x4. CPAP worn overnight. Fluid restriction followed. Norco q6 prn.  Plan for discharge in AM.

## 2022-10-18 NOTE — CM/SW NOTE
10/18/22 1200   Discharge disposition   Expected discharge disposition Skilled Nurs   [address] [facility] Provider [facility]   Discharge transportation [address]     Per Diane liaison at [facility] rehab has room 233 assigned. RN report Phone: [phone]    [facility] ETA 90 mins to 2hrs. PCS done, RN informed. RN to notify pt/family of dc time today.

## 2022-10-26 ENCOUNTER — APPOINTMENT (OUTPATIENT)
Dept: GENERAL RADIOLOGY | Facility: HOSPITAL | Age: 62
End: 2022-10-26
Attending: EMERGENCY MEDICINE
Payer: MEDICARE

## 2022-10-26 ENCOUNTER — APPOINTMENT (OUTPATIENT)
Dept: CT IMAGING | Facility: HOSPITAL | Age: 62
End: 2022-10-26
Attending: EMERGENCY MEDICINE
Payer: MEDICARE

## 2022-10-26 ENCOUNTER — HOSPITAL ENCOUNTER (EMERGENCY)
Facility: HOSPITAL | Age: 62
Discharge: HOME | End: 2022-10-26
Attending: EMERGENCY MEDICINE
Payer: MEDICARE

## 2022-10-26 VITALS
TEMPERATURE: 97 F | DIASTOLIC BLOOD PRESSURE: 97 MMHG | RESPIRATION RATE: 20 BRPM | SYSTOLIC BLOOD PRESSURE: 155 MMHG | OXYGEN SATURATION: 95 % | HEART RATE: 73 BPM | WEIGHT: 278 LBS | BODY MASS INDEX: 46 KG/M2

## 2022-10-26 DIAGNOSIS — K59.00 CONSTIPATION, UNSPECIFIED CONSTIPATION TYPE: ICD-10-CM

## 2022-10-26 DIAGNOSIS — R10.9 ABDOMINAL PAIN, ACUTE: Primary | ICD-10-CM

## 2022-10-26 DIAGNOSIS — I50.9 ACUTE ON CHRONIC CONGESTIVE HEART FAILURE, UNSPECIFIED HEART FAILURE TYPE (HCC): ICD-10-CM

## 2022-10-26 LAB
ALBUMIN SERPL-MCNC: 3.5 G/DL (ref 3.4–5)
ALP LIVER SERPL-CCNC: 48 U/L
ALT SERPL-CCNC: 33 U/L
ANION GAP SERPL CALC-SCNC: 8 MMOL/L (ref 0–18)
AST SERPL-CCNC: 27 U/L (ref 15–37)
BASOPHILS # BLD AUTO: 0.02 X10(3) UL (ref 0–0.2)
BASOPHILS NFR BLD AUTO: 0.3 %
BILIRUB DIRECT SERPL-MCNC: 0.3 MG/DL (ref 0–0.2)
BILIRUB SERPL-MCNC: 0.8 MG/DL (ref 0.1–2)
BUN BLD-MCNC: 13 MG/DL (ref 7–18)
BUN/CREAT SERPL: 10.4 (ref 10–20)
CALCIUM BLD-MCNC: 8.7 MG/DL (ref 8.5–10.1)
CHLORIDE SERPL-SCNC: 106 MMOL/L (ref 98–112)
CO2 SERPL-SCNC: 25 MMOL/L (ref 21–32)
CREAT BLD-MCNC: 1.25 MG/DL
DEPRECATED RDW RBC AUTO: 48.8 FL (ref 35.1–46.3)
EOSINOPHIL # BLD AUTO: 0.41 X10(3) UL (ref 0–0.7)
EOSINOPHIL NFR BLD AUTO: 6.4 %
ERYTHROCYTE [DISTWIDTH] IN BLOOD BY AUTOMATED COUNT: 14.2 % (ref 11–15)
GFR SERPLBLD BASED ON 1.73 SQ M-ARVRAT: 65 ML/MIN/1.73M2 (ref 60–?)
GLUCOSE BLD-MCNC: 139 MG/DL (ref 70–99)
GLUCOSE BLDC GLUCOMTR-MCNC: 148 MG/DL (ref 70–99)
HCT VFR BLD AUTO: 46.5 %
HGB BLD-MCNC: 14.5 G/DL
IMM GRANULOCYTES # BLD AUTO: 0.01 X10(3) UL (ref 0–1)
IMM GRANULOCYTES NFR BLD: 0.2 %
LYMPHOCYTES # BLD AUTO: 1.53 X10(3) UL (ref 1–4)
LYMPHOCYTES NFR BLD AUTO: 24 %
MCH RBC QN AUTO: 29.1 PG (ref 26–34)
MCHC RBC AUTO-ENTMCNC: 31.2 G/DL (ref 31–37)
MCV RBC AUTO: 93.2 FL
MONOCYTES # BLD AUTO: 0.46 X10(3) UL (ref 0.1–1)
MONOCYTES NFR BLD AUTO: 7.2 %
NEUTROPHILS # BLD AUTO: 3.95 X10 (3) UL (ref 1.5–7.7)
NEUTROPHILS # BLD AUTO: 3.95 X10(3) UL (ref 1.5–7.7)
NEUTROPHILS NFR BLD AUTO: 61.9 %
OSMOLALITY SERPL CALC.SUM OF ELEC: 290 MOSM/KG (ref 275–295)
PLATELET # BLD AUTO: 127 10(3)UL (ref 150–450)
POTASSIUM SERPL-SCNC: 3.9 MMOL/L (ref 3.5–5.1)
PROT SERPL-MCNC: 7.8 G/DL (ref 6.4–8.2)
RBC # BLD AUTO: 4.99 X10(6)UL
SODIUM SERPL-SCNC: 139 MMOL/L (ref 136–145)
WBC # BLD AUTO: 6.4 X10(3) UL (ref 4–11)

## 2022-10-26 PROCEDURE — 93010 ELECTROCARDIOGRAM REPORT: CPT | Performed by: EMERGENCY MEDICINE

## 2022-10-26 PROCEDURE — 80048 BASIC METABOLIC PNL TOTAL CA: CPT | Performed by: EMERGENCY MEDICINE

## 2022-10-26 PROCEDURE — 74177 CT ABD & PELVIS W/CONTRAST: CPT | Performed by: EMERGENCY MEDICINE

## 2022-10-26 PROCEDURE — 85025 COMPLETE CBC W/AUTO DIFF WBC: CPT | Performed by: EMERGENCY MEDICINE

## 2022-10-26 PROCEDURE — 82962 GLUCOSE BLOOD TEST: CPT

## 2022-10-26 PROCEDURE — 71045 X-RAY EXAM CHEST 1 VIEW: CPT | Performed by: EMERGENCY MEDICINE

## 2022-10-26 PROCEDURE — 93005 ELECTROCARDIOGRAM TRACING: CPT

## 2022-10-26 PROCEDURE — 80076 HEPATIC FUNCTION PANEL: CPT | Performed by: EMERGENCY MEDICINE

## 2022-10-26 PROCEDURE — 96374 THER/PROPH/DIAG INJ IV PUSH: CPT

## 2022-10-26 PROCEDURE — 99285 EMERGENCY DEPT VISIT HI MDM: CPT

## 2022-10-26 RX ORDER — BISACODYL 10 MG
10 SUPPOSITORY, RECTAL RECTAL ONCE
Status: COMPLETED | OUTPATIENT
Start: 2022-10-26 | End: 2022-10-26

## 2022-10-26 RX ORDER — FUROSEMIDE 10 MG/ML
40 INJECTION INTRAMUSCULAR; INTRAVENOUS ONCE
Status: COMPLETED | OUTPATIENT
Start: 2022-10-26 | End: 2022-10-26

## 2022-10-26 NOTE — ED PROVIDER NOTES
Patient endorsed to me by Dr. Val Stark pending imaging. Imaging shows constipation. He has some mild pulmonary edema as well. Patient was given some milk of magnesia as well as a Dulcolax suppository and was able to have a bowel movement. He was given some Lasix as well. His oxygen saturations have been normal.  At this time there is no need for any admission. Patient will be discharged back to the nursing home. EKG shows atrial fibrillation without acute ischemic changes. EKG    Rate, intervals and axes as noted on EKG Report. Rate: 83  Rhythm: Atrial Fibrillation  Reading: No STEMI.  Interpreted by ED physician.     (R10.9) Abdominal pain, acute  (primary encounter diagnosis)  Plan:     (K59.00) Constipation, unspecified constipation type  Plan:     (I50.9) Acute on chronic congestive heart failure, unspecified heart failure type ([facility] [address]  Plan:

## 2022-10-26 NOTE — ED QUICK NOTES
Pt declined food at this time  accucheck performed to monitor blood sugar    [facility] called and notified of patients status to return

## 2022-10-26 NOTE — ED QUICK NOTES
Patient ambulatory to restroom with walker assist. Tolerated well. Patient reconnected to cardiac monitor and pulse oximeter.

## 2022-10-26 NOTE — ED INITIAL ASSESSMENT (HPI)
Pt arrives to ED via [facility] for c/o of abdominal pain, n/v and cellulitis on BLE. Per pt he was admitted last week for impacted colon and stroke. Denies chest pain.

## 2022-10-26 NOTE — ED QUICK NOTES
Report given to [facility]. Pt updated on POC and denies any questions or concerns. Call light within reach and bed in lowest position.

## 2022-12-05 NOTE — PROGRESS NOTES
[facility]    Progress Note    Corrine Romero Patient Status:  Inpatient    1960 MRN [id number]   Location [facility] 2W/SW Attending Robi Reynolds, [address] [facility] [address] PCP LATIA Heart MD            Subjective:       Cici Fletcher < > 2.31* 1.96* 1.57*   GFRAA 16* 15*   < > 34* 42* 55*   GFRNAA 14* 13*   < > 30* 36* 48*   CA 8.1* 8.2*   < > 8.8 8.4* 8.4*   ALB 2.8* 2.7*  --   --  2.8*  --     142   < > 148* 148* 146*   K 3.2* 4.8   < > 3.4* 3.7 4.2    108   < > 114* 115* needed for present time. pt may need surgery if unable to improve situation further, though pt would be high risk.   Cont observation, there remains possibility that situation of colon may worsen, rebleeding or perforation, however, due to high risk of lopes Calcipotriene Counseling: Cantharidin Counseling:  I discussed with the patient the risks of Cantharidin including but not limited to pain, redness, burning, itching, and blistering.

## 2022-12-25 NOTE — CHRONIC PAIN
MEDICATION EVALUATION  HISTORY OF PRESENT ILLNESS:  Guido uCi is a 60year old old male with history of chronic pain 2/2 fibromyalgia, joint pain who presents for medication evaluation. He continues to report chronic joint pain.  He denies new or worsenin BREAKFAST, 85 UNITS WITH LUNCH AND 30 UNITS WITH DINNER Disp: 12 mL Rfl: 0   NORTRIPTYLINE HCL 25 MG Oral Cap TAKE 2 CAPSULES(50MG TOTAL) BY MOUTH NIGHTLY Disp: 60 capsule Rfl: 2   NORTRIPTYLINE HCL 25 MG Oral Cap TAKE 1 CAPSULE(25 MG) BY MOUTH EVERY NIGHT O/N Events:    Subjective/ROS: Patient seen and examined at bedside.     Denies Fever/Chills, HA, CP, SOB, n/v, changes in bowel/urinary habits.  12pt ROS otherwise negative.    VITALS  Vital Signs Last 24 Hrs  T(C): 36.4 (25 Dec 2022 05:31), Max: 37.2 (24 Dec 2022 09:29)  T(F): 97.5 (25 Dec 2022 05:31), Max: 99 (24 Dec 2022 09:29)  HR: 90 (25 Dec 2022 05:31) (77 - 107)  BP: 102/68 (25 Dec 2022 05:31) (102/68 - 141/79)  BP(mean): 96 (24 Dec 2022 16:32) (78 - 96)  RR: 18 (25 Dec 2022 05:31) (18 - 20)  SpO2: 98% (25 Dec 2022 05:31) (98% - 100%)    Parameters below as of 25 Dec 2022 05:31  Patient On (Oxygen Delivery Method): room air        CAPILLARY BLOOD GLUCOSE      POCT Blood Glucose.: 206 mg/dL (24 Dec 2022 21:02)  POCT Blood Glucose.: 139 mg/dL (24 Dec 2022 17:31)  POCT Blood Glucose.: 160 mg/dL (24 Dec 2022 12:15)      PHYSICAL EXAM  General: NAD  Head: NC/AT; MMM; PERRL; EOMI;  Neck: Supple; no JVD  Respiratory: CTAB; no wheezes/rales/rhonchi  Cardiovascular: Regular rhythm/rate; S1/S2+, no murmurs, rubs gallops   Gastrointestinal: Soft; NTND; bowel sounds normal and present  Extremities: WWP; no edema/cyanosis  Neurological: A&Ox3, CNII-XII grossly intact; no obvious focal deficits    MEDICATIONS  (STANDING):  glucagon  Injectable 1 milliGRAM(s) IntraMuscular once  insulin glargine Injectable (LANTUS) 20 Unit(s) SubCutaneous at bedtime  insulin lispro (ADMELOG) corrective regimen sliding scale   SubCutaneous Before meals and at bedtime  metoprolol tartrate 12.5 milliGRAM(s) Oral every 12 hours  pantoprazole  Injectable 40 milliGRAM(s) IV Push every 12 hours    MEDICATIONS  (PRN):  acetaminophen     Tablet .. 650 milliGRAM(s) Oral every 6 hours PRN Temp greater or equal to 38C (100.4F), Mild Pain (1 - 3)  dextrose Oral Gel 15 Gram(s) Oral once PRN Blood Glucose LESS THAN 70 milliGRAM(s)/deciliter      Allergy Status Unknown      LABS                        9.8    8.42  )-----------( 144      ( 24 Dec 2022 06:43 )             29.3     12-24    142  |  113<H>  |  44<H>  ----------------------------<  146<H>  3.9   |  20<L>  |  1.71<H>    Ca    7.9<L>      24 Dec 2022 06:43  Phos  2.9     12-24  Mg     2.1     12-24    TPro  5.3<L>  /  Alb  2.7<L>  /  TBili  0.3  /  DBili  x   /  AST  14  /  ALT  8<L>  /  AlkPhos  74  12-24        CARDIAC MARKERS ( 23 Dec 2022 13:24 )  x     / 0.06 ng/mL / x     / x     / x              IMAGING/EKG/ETC   Blood Glucose Monitoring Suppl (TALIA CONTOUR NEXT EZ) W/DEVICE Does not apply Kit  Disp:  Rfl: 0   Pantoprazole Sodium (PROTONIX) 40 MG Oral Tab EC  Disp:  Rfl: 0   carvedilol (COREG) 12.5 MG Oral Tab Take 12.5 mg by mouth 2 (two) times daily with meals Number of children: Not on file      Years of education: Not on file      Highest education level: Not on file    Social Needs      Financial resource strain: Not on file      Food insecurity - worry: Not on file      Food insecurity - inability: Not on fi advised not to consume ETOH or operating heavy machinery or vehicles while taking this medication.  Patient verbalized understanding.    - Will plan to continue Lyrica and Elavil  - Narcan: prescribed 11/2017, denies having to use   -need to repeat UDS at O/N Events: No acute events overnight.    Subjective/ROS: Patient seen and examined at bedside. Pt states that he is feeling well without any concerns or complaints.     Denies Fever/Chills, HA, CP, SOB, n/v, changes in bowel/urinary habits.  12pt ROS otherwise negative.    VITALS  Vital Signs Last 24 Hrs  T(C): 36.4 (25 Dec 2022 05:31), Max: 37.2 (24 Dec 2022 09:29)  T(F): 97.5 (25 Dec 2022 05:31), Max: 99 (24 Dec 2022 09:29)  HR: 90 (25 Dec 2022 05:31) (77 - 107)  BP: 102/68 (25 Dec 2022 05:31) (102/68 - 141/79)  BP(mean): 96 (24 Dec 2022 16:32) (78 - 96)  RR: 18 (25 Dec 2022 05:31) (18 - 20)  SpO2: 98% (25 Dec 2022 05:31) (98% - 100%)    Parameters below as of 25 Dec 2022 05:31  Patient On (Oxygen Delivery Method): room air        CAPILLARY BLOOD GLUCOSE      POCT Blood Glucose.: 206 mg/dL (24 Dec 2022 21:02)  POCT Blood Glucose.: 139 mg/dL (24 Dec 2022 17:31)  POCT Blood Glucose.: 160 mg/dL (24 Dec 2022 12:15)      PHYSICAL EXAM  Constitutional: elderly male resting comfortably in bed; NAD  HEENT: PER, anicteric sclera, no nasal discharge; MMM  Respiratory: CTA B/L; no W/R/R, no retractions  Cardiac: +S1/S2; RRR; no M/R/G  Gastrointestinal: soft, NT/ND; no rebound or guarding  Extremities: WWP, no clubbing or cyanosis; no peripheral edema;   Vascular: 2+ radial, DP/PT pulses B/L  Dermatologic: diffuse patches along b/l forearms  Neurologic: drowsy, AAO to self and hospital but not which hospital, oriented to year but not month or day. light touch intact, moving all extremities to command with symmetric strength.    MEDICATIONS  (STANDING):  glucagon  Injectable 1 milliGRAM(s) IntraMuscular once  insulin glargine Injectable (LANTUS) 20 Unit(s) SubCutaneous at bedtime  insulin lispro (ADMELOG) corrective regimen sliding scale   SubCutaneous Before meals and at bedtime  metoprolol tartrate 12.5 milliGRAM(s) Oral every 12 hours  pantoprazole  Injectable 40 milliGRAM(s) IV Push every 12 hours    MEDICATIONS  (PRN):  acetaminophen     Tablet .. 650 milliGRAM(s) Oral every 6 hours PRN Temp greater or equal to 38C (100.4F), Mild Pain (1 - 3)  dextrose Oral Gel 15 Gram(s) Oral once PRN Blood Glucose LESS THAN 70 milliGRAM(s)/deciliter      Allergy Status Unknown      LABS                        9.8    8.42  )-----------( 144      ( 24 Dec 2022 06:43 )             29.3     12-24    142  |  113<H>  |  44<H>  ----------------------------<  146<H>  3.9   |  20<L>  |  1.71<H>    Ca    7.9<L>      24 Dec 2022 06:43  Phos  2.9     12-24  Mg     2.1     12-24    TPro  5.3<L>  /  Alb  2.7<L>  /  TBili  0.3  /  DBili  x   /  AST  14  /  ALT  8<L>  /  AlkPhos  74  12-24        CARDIAC MARKERS ( 23 Dec 2022 13:24 )  x     / 0.06 ng/mL / x     / x     / x              IMAGING/EKG/ETC   O/N Events: No acute events overnight.    Subjective/ROS: Patient seen and examined at bedside. Pt states that he is feeling well without any concerns or complaints.     Denies Fever/Chills, HA, CP, SOB, n/v, changes in bowel/urinary habits.  12pt ROS otherwise negative.    VITALS  Vital Signs Last 24 Hrs  T(C): 36.4 (25 Dec 2022 05:31), Max: 37.2 (24 Dec 2022 09:29)  T(F): 97.5 (25 Dec 2022 05:31), Max: 99 (24 Dec 2022 09:29)  HR: 90 (25 Dec 2022 05:31) (77 - 107)  BP: 102/68 (25 Dec 2022 05:31) (102/68 - 141/79)  BP(mean): 96 (24 Dec 2022 16:32) (78 - 96)  RR: 18 (25 Dec 2022 05:31) (18 - 20)  SpO2: 98% (25 Dec 2022 05:31) (98% - 100%)    Parameters below as of 25 Dec 2022 05:31  Patient On (Oxygen Delivery Method): room air        CAPILLARY BLOOD GLUCOSE      POCT Blood Glucose.: 206 mg/dL (24 Dec 2022 21:02)  POCT Blood Glucose.: 139 mg/dL (24 Dec 2022 17:31)  POCT Blood Glucose.: 160 mg/dL (24 Dec 2022 12:15)      PHYSICAL EXAM  Constitutional: elderly male resting comfortably in bed; NAD  HEENT: PER, anicteric sclera, no nasal discharge; MMM, poor dentition  Respiratory: CTA B/L; no W/R/R, no retractions  Cardiac: +S1/S2; RRR; no M/R/G  Gastrointestinal: soft, NT/ND; no rebound or guarding  Extremities: WWP, no clubbing or cyanosis; no peripheral edema;   Vascular: 2+ radial, DP/PT pulses B/L  Dermatologic: diffuse patches along b/l forearms  Neurologic: drowsy, AAO to self and hospital but not which hospital, oriented to year but not month or day. light touch intact, moving all extremities to command with symmetric strength.    MEDICATIONS  (STANDING):  glucagon  Injectable 1 milliGRAM(s) IntraMuscular once  insulin glargine Injectable (LANTUS) 20 Unit(s) SubCutaneous at bedtime  insulin lispro (ADMELOG) corrective regimen sliding scale   SubCutaneous Before meals and at bedtime  metoprolol tartrate 12.5 milliGRAM(s) Oral every 12 hours  pantoprazole  Injectable 40 milliGRAM(s) IV Push every 12 hours    MEDICATIONS  (PRN):  acetaminophen     Tablet .. 650 milliGRAM(s) Oral every 6 hours PRN Temp greater or equal to 38C (100.4F), Mild Pain (1 - 3)  dextrose Oral Gel 15 Gram(s) Oral once PRN Blood Glucose LESS THAN 70 milliGRAM(s)/deciliter      Allergy Status Unknown      LABS                        9.8    8.42  )-----------( 144      ( 24 Dec 2022 06:43 )             29.3     12-24    142  |  113<H>  |  44<H>  ----------------------------<  146<H>  3.9   |  20<L>  |  1.71<H>    Ca    7.9<L>      24 Dec 2022 06:43  Phos  2.9     12-24  Mg     2.1     12-24    TPro  5.3<L>  /  Alb  2.7<L>  /  TBili  0.3  /  DBili  x   /  AST  14  /  ALT  8<L>  /  AlkPhos  74  12-24        CARDIAC MARKERS ( 23 Dec 2022 13:24 )  x     / 0.06 ng/mL / x     / x     / x              IMAGING/EKG/ETC

## 2022-12-27 NOTE — TELEPHONE ENCOUNTER
LOV 4/03/20. Unspecified RTC. Called for first available. Pt states he is aware he needs apt, but he depends on his wife for transportation and scheduling. States he will call back later with his wife to schedule apt. Pended 3 month supply. Activity: active at work   METS: 5  Symptoms: none     Denition: denies loose teeth or dentures Cardiovascular Expenditure   Activity: active at work, walking   METS: 5  Symptoms: none     Dentition: denies loose teeth or dentures

## 2023-02-04 NOTE — PROGRESS NOTES
[facility]    Progress Note    Corrine Romero Patient Status:  Inpatient    1960 MRN [id number]   [facility] 2W/SW Attending Sherice [facility] Day # 9 PCP Cherry Villasenor DO       Subjective:   Corrine Romero mg, 3.125 mg, Oral, BID with meals  ipratropium-albuterol (DUONEB) nebulizer solution 3 mL, 3 mL, Nebulization, Q6H PRN  levETIRAcetam (KEPPRA) tab 1,000 mg, 1,000 mg, Oral, BID  mirtazapine (REMERON) tab 15 mg, 15 mg, Oral, Nightly  pregabalin (LYRICA) ca Inpatient Hospitalization - Initial Certification    Patient will require inpatient services that will reasonably be expected to span two midnight's based on the clinical documentation in H+P.    Based on patients current state of illness, I anticipate that monitor BP  cont meds

## 2023-02-09 NOTE — PROGRESS NOTES
[facility]    Progress Note    Vanessa Tavarez Patient Status:  Inpatient    1960 MRN [id number]   Location [facility] 2W/SW Attending Sunny Ibrahim,  [facility] Se Day # 16 PCP LATIA Campbell MD       Subjective:   Vanessa Tavarez is Imaging:  [unfilled]   Xr Abdomen (1 View) (cpt=74018)    Result Date: 5/17/2019  CONCLUSION:  * A rectal tube has been inserted with the distal tip in the proximal transverse colon.   * There has been considerable reduction in the colonic distens Sotyktu Counseling:  I discussed the most common side effects of Sotyktu including: common cold, sore throat, sinus infections, cold sores, canker sores, folliculitis, and acne.  I also discussed more serious side effects of Sotyktu including but not limited to: serious allergic reactions; increased risk for infections such as TB; cancers such as lymphomas; rhabdomyolysis and elevated CPK; and elevated triglycerides and liver enzymes.

## 2024-01-01 NOTE — IP AVS SNAPSHOT
Patient Demographics     Address  [address] [place] [id number] Phone  [phone] [facility]) E-mail Address  [email or web address]      Emergency Contact(s)     Name Relation Home Work Elizabeth [phone] Heparin Sodium (Porcine) 5000 UNIT/ML Soln  Start taking on:  7/2/2019      Inject 1 mL (5,000 Units total) into the skin every 12 (twelve) hours.    Lan David MD   [    ]   [    ]   [    ]   [    ]     Insulin Aspart Pen 100 UNIT/ML Sopn  Commonly Commonly known as:  MS CONTIN      Take 1 tablet (60 mg total) by mouth every 8 (eight) hours. Stop taking on:  7/31/2019   Jed Alfred MD   [    ]   [    ]   [    ]   [    ]     Naloxone HCl 4 MG/0.1ML Liqd      4 mg by Nasal route as needed.    Emmie Methylnaltrexone Bromide 150 MG Tabs     Please  your prescriptions at the location directed by your doctor or nurse    Bring a paper prescription for each of these medications  morphINE Sulfate  MG Tbcr  morphINE Sulfate ER 60 MG Tbcr [id number] morphINE sulfate (PF) 4 MG/ML injection 4 mg 07/01/19 0815 Given            LEFT LOWER ABDOMEN     Order ID Medication Name Action Time Action Reason Comments    [id number] Heparin Sodium (Porcine) 5000 UNIT/ML injection 5,000 Units 07/01/19 0544 CO2 25.0 21.0 - 32.0 mmol/L — [facility]   Anion Gap 7 0 - 18 mmol/L — [facility]   BUN 21 7 - 18 mg/dL [facility]   Creatinine 0.90 0.70 - 1.30 mg/dL [facility]   BUN/CREA Ratio 23.3 10.0 - 20.0 [facility]   Calcium, Total 8.3 8.5 - 10.1 m Lab - Abbreviation Name Director Address Valid Date Range    [address] [facility] Brissa Alcantara. Julieta Alexis M.D. [facility]. [address] [place] [id number] 04/29/14 1047 - Present            Microbiology Results (All)     Procedure Component Value Units Da BLD CULT ID PCR GPC PANEL Once [[id number]] Collected:  06/07/19 1957    Order Status:  Completed Lab Status:  Final result Updated:  06/11/19 1599    Specimen:  Blood from Bld,Picc Line      NOT Staphylococcus aureus, NOT MRSA by PCR See Comment    BLD CU Specimen:  Other from Tracheostomy aspirate      Sputum Culture Result 1+ Normal Respiratory Michela isolated      4+ growth Stenotrophomonas maltophilia     Sputum Smear 4+ WBCs seen      4+ Gram Negative Rods    Blood Culture FREQ X 2 [[id number]] Collecte Order Status:  Completed Lab Status:  Final result Updated:  05/04/19 1239    Specimen:  Blood,peripheral      Staphylococcus Not Aureus by PCR Detected     PCR Comment --    Emergency MRSA Screen by PCR STAT [[id number]]  (Normal) Collected:  05/02/19 134 with known past medical history of morbid obesity, obstructive sleep apnea, diabetes, and chronic fibromyalgia with chronic opiate use. He was noted to have a dry cough by his wife last night and he was unable to sleep.   This morning, he seems a bit confu FAMILY HISTORY:  Father had hypertension and cerebrovascular accident. SOCIAL HISTORY:  No tobacco, alcohol or drug use. Sedentary lifestyle. Lives at home. Requires some assistance in his basic activities of daily living. Uses a cane.     REVIEW OF pneumonia, possible influenza or other viral illness. 4.   Diabetes mellitus type 2.  5.   Acute renal failure. PLAN:  The patient will be admitted to progressive care unit. IV fluids. Obtain 2D echocardiogram with Doppler.   Obtain pulmonary and card • Diabetes [facility])    • Diverticulitis    • Esophageal reflux    • Essential hypertension    • Fibromyalgia    • Hepatic Encephalopathy (HCC)     lactulose   • High blood pressure    • Hyperlipidemia    • Lipid screening 1/17/2011    per NG   • Morbid obesit HUMULIN R U-500 KWIKPEN 500 UNIT/ML Subcutaneous Solution Pen-injector INJECT 80 UNITS WITH BREAKFAST, 85 UNITS WITH LUNCH AND 30 UNITS WITH DINNER (Patient taking differently: INJECT 80 UNITS WITH BREAKFAST, 95 UNITS WITH LUNCH AND 35 UNITS WITH DINNER ) (three) times daily. (Patient taking differently: Take 300 mg by mouth as needed.  )       Review of Systems:     Constitutional: Negative for activity change. HENT: Positive for dental problem. Negative for mouth sores.          Dental loss   Eyes: WBC 7.5 06/28/2019    HGB 11.3 (L) 06/28/2019    HCT 35.5 (L) 06/28/2019    .0 (L) 06/28/2019    CREATSERUM 0.73 06/28/2019    BUN 17 06/28/2019     06/28/2019    K 4.0 06/28/2019     06/28/2019    CO2 28.0 06/28/2019     (H) 06/ Physical Therapy Note signed by Gisel Garza PT at 7/1/2019  3:12 PM  Version 1 of 1    Author:  Gisel Garza PT Service:  Rehab Author Type:  Physical Therapist    Filed:  7/1/2019  3:12 PM Date of Service:  7/1/2019  2:59 PM Status: session findings. The patient's Approx Degree of Impairment: 86.62% has been calculated based on documentation in the [facility] '6 clicks' Inpatient Basic Mobility Short Form.   Research supports that patients with this level of impairment may benefit from [facility] Comment : pt is none amb at this time    THERAPEUTIC EXERCISES  Lower Extremity LAQ, SLR     Position supine       Patient End of Session: In bed;Needs met;Call light within reach;RN aware of session/findings; All patient questions and concerns addressed Acute GI bleeding    Acute blood loss anemia    Rectal bleeding      OCCUPATIONAL THERAPY ASSESSMENT     Chart review complete, RN consulted and approved pt participation in OT/PT co-tx session on this date, RN Dameon present for session.  Pt with some conf Precautions: Drain(s)(flexiseal)    WEIGHT BEARING RESTRICTION  Weight Bearing Restriction: None                PAIN ASSESSMENT  Ratin  Location: generalized  Management Techniques: Repositioning     ACTIVITY TOLERANCE    O2 SATURATIONS  SPO2 on Room A SLP Note signed by CONRAD Simpson at 6/30/2019  2:09 PM  Version 2 of 2    Author:  CONRAD Simpson Service:  Rehab Author Type:  SPEECH-LANGUAGE PATHOLOGIST    Filed:  6/30/2019  2:09 PM Date of Service:  6/28/2019 10:06 AM Status:  A tolerance of current diet[MD.1] during breakfast meal. Discussed upgrade of diet to GROUND/thin liquid via tsp (will trial thin liquid via cup an additional session). [MD.2] No new CXR has been completed.         CXR  6/24/19    ONCLUSION:   1. Bibasilar opa independently over 4 session(s). Reviewed swallowing precautions/strategies with Pt; mild cues needed for use. No family present. Will f/u with family education as available.       In Progress   Goal #3 The patient will utilize compensatory strategies as Author:  CONRAD Kenney Service:  Rehab Author Type:  SPEECH-LANGUAGE PATHOLOGIST    Filed:  6/28/2019  2:11 PM Date of Service:  6/28/2019 10:06 AM Status:  Signed    :  CONRAD Kenney (SPEECH-LANGUAGE PATHOLOGIST)    Related of diet to GROUND/thin liquid via tsp (will trial thin liquid via cup an additional session). [MD.2] No new CXR has been completed. CXR  6/24/19    ONCLUSION:   1. Bibasilar opacification/subsegmental atelectasis.            PLAN:    Speech to f/u x[M Reviewed swallowing precautions/strategies with Pt; mild cues needed for use. No family present. Will f/u with family education as available.       In Progress   Goal #3 The patient will utilize compensatory strategies as outlined by  BSSE (clinical evaluat 3.87

## 2024-04-01 NOTE — PROGRESS NOTES
04/01/24 0008   NIV Type   $NIV $Daily Charge   Mode (S)  Bilevel  (refuses use , standby)        [facility]    Progress Note    Delsie Factor Patient Status:  Inpatient    1960 MRN [id number]   [facility] 2W/SW Attending [address], [address] [facility] Day # 3 PCP Mago Mina DO       Subjective:   Delsie Factor mL 3 mL Nebulization Q6H PRN   levETIRAcetam (KEPPRA) tab 1,000 mg 1,000 mg Oral BID   mirtazapine (REMERON) tab 15 mg 15 mg Oral Nightly   pregabalin (LYRICA) cap 50 mg 50 mg Oral BID   Zolpidem Tartrate (AMBIEN) tab 5 mg 5 mg Oral Nightly PRN   morphINE on 10/10/2019 at 6:57          Ct Abdomen+pelvis(contrast Only)(cpt=74177)    Result Date: 10/10/2019  CONCLUSION:  1. Diffuse colonic dilation without definite transition point/evidence of mechanical obstruction.   Findings likely reflect colonic ileus and inguinal hernias. 11. Lesser incidental findings as above.      Dictated by (CST): Israel Marrufo MD on 10/10/2019 at 10:37     Approved by (CST): Israel Marrufo MD on 10/10/2019 at 11:02          Xr Chest Ap Portable  (cpt=71045)    Result Date: 10/10/20

## 2024-04-07 NOTE — PROGRESS NOTES
[facility]    Cardiology Progress Note    Guido Cui Patient Status:  Inpatient    1960 MRN [id number]   Location [facility] 2W/SW Attending Amy Alanis MD   [address] Day # 45 PCP LATIA Stevens MD         Assessment and P 1 capsule Per G Tube TID CC   • epoetin shakeel-epbx  10,000 Units Subcutaneous Once per day on Mon Wed Fri   • pregabalin  100 mg Oral Nightly   • Vancomycin HCl  125 mg Per NG Tube Daily   • artificial tears   Both Eyes TID   • Metoclopramide HCl  5 mg Intr No

## 2025-05-22 NOTE — PROGRESS NOTES
----- Message from Christopher D'Amico sent at 5/18/2025 12:49 PM EDT -----  , good HDL at 51.  Would like to see that lower.  Hopefully he is taking his atorvastatin every day.  Continue to follow cardiology.    Vitamin D moderately low at 18, recommend OTC vitamin D3, 2000 units daily.    Remaining labs unremarkable.  ----- Message -----  From: Paulie Dpivision Results In  Sent: 5/15/2025  10:32 PM EDT  To: Christopher D'Amico,      [facility]    Progress Note    Roberto Sickle Patient Status:  Inpatient    1960 MRN [id number]   Location [facility] 2W/SW Attending Rhiannon Bone, [address] [facility] Se Day # 10 PCP LATIA Galeas MD            Subjective:     Pt was r BILT  --   --  1.2   TP  --   --  6.2*             Xr Abdomen (1 View) (cpt=74018)    Result Date: 5/11/2019  CONCLUSION: Dilated small and large bowel loops suggesting bowel obstruction or ileus.   No significant change since prior exam.    Dictated by ( cardiomegaly. Pulmonary venous distention 2. Moderate elevation left diaphragm. Persistent bibasilar atelectatic changes and/or lung consolidation.  3. No significant change    Dictated by (CST): Glen Guthrie MD on 5/10/2019 at 15:16     Approved b

## (undated) DEVICE — GAMMEX® PI HYBRID SIZE 6.5, STERILE POWDER-FREE SURGICAL GLOVE, POLYISOPRENE AND NEOPRENE BLEND: Brand: GAMMEX

## (undated) DEVICE — KIT ENDO ORCAPOD 160/180/190

## (undated) DEVICE — SOLUTION IRR BTL 250CC NACL

## (undated) DEVICE — FORCEP RADIAL JAW 4

## (undated) DEVICE — GOWN SURG AERO BLUE PERF LG

## (undated) DEVICE — SUTURE CHROMIC GUT 3-0 SH

## (undated) DEVICE — CLIP RESOLUTION 235CM

## (undated) DEVICE — DRAIN SPONGES,6 PLY: Brand: EXCILON

## (undated) DEVICE — Device: Brand: CUSTOM PROCEDURE KIT

## (undated) DEVICE — STERILE LATEX POWDER-FREE SURGICAL GLOVESWITH NITRILE COATING: Brand: PROTEXIS

## (undated) DEVICE — MASK PROC W/VISOR ANTIGLARE

## (undated) DEVICE — Device: Brand: PORTEX

## (undated) DEVICE — 3 ML SYRINGE LUER-LOCK TIP: Brand: MONOJECT

## (undated) DEVICE — GAUZE PACKING PLAIN 1/2

## (undated) DEVICE — SUCTION CANISTER, 3000CC,SAFELINER: Brand: DEROYAL

## (undated) DEVICE — SOL  .9 1000ML BTL

## (undated) DEVICE — Device

## (undated) DEVICE — SUTURE SILK 3-0 SA64H

## (undated) DEVICE — MEDI-VAC NON-CONDUCTIVE SUCTION TUBING 6MM X 1.8M (6FT.) L: Brand: CARDINAL HEALTH

## (undated) DEVICE — HEAD & NECK: Brand: MEDLINE INDUSTRIES, INC.

## (undated) DEVICE — LINE MNTR ADLT SET O2 INTMD

## (undated) DEVICE — 14FR COLON DECOMPRESSION SET: Brand: COOK

## (undated) DEVICE — 35 ML SYRINGE REGULAR TIP: Brand: MONOJECT

## (undated) DEVICE — Device: Brand: DEFENDO AIR/WATER/SUCTION AND BIOPSY VALVE

## (undated) DEVICE — KIT CLEAN ENDOKIT 1.1OZ GOWNX2

## (undated) DEVICE — SUTURE SILK 2-0 SH

## (undated) DEVICE — 6 ML SYRINGE LUER-LOCK TIP: Brand: MONOJECT

## (undated) DEVICE — TRAY SKIN PREP PVP-1

## (undated) NOTE — MR AVS SNAPSHOT
[address]  [phone]               Thank you for choosing us for your health care visit with Meryle Ok, MD.  We are glad to serve you and happy to provide you with this summary of your visit. BD PEN NEEDLE ROMMEL U/F 32G X 4 MM Misc   Generic drug:  Insulin Pen Needle           captopril 12.5 MG Tabs   TAKE 1 TABLET BY MOUTH TWICE DAILY   Commonly known as:  CAPOTEN           * Clindamycin HCl 300 MG Caps   Commonly known as:  CLEOCIN spironolactone 25 MG Tabs   25 mg daily. Commonly known as:  ALDACTONE           TRADJENTA 5 MG Tabs   Generic drug:  Linagliptin   Take 5 mg by mouth daily. Zolpidem Tartrate 10 MG Tabs   Take 1 tablet (10 mg total) by mouth nightly.    Common Eat plenty of low-fat dairy products High fat meats and dairy   Choose whole grain products Foods high in sodium   Water is best for hydration Fast food.    Eat at home when possible     Tips for increasing your physical activity – Adults who are physically

## (undated) NOTE — MR AVS SNAPSHOT
Kenny Santamaria [address] [facility] [address] [id number]  [phone]  [phone]               Thank you for choosing us for your health care visit with DEBORAH Weaver.   We are glad to serve you and happy to provide you wi Take 1 capsule (300 mg total) by mouth 3 (three) times daily. What changed:  Another medication with the same name was removed. Continue taking this medication, and follow the directions you see here.    Commonly known as:  CLEOCIN           COREG 12.5 MG Promethazine HCl 12.5 MG Tabs   TK 1 T PO TID PRN N   Commonly known as:  PHENERGAN           spironolactone 25 MG Tabs   25 mg daily. Commonly known as:  ALDACTONE           TRADJENTA 5 MG Tabs   Generic drug:  Linagliptin   Take 5 mg by mouth daily.

## (undated) NOTE — LETTER
[address], [place]  Authorization for Invasive Procedures  1.  I hereby authorize Dr. Puma Ybarra , my physician and whomever may be designated as the doctor's assistant, to perform the following operation and/or procedure:  CHOLECYSTOS performed for the purposes of advancing medicine, science, and/or education, provided my identity is not revealed. If the procedure has been videotaped, the physician/surgeon will obtain the original videotape.  The hospital will not be responsible for stor My signature below affirms that prior to the time of the procedure, I have explained to the patient and/or his legal representative, the risks and benefits involved in the proposed treatment and any reasonable alternative to the proposed treatment.  I have

## (undated) NOTE — IP AVS SNAPSHOT
[facility]            (For Outpatient Use Only) Initial Admit Date: 10/7/2022   Inpt/Obs Admit Date: Inpt: 10/7/22 / Obs: N/A   Discharge Date:    Shyla Lujan:  [de-identified]   MRN: [de-identified]   CSN: [id number]   CEID: [id number]        ENCOUNTER  Patient Class: Inpatient Admitting Provider: Joaquin Frias MD Unit: [facility] Service: Cardiac Telemetry Attending Provider: Joaquin Frias MD   Bed: 509-A   Visit Type:   Referring Physician: No ref. provider found Billing Flag:    Admit Diagnosis: Severe sepsis ([facility] [address] [A41.9, R65.20]      PATIENT  Legal Name:   Alec Gonzales   Legal Sex: Male  Gender ID:              Pref Name:    PCP:  Miguel Obrienland: [phone]   Address:  [address] [place] [address] : 1960 (61 yrs) Mobile: [phone]         City/[facility]/Zip: [facility] [address] Marital:  Language: Jordin Balzarine: Nohemi Boron: [id number] Anabaptism: [address] Not Brissa Dies*     Race: White Ethnicity: Non  Or [address] [place] [address] Street   Name Relationship Legal Guardian? Home Phone Work Phone Mobile Phone   1. Viji Gomez  2.  Maxinealexsander MorinKimberly of [facility]          [phone]     Eufemia Massey : 1960 Home Phone: [phone]   Address: [address] [place] [address]  Sex: Male Work Phone:    City/State/Zip: [place] [address] [place]amar Chao   Rel. to Patient: Self Guarantor ID: [id number]   GUARANTOR EMPLOYER   Employer:  Status: DISABLED     COVERAGE  PRIMARY INSURANCE   Payor: MEDICARE Plan: MEDICARE PART A&B   Group Number:  Insurance Type: INDEMNITY   Subscriber Name: Gilda Dunn : 1960   Subscriber ID: [id number] Pt Rel to Subscriber: Self   SECONDARY INSURANCE   Payor: COMMERCIAL Plan: [employer] [facility]   Group Number: [id number] Insurance Type: [address] Name: Gilda Dunn : 1960   Subscriber ID: [id number] Pt Rel to Subscriber: SELF   TERTIARY INSURANCE   Payor:  Plan:    Group Number:  Insurance Type:    Subscriber Name:  Subscriber :    Subscriber ID:  Pt Rel to Subscriber:    Hospital Account Financial Class: Medicare    2022

## (undated) NOTE — LETTER
[facility]  Administration of Anesthesia  1. I, Patisophia Flores, or _________________________________ acting on his behalf, (Patient) (Dependent/Representative) request to receive anesthesia for my pending procedure/operation/treatment.   A tonjay infections, high spinal block, spinal bleeding, seizure, cardiac arrest and death. 7. AWARENESS: I understand that it is possible (but unlikely) to have explicit memory of events from the operating room while under general anesthesia.   8. ELECTROCONVULSIV unconscious pt /Relationship    My signature below affirms that prior to the time of the procedure, I have explained to the patient and/or his/her guardian, the risks and benefits of undergoing anesthesia, as well as any reasonable alternatives.     _______

## (undated) NOTE — LETTER
[address] [place] [address]  Authorization for Surgical Operation or Procedure  Date: ______________       Time: _______________  1.  I hereby authorize Dr. Keyla Norris , my physician and the assistant, to perform the following operation performed for the purposes of advancing medicine, science, and/or education, provided my identity is not revealed. If the procedure has been videotaped, the physician/surgeon will obtain the original videotape.  The hospital will not be responsible for stor alternative to the proposed treatment. I have also explained the risks and benefits involved in the refusal of the proposed treatment and have answered the patient's questions.  If I have a significant financial interest in a co-management agreement or a si

## (undated) NOTE — LETTER
[facility], [place]  Authorization for Invasive Procedures  1.  I hereby authorize Dr. Pa Brantley  , my physician and whomever may be designated as the doctor's assistant, to perform the following operation and/or procedure:  temporary performed for the purposes of advancing medicine, science, and/or education, provided my identity is not revealed. If the procedure has been videotaped, the physician/surgeon will obtain the original videotape.  The hospital will not be responsible for stor My signature below affirms that prior to the time of the procedure, I have explained to the patient and/or his legal representative, the risks and benefits involved in the proposed treatment and any reasonable alternative to the proposed treatment.  I have

## (undated) NOTE — LETTER
[address] [facility], [facility]     I agree to have a Peripherally Inserted Central Catheter (PICC) placed in my arm.      1. The PICC insertion procedure, care, maintenance, risks, benefits, and complications Statement of Physician: My signature below affirms that prior to the time of the PICC line insertion, I have explained to the patient and/or his/her legal representative, the risks and benefits involved in the proposed treatment and any reasonable alternat

## (undated) NOTE — IP AVS SNAPSHOT
Patient Demographics     Address  [address] [place] [address] E [address] Phone  [phone] [facility]) E-mail Address  [email or web address]      Emergency Contact(s)     Name Relation Home Work Elizabeth [phone] 1 tablet (3.125 mg total) by Per NG Tube route 2 (two) times daily with meals. Tono Walls MD         diphenhydrAMINE HCl 25 MG Tabs  Commonly known as:  DIPHENHIST      Take 25 mg by mouth every 6 (six) hours as needed for Itching.           [place] Knife Promethazine HCl 12.5 MG Tabs  Commonly known as:  PHENERGAN      Take 1 tablet (12.5 mg total) by mouth every 4 (four) hours as needed.    Maddie Fernando MD         TRUEPLUS PEN NEEDLES 32G X 4 MM Misc  Generic drug:  Insulin Pen Needle      USE LEFT UPPER ARM     Order ID Medication Name Action Time Action Reason Comments    [id number] Insulin Aspart Pen (NOVOLOG) 100 UNIT/ML flexpen 1-5 Units 07/15/19 1737 Given      [id number] Insulin Aspart Pen (NOVOLOG) 100 UNIT/ML flexpen 6 Units 07/15/ ---------                               -----------         ------                     CBC W/ DIFFERENTIAL[[id number]]          Abnormal            Final result                 Please view results for these tests on the individual orders.     Specimen Inform Order Status:  Completed Lab Status:  Final result Updated:  07/11/19 4053    Specimen:  Urine, clean catch      Urine Culture <10,000 CFU/ML Gram positive jesus    GI Stool panel by PCR Once [[id number]]  (Normal) Collected:  07/10/19 1530    Order Status ADMISSION DATE:       07/09/2019    HISTORY AND PHYSICAL EXAMINATION    DATE OF EXAMINATION:  07/10/2019. CHIEF COMPLAINT:  Abdominal distention and pain.     HISTORY OF PRESENT ILLNESS:  This is a 39-year-old male who spent 2 months in the hospital, fro of 20%.   Chronic pain with chronic narcotic use, congestive heart failure, diabetes, diverticulosis with episodes of diverticulitis, gastroesophageal reflux disease, hypertension, fibromyalgia, history of hepatic encephalopathy, hyperlipidemia, morbid obes does snore at night, also has heartburn on and off. There are otherwise no additional pertinent positives or negatives on a 12-point review of systems except as listed in the History of Present Illness.        PHYSICAL EXAMINATION:    VITAL SIGNS:  Blood p CT scan of the abdomen and pelvis was previously mentioned. ASSESSMENT AND PLAN:    1. 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   Recheck obstructive series after the patient has had a re Hosp Day # 6 PCP eZeshan Francisco     Date of Admission:  7/9/2019  Date of Consult:  7/16/2019  Reason for Consultation:   The patient with symptom of depression, Dr. Lindsey Mckay requested psychiatric consult for evaluation and advice.     Consult Duration     T taking Ambien 5 mg as needed for insomnia.       Medical History:       Past Medical History[GA.1]  Past Medical History:   Diagnosis Date   • Cardiomyopathy [facility])    • Chronic pain     chronic narcotics   • [facility]    • Diabetes ([facility] Heparin Sodium (Porcine) 5000 UNIT/ML injection 5,000 Units 5,000 Units Subcutaneous 2 times per day   ondansetron HCl (ZOFRAN) injection 4 mg 4 mg Intravenous Q6H PRN   Metoclopramide HCl (REGLAN) injection 10 mg 10 mg Intravenous Q8H PRN   Pantoprazole S Methylnaltrexone Bromide (RELISTOR) 150 MG Oral Tab Take 450 mg by mouth daily. levETIRAcetam 1000 MG Oral Tab 1 tablet (1,000 mg total) by Per NG Tube route 2 (two) times daily.    carvedilol 3.125 MG Oral Tab 1 tablet (3.125 mg total) by Per NG Tube rou Insulin Pen Needle (BD PEN NEEDLE ROMMEL U/F) 32G X 4 MM Does not apply Misc TEST THREE TIMES DAILY AS DIRECTED   TRUEPLUS PEN NEEDLES 32G X 4 MM Does not apply Misc USE TO INJECT INSULIN THREE TIMES DAILY   Insulin Pen Needle (BD PEN NEEDLE ROMMEL U/F) 32G X CONCLUSION:  1. Moderate stool throughout the colon consistent with constipation/fecal retention. 2. Small-bowel distention has resolved.   Nonspecific nonobstructive abdominal gas pattern    Dictated by (CST): Ta Conway MD on 7/15/2019 at 11:31 Orders Placed This Encounter      CBC With Differential With Platelet      Basic Metabolic Panel (8)      Hepatic Function Panel (7)      Comp Metabolic Panel (14)      Magnesium      CBC With Differential With Platelet      Hemoglobin A1C      Potassium Ileus ([facility] [address]  Active Problems:    Hypokalemia    Hyponatremia    Abdominal pain, acute      PHYSICAL THERAPY ASSESSMENT     Pt seen daily. Pt educated on deep breathing,relaxation and energy conservation technique. Max a x 2 for bed mobility. Due to BM PT R -   Need to walk in hospital room?: Total   -   Climbing 3-5 steps with a railing?: Total     AM-PAC Score:  Raw Score: 8   Approx Degree of Impairment: 86.62%   Standardized Score (AM-PAC Scale): 28.58   CMS Modifier (G-Code): CM    FUNCTIONAL ABILITY STA

## (undated) NOTE — LETTER
[facility]  Administration of Anesthesia  1. I, Carmen Castellanos, or _________________________________ acting on his behalf, (Patient) (Dependent/Representative) request to receive anesthesia for my pending procedure/operation/treatment.   A phy infections, high spinal block, spinal bleeding, seizure, cardiac arrest and death. 7. AWARENESS: I understand that it is possible (but unlikely) to have explicit memory of events from the operating room while under general anesthesia.   8. ELECTROCONVULSIV unconscious pt /Relationship    My signature below affirms that prior to the time of the procedure, I have explained to the patient and/or his/her guardian, the risks and benefits of undergoing anesthesia, as well as any reasonable alternatives.     _______

## (undated) NOTE — IP AVS SNAPSHOT
Patient Demographics     Address  [address] [place] [address] Phone  [phone] [facility]) E-mail Address  [email or web address]      Emergency Contact(s)     Name Relation Home Work Kedardanteduy [phone] Take 2 tablets (1,000 mg total) by mouth every 6 (six) hours as needed.    Michel Saldana MD         acetaminophen 325 MG Tabs  Commonly known as:  TYLENOL  Next dose due:  Anytime as needed      Take 2 tablets (650 mg total) by mouth every 6 (six) hour Next dose due: With lunch      Inject 20 Units into the skin Noon.    Lachelle Baird MD         ipratropium-albuterol 0.5-2.5 (3) MG/3ML Soln  Commonly known as:  DUONEB  Next dose due:  Anytime as needed      Take 3 mL by nebulization every 6 (six) hours as Take 1 tablet (40 mg total) by mouth every morning before breakfast.   Sabi Santiago MD         Promethazine HCl 12.5 MG Tabs  Commonly known as:  PHENERGAN  Next dose due:  Anytime as needed      Take 1 tablet (12.5 mg total) by mouth every 4 (four) [id number] meropenem (MERREM) 1 g in sodium chloride 0.9% 100 mL MBP 07/26/19 0313 New Bag      [id number] meropenem (MERREM) 1 g in sodium chloride 0.9% 100 mL MBP 07/26/19 1235 New Bag      [id number] mirtazapine (REMERON) tab 15 mg 07/25/19 2021 Given Patient's Most Recent Weight       Most Recent Value   Patient Weight  136.1 kg (300 lb)         Lab Results Last 24 Hours      RAINBOW DRAW GOLD [[id number]]  Resulted: 07/26/19 0800, Result status: Final result   Ordering provider:  Patsy Mina MD  0 Type Source Collected On   Blood — 07/26/19 0504          Components    Component Value Reference Range Flag Lab   Slide Review Platelets reviewed on smear — — [facility]            CBC WITH DIFFERENTIAL WITH PLATELET [[id number]]  Resulted: 07/26/19 0985 HEPATIC FUNCTION PANEL (7) [[id number]] (Abnormal)  Resulted: 07/26/19 0558, Result status: Final result   Ordering provider:  Verna Adhikari MD  07/25/19 5019 Resulting lab:  [facility] [place] [facility]    Specimen Information    Type Source Collected On   Blood — 07/2 Order Status:  Completed Lab Status:  Preliminary result Updated:  07/26/19 1013    Specimen:   Body fluid, unspecified from Bile      Aerobic Culture Result No Growth 2 Days     Aerobic Smear No WBCs seen      No organisms seen    Anaerobic Culture [[id number] H&P signed by Kenny Valdez MD at 7/23/2019  2:14 PM   Version 1 of 1    Author:  Kenny Valdez MD Service:  Hospitalist Author Type:  Physician    Filed:  7/23/2019  2:14 PM Status:  Signed    :  Kenny Valdez MD (Physician)       [facility] opiates altogether, history of hepatitis C and evidence of liver cirrhosis on imaging studies, seizure disorder. PAST SURGICAL HISTORY:  Left total knee arthroplasty, tracheostomy after episode of respiratory failure.     MEDICATIONS:  Please see medicat NEUROLOGIC:  Motor and sensory intact. Cranial nerves II-XII are intact. ASSESSMENT:    1. Acute cholecystitis with abdominal pain. 2.   History of hepatitis C and possible liver cirrhosis. 3.   Diabetes mellitus type 2.  4.   Morbid obesity.   5. and right transverse colon with colitiis. Pt eventually improved with colonic decompression twice with colonoscopy. Pt was discharged, returned once for short period.     This episode started last night, pain epigastrium, mild nausea, no vomiting, differe •  0.9% NaCl infusion, , Intravenous, Continuous  •  Piperacillin Sod-Tazobactam So (ZOSYN) 4.5 g in dextrose 5 % 100 mL ADD-vantage, 4.5 g, Intravenous, Q8H  •  Normal Saline Flush 0.9 % injection 3 mL, 3 mL, Intravenous, PRN  •  morphINE sulfate (PF) 2 M Extremities:  No lower extremity edema noted. Obese,Without clubbing or cyanosis. Neurologic: Cranial nerves are grossly intact. Motor strength and sensory examination is grossly normal.  No focal neurologic deficit.     Laboratory Data:  Lab Results Stigmata of portal hypertension, including marked splenomegaly. 5. Status post subtotal distal colectomy with primary rectosigmoid colocolonic anastomosis. 6. Scattered reticular opacities in the lungs bilaterally may represent atelectasis.   7. Lesser in Physical Therapy Notes (last 72 hours) (Notes from 7/23/2019  3:57 PM through 7/26/2019  3:57 PM)    No notes of this type exist for this encounter.      Occupational Therapy Notes (last 72 hours) (Notes from 7/23/2019  3:57 PM through 7/26/2019  3:57 PM)

## (undated) NOTE — LETTER
[facility]  Administration of Anesthesia  1. I, Fidelina Baires, or _________________________________ acting on his behalf, (Patient) (Dependent/Representative) request to receive anesthesia for my pending procedure/operation/treatment.   A tonjay infections, high spinal block, spinal bleeding, seizure, cardiac arrest and death. 7. AWARENESS: I understand that it is possible (but unlikely) to have explicit memory of events from the operating room while under general anesthesia.   8. ELECTROCONVULSIV unconscious pt /Relationship    My signature below affirms that prior to the time of the procedure, I have explained to the patient and/or his/her guardian, the risks and benefits of undergoing anesthesia, as well as any reasonable alternatives.     _______

## (undated) NOTE — LETTER
[address], [place]  Authorization for Invasive Procedures  1.  I hereby authorize Dr. Alva Scott , my physician and whomever may be designated as the doctor's assistant, to perform the following operation and/or procedure:  Central jorge performed for the purposes of advancing medicine, science, and/or education, provided my identity is not revealed. If the procedure has been videotaped, the physician/surgeon will obtain the original videotape.  The hospital will not be responsible for stor My signature below affirms that prior to the time of the procedure, I have explained to the patient and/or his legal representative, the risks and benefits involved in the proposed treatment and any reasonable alternative to the proposed treatment.  I have

## (undated) NOTE — LETTER
[facility], [place]  Authorization for Invasive Procedures  1.  I hereby authorize Dr. Aaron Cooley , my physician and whomever may be designated as the doctor's assistant, to perform the following operation and/or procedure:  colonoscop performed for the purposes of advancing medicine, science, and/or education, provided my identity is not revealed. If the procedure has been videotaped, the physician/surgeon will obtain the original videotape.  The hospital will not be responsible for stor My signature below affirms that prior to the time of the procedure, I have explained to the patient and/or his legal representative, the risks and benefits involved in the proposed treatment and any reasonable alternative to the proposed treatment.  I have

## (undated) NOTE — LETTER
[facility], [place]  Authorization for Invasive Procedures  1.  I hereby authorize Dr. Sisi Arndt** , my physician and whomever may be designated as the doctor's assistant, to perform the following operation and/or procedure:  *Cholecy performed for the purposes of advancing medicine, science, and/or education, provided my identity is not revealed. If the procedure has been videotaped, the physician/surgeon will obtain the original videotape.  The hospital will not be responsible for stor My signature below affirms that prior to the time of the procedure, I have explained to the patient and/or his legal representative, the risks and benefits involved in the proposed treatment and any reasonable alternative to the proposed treatment.  I have

## (undated) NOTE — LETTER
[facility], [place]  Authorization for Invasive Procedures  1.  I hereby authorize Dr. Christiane Shannon , my physician and whomever may be designated as the doctor's assistant, to perform the following operation and/or procedure:  Cholec performed for the purposes of advancing medicine, science, and/or education, provided my identity is not revealed. If the procedure has been videotaped, the physician/surgeon will obtain the original videotape.  The hospital will not be responsible for stor My signature below affirms that prior to the time of the procedure, I have explained to the patient and/or his legal representative, the risks and benefits involved in the proposed treatment and any reasonable alternative to the proposed treatment.  I have

## (undated) NOTE — IP AVS SNAPSHOT
[facility]            (For Outpatient Use Only) Initial Admit Date: 5/2/2019   Inpt/Obs Admit Date: Inpt: 5/2/19 / Obs: N/A   Discharge Date:    Silke Alex:  [de-identified]   MRN: [de-identified]   CSN: [id number]   CEID: [id number] Group Number:  Insurance Type:    Subscriber Name:  Subscriber :    Subscriber ID:  Pt Rel to Subscriber:    Hospital Account Financial Class: Medicare    2019

## (undated) NOTE — LETTER
[facility], [place]  Authorization for Invasive Procedures  1.  I hereby authorize Dr. Edward Saldivar , my physician and whomever may be designated as the doctor's assistant, to perform the following operation and/or procedure:  colonoscop performed for the purposes of advancing medicine, science, and/or education, provided my identity is not revealed. If the procedure has been videotaped, the physician/surgeon will obtain the original videotape.  The hospital will not be responsible for stor My signature below affirms that prior to the time of the procedure, I have explained to the patient and/or his legal representative, the risks and benefits involved in the proposed treatment and any reasonable alternative to the proposed treatment.  I have

## (undated) NOTE — IP AVS SNAPSHOT
[facility]            (For Outpatient Use Only) Initial Admit Date: 7/23/2019   Inpt/Obs Admit Date: Inpt: 7/23/19 / Obs: N/A   Discharge Date:    Jessica Martini:  [de-identified]   MRN: [de-identified]   CSN: [id number]   CEID: [id number]        ERIKA Subscriber Name:  Lynne Sexton :    Subscriber ID:  Pt Rel to Subscriber:    Hospital Account Financial Class: Medicare    2019

## (undated) NOTE — IP AVS SNAPSHOT
[facility]            (For Outpatient Use Only) Initial Admit Date: 7/9/2019   Inpt/Obs Admit Date: Inpt: 7/10/19 / Obs: N/A   Discharge Date:    Hospital Acct:  [de-identified]   MRN: [de-identified]   CSN: [id number]   CEID: [id number]        [facility] Subscriber Name:  Roland Frias :    Subscriber ID:  Pt Rel to Subscriber:    Hospital Account Financial Class: Medicare    2019

## (undated) NOTE — LETTER
[address], [place]  Authorization for Invasive Procedures  1. I hereby authorize Dr. Bing Frankel , my physician and whomever may be designated as the doctor's assistant, to perform the following operation and/or procedure:  colonoscopy with decompression tube placement on Tosha Cabral at [facility].    2. My physician has explained to me the nature and purpose of the operation or other procedure, possible alternative methods of treatment, the risks involved and the possibility of complications to me. I understand the probable consequences of declining the recommended procedure and the alternative methods of treatment. I acknowledge that no guarantee has been made as to the result that may be obtained. 3. I recognize that during the course of this operation or other procedure, unforeseen conditions may necessitate additional or different procedures than those listed above. I, therefore, further authorize and request that the above-named physician, his/her physician assistants, or designees perform such procedures as are, in his/her professional opinion, necessary and desirable. If I have a Do Not Attempt Resuscitation (DNAR) order in place, that status will be suspended while in the operating room, procedural suite, and during the recovery period unless otherwise explicitly stated by me (or a person authorized to consent on my behalf). The surgeon or my attending physician will determine when the applicable recovery period ends for purposes of reinstating the DNAR order. 4. Should the need arise during my operation or immediate post-operative period; I also consent to the administration of blood and/or blood products.  Further, I understand that despite careful testing and screening of blood and blood products, I may still be subject to ill effects as a result of recieving a blood transfusion an/or blood producst. The following are some, but not all, of the potential risks that can occur: fever and allergic reactions, hemolytic reactions, transmission of disease such as hepatitis, AIDS, cytomegalovirus (CMV), and flluid overload. In the event that I wish to have autologous transfusions of my own blood, or a directed donor transfusion, I will discuss this with my physician. 5. I consent to the photographing of the operations or procedures to be performed for the purposes of advancing medicine, science, and/or education, provided my identity is not revealed. If the procedure has been videotaped, the physician/surgeon will obtain the original videotape. [facility] will not be responsible for storage or maintenance of this tape. 6. I consent to the presence of a  or observer as deemed necessary by my physician or his designee. 7. Any tissues or organs removed in the operation or other procedure may be disposed of by and at the discretion of [facility].    8. I understand that the physician and his/her physician assistants may not be employees or agents of [facility], [facility], nor [facility], but are independent medical practitioners who have been permitted to use its facilities for the care and treatment of their patients. 9. Patients having a sterilization procedure: I understand that if the procedure is successful the results will be permanent and it will therefore be impossible for me to inseminate, conceive or bear children. I also understand that the procedure is intended to result in sterility, although the result has not been guaranteed. 10. I CERTIFY THAT I HAVE READ AND FULLY UNDERSTAND THE ABOVE CONSENT TO OPERATION and/or OTHER PROCEDURE. 11. I acknowledge that my physician has explained sedation/analgesia administration to me including the risks and benefits.  I consent to the administration of sedation/analgesia as may be necessary or desirable in the judgment of my physician. Signature of Patient:  ________________________________________________ Date: _________Time: _________    Responsible person in case of minor or unconscious: _____________________________Relationship: ____________     Witness Signature: ____________________________________________ Date: __________ Time: ___________  Statement of Physician  My signature below affirms that prior to the time of the procedure, I have explained to the patient and/or his legal representative, the risks and benefits involved in the proposed treatment and any reasonable alternative to the proposed treatment. I have also explained the risks and benefits involved in the refusal of the proposed treatment and have answered the patient's questions. If I have a significant financial interest in this procedure/surgery, I have disclosed this and had a discussion with my patient.     Signature of Physician:   ________________________________________Date: _________Time:_______ Patient Name: Sal Lubin  : 1960   Printed: 2022    Medical Record #: [id number]

## (undated) NOTE — LETTER
[facility], [place]  Authorization for Invasive Procedures  1.  I hereby authorize  Dr. Mayra Alvarenga / [facility]  my physician and whomever may be designated as the doctor's assistant, to perform the following operation and/or procedure:  Cho performed for the purposes of advancing medicine, science, and/or education, provided my identity is not revealed. If the procedure has been videotaped, the physician/surgeon will obtain the original videotape.  The hospital will not be responsible for stor My signature below affirms that prior to the time of the procedure, I have explained to the patient and/or his legal representative, the risks and benefits involved in the proposed treatment and any reasonable alternative to the proposed treatment.  I have

## (undated) NOTE — LETTER
2/21/2019              Guido Cui        [address] [facility] [address]         Dear Caryl Berger,    [address] [facility] records indicate that the tests ordered for you by Kaitlyn Agosto MD  have not been done.   If you have, in fact, already completed the olman

## (undated) NOTE — LETTER
Teena Ahn [address], [place], [place]  [address]  INFORMED CONSENT FOR TRANSFUSION OF BLOOD OR BLOOD PRODUCTS  My physician has informed me of the nature, purpose, benefits and risks of transfusion for blood and blood components that ______________________________________________  (Signature of Patient)                                                            (Responsible party in case of Minor,

## (undated) NOTE — LETTER
[address] [facility], [facility] [address]     I agree to have a Peripherally Inserted Central Catheter (PICC) placed in my arm.      1. The PICC insertion procedure, care, maintenance, risks, benefits, and complications Statement of Physician: My signature below affirms that prior to the time of the PICC line insertion, I have explained to the patient and/or his/her legal representative, the risks and benefits involved in the proposed treatment and any reasonable alternat

## (undated) NOTE — LETTER
[address], [place]  Authorization for Invasive Procedures  1.  I hereby authorize Dr. Joby Arellano , my physician and whomever may be designated as the doctor's assistant, to perform the following operation and/or procedure: JOSE Stewart performed for the purposes of advancing medicine, science, and/or education, provided my identity is not revealed. If the procedure has been videotaped, the physician/surgeon will obtain the original videotape.  The hospital will not be responsible for stor My signature below affirms that prior to the time of the procedure, I have explained to the patient and/or his legal representative, the risks and benefits involved in the proposed treatment and any reasonable alternative to the proposed treatment.  I have